# Patient Record
Sex: MALE | Race: WHITE | NOT HISPANIC OR LATINO | Employment: FULL TIME | URBAN - METROPOLITAN AREA
[De-identification: names, ages, dates, MRNs, and addresses within clinical notes are randomized per-mention and may not be internally consistent; named-entity substitution may affect disease eponyms.]

---

## 2017-08-14 ENCOUNTER — TRANSFERRED RECORDS (OUTPATIENT)
Dept: HEALTH INFORMATION MANAGEMENT | Facility: CLINIC | Age: 57
End: 2017-08-14

## 2017-11-16 DIAGNOSIS — I25.10 CORONARY ARTERY DISEASE INVOLVING NATIVE CORONARY ARTERY OF NATIVE HEART WITHOUT ANGINA PECTORIS: Primary | ICD-10-CM

## 2017-11-16 RX ORDER — METOPROLOL SUCCINATE 25 MG/1
TABLET, EXTENDED RELEASE ORAL
Qty: 30 TABLET | Refills: 9 | Status: SHIPPED | OUTPATIENT
Start: 2017-11-16 | End: 2018-10-09 | Stop reason: SDUPTHER

## 2018-01-08 ENCOUNTER — TELEPHONE (OUTPATIENT)
Dept: CARDIOLOGY | Facility: CLINIC | Age: 58
End: 2018-01-08

## 2018-01-08 DIAGNOSIS — E78.5 HYPERLIPIDEMIA, UNSPECIFIED HYPERLIPIDEMIA TYPE: Primary | ICD-10-CM

## 2018-01-08 NOTE — TELEPHONE ENCOUNTER
Spoke with patient and patient told lipid clinic that he forgets to take his medication. Patient told lipid clinic that he now places his medication in bathroom next to his shaving kit. Lipid clinic told patient to obtain lipid panel March 2018 and outpatient orders sent to patient. Patient agreed. Patient requested to be transfered to Dr. Faulkner's care after Dr. Purvis leaves.

## 2018-01-31 DIAGNOSIS — I25.10 CORONARY ARTERY DISEASE INVOLVING NATIVE CORONARY ARTERY OF NATIVE HEART WITHOUT ANGINA PECTORIS: Primary | ICD-10-CM

## 2018-01-31 RX ORDER — LISINOPRIL 5 MG/1
5 TABLET ORAL
Qty: 90 TABLET | Refills: 0 | Status: SHIPPED | OUTPATIENT
Start: 2018-01-31 | End: 2018-05-11 | Stop reason: SDUPTHER

## 2018-04-18 ENCOUNTER — TELEPHONE (OUTPATIENT)
Dept: CARDIOLOGY | Facility: CLINIC | Age: 58
End: 2018-04-18

## 2018-04-18 NOTE — TELEPHONE ENCOUNTER
Spoke with patient and reminded patient to obtain lipid panel and patient told lipid clinic that he forgot to get a lipid panel. Patient will look for outpatient lab orders tonight at home and if he does not find them, he will call lipid panel tomorrow and have lipid clinic send another outpatient lab orders. Lipid clinic agreed. Lipid clinic informed patient that lipid clinic will be out of office Friday April 20th  and returning April 30th.

## 2018-04-27 DIAGNOSIS — E78.5 HYPERLIPIDEMIA, UNSPECIFIED HYPERLIPIDEMIA TYPE: Primary | ICD-10-CM

## 2018-04-27 NOTE — TELEPHONE ENCOUNTER
Spoke with patient and informed of chol results. Lipid clinic told patient to decrease starch foods and pastry's. Patient told lipid clinic that he does not eat pastry;s ,but, eats a lot of potatoes.Patient told lipid clinic that he is trying to lose weight and will start exercising by walking with wife during nice weather months. Patient physically active by going up and down stairs a lot during his work hours and walk on cat walks throughout his day. Lipid clinic told patient to obtain lipid panel in 4 months (time to lose weight). Outpatient lab orders mailed to patient as patient requested.

## 2018-05-11 DIAGNOSIS — I25.10 CORONARY ARTERY DISEASE INVOLVING NATIVE CORONARY ARTERY OF NATIVE HEART WITHOUT ANGINA PECTORIS: ICD-10-CM

## 2018-05-11 RX ORDER — LISINOPRIL 5 MG/1
TABLET ORAL
Qty: 90 TABLET | Refills: 0 | Status: SHIPPED | OUTPATIENT
Start: 2018-05-11 | End: 2018-08-22 | Stop reason: SDUPTHER

## 2018-08-13 ENCOUNTER — CLINICAL SUPPORT (OUTPATIENT)
Dept: URGENT CARE | Facility: URGENT CARE | Age: 58
End: 2018-08-13
Payer: COMMERCIAL

## 2018-08-13 VITALS
DIASTOLIC BLOOD PRESSURE: 80 MMHG | HEART RATE: 79 BPM | OXYGEN SATURATION: 95 % | SYSTOLIC BLOOD PRESSURE: 126 MMHG | RESPIRATION RATE: 16 BRPM | TEMPERATURE: 98.7 F

## 2018-08-13 DIAGNOSIS — Z23 NEED FOR DIPHTHERIA-TETANUS-PERTUSSIS (TDAP) VACCINE: Primary | ICD-10-CM

## 2018-08-13 RX ORDER — ASPIRIN 81 MG/1
81 TABLET ORAL DAILY
COMMUNITY
End: 2018-12-18 | Stop reason: SDUPTHER

## 2018-08-13 ASSESSMENT — PAIN SCALES - GENERAL: PAINLEVEL: 0-NO PAIN

## 2018-08-22 DIAGNOSIS — I25.10 CORONARY ARTERY DISEASE INVOLVING NATIVE CORONARY ARTERY OF NATIVE HEART WITHOUT ANGINA PECTORIS: ICD-10-CM

## 2018-08-22 RX ORDER — LISINOPRIL 5 MG/1
5 TABLET ORAL DAILY
Qty: 60 TABLET | Refills: 0 | Status: SHIPPED | OUTPATIENT
Start: 2018-08-22 | End: 2018-10-09 | Stop reason: SDUPTHER

## 2018-10-09 DIAGNOSIS — I25.10 CORONARY ARTERY DISEASE INVOLVING NATIVE CORONARY ARTERY OF NATIVE HEART WITHOUT ANGINA PECTORIS: ICD-10-CM

## 2018-10-09 DIAGNOSIS — E78.2 MIXED HYPERLIPIDEMIA: Primary | ICD-10-CM

## 2018-10-10 RX ORDER — LISINOPRIL 5 MG/1
5 TABLET ORAL DAILY
Qty: 30 TABLET | Refills: 0 | Status: SHIPPED | OUTPATIENT
Start: 2018-10-10 | End: 2018-11-30 | Stop reason: SDUPTHER

## 2018-10-10 RX ORDER — ATORVASTATIN CALCIUM 80 MG/1
80 TABLET, FILM COATED ORAL NIGHTLY
Qty: 30 TABLET | Refills: 0 | Status: SHIPPED | OUTPATIENT
Start: 2018-10-10 | End: 2018-12-18 | Stop reason: SDUPTHER

## 2018-10-10 RX ORDER — METOPROLOL SUCCINATE 25 MG/1
25 TABLET, EXTENDED RELEASE ORAL DAILY
Qty: 30 TABLET | Refills: 0 | Status: SHIPPED | OUTPATIENT
Start: 2018-10-10 | End: 2018-11-30 | Stop reason: SDUPTHER

## 2018-11-09 ENCOUNTER — IMMUNIZATION (OUTPATIENT)
Dept: URGENT CARE | Facility: URGENT CARE | Age: 58
End: 2018-11-09
Payer: COMMERCIAL

## 2018-11-09 PROCEDURE — 90471 IMMUNIZATION ADMIN: CPT | Performed by: PHYSICIAN ASSISTANT

## 2018-11-09 PROCEDURE — 90686 IIV4 VACC NO PRSV 0.5 ML IM: CPT | Performed by: PHYSICIAN ASSISTANT

## 2018-11-30 ENCOUNTER — TELEPHONE (OUTPATIENT)
Dept: CARDIOLOGY | Facility: CLINIC | Age: 58
End: 2018-11-30

## 2018-11-30 DIAGNOSIS — I25.10 CORONARY ARTERY DISEASE INVOLVING NATIVE CORONARY ARTERY OF NATIVE HEART WITHOUT ANGINA PECTORIS: ICD-10-CM

## 2018-11-30 RX ORDER — METOPROLOL SUCCINATE 25 MG/1
25 TABLET, EXTENDED RELEASE ORAL DAILY
Qty: 30 TABLET | Refills: 0 | Status: SHIPPED | OUTPATIENT
Start: 2018-11-30 | End: 2018-12-18 | Stop reason: SDUPTHER

## 2018-11-30 RX ORDER — LISINOPRIL 5 MG/1
5 TABLET ORAL DAILY
Qty: 30 TABLET | Refills: 0 | Status: SHIPPED | OUTPATIENT
Start: 2018-11-30 | End: 2018-12-18 | Stop reason: SDUPTHER

## 2018-12-18 ENCOUNTER — OFFICE VISIT (OUTPATIENT)
Dept: CARDIOLOGY | Facility: CLINIC | Age: 58
End: 2018-12-18
Payer: COMMERCIAL

## 2018-12-18 VITALS
OXYGEN SATURATION: 91 % | WEIGHT: 198.8 LBS | BODY MASS INDEX: 26.93 KG/M2 | RESPIRATION RATE: 16 BRPM | DIASTOLIC BLOOD PRESSURE: 82 MMHG | HEART RATE: 76 BPM | SYSTOLIC BLOOD PRESSURE: 114 MMHG | HEIGHT: 72 IN

## 2018-12-18 DIAGNOSIS — I25.10 ATHEROSCLEROSIS OF NATIVE CORONARY ARTERY OF NATIVE HEART WITHOUT ANGINA PECTORIS: Primary | ICD-10-CM

## 2018-12-18 DIAGNOSIS — I35.0 NONRHEUMATIC AORTIC (VALVE) STENOSIS: ICD-10-CM

## 2018-12-18 DIAGNOSIS — I25.10 CORONARY ARTERY DISEASE INVOLVING NATIVE CORONARY ARTERY OF NATIVE HEART WITHOUT ANGINA PECTORIS: ICD-10-CM

## 2018-12-18 DIAGNOSIS — F17.210 CIGARETTE NICOTINE DEPENDENCE WITHOUT COMPLICATION: ICD-10-CM

## 2018-12-18 DIAGNOSIS — E78.2 MIXED HYPERLIPIDEMIA: ICD-10-CM

## 2018-12-18 PROBLEM — F17.200 NICOTINE DEPENDENCE, UNSPECIFIED, UNCOMPLICATED: Status: ACTIVE | Noted: 2017-08-03

## 2018-12-18 PROBLEM — Z95.1 HISTORY OF CORONARY ARTERY BYPASS GRAFT X 1: Chronic | Status: ACTIVE | Noted: 2018-12-18

## 2018-12-18 PROBLEM — Z95.2 S/P AVR: Chronic | Status: ACTIVE | Noted: 2018-12-18

## 2018-12-18 PROCEDURE — 99214 OFFICE O/P EST MOD 30 MIN: CPT | Performed by: NURSE PRACTITIONER

## 2018-12-18 RX ORDER — IBUPROFEN 200 MG
1 TABLET ORAL EVERY 24 HOURS
Qty: 45 PATCH | Refills: 0 | Status: SHIPPED | OUTPATIENT
Start: 2018-12-18 | End: 2019-01-17

## 2018-12-18 RX ORDER — LISINOPRIL 5 MG/1
5 TABLET ORAL DAILY
Qty: 90 TABLET | Refills: 3 | Status: SHIPPED | OUTPATIENT
Start: 2018-12-18 | End: 2020-01-30

## 2018-12-18 RX ORDER — NICOTINE 7MG/24HR
1 PATCH, TRANSDERMAL 24 HOURS TRANSDERMAL EVERY 24 HOURS
Qty: 14 PATCH | Refills: 0 | Status: SHIPPED | OUTPATIENT
Start: 2018-12-18 | End: 2019-01-17

## 2018-12-18 RX ORDER — ASPIRIN 81 MG/1
81 TABLET ORAL DAILY
Qty: 90 TABLET | Refills: 3 | Status: SHIPPED | OUTPATIENT
Start: 2018-12-18

## 2018-12-18 RX ORDER — ATORVASTATIN CALCIUM 80 MG/1
80 TABLET, FILM COATED ORAL NIGHTLY
Qty: 90 TABLET | Refills: 3 | Status: SHIPPED | OUTPATIENT
Start: 2018-12-18 | End: 2020-01-30

## 2018-12-18 RX ORDER — METOPROLOL SUCCINATE 25 MG/1
25 TABLET, EXTENDED RELEASE ORAL DAILY
Qty: 90 TABLET | Refills: 3 | Status: SHIPPED | OUTPATIENT
Start: 2018-12-18 | End: 2020-01-30

## 2018-12-18 SDOH — ECONOMIC STABILITY: FOOD INSECURITY
WITHIN THE PAST 12 MONTHS, YOU WORRIED THAT YOUR FOOD WOULD RUN OUT BEFORE YOU GOT THE MONEY TO BUY MORE.: PATIENT DECLINED

## 2018-12-18 SDOH — ECONOMIC STABILITY: TRANSPORTATION INSECURITY
IN THE PAST 12 MONTHS, HAS LACK OF TRANSPORTATION KEPT YOU FROM MEDICAL APPOINTMENTS OR FROM GETTING MEDICATIONS?: PATIENT DECLINED

## 2018-12-18 SDOH — ECONOMIC STABILITY: FOOD INSECURITY: HOW HARD IS IT FOR YOU TO PAY FOR THE VERY BASICS LIKE FOOD, HOUSING, MEDICAL CARE, AND HEATING?: PATIENT DECLINED

## 2018-12-18 SDOH — ECONOMIC STABILITY: FOOD INSECURITY: WITHIN THE PAST 12 MONTHS, THE FOOD YOU BOUGHT JUST DIDN'T LAST AND YOU DIDN'T HAVE MONEY TO GET MORE.: PATIENT DECLINED

## 2018-12-18 ASSESSMENT — ENCOUNTER SYMPTOMS
SHORTNESS OF BREATH: 1
WEIGHT LOSS: 1
MEMORY LOSS: 1
MYALGIAS: 1
DIAPHORESIS: 1
TREMORS: 1
NUMBNESS: 1
BLURRED VISION: 1
SNORING: 1
NIGHT SWEATS: 1
BRUISES/BLEEDS EASILY: 1

## 2018-12-18 ASSESSMENT — ACTIVITIES OF DAILY LIVING (ADL): LACK_OF_TRANSPORTATION: PATIENT DECLINED

## 2018-12-18 ASSESSMENT — PAIN SCALES - GENERAL: PAINLEVEL: 0-NO PAIN

## 2018-12-18 NOTE — PROGRESS NOTES
[facility], later [facility] [place]  [address] [place] [address]                                         Cardiology Outpatient Follow-up Note    Subjective    Patient ID: SHENA Franklin is a 58 y.o. male.    Chief Complaint:   Chief Complaint   Patient presents with   • Coronary Artery Disease   • Aortic valve replacement   • dilated ascending aorta       HPI    This is a pleasant 58-year-old male, patient followed previously by Dr. Purvis.  He has known history as listed below in detail.  He presents today for annual follow-up.  He is been feeling overall well.  He recently switched jobs and is doing more shift work so is a little more tired.  He is active as he is an  climbing up ladders and stairs.  He denies any chest pain/pressure/heaviness. He occasionally has some shortness of breath but relates this to his smoking.  He continues to smoke less than 1 pack/day.  He is interested in quitting with a nicotine patch but is worried that he would replace this with eating more and gaining weight.  He has managed to lose about 10 pounds since May of this year.  His blood pressure today is 114/82.  He denies any lower extremity swelling orthopnea.  At last visit, he did have episodes of dizziness and lightheadedness with blurred vision.  He occasionally will have these but they have improved.  He has not followed up with eye doctor ophthalmologist.  I did recommend this.  He did have carotid ultrasound done at that time showing 16-49% stenosis on the right ICA.  He does not have recent lab work and is scheduled to see his primary care provider in the next month.  We will send a lab slip for CMP and lipid panel.    Cardiac problem list:    Cardiology updated 11/28/2018 by Nela Aggarwal LPN-reviewed BT    Primary Cardiologist: Previously [facility]    1. Coronary Artery Disease  -Inferior MI 11/2013: 1V CABG (SVG-RCA) with concomitant AVR and ascending aortic dacron graft    2. Nonrheumatic  Aortic Valve Stenosis  -s/p Aortic Valve Replacement with a Justin Mitroflow bioprosthetic- 11/2013    -Echo 8/3/2017: mildly globally impaired LV function, EF 45%; mildly dilated and hypocontractile Rv, mild sclerosis of 27mm Justin Mitroflow bioprosthetic aortic valve, mild MVR, mild TVR, Grade I diastolic dysfunction, normal appearing 34mm Dacron Ascending Aorta graft.      3. Dilated Ascending Aorta  -s/p AAA repair with a 34mm Dacron graft- 11/2013    4. Hyperlipidemia  5. Tobacco use  6. Carotid stenosis  -Carotid duplex 8/2017: Right ICA stenosis 16-49%, left ICA stenosis 0-15% stenosis. Suggest follow-up study 1-2 years.    Past Medical History:   Diagnosis Date   • Aortic valve stenosis     s/p AV replacement 11/2013   • Ascending aorta dilatation (CMS/HCC) (HCC)     s/p AAA repair 11/2013   • CAD (coronary artery disease)    • Hyperlipidemia        Past Surgical History:   Procedure Laterality Date   • AAA REPAIR - ENDOGRAFT  11/2013   • AORTIC VALVE REPLACEMENT  11/2013   • CORONARY ANGIOPLASTY  11/2013   • CORONARY ARTERY BYPASS GRAFT  11/2013    1V CABG SVG- RCA       Allergies as of 12/18/2018   • (No Known Allergies)       Current Outpatient Medications   Medication Sig Dispense Refill   • aspirin 81 mg EC tablet Take 81 mg by mouth daily.     • atorvastatin (LIPITOR) 80 mg tablet Take 1 tablet (80 mg total) by mouth nightly. Patient needs appointment for further refills. 30 tablet 0   • lisinopril (PRINIVIL,ZESTRIL) 5 mg tablet Take 1 tablet (5 mg total) by mouth daily Pt has appt on 12/18/18 30 tablet 0   • metoprolol succinate XL (TOPROL-XL) 25 mg 24 hr tablet Take 1 tablet (25 mg total) by mouth daily Pt has appt on 12/18/18 30 tablet 0     No current facility-administered medications for this visit.        History reviewed. No pertinent family history.    Social History     Tobacco Use   • Smoking status: Current Every Day Smoker     Packs/day: 0.75     Years: 30.00     Pack years: 22.50   •  Smokeless tobacco: Never Used   Substance Use Topics   • Alcohol use: Yes     Alcohol/week: 2.4 - 3.0 oz     Types: 4 - 5 Cans of beer per week     Comment: 4-5 beers weekly   • Drug use: No       Review of Systems  Review of Systems   Constitution: Positive for diaphoresis, night sweats and weight loss.   Eyes: Positive for blurred vision.   Cardiovascular: Positive for leg swelling.   Respiratory: Positive for shortness of breath and snoring.    Hematologic/Lymphatic: Bruises/bleeds easily.   Skin: Positive for rash.   Musculoskeletal: Positive for joint pain and myalgias.   Genitourinary: Positive for nocturia.   Neurological: Positive for numbness and tremors.   Psychiatric/Behavioral: Positive for memory loss.       Objective     Vitals:    12/18/18 0754   BP: 114/82   Pulse: 76   Resp: 16   SpO2: 91%     Weight: 90.2 kg (198 lb 12.8 oz)  Physical Exam   Constitutional: He is oriented to person, place, and time. He appears well-developed.   Neck: Normal range of motion. Neck supple. No JVD present.   Cardiovascular: Normal rate, regular rhythm, normal heart sounds and intact distal pulses.   No murmur heard.  Pulmonary/Chest: Effort normal and breath sounds normal.   Abdominal: Soft. Bowel sounds are normal.   Musculoskeletal: Normal range of motion. He exhibits no edema.   Neurological: He is alert and oriented to person, place, and time.   Skin: Skin is warm and dry.   Psychiatric: He has a normal mood and affect.       Data Review:   Lab Results   Component Value Date     12/22/2017    K 4.6 12/22/2017     12/22/2017    CO2 29 12/22/2017    BUN 19 12/22/2017    CREATININE 1.0 12/22/2017    GLUCOSE 98 12/22/2017    CALCIUM 9.6 12/22/2017     No results found for: CKTOTAL, CKMB, CKMBINDEX, TROPONINI, BNP, POCBNP  Lab Results   Component Value Date    WBC 5.30 12/22/2017    HCT 50.80 12/22/2017    MCV 93.90 12/22/2017    .00 12/22/2017     Lab Results   Component Value Date    CHOL 81  04/21/2018    TRIG 177 04/21/2018    HDL 53 04/21/2018       No results found for: TSH    Lab Results   Component Value Date    CHOL 81 04/21/2018      Lab Results   Component Value Date    HDL 53 04/21/2018      Lab Results   Component Value Date    LDLCALC 108 04/21/2018      Lab Results   Component Value Date    TRIG 177 04/21/2018                   Assessment/Plan   Diagnoses and all orders for this visit:    Atherosclerosis of native coronary artery of native heart without angina pectoris    Nonrheumatic aortic (valve) stenosis    Mixed hyperlipidemia    Cigarette nicotine dependence without complication        Plan  Coronary artery disease: Patient is doing well from a cardiac standpoint.  He is active without anginal symptoms.  Most recent lipid panel 4/2018 demonstrated .  Would recommend this less than 70.  He continues to be on atorvastatin 80 mg daily.  No recent lipid panel on file, will send lab slip in addition to a CMP to be done in the next month.  If LDL continues to be elevated we may need to add on Zetia.  Patient is amenable to this plan.  Otherwise, will continue current therapy with beta-blocker, aspirin, and statin therapy.    History of AVR: Patient has history of aortic valve replacement with ascending aortic Dacron graft completed in 2013.  Most recent echocardiogram 8/2017 showed EF 45% with mild sclerosis of the 27 mm Justin Mitroflow bioprosthetic aortic valve, normal-appearing 34 mm Dacron ascending aortic graft.  We will continue to monitor.    Ischemic cardiomyopathy: Patient has known history of ischemic cardia myopathy with EF 45% noted on echocardiogram 8/2017.  He denies any heart failure symptoms.  NYHA class I.  Continue with line directed medical therapy with beta-blocker and ACE inhibitor.    Tobacco use: Patient continues to smoke less than 1 pack/day.  He is interested in quitting.  Will order 14 mg nicotine patch for 6 weeks then 7 mg patch for 2 weeks.  Encouraged  him to participate in routine exercise.  Find other ways of coping with his stress other than adding on more food.    Carotid stenosis: Most recent carotid duplex 8/2017 showed right ICA stenosis 16-49%, left ICA stenosis 0-15% stenosis.  Suggest follow-up study 1-2 years.  Continue aspirin and high intensity statin therapy.  Patient does have occasional blurred vision and dizziness.  Did recommend he follow-up with his ophthalmologist. May need to rule out PFO if continues. Denies any palpitations.     Patient Instructions   Routine exercise. Eat a heart healthy diet.      Return in about 1 year (around 12/18/2019), or sooner if needed.    The patient verbalized correct understanding and agreement to today's instructuctions and plan.  He voiced that questions have been addressed.    Some sections of this report may have been generated using a voice to text program.  Every effort is made to correct errors.  If mistakes are found they should be taken in context.    Electronically signed by: ALEXANDER GALVAN CNP  12/18/2018  7:56 AM

## 2019-01-03 ENCOUNTER — TELEPHONE (OUTPATIENT)
Dept: CARDIOLOGY | Facility: CLINIC | Age: 59
End: 2019-01-03

## 2019-01-03 NOTE — TELEPHONE ENCOUNTER
----- Message from Nadege Coombs CNP sent at 1/2/2019  4:11 PM [facility] -----  Please notify patient his kidney function, potassium, and liver function all look within normal range. I did not receive lipid panel results.  Thanks,  Nadege Coombs CNP

## 2019-01-03 NOTE — TELEPHONE ENCOUNTER
Patient has been notified of normal lab results and verbalized understanding. Patient will contact the clinic with any questions or concerns.

## 2019-01-07 ENCOUNTER — TELEPHONE (OUTPATIENT)
Dept: CARDIOLOGY | Facility: CLINIC | Age: 59
End: 2019-01-07

## 2019-01-07 DIAGNOSIS — E78.5 HYPERLIPIDEMIA, UNSPECIFIED HYPERLIPIDEMIA TYPE: Primary | ICD-10-CM

## 2019-01-07 RX ORDER — EZETIMIBE 10 MG/1
10 TABLET ORAL DAILY
Qty: 30 TABLET | Refills: 11 | Status: SHIPPED | OUTPATIENT
Start: 2019-01-07 | End: 2019-01-07 | Stop reason: SDUPTHER

## 2019-01-07 RX ORDER — EZETIMIBE 10 MG/1
10 TABLET ORAL DAILY
Qty: 90 TABLET | Refills: 3 | Status: SHIPPED | OUTPATIENT
Start: 2019-01-07 | End: 2020-01-07

## 2019-01-07 NOTE — TELEPHONE ENCOUNTER
----- Message from Nadege Coombs CNP sent at 1/7/2019  7:50 AM MST -----  Please notify patient of lipid panel results. LDL is recommended to be less than 70. Please assure he is taking his atorvastatin. Please add Zetia 10 mg daily. Advise heart healthy diet.  Thanks,  Nadege Coombs CNP

## 2019-01-07 NOTE — TELEPHONE ENCOUNTER
This has already been called on. [place]ney on 1/7/2019. Patient was made aware of addition of the Zetia.

## 2019-01-07 NOTE — TELEPHONE ENCOUNTER
Spoke with patient informed chol results. Patient told lipid clinic has lost 10 pounds due to change in hours at work (more nights), eats more portion sizes. Educated patient to healthy eating, less saturated trans fat foods, continue eating in portions size and exercise. Patient informed of ordering Zetia 10 mg to help lower chol. Patient agreed. Zetia 10 mg 90 tablet 3 refills ordered.

## 2019-11-01 ENCOUNTER — IMMUNIZATION (OUTPATIENT)
Dept: URGENT CARE | Facility: URGENT CARE | Age: 59
End: 2019-11-01
Payer: COMMERCIAL

## 2019-11-01 PROCEDURE — 90686 IIV4 VACC NO PRSV 0.5 ML IM: CPT | Mod: FLU

## 2019-11-01 PROCEDURE — 90471 IMMUNIZATION ADMIN: CPT | Mod: FLU

## 2020-01-30 DIAGNOSIS — E78.2 MIXED HYPERLIPIDEMIA: ICD-10-CM

## 2020-01-30 DIAGNOSIS — E78.2 MIXED HYPERLIPIDEMIA: Primary | ICD-10-CM

## 2020-01-30 DIAGNOSIS — I25.10 CORONARY ARTERY DISEASE INVOLVING NATIVE CORONARY ARTERY OF NATIVE HEART WITHOUT ANGINA PECTORIS: ICD-10-CM

## 2020-01-30 RX ORDER — NITROGLYCERIN 0.4 MG/1
0.4 TABLET SUBLINGUAL EVERY 5 MIN PRN
COMMUNITY
Start: 2013-10-30 | End: 2022-08-03 | Stop reason: SDUPTHER

## 2020-01-30 RX ORDER — ATORVASTATIN CALCIUM 80 MG/1
80 TABLET, FILM COATED ORAL DAILY
Qty: 30 TABLET | Refills: 1 | Status: SHIPPED | OUTPATIENT
Start: 2020-01-30 | End: 2020-03-16 | Stop reason: SDUPTHER

## 2020-01-30 RX ORDER — LISINOPRIL 5 MG/1
5 TABLET ORAL DAILY
Qty: 30 TABLET | Refills: 1 | Status: SHIPPED | OUTPATIENT
Start: 2020-01-30 | End: 2020-03-16 | Stop reason: SDUPTHER

## 2020-01-30 RX ORDER — METOPROLOL SUCCINATE 25 MG/1
25 TABLET, EXTENDED RELEASE ORAL DAILY
Qty: 30 TABLET | Refills: 1 | Status: SHIPPED | OUTPATIENT
Start: 2020-01-30 | End: 2020-03-16 | Stop reason: SDUPTHER

## 2020-01-30 NOTE — TELEPHONE ENCOUNTER
Called into [facility] refills on metoprolol succinate XL 25 mg 1 tab by mouth daily #30 with 1 refill, lisinopril 5 mg #30 with 1 refill, and atorvastatin 80 mg 1 tab nightly 30 with 1 refill as patient needs a follow up appointment and labs prior to any further refills. Patient is scheduled to see Nadege Marin CNP on 3-16-20 Vclement,RN        +                        +

## 2020-03-16 ENCOUNTER — OFFICE VISIT (OUTPATIENT)
Dept: CARDIOLOGY | Facility: CLINIC | Age: 60
End: 2020-03-16
Payer: COMMERCIAL

## 2020-03-16 ENCOUNTER — TELEPHONE (OUTPATIENT)
Dept: CARDIOLOGY | Facility: CLINIC | Age: 60
End: 2020-03-16

## 2020-03-16 VITALS
HEIGHT: 72 IN | SYSTOLIC BLOOD PRESSURE: 120 MMHG | OXYGEN SATURATION: 95 % | HEART RATE: 76 BPM | BODY MASS INDEX: 26.59 KG/M2 | WEIGHT: 196.3 LBS | DIASTOLIC BLOOD PRESSURE: 86 MMHG

## 2020-03-16 DIAGNOSIS — I25.10 CORONARY ARTERY DISEASE INVOLVING NATIVE CORONARY ARTERY OF NATIVE HEART WITHOUT ANGINA PECTORIS: ICD-10-CM

## 2020-03-16 DIAGNOSIS — I65.23 OCCLUSION AND STENOSIS OF BILATERAL CAROTID ARTERIES: ICD-10-CM

## 2020-03-16 DIAGNOSIS — E78.5 HYPERLIPIDEMIA, UNSPECIFIED HYPERLIPIDEMIA TYPE: Primary | ICD-10-CM

## 2020-03-16 DIAGNOSIS — E78.2 MIXED HYPERLIPIDEMIA: ICD-10-CM

## 2020-03-16 DIAGNOSIS — Z95.4 HISTORY OF ALLOGRAFT AORTIC VALVE REPLACEMENT: Primary | ICD-10-CM

## 2020-03-16 PROCEDURE — 99214 OFFICE O/P EST MOD 30 MIN: CPT | Performed by: NURSE PRACTITIONER

## 2020-03-16 RX ORDER — METOPROLOL SUCCINATE 25 MG/1
25 TABLET, EXTENDED RELEASE ORAL DAILY
Qty: 90 TABLET | Refills: 4 | Status: SHIPPED | OUTPATIENT
Start: 2020-03-16 | End: 2021-03-26

## 2020-03-16 RX ORDER — LISINOPRIL 5 MG/1
5 TABLET ORAL DAILY
Qty: 90 TABLET | Refills: 4 | Status: SHIPPED | OUTPATIENT
Start: 2020-03-16 | End: 2021-03-26

## 2020-03-16 RX ORDER — ATORVASTATIN CALCIUM 80 MG/1
80 TABLET, FILM COATED ORAL DAILY
Qty: 90 TABLET | Refills: 4 | Status: SHIPPED | OUTPATIENT
Start: 2020-03-16 | End: 2021-04-09 | Stop reason: SDUPTHER

## 2020-03-16 ASSESSMENT — ENCOUNTER SYMPTOMS
SPUTUM PRODUCTION: 1
POLYDIPSIA: 1
TREMORS: 1
SHORTNESS OF BREATH: 1
JOINT SWELLING: 1

## 2020-03-16 ASSESSMENT — PAIN SCALES - GENERAL: PAINLEVEL: 0-NO PAIN

## 2020-03-16 NOTE — PROGRESS NOTES
[facility] [place]  [address] [place] [address]                                         Cardiology Outpatient Follow-up Note    Subjective    Patient ID: SHENA Franklin is a 59 y.o. male.    Chief Complaint:   Chief Complaint   Patient presents with   • Coronary Artery Disease   • Hyperlipidemia   • Cardiomyopathy       HPI    This is a pleasant 59-year-old male, patient followed previously by Dr. marin.  He has known history of coronary disease with CABG x1 with SVG to RCA with concomitant AVR (Justin Mitroflow bioprosthetic valve) and ascending aortic dacron graft in 2013.  His most recent echocardiogram May 2017 showed EF 45%, 27 mm Justin Mitroflow bioprosthetic aortic valve with mild sclerosis, normal-appearing 34 mm Dacron ascending aortic graft.  He also has hyperlipidemia, carotid stenosis with recent duplex 8/2017 showing right ICA stenosis 16 to 49% and left ICA stenosis 0 to 15%.    Patient presents today for annual follow-up.  He is been feeling overall well.  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  He has 2 new grandbaby's a year and a half and 7 weeks old.  He is hoping he can get back to day shift so he can have more time with his grandkids.  He stays very active with his job and denies any chest pain.  He does have some chronic shortness of breath with his smoking but has not worsened.  He denies any lightheaded or dizziness.  No palpitations.  Occasionally some right ankle swelling due to his arthritis but otherwise no significant lower extremity swelling.  No orthopnea.  He does continue to smoke about a pack per day.  Last year I did send in some nicotine patches and he still has them but never started on.  He may consider this.    Cardiac problem list:    Cardiology updated 11/28/2018 by Nela Aggarwal LPN-reviewed BT    Primary Cardiologist: Previously  [place]    1. Coronary Artery Disease  -Inferior MI 11/2013: 1V CABG (SVG-RCA) with concomitant AVR and ascending aortic dacron graft    2. Nonrheumatic Aortic Valve Stenosis  -s/p Aortic Valve Replacement with a Justin Mitroflow bioprosthetic- 11/2013    -Echo 8/3/2017: mildly globally impaired LV function, EF 45%; mildly dilated and hypocontractile Rv, mild sclerosis of 27mm Justin Mitroflow bioprosthetic aortic valve, mild MVR, mild TVR, Grade I diastolic dysfunction, normal appearing 34mm Dacron Ascending Aorta graft.      3. Dilated Ascending Aorta  -s/p repair with a 34mm Dacron graft- 11/2013    4. Hyperlipidemia  5. Tobacco use  6. Carotid stenosis  -Carotid duplex 8/2017: Right ICA stenosis 16-49%, left ICA stenosis 0-15% stenosis. Suggest follow-up study 1-2 years.    Past Medical History:   Diagnosis Date   • Aortic valve stenosis     s/p AV replacement 11/2013   • Ascending aorta dilatation (CMS/HCC) (HCC)     s/p AAA repair 11/2013   • CAD (coronary artery disease)    • Hyperlipidemia        Past Surgical History:   Procedure Laterality Date   • AAA REPAIR - ENDOGRAFT  11/2013   • AORTIC VALVE REPLACEMENT  11/2013   • CORONARY ANGIOPLASTY  11/2013   • CORONARY ARTERY BYPASS GRAFT  11/2013    1V CABG SVG- RCA       Allergies as of 03/16/2020 - Reviewed 03/16/2020   Allergen Reaction Noted   • Ezetimibe Rash 03/16/2020       Current Outpatient Medications   Medication Sig Dispense Refill   • metoprolol succinate XL (TOPROL-XL) 25 mg 24 hr tablet Take 1 tablet (25 mg total) by mouth daily 30 tablet 1   • lisinopril (PRINIVIL,ZESTRIL) 5 mg tablet Take 1 tablet (5 mg total) by mouth daily 30 tablet 1   • atorvastatin (LIPITOR) 80 mg tablet Take 1 tablet (80 mg total) by mouth daily 30 tablet 1   • nitroglycerin (Nitrostat) 0.4 mg SL tablet As needed.     • aspirin 81 mg EC tablet Take 1 tablet (81 mg total) by mouth daily 90 tablet 3     No current facility-administered medications for this visit.         Family History   Problem Relation Age of Onset   • Heart disease Father    • Aneurysm Father        Social History     Tobacco Use   • Smoking status: Current Every Day Smoker     Packs/day: 0.75     Years: 30.00     Pack years: 22.50   • Smokeless tobacco: Never Used   Substance Use Topics   • Alcohol use: Yes     Alcohol/week: 4.0 - 5.0 standard drinks     Types: 4 - 5 Cans of beer per week     Comment: 4-5 beers weekly   • Drug use: No       Review of Systems  Review of Systems   Constitution: Positive for malaise/fatigue.   Cardiovascular: Positive for dyspnea on exertion.   Respiratory: Positive for shortness of breath and sputum production.    Endocrine: Positive for polydipsia and polyuria.   Skin: Positive for rash.   Musculoskeletal: Positive for joint pain, joint swelling and muscle weakness.   Genitourinary: Positive for nocturia.   Neurological: Positive for tremors.   All other systems reviewed and are negative.      Objective     Vitals:    03/16/20 1311   BP: 120/86   Pulse: 76   SpO2: 95%   Height: 1.829 m (6')   Weight: 89 kg (196 lb 4.8 oz)   PainSc: 0-No pain   Patient Position: Sitting     Weight: 89 kg (196 lb 4.8 oz)  Physical Exam   Constitutional: He is oriented to person, place, and time. He appears well-developed.   Neck: Normal range of motion. Neck supple.   Cardiovascular: Normal rate, regular rhythm and intact distal pulses.   Murmur heard.  Carotid bruits left greater than right.   Pulmonary/Chest: Effort normal and breath sounds normal.   Abdominal: Soft. Bowel sounds are normal.   Musculoskeletal: Normal range of motion.         General: No edema.   Neurological: He is alert and oriented to person, place, and time.   Skin: Skin is warm and dry.   Psychiatric: He has a normal mood and affect.       Data Review:   Sodium   Date Value Ref Range Status   03/12/2020 138 mmol/L Final     Potassium   Date Value Ref Range Status   03/12/2020 4.6 mmol/L Final     Chloride   Date Value  Ref Range Status   03/12/2020 103 mmol/L Final     CO2   Date Value Ref Range Status   03/12/2020 30 mmol/L Final     BUN   Date Value Ref Range Status   03/12/2020 18 mg/dL Final     Creatinine   Date Value Ref Range Status   03/12/2020 0.95 mg/dL Final     Glucose   Date Value Ref Range Status   03/12/2020 96 mg/dL Final     Calcium   Date Value Ref Range Status   03/12/2020 9.8 mg/dL Final     External WBC   Date Value Ref Range Status   03/12/2020 6.1 10*3/mL Final     External Hemoglobin   Date Value Ref Range Status   03/12/2020 18.3 g/dL Final     External Hematocrit   Date Value Ref Range Status   03/12/2020 52.5 % Final     External MCV   Date Value Ref Range Status   03/12/2020 93 fL Final     External Platelets   Date Value Ref Range Status   03/12/2020 142 10*3/uL Final     Cholesterol   Date Value Ref Range Status   03/12/2020 239 mg/dL Final     Triglycerides   Date Value Ref Range Status   03/12/2020 90 mg/dL Final     HDL   Date Value Ref Range Status   03/12/2020 61 mg/dL Final     LDL Calculated   Date Value Ref Range Status   03/12/2020 160 mg/dL Final       No results found for: TSH    Lab Results   Component Value Date    CHOL 239 03/12/2020      Lab Results   Component Value Date    HDL 61 03/12/2020      Lab Results   Component Value Date    LDLCALC 160 03/12/2020      Lab Results   Component Value Date    TRIG 90 03/12/2020      No results found for: TROPHS, UMOZAQ2AQ, HSDELTA1, FEONVD7GH, HSDELTA2       Assessment/Plan   Diagnoses and all orders for this visit:    History of allograft aortic valve replacement  -     US Echo complete; Future    Coronary artery disease involving native coronary artery of native heart without angina pectoris  -     metoprolol succinate XL (TOPROL-XL) 25 mg 24 hr tablet; Take 1 tablet (25 mg total) by mouth daily  -     lisinopriL (PRINIVIL,ZESTRIL) 5 mg tablet; Take 1 tablet (5 mg total) by mouth daily  -     atorvastatin (LIPITOR) 80 mg tablet; Take 1 tablet  (80 mg total) by mouth daily    Mixed hyperlipidemia  -     atorvastatin (LIPITOR) 80 mg tablet; Take 1 tablet (80 mg total) by mouth daily    Occlusion and stenosis of bilateral carotid arteries  -     US Carotid duplex bilateral; Future        Plan  Coronary artery disease: Patient has known history of CABG x1.  He stays very active without anginal symptoms. His most recent echocardiogram in 2017 did show an EF of 45%.  He is on Toprol-XL and lisinopril.  His LDL continues to be elevated at 160.  He is on high intensity statin therapy with atorvastatin 80 mg daily.  Last year we tried adding Zetia however he did develop a rash on his leg and abdomen.  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  Will have him follow-up with the lipid clinic to look into PCSK9 inhibitors such as Repatha or Praluent coverage.  Otherwise may benefit from research study to get his LDL down. Goal LDL less than 70.  Otherwise continue beta-blocker, aspirin, and statin therapy.    Aortic valve replacement: Patient history of aortic valve replacement with Justin Mitroflow Bioprosthetic valve in 2013 with 34 mm Dacron graft to the ascending aorta.  Most recent surveillance was 2017.  Will repeat echocardiogram.    Carotid stenosis: Patient was noted to have right ICA stenosis of 16 to 40% in 2017.  He has been on statin therapy.  He continues to smoke and his LDL is not well controlled.  We will repeat carotid ultrasound to be completed in the next few weeks.    Tobacco use: Patient has history of tobacco use with a pack per day.  Last year we talked about nicotine patches and he did get these ordered and has them still but has not started them.  Did encourage him to start as previously ordered.  He will notify clinic if he has any questions or needs further prescription.    Patient Instructions   -Will discuss with lipid clinic for PCSK9 inhibitor injections  for cholesterol management.   -Echocardiogram and carotid ultrasound in the next month.  -Heart healthy diet  -Routine exercise.     Return in about 1 year (around 3/16/2021), or sooner if needed.    The patient verbalized correct understanding and agreement to today's instructuctions and plan.  He voiced that questions have been addressed.    Some sections of this report may have been generated using a voice to text program.  Every effort is made to correct errors.  If mistakes are found they should be taken in context.    Electronically signed by: Nadege Marin, CNP

## 2020-03-16 NOTE — PATIENT INSTRUCTIONS
-Will discuss with lipid clinic for PCSK9 inhibitor injections for cholesterol management.   -Echocardiogram and carotid ultrasound in the next month.  -Heart healthy diet  -Routine exercise.

## 2020-03-16 NOTE — TELEPHONE ENCOUNTER
Nadege Marin CNP ordered PCSK 9. Lipid clinic visited with patient informed PCSK 9 injection process, cost, ordering process, side effects etc. Patient agrees with starting Repatha. E scribed Repatha 140 mg sure click pen to [facility] for ins approval. Patient told [facility] will call with ins approval status.Repatha will be obtained through [facility].

## 2020-03-17 ENCOUNTER — SPECIALTY PHARMACY (OUTPATIENT)
Dept: PHARMACY | Facility: HOSPITAL | Age: 60
End: 2020-03-17

## 2020-03-17 NOTE — PROGRESS NOTES
Prescription/Therapy Management Communication    Medication name:Repatha  Prescribed by: EVERETTE Marin    Patient copay prior to copay assistance:  $0.00  Patient actually paid (out-of-pocket): $0.00    Patient will start medication on:  03/17/2020    Patient enrolled in [facility] Therapy management program:   ____ Yes    __x__ No (pt opted out)        ____ Other:    Patient will receive:            Medication information/education   ___x__ Verbal   ___x__ Written  Disease state information/education        _____ Verbal         ___x__ Written     Injection Training (when applicable)   _____ Yes    _____ No  Sharps container (when applicable)            __x___ Yes          _____ No    Other:     _x___ alcohol wipes        ____ pill boxes     ____ medication card       ____ pen needles  ____ starter package ()              ____ training pen/device      Additional Notes: Gopal was counseled via phone on Repatha. All counseling points were discussed. injection training was done via phone. Injection instructions and medication information was also given to Gopal. He was advised to go to [employer] if he wants to watch injection video. He hads no further questions for the pharmacist at this time.      If you have any questions please call [facility] at [phone]

## 2020-03-24 ENCOUNTER — SPECIALTY PHARMACY (OUTPATIENT)
Dept: PHARMACY | Facility: HOSPITAL | Age: 60
End: 2020-03-24

## 2020-03-24 NOTE — PROGRESS NOTES
Spoke with Gopal for first follow up after initiation of therapy.  He reports that he did start therapy on 3/17 and was able to self-administer the first dose of his Repatha in his right upper thigh without difficulty.  His wife who is a nurse was present to help him.  He denies any adverse effect of medication including injection site reaction.  He is confident that he will not have any problem with future injections and states that he has a reminder marked on his calendar when his next injection is due.  He verifies that he has 5 syringes left and is storing them in the refrigerator as directed.  He has no questions or concerns at this time.  We reviewed the proactive refill process and I explained that we will be contacting him soon after he administers his last dose on hand in order to set up delivery or  of his medication, Gopal verbalizes understanding.  He has opted out of therapy management, therefore profile will be inactivated in [facility].

## 2020-04-10 ENCOUNTER — APPOINTMENT (OUTPATIENT)
Dept: RESEARCH | Facility: OTHER | Age: 60
End: 2020-04-10
Payer: COMMERCIAL

## 2020-04-27 ENCOUNTER — ANCILLARY PROCEDURE (OUTPATIENT)
Dept: CARDIOLOGY | Facility: CLINIC | Age: 60
End: 2020-04-27
Payer: COMMERCIAL

## 2020-04-27 ENCOUNTER — TRANSFERRED RECORDS (OUTPATIENT)
Dept: HEALTH INFORMATION MANAGEMENT | Facility: CLINIC | Age: 60
End: 2020-04-27

## 2020-04-27 VITALS
WEIGHT: 196 LBS | BODY MASS INDEX: 26.55 KG/M2 | HEIGHT: 72 IN | DIASTOLIC BLOOD PRESSURE: 62 MMHG | SYSTOLIC BLOOD PRESSURE: 110 MMHG

## 2020-04-27 VITALS — SYSTOLIC BLOOD PRESSURE: 104 MMHG | DIASTOLIC BLOOD PRESSURE: 61 MMHG

## 2020-04-27 LAB
EJECTION FRACTION: 44 %
EJECTION FRACTION: 45 %

## 2020-04-27 PROCEDURE — 93880 EXTRACRANIAL BILAT STUDY: CPT | Performed by: INTERNAL MEDICINE

## 2020-04-27 PROCEDURE — 93306 TTE W/DOPPLER COMPLETE: CPT | Performed by: INTERNAL MEDICINE

## 2021-02-03 DIAGNOSIS — E78.5 HYPERLIPIDEMIA, UNSPECIFIED HYPERLIPIDEMIA TYPE: ICD-10-CM

## 2021-02-05 RX ORDER — EVOLOCUMAB 140 MG/ML
140 INJECTION, SOLUTION SUBCUTANEOUS
Qty: 6 PEN | Refills: 3 | Status: SHIPPED | OUTPATIENT
Start: 2021-02-05 | End: 2022-01-17 | Stop reason: SDUPTHER

## 2021-03-03 ENCOUNTER — CLINICAL SUPPORT (OUTPATIENT)
Dept: FAMILY MEDICINE | Facility: CLINIC | Age: 61
End: 2021-03-03

## 2021-03-03 DIAGNOSIS — Z23 ENCOUNTER FOR VACCINATION: Primary | ICD-10-CM

## 2021-03-22 ENCOUNTER — TELEPHONE (OUTPATIENT)
Dept: CARDIOLOGY | Facility: CLINIC | Age: 61
End: 2021-03-22

## 2021-03-22 DIAGNOSIS — E78.2 MIXED HYPERLIPIDEMIA: Primary | ICD-10-CM

## 2021-03-22 NOTE — TELEPHONE ENCOUNTER
Spoke with patient informed needing lipid panel, needing PA for Repatha. Patient will obtain lipid panel at [facility] next week. Lipid panel orders faxed to [facility] [phone]

## 2021-03-24 ENCOUNTER — CLINICAL SUPPORT (OUTPATIENT)
Dept: FAMILY MEDICINE | Facility: CLINIC | Age: 61
End: 2021-03-24

## 2021-03-24 DIAGNOSIS — Z23 ENCOUNTER FOR VACCINATION: Primary | ICD-10-CM

## 2021-03-26 DIAGNOSIS — I25.10 CORONARY ARTERY DISEASE INVOLVING NATIVE CORONARY ARTERY OF NATIVE HEART WITHOUT ANGINA PECTORIS: ICD-10-CM

## 2021-03-26 RX ORDER — LISINOPRIL 5 MG/1
TABLET ORAL
Qty: 90 TABLET | Refills: 0 | Status: SHIPPED | OUTPATIENT
Start: 2021-03-26 | End: 2021-07-14 | Stop reason: SDUPTHER

## 2021-03-26 RX ORDER — METOPROLOL SUCCINATE 25 MG/1
TABLET, EXTENDED RELEASE ORAL
Qty: 90 TABLET | Refills: 0 | Status: SHIPPED | OUTPATIENT
Start: 2021-03-26 | End: 2021-07-14 | Stop reason: SDUPTHER

## 2021-04-01 ENCOUNTER — TELEPHONE (OUTPATIENT)
Dept: CARDIOLOGY | Facility: CLINIC | Age: 61
End: 2021-04-01

## 2021-04-01 NOTE — TELEPHONE ENCOUNTER
Spoke with patient's wife Robina, informed  needing lipid panel for patient. Robina will tell patient and will obtain lipid panel either Saturday April 3rd or Monday April 5th 2021.

## 2021-04-06 ENCOUNTER — TELEPHONE (OUTPATIENT)
Dept: CARDIOLOGY | Facility: CLINIC | Age: 61
End: 2021-04-06

## 2021-04-09 ENCOUNTER — TELEPHONE (OUTPATIENT)
Dept: CARDIOLOGY | Facility: CLINIC | Age: 61
End: 2021-04-09

## 2021-04-09 DIAGNOSIS — I25.10 CORONARY ARTERY DISEASE INVOLVING NATIVE CORONARY ARTERY OF NATIVE HEART WITHOUT ANGINA PECTORIS: ICD-10-CM

## 2021-04-09 DIAGNOSIS — E78.2 MIXED HYPERLIPIDEMIA: ICD-10-CM

## 2021-04-09 RX ORDER — ATORVASTATIN CALCIUM 80 MG/1
80 TABLET, FILM COATED ORAL DAILY
Qty: 90 TABLET | Refills: 3 | Status: SHIPPED | OUTPATIENT
Start: 2021-04-09 | End: 2022-08-03 | Stop reason: SDUPTHER

## 2021-04-09 NOTE — TELEPHONE ENCOUNTER
Spoke with patient discussed April 5th lipids with patient, started taking Repatha starting Sept 2020. No issues with Repatha, continues taking Atorvastatin 80 mg with no side effects. E scribed Atorvastatin 80 mg 90 tablets 3 refills. Does a lot of walking at work 3 miles daily, yard work during nice weather. Msg to schedulers to place on recall to schedule appt with Dr Gomez and schedule lab at [facility] lab in Oct 2021.

## 2021-04-15 DIAGNOSIS — E78.2 MIXED HYPERLIPIDEMIA: Primary | ICD-10-CM

## 2021-07-14 ENCOUNTER — OFFICE VISIT (OUTPATIENT)
Dept: CARDIOLOGY | Facility: CLINIC | Age: 61
End: 2021-07-14
Payer: COMMERCIAL

## 2021-07-14 ENCOUNTER — TRANSFERRED RECORDS (OUTPATIENT)
Dept: HEALTH INFORMATION MANAGEMENT | Facility: CLINIC | Age: 61
End: 2021-07-14

## 2021-07-14 VITALS
OXYGEN SATURATION: 96 % | BODY MASS INDEX: 26.57 KG/M2 | SYSTOLIC BLOOD PRESSURE: 110 MMHG | HEIGHT: 73 IN | DIASTOLIC BLOOD PRESSURE: 72 MMHG | WEIGHT: 200.5 LBS | HEART RATE: 74 BPM

## 2021-07-14 DIAGNOSIS — E78.2 MIXED HYPERLIPIDEMIA: ICD-10-CM

## 2021-07-14 DIAGNOSIS — Z95.2 AORTIC VALVE REPLACED: ICD-10-CM

## 2021-07-14 DIAGNOSIS — I25.10 CORONARY ARTERY DISEASE INVOLVING NATIVE CORONARY ARTERY OF NATIVE HEART WITHOUT ANGINA PECTORIS: Primary | ICD-10-CM

## 2021-07-14 PROCEDURE — 93005 ELECTROCARDIOGRAM TRACING: CPT | Performed by: INTERNAL MEDICINE

## 2021-07-14 PROCEDURE — 99214 OFFICE O/P EST MOD 30 MIN: CPT | Mod: 25 | Performed by: INTERNAL MEDICINE

## 2021-07-14 RX ORDER — LISINOPRIL 5 MG/1
5 TABLET ORAL DAILY
Qty: 90 TABLET | Refills: 3 | Status: SHIPPED | OUTPATIENT
Start: 2021-07-14 | End: 2022-08-03 | Stop reason: SDUPTHER

## 2021-07-14 RX ORDER — METOPROLOL SUCCINATE 25 MG/1
25 TABLET, EXTENDED RELEASE ORAL DAILY
Qty: 90 TABLET | Refills: 3 | Status: SHIPPED | OUTPATIENT
Start: 2021-07-14 | End: 2021-08-20 | Stop reason: SDUPTHER

## 2021-07-14 ASSESSMENT — PAIN SCALES - GENERAL: PAINLEVEL: 0-NO PAIN

## 2021-07-14 NOTE — PROGRESS NOTES
Cardiology Outpatient Note                                            MECCA Murry Notes:     Chief Complaint   Patient presents with   • Coronary Artery Disease     S/P CABG 1v 11/6/13    • Hx of AVR       Assessment/ Plan:    Coronary artery disease  Status post CABG x1 vessel (SVG to RCA).  ECG reviewed in clinic today shows normal sinus rhythm with rate IVCD and poor R wave progression through the precordial leads.  -No angina  -GDMT: Aspirin, beta-blocker, lisinopril  -Cardiac diet  -We will have our nutritionist Juliane reach out to patient    Ischemic cardiomyopathy  Last echo 4/2020 showed EF 44% with inferior wall hypokinesis.  NYHA/AHA 1/C  -Volume status: Warm and dry  -Diuretic strategy: None needed at present  -GDMT: Lisinopril, Toprol  -Repeat echo and will decide on more aggressive medical therapy if needed depending on ultrasound finding    Dyslipidemia  Last lipid panel from 4/2021 showed LDL 41, HDL 65, triglycerides 91, non-HDL 58.  LDL less than 70 non-HDL less than 100.  Unable to tolerate Zetia secondary to rash.  -Continue Lipitor 80 mg nightly  -Repatha  -Counseled on plant-based diet, increasing fiber intake, looking into aged garlic extract  -Offered patient meet with our nutritionist Juliane  -Needs hemoglobin A1c checked  -Given young age of onset of CAD would be reasonable to check a LP(a)    Carotid artery stenosis  Less than 50% stenosis in bilateral internal carotid arteries and has remained stable.  -We will likely repeat in the next 2 to 3 years to reassess stability    History of aortic valve replacement  History of ascending aortic aneurysm repair  In 2013 patient had a Justin Mitroflow bioprosthetic aortic valve replacement with a 34 mm 34 mm dacryon ascending aortic graft    Tobacco Abuse  -Encourage cessation and offered nicotine replacement therapy    Sleep disordered breathing  High stop bang score  -May be reasonable to consider sleep study in the future    HPI:  SHENA Saravia  Noemi is a 60 y.o. male being seen 08/09/21   for Coronary Artery Disease (S/P CABG 1v 11/6/13 ) and Hx of AVR     Patient states doing well overall.  Denies any dizziness, lightheadedness, falls, syncope, palpitations, racing heart.  Patient has noted some shortness of breath on exertion but feels it is more related to his smoking as well as having Covid in January 2021.  Patient denies any edema, orthopnea, PND, early satiety or abdominal fullness.  Patient denies any chest pain or claudication.  Denies any bleeding concerns like black or red stools hematuria or epistaxis.    With regards to patient's diet is primarily things like eggs toast cereals for breakfast.  For lunch he is often having deli sandwiches chips or leftovers.  Usually for dinner having hamburgers steaks casseroles with very minimal vegetable and fruit intake.  Does snack in between dinner and bedtime on ice cream.  Denies taking a fiber supplement.  Does not read nutrition labels.  Patient states he eats because of stress in his work schedule. He is an  with variable hours at the [employer]. Still smoking 10-15 cigs/day.      CARDIAC PROBLEM LIST:  Cardiac problem list:    Cardiology updated 11/28/2018 by Nela Aggarwal LPN-reviewed BT    Primary Cardiologist: Charo Purvis    1. Coronary Artery Disease  -Inferior MI 11/2013: 1V CABG (SVG-RCA) with concomitant AVR and ascending aortic dacron graft    2. Nonrheumatic Aortic Valve Stenosis  -s/p Aortic Valve Replacement with a Justin Mitroflow bioprosthetic- 11/2013    -Echo 8/3/2017: mildly globally impaired LV function, EF 45%; mildly dilated and hypocontractile Rv, mild sclerosis of 27mm Justin Mitroflow bioprosthetic aortic valve, mild MVR, mild TVR, Grade I diastolic dysfunction, normal appearing 34mm Dacron Ascending Aorta graft.      3. Dilated Ascending Aorta  -s/p repair with a 34mm Dacron graft- 11/2013    4. Hyperlipidemia  5. Tobacco use  6. Carotid  stenosis  -Carotid duplex 8/2017: Right ICA stenosis 16-49%, left ICA stenosis 0-15% stenosis. Suggest follow-up study 1-2 years.     Review of Systems:  The following system(s) were reviewed and negative.  Pertinent positive and negative findings are noted in the HPI.    [x]                     Const                                   [x]                      Eyes   [x]                     ENT                                     [x]                      Resp                                       [x]                     CV                                       [x]                      GI   [x]                                                            [x]                      Neuro   [x]                     Musc                                    [x]                      Skin   [x]                     Psych                                  [x]                      Endo   [x]                     Allergy                                 [x]                      Heme/Lymph.    Histories:  Past Medical History:   Diagnosis Date   • Aortic valve stenosis     s/p AV replacement 11/2013   • Ascending aorta dilatation (CMS/HCC) (HCC)     s/p AAA repair 11/2013   • CAD (coronary artery disease)    • Hyperlipidemia      Past Surgical History:   Procedure Laterality Date   • AAA REPAIR - ENDOGRAFT  11/2013   • AORTIC VALVE REPLACEMENT  11/2013   • CORONARY ANGIOPLASTY  11/2013   • CORONARY ARTERY BYPASS GRAFT  11/2013    1V CABG SVG- RCA     Family History   Problem Relation Age of Onset   • Heart disease Father    • Aneurysm Father      Social History     Occupational History   • Not on file   Tobacco Use   • Smoking status: Current Every Day Smoker     Packs/day: 0.75     Years: 30.00     Pack years: 22.50   • Smokeless tobacco: Never Used   Substance and Sexual Activity   • Alcohol use: Yes     Alcohol/week: 4.0 - 5.0 standard drinks     Types: 4 - 5 Cans of beer per week     Comment: 4-5 beers weekly   • Drug use: No   •  "Sexual activity: Defer   Social History Narrative   • Not on file     Social History     Tobacco Use   Smoking Status Current Every Day Smoker   • Packs/day: 0.75   • Years: 30.00   • Pack years: 22.50   Smokeless Tobacco Never Used     Social History     Substance and Sexual Activity   Drug Use No       Medications:   Current Outpatient Medications   Medication Sig Dispense Refill   • metoprolol succinate XL (TOPROL-XL) 25 mg 24 hr tablet Take 1 tablet (25 mg total) by mouth daily 90 tablet 3   • lisinopriL (PRINIVIL,ZESTRIL) 5 mg tablet Take 1 tablet (5 mg total) by mouth daily 90 tablet 3   • atorvastatin (LIPITOR) 80 mg tablet Take 1 tablet (80 mg total) by mouth daily 90 tablet 3   • evolocumab (Repatha SureClick) 140 mg/mL pen injector Inject 140 mg under the skin every 14 (fourteen) days 6 pen 3   • nitroglycerin (Nitrostat) 0.4 mg SL tablet As needed.     • aspirin 81 mg EC tablet Take 1 tablet (81 mg total) by mouth daily 90 tablet 3     No current facility-administered medications for this visit.       Allergies:  Allergies   Allergen Reactions   • Ezetimibe Rash     I have reviewed and confirmed SHENA Franklin's   allergies this visit.     Objective:    Vitals:    07/14/21 1442   BP: 110/72   Pulse: 74   SpO2: 96%   Height: 1.854 m (6' 1\")   Weight: 90.9 kg (200 lb 8 oz)   PainSc: 0-No pain        General:  Appears appropriate for stated age.  Awake, alert, and oriented x3. NAD  Head:  Normocephalic, atraumatic.  Neck:  Supple.  No JVD  Eyes:  Anicteric  Cardiovascular: Regular rate and rhythm  Respiratory:  No stridor   Abdominal:  Appears nondistended.    Extremities:  No clubbing, cyanosis.  No edema.    Skin:  No rashes noted.  Neurological:  CN 2 - 12 intact.  No gross sensory or motor deficits noted.  Musculoskeletal:  Able to move all 4 extremities spontaneously.    Data Review:   Sodium   Date Value Ref Range Status   03/12/2020 138 mmol/L Final     Potassium   Date Value Ref Range Status "   03/12/2020 4.6 mmol/L Final     Chloride   Date Value Ref Range Status   03/12/2020 103 mmol/L Final     CO2   Date Value Ref Range Status   03/12/2020 30 mmol/L Final     BUN   Date Value Ref Range Status   03/12/2020 18 mg/dL Final     Creatinine   Date Value Ref Range Status   03/12/2020 0.95 mg/dL Final     Glucose   Date Value Ref Range Status   03/12/2020 96 mg/dL Final     Calcium   Date Value Ref Range Status   03/12/2020 9.8 mg/dL Final     No results found for: CKTOTAL, CKMB, CKMBINDEX, TROPONINI, BNP, POCBNP  Lipids:    Lab Results   Component Value Date    CHOL 123 04/05/2021    CHOL 239 03/12/2020    CHOL 218 12/29/2018     Lab Results   Component Value Date    HDL 65 04/05/2021    HDL 61 03/12/2020    HDL 63 12/29/2018     Lab Results   Component Value Date    LDLCALC 41 04/05/2021    LDLCALC 160 03/12/2020    LDLCALC 134 12/29/2018     Lab Results   Component Value Date    TRIG 91 04/05/2021    TRIG 90 03/12/2020    TRIG 105 12/29/2018        TSH: No results found for: TSH  Magnesium: No results found for: MG  Protime-INR: No results found for: PT, INR  A1c: No results found for: HGBA1C    Patient Instructions   · Look into getting a fiber a supplement like metamucil to take 10 -15 minute before your meals  · Look into getting aged garlic extract which has been shown to have some plaque regression is more of a natural route (kyolic)  · Look into Dr. Power's diet  · We will repeat an ultrasound of your heart and let you know the results if everything is stable we will plan to follow-up with you in 1 year or sooner if there is any change  · Our nutritionist Juliane also try to reach out to you and your wife  · Try to watch her salt intake and maintain it less than 2 to 3 g a day by watching out for hidden salts and not adding salt to your food in particular things like garlic salt which also contain a lot of sodium  · Your children should have ultrasounds at somepoint also for a bicuspid aortic  valve             Next follow up in 1 year or sooner if clinical change or echocardiogram results are significantly abnormal    Sincerely,    Agusto Gomez, DO  08/09/21

## 2021-07-14 NOTE — PATIENT INSTRUCTIONS
· Look into getting a fiber a supplement like metamucil to take 10 -15 minute before your meals  · Look into getting aged garlic extract which has been shown to have some plaque regression is more of a natural route (kyolic)  · Look into Dr. Power's diet  · We will repeat an ultrasound of your heart and let you know the results if everything is stable we will plan to follow-up with you in 1 year or sooner if there is any change  · Our nutritionist Juliane also try to reach out to you and your wife  · Try to watch her salt intake and maintain it less than 2 to 3 g a day by watching out for hidden salts and not adding salt to your food in particular things like garlic salt which also contain a lot of sodium  · Your children should have ultrasounds at somepoint also for a bicuspid aortic valve

## 2021-07-21 PROCEDURE — 93010 ELECTROCARDIOGRAM REPORT: CPT | Performed by: INTERNAL MEDICINE

## 2021-08-18 ENCOUNTER — ANCILLARY PROCEDURE (OUTPATIENT)
Dept: CARDIOLOGY | Facility: CLINIC | Age: 61
End: 2021-08-18
Payer: COMMERCIAL

## 2021-08-18 ENCOUNTER — TRANSFERRED RECORDS (OUTPATIENT)
Dept: HEALTH INFORMATION MANAGEMENT | Facility: CLINIC | Age: 61
End: 2021-08-18

## 2021-08-18 VITALS — DIASTOLIC BLOOD PRESSURE: 80 MMHG | SYSTOLIC BLOOD PRESSURE: 142 MMHG

## 2021-08-18 DIAGNOSIS — Z95.2 AORTIC VALVE REPLACED: ICD-10-CM

## 2021-08-18 LAB
ASCENDING AORTA: 3.51 CM
AV LVOT PEAK GRADIENT: 3.2 MMHG
AV MEAN GRADIENT: 18.84 MMHG
AV PEAK GRADIENT: 30.03 MMHG
DOP CALC AO PEAK VEL: 2.74 M/S
DOP CALC AO VTI: 63.4 CM
DOP CALC LVOT DIAMETER: 2.12 CM
DOP CALC LVOT STROKE VOLUME: 74 CM3
DOP CALC MV VTI: 25.92 CM
DOP CALC RVOT PEAK VEL: 0.5 M/S
DOP CALCLVOT PEAK VEL VTI: 21.1 CM
E/A RATIO: 1.4
E/E' RATIO (AVERAGE): 8.9
E/E' RATIO: 6.9
EJECTION FRACTION: 38 %
ERAP: 5 MMHG
INTERVENTRICULAR SEPTUM: 0.9 CM (ref 0.6–1.1)
LA AREA A4C SYSTOLE: 79.5 CM3
LEFT ATRIUM SIZE: 4.31 CM
LEFT ATRIUM VOLUME INDEX: 44 ML/M2
LEFT ATRIUM VOLUME: 93.8 CM3
LEFT INTERNAL DIMENSION IN SYSTOLE: 3.7 CM (ref 2.1–4)
LEFT VENTRICLE DIASTOLIC VOLUME: 155 CM3
LEFT VENTRICLE SYSTOLIC VOLUME: 96 CM3
LEFT VENTRICULAR INTERNAL DIMENSION IN DIASTOLE: 5.5 CM (ref 3.5–6)
LVAD-AP2: 41.1 CM2
ML OF DILUTED DEFINITY INJECTED: 3.5 ML
MV DEC SLOPE: 6760 MM/S2
MV DT: 182 MS
MV MEAN GRADIENT: 1.7 MMHG
MV PEAK A VEL: 55 CM/S
MV PEAK E VEL: 77.7 CM/S
MV PEAK GRADIENT: 4 MMHG
MV STENOSIS PRESSURE HALF TIME: 43 MS
MV VALVE AREA P 1/2 METHOD: 5.12 CM2
MV VMAX: 102 CM/S
MVA (VTI): 2.87 CM2
POSTERIOR WALL: 1 CM (ref 0.6–1.1)
PULM VEIN S/D RATIO: 1.2
PV PEAK D VEL: 38 CM/S
PV PEAK GRADIENT: 4.08 MMHG
PV PEAK S VEL: 47 CM/S
PV VMAX: 1.01 M/S
RA AREA: 25 CM2
RH CV ECHO AV VALVE AREA VEL: 1.1 CM2
RH CV ECHO AV VALVE AREA VTI: 1.2 CM2
RH LVOT PEAK VELOCITY REST: 0.9 M/S
RIGHT VENTRICULAR INTERNAL DIMENSION IN DIASTOLE: 3.9 CM
RV AP4 BASE: 4.1 CM
S': 7.7 CM/S
TDI: 11.3 CM/S
TDILATERAL: 7.2 CM/S
TR MAX PG: 23.23 MMHG
TRICUSPID ANNULAR PLANE SYSTOLIC EXCURSION: 1.3 CM
TRICUSPID VALVE PEAK REGURGITATION VELOCITY: 2.41 M/S
TV REST PULMONARY ARTERY PRESSURE: 28 MMHG

## 2021-08-18 PROCEDURE — 93306 TTE W/DOPPLER COMPLETE: CPT | Performed by: INTERNAL MEDICINE

## 2021-08-20 ENCOUNTER — TELEPHONE (OUTPATIENT)
Dept: CARDIOLOGY | Facility: CLINIC | Age: 61
End: 2021-08-20

## 2021-08-20 DIAGNOSIS — I25.10 CORONARY ARTERY DISEASE INVOLVING NATIVE CORONARY ARTERY OF NATIVE HEART WITHOUT ANGINA PECTORIS: ICD-10-CM

## 2021-08-20 DIAGNOSIS — I50.9 HEART FAILURE, UNSPECIFIED HF CHRONICITY, UNSPECIFIED HEART FAILURE TYPE (CMS/HCC): Primary | ICD-10-CM

## 2021-08-20 RX ORDER — METOPROLOL SUCCINATE 50 MG/1
50 TABLET, EXTENDED RELEASE ORAL DAILY
Qty: 90 TABLET | Refills: 3 | Status: SHIPPED | OUTPATIENT
Start: 2021-08-20 | End: 2022-09-29 | Stop reason: ALTCHOICE

## 2021-08-20 RX ORDER — DAPAGLIFLOZIN 10 MG/1
10 TABLET, FILM COATED ORAL DAILY
Qty: 90 TABLET | Refills: 1 | Status: ON HOLD | OUTPATIENT
Start: 2021-08-20 | End: 2022-02-25 | Stop reason: ALTCHOICE

## 2021-10-10 ENCOUNTER — OFFICE VISIT (OUTPATIENT)
Dept: URGENT CARE | Facility: URGENT CARE | Age: 61
End: 2021-10-10
Payer: COMMERCIAL

## 2021-10-10 VITALS
SYSTOLIC BLOOD PRESSURE: 148 MMHG | HEIGHT: 73 IN | WEIGHT: 200 LBS | BODY MASS INDEX: 26.51 KG/M2 | DIASTOLIC BLOOD PRESSURE: 73 MMHG | OXYGEN SATURATION: 97 % | HEART RATE: 72 BPM | RESPIRATION RATE: 18 BRPM | TEMPERATURE: 98.6 F

## 2021-10-10 DIAGNOSIS — L50.9 URTICARIA: Primary | ICD-10-CM

## 2021-10-10 PROCEDURE — 99203 OFFICE O/P NEW LOW 30 MIN: CPT | Performed by: FAMILY MEDICINE

## 2021-10-10 RX ORDER — PREDNISONE 50 MG/1
50 TABLET ORAL DAILY
Qty: 5 TABLET | Refills: 0 | Status: SHIPPED | OUTPATIENT
Start: 2021-10-10 | End: 2021-10-15

## 2021-10-10 RX ORDER — CETIRIZINE HYDROCHLORIDE 10 MG/1
10 TABLET ORAL DAILY
Qty: 30 TABLET | Refills: 0 | Status: ON HOLD | OUTPATIENT
Start: 2021-10-10 | End: 2022-02-25 | Stop reason: ALTCHOICE

## 2021-10-10 RX ORDER — FAMOTIDINE 20 MG/1
20 TABLET, FILM COATED ORAL 2 TIMES DAILY
Qty: 60 TABLET | Refills: 0 | Status: ON HOLD | OUTPATIENT
Start: 2021-10-10 | End: 2022-02-25 | Stop reason: ALTCHOICE

## 2021-10-10 ASSESSMENT — PAIN SCALES - GENERAL: PAINLEVEL: 3

## 2021-10-10 NOTE — PATIENT INSTRUCTIONS
Urticaria  1.prednisone for 5 days  2.pepcid 20mg twice daily  3.zyrtec 10mg daily  4. Benadryl 25 to 50 mg every 6 hours as needed

## 2021-10-10 NOTE — PROGRESS NOTES
"Subjective      G Manjit Franklin is a 61 y.o. male who presents for rash.    HPI 61-year-old male with rash affecting face legs back and trunk for the last few days pruritic in nature has had raised welts.  Never had similar rash in the past denies any new medication recent illness  or new topical exposure.      The following have been reviewed and updated as appropriate in this visit:         Review of Systems  See HPI  Objective   /73   Pulse 72   Temp 37 °C (98.6 °F)   Resp 18   Ht 1.854 m (6' 1\")   Wt 90.7 kg (200 lb)   SpO2 97%   BMI 26.39 kg/m²     Physical Exam  General: He is alert no acute distress  HEENT: Normocephalic atraumatic mucous memories are moist pharynx is nonerythematous tongue and lips are nonswollen or affected by the rash  Respiratory: Clear auscultation bilaterally  Skin: Maculopapular rash which is pruritic and with raised wheals multiple areas affecting the face trunk legs and arms  Assessment/Plan   Diagnoses and all orders for this visit:    Urticaria  -     predniSONE 50 mg tablet; Take 1 tablet (50 mg total) by mouth daily for 5 days  -     cetirizine (ZyrTEC) 10 mg tablet; Take 1 tablet (10 mg total) by mouth daily  -     famotidine (Pepcid) 20 mg tablet; Take 1 tablet (20 mg total) by mouth 2 (two) times a day    1.  Urticaria: Pepcid and Zyrtec for pruritus, prednisone for 5 days as well as as needed Benadryl recommended only for use when does not have to operate equipment.  Discussed the potential for cognitive effects and decreased reaction time while using Benadryl.  Typically will last for 1 month.    Follow-up as needed    Santana Jade MD  "

## 2021-10-27 ENCOUNTER — TELEPHONE (OUTPATIENT)
Dept: CARDIOLOGY | Facility: CLINIC | Age: 61
End: 2021-10-27

## 2021-10-27 NOTE — TELEPHONE ENCOUNTER
Developing some hives & he would like to know if [facility] would recommend an allergy doc.     He asked if you could leave a voice mail with that info. I'm sure you can't, but that is what he asked me to tell you. He asked because he doesn't want to play phone tag with the nurse.

## 2021-10-28 NOTE — TELEPHONE ENCOUNTER
Returned call to pt. He states he has an appt with an allergist tomorrow, 10/29/21. He will call if he has any additional concerns.

## 2021-11-18 ENCOUNTER — TELEPHONE (OUTPATIENT)
Dept: CARDIOLOGY | Facility: CLINIC | Age: 61
End: 2021-11-18
Payer: COMMERCIAL

## 2021-11-18 NOTE — TELEPHONE ENCOUNTER
PT would like to have the nurse call & get the correct dosage of his metoprolol 50 mg sent to [employer].    Pt would like a confirmation call when this is done.

## 2022-01-17 DIAGNOSIS — E78.5 HYPERLIPIDEMIA, UNSPECIFIED HYPERLIPIDEMIA TYPE: ICD-10-CM

## 2022-01-18 RX ORDER — EVOLOCUMAB 140 MG/ML
140 INJECTION, SOLUTION SUBCUTANEOUS
Qty: 2 ML | Refills: 11 | Status: SHIPPED | OUTPATIENT
Start: 2022-01-18 | End: 2022-04-19 | Stop reason: SDUPTHER

## 2022-02-04 ENCOUNTER — CLINICAL SUPPORT (OUTPATIENT)
Dept: FAMILY MEDICINE | Facility: CLINIC | Age: 62
End: 2022-02-04

## 2022-02-04 DIAGNOSIS — Z23 ENCOUNTER FOR VACCINATION: Primary | ICD-10-CM

## 2022-02-04 PROCEDURE — 91300 PFIZER-BIONTECH SARS-COV-2 VACCINE: CPT

## 2022-02-04 PROCEDURE — 0004A PFIZER-BIONTECH SARS-COV-2 VACCINE: CPT

## 2022-02-25 ENCOUNTER — HOSPITAL ENCOUNTER (INPATIENT)
Facility: HOSPITAL | Age: 62
LOS: 2 days | Discharge: HOME | End: 2022-02-27
Attending: EMERGENCY MEDICINE | Admitting: HOSPITALIST
Payer: COMMERCIAL

## 2022-02-25 ENCOUNTER — APPOINTMENT (OUTPATIENT)
Dept: CARDIOLOGY | Facility: HOSPITAL | Age: 62
End: 2022-02-25
Payer: COMMERCIAL

## 2022-02-25 ENCOUNTER — APPOINTMENT (OUTPATIENT)
Dept: CT IMAGING | Facility: HOSPITAL | Age: 62
End: 2022-02-25
Payer: COMMERCIAL

## 2022-02-25 ENCOUNTER — APPOINTMENT (OUTPATIENT)
Dept: RADIOLOGY | Facility: HOSPITAL | Age: 62
End: 2022-02-25
Payer: COMMERCIAL

## 2022-02-25 DIAGNOSIS — Z95.1 HISTORY OF CORONARY ARTERY BYPASS GRAFT X 1: Chronic | ICD-10-CM

## 2022-02-25 DIAGNOSIS — I35.0 NONRHEUMATIC AORTIC (VALVE) STENOSIS: ICD-10-CM

## 2022-02-25 DIAGNOSIS — J44.1 COPD EXACERBATION (CMS/HCC): Primary | ICD-10-CM

## 2022-02-25 LAB
ALBUMIN SERPL-MCNC: 4.3 G/DL (ref 3.5–5.3)
ALP SERPL-CCNC: 88 U/L (ref 45–115)
ALT SERPL-CCNC: 14 U/L (ref 7–52)
ANION GAP SERPL CALC-SCNC: 8 MMOL/L (ref 3–11)
AR MAX PG: 57 MMHG
AR SLOPE: 5440 MM/S2
ASCENDING AORTA: 3.26 CM
AST SERPL-CCNC: 18 U/L
AV LVOT PEAK GRADIENT: 4 MMHG
AV MEAN GRADIENT: 20.23 MMHG
AV PEAK GRADIENT: 38.69 MMHG
AV REGURGITATION PRESSURE HALF TIME: 205 MS
B PARAP IS1001 DNA NPH QL NAA+NON-PROBE: NEGATIVE
B PERT.PT PRMT NPH QL NAA+NON-PROBE: NEGATIVE
BASE EXCESS BLDA CALC-SCNC: 2.3 MMOL/L (ref -2–2)
BASOPHILS # BLD AUTO: 0.1 10*3/UL
BASOPHILS NFR BLD AUTO: 1 % (ref 0–2)
BILIRUB SERPL-MCNC: 0.69 MG/DL (ref 0.2–1.4)
BNP SERPL-MCNC: 572 PG/ML (ref 0–100)
BUN SERPL-MCNC: 19 MG/DL (ref 7–25)
C PNEUM DNA NPH QL NAA+NON-PROBE: NEGATIVE
CALCIUM ALBUM COR SERPL-MCNC: 9 MG/DL (ref 8.6–10.3)
CALCIUM SERPL-MCNC: 9.2 MG/DL (ref 8.6–10.3)
CHLORIDE SERPL-SCNC: 105 MMOL/L (ref 98–107)
CO2 BLDA-SCNC: 23.5 MMOL/L (ref 19–24)
CO2 SERPL-SCNC: 26 MMOL/L (ref 21–32)
CREAT SERPL-MCNC: 1.01 MG/DL (ref 0.7–1.3)
DELTA HIGH SENSITIVITY TROPONIN I, 1 HOUR: -0.6
DELTA HIGH SENSITIVITY TROPONIN I, 2 HOUR: 0.9
DEVICE (RT): ABNORMAL
DOP CALC AO PEAK VEL: 3.11 M/S
DOP CALC AO VTI: 60.4 CM
DOP CALC LVOT DIAMETER: 2.2 CM
DOP CALC LVOT STROKE VOLUME: 83 CM3
DOP CALC MV VTI: 25.39 CM
DOP CALC RVOT PEAK VEL: 0.6 M/S
DOP CALCLVOT PEAK VEL VTI: 21.8 CM
ECHO EF ESTIMATED: 50 %
EJECTION FRACTION: 52 %
EJECTION FRACTION: 52 %
EOSINOPHIL # BLD AUTO: 0.2 10*3/UL
EOSINOPHIL NFR BLD AUTO: 3 % (ref 0–3)
ERAP: 5 MMHG
ERYTHROCYTE [DISTWIDTH] IN BLOOD BY AUTOMATED COUNT: 13 % (ref 11.5–15)
FIBRIN D-DIMER (NG/ML) IN PLATELET POOR PLASMA: 1081 NG/ML FEU
FLOW: 4 L/M
FLUAV RNA NPH QL NAA+NON-PROBE: NEGATIVE
FLUBV RNA NPH QL NAA+NON-PROBE: NEGATIVE
GFR SERPL CREATININE-BSD FRML MDRD: 86 ML/MIN/1.73M*2
GLUCOSE SERPL-MCNC: 103 MG/DL (ref 70–105)
HADV DNA NPH QL NAA+NON-PROBE: NEGATIVE
HCO3 BLDA-SCNC: 27 MMOL/L (ref 23–29)
HCOV 229E RNA NPH QL NAA+NON-PROBE: NEGATIVE
HCOV HKU1 RNA NPH QL NAA+NON-PROBE: NEGATIVE
HCOV NL63 RNA NPH QL NAA+NON-PROBE: NEGATIVE
HCOV OC43 RNA NPH QL NAA+NON-PROBE: NEGATIVE
HCT VFR BLD AUTO: 46.8 % (ref 38–50)
HGB BLD-MCNC: 15.9 G/DL (ref 13.2–17.2)
HMPV RNA NPH QL NAA+NON-PROBE: NEGATIVE
HPIV1 RNA NPH QL NAA+NON-PROBE: NEGATIVE
HPIV2 RNA NPH QL NAA+NON-PROBE: NEGATIVE
HPIV3 RNA NPH QL NAA+NON-PROBE: NEGATIVE
HPIV4 RNA NPH QL NAA+NON-PROBE: NEGATIVE
INTERVENTRICULAR SEPTUM: 1.3 CM (ref 0.6–1.1)
IVC PROX: 1.86 CM
LA AREA A4C SYSTOLE: 88.7 CM3
LEFT ATRIUM SIZE: 4.13 CM
LEFT ATRIUM VOLUME INDEX: 28 ML/M2
LEFT INTERNAL DIMENSION IN SYSTOLE: 3 CM (ref 2.1–4)
LEFT VENTRICLE DIASTOLIC VOLUME: 192 CM3
LEFT VENTRICLE SYSTOLIC VOLUME: 92 CM3
LEFT VENTRICULAR INTERNAL DIMENSION IN DIASTOLE: 5.1 CM (ref 3.5–6)
LVAD-AP2: 51.1 CM2
LYMPHOCYTES # BLD AUTO: 1.8 10*3/UL
LYMPHOCYTES NFR BLD AUTO: 20 % (ref 15–47)
M PNEUMO DNA NPH QL NAA+NON-PROBE: NEGATIVE
MCH RBC QN AUTO: 31.9 PG (ref 29–34)
MCHC RBC AUTO-ENTMCNC: 34 G/DL (ref 32–36)
MCV RBC AUTO: 93.9 FL (ref 82–97)
MONOCYTES # BLD AUTO: 0.6 10*3/UL
MONOCYTES NFR BLD AUTO: 7 % (ref 5–13)
MV DEC SLOPE: 3960 MM/S2
MV MEAN GRADIENT: 2 MMHG
MV PEAK GRADIENT: 6 MMHG
MV STENOSIS PRESSURE HALF TIME: 87 MS
MV VALVE AREA P 1/2 METHOD: 2.53 CM2
MV VMAX: 121 CM/S
MVA (VTI): 3.26 CM2
NEUTROPHILS # BLD AUTO: 6.2 10*3/UL
NEUTROPHILS NFR BLD AUTO: 70 % (ref 46–70)
PATIENT POSITION: ABNORMAL
PCO2 BLDA: 41.3 MMHG (ref 35–45)
PH BLDA: 7.42 PH (ref 7.35–7.45)
PISA AR MAX VEL: 376 CM/S
PLATELET # BLD AUTO: 141 10*3/UL (ref 130–350)
PMV BLD AUTO: 10.7 FL (ref 6.9–10.8)
PO2 BLDA: 69.2 MMHG (ref 60–80)
POCT CTO2, ARTERIAL: 20 ML/DL (ref 15–24)
POCT HEMOGLOBIN (MEASURED), ARTERIAL: 15.6 G/DL (ref 13.2–17.2)
POCT OXYHEMOGLOBIN, ARTERIAL: 91.1 %
POSTERIOR WALL: 1.2 CM (ref 0.6–1.1)
POTASSIUM SERPL-SCNC: 4.1 MMOL/L (ref 3.5–5.1)
PROCALCITONIN SERPL-MCNC: <0.02 NG/ML
PROT SERPL-MCNC: 6.8 G/DL (ref 6–8.3)
PULM VEIN S/D RATIO: 0.6
PV PEAK D VEL: 90 CM/S
PV PEAK GRADIENT: 3.69 MMHG
PV PEAK S VEL: 52 CM/S
PV VMAX: 0.96 M/S
RA AREA: 22 CM2
RBC # BLD AUTO: 4.99 10*6/ΜL (ref 4.1–5.8)
RH CV ECHO AV VALVE AREA VEL: 1.2 CM2
RH CV ECHO AV VALVE AREA VTI: 1.4 CM2
RH LVOT PEAK VELOCITY REST: 1 M/S
RIGHT VENTRICULAR INTERNAL DIMENSION IN DIASTOLE: 3.6 CM
RSV RNA NPH QL NAA+NON-PROBE: NEGATIVE
RV AP4 BASE: 3.8 CM
RV+EV RNA NPH QL NAA+NON-PROBE: NEGATIVE
S': 10.3 CM/S
SARS-COV-2 RNA NPH QL NAA+NON-PROBE: NEGATIVE
SODIUM SERPL-SCNC: 139 MMOL/L (ref 135–145)
SPO2 (RT): 93 %
TDI: 10.3 CM/S
TDILATERAL: 10 CM/S
TR MAX PG: 35.05 MMHG
TRICUSPID ANNULAR PLANE SYSTOLIC EXCURSION: 2.1 CM
TRICUSPID VALVE PEAK REGURGITATION VELOCITY: 2.96 M/S
TROPONIN I SERPL-MCNC: 15.3 PG/ML
TROPONIN I SERPL-MCNC: 17 PG/ML
TROPONIN I SERPL-MCNC: 17.6 PG/ML
TROPONIN I SERPL-MCNC: 18.5 PG/ML
TROPONIN I SERPL-MCNC: 20.8 PG/ML
TV REST PULMONARY ARTERY PRESSURE: 40 MMHG
WBC # BLD AUTO: 8.9 10*3/UL (ref 3.7–9.6)

## 2022-02-25 PROCEDURE — 82805 BLOOD GASES W/O2 SATURATION: CPT

## 2022-02-25 PROCEDURE — 99223 1ST HOSP IP/OBS HIGH 75: CPT | Mod: AI | Performed by: HOSPITALIST

## 2022-02-25 PROCEDURE — 80053 COMPREHEN METABOLIC PANEL: CPT | Performed by: EMERGENCY MEDICINE

## 2022-02-25 PROCEDURE — 6360000200 HC RX 636 W HCPCS (ALT 250 FOR IP): Performed by: HOSPITALIST

## 2022-02-25 PROCEDURE — 87633 RESP VIRUS 12-25 TARGETS: CPT | Performed by: EMERGENCY MEDICINE

## 2022-02-25 PROCEDURE — 6360000200 HC RX 636 W HCPCS (ALT 250 FOR IP): Performed by: INTERNAL MEDICINE

## 2022-02-25 PROCEDURE — 94640 AIRWAY INHALATION TREATMENT: CPT

## 2022-02-25 PROCEDURE — 84484 ASSAY OF TROPONIN QUANT: CPT | Performed by: EMERGENCY MEDICINE

## 2022-02-25 PROCEDURE — 84484 ASSAY OF TROPONIN QUANT: CPT | Performed by: INTERNAL MEDICINE

## 2022-02-25 PROCEDURE — 85025 COMPLETE CBC W/AUTO DIFF WBC: CPT | Performed by: EMERGENCY MEDICINE

## 2022-02-25 PROCEDURE — 96374 THER/PROPH/DIAG INJ IV PUSH: CPT

## 2022-02-25 PROCEDURE — 99232 SBSQ HOSP IP/OBS MODERATE 35: CPT | Mod: NC | Performed by: INTERNAL MEDICINE

## 2022-02-25 PROCEDURE — 85379 FIBRIN DEGRADATION QUANT: CPT | Performed by: HOSPITALIST

## 2022-02-25 PROCEDURE — 99285 EMERGENCY DEPT VISIT HI MDM: CPT | Performed by: EMERGENCY MEDICINE

## 2022-02-25 PROCEDURE — 36415 COLL VENOUS BLD VENIPUNCTURE: CPT | Performed by: HOSPITALIST

## 2022-02-25 PROCEDURE — 84484 ASSAY OF TROPONIN QUANT: CPT | Performed by: HOSPITALIST

## 2022-02-25 PROCEDURE — 36600 WITHDRAWAL OF ARTERIAL BLOOD: CPT

## 2022-02-25 PROCEDURE — 6370000100 HC RX 637 (ALT 250 FOR IP): Performed by: EMERGENCY MEDICINE

## 2022-02-25 PROCEDURE — 2550000100 HC RX 255: Performed by: HOSPITALIST

## 2022-02-25 PROCEDURE — 6370000100 HC RX 637 (ALT 250 FOR IP): Performed by: INTERNAL MEDICINE

## 2022-02-25 PROCEDURE — 84145 PROCALCITONIN (PCT): CPT | Performed by: HOSPITALIST

## 2022-02-25 PROCEDURE — 71275 CT ANGIOGRAPHY CHEST: CPT | Mod: ME

## 2022-02-25 PROCEDURE — 71045 X-RAY EXAM CHEST 1 VIEW: CPT

## 2022-02-25 PROCEDURE — 6370000100 HC RX 637 (ALT 250 FOR IP): Performed by: HOSPITALIST

## 2022-02-25 PROCEDURE — 93306 TTE W/DOPPLER COMPLETE: CPT | Mod: 26 | Performed by: INTERNAL MEDICINE

## 2022-02-25 PROCEDURE — 2550000100 HC RX 255: Performed by: INTERNAL MEDICINE

## 2022-02-25 PROCEDURE — 83880 ASSAY OF NATRIURETIC PEPTIDE: CPT | Performed by: EMERGENCY MEDICINE

## 2022-02-25 PROCEDURE — 6360000200 HC RX 636 W HCPCS (ALT 250 FOR IP): Performed by: EMERGENCY MEDICINE

## 2022-02-25 PROCEDURE — 36415 COLL VENOUS BLD VENIPUNCTURE: CPT | Performed by: EMERGENCY MEDICINE

## 2022-02-25 PROCEDURE — 93005 ELECTROCARDIOGRAM TRACING: CPT | Performed by: EMERGENCY MEDICINE

## 2022-02-25 PROCEDURE — 93306 TTE W/DOPPLER COMPLETE: CPT

## 2022-02-25 PROCEDURE — (BLANK) HC ROOM SEMI PRIVATE

## 2022-02-25 RX ORDER — FAMOTIDINE 20 MG/1
20 TABLET, FILM COATED ORAL 2 TIMES DAILY
Status: DISCONTINUED | OUTPATIENT
Start: 2022-02-25 | End: 2022-02-25 | Stop reason: ALTCHOICE

## 2022-02-25 RX ORDER — SODIUM CHLORIDE 0.9 % (FLUSH) 0.9 %
3 SYRINGE (ML) INJECTION AS NEEDED
Status: DISCONTINUED | OUTPATIENT
Start: 2022-02-25 | End: 2022-02-27 | Stop reason: HOSPADM

## 2022-02-25 RX ORDER — ATORVASTATIN CALCIUM 80 MG/1
80 TABLET, FILM COATED ORAL DAILY
Status: DISCONTINUED | OUTPATIENT
Start: 2022-02-25 | End: 2022-02-27 | Stop reason: HOSPADM

## 2022-02-25 RX ORDER — LEVALBUTEROL 1.25 MG/.5ML
1.25 SOLUTION, CONCENTRATE RESPIRATORY (INHALATION) EVERY 4 HOURS PRN
Status: DISCONTINUED | OUTPATIENT
Start: 2022-02-25 | End: 2022-02-27 | Stop reason: HOSPADM

## 2022-02-25 RX ORDER — DAPAGLIFLOZIN 10 MG/1
10 TABLET, FILM COATED ORAL DAILY
Status: CANCELLED | OUTPATIENT
Start: 2022-02-25

## 2022-02-25 RX ORDER — ONDANSETRON HYDROCHLORIDE 2 MG/ML
4 INJECTION, SOLUTION INTRAVENOUS EVERY 6 HOURS PRN
Status: DISCONTINUED | OUTPATIENT
Start: 2022-02-25 | End: 2022-02-27 | Stop reason: HOSPADM

## 2022-02-25 RX ORDER — METOPROLOL SUCCINATE 50 MG/1
50 TABLET, EXTENDED RELEASE ORAL DAILY
Status: DISCONTINUED | OUTPATIENT
Start: 2022-02-25 | End: 2022-02-27 | Stop reason: HOSPADM

## 2022-02-25 RX ORDER — OXYCODONE HYDROCHLORIDE 5 MG/1
5-10 TABLET ORAL EVERY 4 HOURS PRN
Status: DISCONTINUED | OUTPATIENT
Start: 2022-02-25 | End: 2022-02-26

## 2022-02-25 RX ORDER — ENOXAPARIN SODIUM 100 MG/ML
40 INJECTION SUBCUTANEOUS
Status: DISCONTINUED | OUTPATIENT
Start: 2022-02-25 | End: 2022-02-27 | Stop reason: HOSPADM

## 2022-02-25 RX ORDER — IPRATROPIUM BROMIDE 0.5 MG/2.5ML
0.5 SOLUTION RESPIRATORY (INHALATION) EVERY 4 HOURS PRN
Status: DISCONTINUED | OUTPATIENT
Start: 2022-02-25 | End: 2022-02-27 | Stop reason: HOSPADM

## 2022-02-25 RX ORDER — ACETAMINOPHEN 325 MG/1
325-650 TABLET ORAL EVERY 4 HOURS PRN
Status: DISCONTINUED | OUTPATIENT
Start: 2022-02-25 | End: 2022-02-27 | Stop reason: HOSPADM

## 2022-02-25 RX ORDER — IPRATROPIUM BROMIDE 0.5 MG/2.5ML
0.5 SOLUTION RESPIRATORY (INHALATION)
Status: DISCONTINUED | OUTPATIENT
Start: 2022-02-25 | End: 2022-02-27 | Stop reason: HOSPADM

## 2022-02-25 RX ORDER — PREDNISONE 20 MG/1
40 TABLET ORAL DAILY
Status: DISCONTINUED | OUTPATIENT
Start: 2022-02-25 | End: 2022-02-27 | Stop reason: HOSPADM

## 2022-02-25 RX ORDER — IPRATROPIUM BROMIDE 0.5 MG/2.5ML
0.5 SOLUTION RESPIRATORY (INHALATION) ONCE
Status: COMPLETED | OUTPATIENT
Start: 2022-02-25 | End: 2022-02-25

## 2022-02-25 RX ORDER — POTASSIUM CHLORIDE 750 MG/1
20 TABLET, FILM COATED, EXTENDED RELEASE ORAL 2 TIMES DAILY WITH MEALS
Status: DISCONTINUED | OUTPATIENT
Start: 2022-02-25 | End: 2022-02-27 | Stop reason: HOSPADM

## 2022-02-25 RX ORDER — IOPAMIDOL 755 MG/ML
80 INJECTION, SOLUTION INTRAVASCULAR ONCE
Status: COMPLETED | OUTPATIENT
Start: 2022-02-25 | End: 2022-02-25

## 2022-02-25 RX ORDER — ONDANSETRON 4 MG/1
4 TABLET, FILM COATED ORAL EVERY 6 HOURS PRN
Status: DISCONTINUED | OUTPATIENT
Start: 2022-02-25 | End: 2022-02-27 | Stop reason: HOSPADM

## 2022-02-25 RX ORDER — LEVALBUTEROL 1.25 MG/.5ML
1.25 SOLUTION, CONCENTRATE RESPIRATORY (INHALATION)
Status: DISCONTINUED | OUTPATIENT
Start: 2022-02-25 | End: 2022-02-27 | Stop reason: HOSPADM

## 2022-02-25 RX ORDER — FUROSEMIDE 10 MG/ML
40 INJECTION INTRAMUSCULAR; INTRAVENOUS EVERY 12 HOURS
Status: DISCONTINUED | OUTPATIENT
Start: 2022-02-25 | End: 2022-02-27 | Stop reason: HOSPADM

## 2022-02-25 RX ORDER — LEVALBUTEROL 1.25 MG/.5ML
1.25 SOLUTION, CONCENTRATE RESPIRATORY (INHALATION) ONCE
Status: COMPLETED | OUTPATIENT
Start: 2022-02-25 | End: 2022-02-25

## 2022-02-25 RX ORDER — ASPIRIN 81 MG/1
81 TABLET ORAL DAILY
Status: DISCONTINUED | OUTPATIENT
Start: 2022-02-25 | End: 2022-02-27 | Stop reason: HOSPADM

## 2022-02-25 RX ORDER — LISINOPRIL 5 MG/1
5 TABLET ORAL DAILY
Status: DISCONTINUED | OUTPATIENT
Start: 2022-02-25 | End: 2022-02-27 | Stop reason: HOSPADM

## 2022-02-25 RX ORDER — BUDESONIDE AND FORMOTEROL FUMARATE DIHYDRATE 160; 4.5 UG/1; UG/1
2 AEROSOL RESPIRATORY (INHALATION)
Status: DISCONTINUED | OUTPATIENT
Start: 2022-02-25 | End: 2022-02-27 | Stop reason: HOSPADM

## 2022-02-25 RX ADMIN — PREDNISONE 40 MG: 20 TABLET ORAL at 10:44

## 2022-02-25 RX ADMIN — BUDESONIDE AND FORMOTEROL FUMARATE DIHYDRATE 2 PUFF: 160; 4.5 AEROSOL RESPIRATORY (INHALATION) at 05:49

## 2022-02-25 RX ADMIN — IPRATROPIUM BROMIDE 0.5 MG: 0.5 SOLUTION RESPIRATORY (INHALATION) at 03:07

## 2022-02-25 RX ADMIN — LEVALBUTEROL 1.25 MG: 1.25 SOLUTION, CONCENTRATE RESPIRATORY (INHALATION) at 03:07

## 2022-02-25 RX ADMIN — IPRATROPIUM BROMIDE 0.5 MG: 0.5 SOLUTION RESPIRATORY (INHALATION) at 15:04

## 2022-02-25 RX ADMIN — POTASSIUM CHLORIDE 20 MEQ: 750 TABLET, FILM COATED, EXTENDED RELEASE ORAL at 10:45

## 2022-02-25 RX ADMIN — LEVALBUTEROL 1.25 MG: 1.25 SOLUTION, CONCENTRATE RESPIRATORY (INHALATION) at 19:30

## 2022-02-25 RX ADMIN — FUROSEMIDE 40 MG: 10 INJECTION, SOLUTION INTRAMUSCULAR; INTRAVENOUS at 17:56

## 2022-02-25 RX ADMIN — BUDESONIDE AND FORMOTEROL FUMARATE DIHYDRATE 2 PUFF: 160; 4.5 AEROSOL RESPIRATORY (INHALATION) at 19:31

## 2022-02-25 RX ADMIN — LEVALBUTEROL 1.25 MG: 1.25 SOLUTION, CONCENTRATE RESPIRATORY (INHALATION) at 15:04

## 2022-02-25 RX ADMIN — ATORVASTATIN CALCIUM 80 MG: 80 TABLET, FILM COATED ORAL at 19:13

## 2022-02-25 RX ADMIN — LISINOPRIL 5 MG: 5 TABLET ORAL at 14:39

## 2022-02-25 RX ADMIN — ENOXAPARIN SODIUM 40 MG: 100 INJECTION SUBCUTANEOUS at 13:21

## 2022-02-25 RX ADMIN — PERFLUTREN 2 ML: 6.52 INJECTION, SUSPENSION INTRAVENOUS at 09:30

## 2022-02-25 RX ADMIN — POTASSIUM CHLORIDE 20 MEQ: 750 TABLET, FILM COATED, EXTENDED RELEASE ORAL at 17:56

## 2022-02-25 RX ADMIN — METOPROLOL SUCCINATE 50 MG: 50 TABLET, EXTENDED RELEASE ORAL at 14:40

## 2022-02-25 RX ADMIN — METHYLPREDNISOLONE SODIUM SUCCINATE 125 MG: 125 INJECTION, POWDER, FOR SOLUTION INTRAMUSCULAR; INTRAVENOUS at 04:07

## 2022-02-25 RX ADMIN — FUROSEMIDE 40 MG: 10 INJECTION, SOLUTION INTRAMUSCULAR; INTRAVENOUS at 06:06

## 2022-02-25 RX ADMIN — IPRATROPIUM BROMIDE 0.5 MG: 0.5 SOLUTION RESPIRATORY (INHALATION) at 19:30

## 2022-02-25 RX ADMIN — IOPAMIDOL 80 ML: 755 INJECTION, SOLUTION INTRAVENOUS at 08:40

## 2022-02-25 RX ADMIN — ASPIRIN 81 MG: 81 TABLET ORAL at 14:39

## 2022-02-25 ASSESSMENT — ENCOUNTER SYMPTOMS
SHORTNESS OF BREATH: 1
BACK PAIN: 0
EYE PAIN: 0
SEIZURES: 0
PALPITATIONS: 0
COLOR CHANGE: 0
DYSURIA: 0
COUGH: 1
ABDOMINAL PAIN: 0
VOMITING: 0
CHILLS: 0
SORE THROAT: 0
FEVER: 0
HEMATURIA: 0
ARTHRALGIAS: 0

## 2022-02-25 ASSESSMENT — ACTIVITIES OF DAILY LIVING (ADL)
PATIENT'S MEMORY ADEQUATE TO SAFELY COMPLETE DAILY ACTIVITIES?: YES
ADEQUATE_TO_COMPLETE_ADL: YES

## 2022-02-25 NOTE — ED PROCEDURE NOTE
Procedure  ECG 12 lead    Date/Time: 2/25/2022 2:59 AM  Performed by: Mumtaz Figueroa DO  Authorized by: Mumtaz Figueroa DO     Comments:      Sinus rhythm, rate 83, , , QTc 42, incomplete right bundle branch block no acute ST depression or elevation, similar comparison EKG July 14, 2021                 Mumtaz Figueroa DO  02/25/22 0300

## 2022-02-25 NOTE — MEDICAL STUDENT
Hospitalist Daily Progress Note  Patient name: Hayden Franklin  MRN: [id number]   LOS: 0 days     Subjective   I saw and examined the patient in his hospital room today. Gopal initially presented to the ED secondary to acute onset shortness of breath and dyspnea. Initial occurrence at approximately 11:30PM last night, which worsened during the night. He denied chest pain, palpitations, lightheadedness, loss of consciousness, nausea/vomiting, or fever/chills. He admits to recent COVID infection and persistent cough.     Of note, his past medical history is significant for CAD s/p CABG of one vessel, COPD with active heavy smoking/tobacco use, and hypertension. Also of note, he reports that in December, he noted an unusual rash, that had a hive-like appearance. He followed up with dermatology who told him that he had positive mycoplasma in his blood and treated him with several medications with names he does not remember.     Today, he reports that he is doing okay and feels like he has returned to his baseline. He denies SOB, dyspnea, nausea/vomiting, or fever/chills.     Objective   Vitals:Temp:  [36.7 °C (98.1 °F)-36.8 °C (98.2 °F)] 36.8 °C (98.2 °F)  Heart Rate:  [75-88] 88  Resp:  [13-25] 17  BP: ()/(46-79) 109/50  SpO2/FiO2 Ratio Using Approximate FiO2 (%):  [202.3-242.5] 227.5  Estimated P/F Ratio Using Approximate FiO2 (%):  [182.1-214.7] 202.6   No intake/output data recorded.  I/O this shift:  In: 240 [P.O.:240]  Out: 400 [Urine:400]  Weight change:     Physical Exam:  General: Alert and oriented x 4, communicative and cooperative, no acute distress, comfortably laying in bed with examination  HEENT: Pupils equal round and reactive to light and accommodation.  Extraocular movements are intact.   Mucous membranes moist.  Lungs: breath sounds auscultated, but diminished bilaterally, expiratory wheezing heard  Heart: Regular rate and rhythm with normal S1 and S2, no appreciable S3/S4, murmurs, rubs, or  "thrills  Abdomen: Soft nontender. normoactive bowel sounds all 4 quadrants. No hepatosplenomegaly  Extremities: No clubbing, cyanosis, or edema. Pulses palpable  Neurologic: Alert and oriented ×3.  CN II through XII intact.  Nonfocal exam.     Results from last 4 days   Lab Units 02/25/22  0252   WBC AUTO 10*3/uL 8.9   HEMOGLOBIN g/dL 15.9   HEMATOCRIT % 46.8   PLATELETS AUTO 10*3/uL 141     Results from last 4 days   Lab Units 02/25/22  0252   SODIUM mmol/L 139   POTASSIUM MMOL/L 4.1   CHLORIDE mmol/L 105   CO2 mmol/L 26   BUN mg/dL 19   CREATININE mg/dL 1.01   CALCIUM mg/dL 9.2   ALBUMIN g/dL 4.3   TOTAL PROTEIN g/dL 6.8   BILIRUBIN TOTAL mg/dL 0.69   ALK PHOS U/L 88   ALT U/L 14   AST U/L 18   GLUCOSE mg/dL 103     Lab Results   Component Value Date    TROPHS 17.6 02/25/2022    HGDLZZ5EY 17.0 02/25/2022    HSDELTA1 -0.6 02/25/2022    MLSNCE6CS 18.5 02/25/2022    HSDELTA2 0.9 02/25/2022         aspirin, 81 mg, oral, Daily  atorvastatin, 80 mg, oral, Daily  famotidine, 20 mg, oral, 2x daily  lisinopriL, 5 mg, oral, Daily  metoprolol succinate XL, 50 mg, oral, Daily  levalbuterol, 1.25 mg, nebulization, 3x daily  ipratropium, 0.5 mg, nebulization, 3x daily  enoxaparin, 40 mg, subcutaneous, Daily at 1400  budesonide-formoteroL, 2 puff, inhalation, 2x daily  furosemide, 40 mg, intravenous, q12h  predniSONE, 40 mg, oral, Daily  potassium chloride, 20 mEq, oral, 2x daily with meals  [START ON 2/26/2022] influenza vaccine, 0.5 mL, intramuscular, Vaccine - Once        Assessment/Plan    In summary this is a 60 yo male with past medical history of CAD s/p CABG, hypertension, active tobacco use/smoking, and COPD presenting for acute onset shortness of breath and dyspnea.   Currently requiring oxygen supplemental via nasal cannula, at 5L satting 88-92%  Recent rash, dermatologist told him \"hives\" with positive mycoplasma culture-per patient  EKG compared to previous: new onset LAFB  Echo: EF 52%, aortic stenosis is " unchanged, aortic regurgitation has progressed, LV systolic function improved compared to previously  CTA: Negative for pulmonary emboli. Biatrial cardiac dilatation. Small bilateral pleural effusions with adjacent atelectasis/ consolidation. Mild bronchial wall thickening and interlobular septal thickening, favored secondary to mild cardiogenic pulmonary edema. Mild centrilobular pulmonary emphysema.Mild adenopathy at the right lateral hilar mediastinal regions, nonspecific. 6 mm right upper lobe pulmonary nodule    ASSESSMENT  Shortness of breath  COPD exacerbation possible  Heart failure, possible  Essential hypertension  Mycoplasma infection, possible    PLAN  Shortness of breath  - Patient has several risk factors that could impact breathing: heavy smoking, COPD, and possible heart failure in the setting of CAD s/p CABG  - Most recent echo indicates EF at 52%, no chest pain, troponins remain low  -X-rays indicates cardiomegaly with pulmonary vasculature concerning for hypertension/edema  -currently still requiring oxygen supplementation at 5L, he does not use oxygen at home  - Concern that this could be exacerbation of heart failure with elevated BNP  Heart failure, possible  - Elevated BNP with abnormal chest imaging (cardiomegaly and pulmonary hypertension/edema) is concerning for heart failure exacerbation.   COPD exacerbation possible  -Patient has known COPD with associated shortness of breath with significant exertion, he is still an active smoker  - Could play role in current presentation, but more concern for heart failure given elevated BNP and abnormal imaging showing pulmonary vasculature edema/hypertension with enlarged heart and history of CAD.   - patient does report wheezing which is supported by physical exam, steroid started  -Inhaled medications (Symbicort) as well   Essential hypertension  -well-controlled on home medications, continue with medication therapy  -will monitor closely while in  the facility  Mycoplasma infection, possible  - patient admits to rash that he visited dermatologist for. Per patient, dermatologist informed him that he had mycoplasma in his blood and started him on several medications for this. Rash began in January.   -recommend assessing for mycoplasma to r/o possible atypical pneumonia along with other symptoms.     DVT Prophylaxis: Lovenox  Full Code      Electronically signed by: Leesa Hilton  2/25/2022  12:49 PM    Provider Inpatient Hospital Services Certification: Current therapeutic care plan can only be provided in the hospital

## 2022-02-25 NOTE — MEDICATION HISTORY SPECIALIST NOTES
"    [facility] 7-724-01    CSN: [id number]  : [id number]    Information sources:  EPIC  Rx meds in [facility] are \"house meds\" and have been e-prescribed accordingly.     Allergies verified.    Patient interviewed via phone who provided all history. Patient verified medications and provided last doses. Verified with Complete Dispense Report.     New medications added:    Discontinued medications:  Pt no longer taking these medications as therapy  Completed;  Zyrtec   farxiga  famotidine    Dose changes:    Profile was checked for  medications. Reviewed previously removed medication history section for previously removed medications and reasoning. If applicable reasoning will be listed in discrepancies.     **Specialty med** Repatha       "

## 2022-02-25 NOTE — FAX COVER SHEET
Fax Transmission  ----------------------------------------------------------------------------------------------------------------------  Facility Name:  [facility] - Care Management Dept.  Mailing address:  [address] [place] [address], Zip:  [place] [address]  Tax ID:   [id number]  NPI:    [id number]      Attention: WAYLON Sears     Initial clinical. Please let me know if you have any questions. Thanks.     Comments: [facility] has become [facility]: Find out more at [email or web address]        Auth/Cert Number:   [id number]        Please call with any questions.        Thanks,         Nani Stephen RN, Case Management- Utilization Review  [address]   [place] [id number]  p:  [phone]    f: [phone]    e: [email or web address]    This facsimile message is CONFIDENTIAL and may contain -privileged information and/or Protected Health Information (PHI) as defined in the federal Health Insurance Portability and Accountability Act, as amended.  This facsimile is  intended ONLY for the use of the individual or company named.  If the reader is NOT the intended recipient, or the employee or agent responsible to deliver it to the intended recipient, you are hereby notified that any dissemination, distribution, or copying of this communication is prohibited.  If you have received this communication in error, please immediately notify us by telephone so that we may arrange for the return of the original message.

## 2022-02-25 NOTE — H&P
HISTORY AND PHYSICAL NOTE  Maikel Smith MD      CHIEF COMPLAINT:  Shortness of breath    HPI:  61 years old  gentleman with past medical history of heavy smoking, CAD status post one-vessel CABG, dyslipidemia and hypertension who is presenting to emergency room with a sudden episode of shortness of breath starting around 11:30 PM last night.    Patient reports recent Covid infection and reports that since then he has been having a nagging cough.  He reports he is breathing getting suddenly labored and being short of breath acutely around 11:30 PM last night.  He reports audible wheezing.  Denies any chest pain, palpitation, lightheadedness, fever or chills.  Denies any nausea, vomiting, abdominal pain or diarrhea.    ALLERGIES:  Allergies   Allergen Reactions   • Ezetimibe Rash         HOME MEDICATIONS:    Current Outpatient Medications:   •  evolocumab (Repatha SureClick) 140 mg/mL pen injector, Inject 140 mg under the skin every 14 (fourteen) days, Disp: 2 mL, Rfl: 11  •  cetirizine (ZyrTEC) 10 mg tablet, Take 1 tablet (10 mg total) by mouth daily, Disp: 30 tablet, Rfl: 0  •  famotidine (Pepcid) 20 mg tablet, Take 1 tablet (20 mg total) by mouth 2 (two) times a day, Disp: 60 tablet, Rfl: 0  •  metoprolol succinate XL (TOPROL-XL) 50 mg 24 hr tablet, Take 1 tablet (50 mg total) by mouth daily, Disp: 90 tablet, Rfl: 3  •  dapagliflozin (Farxiga) 10 mg tablet, Take 1 tablet (10 mg total) by mouth daily, Disp: 90 tablet, Rfl: 1  •  lisinopriL (PRINIVIL,ZESTRIL) 5 mg tablet, Take 1 tablet (5 mg total) by mouth daily, Disp: 90 tablet, Rfl: 3  •  atorvastatin (LIPITOR) 80 mg tablet, Take 1 tablet (80 mg total) by mouth daily, Disp: 90 tablet, Rfl: 3  •  nitroglycerin (Nitrostat) 0.4 mg SL tablet, As needed., Disp: , Rfl:   •  aspirin 81 mg EC tablet, Take 1 tablet (81 mg total) by mouth daily, Disp: 90 tablet, Rfl: 3      PMH:   Past Medical History:   Diagnosis Date   • Aortic valve stenosis     s/p  AV replacement 11/2013   • Ascending aorta dilatation (CMS/HCC) (HCC)     s/p AAA repair 11/2013   • CAD (coronary artery disease)    • Hyperlipidemia         FAMILY HISTORY:  Family History   Problem Relation Age of Onset   • Heart disease Father    • Aneurysm Father         SOCIAL HISTORY:  Patient  reports that he has been smoking cigarettes. He has a 22.50 pack-year smoking history. He has never used smokeless tobacco. He reports current alcohol use of about 4.0 - 5.0 standard drinks of alcohol per week. He reports that he does not use drugs.       ROS:  All systems reviewed and a 14 point review of system is remarkable for what has been mentioned in the body of present illness.      VITAL SIGNS:  /49   Pulse 75   Temp 36.8 °C (98.2 °F) (Oral)   Resp 20   Wt 90.6 kg (199 lb 11.8 oz)   SpO2 90%   BMI 26.35 kg/m²     PHYSICAL EXAM  General Appearance: Middle-age  gentleman who looks acutely ill  HEENT:  PERRLA. No neck vein distension.  No neck rigidity.  CHEST: Poor air mobilization.  Mild diffuse wheezing after receiving nebulizers in ED.  Basilar crackles.  HEART:  Nr S1 and S2  ABDOMEN:  Soft, positive bowel sounds, no tenderness, no distention. No ascites  MUSCULOSKELETAL: No lower or upper extremities edema or swelling. Joints are grossly unremarkable.  SKIN/HAIR/NAILS:  no remarkable rash or lesions.  NEURO:  Grossly non-focal  PSYCH:  Alert and orient times three    LABS AND DIAGNOSTICS  Results for orders placed or performed during the hospital encounter of 02/25/22   CBC w/auto differential Blood, Venous   Result Value Ref Range    WBC 8.9 3.7 - 9.6 10*3/uL    RBC 4.99 4.10 - 5.80 10*6/µL    Hemoglobin 15.9 13.2 - 17.2 g/dL    Hematocrit 46.8 38.0 - 50.0 %    MCV 93.9 82.0 - 97.0 fL    MCH 31.9 29.0 - 34.0 pg    MCHC 34.0 32.0 - 36.0 g/dL    RDW 13.0 11.5 - 15.0 %    Platelets 141 130 - 350 10*3/uL    MPV 10.7 6.9 - 10.8 fL    Neutrophils% 70 46 - 70 %    Lymphocytes% 20 15 - 47 %     Monocytes% 7 5 - 13 %    Eosinophils% 3 0 - 3 %    Basophils% 1 0 - 2 %    ANC (auto diff) 6.20 10*3/UL    Lymphocytes Absolute 1.80 10*3/uL    Monocytes Absolute 0.60 10*3/uL    Eosinophils Absolute 0.20 10*3/uL    Basophils Absolute 0.10 10*3/uL   Comprehensive metabolic panel Blood, Venous   Result Value Ref Range    Sodium 139 135 - 145 mmol/L    Potassium 4.1 3.5 - 5.1 MMOL/L    Chloride 105 98 - 107 mmol/L    CO2 26 21 - 32 mmol/L    Anion Gap 8 3 - 11 mmol/L    BUN 19 7 - 25 mg/dL    Creatinine 1.01 0.70 - 1.30 mg/dL    Glucose 103 70 - 105 mg/dL    Calcium 9.2 8.6 - 10.3 mg/dL    AST 18 <40 U/L    ALT (SGPT) 14 7 - 52 U/L    Alkaline Phosphatase 88 45 - 115 U/L    Total Protein 6.8 6.0 - 8.3 g/dL    Albumin 4.3 3.5 - 5.3 g/dL    Total Bilirubin 0.69 0.20 - 1.40 mg/dL    Corrected Calcium 9.0 8.6 - 10.3 mg/dL    eGFR 86 >60 mL/min/1.73m*2   HS Troponin I   Result Value Ref Range    hsTnI 0 Hour 15.3 <=19.8 pg/mL   B-type natriuretic peptide (BNP) Blood, Venous   Result Value Ref Range     (H) 0 - 100 pg/mL   Respiratory Pathogen Panel, PCR Swab   Result Value Ref Range    Adenovirus Detection by PCR Negative Negative    SARS-COV-2 PCR Negative Negative    Human Metapneumovirus Detection by PCR Negative Negative    Rhinovirus/Enterovirus Detection by PCR Negative Negative    Influenza A  Detection by PCR Negative Negative    Influenza B Detection by PCR Negative Negative    Respiratory Syncytial Virus Detection by PCR Negative Negative    Bordetella Pertussis Detection by PCR Negative Negative    Chlamydophila Pneumoniae Detection by PCR Negative Negative    Mycoplasma pneumo by PCR Negative Negative    Bordetella Parapertussis Detection by PCR Negative Negative    Coronavirus 229E Detection by PCR Negative Negative    Coronavirus HKU1 Detection by PCR Negative Negative    Coronavirus NL63  Detection by PCR Negative Negative    Coronavirus OC43 Detection by PCR Negative Negative    Parainfluenza 1  Detection by PCR Negative Negative    Parainfluenza 2 Detection by PCR Negative Negative    Parainfluenza 3 Detection by PCR Negative Negative    Parainfluenza 4 Detection by PCR Negative Negative   Blood gas, arterial (Respiratory Therapy) Blood, Arterial   Result Value Ref Range    pH, Arterial 7.42 7.35 - 7.45 PH    pCO2, Arterial 41.3 35.0 - 45.0 MMHG    pO2, Arterial 69.2 60.0 - 80.0 MMHG    HCO3, Arterial 27.0 23.0 - 29.0 mmol/L    Base Excess, Arterial 2.3 (H) -2.0 - 2.0 mmol/L    tO2, Arterial 20.0 15.0 - 24.0 mL/dL    Hemoglobin (measured), Arterial 15.6 13.2 - 17.2 g/dL    Oxyhemoglobin, Arterial 91.1 %    Flow 4.00 L/m    Device Nasal Cannula     SpO2 93 %    tCO2 (calculated), Arterial 23.5 19.0 - 24.0 mmol/L    Patient Position Supine        EKG: Incomplete RBBB, LAFB, poor R wave progressions anteroseptal leads  CXR: Cardiomegaly with possible pulmonary edema.       ASSESSMENT and PLAN     • Acute respiratory insufficiency: Due to acute CHF exacerbation versus acute COPD exacerbation.  Will also check a D-dimer.  Absence of sinus tachycardia might be due to beta-blocker therapy    • Acute COPD exacerbation suspected: Patient was reportedly wheezing very remarkably on arrival to our ED but at the time of my encounter after getting breathing treatments there are only very faint wheezing diffusely.  Will treat for COPD exacerbation with nebulizers along with systemic steroids and inhaled steroids.    • Acute CHF exacerbation suspected: Patient's chest x-ray shows mild cardiomegaly and pulmonary edema.  Lung exam shows basilar crackles.  BNP is mildly elevated.  Will start patient on IV Lasix and do a 2D echo.    • History of CAD status post one-vessel CABG: Patient denies chest pain.  Troponin on arrival is unremarkable.  Given possibility of acute CHF exacerbation, will do another troponin.  We will continue home medications.    • History of essential hypertension: We will continue home  medications.    • DVT PROPHYLAXIS: Lovenox subcutaneous    • Code status: Full code with discussion    Patient is expected to stay admitted in the hospital for 2 or more midnights.  Past medical history, social history and family history were reviewed and obtained from the patient directly.      HERMAN HUDSON MD  5:35 AM, 2/25/2022.

## 2022-02-25 NOTE — ED PROVIDER NOTES
HPI:  Chief Complaint   Patient presents with   • Shortness of Breath     Pt came in for eval of SOB started last night 2330h, accompanied by cough. RA 83%     HPI    61-year-old male presents emergency department with complaints of cough whitish phlegm and shortness of breath without fever.  Patient denies chest pain.  Patient has an extensive cardiac history with one-vessel CABG, bicuspid aortic valve replacement and aortic graft.  Patient states he is not on systemic anticoagulation.  Patient has had prior Covid infection and has been vaccinated since then to include booster.  Patient smokes 1 pack/cigarettes a day.  Patient is not on home oxygen.  Patient was 83% upon arrival.      HISTORY:  Past Medical History:   Diagnosis Date   • Aortic valve stenosis     s/p AV replacement 11/2013   • Ascending aorta dilatation (CMS/HCC) (Formerly [facility])     s/p AAA repair 11/2013   • CAD (coronary artery disease)    • Hyperlipidemia        Past Surgical History:   Procedure Laterality Date   • AAA REPAIR - ENDOGRAFT  11/2013   • AORTIC VALVE REPLACEMENT  11/2013   • CORONARY ANGIOPLASTY  11/2013   • CORONARY ARTERY BYPASS GRAFT  11/2013    1V CABG SVG- RCA       Family History   Problem Relation Age of Onset   • Heart disease Father    • Aneurysm Father        Social History     Tobacco Use   • Smoking status: Current Every Day Smoker     Packs/day: 0.75     Years: 30.00     Pack years: 22.50     Types: Cigarettes   • Smokeless tobacco: Never Used   Substance Use Topics   • Alcohol use: Yes     Alcohol/week: 4.0 - 5.0 standard drinks     Types: 4 - 5 Cans of beer per week     Comment: 4-5 beers weekly   • Drug use: No       ROS:  Review of Systems   Constitutional: Negative for chills and fever.   HENT: Negative for ear pain and sore throat.    Eyes: Negative for pain and visual disturbance.   Respiratory: Positive for cough and shortness of breath.    Cardiovascular: Negative for chest pain and palpitations.   Gastrointestinal:  Negative for abdominal pain and vomiting.   Genitourinary: Negative for dysuria and hematuria.   Musculoskeletal: Negative for arthralgias and back pain.   Skin: Negative for color change and rash.   Neurological: Negative for seizures and syncope.   All other systems reviewed and are negative.       PHYSICAL EXAM:  Physical Exam  Vitals and nursing note reviewed.   Constitutional:       Appearance: He is well-developed.   HENT:      Head: Normocephalic and atraumatic.      Mouth/Throat:      Mouth: Mucous membranes are moist.      Pharynx: Oropharynx is clear.   Eyes:      Extraocular Movements: Extraocular movements intact.      Conjunctiva/sclera: Conjunctivae normal.      Pupils: Pupils are equal, round, and reactive to light.   Cardiovascular:      Rate and Rhythm: Normal rate and regular rhythm.      Heart sounds: No murmur heard.  Pulmonary:      Effort: Tachypnea, accessory muscle usage and respiratory distress present.      Breath sounds: Examination of the right-upper field reveals decreased breath sounds. Examination of the left-upper field reveals decreased breath sounds. Examination of the right-middle field reveals decreased breath sounds. Examination of the left-middle field reveals decreased breath sounds. Examination of the right-lower field reveals decreased breath sounds and wheezing. Examination of the left-lower field reveals decreased breath sounds and wheezing. Decreased breath sounds and wheezing present.   Abdominal:      Palpations: Abdomen is soft.      Tenderness: There is no abdominal tenderness.   Musculoskeletal:      Cervical back: Normal range of motion and neck supple.   Skin:     General: Skin is warm and dry.      Capillary Refill: Capillary refill takes less than 2 seconds.   Neurological:      General: No focal deficit present.      Mental Status: He is alert and oriented to person, place, and time.          Results for orders placed or performed during the hospital encounter of  02/25/22   CBC w/auto differential Blood, Venous   Result Value Ref Range    WBC 8.9 3.7 - 9.6 10*3/uL    RBC 4.99 4.10 - 5.80 10*6/µL    Hemoglobin 15.9 13.2 - 17.2 g/dL    Hematocrit 46.8 38.0 - 50.0 %    MCV 93.9 82.0 - 97.0 fL    MCH 31.9 29.0 - 34.0 pg    MCHC 34.0 32.0 - 36.0 g/dL    RDW 13.0 11.5 - 15.0 %    Platelets 141 130 - 350 10*3/uL    MPV 10.7 6.9 - 10.8 fL    Neutrophils% 70 46 - 70 %    Lymphocytes% 20 15 - 47 %    Monocytes% 7 5 - 13 %    Eosinophils% 3 0 - 3 %    Basophils% 1 0 - 2 %    ANC (auto diff) 6.20 10*3/UL    Lymphocytes Absolute 1.80 10*3/uL    Monocytes Absolute 0.60 10*3/uL    Eosinophils Absolute 0.20 10*3/uL    Basophils Absolute 0.10 10*3/uL   Comprehensive metabolic panel Blood, Venous   Result Value Ref Range    Sodium 139 135 - 145 mmol/L    Potassium 4.1 3.5 - 5.1 MMOL/L    Chloride 105 98 - 107 mmol/L    CO2 26 21 - 32 mmol/L    Anion Gap 8 3 - 11 mmol/L    BUN 19 7 - 25 mg/dL    Creatinine 1.01 0.70 - 1.30 mg/dL    Glucose 103 70 - 105 mg/dL    Calcium 9.2 8.6 - 10.3 mg/dL    AST 18 <40 U/L    ALT (SGPT) 14 7 - 52 U/L    Alkaline Phosphatase 88 45 - 115 U/L    Total Protein 6.8 6.0 - 8.3 g/dL    Albumin 4.3 3.5 - 5.3 g/dL    Total Bilirubin 0.69 0.20 - 1.40 mg/dL    Corrected Calcium 9.0 8.6 - 10.3 mg/dL    eGFR 86 >60 mL/min/1.73m*2   HS Troponin I   Result Value Ref Range    hsTnI 0 Hour 15.3 <=19.8 pg/mL   B-type natriuretic peptide (BNP) Blood, Venous   Result Value Ref Range     (H) 0 - 100 pg/mL   Respiratory Pathogen Panel, PCR Swab   Result Value Ref Range    Adenovirus Detection by PCR Negative Negative    SARS-COV-2 PCR Negative Negative    Human Metapneumovirus Detection by PCR Negative Negative    Rhinovirus/Enterovirus Detection by PCR Negative Negative    Influenza A  Detection by PCR Negative Negative    Influenza B Detection by PCR Negative Negative    Respiratory Syncytial Virus Detection by PCR Negative Negative    Bordetella Pertussis Detection by PCR  Negative Negative    Chlamydophila Pneumoniae Detection by PCR Negative Negative    Mycoplasma pneumo by PCR Negative Negative    Bordetella Parapertussis Detection by PCR Negative Negative    Coronavirus 229E Detection by PCR Negative Negative    Coronavirus HKU1 Detection by PCR Negative Negative    Coronavirus NL63  Detection by PCR Negative Negative    Coronavirus OC43 Detection by PCR Negative Negative    Parainfluenza 1 Detection by PCR Negative Negative    Parainfluenza 2 Detection by PCR Negative Negative    Parainfluenza 3 Detection by PCR Negative Negative    Parainfluenza 4 Detection by PCR Negative Negative   Blood gas, arterial (Respiratory Therapy) Blood, Arterial   Result Value Ref Range    pH, Arterial 7.42 7.35 - 7.45 PH    pCO2, Arterial 41.3 35.0 - 45.0 MMHG    pO2, Arterial 69.2 60.0 - 80.0 MMHG    HCO3, Arterial 27.0 23.0 - 29.0 mmol/L    Base Excess, Arterial 2.3 (H) -2.0 - 2.0 mmol/L    tO2, Arterial 20.0 15.0 - 24.0 mL/dL    Hemoglobin (measured), Arterial 15.6 13.2 - 17.2 g/dL    Oxyhemoglobin, Arterial 91.1 %    Flow 4.00 L/m    Device Nasal Cannula     SpO2 93 %    tCO2 (calculated), Arterial 23.5 19.0 - 24.0 mmol/L    Patient Position Supine        MDM:     61-year-old male presents emergency department with respiratory distress.  Upon arrival patient was hypoxic with difficulty breathing was placed on 4 L nasal cannula due to his O2 sat at 83% on room air, patient has no history of O2 requirement and his O2 sat increased to 92%.  Patient did have wheezing with minimal air movement.  Chest x-ray showed no acute abnormalities.  Patient was given a DuoNeb as well as IV corticosteroids.  Reevaluation after administration of DuoNeb patient's wheezing had resolved and he had increased air movement.  EKG was negative for arrhythmia or ischemia, patient had no chest pain during his emergency department stay.  Arterial blood gas on 4 L nasal cannula demonstrated pH 7.42, PCO2 41.3, PO2 69.2 with  bicarb 27.  Cardiac biomarker x1 was not elevated, respiratory PCR was negative for viral infection.  Patient had no elevated white count is not anemic platelets were normal.  Comprehensive metabolic panel had no acute abnormalities.  Patient did have mildly elevated BNP of 572 without findings of CHF.    Notified patient of all findings recommended hospitalization due to his oxygen dependency which patient consented to.    Discussed case with hospitalist service who agreed to hospitalize patient for further evaluation and management for his COPD exacerbation.    Differential diagnosis includes not limited to: COPD exacerbation, viral pneumonia, bacterial pneumonia, pulmonary hypertension, pulmonary embolism.    I provided critical care services for this patient given the high probability of sudden, clinically significant, and/or life-threatening deterioration requiring full and direct attention, intervention, and personal management while the patient was critical.  I provided  31 min of critical care services in addition to separately billable procedures.     Labs Reviewed   B-TYPE NATRIURETIC PEPTIDE (BNP) - Abnormal       Result Value     (*)    BLOOD GAS, ARTERIAL (RESPIRATORY THERAPY) - Abnormal    pH, Arterial 7.42      pCO2, Arterial 41.3      pO2, Arterial 69.2      HCO3, Arterial 27.0      Base Excess, Arterial 2.3 (*)     tO2, Arterial 20.0      Hemoglobin (measured), Arterial 15.6      Oxyhemoglobin, Arterial 91.1      Flow 4.00      Device Nasal Cannula      SpO2 93      tCO2 (calculated), Arterial 23.5      Patient Position Supine     COMPREHENSIVE METABOLIC PANEL - Normal    Sodium 139      Potassium 4.1      Chloride 105      CO2 26      Anion Gap 8      BUN 19      Creatinine 1.01      Glucose 103      Calcium 9.2      AST 18      ALT (SGPT) 14      Alkaline Phosphatase 88      Total Protein 6.8      Albumin 4.3      Total Bilirubin 0.69      Corrected Calcium 9.0      eGFR 86      Narrative:      Calculation based on the 2021 Chronic Kidney Disease Epidemiology Collaboration (CKD-EPI) equation refit without adjustment for race.   HIGH SENSITIVE TROPONIN I - Normal    hsTnI 0 Hour 15.3     RESPIRATORY PATHOGEN PANEL, PCR - Normal    Adenovirus Detection by PCR Negative      SARS-COV-2 PCR Negative      Human Metapneumovirus Detection by PCR Negative      Rhinovirus/Enterovirus Detection by PCR Negative      Influenza A  Detection by PCR Negative      Influenza B Detection by PCR Negative      Respiratory Syncytial Virus Detection by PCR Negative      Bordetella Pertussis Detection by PCR Negative      Chlamydophila Pneumoniae Detection by PCR Negative      Mycoplasma pneumo by PCR Negative      Bordetella Parapertussis Detection by PCR Negative      Coronavirus 229E Detection by PCR Negative      Coronavirus HKU1 Detection by PCR Negative      Coronavirus NL63  Detection by PCR Negative      Coronavirus OC43 Detection by PCR Negative      Parainfluenza 1 Detection by PCR Negative      Parainfluenza 2 Detection by PCR Negative      Parainfluenza 3 Detection by PCR Negative      Parainfluenza 4 Detection by PCR Negative      Narrative:     This assay is performed using the FilmArray Respiratory Panel ([employer] Results from this test must be correlated with the clinical history, epidemiological data, and other data available to the clinician evaluating the patient. A negative FilmArray RP result does not exclude the possibility of viral or bacterial infection. Negative test results may occur from the presence of sequence variants in the region targeted by the assay, the presence of inhibitors or an infection caused by an organism not detected by thepanel.   CBC WITH AUTO DIFFERENTIAL    WBC 8.9      RBC 4.99      Hemoglobin 15.9      Hematocrit 46.8      MCV 93.9      MCH 31.9      MCHC 34.0      RDW 13.0      Platelets 141      MPV 10.7      Neutrophils% 70      Lymphocytes% 20       Monocytes% 7      Eosinophils% 3      Basophils% 1      ANC (auto diff) 6.20      Lymphocytes Absolute 1.80      Monocytes Absolute 0.60      Eosinophils Absolute 0.20      Basophils Absolute 0.10     BLOOD GAS, ARTERIAL (RESPIRATORY THERAPY)   PROCALCITONIN       XR chest portable 1 view    (Results Pending)       ED Medication Administration from 02/25/2022 0232 to 02/25/2022 0533       Date/Time Order Dose Route Action Action by     02/25/2022 0307 levalbuterol (XOPENEX) 1.25 mg/0.5 mL nebulizer solution 1.25 mg 1.25 mg nebulization Given Koehler, W     02/25/2022 0307 ipratropium (ATROVENT) 0.02 % nebulizer solution 0.5 mg 0.5 mg nebulization Given Koehler, W     02/25/2022 0407 methylPREDNISolone sod suc(PF) (Solu-MEDROL) injection 125 mg 125 mg intravenous Given EVERETTE Riley          PROCEDURES:  Procedures    ED COURSE:  ED Course as of 02/25/22 0533   Fri Feb 25, 2022   0347 Reevaluated patient after administration of DuoNeb, patient states he feels better work of breathing has improved. Patient is still requiring 5 L supplemental oxygen to keep his O2 sat at 92%. [ANDRIY]      ED Course User Index  [ANDRIY] Mumtaz Figueroa DO     Recent Results (from the past 24 hour(s))   CBC w/auto differential Blood, Venous    Collection Time: 02/25/22  2:52 AM   Result Value Ref Range    WBC 8.9 3.7 - 9.6 10*3/uL    RBC 4.99 4.10 - 5.80 10*6/µL    Hemoglobin 15.9 13.2 - 17.2 g/dL    Hematocrit 46.8 38.0 - 50.0 %    MCV 93.9 82.0 - 97.0 fL    MCH 31.9 29.0 - 34.0 pg    MCHC 34.0 32.0 - 36.0 g/dL    RDW 13.0 11.5 - 15.0 %    Platelets 141 130 - 350 10*3/uL    MPV 10.7 6.9 - 10.8 fL    Neutrophils% 70 46 - 70 %    Lymphocytes% 20 15 - 47 %    Monocytes% 7 5 - 13 %    Eosinophils% 3 0 - 3 %    Basophils% 1 0 - 2 %    ANC (auto diff) 6.20 10*3/UL    Lymphocytes Absolute 1.80 10*3/uL    Monocytes Absolute 0.60 10*3/uL    Eosinophils Absolute 0.20 10*3/uL    Basophils Absolute 0.10 10*3/uL   Comprehensive metabolic panel Blood, Venous     Collection Time: 02/25/22  2:52 AM   Result Value Ref Range    Sodium 139 135 - 145 mmol/L    Potassium 4.1 3.5 - 5.1 MMOL/L    Chloride 105 98 - 107 mmol/L    CO2 26 21 - 32 mmol/L    Anion Gap 8 3 - 11 mmol/L    BUN 19 7 - 25 mg/dL    Creatinine 1.01 0.70 - 1.30 mg/dL    Glucose 103 70 - 105 mg/dL    Calcium 9.2 8.6 - 10.3 mg/dL    AST 18 <40 U/L    ALT (SGPT) 14 7 - 52 U/L    Alkaline Phosphatase 88 45 - 115 U/L    Total Protein 6.8 6.0 - 8.3 g/dL    Albumin 4.3 3.5 - 5.3 g/dL    Total Bilirubin 0.69 0.20 - 1.40 mg/dL    Corrected Calcium 9.0 8.6 - 10.3 mg/dL    eGFR 86 >60 mL/min/1.73m*2   HS Troponin I    Collection Time: 02/25/22  2:52 AM   Result Value Ref Range    hsTnI 0 Hour 15.3 <=19.8 pg/mL   B-type natriuretic peptide (BNP) Blood, Venous    Collection Time: 02/25/22  2:52 AM   Result Value Ref Range     (H) 0 - 100 pg/mL   Respiratory Pathogen Panel, PCR Swab    Collection Time: 02/25/22  2:53 AM   Result Value Ref Range    Adenovirus Detection by PCR Negative Negative    SARS-COV-2 PCR Negative Negative    Human Metapneumovirus Detection by PCR Negative Negative    Rhinovirus/Enterovirus Detection by PCR Negative Negative    Influenza A  Detection by PCR Negative Negative    Influenza B Detection by PCR Negative Negative    Respiratory Syncytial Virus Detection by PCR Negative Negative    Bordetella Pertussis Detection by PCR Negative Negative    Chlamydophila Pneumoniae Detection by PCR Negative Negative    Mycoplasma pneumo by PCR Negative Negative    Bordetella Parapertussis Detection by PCR Negative Negative    Coronavirus 229E Detection by PCR Negative Negative    Coronavirus HKU1 Detection by PCR Negative Negative    Coronavirus NL63  Detection by PCR Negative Negative    Coronavirus OC43 Detection by PCR Negative Negative    Parainfluenza 1 Detection by PCR Negative Negative    Parainfluenza 2 Detection by PCR Negative Negative    Parainfluenza 3 Detection by PCR Negative Negative     Parainfluenza 4 Detection by PCR Negative Negative   Blood gas, arterial (Respiratory Therapy) Blood, Arterial    Collection Time: 02/25/22  3:23 AM   Result Value Ref Range    pH, Arterial 7.42 7.35 - 7.45 PH    pCO2, Arterial 41.3 35.0 - 45.0 MMHG    pO2, Arterial 69.2 60.0 - 80.0 MMHG    HCO3, Arterial 27.0 23.0 - 29.0 mmol/L    Base Excess, Arterial 2.3 (H) -2.0 - 2.0 mmol/L    tO2, Arterial 20.0 15.0 - 24.0 mL/dL    Hemoglobin (measured), Arterial 15.6 13.2 - 17.2 g/dL    Oxyhemoglobin, Arterial 91.1 %    Flow 4.00 L/m    Device Nasal Cannula     SpO2 93 %    tCO2 (calculated), Arterial 23.5 19.0 - 24.0 mmol/L    Patient Position Supine               CLINICAL IMPRESSION:  Final diagnoses:   [J44.1] COPD exacerbation (CMS/[facility]) ([facility]ly [facility])       I, Dr. Figueroa, personally performed the services described in this documentation as described by andreia in my presence and it is both accurate and complete.     A voice recognition program was used to aid in documentation of this record.  Sometimes words are not printed exactly as they were spoken.  While efforts were made to carefully edit and correct any inaccuracies, some areas may be present; please take these into context.  Please contact the provider if areas are identified.       Mumtaz Figueroa DO  02/25/22 0553

## 2022-02-26 ENCOUNTER — APPOINTMENT (OUTPATIENT)
Dept: RADIOLOGY | Facility: HOSPITAL | Age: 62
End: 2022-02-26
Payer: COMMERCIAL

## 2022-02-26 ENCOUNTER — APPOINTMENT (OUTPATIENT)
Dept: NUCLEAR MEDICINE | Facility: HOSPITAL | Age: 62
End: 2022-02-26
Payer: COMMERCIAL

## 2022-02-26 LAB
ANION GAP SERPL CALC-SCNC: 10 MMOL/L (ref 3–11)
BASOPHILS # BLD AUTO: 0.1 10*3/UL
BASOPHILS NFR BLD AUTO: 1 % (ref 0–2)
BNP SERPL-MCNC: 634 PG/ML (ref 0–100)
BUN SERPL-MCNC: 26 MG/DL (ref 7–25)
CALCIUM SERPL-MCNC: 9.7 MG/DL (ref 8.6–10.3)
CHLORIDE SERPL-SCNC: 100 MMOL/L (ref 98–107)
CO2 SERPL-SCNC: 28 MMOL/L (ref 21–32)
CREAT SERPL-MCNC: 0.96 MG/DL (ref 0.7–1.3)
EJECTION FRACTION: 48 %
EOSINOPHIL # BLD AUTO: 0 10*3/UL
EOSINOPHIL NFR BLD AUTO: 0 % (ref 0–3)
ERYTHROCYTE [DISTWIDTH] IN BLOOD BY AUTOMATED COUNT: 13.4 % (ref 11.5–15)
GFR SERPL CREATININE-BSD FRML MDRD: 91 ML/MIN/1.73M*2
GLUCOSE SERPL-MCNC: 136 MG/DL (ref 70–105)
HCT VFR BLD AUTO: 47.5 % (ref 38–50)
HGB BLD-MCNC: 15.8 G/DL (ref 13.2–17.2)
LYMPHOCYTES # BLD AUTO: 1.3 10*3/UL
LYMPHOCYTES NFR BLD AUTO: 7 % (ref 15–47)
MCH RBC QN AUTO: 31.8 PG (ref 29–34)
MCHC RBC AUTO-ENTMCNC: 33.4 G/DL (ref 32–36)
MCV RBC AUTO: 95.2 FL (ref 82–97)
MONOCYTES # BLD AUTO: 1.1 10*3/UL
MONOCYTES NFR BLD AUTO: 6 % (ref 5–13)
NEUTROPHILS # BLD AUTO: 14.7 10*3/UL
NEUTROPHILS NFR BLD AUTO: 86 % (ref 46–70)
PLATELET # BLD AUTO: 149 10*3/UL (ref 130–350)
PMV BLD AUTO: 10.5 FL (ref 6.9–10.8)
POTASSIUM SERPL-SCNC: 4.2 MMOL/L (ref 3.5–5.1)
PROCALCITONIN SERPL-MCNC: <0.02 NG/ML
RBC # BLD AUTO: 4.99 10*6/ΜL (ref 4.1–5.8)
SODIUM SERPL-SCNC: 138 MMOL/L (ref 135–145)
TROPONIN I SERPL-MCNC: 14.7 PG/ML
WBC # BLD AUTO: 17.2 10*3/UL (ref 3.7–9.6)

## 2022-02-26 PROCEDURE — 84145 PROCALCITONIN (PCT): CPT | Performed by: INTERNAL MEDICINE

## 2022-02-26 PROCEDURE — 93017 CV STRESS TEST TRACING ONLY: CPT

## 2022-02-26 PROCEDURE — 78452 HT MUSCLE IMAGE SPECT MULT: CPT | Mod: ME

## 2022-02-26 PROCEDURE — 3430000500 HC RX DIAGNOSTIC RADIOPHARMACEUTICALS: Performed by: INTERNAL MEDICINE

## 2022-02-26 PROCEDURE — 6370000100 HC RX 637 (ALT 250 FOR IP): Performed by: HOSPITALIST

## 2022-02-26 PROCEDURE — 90471 IMMUNIZATION ADMIN: CPT | Mod: FLU

## 2022-02-26 PROCEDURE — 6360000200 HC RX 636 W HCPCS (ALT 250 FOR IP): Mod: FLU

## 2022-02-26 PROCEDURE — A9502 TC99M TETROFOSMIN: HCPCS | Performed by: INTERNAL MEDICINE

## 2022-02-26 PROCEDURE — 90686 IIV4 VACC NO PRSV 0.5 ML IM: CPT | Mod: FLU

## 2022-02-26 PROCEDURE — 84484 ASSAY OF TROPONIN QUANT: CPT | Performed by: INTERNAL MEDICINE

## 2022-02-26 PROCEDURE — 6360000200 HC RX 636 W HCPCS (ALT 250 FOR IP): Performed by: INTERNAL MEDICINE

## 2022-02-26 PROCEDURE — 97161 PT EVAL LOW COMPLEX 20 MIN: CPT | Mod: GP

## 2022-02-26 PROCEDURE — 80048 BASIC METABOLIC PNL TOTAL CA: CPT | Performed by: HOSPITALIST

## 2022-02-26 PROCEDURE — (BLANK) HC ROOM SEMI PRIVATE W TELE

## 2022-02-26 PROCEDURE — 94640 AIRWAY INHALATION TREATMENT: CPT

## 2022-02-26 PROCEDURE — 71046 X-RAY EXAM CHEST 2 VIEWS: CPT

## 2022-02-26 PROCEDURE — 83880 ASSAY OF NATRIURETIC PEPTIDE: CPT | Performed by: INTERNAL MEDICINE

## 2022-02-26 PROCEDURE — 36415 COLL VENOUS BLD VENIPUNCTURE: CPT | Performed by: HOSPITALIST

## 2022-02-26 PROCEDURE — 99232 SBSQ HOSP IP/OBS MODERATE 35: CPT | Performed by: INTERNAL MEDICINE

## 2022-02-26 PROCEDURE — 6370000100 HC RX 637 (ALT 250 FOR IP): Performed by: INTERNAL MEDICINE

## 2022-02-26 PROCEDURE — 6360000200 HC RX 636 W HCPCS (ALT 250 FOR IP): Performed by: HOSPITALIST

## 2022-02-26 PROCEDURE — 85025 COMPLETE CBC W/AUTO DIFF WBC: CPT | Performed by: HOSPITALIST

## 2022-02-26 RX ADMIN — TETROFOSMIN 7.44 MILLICURIE: 1.38 INJECTION, POWDER, LYOPHILIZED, FOR SOLUTION INTRAVENOUS at 10:30

## 2022-02-26 RX ADMIN — INFLUENZA VIRUS VACCINE 0.5 ML: 15; 15; 15; 15 SUSPENSION INTRAMUSCULAR at 09:19

## 2022-02-26 RX ADMIN — IPRATROPIUM BROMIDE 0.5 MG: 0.5 SOLUTION RESPIRATORY (INHALATION) at 07:22

## 2022-02-26 RX ADMIN — BUDESONIDE AND FORMOTEROL FUMARATE DIHYDRATE 2 PUFF: 160; 4.5 AEROSOL RESPIRATORY (INHALATION) at 07:24

## 2022-02-26 RX ADMIN — LEVALBUTEROL 1.25 MG: 1.25 SOLUTION, CONCENTRATE RESPIRATORY (INHALATION) at 07:23

## 2022-02-26 RX ADMIN — POTASSIUM CHLORIDE 20 MEQ: 750 TABLET, FILM COATED, EXTENDED RELEASE ORAL at 17:37

## 2022-02-26 RX ADMIN — PREDNISONE 40 MG: 20 TABLET ORAL at 09:15

## 2022-02-26 RX ADMIN — BUDESONIDE AND FORMOTEROL FUMARATE DIHYDRATE 2 PUFF: 160; 4.5 AEROSOL RESPIRATORY (INHALATION) at 19:31

## 2022-02-26 RX ADMIN — FUROSEMIDE 40 MG: 10 INJECTION, SOLUTION INTRAMUSCULAR; INTRAVENOUS at 17:38

## 2022-02-26 RX ADMIN — IPRATROPIUM BROMIDE 0.5 MG: 0.5 SOLUTION RESPIRATORY (INHALATION) at 19:31

## 2022-02-26 RX ADMIN — LEVALBUTEROL 1.25 MG: 1.25 SOLUTION, CONCENTRATE RESPIRATORY (INHALATION) at 19:31

## 2022-02-26 RX ADMIN — LISINOPRIL 5 MG: 5 TABLET ORAL at 09:15

## 2022-02-26 RX ADMIN — LEVALBUTEROL 1.25 MG: 1.25 SOLUTION, CONCENTRATE RESPIRATORY (INHALATION) at 14:13

## 2022-02-26 RX ADMIN — ENOXAPARIN SODIUM 40 MG: 100 INJECTION SUBCUTANEOUS at 13:25

## 2022-02-26 RX ADMIN — ATORVASTATIN CALCIUM 80 MG: 80 TABLET, FILM COATED ORAL at 19:44

## 2022-02-26 RX ADMIN — IPRATROPIUM BROMIDE 0.5 MG: 0.5 SOLUTION RESPIRATORY (INHALATION) at 14:13

## 2022-02-26 RX ADMIN — FUROSEMIDE 40 MG: 10 INJECTION, SOLUTION INTRAMUSCULAR; INTRAVENOUS at 05:18

## 2022-02-26 RX ADMIN — POTASSIUM CHLORIDE 20 MEQ: 750 TABLET, FILM COATED, EXTENDED RELEASE ORAL at 09:15

## 2022-02-26 RX ADMIN — ASPIRIN 81 MG: 81 TABLET ORAL at 09:15

## 2022-02-26 NOTE — INTERDISCIPLINARY/THERAPY
02/26/22 1000   Time Calculation   Start Time 1000   Stop Time 1009   Time Calculation (min) 9 min   General   Therapy Treatment Diagnosis COPD   Is this a Co-Treatment? No   Home Living   Home Living Comments Pt indep at baseline. Reports 5 [employer]. No DME   Prior Function   Level of [place] Independent with ADLs and functional transfers;Independent with homemaking with ambulation   Subjective Comments   RN Stated patient is medically cleared for therapy Yes   Subjective Comments Pt sitting EOB, agreeable for session. Pt ends session EOB w/ needs in reach.   Bed Mobility   Bed Mobility Not assessed   Transfers   Transfer Assessed   Transfer 1   Transfer From 1 Sit   Transfer Type 1 To and from   Transfer to 1 Stand   Level of Assistance 1 Independent   Ambulation 1   Surface 1 Level surface;Smooth   Device 1 Gait belt;No device   Assistance 1 General supervision   Quality of Gait 1 SPV, steady w no AD.   Ambulation Distance (meters) 80 meters   Balance   Balance Intact   RLE Assessment   RLE Assessment WFL   LLE Assessment   LLE Assessment WFL   Activity Tolerance   Activity Tolerance Comments on 3L , VSS after amb   Assessment   Assessment Comment Pt is up ambulating indep in room. He ambulates 80m w no AD and no assist required. Pt w/ no need for skilled PT, PT will sign off.   Recommendation   Recommendations for Therapy No further therapy needed   PT Untimed Charges - Quick List (Time Spent in Minutes)   PT Eval Low Complexity 9   Plan   Plan Comment DC PT

## 2022-02-26 NOTE — PLAN OF CARE
Problem: Pain - Adult  Goal: Verbalizes/displays adequate comfort level or baseline comfort level  Description: INTERVENTIONS:  1. Encourage patient to monitor pain and request interventions  2. Assess pain using the appropriate pain scale  3. Administer analgesics based on type and severity of pain and evaluate response  4. Educate/Implement non-pharmacological measures as appropriate and evaluate response  5. Consider cultural, developmental and social influences on pain and pain management  6. Notify Provider if interventions unsuccessful or patient reports new pain  Outcome: Adequate for Discharge     Problem: Infection - Adult  Goal: Absence of infection during hospitalization  Description: INTERVENTIONS:  1. Assess and monitor for signs and symptoms of infection  2. Monitor lab/diagnostic results  3. Monitor all insertion sites/wounds/incisions  4. Monitor secretions for changes in amount and color  5. Administer medications as ordered  6. Educate and encourage patient and family to use good hand hygiene technique  7. Identify and educate in appropriate isolation precautions for identified infection/condition  Outcome: Progressing     Problem: Safety Adult  Goal: Patient will remain safe during hospitalization  Description: INTERVENTIONS    1. Assess patient for fall risk and implement interventions if needed  2. Use safe transport techniques  3. Assess patient using the appropriate Bennie skin assessment scale  4. Assess patient for risk of aspiration  5. Assess patient for risk of elopement  6. Assess patient for risk of suicide  Outcome: Progressing     Problem: Daily Care  Goal: Daily care needs are met  Description: INTERVENTIONS:   1. Assess and monitor skin integrity  2. Identify patients at risk for skin breakdown on admission and per policy  3. Assess and monitor ability to perform self care and identify potential discharge needs  4. Assess skin integrity/risk for skin breakdown  5. Assist patient  with activities of daily living as needed  6. Encourage independent activity per ability   7. Provide mouth care   8. Include patient/family/caregiver in decisions related to daily care   Outcome: Progressing     Problem: Knowledge Deficit  Goal: Patient/family/caregiver demonstrates understanding of disease process, treatment plan, medications, and discharge instructions  Description: INTERVENTIONS:   1. Complete learning assessment and assess knowledge base  2. Provide teaching at level of understanding   3. Provide teaching via preferred learning methods  Outcome: Progressing     Problem: Discharge Barriers  Goal: Patient's discharge needs are met  Description: INTERVENTIONS:  1. Assess patient for self-management skills  2. Encourage participation in management  3. Identify potential discharge barriers on admission and throughout hospital stay  4. Involve patient/family/caregiver in discharge planning process  5. Collaborate with case management/ for discharge needs  Outcome: Progressing

## 2022-02-26 NOTE — PROGRESS NOTES
Subjective   Overall he says he is doing better he feels better  Still has a little bit of a cough is not sure if he feels quite back to normal but much better than yesterday  He denies any fevers chills nausea or vomiting  Denies any chest pain or pressure  Bowels are doing okay  Has noted frequent urination  Objective  Temp:  [36.4 °C (97.5 °F)-36.8 °C (98.3 °F)] 36.4 °C (97.6 °F)  Heart Rate:  [76-93] 84  Resp:  [13-25] 18  BP: ()/(45-63) 105/48  SpO2/FiO2 Ratio Using Approximate FiO2 (%):  [202.3-230] 230  Estimated P/F Ratio Using Approximate FiO2 (%):  [182.1-204.6] 204.6  I/O last 3 completed shifts:  In: 240 [P.O.:240]  Out: 1400 [Urine:1400]  No intake/output data recorded.   General: Alert pleasant no distress laying in bed    HEENT exam: No scleral icterus pharynx is clear and moist neck is supple trachea              midline no lymphadenopathy    Lungs: Positive expiratory wheeze no rales  Cardiovascular exam: S1-S2 regular rate and rhythm,                                         No murmurs    Abdominal exam: Soft positive bowel sounds nontender    Extremities: Warm no palpable cords no edema    Neurological exam: Alert and oriented ×3                                       Cranial nerves II through XII are grossly intact                                        Grossly nonfocal exam    Skin exam: No significant rashes, warm                                               Assessment & Plan   Shortness of breath/hypoxia: Appear to be secondary to congestive heart failure yesterday though also had evidence of expiratory wheeze  He has been treated aggressively with Lasix as well as steroid  When I walked in this morning he was on 5 L I was able to wean him down to 2 L so he is still requiring oxygen I'm going to repeat a BNP repeat a procalcitonin and get a 2 view chest x-ray    History of cardiomyopathy/ischemic: Is echocardiogram actually showed an improvement in his ejection fraction, however possible  aortic regurgitation which was somewhat worse than it was before  Patient's troponins have been negative I'm going to recheck again this morning I'm going to try and have him ambulate on a treadmill and see if we can assess his cardiac status  I do not have a good explanation as to why he went into heart failure so we need to consider cardiac ischemia    Leukocytosis: Likely related to steroids  No other obvious evidence of infection, though chest x-ray thought possible mild consolidation  Patient also has underlying emphysema and expiratory wheeze    Pulmonary nodule: Repeat CT scan in 6 months discussed with patient    DVT prophylaxis  Active Problems:    COPD exacerbation (CMS/HCC) ([facility])

## 2022-02-27 ENCOUNTER — APPOINTMENT (OUTPATIENT)
Dept: NUCLEAR MEDICINE | Facility: HOSPITAL | Age: 62
End: 2022-02-27
Payer: COMMERCIAL

## 2022-02-27 LAB
ALBUMIN SERPL-MCNC: 3.9 G/DL (ref 3.5–5.3)
ALP SERPL-CCNC: 60 U/L (ref 45–115)
ALT SERPL-CCNC: 12 U/L (ref 7–52)
ANION GAP SERPL CALC-SCNC: 6 MMOL/L (ref 3–11)
AST SERPL-CCNC: 11 U/L
BASOPHILS # BLD AUTO: 0.1 10*3/UL
BASOPHILS NFR BLD AUTO: 1 % (ref 0–2)
BILIRUB SERPL-MCNC: 0.7 MG/DL (ref 0.2–1.4)
BNP SERPL-MCNC: 494 PG/ML (ref 0–100)
BSA FOR ECHO PROCEDURE: 2.2 M2
BUN SERPL-MCNC: 24 MG/DL (ref 7–25)
CALCIUM ALBUM COR SERPL-MCNC: 9 MG/DL (ref 8.6–10.3)
CALCIUM SERPL-MCNC: 8.9 MG/DL (ref 8.6–10.3)
CHLORIDE SERPL-SCNC: 102 MMOL/L (ref 98–107)
CO2 SERPL-SCNC: 31 MMOL/L (ref 21–32)
CREAT SERPL-MCNC: 0.87 MG/DL (ref 0.7–1.3)
EOSINOPHIL # BLD AUTO: 0 10*3/UL
EOSINOPHIL NFR BLD AUTO: 0 % (ref 0–3)
ERYTHROCYTE [DISTWIDTH] IN BLOOD BY AUTOMATED COUNT: 13.3 % (ref 11.5–15)
GFR SERPL CREATININE-BSD FRML MDRD: 99 ML/MIN/1.73M*2
GLUCOSE SERPL-MCNC: 94 MG/DL (ref 70–105)
HCT VFR BLD AUTO: 43.5 % (ref 38–50)
HGB BLD-MCNC: 14.5 G/DL (ref 13.2–17.2)
LYMPHOCYTES # BLD AUTO: 1.8 10*3/UL
LYMPHOCYTES NFR BLD AUTO: 17 % (ref 15–47)
MCH RBC QN AUTO: 31.6 PG (ref 29–34)
MCHC RBC AUTO-ENTMCNC: 33.4 G/DL (ref 32–36)
MCV RBC AUTO: 94.9 FL (ref 82–97)
MONOCYTES # BLD AUTO: 0.8 10*3/UL
MONOCYTES NFR BLD AUTO: 8 % (ref 5–13)
NEUTROPHILS # BLD AUTO: 8.2 10*3/UL
NEUTROPHILS NFR BLD AUTO: 75 % (ref 46–70)
NUC STRESS EJECTION FRACTION: 48 %
NUC STRESS TID: 1.11
PLATELET # BLD AUTO: 121 10*3/UL (ref 130–350)
PMV BLD AUTO: 10.7 FL (ref 6.9–10.8)
POTASSIUM SERPL-SCNC: 4.1 MMOL/L (ref 3.5–5.1)
PROT SERPL-MCNC: 6.1 G/DL (ref 6–8.3)
RBC # BLD AUTO: 4.59 10*6/ΜL (ref 4.1–5.8)
RH CV BASELINE VITALS COMMENTS: ABNORMAL
RH CV NM REST DOSE: 7.4
RH CV NM STRESS DOSE: 22.6
RH CV PEAK STRESS VITALS COMMENTS: ABNORMAL
RH CV STRESS CONVERT TO LEXI: 2 MIN
RH CV STRESS CONVERT TO WALKING LEXI: 3 MIN
RH CV STRESS CONVERTED TO LEXI SECONDS: 10 SEC
RH CV STRESS CONVERTED TO WALKING LEXI SECONDS: 27 SEC
SODIUM SERPL-SCNC: 139 MMOL/L (ref 135–145)
STRESS BASELINE BP: ABNORMAL MMHG
STRESS BASELINE HR: 82 BPM
STRESS O2 SAT REST: 89 %
STRESS PERCENT HR: 72 %
STRESS POST O2 SAT PEAK: 92 %
STRESS POST PEAK BP: ABNORMAL MMHG
STRESS POST PEAK HR: 116 BPM
STRESS TARGET HR: 137 BPM
SUMMED DIFFERENCE: 2
SUMMED REST SCORE: 6
SUMMED STRESS SCORE: 7
WBC # BLD AUTO: 10.9 10*3/UL (ref 3.7–9.6)

## 2022-02-27 PROCEDURE — 6360000200 HC RX 636 W HCPCS (ALT 250 FOR IP): Performed by: INTERNAL MEDICINE

## 2022-02-27 PROCEDURE — 93016 CV STRESS TEST SUPVJ ONLY: CPT | Performed by: INTERNAL MEDICINE

## 2022-02-27 PROCEDURE — 36415 COLL VENOUS BLD VENIPUNCTURE: CPT | Performed by: INTERNAL MEDICINE

## 2022-02-27 PROCEDURE — 94761 N-INVAS EAR/PLS OXIMETRY MLT: CPT

## 2022-02-27 PROCEDURE — A9502 TC99M TETROFOSMIN: HCPCS | Performed by: INTERNAL MEDICINE

## 2022-02-27 PROCEDURE — 85025 COMPLETE CBC W/AUTO DIFF WBC: CPT | Performed by: INTERNAL MEDICINE

## 2022-02-27 PROCEDURE — 93018 CV STRESS TEST I&R ONLY: CPT | Performed by: INTERNAL MEDICINE

## 2022-02-27 PROCEDURE — 3430000500 HC RX DIAGNOSTIC RADIOPHARMACEUTICALS: Performed by: INTERNAL MEDICINE

## 2022-02-27 PROCEDURE — 80053 COMPREHEN METABOLIC PANEL: CPT | Performed by: INTERNAL MEDICINE

## 2022-02-27 PROCEDURE — 94640 AIRWAY INHALATION TREATMENT: CPT

## 2022-02-27 PROCEDURE — 78452 HT MUSCLE IMAGE SPECT MULT: CPT | Mod: 26,ME | Performed by: INTERNAL MEDICINE

## 2022-02-27 PROCEDURE — 6370000100 HC RX 637 (ALT 250 FOR IP): Performed by: HOSPITALIST

## 2022-02-27 PROCEDURE — 6370000100 HC RX 637 (ALT 250 FOR IP): Performed by: INTERNAL MEDICINE

## 2022-02-27 PROCEDURE — 99238 HOSP IP/OBS DSCHRG MGMT 30/<: CPT | Performed by: INTERNAL MEDICINE

## 2022-02-27 PROCEDURE — G1004 CDSM NDSC: HCPCS | Performed by: INTERNAL MEDICINE

## 2022-02-27 PROCEDURE — 83880 ASSAY OF NATRIURETIC PEPTIDE: CPT | Performed by: INTERNAL MEDICINE

## 2022-02-27 RX ORDER — FLUTICASONE PROPIONATE AND SALMETEROL XINAFOATE 230; 21 UG/1; UG/1
2 AEROSOL, METERED RESPIRATORY (INHALATION) 2 TIMES DAILY
Qty: 1 G | Refills: 0 | Status: SHIPPED | OUTPATIENT
Start: 2022-02-27 | End: 2022-04-06 | Stop reason: ALTCHOICE

## 2022-02-27 RX ORDER — POTASSIUM CHLORIDE 20 MEQ/1
20 TABLET, EXTENDED RELEASE ORAL DAILY
Qty: 30 TABLET | Refills: 0 | Status: SHIPPED | OUTPATIENT
Start: 2022-02-27 | End: 2022-03-21 | Stop reason: SDUPTHER

## 2022-02-27 RX ORDER — PREDNISONE 20 MG/1
40 TABLET ORAL DAILY
Qty: 10 TABLET | Refills: 0 | Status: SHIPPED | OUTPATIENT
Start: 2022-02-28 | End: 2022-03-05

## 2022-02-27 RX ORDER — FUROSEMIDE 40 MG/1
40 TABLET ORAL 2 TIMES DAILY
Qty: 60 TABLET | Refills: 0 | Status: SHIPPED | OUTPATIENT
Start: 2022-02-27 | End: 2022-03-21 | Stop reason: SDUPTHER

## 2022-02-27 RX ORDER — REGADENOSON 0.08 MG/ML
0.4 INJECTION, SOLUTION INTRAVENOUS ONCE
Status: COMPLETED | OUTPATIENT
Start: 2022-02-27 | End: 2022-02-27

## 2022-02-27 RX ADMIN — LISINOPRIL 5 MG: 5 TABLET ORAL at 10:40

## 2022-02-27 RX ADMIN — TETROFOSMIN 22.58 MILLICURIE: 1.38 INJECTION, POWDER, LYOPHILIZED, FOR SOLUTION INTRAVENOUS at 08:42

## 2022-02-27 RX ADMIN — BUDESONIDE AND FORMOTEROL FUMARATE DIHYDRATE 2 PUFF: 160; 4.5 AEROSOL RESPIRATORY (INHALATION) at 07:01

## 2022-02-27 RX ADMIN — REGADENOSON 0.4 MG: 0.08 INJECTION, SOLUTION INTRAVENOUS at 08:42

## 2022-02-27 RX ADMIN — LEVALBUTEROL 1.25 MG: 1.25 SOLUTION, CONCENTRATE RESPIRATORY (INHALATION) at 07:05

## 2022-02-27 RX ADMIN — ASPIRIN 81 MG: 81 TABLET ORAL at 10:40

## 2022-02-27 RX ADMIN — IPRATROPIUM BROMIDE 0.5 MG: 0.5 SOLUTION RESPIRATORY (INHALATION) at 07:05

## 2022-02-27 RX ADMIN — POTASSIUM CHLORIDE 20 MEQ: 750 TABLET, FILM COATED, EXTENDED RELEASE ORAL at 10:40

## 2022-02-27 RX ADMIN — IPRATROPIUM BROMIDE 0.5 MG: 0.5 SOLUTION RESPIRATORY (INHALATION) at 13:42

## 2022-02-27 RX ADMIN — PREDNISONE 40 MG: 20 TABLET ORAL at 10:40

## 2022-02-27 RX ADMIN — LEVALBUTEROL 1.25 MG: 1.25 SOLUTION, CONCENTRATE RESPIRATORY (INHALATION) at 13:42

## 2022-02-27 NOTE — INTERDISCIPLINARY/THERAPY
Case Management Admission Note    Phone # [phone]    Living Situation: Spouse/significant other Private residence            ADLs: Independent  Stairs:      HME/CPAP: None      Oxygen: No   Home Health:No  Current Resources: None   Diabetes/supplies: Do you have Diabetes?: No  PCP: Pcp No  Funding: [facility]  Pharmacy:[facility] [id number]    Support Person: Primary Emergency Contact: Robina Franklin, Home Phone: [phone], Mobile Phone: [phone], Relation: Spouse  Needs transportation assistance at DC: No  Discharge Needs/Barriers: Home O2, PCP    Narrative: Patient admitted for resp failure and CHF. Received IV diuresis. PO steroids. No ischemia shown on stress test. Home O2 determination completed and patient requiring 3L O2 with rest and 4L with activity. CM met with patient and daughter and discussed need for home O2, provided [facility] and [facility] CM queued up order and obtained signature from provider. Faxed to [facility] and spoke with on-call tech for delivery. The patient explained that he does not feel he can work with O2 as he is an . CM discussed that if his provider feels that he may only need O2 for a short period, of time he could look into [place] through his work. He also is concerned about traveling with O2 as he has a trip planned to [place] at the end of March. ECTOR explained that airlines have specifications on portable O2 tanks and that the have to be airline compatible. ECTOR discussed he will need to speak with [facility] about this and also look at the airlines website for their requirements.     Dispo: ARTHUR

## 2022-02-27 NOTE — DISCHARGE SUMMARY
Date of admission:  February 25, 2022      Date of discharge:  February 27, 2022      Admission diagnoses:  Acute hypoxic respiratory failure  Acute systolic congestive heart failure  Known bioprosthetic aortic valve  Known history of cardiomyopathy  Probable underlying COPD  Known history of coronary artery disease status post CABG  Essential hypertension    Discharge diagnoses:  Acute hypoxic respiratory failure  Acute systolic heart failure  Known bioprosthetic aortic valve  Ischemic cardiomyopathy  Probable underlying COPD  Essential hypertension    Procedures:  Walking Lexiscan stress test  Echocardiogram  CT pulmonary embolism protocol chest    Hospital course:  The patient is a 61-year-old gentleman with known history of coronary artery disease/cardiomyopathy/bioprosthetic aortic valve who presented to the emergency department with increasing shortness of breath that began in the middle of the night  On presentation he was found to be hypoxic he also had evidence of heart failure on his chest x-ray had an elevated BNP and diffuse expiratory wheezing  CT PE protocol was obtained which was negative there is no obvious evidence of infection with a negative procalcitonin but his BNP was elevated  He underwent aggressive diuresis and improved to some extent however echocardiogram showed possibly slightly worsening aortic regurgitation on his bioprosthetic valve  He did have improvement in his ejection fraction from 6 months earlier  A Lexiscan walking treadmill test was negative for ischemia as were his cardiac enzyme  Patient was anxious for discharge to home he was discharged on diuretics including Lasix and potassium replacement, prednisone 40 mg daily for 5 days, and Advair inhaler with instructions to follow-up with his cardiologist as well as primary care physician  Patient was smoking up until the time of admission but plans on discontinuing      Physical exam on discharge:  General: Alert pleasant no  distress    HEENT exam: No scleral icterus pharynx is clear and moist neck is supple trachea              midline no lymphadenopathy    Lungs: Occasional expiratory wheeze rare rales at the bases    Cardiovascular exam: S1-S2 regular rate and rhythm,                                        1/6 systolic murmur  Abdominal exam: Soft positive bowel sounds nontender    Extremities: Warm no palpable cords no edema    Neurological exam: Alert and oriented ×3                                       Cranial nerves II through XII are grossly intact                                        Grossly nonfocal exam    Skin exam: No significant rashes, warm                                               Discharge medications:     Discharge medication list      START taking these medications      Instructions   Advair -21 mcg/actuation inhaler  Generic drug: fluticasone propion-salmeteroL   Inhale 2 puffs 2 (two) times a day Rinse mouth with water after use. Do not swallow.     furosemide 40 mg tablet  Commonly known as: Lasix   Take 1 tablet (40 mg total) by mouth 2 (two) times a day     potassium chloride 20 mEq CR tablet  Commonly known as: KLOR-CON M20   Take 1 tablet (20 mEq total) by mouth daily     predniSONE 20 mg tablet  Commonly known as: DELTASONE  Start taking on: February 28, 2022   Take 2 tablets (40 mg total) by mouth daily for 5 days        CONTINUE taking these medications      Instructions   aspirin 81 mg EC tablet   Take 1 tablet (81 mg total) by mouth daily     atorvastatin 80 mg tablet  Commonly known as: LIPITOR   Take 1 tablet (80 mg total) by mouth daily     lisinopriL 5 mg tablet  Commonly known as: PRINIVIL,ZESTRIL   Take 1 tablet (5 mg total) by mouth daily     metoprolol succinate XL 50 mg 24 hr tablet  Commonly known as: TOPROL-XL   Take 1 tablet (50 mg total) by mouth daily     nitroglycerin 0.4 mg SL tablet  Commonly known as: NITROSTAT      Repatha SureClick 140 mg/mL pen injector  Generic drug:  evolocumab   Inject 140 mg under the skin every 14 (fourteen) days           Where to Get Your Medications      These medications were sent to [facility] [address] [place], [place] - [address] [facility]  [address] [facility] [address]    Phone: [phone]   · Advair -21 mcg/actuation inhaler  · furosemide 40 mg tablet  · potassium chloride 20 mEq CR tablet  · predniSONE 20 mg tablet          Follow-up:  Cardiology 1 to 2-week  Primary care 1 to 2-week

## 2022-02-27 NOTE — INTERDISCIPLINARY/THERAPY
02/27/22 0814   O2 Discharge Determination   Activity Resting;Exercise   $ Multiple SpO2 Levels Obtained? - RT Charge Only Yes   O2 Discharge Determination Activity: Resting   Resting Lowest SPO2 (%) on Room Air 86 %   Resting Oxygen Delivery Interface Nasal cannula   Resting Oxygen Requirement (lpm) 3 lpm   Resting SPO2 (%) on Final Need of Oxygen 90 %   O2 Discharge Determination Activity: Exercise   Exercise Oxygen Delivery Interface Nasal cannula   Exercise Oxygen Requirement (lpm) 4 lpm   Exercise SPO2 (%) on Final Need of Oxygen 90 %   Exercise Comment pt ambulates well.

## 2022-02-28 ENCOUNTER — PATIENT OUTREACH (OUTPATIENT)
Dept: FAMILY MEDICINE | Facility: CLINIC | Age: 62
End: 2022-02-28
Payer: COMMERCIAL

## 2022-02-28 NOTE — FAX COVER SHEET
Fax Transmission  ----------------------------------------------------------------------------------------------------------------------  Facility Name:  [facility] - Care Management Dept.  Mailing address:  [address] [place] [address], Zip:  [place] [address]  Tax ID:   [id number]  NPI:    [id number]      Attention: UR  Additional clinical       Comments: [facility] has become [facility]: Find out more at [email or web address]        Auth/Cert Number:   Pending REF/ AUTH #[id number]        Please call with any questions.        Thanks,         Nani Stephen RN, Case Management- Utilization Review  [address] [place] [address]  p:  [phone]    f: [phone]    e: [email or web address]    This facsimile message is CONFIDENTIAL and may contain -privileged information and/or Protected Health Information (PHI) as defined in the federal Health Insurance Portability and Accountability Act, as amended.  This facsimile is  intended ONLY for the use of the individual or company named.  If the reader is NOT the intended recipient, or the employee or agent responsible to deliver it to the intended recipient, you are hereby notified that any dissemination, distribution, or copying of this communication is prohibited.  If you have received this communication in error, please immediately notify us by telephone so that we may arrange for the return of the original message.

## 2022-02-28 NOTE — PROGRESS NOTES
Hayden Franklin was contacted following recent hospitalization at [facility]. The patient was discharged from the hospital on 2/27/2022 .The Discharge Summary and After Visit Summary were reviewed. The patient's main diagnosis during the hospitalization was COPD exacerbation.  Followup appointment:is to be made by pt once he establishes care with a PCP. Any additional testing and labs will be discussed at the patient's upcoming post-hospital follow up appointment.    Transitional Care Management Follow Up (most recent)     Transitional Care Management - 02/28/22 1440        OTHER    Date of post hospital outreach 02/28/22     Contact Status Contact done     Speaking with the Patient or Patient's Caregiver? Patient     Is the patient on the Diabetes registry? N     Were patient's home medications changed or did they have any new medications added during the hospitalization? Y     Did someone go over the discharge summary with the patient or the patient's caregiver and discuss the medications listed on it? Y     Does the patient or patient's caregiver have any questions about the medication changes? no     Patient verbalized understanding of when to contact health care provider or when to get help right away? Y     Discharge instructions reviewed with patient or patient's caregiver and all questions answered? Y     Is there a Home Health/DME Plan being enacted? Please note name of HH agency, DME vendor, contacted/received N     Does patient have psychosocial issues that might impact their health status? None     Does patient have financial barriers to care? None                  Corrine Ash RN  February 28, 2022 2:45 PM

## 2022-03-02 VITALS
SYSTOLIC BLOOD PRESSURE: 117 MMHG | TEMPERATURE: 97.7 F | HEIGHT: 73 IN | OXYGEN SATURATION: 92 % | WEIGHT: 203.93 LBS | BODY MASS INDEX: 27.03 KG/M2 | HEART RATE: 83 BPM | RESPIRATION RATE: 18 BRPM | DIASTOLIC BLOOD PRESSURE: 44 MMHG

## 2022-03-06 ENCOUNTER — TRANSFERRED RECORDS (OUTPATIENT)
Dept: HEALTH INFORMATION MANAGEMENT | Facility: CLINIC | Age: 62
End: 2022-03-06

## 2022-03-06 PROBLEM — Z98.890 HISTORY OF ABDOMINAL AORTIC ANEURYSM REPAIR: Status: RESOLVED | Noted: 2022-03-06 | Resolved: 2022-03-06

## 2022-03-06 PROBLEM — Z98.890 HISTORY OF AORTIC VALVE REPAIR: Status: ACTIVE | Noted: 2022-03-06

## 2022-03-06 PROBLEM — Z86.79 HISTORY OF AORTIC VALVE REPAIR: Status: ACTIVE | Noted: 2022-03-06

## 2022-03-06 PROBLEM — Z98.890 HISTORY OF ABDOMINAL AORTIC ANEURYSM REPAIR: Status: ACTIVE | Noted: 2022-03-06

## 2022-03-07 ENCOUNTER — OFFICE VISIT (OUTPATIENT)
Dept: CARDIOLOGY | Facility: CLINIC | Age: 62
End: 2022-03-07
Payer: COMMERCIAL

## 2022-03-07 ENCOUNTER — OFFICE VISIT (OUTPATIENT)
Dept: FAMILY MEDICINE | Facility: CLINIC | Age: 62
End: 2022-03-07
Payer: COMMERCIAL

## 2022-03-07 ENCOUNTER — TRANSFERRED RECORDS (OUTPATIENT)
Dept: HEALTH INFORMATION MANAGEMENT | Facility: CLINIC | Age: 62
End: 2022-03-07

## 2022-03-07 VITALS
HEIGHT: 73 IN | HEART RATE: 78 BPM | DIASTOLIC BLOOD PRESSURE: 42 MMHG | OXYGEN SATURATION: 96 % | WEIGHT: 200 LBS | BODY MASS INDEX: 26.51 KG/M2 | SYSTOLIC BLOOD PRESSURE: 100 MMHG

## 2022-03-07 VITALS
DIASTOLIC BLOOD PRESSURE: 60 MMHG | WEIGHT: 198 LBS | OXYGEN SATURATION: 95 % | TEMPERATURE: 96.8 F | BODY MASS INDEX: 26.24 KG/M2 | SYSTOLIC BLOOD PRESSURE: 116 MMHG | HEART RATE: 85 BPM | HEIGHT: 73 IN

## 2022-03-07 DIAGNOSIS — I65.23 OCCLUSION AND STENOSIS OF BILATERAL CAROTID ARTERIES: ICD-10-CM

## 2022-03-07 DIAGNOSIS — J44.1 COPD EXACERBATION (CMS/HCC): Primary | ICD-10-CM

## 2022-03-07 DIAGNOSIS — I50.811 ACUTE RIGHT-SIDED CONGESTIVE HEART FAILURE (CMS/HCC): ICD-10-CM

## 2022-03-07 DIAGNOSIS — Z23 NEED FOR VACCINATION: ICD-10-CM

## 2022-03-07 DIAGNOSIS — R91.1 LUNG NODULE: ICD-10-CM

## 2022-03-07 DIAGNOSIS — E78.2 MIXED HYPERLIPIDEMIA: ICD-10-CM

## 2022-03-07 DIAGNOSIS — I25.5 ISCHEMIC CARDIOMYOPATHY: ICD-10-CM

## 2022-03-07 DIAGNOSIS — I35.0 NONRHEUMATIC AORTIC (VALVE) STENOSIS: Primary | ICD-10-CM

## 2022-03-07 DIAGNOSIS — I25.10 CORONARY ARTERY DISEASE INVOLVING NATIVE CORONARY ARTERY OF NATIVE HEART WITHOUT ANGINA PECTORIS: ICD-10-CM

## 2022-03-07 PROCEDURE — 90732 PPSV23 VACC 2 YRS+ SUBQ/IM: CPT | Performed by: FAMILY MEDICINE

## 2022-03-07 PROCEDURE — 90471 IMMUNIZATION ADMIN: CPT | Performed by: FAMILY MEDICINE

## 2022-03-07 PROCEDURE — 99213 OFFICE O/P EST LOW 20 MIN: CPT | Mod: 25 | Performed by: FAMILY MEDICINE

## 2022-03-07 PROCEDURE — 99214 OFFICE O/P EST MOD 30 MIN: CPT | Performed by: NURSE PRACTITIONER

## 2022-03-07 ASSESSMENT — ENCOUNTER SYMPTOMS
DYSPNEA ON EXERTION: 0
WEIGHT LOSS: 0
LIGHT-HEADEDNESS: 0
WEIGHT GAIN: 0
SNORING: 1
IRREGULAR HEARTBEAT: 0
DIZZINESS: 0
PALPITATIONS: 0
BACK PAIN: 0
SHORTNESS OF BREATH: 0
CLAUDICATION: 0
FALLS: 0
SLEEP DISTURBANCES DUE TO BREATHING: 0
LOSS OF BALANCE: 0
ORTHOPNEA: 0
BRUISES/BLEEDS EASILY: 0

## 2022-03-07 ASSESSMENT — PAIN SCALES - GENERAL: PAINLEVEL: 0-NO PAIN

## 2022-03-07 NOTE — Clinical Note
Kizzy,   I sent this patient over to you to help manage lasix therapy closely as he needs to return to work.  Please let me know if you have questions.

## 2022-03-07 NOTE — PROGRESS NOTES
Cardiology Outpatient Follow-up Note    Subjective    Patient ID: Hayden Franklin is a 61 y.o. male.    Chief Complaint   Patient presents with   • Hospital Follow Up   • Coronary Artery Disease   • Hyperlipidemia        HPI    This is a 61-year-old male who is a patient of Dr. Gomez.  He has a history of known coronary disease with CABG x1 with SVG to RCA with con commitment AVR (Justin Mitroflow bioprosthetic valve) with ascending aortic Dacron graft in 2013.  His echocardiogram in August 2021 demonstrated that he had trivial regurgitation with mild valvular stenosis.  At that time his EF was 35 to 40%.  She also has a history of carotid stenosis with his most recent carotid duplex in April 2020 demonstrating 16 to 49% bilateral ICA stenosis.    He was recently hospitalized from 2/25/2022 to 2/27/2022 for acute hypoxic respiratory failure secondary to acute diastolic congestive heart failure as well as probable underlying COPD.  He had an elevated BNP on arrival with diffuse expiratory wheezing.  His chest x-ray demonstrated diffuse vascular congestive changes suggestive of pulmonary edema/vascular congestion.  His echocardiogram did demonstrate worsening valvular dysfunction with moderate bioprosthetic aortic stenosis with orifice area of 1.4 cm².  Peak velocity of 3.1 m/s with mild to moderate aortic regurgitation.  The degree of aortic stenosis and regurgitation has progressed since prior echocardiogram however, the LV systolic function had improved significantly from prior echocardiogram with his LV systolic function being 52%.there was mild hypokinesis of the basal to mid inferior wall.  However his walking Lexiscan stress test was negative for any ischemia or prior infarction.  Patient was anxious to discharge and was sent home on 3 L of oxygen therapy.  As well as close follow-up with his cardiologist and primary care.  He was sent home with prednisone for 5 days.    He presents today for post  hospital follow-up.  He says he is lost approximately 3 pounds since his hospitalization.  He is an  by [employer].  Currently he is doing shift work with overnight shifts.  They are 12-hour shifts.  Starting in the beginning of April, he is doing five 8-hour shifts.  He occasionally does take breaks with his oxygen therapy and this will be after small increments of activity such as going to the garage and coming back and his oxygen he states will be above 90%.  He does maintain adequate salt restrictions in his diet.  He does not have a scale at home to check his weights.  We did discuss this at today's visit in the importance of daily weights.  He denies any PND orthopnea.  His lower leg swelling has significantly improved.  He denies any significant shortness of breath.  He states that he is feeling much better prior to his hospitalization.  He is inquiring also when he can come off of his oxygen and returned to work.  He will follow-up with his primary care provider later today for his first post hospital follow-up.    Cardiology Problem List:      Cardiology Problem List     Last Edited By Markell Cano LPN on 3/2/2022 at 2:07 PM     Primary Cardiologist: Dr. Gomez     Coronary   -11/1/2013 Inferior MI: 1V CABG (SVG-RCA) with concomitant AVR and ascending aortic dacron graft     Peripheral Vascular  - 8/2017 Carotid duplex: Right ICA stenosis 16-49%, left ICA stenosis 0-15% stenosis. Suggest follow-up study 1-2 years.    - 11/2013 s/p repair with a 34mm Dacron graft d/t Dilated Ascending Aorta    Valvular  - 11/2013 Aortic Valve Replacement with a Justin Mitroflow bioprosthetic d/t Nonrheumatic Aortic Valve Stenosis    CHF/CM NA  Electrophysiology NA  Pulmonary Vascular NA    Risk Factors   - Hyperlipidemia  - Tobacco use  - Carotid stenosis    Prior Imaging/Testing History   -2/26/2022 Lexiscan: EF 48%, LV perfusion is abnormal. Post stress imaging demonstrates moderately sized area of mild to  moderately decreased intensity involving the proximal to distal inferior wall and inferoapical wall. On rest imaging this defect appears predominantly fixed to partially worse. Findings consistent with prior inferior wall infarct.    - 2/25/2022 Echo: EF 52%, RA dilated, there is a 27 mm Justin Mitroflow bioprosthetic aortic valve present. Valve leaflets are slightly thickened and calcified although all 3 leaflets are noted to have a decent range of motion. Mild to moderate aortic regurgitation visually. Mild pulmonary hypertension is present. Right ventricular systolic pressure is estimated at 40 mmHg.    -8/18/2021 Echo: Biplane EF 38%, moderate LV systolic dysfunction. Segmental wall motion abnormalities noted, RV is hypokinetic, LA is moderately dilated, Prosthetic graft present. 34 mm Dacron Graft, RV systolic pressure is estimated at 28 mmHg.    -4/27/2020 Echo: EF 44%, Mild LV systolic dysfunction. Segmental wall motion abnormalities noted. Grade I (mild) LV diastolic abnormality, RV is moderately dilated. RV is mildly hypokinetic, LA  is moderately dilated. Mild to moderate aortic stenosis is noted.    - 8/3/2017 Echo: mildly globally impaired LV function, EF 45%; mildly dilated and hypocontractile Rv, mild sclerosis of 27mm Justin Mitroflow bioprosthetic aortic valve, mild MVR, mild TVR, Grade I diastolic dysfunction, normal appearing 34mm Dacron Ascending Aorta graft.    Past Medical History:   Diagnosis Date   • Aortic valve stenosis     s/p AV replacement 11/2013   • Ascending aorta dilatation (CMS/HCC) (HCC)     s/p AAA repair 11/2013   • CAD (coronary artery disease)    • Hyperlipidemia        Past Surgical History:   Procedure Laterality Date   • AAA REPAIR - ENDOGRAFT  11/2013   • AORTIC VALVE REPLACEMENT  11/2013   • CORONARY ANGIOPLASTY  11/2013   • CORONARY ARTERY BYPASS GRAFT  11/2013    1V CABG SVG- RCA       Allergies as of 03/07/2022 - Reviewed 03/07/2022   Allergen Reaction Noted   • Ezetimibe  Rash 03/16/2020       Current Outpatient Medications   Medication Sig Dispense Refill   • fluticasone propion-salmeteroL (Advair HFA) 230-21 mcg/actuation inhaler Inhale 2 puffs 2 (two) times a day Rinse mouth with water after use. Do not swallow. 1 g 0   • potassium chloride (KLOR-CON M20) 20 mEq CR tablet Take 1 tablet (20 mEq total) by mouth daily 30 tablet 0   • furosemide (Lasix) 40 mg tablet Take 1 tablet (40 mg total) by mouth 2 (two) times a day 60 tablet 0   • evolocumab (Repatha SureClick) 140 mg/mL pen injector Inject 140 mg under the skin every 14 (fourteen) days 2 mL 11   • metoprolol succinate XL (TOPROL-XL) 50 mg 24 hr tablet Take 1 tablet (50 mg total) by mouth daily 90 tablet 3   • lisinopriL (PRINIVIL,ZESTRIL) 5 mg tablet Take 1 tablet (5 mg total) by mouth daily 90 tablet 3   • atorvastatin (LIPITOR) 80 mg tablet Take 1 tablet (80 mg total) by mouth daily 90 tablet 3   • nitroglycerin (NITROSTAT) 0.4 mg SL tablet As needed.     • aspirin 81 mg EC tablet Take 1 tablet (81 mg total) by mouth daily 90 tablet 3     No current facility-administered medications for this visit.       Family History   Problem Relation Age of Onset   • Heart disease Father    • Aneurysm Father        Social History     Tobacco Use   • Smoking status: Current Every Day Smoker     Packs/day: 0.75     Years: 30.00     Pack years: 22.50     Types: Cigarettes   • Smokeless tobacco: Never Used   • Tobacco comment: Last smoked 3/1/22   Substance Use Topics   • Alcohol use: Yes     Alcohol/week: 4.0 - 5.0 standard drinks     Types: 4 - 5 Cans of beer per week     Comment: 4-5 beers weekly   • Drug use: No       The following have been reviewed and updated as appropriate in this visit  Tobacco  Allergies  Problems  Med Hx  Surg Hx  Fam Hx      .    Review of Systems   Constitutional: Positive for malaise/fatigue. Negative for weight gain and weight loss.   HENT: Negative for nosebleeds.    Cardiovascular: Negative for chest  pain, claudication, dyspnea on exertion, irregular heartbeat, leg swelling, orthopnea and palpitations.   Respiratory: Positive for snoring. Negative for shortness of breath and sleep disturbances due to breathing.    Hematologic/Lymphatic: Does not bruise/bleed easily.   Musculoskeletal: Positive for arthritis and joint pain. Negative for back pain and falls.   Neurological: Negative for dizziness, light-headedness and loss of balance.   All other systems reviewed and are negative.      Objective     Vitals:    03/07/22 0800   BP: 100/42   Pulse: 78   SpO2: 96%     Weight:   Weights (Current Encounter Only) (last 180 days)     Date/Time Weight    03/07/22 0800 90.7 kg (200 lb)           Physical Exam    Constitutional: Well built, nourished, no acute distress.   HEENT: Head is normocephalic and atraumatic. Sclera anicteric.   Neck: Supple. No carotid bruits.  No JVD.  Lungs: Effort normal. Breath sounds are clear without wheezes or rales. No respiratory distress.   Heart: S1-S2, Regular rate and rhythm.  Crisp prosthetic valve tones.  Grade 1/6 systolic murmur.  Extremities: Right lower ankle has trace edema (chronic) no left lower leg edema. Radial pulses 2+.  Musculoskeletal: Moves all 4 extremities spontaneously  Neurologic: No focal deficits.  Psychiartic: cooperative, alert and orientated X 3.  Skin: warm, dry, intact.      Data Review:     Sodium   Date Value Ref Range Status   02/27/2022 139 135 - 145 mmol/L Final     Potassium   Date Value Ref Range Status   02/27/2022 4.1 3.5 - 5.1 MMOL/L Final     Chloride   Date Value Ref Range Status   02/27/2022 102 98 - 107 mmol/L Final     CO2   Date Value Ref Range Status   02/27/2022 31 21 - 32 mmol/L Final     BUN   Date Value Ref Range Status   02/27/2022 24 7 - 25 mg/dL Final     Creatinine   Date Value Ref Range Status   02/27/2022 0.87 0.70 - 1.30 mg/dL Final     Glucose   Date Value Ref Range Status   02/27/2022 94 70 - 105 mg/dL Final     Calcium   Date  Value Ref Range Status   02/27/2022 8.9 8.6 - 10.3 mg/dL Final      hsTnI 0 Hour   Date Value Ref Range Status   02/26/2022 14.7 <=19.8 pg/mL Final     BNP   Date Value Ref Range Status   02/27/2022 494 (H) 0 - 100 pg/mL Final      WBC   Date Value Ref Range Status   02/27/2022 10.9 (H) 3.7 - 9.6 10*3/uL Final     Hemoglobin   Date Value Ref Range Status   02/27/2022 14.5 13.2 - 17.2 g/dL Final     Hematocrit   Date Value Ref Range Status   02/27/2022 43.5 38.0 - 50.0 % Final     MCV   Date Value Ref Range Status   02/27/2022 94.9 82.0 - 97.0 fL Final     Platelets   Date Value Ref Range Status   02/27/2022 121 (L) 130 - 350 10*3/uL Final      Cholesterol   Date Value Ref Range Status   04/05/2021 123 mg/dL Final     Triglycerides   Date Value Ref Range Status   04/05/2021 91 mg/dL Final     HDL   Date Value Ref Range Status   04/05/2021 65 mg/dL Final     LDL Calculated   Date Value Ref Range Status   04/05/2021 41 mg/dL Final          Assessment/Plan     1. Nonrheumatic aortic (valve) stenosis  · Since prior echocardiogram in August 2021, patient did have increase in aortic stenosis as well as aortic regurgitation.  We will obtain a repeat echocardiogram in approximately 3 months after the patient is euvolemic.  If the patient does have evidence again of moderate aortic stenosis as well as mild aortic regurgitation again at 3 months, likely it would be beneficial for him to obtain a transesophageal echocardiogram to assess the bioprosthetic valve gradients.  - Ambulatory referral to Cardiology  - B-type natriuretic peptide (BNP) Blood, Venous; Future  - Basic metabolic panel Blood, Venous; Future  - Ambulatory referral to CHF Clinic; Future  -  Echo ltd; Future    2. Acute right-sided congestive heart failure (CMS/HCC) (HCC)  · He is doing well at this time at furosemide 40 mg twice daily.  He does not have a scale at home and would benefit from obtaining daily weights.  · He has lost approximately 3 pounds  since his hospitalization stay.  · He denies any PND orthopnea.  · NYHA class III  · Still on oxygen therapy.  Likely this is a mixed component with probable underlying COPD as well as CHF.  · Obtain labs today for BNP as well as BMP.  · Consultation to heart failure for Kizzy Bruno CNP to help titrate diuretic therapy.  · Follow-up with echocardiogram in 3 months as well as myself in approximately 4 to 6 weeks.  · Tentative return to work beginning of April.  Letter provided to patient and spouse today.  If patient improves prior to then, will re-evaluate.    3. Coronary artery disease involving native coronary artery of native heart without angina pectoris  · GDMT: Aspirin therapy, metoprolol, statin, Repatha  · No angina.  · Stress test demonstrated no ischemia or infarction.  · EF 52%.    4. Mixed hyperlipidemia  · 4/5/2021: Triglycerides 91, cholesterol 123, HDL 65 with an LDL of 41.  · Due for lipid panel in April 2022.    5. Occlusion and stenosis of bilateral carotid arteries  · Last carotid artery duplex completed in April 2020 demonstrated 16 to 49% stenosis bilaterally.  Recommend repeat in 1 year.  He is due for surveillance ultrasound.  · Orders placed to obtain at his next appointment for echocardiogram.     6.  Tobacco cessation  · Patient has not had a cigarette since prior to his hospitalization.  Congratulated him on his efforts.    7.  Probable COPD  · He is still on oxygen therapy at this time.  He would like to discontinue his oxygen.  · He has completed his prednisone taper therapy.  · He does have a follow-up with his primary care provider.  I did asked that he discuss this with his primary care provider on whether or not he would benefit from referral to pulmonary or whether or not primary would like to manage.  · Likely multifactorial in nature with his oxygen therapy with underlying COPD exacerbation as well as heart failure exacerbation.  · He is likely close to being able to come off  of his oxygen therapy with O2 determination reevaluation necessary prior to discontinuation.    Patient Instructions   Labs today      Return in about 4 weeks (around 4/4/2022) for Recheck - In Office; Yeny Fisher CNP.    The patient verbalized correct understanding and agreement to today's instructions and plan.  The patient verbalized that all questions have been addressed.    Yeny Fisher CNP      A voice recognition program was used to aid in documentation of this record. Sometimes words are not printed exactly as they were spoken. While efforts were made to carefully edit and correct any inaccuracies, some errors may be present; please take these into context. Please contact the provider if errors are identified.

## 2022-03-08 ENCOUNTER — APPOINTMENT (OUTPATIENT)
Dept: LAB | Facility: CLINIC | Age: 62
End: 2022-03-08
Payer: COMMERCIAL

## 2022-03-08 DIAGNOSIS — I35.0 NONRHEUMATIC AORTIC (VALVE) STENOSIS: ICD-10-CM

## 2022-03-08 LAB
ANION GAP SERPL CALC-SCNC: 7 MMOL/L (ref 3–11)
BNP SERPL-MCNC: 375 PG/ML (ref 0–100)
BUN SERPL-MCNC: 23 MG/DL (ref 7–25)
CALCIUM SERPL-MCNC: 9.3 MG/DL (ref 8.6–10.3)
CHLORIDE SERPL-SCNC: 99 MMOL/L (ref 98–107)
CO2 SERPL-SCNC: 31 MMOL/L (ref 21–32)
CREAT SERPL-MCNC: 0.96 MG/DL (ref 0.7–1.3)
CREATININE (EXTERNAL): 0.96 MG/DL (ref 0.7–1.3)
GFR SERPL CREATININE-BSD FRML MDRD: 91 ML/MIN/1.73M*2
GLUCOSE (EXTERNAL): 119 MG/DL (ref 70–105)
GLUCOSE SERPL-MCNC: 119 MG/DL (ref 70–105)
POTASSIUM (EXTERNAL): 4.1 MMOL/L (ref 3.5–5.1)
POTASSIUM SERPL-SCNC: 4.1 MMOL/L (ref 3.5–5.1)
SODIUM SERPL-SCNC: 137 MMOL/L (ref 135–145)

## 2022-03-08 PROCEDURE — 36415 COLL VENOUS BLD VENIPUNCTURE: CPT | Performed by: NURSE PRACTITIONER

## 2022-03-08 PROCEDURE — 83880 ASSAY OF NATRIURETIC PEPTIDE: CPT | Performed by: NURSE PRACTITIONER

## 2022-03-08 PROCEDURE — 80048 BASIC METABOLIC PNL TOTAL CA: CPT | Performed by: NURSE PRACTITIONER

## 2022-03-08 NOTE — PROGRESS NOTES
SUBJECTIVE:    Chief Complaint   Patient presents with   • Breathing Problem         Hayden Franklin is a 61 y.o. old male who presents for follow-up of his recent hospitalization and to establish care.  The patient was in the hospital 2/25 to 2/27/2022 with an acute CHF/COPD exacerbation.  He was discharged on Advair which is new.  He also was prescribed prednisone which is finished.  Lasix and potassium are also new.  He had a stress test in the hospital which was normal and his EF was 52% on echo.  He just saw cardiology in follow-up today and will be scheduled to follow-up in the heart failure clinic as well.    The patient has a cardiac history including aortic valve replacement with bioprosthetic valve, one-vessel CABG and an ascending aortic aneurysm repair, all in 2013.  He also has a history of ischemic cardiomyopathy which has been relatively stable until his recent hospitalization.    Patient was discharged on oxygen, 3 L continuous.  He had not been on oxygen at home previously.  He is averaging sats of 93 to 94% at home.  He works as an , is not currently able to return to work until he is off of oxygen.  We discussed weaning by a half a liter as able at home, daytime only.  He will most likely need [facility] paperwork completed and he can bring that by any time for me to complete.    He is a smoker.  He has not smoked since right before his hospitalization.  He has tried methods in the past to help him quit which have not been successful.  He wants to just quit on his own at this point.  He had a lung nodule on his CT chest which needs follow-up in 6 months.  I have ordered that CT to be done at that time.    The patient's past medical history, medications, family history, allergies and social history were reviewed in his electronic medical record at today's visit.    Review of Systems -   Positive for: Shortness of breath  Negative for: Edema, chest pain    OBJECTIVE:  /60 (BP  "Location: Right arm, Patient Position: Sitting, Cuff Size: Regular Adult)   Pulse 85   Temp 36 °C (96.8 °F) (Temporal)   Ht 1.854 m (6' 1\")   Wt 89.8 kg (198 lb)   SpO2 95%   BMI 26.12 kg/m²   General appearance: alert, well appearing, and in no distress.  Neck exam - supple, no significant adenopathy.  Thyroid- palpates normal, no nodules.  Chest: clear to auscultation, no wheezes, rales or rhonchi, symmetric air entry.   CVS exam: normal rate, regular rhythm, carotids without bruits.  Abdominal exam: soft, non-tender, no palpable or pulsatile masses, no hepatosplenomegaly.  Exam of extremities: no pedal edema noted  Skin exam - no rashes noted; no jaundice.        DIAGNOSIS   Diagnosis Plan   1. COPD exacerbation (CMS/HCC) (HCC)  Ambulatory referral to Pulmonology   2. Lung nodule  CT chest without IV contrast   3. Need for vaccination  Pneumococcal polysaccharide vaccine 23-valent greater than or equal to 1yo subcutaneous/IM   4. Ischemic cardiomyopathy           PLAN:  He will continue with his Advair for the COPD.  I have also recommended a referral to pulmonology and he is agreeable.  He may wean his daytime oxygen by half liter at a time as tolerated, with target of keeping his oxygen saturations above 90% with both rest and ambulation.  Once he is weaned off daytime oxygen, we can consider an overnight oximetry.  The pulmonology may resume care of this problem if desired.    Continue specialty follow-up with the heart failure clinic.      I have ordered a follow-up CT chest to follow-up on the lung nodule in 6 months.    I recommended a physical at some point in the future.  We discussed goal of may be 6 months from now, after he has gotten through these acute issues.    Total encounter time:  26 minutes.    Rosa Haney MD    "

## 2022-03-09 NOTE — NURSING END OF SHIFT
Nursing End of Shift Summary:    Patient: Hayden Franklin  MRN: [id number]  : 1960, Age: 61 y.o.    Location: [address] [place] [address]    Nursing Goals  Clinical Goals for the Shift: safety,comfort, monitor vitals, wean off oxygen    Narrative Summary of Progress Toward Clinical Goals:  Safety and comfort maintained, vitals acceptable BP soft but no dizziness noted. Reports no pain. For Lexiscan in the morning.    Barriers to Goals/Nursing Concerns:  None    New Patient or Family Concerns/Issues:  None    Shift Summary:   Significant Events & Communications to Providers (last 12 hours)     Last 5 Values    No documentation.             Oxygen Usage (last 12 hours)     Last 5 Values    No documentation.             Mobility (last 12 hours)     Last 5 Values     Row Name [id number]                   Mobility    Activity Bathroom privileges;Chair        Level of Assistance Independent after set-up                  Urethral Catheter     Active Urethral Catheter     None            Active Lines     Active Central venous catheter / Peripherally inserted central catheter / Implantable Port / Hemodialysis catheter / Midline Catheter     None              Infusing Medications   Medication Dose Last Rate     PRN Medications   Medication Dose Last Admin   • sodium chloride  3 mL     • acetaminophen  325-650 mg     • ondansetron  4 mg      Or   • ondansetron  4 mg     • levalbuterol  1.25 mg     • ipratropium  0.5 mg       _________________________  Neema Patton RN  22 6:59 PM   No

## 2022-03-14 ENCOUNTER — TELEPHONE (OUTPATIENT)
Dept: FAMILY MEDICINE | Facility: CLINIC | Age: 62
End: 2022-03-14
Payer: COMMERCIAL

## 2022-03-14 NOTE — TELEPHONE ENCOUNTER
Patient stopped in at clinic today and stated he had lost 6lbs since being in for an appointment. He states he is now slowing gaining again and was told to let you know in regards to his Lasix medication. He is currently on 40mg BID.      Also dropped paperwork to be filled out and faxed.     Also going on a Trip and was wondering about his oxygen and I told him he would have to contact [facility] where he got his oxygen and he will give them a call.

## 2022-03-14 NOTE — TELEPHONE ENCOUNTER
Disability paperwork completed. I put an estimated return date of 4/10/2022 which can be changed if his condition changes.  Regarding diuretics/weight--he is managed by heart failure clinic and needs to contact them

## 2022-03-18 DIAGNOSIS — J44.9 CHRONIC OBSTRUCTIVE PULMONARY DISEASE, UNSPECIFIED COPD TYPE (CMS/HCC): Primary | ICD-10-CM

## 2022-03-21 ENCOUNTER — OFFICE VISIT (OUTPATIENT)
Dept: CARDIOLOGY | Facility: CLINIC | Age: 62
End: 2022-03-21
Payer: COMMERCIAL

## 2022-03-21 ENCOUNTER — TRANSFERRED RECORDS (OUTPATIENT)
Dept: HEALTH INFORMATION MANAGEMENT | Facility: CLINIC | Age: 62
End: 2022-03-21

## 2022-03-21 VITALS
WEIGHT: 200.9 LBS | SYSTOLIC BLOOD PRESSURE: 108 MMHG | BODY MASS INDEX: 26.51 KG/M2 | OXYGEN SATURATION: 96 % | DIASTOLIC BLOOD PRESSURE: 58 MMHG | HEART RATE: 75 BPM

## 2022-03-21 DIAGNOSIS — I50.32 CHRONIC DIASTOLIC HEART FAILURE DUE TO VALVULAR DISEASE (CMS/HCC): Primary | ICD-10-CM

## 2022-03-21 DIAGNOSIS — I35.0 NONRHEUMATIC AORTIC (VALVE) STENOSIS: ICD-10-CM

## 2022-03-21 DIAGNOSIS — I38 CHRONIC DIASTOLIC HEART FAILURE DUE TO VALVULAR DISEASE (CMS/HCC): Primary | ICD-10-CM

## 2022-03-21 PROCEDURE — 99213 OFFICE O/P EST LOW 20 MIN: CPT | Performed by: NURSE PRACTITIONER

## 2022-03-21 RX ORDER — FUROSEMIDE 40 MG/1
40 TABLET ORAL 2 TIMES DAILY
Qty: 180 TABLET | Refills: 3 | Status: SHIPPED | OUTPATIENT
Start: 2022-03-21 | End: 2022-04-19 | Stop reason: ALTCHOICE

## 2022-03-21 RX ORDER — POTASSIUM CHLORIDE 20 MEQ/1
20 TABLET, EXTENDED RELEASE ORAL DAILY
Qty: 90 TABLET | Refills: 3 | Status: SHIPPED | OUTPATIENT
Start: 2022-03-21 | End: 2022-05-23 | Stop reason: SDUPTHER

## 2022-03-21 ASSESSMENT — ENCOUNTER SYMPTOMS
DYSPNEA ON EXERTION: 0
VOMITING: 0
HEARTBURN: 0
INSOMNIA: 0
DIZZINESS: 0
PALPITATIONS: 0
CLAUDICATION: 0
VERTIGO: 0
NEAR-SYNCOPE: 0
ORTHOPNEA: 0
COUGH: 0
BLOATING: 1
CONSTIPATION: 0
PND: 0
DIARRHEA: 0
SHORTNESS OF BREATH: 0
MYALGIAS: 0
IRREGULAR HEARTBEAT: 0
CHANGE IN BOWEL HABIT: 0
COLOR CHANGE: 0
WEIGHT LOSS: 0
WEAKNESS: 0
LOSS OF BALANCE: 0
SYNCOPE: 0
ABDOMINAL PAIN: 0
DEPRESSION: 0
FEVER: 0
LIGHT-HEADEDNESS: 1
MEMORY LOSS: 0
SLEEP DISTURBANCES DUE TO BREATHING: 0
SPUTUM PRODUCTION: 0
WEIGHT GAIN: 0
DECREASED APPETITE: 0
BRUISES/BLEEDS EASILY: 0
NAUSEA: 0
FALLS: 0
SNORING: 1

## 2022-03-21 ASSESSMENT — PAIN SCALES - GENERAL: PAINLEVEL: 0-NO PAIN

## 2022-03-21 NOTE — PROGRESS NOTES
Subjective     Patient ID: Hayden Franklin is a 61 y.o. patient of Dr. Gomez, who presents to the heart failure clinic for a follow-up visit. He has a history of coronary artery disease and aortic stenosis status post CABG x1 (SVG-RCA) with concomitant  AVR (Justin Mitroflow bioprosthetic valve) and ascending aortic Dacron graft in 2013, ischemic cardiomyopathy, systolic heart failure, carotid artery disease, hyperlipidemia, tobacco use     Gopal had a hospitalization the end of February 2022 for acute hypoxemic respiratory failure secondary to acute on chronic heart failure.  He was diuresed with IV diuretic therapy.  His echocardiogram revealed worsening valvular dysfunction with moderate bioprosthetic aortic stenosis with aortic valve area of 1.4 cm², peak velocity of 3.1 m/s, and mild to moderate regurgitation..  He was found to have an improvement in his ejection fraction from 35-40% to 52%.  He had a Lexiscan Cardiolite stress test that was negative for ischemia.  He was discharged home with 3 L of oxygen and on prednisone due to suspected COPD exacerbation.  A lung nodule was noted on his CT scan and repeat scan was recommended in 6 months.     Gopal was seen by Yeny Fisher CNP on 3/7/2022 for a post hospital visit.  His volume status was stable at that time and he was continued on furosemide 40 mg twice daily.  She ordered follow-up in the heart failure clinic for medication optimization.  She also ordered an echocardiogram to be completed in 3 months.       Gopal was seen by his primary care provider on 3/7/2022 for a post hospital visit.  He was doing well at that time. She ordered a CT chest in 6 months. Gopal stopped primary care provider office on 3/14/2022 to drop off disability paperwork.  He also reported a 6-pound weight loss since his appointment on 3/7/2022, but stated he was slowly regaining the weight.  He was advised to follow-up in the heart failure clinic.    Gopal states that he has been  feeling okay since he was seen earlier this month.  He denies dyspnea, but states he has not done a lot of activity.  He works out in his shop and moves around the house, but has not been actively going up and down stairs or exerting himself physically.  He states that he does not typically wear his oxygen during the day unless his oxygen saturations are around 90%.  He is wearing his oxygen at night.  Gopal reports some daytime fatigue as well as snoring at night but would like to consider a sleep study at a later date.  Gopal denies PND and orthopnea.  He has some chronic right ankle edema secondary to an injury, but otherwise has not had any edema.  He reports a good appetite and only feels bloated after eating Mexican food.  He reports good urine output with the furosemide.  Gopal denies chest discomfort, palpitations, and presyncopal or syncopal episodes.    Gopal Angulo uses a small amount of salt during the day.  He reports eating foods such as toast, eggs, sandwiches, casseroles, pizza, tacos, hamburger, and grilled meats.  He is drinking approximately half gallon of fluid each day.      HPI    Past Medical History:   Diagnosis Date   • Aortic valve stenosis     s/p AV replacement 11/2013   • Ascending aorta dilatation (CMS/[facility]) ([facility])     s/p AAA repair 11/2013   • CAD (coronary artery disease)     1 vessel bypass   • COPD (chronic obstructive pulmonary disease) (CMS/[facility]) ([facility])    • Hyperlipidemia    • Ischemic cardiomyopathy        Past Surgical History:   Procedure Laterality Date   • AAA REPAIR - ENDOGRAFT  11/2013   • ANKLE SURGERY     • AORTIC VALVE REPLACEMENT  11/2013   • CORONARY ANGIOPLASTY  11/2013   • CORONARY ARTERY BYPASS GRAFT  11/2013    1V CABG SVG- RCA       Allergies   Allergen Reactions   • Ezetimibe Rash         Current Outpatient Medications:   •  fluticasone propion-salmeteroL (Advair HFA) 230-21 mcg/actuation inhaler, Inhale 2 puffs 2 (two) times a day Rinse mouth with water after use. Do not  swallow., Disp: 1 g, Rfl: 0  •  evolocumab (Repatha SureClick) 140 mg/mL pen injector, Inject 140 mg under the skin every 14 (fourteen) days, Disp: 2 mL, Rfl: 11  •  metoprolol succinate XL (TOPROL-XL) 50 mg 24 hr tablet, Take 1 tablet (50 mg total) by mouth daily, Disp: 90 tablet, Rfl: 3  •  lisinopriL (PRINIVIL,ZESTRIL) 5 mg tablet, Take 1 tablet (5 mg total) by mouth daily, Disp: 90 tablet, Rfl: 3  •  atorvastatin (LIPITOR) 80 mg tablet, Take 1 tablet (80 mg total) by mouth daily, Disp: 90 tablet, Rfl: 3  •  aspirin 81 mg EC tablet, Take 1 tablet (81 mg total) by mouth daily, Disp: 90 tablet, Rfl: 3  •  furosemide (Lasix) 40 mg tablet, Take 1 tablet (40 mg total) by mouth 2 (two) times a day, Disp: 180 tablet, Rfl: 3  •  potassium chloride (KLOR-CON M20) 20 mEq CR tablet, Take 1 tablet (20 mEq total) by mouth daily, Disp: 90 tablet, Rfl: 3  •  nitroglycerin (NITROSTAT) 0.4 mg SL tablet, As needed., Disp: , Rfl:     Family History   Problem Relation Age of Onset   • No Known Problems Mother    • Heart disease Father    • Aneurysm Father    • Parkinsonism Sister    • No Known Problems Sister    • No Known Problems Sister    • No Known Problems Daughter    • No Known Problems Daughter        Social History     Socioeconomic History   • Marital status:      Spouse name: Robina   • Number of children: 2   • Years of education: Not on file   • Highest education level: Not on file   Occupational History   • 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   Tobacco Use   • Smoking status: Former Smoker     Packs/day: 0.75     Years: 30.00     Pack years: 22.50     Types: Cigarettes     Quit date: 2022     Years since quittin.0   • Smokeless tobacco: Never Used   • Tobacco comment: Last smoked 3/1/22   Substance and Sexual Activity   • Alcohol use: Yes     Alcohol/week: 4.0 - 5.0 standard drinks     Types: 4 - 5 Cans of beer per week     Comment: 4-5 beers weekly or less   • Drug use: No   • Sexual activity: Defer    Other Topics Concern   • Not on file   Social History Narrative   • Not on file     Social Determinants of Health     Financial Resource Strain: Not on file   Food Insecurity: Not on file   Transportation Needs: Not on file   Physical Activity: Not on file   Stress: Not on file   Social Connections: Not on file   Intimate Partner Violence: Not on file   Housing Stability: Not on file       Review of Systems   Constitutional: Positive for malaise/fatigue (occasional). Negative for decreased appetite, fever, weight gain and weight loss.   HENT: Negative for congestion.    Eyes: Negative for visual disturbance.   Cardiovascular: Positive for leg swelling (chronic right ankle due to injury). Negative for chest pain, claudication, dyspnea on exertion, irregular heartbeat, near-syncope, orthopnea, palpitations, paroxysmal nocturnal dyspnea and syncope.   Respiratory: Positive for snoring. Negative for cough, shortness of breath, sleep disturbances due to breathing and sputum production.    Hematologic/Lymphatic: Does not bruise/bleed easily.   Skin: Negative for color change, dry skin and rash.   Musculoskeletal: Negative for falls, joint pain, muscle weakness and myalgias.   Gastrointestinal: Positive for bloating (if overeats). Negative for abdominal pain, change in bowel habit, constipation, diarrhea, heartburn, nausea and vomiting.   Genitourinary: Positive for nocturia.   Neurological: Positive for light-headedness (occasional). Negative for dizziness, loss of balance, vertigo and weakness.   Psychiatric/Behavioral: Negative for depression and memory loss. The patient does not have insomnia.        Objective     /58 (BP Location: Left arm, Patient Position: Sitting)   Pulse 75   Wt 91.1 kg (200 lb 14.4 oz) Comment: 199.8 lbs home weight  SpO2 96%   BMI 26.51 kg/m²    His weight in the clinic on 3/7/2022 was 200 pounds.    Sodium   Date Value Ref Range Status   03/08/2022 137 135 - 145 mmol/L Final      Potassium   Date Value Ref Range Status   03/08/2022 4.1 3.5 - 5.1 MMOL/L Final     Chloride   Date Value Ref Range Status   03/08/2022 99 98 - 107 mmol/L Final     CO2   Date Value Ref Range Status   03/08/2022 31 21 - 32 mmol/L Final     BUN   Date Value Ref Range Status   03/08/2022 23 7 - 25 mg/dL Final     Creatinine   Date Value Ref Range Status   03/08/2022 0.96 0.70 - 1.30 mg/dL Final     Glucose   Date Value Ref Range Status   03/08/2022 119 (H) 70 - 105 mg/dL Final     Calcium   Date Value Ref Range Status   03/08/2022 9.3 8.6 - 10.3 mg/dL Final       Physical Exam  Constitutional:       Appearance: He is well-developed.   HENT:      Head: Normocephalic.      Nose: Nose normal.   Eyes:      Conjunctiva/sclera: Conjunctivae normal.      Pupils: Pupils are equal, round, and reactive to light.   Neck:      Vascular: No JVD.   Cardiovascular:      Rate and Rhythm: Normal rate and regular rhythm.      Pulses: Intact distal pulses.      Heart sounds: Murmur heard.    Systolic murmur is present with a grade of 2/6.    No gallop.   Pulmonary:      Effort: Pulmonary effort is normal. No respiratory distress.      Breath sounds: No decreased breath sounds, wheezing, rhonchi or rales.   Chest:      Chest wall: No tenderness.   Abdominal:      General: Bowel sounds are normal. There is no distension.      Palpations: Abdomen is soft.      Tenderness: There is no abdominal tenderness.   Musculoskeletal:         General: No tenderness or deformity. Normal range of motion.      Cervical back: Normal range of motion.      Right lower leg: No edema.      Left lower leg: No edema.   Skin:     General: Skin is warm and dry.   Neurological:      Mental Status: He is alert and oriented to person, place, and time.   Psychiatric:         Behavior: Behavior normal.         Assessment/Plan     Diagnoses and all orders for this visit:    Chronic diastolic heart failure due to valvular disease (CMS/HCC) (HCC)  Patient with  chronic diastolic heart failure in the setting of moderate bioprosthetic aortic valve stenosis.  His ejection fraction is improved to 52% on echocardiogram 2/25/2022.  He has moderate bioprosthetic aortic valve stenosis.  He has mild to moderate exertional symptoms, which are multifactorial in etiology.  His volume status is stable on exam.  Discussed that he take his furosemide all at once or split and take the doses 6 hours apart.  Continue to weigh daily and contact the clinic with a 3 pound weight gain overnight or 5 pounds in a week.  Follow a low-sodium, heart healthy diet and 2 L fluid restriction.  Increase daily activity.  Keep follow-up as scheduled with Yeny Fisher CNP on 4/19/2022.  He will be seeing pulmonology with PFTs on 4/6/2022.  Discussed sleep study but he would like to hold on that for now.  I will see him back with his echocardiogram on 6/7/2022.  Discussed that we can see him sooner than that if needed.  Advised he contact the clinic with increasing symptoms or concerns.  -     furosemide (Lasix) 40 mg tablet; Take 1 tablet (40 mg total) by mouth 2 (two) times a day  -     potassium chloride (KLOR-CON M20) 20 mEq CR tablet; Take 1 tablet (20 mEq total) by mouth daily          Return in about 3 months (around 6/7/2022).            A voice recognition program was used to aid in documentation of this record. Sometimes words are not printed exactly as they were spoken.  While efforts were made to carefully edit and correct any inaccuracies, some errors may be present; please take these into context.  Please contact the provider if errors are identified.

## 2022-03-21 NOTE — PATIENT INSTRUCTIONS
You may take both tablets of furosemide (lasix) in the morning or you can split the dose and take 1 tab at 6-8am and 1 tab 6 hours later

## 2022-03-22 ENCOUNTER — TELEPHONE (OUTPATIENT)
Dept: FAMILY MEDICINE | Facility: CLINIC | Age: 62
End: 2022-03-22
Payer: COMMERCIAL

## 2022-03-22 NOTE — TELEPHONE ENCOUNTER
Caller would like to discuss (a) paperwork q's Writer has advised caller of a callback from within 24 hours.    Patient: Hayden Franklin    Caller Name (Last and first, relation/role): self    Name of Facility: [facility]    Callback Number: [phone]    Best Availability: anytime    Fax Number: [phone]    Additional Info: wondering if there is any info for [employer] for leave. Wondering if the paperwork has been filled out and sent back? Please call and give update on this. Said to leave VM if no answer, going to some appts.    Did you confirm the message with the caller: Yes    Is it okay that the nurse communicates your response through [employer]? No

## 2022-03-22 NOTE — TELEPHONE ENCOUNTER
Spoke with patient and I faxed this over on 3/16/22. I have faxed again today just in case and he will be giving them a call later. No other questions or concerns at this time.

## 2022-03-30 NOTE — PROGRESS NOTES
Cardiology Outpatient Follow-up Note    Subjective    Patient ID: Hayden Franklin is a 61 y.o. male.    Chief Complaint   Patient presents with   • Congestive Heart Failure        HPI    Gopal is a 60 yo patient followed by Dr. Gomez.  He has a known history of CAD with CABG x1 (SVG-RCA) with AVR (Justin Mitroflow bioprosthetic valve) with ascending aortic Dacron graft in 2013.  He was recently hospitalized for acute on chronic diastolic HFpEF with COPD exacerbation.  He followed with the heart failure clinic about 1 month ago without adjustment of diuretic therapy.  His degree of aortic stenosis and regurgitation has progressed since his prior echocardiogram in August 2021 now to the moderate range.  His stress test was negative for ischemia or infarction.    He is here today for cardiology follow-up.  He has needed to begin his oxygen within the last couple of days.  Prior to this, he has been able to wean himself off to only using oxygen at night.  He denies any PND.  He does have some lower extremity swelling.  He does follow a sodium restricted diet.  He primarily uses fresh and frozen vegetables as well as a lean meat.  He did state that he did have a little more salt intake than normal over the holiday weekend.  He does feel that he does have more bloating in his stomach than normal.  However, he has not had a bowel movement for several days as well and he is unsure whether or not this is fluid retention versus constipation.  He states his weights have been stable over the last several days.  He denies any chest pain.    Cardiology Problem List:      Cardiology Problem List     Last Edited By Markell Cano LPN on 3/2/2022 at 2:07 PM     Primary Cardiologist: Dr. Gomez     Coronary   -11/1/2013 Inferior MI: 1V CABG (SVG-RCA) with concomitant AVR and ascending aortic dacron graft     Peripheral Vascular  - 8/2017 Carotid duplex: Right ICA stenosis 16-49%, left ICA stenosis 0-15% stenosis. Suggest  follow-up study 1-2 years.    - 11/2013 s/p repair with a 34mm Dacron graft d/t Dilated Ascending Aorta    Valvular  - 11/2013 Aortic Valve Replacement with a Justin Mitroflow bioprosthetic d/t Nonrheumatic Aortic Valve Stenosis    CHF/CM NA  Electrophysiology NA  Pulmonary Vascular NA    Risk Factors   - Hyperlipidemia  - Tobacco use  - Carotid stenosis    Prior Imaging/Testing History   -2/26/2022 Lexiscan: EF 48%, LV perfusion is abnormal. Post stress imaging demonstrates moderately sized area of mild to moderately decreased intensity involving the proximal to distal inferior wall and inferoapical wall. On rest imaging this defect appears predominantly fixed to partially worse. Findings consistent with prior inferior wall infarct.    - 2/25/2022 Echo: EF 52%, RA dilated, there is a 27 mm Justin Mitroflow bioprosthetic aortic valve present. Valve leaflets are slightly thickened and calcified although all 3 leaflets are noted to have a decent range of motion. Mild to moderate aortic regurgitation visually. Mild pulmonary hypertension is present. Right ventricular systolic pressure is estimated at 40 mmHg.    -8/18/2021 Echo: Biplane EF 38%, moderate LV systolic dysfunction. Segmental wall motion abnormalities noted, RV is hypokinetic, LA is moderately dilated, Prosthetic graft present. 34 mm Dacron Graft, RV systolic pressure is estimated at 28 mmHg.    -4/27/2020 Echo: EF 44%, Mild LV systolic dysfunction. Segmental wall motion abnormalities noted. Grade I (mild) LV diastolic abnormality, RV is moderately dilated. RV is mildly hypokinetic, LA  is moderately dilated. Mild to moderate aortic stenosis is noted.    - 8/3/2017 Echo: mildly globally impaired LV function, EF 45%; mildly dilated and hypocontractile Rv, mild sclerosis of 27mm Justin Mitroflow bioprosthetic aortic valve, mild MVR, mild TVR, Grade I diastolic dysfunction, normal appearing 34mm Dacron Ascending Aorta graft.    Past Medical History:   Diagnosis  Date   • Aortic valve stenosis     s/p AV replacement 2013   • Ascending aorta dilatation (CMS/HCC) ([facility])     s/p AAA repair 2013   • CAD (coronary artery disease)     1 vessel bypass   • COPD (chronic obstructive pulmonary disease) (CMS/HCC) ([facility])    • Hyperlipidemia    • Ischemic cardiomyopathy        Past Surgical History:   Procedure Laterality Date   • AAA REPAIR - ENDOGRAFT  2013   • ANKLE SURGERY     • AORTIC VALVE REPLACEMENT  2013   • CORONARY ANGIOPLASTY  2013   • CORONARY ARTERY BYPASS GRAFT  2013    1V CABG SVG- RCA       Allergies as of 2022 - Reviewed 2022   Allergen Reaction Noted   • Ezetimibe Rash 2020       Current Outpatient Medications   Medication Sig Dispense Refill   • albuterol HFA (Ventolin HFA) 90 mcg/actuation inhaler Inhale 2 puffs every 4 (four) hours 1 Inhaler 11   • clotrimazole (MYCELEX) 10 mg esdras SMARTSI Lozenge(s) By Mouth 5 Times Daily     • amoxicillin (AMOXIL) 500 mg tablet SMARTSI Tablet(s) By Mouth     • budesonide-formoteroL (SYMBICORT) 160-4.5 mcg/actuation inhaler Inhale 2 puffs 2 (two) times a day Rinse mouth with water after use. Do not swallow. 1 Inhaler 5   • potassium chloride (KLOR-CON M20) 20 mEq CR tablet Take 1 tablet (20 mEq total) by mouth daily 90 tablet 3   • evolocumab (Repatha SureClick) 140 mg/mL pen injector Inject 140 mg under the skin every 14 (fourteen) days 2 mL 11   • metoprolol succinate XL (TOPROL-XL) 50 mg 24 hr tablet Take 1 tablet (50 mg total) by mouth daily 90 tablet 3   • lisinopriL (PRINIVIL,ZESTRIL) 5 mg tablet Take 1 tablet (5 mg total) by mouth daily 90 tablet 3   • atorvastatin (LIPITOR) 80 mg tablet Take 1 tablet (80 mg total) by mouth daily 90 tablet 3   • nitroglycerin (NITROSTAT) 0.4 mg SL tablet As needed.     • aspirin 81 mg EC tablet Take 1 tablet (81 mg total) by mouth daily 90 tablet 3   • torsemide (DEMADEX) 20 mg tablet Take 3 tablets (60 mg total) by mouth daily 270 tablet 0     No  current facility-administered medications for this visit.       Family History   Problem Relation Age of Onset   • No Known Problems Mother    • Heart disease Father    • Aneurysm Father    • Parkinsonism Sister    • No Known Problems Sister    • No Known Problems Sister    • No Known Problems Daughter    • No Known Problems Daughter        Social History     Tobacco Use   • Smoking status: Former Smoker     Packs/day: 0.75     Years: 30.00     Pack years: 22.50     Types: Cigarettes     Quit date: 2022     Years since quittin.1   • Smokeless tobacco: Never Used   • Tobacco comment: Last smoked 3/1/22   Substance Use Topics   • Alcohol use: Yes     Alcohol/week: 4.0 - 5.0 standard drinks     Types: 4 - 5 Cans of beer per week     Comment: 4-5 beers weekly or less   • Drug use: No       The following have been reviewed and updated as appropriate in this visit  Tobacco  Allergies  Meds  Problems  Med Hx  Surg Hx  Fam Hx      .    Review of Systems   Constitutional: Positive for malaise/fatigue.   HENT: Negative for nosebleeds.    Cardiovascular: Positive for dyspnea on exertion. Negative for chest pain, claudication, cyanosis, irregular heartbeat, leg swelling, near-syncope and palpitations.   Respiratory: Positive for shortness of breath.    Musculoskeletal: Negative for back pain, falls and joint pain.   Gastrointestinal: Negative for hematochezia.   Genitourinary: Negative for hematuria.   Neurological: Negative for dizziness, focal weakness, headaches, light-headedness and loss of balance.       Objective     Vitals:    22 1522   BP: 100/56   Pulse: 87   Resp: 16   SpO2: 91%     Weight:   Weights (Current Encounter Only) (last 180 days)     Date/Time Weight    22 1522 92.8 kg (204 lb 8 oz)           Physical Exam    Constitutional: Well built, nourished, no acute distress.   HEENT: Head is normocephalic and atraumatic. Sclera anicteric.   Neck: Supple. No carotid bruits.  No  JVD.  Lungs: Effort normal. No respiratory distress.  Bibasilar crackles.  Heart: S1-S2, Regular rate and rhythm.  Crisp prosthetic valve tones.  Grade 1/6 systolic murmur.  Extremities: Right lower ankle has trace edema (chronic) no left lower leg edema. Radial pulses 2+.  Musculoskeletal: Moves all 4 extremities spontaneously  Neurologic: No focal deficits.  Psychiartic: cooperative, alert and orientated X 3.  Skin: warm, dry, intact.    Data Review:     Sodium   Date Value Ref Range Status   03/08/2022 137 135 - 145 mmol/L Final     Potassium   Date Value Ref Range Status   03/08/2022 4.1 3.5 - 5.1 MMOL/L Final     Chloride   Date Value Ref Range Status   03/08/2022 99 98 - 107 mmol/L Final     CO2   Date Value Ref Range Status   03/08/2022 31 21 - 32 mmol/L Final     BUN   Date Value Ref Range Status   03/08/2022 23 7 - 25 mg/dL Final     Creatinine   Date Value Ref Range Status   03/08/2022 0.96 0.70 - 1.30 mg/dL Final     Glucose   Date Value Ref Range Status   03/08/2022 119 (H) 70 - 105 mg/dL Final     Calcium   Date Value Ref Range Status   03/08/2022 9.3 8.6 - 10.3 mg/dL Final      hsTnI 0 Hour   Date Value Ref Range Status   02/26/2022 14.7 <=19.8 pg/mL Final     BNP   Date Value Ref Range Status   03/08/2022 375 (H) 0 - 100 pg/mL Final      WBC   Date Value Ref Range Status   02/27/2022 10.9 (H) 3.7 - 9.6 10*3/uL Final     Hemoglobin   Date Value Ref Range Status   02/27/2022 14.5 13.2 - 17.2 g/dL Final     Hematocrit   Date Value Ref Range Status   02/27/2022 43.5 38.0 - 50.0 % Final     MCV   Date Value Ref Range Status   02/27/2022 94.9 82.0 - 97.0 fL Final     Platelets   Date Value Ref Range Status   02/27/2022 121 (L) 130 - 350 10*3/uL Final      Cholesterol   Date Value Ref Range Status   04/19/2022 84 0 - 199 mg/dL Final     Triglycerides   Date Value Ref Range Status   04/19/2022 60 <=149 mg/dL Final     HDL   Date Value Ref Range Status   04/19/2022 56 >=40 mg/dL Final     LDL Calculated   Date  Value Ref Range Status   04/19/2022 <20 (L) 20 - 99 mg/dL Final          Assessment/Plan     1. Chronic diastolic heart failure due to valvular disease (CMS/HCC) (HCC)    2. Acute right-sided congestive heart failure (CMS/HCC) (HCC)  · Acute on chronic diastolic heart failure.  Likely related to his valvular disease.  · He currently is on 80 mg of furosemide daily.  · He denies PND orthopnea.  · NYHA class III-IV.  · Still on oxygen therapy.  · We will switch his diuretic therapy from furosemide over to torsemide.  We will start at 60 mg daily.  · Repeat basic metabolic panel in 1 to 2 weeks.  · Follow-up with myself in approximately 10 days.  He has follow-up with heart failure clinic in June with repeat echocardiogram.  · Continue with other GDMT including beta-blocker therapy, lisinopril  - torsemide (DEMADEX) 20 mg tablet; Take 3 tablets (60 mg total) by mouth daily  Dispense: 270 tablet; Refill: 0  - Basic metabolic panel Blood, Venous; Future    3. Nonrheumatic aortic (valve) stenosis  · Prior echocardiogram in August 2021 did demonstrate increase in aortic stenosis as well as aortic regurgitation and echocardiogram in February 2022 demonstrated an increase in his aortic regurgitation.  We will repeat echocardiogram once the patient is euvolemic, hopefully this is in June 2022 which is approximately 3 months after titration of medication.  · We did talk today that the patient would likely benefit from pursuing a transesophageal echocardiogram to assess the bioprosthetic valve gradients, as well as bilateral heart catheterization for right-sided heart pressures as well as to rule out any obstructive disease.  He would be in agreement with this plan and we will talk more about this at his next visit.    4. Coronary artery disease involving native coronary artery of native heart without angina pectoris  · Stress test demonstrated no evidence of ischemia or infarction.  No change from previous stress  test.  · GDMT: Aspirin therapy, metoprolol, statin and Repatha.  · No angina.  · EF 52%.    5. Mixed hyperlipidemia  · Lipid panel today demonstrated LDL less than 20, triglycerides 60, cholesterol 84, HDL 56.  · Continue with Repatha therapy.  Refilled for 1 year.    6. Occlusion and stenosis of bilateral carotid arteries  · Due for carotid ultrasound.  Scheduled for June 2022.  Last carotid duplex in April 2020 demonstrated 16 to 49% stenosis with recommendation for repeat in 1 year.       There are no Patient Instructions on file for this visit.    Return in about 20 days (around 5/9/2022) for Recheck - In Office.    The patient verbalized correct understanding and agreement to today's instructions and plan.  The patient verbalized that all questions have been addressed.    Yeny Fisher CNP      A voice recognition program was used to aid in documentation of this record. Sometimes words are not printed exactly as they were spoken. While efforts were made to carefully edit and correct any inaccuracies, some errors may be present; please take these into context. Please contact the provider if errors are identified.

## 2022-04-06 ENCOUNTER — HOSPITAL ENCOUNTER (OUTPATIENT)
Dept: RESPIRATORY THERAPY | Facility: HOSPITAL | Age: 62
Discharge: HOME | End: 2022-04-06
Payer: COMMERCIAL

## 2022-04-06 ENCOUNTER — CONSULT (OUTPATIENT)
Dept: PULMONOLOGY | Facility: CLINIC | Age: 62
End: 2022-04-06
Payer: COMMERCIAL

## 2022-04-06 ENCOUNTER — TRANSFERRED RECORDS (OUTPATIENT)
Dept: HEALTH INFORMATION MANAGEMENT | Facility: CLINIC | Age: 62
End: 2022-04-06

## 2022-04-06 VITALS
HEART RATE: 91 BPM | SYSTOLIC BLOOD PRESSURE: 104 MMHG | RESPIRATION RATE: 16 BRPM | OXYGEN SATURATION: 94 % | DIASTOLIC BLOOD PRESSURE: 52 MMHG

## 2022-04-06 DIAGNOSIS — J44.89 ASTHMA-COPD OVERLAP SYNDROME (CMS/HCC): Primary | ICD-10-CM

## 2022-04-06 DIAGNOSIS — J44.9 CHRONIC OBSTRUCTIVE PULMONARY DISEASE, UNSPECIFIED COPD TYPE (CMS/HCC): ICD-10-CM

## 2022-04-06 DIAGNOSIS — I38 CHRONIC DIASTOLIC HEART FAILURE DUE TO VALVULAR DISEASE (CMS/HCC): ICD-10-CM

## 2022-04-06 DIAGNOSIS — I50.32 CHRONIC DIASTOLIC HEART FAILURE DUE TO VALVULAR DISEASE (CMS/HCC): ICD-10-CM

## 2022-04-06 DIAGNOSIS — R09.02 HYPOXIA: ICD-10-CM

## 2022-04-06 PROCEDURE — 94060 EVALUATION OF WHEEZING: CPT | Mod: 26 | Performed by: INTERNAL MEDICINE

## 2022-04-06 PROCEDURE — 94726 PLETHYSMOGRAPHY LUNG VOLUMES: CPT | Mod: 26 | Performed by: INTERNAL MEDICINE

## 2022-04-06 PROCEDURE — 94729 DIFFUSING CAPACITY: CPT | Mod: 26 | Performed by: INTERNAL MEDICINE

## 2022-04-06 PROCEDURE — 94060 EVALUATION OF WHEEZING: CPT

## 2022-04-06 PROCEDURE — 99205 OFFICE O/P NEW HI 60 MIN: CPT | Mod: 25 | Performed by: INTERNAL MEDICINE

## 2022-04-06 RX ORDER — ALBUTEROL SULFATE 90 UG/1
180 INHALANT RESPIRATORY (INHALATION) EVERY 4 HOURS
COMMUNITY
End: 2022-04-08 | Stop reason: SDUPTHER

## 2022-04-06 RX ORDER — MOMETASONE FUROATE AND FORMOTEROL FUMARATE DIHYDRATE 200; 5 UG/1; UG/1
2 AEROSOL RESPIRATORY (INHALATION) 2 TIMES DAILY
Qty: 120 G | Refills: 11 | Status: SHIPPED | OUTPATIENT
Start: 2022-04-06 | End: 2022-04-19 | Stop reason: ALTCHOICE

## 2022-04-06 RX ORDER — AMOXICILLIN 500 MG/1
500 TABLET, FILM COATED ORAL SEE ADMIN INSTRUCTIONS
COMMUNITY
Start: 2022-03-24 | End: 2024-04-19 | Stop reason: ALTCHOICE

## 2022-04-06 RX ORDER — CLOTRIMAZOLE 10 MG/1
LOZENGE ORAL
COMMUNITY
Start: 2022-03-24 | End: 2023-04-14 | Stop reason: ALTCHOICE

## 2022-04-06 ASSESSMENT — ENCOUNTER SYMPTOMS
AGITATION: 1
WHEEZING: 1
CHEST TIGHTNESS: 1
SLEEP DISTURBANCE: 1
SHORTNESS OF BREATH: 1
VOICE CHANGE: 1
FATIGUE: 1
FREQUENCY: 1

## 2022-04-06 ASSESSMENT — PAIN SCALES - GENERAL: PAINLEVEL: 0-NO PAIN

## 2022-04-06 NOTE — LETTER
[address] [facility] [address]  Dept: [phone]  April 6, 2022    Hayden Franklin  [address]   [place] [address]      To whom it may concern:    Mr. Franklin was evaluated by me in clinic today. He has continued supplemental oxygen requirement with exertion and is not able to perform his job duties as an  at this time.  Plan reevaluation in 1 month to see if he has ongoing oxygen needs.  I am optimistic that he will be able to stop portable oxygen at some point in the future.  Once he has been weaned off of oxygen, there would be no limitations in work or activities.    If you have any questions or concerns, please don't hesitate to call.    Sincerely,             Patric Dick MD        CC: Gopal Franklin

## 2022-04-06 NOTE — PROGRESS NOTES
Assessment      1. Asthma-COPD overlap syndrome (CMS/HCC) ([facility])    2. Hypoxia    3. Chronic diastolic heart failure due to valvular disease (CMS/HCC) ([facility])            Plan   1.  We will restart ICS/laba (Dulera covered on insurance).  He was instructed on inhaler technique and given a spacer to use with dosing.  He knows to rinse his mouth out after using.  If he develops thrush despite spacer use, would try DPI to see if better tolerated.  2.  Continue albuterol as needed.  Encouraged him to use it both when symptomatic and prior to exertion.  3.  Agree with using supplemental oxygen as needed for now with exertion when his SPO2 is less than 88%.  He needs to continue nightly as he is doing, but does not need during the day with rest or when walking short distances around his house  4.  Applaud his efforts at smoking cessation.  5.  Incidental pulmonary nodule will need to be followed in 6 months or so.  6.  He is not able to work for the time being because of oxygen requirements, but hopefully with optimization of both lung and cardiac issues.  Will reassess a 6-minute walk test at next visit  7.  Return in 1 month to see LARRY following 6-minute walk test.  He will need follow-up scheduled with me in 3 months for routine recheck.          On this date of service 66 minutes of total time was spent on this encounter, from 0823 - 0830 and 0840 -  0939.  This included preappointment review of chart, counseling, discussion of diagnosis and treatment options, documentation, oxygen assessment and review of testing with patient        HPI  Hayden Franklin is a 61 y.o. year old male who was referred for evaluation of COPD and hypoxia.    He has noticed a gradual decline in his breathing over the past 6+ months, although never required evaluation or treatment until hospitalization February 2022 for acute CHF exacerbation +/- COPD exacerbation.  He improved with diuretics and steroids, discharged on 3 L nasal cannula.  At  that time was started on high-dose Advair and noticed a significant improvement in his breathing using this.  He has had infrequent dosing of albuterol since discharge.  Almost 1 week ago ran out of his Advair while out of town and feels like his breathing is more difficult with some tightness and wheezing.  With recent dental evaluation noted to have thrush and started on treatment    He has been monitoring his pulse ox closely and mostly goes without oxygen, maintaining 90% or greater.  However he has found that he needs to use POC at 3 L pulsed with more strenuous exertion with desaturations in the mid to upper 80s.    Has some thick nonpurulent sputum, no hemoptysis.  No orthopnea or nocturnal awakenings with breathing issues.  Previously with lower extremity edema, improved after diuretics.  No chest pain or palpitations.  He denies any reflux or aspiration symptoms, occasionally food will get stuck in the back of his throat.  No constitutional symptoms or sinus issues    Roughly 30-pack-year history of tobacco, quit prior to hospitalization in February.  He works as an           Objective     /52   Pulse 91   Resp 16   SpO2 94%       Physical Exam  Vitals reviewed.   Constitutional:       General: He is not in acute distress.     Appearance: He is normal weight. He is not ill-appearing.   HENT:      Nose:      Comments: No sinus tenderness     Mouth/Throat:      Mouth: Mucous membranes are moist.      Pharynx: Oropharynx is clear.   Eyes:      General: No scleral icterus.     Conjunctiva/sclera: Conjunctivae normal.   Cardiovascular:      Rate and Rhythm: Normal rate and regular rhythm.      Heart sounds: Murmur heard.   Pulmonary:      Breath sounds: No stridor. No wheezing, rhonchi or rales.      Comments: Breath sounds somewhat distant with prolonged expiration  Abdominal:      Palpations: Abdomen is soft.      Tenderness: There is no abdominal tenderness.   Musculoskeletal:       Cervical back: Neck supple.      Right lower leg: No edema.      Left lower leg: No edema.   Lymphadenopathy:      Cervical: No cervical adenopathy.   Skin:     General: Skin is warm and dry.      Capillary Refill: Capillary refill takes less than 2 seconds.   Neurological:      Mental Status: He is alert and oriented to person, place, and time.   Psychiatric:         Behavior: Behavior normal.                Imaging  CT angiogram 2/25/2022    IMPRESSION:  1.  Negative for pulmonary emboli.     2. Biatrial cardiac dilatation. Small bilateral pleural effusions with adjacent atelectasis/consolidation. Mild bronchial wall thickening and interlobular septal thickening, favored secondary to mild cardiogenic pulmonary edema.     3. Mild centrilobular pulmonary emphysema.     4. Mild adenopathy at the right lateral hilar mediastinal regions, nonspecific.     5. 6 mm right upper lobe pulmonary nodule. Recommend follow up chest CT in 6 months.    Results and images independently reviewed by me and discussed with patient      PFT  4/6/2022  Severe airflow obstruction with postbronchodilator reversibility and air trapping consistent with asthma-COPD overlap.  Gas exchange is normal.    Results reviewed by me and with patient in detail      Lab            No lab exists for component: LABALBU                              The following have been reviewed and updated as appropriate in this visit:      Review of Systems   Constitutional: Positive for fatigue.   HENT: Positive for congestion, tinnitus and voice change.    Respiratory: Positive for chest tightness, shortness of breath and wheezing.    Cardiovascular: Positive for leg swelling.   Genitourinary: Positive for frequency.   Psychiatric/Behavioral: Positive for agitation and sleep disturbance.           Past Medical History:   Diagnosis Date   • Aortic valve stenosis     s/p AV replacement 11/2013   • Ascending aorta dilatation (CMS/HCC) ([facility])     s/p AAA repair  2013   • CAD (coronary artery disease)     1 vessel bypass   • COPD (chronic obstructive pulmonary disease) (CMS/HCC) ([facility])    • Hyperlipidemia    • Ischemic cardiomyopathy          Past Surgical History:   Procedure Laterality Date   • AAA REPAIR - ENDOGRAFT  2013   • ANKLE SURGERY     • AORTIC VALVE REPLACEMENT  2013   • CORONARY ANGIOPLASTY  2013   • CORONARY ARTERY BYPASS GRAFT  2013    1V CABG SVG- RCA          Social History     Tobacco Use   • Smoking status: Former Smoker     Packs/day: 0.75     Years: 30.00     Pack years: 22.50     Types: Cigarettes     Quit date: 2022     Years since quittin.1   • Smokeless tobacco: Never Used   • Tobacco comment: Last smoked 3/1/22   Substance Use Topics   • Alcohol use: Yes     Alcohol/week: 4.0 - 5.0 standard drinks     Types: 4 - 5 Cans of beer per week     Comment: 4-5 beers weekly or less   • Drug use: No          Family History   Problem Relation Age of Onset   • No Known Problems Mother    • Heart disease Father    • Aneurysm Father    • Parkinsonism Sister    • No Known Problems Sister    • No Known Problems Sister    • No Known Problems Daughter    • No Known Problems Daughter             Current Outpatient Medications:   •  albuterol HFA (Ventolin HFA) 90 mcg/actuation inhaler, Inhale 180 mcg every 4 (four) hours, Disp: , Rfl:   •  furosemide (Lasix) 40 mg tablet, Take 1 tablet (40 mg total) by mouth 2 (two) times a day, Disp: 180 tablet, Rfl: 3  •  potassium chloride (KLOR-CON M20) 20 mEq CR tablet, Take 1 tablet (20 mEq total) by mouth daily, Disp: 90 tablet, Rfl: 3  •  evolocumab (Repatha SureClick) 140 mg/mL pen injector, Inject 140 mg under the skin every 14 (fourteen) days, Disp: 2 mL, Rfl: 11  •  metoprolol succinate XL (TOPROL-XL) 50 mg 24 hr tablet, Take 1 tablet (50 mg total) by mouth daily, Disp: 90 tablet, Rfl: 3  •  lisinopriL (PRINIVIL,ZESTRIL) 5 mg tablet, Take 1 tablet (5 mg total) by mouth daily, Disp: 90 tablet, Rfl:  3  •  nitroglycerin (NITROSTAT) 0.4 mg SL tablet, As needed., Disp: , Rfl:   •  aspirin 81 mg EC tablet, Take 1 tablet (81 mg total) by mouth daily, Disp: 90 tablet, Rfl: 3  •  clotrimazole (MYCELEX) 10 mg esdras, SMARTSI Lozenge(s) By Mouth 5 Times Daily, Disp: , Rfl:   •  amoxicillin (AMOXIL) 500 mg tablet, SMARTSI Tablet(s) By Mouth, Disp: , Rfl:   •  mometasone-formoterol (Dulera) 200-5 mcg/actuation inhaler, Inhale 2 puffs 2 (two) times a day Rinse mouth with water after use to reduce aftertaste and incidence of candidiasis. Do not swallow., Disp: 120 g, Rfl: 11  •  atorvastatin (LIPITOR) 80 mg tablet, Take 1 tablet (80 mg total) by mouth daily, Disp: 90 tablet, Rfl: 3              A voice recognition program was used to aid in documentation of this record.  Sometimes words are not printed exactly as they were spoken.  While efforts were made to carefully edit and correct any inaccuracies, some errors may be present; please take these into context.  Please contact the provider if areas are identified.

## 2022-04-19 ENCOUNTER — APPOINTMENT (OUTPATIENT)
Dept: LAB | Facility: CLINIC | Age: 62
End: 2022-04-19
Payer: COMMERCIAL

## 2022-04-19 ENCOUNTER — TRANSFERRED RECORDS (OUTPATIENT)
Dept: HEALTH INFORMATION MANAGEMENT | Facility: CLINIC | Age: 62
End: 2022-04-19

## 2022-04-19 ENCOUNTER — OFFICE VISIT (OUTPATIENT)
Dept: CARDIOLOGY | Facility: CLINIC | Age: 62
End: 2022-04-19
Payer: COMMERCIAL

## 2022-04-19 VITALS
BODY MASS INDEX: 27.1 KG/M2 | WEIGHT: 204.5 LBS | DIASTOLIC BLOOD PRESSURE: 56 MMHG | HEIGHT: 73 IN | SYSTOLIC BLOOD PRESSURE: 100 MMHG | HEART RATE: 87 BPM | RESPIRATION RATE: 16 BRPM | OXYGEN SATURATION: 91 %

## 2022-04-19 DIAGNOSIS — E78.5 HYPERLIPIDEMIA, UNSPECIFIED HYPERLIPIDEMIA TYPE: ICD-10-CM

## 2022-04-19 DIAGNOSIS — E78.2 MIXED HYPERLIPIDEMIA: ICD-10-CM

## 2022-04-19 DIAGNOSIS — I50.32 CHRONIC DIASTOLIC HEART FAILURE DUE TO VALVULAR DISEASE (CMS/HCC): Primary | ICD-10-CM

## 2022-04-19 DIAGNOSIS — I38 CHRONIC DIASTOLIC HEART FAILURE DUE TO VALVULAR DISEASE (CMS/HCC): Primary | ICD-10-CM

## 2022-04-19 DIAGNOSIS — I35.0 NONRHEUMATIC AORTIC (VALVE) STENOSIS: ICD-10-CM

## 2022-04-19 DIAGNOSIS — I65.23 OCCLUSION AND STENOSIS OF BILATERAL CAROTID ARTERIES: ICD-10-CM

## 2022-04-19 DIAGNOSIS — I50.811 ACUTE RIGHT-SIDED CONGESTIVE HEART FAILURE (CMS/HCC): ICD-10-CM

## 2022-04-19 DIAGNOSIS — I25.10 CORONARY ARTERY DISEASE INVOLVING NATIVE CORONARY ARTERY OF NATIVE HEART WITHOUT ANGINA PECTORIS: ICD-10-CM

## 2022-04-19 LAB
CHOLEST SERPL-MCNC: 84 MG/DL (ref 0–199)
CHOLESTEROL (EXTERNAL): 84 MG/DL (ref 0–199)
FASTING STATUS PATIENT QL REPORTED: YES
HDLC SERPL-MCNC: 56 MG/DL
HDLC SERPL-MCNC: 56 MG/DL
LDL CHOLESTEROL CALCULATED (EXTERNAL): <20 MG/DL (ref 20–99)
LDLC SERPL CALC-MCNC: <20 MG/DL (ref 20–99)
TRIGL SERPL-MCNC: 60 MG/DL
TRIGLYCERIDES (EXTERNAL): 60 MG/DL

## 2022-04-19 PROCEDURE — 80061 LIPID PANEL: CPT | Performed by: NURSE PRACTITIONER

## 2022-04-19 PROCEDURE — 99214 OFFICE O/P EST MOD 30 MIN: CPT | Performed by: NURSE PRACTITIONER

## 2022-04-19 PROCEDURE — 36415 COLL VENOUS BLD VENIPUNCTURE: CPT | Performed by: NURSE PRACTITIONER

## 2022-04-19 RX ORDER — EVOLOCUMAB 140 MG/ML
140 INJECTION, SOLUTION SUBCUTANEOUS
Qty: 6 ML | Refills: 3 | Status: SHIPPED | OUTPATIENT
Start: 2022-04-19 | End: 2023-04-14 | Stop reason: SDUPTHER

## 2022-04-19 RX ORDER — TORSEMIDE 20 MG/1
60 TABLET ORAL DAILY
Qty: 270 TABLET | Refills: 0 | Status: SHIPPED | OUTPATIENT
Start: 2022-04-19 | End: 2022-07-20 | Stop reason: SDUPTHER

## 2022-04-19 ASSESSMENT — ENCOUNTER SYMPTOMS
IRREGULAR HEARTBEAT: 0
PALPITATIONS: 0
BACK PAIN: 0
FOCAL WEAKNESS: 0
NEAR-SYNCOPE: 0
DYSPNEA ON EXERTION: 1
LOSS OF BALANCE: 0
HEMATOCHEZIA: 0
CLAUDICATION: 0
FALLS: 0
SHORTNESS OF BREATH: 1
DIZZINESS: 0
HEADACHES: 0
HEMATURIA: 0
LIGHT-HEADEDNESS: 0

## 2022-04-19 ASSESSMENT — PAIN SCALES - GENERAL: PAINLEVEL: 0-NO PAIN

## 2022-04-20 ENCOUNTER — HOSPITAL ENCOUNTER (OUTPATIENT)
Dept: RESPIRATORY THERAPY | Facility: HOSPITAL | Age: 62
Discharge: HOME | End: 2022-04-20
Payer: COMMERCIAL

## 2022-04-20 VITALS — RESPIRATION RATE: 16 BRPM | HEIGHT: 71 IN | WEIGHT: 199.74 LBS | OXYGEN SATURATION: 97 % | BODY MASS INDEX: 27.96 KG/M2

## 2022-04-20 DIAGNOSIS — J44.89 ASTHMA-COPD OVERLAP SYNDROME (CMS/HCC): ICD-10-CM

## 2022-04-20 PROCEDURE — 94618 PULMONARY STRESS TESTING: CPT

## 2022-04-20 PROCEDURE — 94618 PULMONARY STRESS TESTING: CPT | Mod: 26 | Performed by: INTERNAL MEDICINE

## 2022-04-20 ASSESSMENT — 6 MINUTE WALK TEST (6MWT): COMMENTS: PT GAVE GOOD EFFORT.

## 2022-04-25 NOTE — H&P (VIEW-ONLY)
Cardiology Outpatient Follow-up Note      Subjective    Patient ID: Hayden Franklin is a 61 y.o. male.    Chief Complaint   Patient presents with   • Congestive Heart Failure        HPI    Gopal is a 60 yo patient followed by Dr. Gomez.  He has a known history of CAD with CABG x1 (SVG-RCA) with AVR (Justin Mitroflow bioprosthetic valve) with ascending aortic Dacron graft in 2013.  He was recently hospitalized for acute on chronic diastolic HFpEF with COPD exacerbation. His degree of aortic stenosis and regurgitation has progressed since his prior echocardiogram in August 2021 now in the moderate range.  His stress test was negative for ischemia or infarction.  With his last follow-up on 4/19/2022, I had to switch his furosemide to torsemide to hopefully aide in diuresis, as he had to go back on oxygen therapy.    He presents today for follow-up.  He continues to struggle with ongoing shortness of breath.  However, it is worse in the morning when he wakes up.  He stated that he woke up this morning and his oxygen saturations were in the 70s despite 4 L of oxygen.  He states by mid morning he is able to come off of his oxygen.  He states he is also struggling with constipation and has not had a bowel movement for approximately 5 days.  He states that this worsens with his shortness of breath as well as his abdominal discomfort.  He recently went down to 40 of torsemide on 5/3/2022.  He states that this may correlate with his worsening shortness of breath.  He states that he does try to follow a salt restricted diet.  He does not need to prop himself up with pillows at night however, he does snore at night.  Wife states he has never been evaluated for sleep apnea.  He does not have any lower leg swelling.  Positive abdominal distention.    Cardiology Problem List:      Cardiology Problem List     Last Edited By Markell Cano LPN on 3/2/2022 at 2:07 PM     Primary Cardiologist: Dr. Gomez     Coronary    -11/1/2013 Inferior MI: 1V CABG (SVG-RCA) with concomitant AVR and ascending aortic dacron graft     Peripheral Vascular  - 8/2017 Carotid duplex: Right ICA stenosis 16-49%, left ICA stenosis 0-15% stenosis. Suggest follow-up study 1-2 years.    - 11/2013 s/p repair with a 34mm Dacron graft d/t Dilated Ascending Aorta    Valvular  - 11/2013 Aortic Valve Replacement with a Justin Mitroflow bioprosthetic d/t Nonrheumatic Aortic Valve Stenosis    CHF/CM NA  Electrophysiology NA  Pulmonary Vascular NA    Risk Factors   - Hyperlipidemia  - Tobacco use  - Carotid stenosis    Prior Imaging/Testing History   -2/26/2022 Lexiscan: EF 48%, LV perfusion is abnormal. Post stress imaging demonstrates moderately sized area of mild to moderately decreased intensity involving the proximal to distal inferior wall and inferoapical wall. On rest imaging this defect appears predominantly fixed to partially worse. Findings consistent with prior inferior wall infarct.    - 2/25/2022 Echo: EF 52%, RA dilated, there is a 27 mm Justin Mitroflow bioprosthetic aortic valve present. Valve leaflets are slightly thickened and calcified although all 3 leaflets are noted to have a decent range of motion. Mild to moderate aortic regurgitation visually. Mild pulmonary hypertension is present. Right ventricular systolic pressure is estimated at 40 mmHg.    -8/18/2021 Echo: Biplane EF 38%, moderate LV systolic dysfunction. Segmental wall motion abnormalities noted, RV is hypokinetic, LA is moderately dilated, Prosthetic graft present. 34 mm Dacron Graft, RV systolic pressure is estimated at 28 mmHg.    -4/27/2020 Echo: EF 44%, Mild LV systolic dysfunction. Segmental wall motion abnormalities noted. Grade I (mild) LV diastolic abnormality, RV is moderately dilated. RV is mildly hypokinetic, LA  is moderately dilated. Mild to moderate aortic stenosis is noted.    - 8/3/2017 Echo: mildly globally impaired LV function, EF 45%; mildly dilated and  hypocontractile Rv, mild sclerosis of 27mm Justin Mitroflow bioprosthetic aortic valve, mild MVR, mild TVR, Grade I diastolic dysfunction, normal appearing 34mm Dacron Ascending Aorta graft.    Past Medical History:   Diagnosis Date   • Aortic valve stenosis     s/p AV replacement 2013   • Ascending aorta dilatation (CMS/HCC) ([facility])     s/p AAA repair 2013   • CAD (coronary artery disease)     1 vessel bypass   • COPD (chronic obstructive pulmonary disease) (CMS/HCC) ([facility])    • Hyperlipidemia    • Ischemic cardiomyopathy        Past Surgical History:   Procedure Laterality Date   • AAA REPAIR - ENDOGRAFT  2013   • ANKLE SURGERY     • AORTIC VALVE REPLACEMENT  2013   • CORONARY ANGIOPLASTY  2013   • CORONARY ARTERY BYPASS GRAFT  2013    1V CABG SVG- RCA       Allergies as of 2022 - Reviewed 2022   Allergen Reaction Noted   • Ezetimibe Rash 2020       Current Outpatient Medications   Medication Sig Dispense Refill   • torsemide (DEMADEX) 20 mg tablet Take 3 tablets (60 mg total) by mouth daily (Patient taking differently: Take 40 mg by mouth daily) 270 tablet 0   • evolocumab (Repatha SureClick) 140 mg/mL pen injector Inject 140 mg under the skin every 14 (fourteen) days 6 mL 3   • albuterol HFA (Ventolin HFA) 90 mcg/actuation inhaler Inhale 2 puffs every 4 (four) hours 1 Inhaler 11   • amoxicillin (AMOXIL) 500 mg tablet SMARTSI Tablet(s) By Mouth     • budesonide-formoteroL (SYMBICORT) 160-4.5 mcg/actuation inhaler Inhale 2 puffs 2 (two) times a day Rinse mouth with water after use. Do not swallow. 1 Inhaler 5   • potassium chloride (KLOR-CON M20) 20 mEq CR tablet Take 1 tablet (20 mEq total) by mouth daily 90 tablet 3   • metoprolol succinate XL (TOPROL-XL) 50 mg 24 hr tablet Take 1 tablet (50 mg total) by mouth daily 90 tablet 3   • lisinopriL (PRINIVIL,ZESTRIL) 5 mg tablet Take 1 tablet (5 mg total) by mouth daily 90 tablet 3   • atorvastatin (LIPITOR) 80 mg tablet Take 1  tablet (80 mg total) by mouth daily 90 tablet 3   • nitroglycerin (NITROSTAT) 0.4 mg SL tablet As needed.     • aspirin 81 mg EC tablet Take 1 tablet (81 mg total) by mouth daily 90 tablet 3   • metOLazone (ZAROXOLYN) 5 mg tablet Take 1 tablet (5 mg total) by mouth daily 30 tablet 0   • clotrimazole (MYCELEX) 10 mg esdras SMARTSI Lozenge(s) By Mouth 5 Times Daily       No current facility-administered medications for this visit.       Family History   Problem Relation Age of Onset   • No Known Problems Mother    • Heart disease Father    • Aneurysm Father    • Parkinsonism Sister    • No Known Problems Sister    • No Known Problems Sister    • No Known Problems Daughter    • No Known Problems Daughter        Social History     Tobacco Use   • Smoking status: Former Smoker     Packs/day: 0.75     Years: 30.00     Pack years: 22.50     Types: Cigarettes     Quit date: 2022     Years since quittin.2   • Smokeless tobacco: Never Used   • Tobacco comment: Last smoked 3/1/22   Substance Use Topics   • Alcohol use: Yes     Alcohol/week: 4.0 - 5.0 standard drinks     Types: 4 - 5 Cans of beer per week     Comment: 4-5 beers weekly or less   • Drug use: No       The following have been reviewed and updated as appropriate in this visit  Tobacco  Allergies  Meds  Problems  Med Hx  Surg Hx  Fam Hx      .    Review of Systems   Cardiovascular: Positive for leg swelling and palpitations. Negative for chest pain, claudication, cyanosis, dyspnea on exertion, irregular heartbeat, near-syncope, orthopnea, paroxysmal nocturnal dyspnea and syncope.   Respiratory: Positive for shortness of breath.    Hematologic/Lymphatic: Does not bruise/bleed easily.   Neurological: Negative for dizziness, headaches, light-headedness and loss of balance.       Objective     Vitals:    22 0808   BP: 100/42   Pulse: 83   Resp: 16   SpO2: 93%     Weight:   Weights (Current Encounter Only) (last 180 days)     Date/Time Weight     05/09/22 0808 94.8 kg (209 lb)           Physical Exam    Constitutional: Well built, nourished, no acute distress.   HEENT: Head is normocephalic and atraumatic. Sclera anicteric.   Neck: Supple. No carotid bruits.  Positive JVD  Lungs: Effort normal. No respiratory distress.  Bibasilar crackles.  Heart: S1-S2, Regular rate and rhythm.  Crisp prosthetic valve tones.  Grade 2/6 systolic murmur.  Abdomen: Positive abdominal distention.  Positive bowel sounds.  Extremities: Right lower ankle has trace edema (chronic) no left lower leg edema. Radial pulses 2+.  Musculoskeletal: Moves all 4 extremities spontaneously  Neurologic: No focal or gross deficits.  Psychiartic: cooperative, alert and orientated X 3.  Skin: warm, dry, intact.    Data Review:     Sodium   Date Value Ref Range Status   05/09/2022 138 135 - 145 mmol/L Final     Potassium   Date Value Ref Range Status   05/09/2022 4.2 3.5 - 5.1 MMOL/L Final     Chloride   Date Value Ref Range Status   05/09/2022 101 98 - 107 mmol/L Final     CO2   Date Value Ref Range Status   05/09/2022 30 21 - 32 mmol/L Final     BUN   Date Value Ref Range Status   05/09/2022 24 7 - 25 mg/dL Final     Creatinine   Date Value Ref Range Status   05/09/2022 1.13 0.70 - 1.30 mg/dL Final     Glucose   Date Value Ref Range Status   05/09/2022 118 (H) 70 - 105 mg/dL Final     Calcium   Date Value Ref Range Status   05/09/2022 9.5 8.6 - 10.3 mg/dL Final      hsTnI 0 Hour   Date Value Ref Range Status   02/26/2022 14.7 <=19.8 pg/mL Final     BNP   Date Value Ref Range Status   03/08/2022 375 (H) 0 - 100 pg/mL Final      WBC   Date Value Ref Range Status   02/27/2022 10.9 (H) 3.7 - 9.6 10*3/uL Final     Hemoglobin   Date Value Ref Range Status   02/27/2022 14.5 13.2 - 17.2 g/dL Final     Hematocrit   Date Value Ref Range Status   02/27/2022 43.5 38.0 - 50.0 % Final     MCV   Date Value Ref Range Status   02/27/2022 94.9 82.0 - 97.0 fL Final     Platelets   Date Value Ref Range Status    02/27/2022 121 (L) 130 - 350 10*3/uL Final      Cholesterol   Date Value Ref Range Status   04/19/2022 84 0 - 199 mg/dL Final     Triglycerides   Date Value Ref Range Status   04/19/2022 60 <=149 mg/dL Final     HDL   Date Value Ref Range Status   04/19/2022 56 >=40 mg/dL Final     LDL Calculated   Date Value Ref Range Status   04/19/2022 <20 (L) 20 - 99 mg/dL Final          Assessment/Plan     1. Chronic diastolic heart failure due to valvular disease (CMS/HCC) (HCC)  · Acute on chronic diastolic heart failure likely related to valvular disease as well as pulmonary disease.  Recently transitioned to furosemide to torsemide.  · NYHA class III  · Requires oxygen therapy.  · We will go back to torsemide 60 mg daily.  · Also start metolazone 5 mg.  Metolazone may 10 and may 12.  Follow-up labs may 13.  Extra dose of potassium on May 10 and May 12.  · Call the clinic for an update on May 13.  · Obtain euvolemia.  · He has follow-up with Kizzy Bruno CNP on June 13, 2022.  · Obtain BNP as well as chest x-ray.  · Patient needs strict adherence to a salt restricted as well as fluid restricted diet.  · He is up approximately 9 pounds from his last visit with me likely related to fluid retention as well as constipation.  - metOLazone (ZAROXOLYN) 5 mg tablet; Take 1 tablet (5 mg total) by mouth daily  Dispense: 30 tablet; Refill: 0  - B-type natriuretic peptide (BNP) Blood, Venous; Future  - X-ray chest 2 views; Future  - Basic metabolic panel Blood, Venous; Future    2. Nonrheumatic aortic (valve) stenosis  · Prior echocardiogram in August 2021 demonstrated increasing aortic stenosis as well as aortic regurgitation with echocardiogram in February 2022 demonstrating increasing aortic regurgitation.  · Discussed case with primary cardiologist and recommendation was to increase timing of echocardiogram from June 13 for possible degeneration of aortic valve.  · Will obtain further plan for any need for follow-up  transesophageal echocardiogram after limited echocardiogram was performed.    3. Mixed hyperlipidemia  · Continue Repatha therapy.  · Lipid panel on 4/19/2022 demonstrated LDL less than 20, triglycerides 60, cholesterol 84, and HDL 56.    4. Occlusion and stenosis of bilateral carotid arteries  · Repeat surveillance June 2022  · Last carotid duplex April 2020 demonstrated 16 to 49% stenosis bilaterally       There are no Patient Instructions on file for this visit.    Return in about 3 months (around 8/9/2022) for Recheck - In Office.    The patient verbalized correct understanding and agreement to today's instructions and plan.  The patient verbalized that all questions have been addressed.    Yeny Fisher CNP      A voice recognition program was used to aid in documentation of this record. Sometimes words are not printed exactly as they were spoken. While efforts were made to carefully edit and correct any inaccuracies, some errors may be present; please take these into context. Please contact the provider if errors are identified.

## 2022-04-25 NOTE — PROGRESS NOTES
Cardiology Outpatient Follow-up Note      Subjective    Patient ID: Hayden Franklin is a 61 y.o. male.    Chief Complaint   Patient presents with   • Congestive Heart Failure        HPI    Gopal is a 60 yo patient followed by Dr. Gomez.  He has a known history of CAD with CABG x1 (SVG-RCA) with AVR (Justin Mitroflow bioprosthetic valve) with ascending aortic Dacron graft in 2013.  He was recently hospitalized for acute on chronic diastolic HFpEF with COPD exacerbation. His degree of aortic stenosis and regurgitation has progressed since his prior echocardiogram in August 2021 now in the moderate range.  His stress test was negative for ischemia or infarction.  With his last follow-up on 4/19/2022, I had to switch his furosemide to torsemide to hopefully aide in diuresis, as he had to go back on oxygen therapy.    He presents today for follow-up.  He continues to struggle with ongoing shortness of breath.  However, it is worse in the morning when he wakes up.  He stated that he woke up this morning and his oxygen saturations were in the 70s despite 4 L of oxygen.  He states by mid morning he is able to come off of his oxygen.  He states he is also struggling with constipation and has not had a bowel movement for approximately 5 days.  He states that this worsens with his shortness of breath as well as his abdominal discomfort.  He recently went down to 40 of torsemide on 5/3/2022.  He states that this may correlate with his worsening shortness of breath.  He states that he does try to follow a salt restricted diet.  He does not need to prop himself up with pillows at night however, he does snore at night.  Wife states he has never been evaluated for sleep apnea.  He does not have any lower leg swelling.  Positive abdominal distention.    Cardiology Problem List:      Cardiology Problem List     Last Edited By Markell Cano LPN on 3/2/2022 at 2:07 PM     Primary Cardiologist: Dr. Gomez     Coronary    -11/1/2013 Inferior MI: 1V CABG (SVG-RCA) with concomitant AVR and ascending aortic dacron graft     Peripheral Vascular  - 8/2017 Carotid duplex: Right ICA stenosis 16-49%, left ICA stenosis 0-15% stenosis. Suggest follow-up study 1-2 years.    - 11/2013 s/p repair with a 34mm Dacron graft d/t Dilated Ascending Aorta    Valvular  - 11/2013 Aortic Valve Replacement with a Justin Mitroflow bioprosthetic d/t Nonrheumatic Aortic Valve Stenosis    CHF/CM NA  Electrophysiology NA  Pulmonary Vascular NA    Risk Factors   - Hyperlipidemia  - Tobacco use  - Carotid stenosis    Prior Imaging/Testing History   -2/26/2022 Lexiscan: EF 48%, LV perfusion is abnormal. Post stress imaging demonstrates moderately sized area of mild to moderately decreased intensity involving the proximal to distal inferior wall and inferoapical wall. On rest imaging this defect appears predominantly fixed to partially worse. Findings consistent with prior inferior wall infarct.    - 2/25/2022 Echo: EF 52%, RA dilated, there is a 27 mm Justin Mitroflow bioprosthetic aortic valve present. Valve leaflets are slightly thickened and calcified although all 3 leaflets are noted to have a decent range of motion. Mild to moderate aortic regurgitation visually. Mild pulmonary hypertension is present. Right ventricular systolic pressure is estimated at 40 mmHg.    -8/18/2021 Echo: Biplane EF 38%, moderate LV systolic dysfunction. Segmental wall motion abnormalities noted, RV is hypokinetic, LA is moderately dilated, Prosthetic graft present. 34 mm Dacron Graft, RV systolic pressure is estimated at 28 mmHg.    -4/27/2020 Echo: EF 44%, Mild LV systolic dysfunction. Segmental wall motion abnormalities noted. Grade I (mild) LV diastolic abnormality, RV is moderately dilated. RV is mildly hypokinetic, LA  is moderately dilated. Mild to moderate aortic stenosis is noted.    - 8/3/2017 Echo: mildly globally impaired LV function, EF 45%; mildly dilated and  hypocontractile Rv, mild sclerosis of 27mm Justin Mitroflow bioprosthetic aortic valve, mild MVR, mild TVR, Grade I diastolic dysfunction, normal appearing 34mm Dacron Ascending Aorta graft.    Past Medical History:   Diagnosis Date   • Aortic valve stenosis     s/p AV replacement 2013   • Ascending aorta dilatation (CMS/HCC) (Formerly [facility])     s/p AAA repair 2013   • CAD (coronary artery disease)     1 vessel bypass   • COPD (chronic obstructive pulmonary disease) (CMS/HCC) (Formerly [facility])    • Hyperlipidemia    • Ischemic cardiomyopathy        Past Surgical History:   Procedure Laterality Date   • AAA REPAIR - ENDOGRAFT  2013   • ANKLE SURGERY     • AORTIC VALVE REPLACEMENT  2013   • CORONARY ANGIOPLASTY  2013   • CORONARY ARTERY BYPASS GRAFT  2013    1V CABG SVG- RCA       Allergies as of 2022 - Reviewed 2022   Allergen Reaction Noted   • Ezetimibe Rash 2020       Current Outpatient Medications   Medication Sig Dispense Refill   • torsemide (DEMADEX) 20 mg tablet Take 3 tablets (60 mg total) by mouth daily (Patient taking differently: Take 40 mg by mouth daily) 270 tablet 0   • evolocumab (Repatha SureClick) 140 mg/mL pen injector Inject 140 mg under the skin every 14 (fourteen) days 6 mL 3   • albuterol HFA (Ventolin HFA) 90 mcg/actuation inhaler Inhale 2 puffs every 4 (four) hours 1 Inhaler 11   • amoxicillin (AMOXIL) 500 mg tablet SMARTSI Tablet(s) By Mouth     • budesonide-formoteroL (SYMBICORT) 160-4.5 mcg/actuation inhaler Inhale 2 puffs 2 (two) times a day Rinse mouth with water after use. Do not swallow. 1 Inhaler 5   • potassium chloride (KLOR-CON M20) 20 mEq CR tablet Take 1 tablet (20 mEq total) by mouth daily 90 tablet 3   • metoprolol succinate XL (TOPROL-XL) 50 mg 24 hr tablet Take 1 tablet (50 mg total) by mouth daily 90 tablet 3   • lisinopriL (PRINIVIL,ZESTRIL) 5 mg tablet Take 1 tablet (5 mg total) by mouth daily 90 tablet 3   • atorvastatin (LIPITOR) 80 mg tablet Take 1  tablet (80 mg total) by mouth daily 90 tablet 3   • nitroglycerin (NITROSTAT) 0.4 mg SL tablet As needed.     • aspirin 81 mg EC tablet Take 1 tablet (81 mg total) by mouth daily 90 tablet 3   • metOLazone (ZAROXOLYN) 5 mg tablet Take 1 tablet (5 mg total) by mouth daily 30 tablet 0   • clotrimazole (MYCELEX) 10 mg esdras SMARTSI Lozenge(s) By Mouth 5 Times Daily       No current facility-administered medications for this visit.       Family History   Problem Relation Age of Onset   • No Known Problems Mother    • Heart disease Father    • Aneurysm Father    • Parkinsonism Sister    • No Known Problems Sister    • No Known Problems Sister    • No Known Problems Daughter    • No Known Problems Daughter        Social History     Tobacco Use   • Smoking status: Former Smoker     Packs/day: 0.75     Years: 30.00     Pack years: 22.50     Types: Cigarettes     Quit date: 2022     Years since quittin.2   • Smokeless tobacco: Never Used   • Tobacco comment: Last smoked 3/1/22   Substance Use Topics   • Alcohol use: Yes     Alcohol/week: 4.0 - 5.0 standard drinks     Types: 4 - 5 Cans of beer per week     Comment: 4-5 beers weekly or less   • Drug use: No       The following have been reviewed and updated as appropriate in this visit  Tobacco  Allergies  Meds  Problems  Med Hx  Surg Hx  Fam Hx      .    Review of Systems   Cardiovascular: Positive for leg swelling and palpitations. Negative for chest pain, claudication, cyanosis, dyspnea on exertion, irregular heartbeat, near-syncope, orthopnea, paroxysmal nocturnal dyspnea and syncope.   Respiratory: Positive for shortness of breath.    Hematologic/Lymphatic: Does not bruise/bleed easily.   Neurological: Negative for dizziness, headaches, light-headedness and loss of balance.       Objective     Vitals:    22 0808   BP: 100/42   Pulse: 83   Resp: 16   SpO2: 93%     Weight:   Weights (Current Encounter Only) (last 180 days)     Date/Time Weight     05/09/22 0808 94.8 kg (209 lb)           Physical Exam    Constitutional: Well built, nourished, no acute distress.   HEENT: Head is normocephalic and atraumatic. Sclera anicteric.   Neck: Supple. No carotid bruits.  Positive JVD  Lungs: Effort normal. No respiratory distress.  Bibasilar crackles.  Heart: S1-S2, Regular rate and rhythm.  Crisp prosthetic valve tones.  Grade 2/6 systolic murmur.  Abdomen: Positive abdominal distention.  Positive bowel sounds.  Extremities: Right lower ankle has trace edema (chronic) no left lower leg edema. Radial pulses 2+.  Musculoskeletal: Moves all 4 extremities spontaneously  Neurologic: No focal or gross deficits.  Psychiartic: cooperative, alert and orientated X 3.  Skin: warm, dry, intact.    Data Review:     Sodium   Date Value Ref Range Status   05/09/2022 138 135 - 145 mmol/L Final     Potassium   Date Value Ref Range Status   05/09/2022 4.2 3.5 - 5.1 MMOL/L Final     Chloride   Date Value Ref Range Status   05/09/2022 101 98 - 107 mmol/L Final     CO2   Date Value Ref Range Status   05/09/2022 30 21 - 32 mmol/L Final     BUN   Date Value Ref Range Status   05/09/2022 24 7 - 25 mg/dL Final     Creatinine   Date Value Ref Range Status   05/09/2022 1.13 0.70 - 1.30 mg/dL Final     Glucose   Date Value Ref Range Status   05/09/2022 118 (H) 70 - 105 mg/dL Final     Calcium   Date Value Ref Range Status   05/09/2022 9.5 8.6 - 10.3 mg/dL Final      hsTnI 0 Hour   Date Value Ref Range Status   02/26/2022 14.7 <=19.8 pg/mL Final     BNP   Date Value Ref Range Status   03/08/2022 375 (H) 0 - 100 pg/mL Final      WBC   Date Value Ref Range Status   02/27/2022 10.9 (H) 3.7 - 9.6 10*3/uL Final     Hemoglobin   Date Value Ref Range Status   02/27/2022 14.5 13.2 - 17.2 g/dL Final     Hematocrit   Date Value Ref Range Status   02/27/2022 43.5 38.0 - 50.0 % Final     MCV   Date Value Ref Range Status   02/27/2022 94.9 82.0 - 97.0 fL Final     Platelets   Date Value Ref Range Status    02/27/2022 121 (L) 130 - 350 10*3/uL Final      Cholesterol   Date Value Ref Range Status   04/19/2022 84 0 - 199 mg/dL Final     Triglycerides   Date Value Ref Range Status   04/19/2022 60 <=149 mg/dL Final     HDL   Date Value Ref Range Status   04/19/2022 56 >=40 mg/dL Final     LDL Calculated   Date Value Ref Range Status   04/19/2022 <20 (L) 20 - 99 mg/dL Final          Assessment/Plan     1. Chronic diastolic heart failure due to valvular disease (CMS/HCC) (HCC)  · Acute on chronic diastolic heart failure likely related to valvular disease as well as pulmonary disease.  Recently transitioned to furosemide to torsemide.  · NYHA class III  · Requires oxygen therapy.  · We will go back to torsemide 60 mg daily.  · Also start metolazone 5 mg.  Metolazone may 10 and may 12.  Follow-up labs may 13.  Extra dose of potassium on May 10 and May 12.  · Call the clinic for an update on May 13.  · Obtain euvolemia.  · He has follow-up with Kizzy Bruno CNP on June 13, 2022.  · Obtain BNP as well as chest x-ray.  · Patient needs strict adherence to a salt restricted as well as fluid restricted diet.  · He is up approximately 9 pounds from his last visit with me likely related to fluid retention as well as constipation.  - metOLazone (ZAROXOLYN) 5 mg tablet; Take 1 tablet (5 mg total) by mouth daily  Dispense: 30 tablet; Refill: 0  - B-type natriuretic peptide (BNP) Blood, Venous; Future  - X-ray chest 2 views; Future  - Basic metabolic panel Blood, Venous; Future    2. Nonrheumatic aortic (valve) stenosis  · Prior echocardiogram in August 2021 demonstrated increasing aortic stenosis as well as aortic regurgitation with echocardiogram in February 2022 demonstrating increasing aortic regurgitation.  · Discussed case with primary cardiologist and recommendation was to increase timing of echocardiogram from June 13 for possible degeneration of aortic valve.  · Will obtain further plan for any need for follow-up  transesophageal echocardiogram after limited echocardiogram was performed.    3. Mixed hyperlipidemia  · Continue Repatha therapy.  · Lipid panel on 4/19/2022 demonstrated LDL less than 20, triglycerides 60, cholesterol 84, and HDL 56.    4. Occlusion and stenosis of bilateral carotid arteries  · Repeat surveillance June 2022  · Last carotid duplex April 2020 demonstrated 16 to 49% stenosis bilaterally       There are no Patient Instructions on file for this visit.    Return in about 3 months (around 8/9/2022) for Recheck - In Office.    The patient verbalized correct understanding and agreement to today's instructions and plan.  The patient verbalized that all questions have been addressed.    Yeny Fisher CNP      A voice recognition program was used to aid in documentation of this record. Sometimes words are not printed exactly as they were spoken. While efforts were made to carefully edit and correct any inaccuracies, some errors may be present; please take these into context. Please contact the provider if errors are identified.

## 2022-05-03 ENCOUNTER — APPOINTMENT (OUTPATIENT)
Dept: LAB | Facility: CLINIC | Age: 62
End: 2022-05-03
Payer: COMMERCIAL

## 2022-05-03 DIAGNOSIS — I50.811 ACUTE RIGHT-SIDED CONGESTIVE HEART FAILURE (CMS/HCC): ICD-10-CM

## 2022-05-03 LAB
ANION GAP SERPL CALC-SCNC: 6 MMOL/L (ref 3–11)
BUN SERPL-MCNC: 26 MG/DL (ref 7–25)
CALCIUM SERPL-MCNC: 9.6 MG/DL (ref 8.6–10.3)
CHLORIDE SERPL-SCNC: 100 MMOL/L (ref 98–107)
CO2 SERPL-SCNC: 32 MMOL/L (ref 21–32)
CREAT SERPL-MCNC: 1.17 MG/DL (ref 0.7–1.3)
CREATININE (EXTERNAL): 1.17 MG/DL (ref 0.7–1.3)
GFR ESTIMATED (EXTERNAL): 72 ML/MIN/1.73M2
GFR SERPL CREATININE-BSD FRML MDRD: 72 ML/MIN/1.73M*2
GLUCOSE (EXTERNAL): 118 MG/DL (ref 70–105)
GLUCOSE SERPL-MCNC: 118 MG/DL (ref 70–105)
POTASSIUM (EXTERNAL): 3.8 MMOL/L (ref 3.5–5.1)
POTASSIUM SERPL-SCNC: 3.8 MMOL/L (ref 3.5–5.1)
SODIUM SERPL-SCNC: 138 MMOL/L (ref 135–145)

## 2022-05-03 PROCEDURE — 36415 COLL VENOUS BLD VENIPUNCTURE: CPT | Performed by: NURSE PRACTITIONER

## 2022-05-03 PROCEDURE — 80048 BASIC METABOLIC PNL TOTAL CA: CPT | Performed by: NURSE PRACTITIONER

## 2022-05-04 ENCOUNTER — OFFICE VISIT (OUTPATIENT)
Dept: PULMONOLOGY | Facility: CLINIC | Age: 62
End: 2022-05-04
Payer: COMMERCIAL

## 2022-05-04 VITALS
HEART RATE: 80 BPM | HEIGHT: 72 IN | OXYGEN SATURATION: 92 % | BODY MASS INDEX: 27.09 KG/M2 | DIASTOLIC BLOOD PRESSURE: 70 MMHG | WEIGHT: 200 LBS | SYSTOLIC BLOOD PRESSURE: 122 MMHG

## 2022-05-04 DIAGNOSIS — J44.89 ASTHMA-COPD OVERLAP SYNDROME (CMS/HCC): Primary | ICD-10-CM

## 2022-05-04 DIAGNOSIS — I50.32 CHRONIC DIASTOLIC HEART FAILURE DUE TO VALVULAR DISEASE (CMS/HCC): ICD-10-CM

## 2022-05-04 DIAGNOSIS — I38 CHRONIC DIASTOLIC HEART FAILURE DUE TO VALVULAR DISEASE (CMS/HCC): ICD-10-CM

## 2022-05-04 PROCEDURE — 99213 OFFICE O/P EST LOW 20 MIN: CPT | Performed by: NURSE PRACTITIONER

## 2022-05-04 NOTE — PROGRESS NOTES
PULMONARY NOTE      HPI:  Hayden Franklin is a 61 y.o. male patient who presents for pulmonary follow-up on COPD with asthma overlap.  Patient's most PFT demonstrates severe airflow obstruction with FEV1 37% predicted with significant postbronchodilator response.  He has approximately 30-pack-year history of smoking and quit February 2022 when he was hospitalized.  Patient was hospitalized from 2/25-2/27 with acute hypoxic respiratory failure, exacerbation of congestive heart failure and probable exacerbation of COPD.  Patient has History of coronary artery disease status post CABG.  He had a stress test during hospitalization which was negative for ischemia.  He has been on Lasix.  Patient has been requiring 3 L of oxygen with activity and sleep since his hospitalization.  Dyspnea and hypoxia has prevented him from being able to go back to work as he works as an .  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    Patient reports respiratory symptoms gradually been improving since hospitalization with diuresis and inhalers.  He is currently on Symbicort and using this consistently.  He uses albuterol about 1-2 times a day and does feel this is helpful.  He reports overall cough is decreased compared to February.  He does continue to have mildly productive cough.  He denies hemoptysis.  He reports he continues to have significant dyspnea on exertion and chest tightness with activity.  He has been struggling to regain his activity tolerance since his hospitalization in February.  Reports he will see his SPO2 drop into the mid 80s if he is not wearing oxygen.    Patient denies any symptoms of seasonal allergies or postnasal drip.  He denies symptoms of acid reflux or GERD.      Review of Systems  Pertinent positives and negatives listed in the HPI.    The following portions of the patient's history were reviewed and  updated as appropriate: allergies, current medications, past family history, past medical history, past social history, past surgical history and problem list.    History:  Social History     Tobacco Use   • Smoking status: Former Smoker     Packs/day: 0.75     Years: 30.00     Pack years: 22.50     Types: Cigarettes     Quit date: 2022     Years since quittin.1   • Smokeless tobacco: Never Used   • Tobacco comment: Last smoked 3/1/22   Substance Use Topics   • Alcohol use: Yes     Alcohol/week: 4.0 - 5.0 standard drinks     Types: 4 - 5 Cans of beer per week     Comment: 4-5 beers weekly or less   • Drug use: No         Immunizations:  Immunization History   Administered Date(s) Administered   • Flu vaccine (PF) greater than or equal to 6 months IM 2018, 2019, 2022   • Influenza TIV (IM) 2020   • PFIZER-BIONTECH SARS-COV-2 PURPLE CAP VACCINATION (D1, D2, Immuno, Booster) 2021, 2021, 2022   • Pneumococcal Polysaccharide - PPSV23 2022   • Tdap 2018       Medications:    Current Outpatient Medications:   •  torsemide (DEMADEX) 20 mg tablet, Take 3 tablets (60 mg total) by mouth daily, Disp: 270 tablet, Rfl: 0  •  evolocumab (Repatha SureClick) 140 mg/mL pen injector, Inject 140 mg under the skin every 14 (fourteen) days, Disp: 6 mL, Rfl: 3  •  albuterol HFA (Ventolin HFA) 90 mcg/actuation inhaler, Inhale 2 puffs every 4 (four) hours, Disp: 1 Inhaler, Rfl: 11  •  budesonide-formoteroL (SYMBICORT) 160-4.5 mcg/actuation inhaler, Inhale 2 puffs 2 (two) times a day Rinse mouth with water after use. Do not swallow., Disp: 1 Inhaler, Rfl: 5  •  potassium chloride (KLOR-CON M20) 20 mEq CR tablet, Take 1 tablet (20 mEq total) by mouth daily, Disp: 90 tablet, Rfl: 3  •  metoprolol succinate XL (TOPROL-XL) 50 mg 24 hr tablet, Take 1 tablet (50 mg total) by mouth daily, Disp: 90 tablet, Rfl: 3  •  lisinopriL (PRINIVIL,ZESTRIL) 5 mg tablet, Take 1 tablet (5 mg total)  by mouth daily, Disp: 90 tablet, Rfl: 3  •  atorvastatin (LIPITOR) 80 mg tablet, Take 1 tablet (80 mg total) by mouth daily, Disp: 90 tablet, Rfl: 3  •  nitroglycerin (NITROSTAT) 0.4 mg SL tablet, As needed., Disp: , Rfl:   •  aspirin 81 mg EC tablet, Take 1 tablet (81 mg total) by mouth daily, Disp: 90 tablet, Rfl: 3  •  clotrimazole (MYCELEX) 10 mg esdras, SMARTSI Lozenge(s) By Mouth 5 Times Daily, Disp: , Rfl:   •  amoxicillin (AMOXIL) 500 mg tablet, SMARTSI Tablet(s) By Mouth, Disp: , Rfl:     Allergies:  Allergies   Allergen Reactions   • Ezetimibe Rash       Objective:  Vitals:    22   BP: 122/70    Pulse: 80    SpO2:  92%   Weight: 90.7 kg (200 lb)    Height: 1.829 m (6')        Physical Exam  Constitutional:       General: He is not in acute distress.     Appearance: He is not ill-appearing.   HENT:      Mouth/Throat:      Pharynx: No oropharyngeal exudate or posterior oropharyngeal erythema.   Eyes:      Conjunctiva/sclera: Conjunctivae normal.   Cardiovascular:      Rate and Rhythm: Normal rate and regular rhythm.      Heart sounds: Murmur heard.     No friction rub. No gallop.   Pulmonary:      Effort: Pulmonary effort is normal. No respiratory distress.      Breath sounds: No wheezing, rhonchi or rales.      Comments: Diminished breath sounds throughout with prolonged exhalation.  Chest:      Chest wall: No tenderness.   Musculoskeletal:      Right lower leg: No edema.      Left lower leg: No edema.   Skin:     General: Skin is warm and dry.   Neurological:      General: No focal deficit present.      Mental Status: He is oriented to person, place, and time.      Gait: Gait normal.         Lab Review:   Lab Results   Component Value Date    GLUCOSE 118 (H) 2022    CALCIUM 9.6 2022     2022    K 3.8 2022    CO2 32 2022     2022    BUN 26 (H) 2022    CREATININE 1.17 2022    ANIONGAP 6 2022     Lab Results    Component Value Date     (H) 03/08/2022     No results found for: IGE  Lab Results   Component Value Date    WBC 10.9 (H) 02/27/2022    HGB 14.5 02/27/2022    HCT 43.5 02/27/2022    MCV 94.9 02/27/2022     (L) 02/27/2022         Imaging Review:  CT angiogram 2/25/2022:  Imaging is negative for PE.  Small bilateral pleural effusions with adjacent consolidation/atelectasis.  Mild emphysematous changes.  6 mm right upper lobe nodule.      PFT:  4/6/2022:  FEV1 1.39 or 37% predicted, FVC 3.08 or 64% predicted, FEV1/FVC ratio 45%, TLC 6.2 or 87% predicted, RV 2.73 or 121% predicted, DLCO 27.64 or 78% predicted.  Impression: Spirometry is consistent with severe airflow obstruction with significant postbronchodilator response.  Lung volumes are normal and diffusion capacity is normal.    6-minute walk test: 4/20/2022:  Patient ambulated 507.2 m in 6 minutes which is normal 6-minute walk distance.  He required 3 L oxygen with lowest oxygen saturation 90%.    Ambulatory assessment of SPO2:  In clinic today patient was only able to ambulate 300 feet on room air before his SPO2 dropped to 87%.    Discussion:  Reviewed results of 6-minute walk test, pulmonary function test and chest CT in clinic with patient and his wife.  Discussed with patient he has COPD with overlap of asthma.  Reinforced that it is essential that he never smoke again.  He does have a 6 mm right upper lobe nodule and his primary care provider has already ordered a 6-month follow-up scan for this.    Oxygen evaluation today demonstrates patient cannot walk more than 300 feet without needing supplemental oxygen.  During 6-minute walk test he required 3 L oxygen.  Patient was not on oxygen prior to this hospitalization.  Discussed with patient there is possibility he may always need oxygen with his underlying COPD.  Discussed with patient it may take some time to see improvement in activity tolerance after his hospitalization.  We discussed  consideration of doing pulmonary rehab.  Patient is interested in this option and he will start the 6-week pulmonary rehab program.  We discussed seeing him back in clinic after completing pulmonary rehab reassess oxygen need.  Patient and his wife agreed with this plan.    Assessment:  1.  COPD with overlap of asthma  2.  Hypoxic respiratory failure  3.  History of tobacco use    PLAN:  1.  Continue Symbicort 2 puffs twice daily  2.  Albuterol 2 puffs as needed every 4-6 hours for shortness of breath, cough, chest tightness or wheezing.  3.  Continue to wear oxygen 3 L with activity and sleep.     Follow-up in approximately 2 months, unless needed sooner.    Pt voices understanding and agreement to plan as stated above. All questions answered.            A voice recognition program was used to aid in medical record documentation. Sometimes words are printed not exactly as they were spoken. While efforts were made to carefully edit and correct any inaccuracies, some errors may be present. Errors should be taken within the context of the discussion.  Please contact our office if you need assistance interpreting this medical record or notice any mistakes.

## 2022-05-09 ENCOUNTER — APPOINTMENT (OUTPATIENT)
Dept: LAB | Facility: CLINIC | Age: 62
End: 2022-05-09
Payer: COMMERCIAL

## 2022-05-09 ENCOUNTER — OFFICE VISIT (OUTPATIENT)
Dept: CARDIOLOGY | Facility: CLINIC | Age: 62
End: 2022-05-09
Payer: COMMERCIAL

## 2022-05-09 ENCOUNTER — TRANSFERRED RECORDS (OUTPATIENT)
Dept: HEALTH INFORMATION MANAGEMENT | Facility: CLINIC | Age: 62
End: 2022-05-09

## 2022-05-09 VITALS
HEIGHT: 71 IN | OXYGEN SATURATION: 93 % | WEIGHT: 209 LBS | BODY MASS INDEX: 29.26 KG/M2 | DIASTOLIC BLOOD PRESSURE: 42 MMHG | HEART RATE: 83 BPM | SYSTOLIC BLOOD PRESSURE: 100 MMHG | RESPIRATION RATE: 16 BRPM

## 2022-05-09 DIAGNOSIS — E78.2 MIXED HYPERLIPIDEMIA: ICD-10-CM

## 2022-05-09 DIAGNOSIS — I65.23 OCCLUSION AND STENOSIS OF BILATERAL CAROTID ARTERIES: ICD-10-CM

## 2022-05-09 DIAGNOSIS — I50.32 CHRONIC DIASTOLIC HEART FAILURE DUE TO VALVULAR DISEASE (CMS/HCC): ICD-10-CM

## 2022-05-09 DIAGNOSIS — I38 CHRONIC DIASTOLIC HEART FAILURE DUE TO VALVULAR DISEASE (CMS/HCC): Primary | ICD-10-CM

## 2022-05-09 DIAGNOSIS — I50.32 CHRONIC DIASTOLIC HEART FAILURE DUE TO VALVULAR DISEASE (CMS/HCC): Primary | ICD-10-CM

## 2022-05-09 DIAGNOSIS — I35.0 NONRHEUMATIC AORTIC (VALVE) STENOSIS: ICD-10-CM

## 2022-05-09 DIAGNOSIS — I38 CHRONIC DIASTOLIC HEART FAILURE DUE TO VALVULAR DISEASE (CMS/HCC): ICD-10-CM

## 2022-05-09 LAB
ANION GAP SERPL CALC-SCNC: 7 MMOL/L (ref 3–11)
BUN SERPL-MCNC: 24 MG/DL (ref 7–25)
CALCIUM SERPL-MCNC: 9.5 MG/DL (ref 8.6–10.3)
CHLORIDE SERPL-SCNC: 101 MMOL/L (ref 98–107)
CO2 SERPL-SCNC: 30 MMOL/L (ref 21–32)
CREAT SERPL-MCNC: 1.13 MG/DL (ref 0.7–1.3)
CREATININE (EXTERNAL): 1.13 MG/DL (ref 0.7–1.3)
GFR ESTIMATED (EXTERNAL): 75 ML/MIN/1.73M2
GFR SERPL CREATININE-BSD FRML MDRD: 75 ML/MIN/1.73M*2
GLUCOSE (EXTERNAL): 118 MG/DL (ref 70–105)
GLUCOSE SERPL-MCNC: 118 MG/DL (ref 70–105)
POTASSIUM (EXTERNAL): 4.2 MMOL/L (ref 3.5–5.1)
POTASSIUM SERPL-SCNC: 4.2 MMOL/L (ref 3.5–5.1)
SODIUM SERPL-SCNC: 138 MMOL/L (ref 135–145)

## 2022-05-09 PROCEDURE — 36415 COLL VENOUS BLD VENIPUNCTURE: CPT | Performed by: NURSE PRACTITIONER

## 2022-05-09 PROCEDURE — 80048 BASIC METABOLIC PNL TOTAL CA: CPT | Performed by: NURSE PRACTITIONER

## 2022-05-09 PROCEDURE — 99214 OFFICE O/P EST MOD 30 MIN: CPT | Performed by: NURSE PRACTITIONER

## 2022-05-09 RX ORDER — METOLAZONE 5 MG/1
5 TABLET ORAL DAILY
Qty: 30 TABLET | Refills: 0 | Status: SHIPPED | OUTPATIENT
Start: 2022-05-09 | End: 2022-06-14 | Stop reason: ALTCHOICE

## 2022-05-09 ASSESSMENT — ENCOUNTER SYMPTOMS
PND: 0
NEAR-SYNCOPE: 0
BRUISES/BLEEDS EASILY: 0
IRREGULAR HEARTBEAT: 0
DYSPNEA ON EXERTION: 0
HEADACHES: 0
SYNCOPE: 0
SHORTNESS OF BREATH: 1
LOSS OF BALANCE: 0
DIZZINESS: 0
PALPITATIONS: 1
ORTHOPNEA: 0
CLAUDICATION: 0
LIGHT-HEADEDNESS: 0

## 2022-05-09 ASSESSMENT — PAIN SCALES - GENERAL: PAINLEVEL: 3

## 2022-05-12 ENCOUNTER — TRANSFERRED RECORDS (OUTPATIENT)
Dept: HEALTH INFORMATION MANAGEMENT | Facility: CLINIC | Age: 62
End: 2022-05-12

## 2022-05-12 ENCOUNTER — ANCILLARY PROCEDURE (OUTPATIENT)
Dept: CARDIOLOGY | Facility: CLINIC | Age: 62
End: 2022-05-12
Payer: COMMERCIAL

## 2022-05-12 ENCOUNTER — HOSPITAL ENCOUNTER (OUTPATIENT)
Dept: RESPIRATORY THERAPY | Facility: HOSPITAL | Age: 62
Discharge: HOME | End: 2022-05-12
Payer: COMMERCIAL

## 2022-05-12 VITALS — WEIGHT: 209 LBS | OXYGEN SATURATION: 94 % | RESPIRATION RATE: 16 BRPM | BODY MASS INDEX: 29.26 KG/M2 | HEIGHT: 71 IN

## 2022-05-12 DIAGNOSIS — I35.0 NONRHEUMATIC AORTIC (VALVE) STENOSIS: ICD-10-CM

## 2022-05-12 LAB
AR MAX PG: 72 MMHG
AR SLOPE: 6080 MM/S2
AV LVOT PEAK GRADIENT: 5.1 MMHG
AV MEAN GRADIENT: 17.15 MMHG
AV PEAK GRADIENT: 30.25 MMHG
AV REGURGITATION PRESSURE HALF TIME: 205 MS
DOP CALC AO PEAK VEL: 2.75 M/S
DOP CALC AO VTI: 56.8 CM
DOP CALC LVOT DIAMETER: 2.21 CM
DOP CALC LVOT STROKE VOLUME: 91 CM3
DOP CALC MV VTI: 24.39 CM
DOP CALCLVOT PEAK VEL VTI: 23.8 CM
E/A RATIO: 4.3
E/E' RATIO (AVERAGE): 22.6
E/E' RATIO: 26.1
EJECTION FRACTION: 45 %
EJECTION FRACTION: 45 %
ERAP: 10 MMHG
INTERVENTRICULAR SEPTUM: 1.1 CM (ref 0.6–1.1)
IVC PROX: 1.85 CM
LA AREA A4C SYSTOLE: 69 CM3
LEFT ATRIUM SIZE: 4.53 CM
LEFT ATRIUM VOLUME INDEX: 53 ML/M2
LEFT ATRIUM VOLUME: 69 CM3
LEFT INTERNAL DIMENSION IN SYSTOLE: 4.1 CM (ref 2.1–4)
LEFT VENTRICLE DIASTOLIC VOLUME: 217 CM3
LEFT VENTRICLE SYSTOLIC VOLUME: 119 CM3
LEFT VENTRICULAR INTERNAL DIMENSION IN DIASTOLE: 6 CM (ref 3.5–6)
LVAD-AP2: 51.6 CM2
ML OF DILUTED DEFINITY INJECTED: 3 ML
MV DT: 140 MS
MV MEAN GRADIENT: 2.5 MMHG
MV PEAK A VEL: 30 CM/S
MV PEAK E VEL: 128 CM/S
MV PEAK GRADIENT: 6 MMHG
MV VMAX: 121 CM/S
MVA (VTI): 3.75 CM2
PISA AR MAX VEL: 425 CM/S
POSTERIOR WALL: 1.1 CM (ref 0.6–1.1)
PULM VEIN S/D RATIO: 0.5
PV PEAK D VEL: 80 CM/S
PV PEAK S VEL: 39 CM/S
RA AREA: 26.2 CM2
RH CV ECHO AV VALVE AREA VEL: 1.6 CM2
RH CV ECHO AV VALVE AREA VTI: 1.6 CM2
RH LVOT PEAK VELOCITY REST: 1.1 M/S
RIGHT VENTRICULAR INTERNAL DIMENSION IN DIASTOLE: 4.1 CM
RV AP4 BASE: 3.6 CM
S': 8 CM/S
TDI: 4.9 CM/S
TDILATERAL: 6.7 CM/S
TR MAX PG: 36.24 MMHG
TRICUSPID ANNULAR PLANE SYSTOLIC EXCURSION: 1.2 CM
TRICUSPID VALVE PEAK REGURGITATION VELOCITY: 3.01 M/S
TV REST PULMONARY ARTERY PRESSURE: 46 MMHG

## 2022-05-12 PROCEDURE — 93308 TTE F-UP OR LMTD: CPT | Performed by: INTERNAL MEDICINE

## 2022-05-12 PROCEDURE — 93325 DOPPLER ECHO COLOR FLOW MAPG: CPT | Performed by: INTERNAL MEDICINE

## 2022-05-12 PROCEDURE — 94625 PHY/QHP OP PULM RHB W/O MNTR: CPT

## 2022-05-12 PROCEDURE — 93321 DOPPLER ECHO F-UP/LMTD STD: CPT | Performed by: INTERNAL MEDICINE

## 2022-05-12 NOTE — INTERDISCIPLINARY/THERAPY
"Patient Name: Hayden Franklin  Gender: male    Height: Height: 1.8 m (5' 10.87\")    Weight: Weight: 94.8 kg (208 lb 15.9 oz)    BMI: Body mass index is 29.26 kg/m².        6 MIN WALK/EXERCISE OXIMETRY:   6 Min Walk/Exercise Oximetry     Row Name 05/12/22 1230 05/12/22 1300 05/12/22 1330       Supplemental Oxygen? None (Room air) 05/12/22 1410 None (Room air) 05/12/22 1415 None (Room air) 05/12/22 1417     Test Phase Resting 05/12/22 1410 Exercise 05/12/22 1415 Recovery 05/12/22 1417     Height -- 1.8 m (5' 10.87\") 05/12/22 1415 --     Weight -- 94.8 kg (208 lb 15.9 oz) 05/12/22 1415 --     Exercise Duration (Minutes) -- 6 minutes 05/12/22 1415 --     6 MWT Walk Distance (Meters) -- 483 meters 05/12/22 1415 --     SpO2 93 % 05/12/22 1415 91 % 05/12/22 1415 94 % 05/12/22 1417     Resp 16 05/12/22 1415 22 05/12/22 1415 16 05/12/22 1417     Max Heart Rate 75 bpm 05/12/22 1415 95 bpm 05/12/22 1415 71 bpm 05/12/22 1417     Left Blood Pressure 104/38 05/12/22 1415 109/45 05/12/22 1415 101/39 05/12/22 1417     BP Method Automatic 05/12/22 1415 Automatic 05/12/22 1415 Automatic 05/12/22 1417     RPD 0 05/12/22 1415 1 05/12/22 1415 0 05/12/22 1417     RPE  0 05/12/22 1415 1 05/12/22 1415 0 05/12/22 1417     6 Min Walk Equation - Male -- 590.57 05/12/22 1415 --     6 Min Walk Equation - Female -- 488.69 05/12/22 1415 --     6 Min Walk Lower Limit of Normal - Male -- 437 Meters 05/12/22 1415 --                  FRANDY WILKINSON, RRT  "

## 2022-05-12 NOTE — INTERDISCIPLINARY/THERAPY
Pulmonary Rehab Assessment    Patient Name: Hayden Franklin    An initial assessment was conducted during the admit session for pulmonary rehab where expectations, goals and outcomes of the program were explained to the patient in detail. During the admit session, the following outcome assessments were measured: functional capacity, quality of life (QOL) and dyspnea.  All outcomes will be reassessed at the conclusion of the pulmonary rehab programs.     Functional capacity was measured by completing a six minute walk test. At the conclusion of the walk, the total distance covered was 6 MWT Walk Distance (Meters): 483 meters   meters. The patient's goal is to improve their walk by 30 meters, resulting in a walk greater than or equal to 6 MWT Distance Goal: 513   meters upon completion of the program.     Quality of Life (QOL) was measured using the [place] Respiratory Questionnaire (SGRQ). The patient's total score is [place] Respiratory Questionnaire Score: 56  . The goal is to decrease the total score by four, resulting in a total score less than or equal to [place] Respiratory Questionnaire Goal: 51   upon completion of the program.     Dyspnea was measured using the Shortness of Breath Questionnaire (SOBQ). The patient's total score is SOBQ Score: 41  . The goal is to decrease the total score by five, resulting in a total score less than or equal to SOBQ Goal: 36   upon completion of the program.     Patient goal for exercise is to progress during the program to 30 minutes of exercise while monitoring Dyspnea/Exertion levels using the Modified Manoj Scale.     Final psychosocial recommendations will be provided at the end of the program after patient has completed pulmonary rehab.     FRANDY WILKINSON, RRT  5/12/2022 2:19 PM

## 2022-05-12 NOTE — INTERDISCIPLINARY/THERAPY
Patient Name: Hayden Franklin  Location: [facility] RESPIRATORY CARE    PULMONARY REHAB INDIVIDUALIZED TREATMENT PLAN    GOLD Guideline Classification: 3      PCP  Rosa Haney MD    Patient Goals are assessed weekly, and the estimated timetable to achieve those outcomes is by the end of the program.    Admission Assessment:  Social History  Living Situation: Lives with other adult  Education Level Completed: 3-trade school  Other Occupation: Electrition  Do You Have Anything That Causes You Stress at Home: Life stressors  Do You Currently Follow Relaxation Practices: Relax  Do you still travel?: No      Nutrition History  Coffee or Other Caffiene Drinks: Yes  Amount of Caffiene Per Day: 8 cups  Amount of Water: 16 oz  Type of Diet: see food  Skips Meals: Yes  Appetite: Fair      Fall Risk  History of Falling: No  Secondary Diagnosis: No  Ambulatory Aids: None/bedrest/nurse assist  Intravenous Therapy/Heparin/Saline Lock: No  Gait/Transferring: Normal/bedrest/wheelchair  Mental Status: Oriented to own ability  Vera Fall Risk Score: 0      Exercise History  Exercise: Walk without assistance  Assist Device: None      Sleep History  Awaken At Night Due To Dyspnea: No  Sleep Sitting in Chair: No      Enviroment History  Has Pets: Yes  Type of Pet: dogs      Advanced Directives  Advanced Directive Assessment: Unaware of advanced directive details        Pulmonary Rehab Treatment       Psychosocial:  Psychosocial Identified Needs: Impaired quality of life (QOL)  Psychosocial Assessment: PHQ9; Impaired QOL  [place] Respiratory Questionnaire Symptoms: 61  [place] Respiratory Questionnaire Activities: 79  [place] Respiratory Questionnaire Impacts: 41  [place] Respiratory Questionnaire Score: 56  [place] Respiratory Questionnaire Goal: 51  PHQ-9 Total Score: 2      Psychosocial Goals: Improved quality of life      Psychosocial Education Topics: Review screening results; Benefits of exercise; Relaxation techniques;  Stress management; Train in coping strategies  Patient Verbalizes Understanding: Yes      Psychosocial Reassessment:       Exercise and Fitness:  Exercise and Fitness Identified Need: Deconditioning; Knowledge deficit exercise guidelines and safety; No regular exercise  Exercise and Fitness Assessment: Current exercise level; Deconditioning; No regular exercise; Knowledge deficit exercise, guidelines & safety  6 MWT Walk Distance (Meters): 483 meters  6 MWT Distance Goal: 513      Exercise and Fitness Goal: Aerobic exercise 3-5 days per week; Resistance exercise 2 days per week; Home exercise; Improvement of 6 MWT      Exercise and Fitness Education Topics: Review benefits and core components of exercise; Review how to measure & monitor dyspnea level; Review exercise intensity; Review exercise safety guidelines; Review home exercise guidelines; Review NAMITA scale  Patient Verbalizes Understanding: Yes      Exercise and Fitness Home Prescription:  Frequency: 3-5 x per week  Duration: 20-30 minutes  Intensity: 4-6 per modified namita scale  Target HR: 104-137  Progression: Initial start  Resting O2 Flow Rate (L/min): 0 L/min (RA @92%)  Exercise O2 Flow Rate (L/min): 0 L/min (RA@91%)  Mode: Nustep or treadmill      Exercise and Fitness Reassessment:       Medications:   Medication Identified Need: Medication non-adherence  Medication Assessment: Knowledge of meds; Med adherence; Patient reports percentage of time taking meds      Medications Goal: Adherence/knowledge of prescribed meds; Taking meds 100% of time      Medication Education Topics: Medication list reviewed; Review RX purpose, side effect and importance of compliance      Medications Reassessment:       Inhaled Medications:  Inhaled Medications Identified Need: Incorrect inhaled Rx use, technique  Patient Demonstrates Correct Technique: MDI; DPI; Patient verbalized when to replace MDI?      Inhaled Medications Goals: Correct care of DPI,MDI or nebulizer;  Patient demonstrated correct technique/timing of DPI; Patient demonstrated correct technique/timing of MDI      Inhaled Medications Education Topics: Instruct on care of DPI,MDI or nebulizer; Instruct on correct technique/timing of DPI; Instruct on correct technique/timing of MDI      Inhaled Medications Reassessment:       Nutrition/Weight Management:  Nutrtion/Weight Management Identified Need: Underweight      Nutrition/Weight Management Goals: Follow proper diet; Dietician/patient discussion      Nutrition/Weight Management Education Topics: Eating for good health; Food pyramid; Nutrition strategies; Ongoing weight management; Prednisone and weight management; Role of supplements      Nutrition/Weight Management Reassessment:       Education:   Education Identified Need: Ineffective control of dyspnea; Knowledge deficit self management  Education Assessment: Knowledge deficit of disease self-management strategies; Poor control of dyspnea; SOBQ  SOBQ Score: 41  SOBQ Goal: 36      Education Goals: Effectively partner with Pulmonary Rehab team to prevent and manage disease-related impairments      Education Topics: Advanced directives; Disease overview; Exacerbation prevention/management; Home exercise program; Breathing strategies, dyspnea control at rest, with panic, exercise & ADLs; Intimacy; Panic control; Respiratory medications; Secretion clearance      Education Reassessment:       Hypoxemia:  Hypoxemia Identified Needs: Needs O2 recommendation  Hypoxemia Assessment: Initial SpO2; Resting liter flow; Activity needs; Sleep needs      Hypoxemia Goals: Hypoxemia managed      Hypoxemia Education Topics: Appropriate use at rest and activity; Assist contact to DME; Oxygen safety; Oxygen systems; Recommendation to provider; Monitor SpO2 at rest and activity      Hypoxemia Reassessment:       Activities of Daily Living:  ADLs Identified Need: Impaired ADL management  ADLs Assessment: Impaired ADL management; Need  for OT evaluation; Fear and/or severe dyspnea with stairs      ADLs Goals: ADL management with control of dyspnea      ADL Education Topics: ADL performance with pacing, dyspnea control; OT evaluation; Assistive device evaluation, recommendation & resources; Train in dyspnea control/pacing with stairs      Activities of Daily Living Reassessment:       Secretion Clearance:  Secrtion Clearance Identified Need: Ineffective secretion clearance  Secretion Clearance Assessment: Patient reports productive cough with infections      Secretion Clearance Goals: Patient demonstrated effective cough, effective secretion clearance; Patient demonstrated correct CPT vibratory PEP therapy; Patient demonstrated correct sputum management      Secretion Clearance Education Topics: Controlled cough; CPT; Vibratory PEP therapy; VEST; Role of exercise in secretion clearance      Secretion Clearance Reassessment:       Respiratory Infection:  Respiratory Infection Identified Need: Respiratory infection prevention/management  Respiratory Infection Assessment: Patient reports greater than 1/year respiratory infection      Respiratory Infection Goals: Patient describes signs/symptoms of infection, methods to identify & prevent & when to call provider      Respiratory Infection Education Topics: Hydration; Hand Hygiene; Evaluate sputum; When to call provider; S/S to report: purulent sputum/dyspnea/fatigue; Influenza/pneumonia vaccine; Cleaning of respiratory equipment      Respiratory Infection Reassessment:         FRANDY WILKINSON, RRT  5/12/2022 2:20 PM

## 2022-05-13 ENCOUNTER — APPOINTMENT (OUTPATIENT)
Dept: LAB | Facility: CLINIC | Age: 62
End: 2022-05-13
Payer: COMMERCIAL

## 2022-05-13 ENCOUNTER — TRANSFERRED RECORDS (OUTPATIENT)
Dept: HEALTH INFORMATION MANAGEMENT | Facility: CLINIC | Age: 62
End: 2022-05-13

## 2022-05-13 DIAGNOSIS — I38 CHRONIC DIASTOLIC HEART FAILURE DUE TO VALVULAR DISEASE (CMS/HCC): ICD-10-CM

## 2022-05-13 DIAGNOSIS — I35.0 NONRHEUMATIC AORTIC (VALVE) STENOSIS: ICD-10-CM

## 2022-05-13 DIAGNOSIS — I50.32 CHRONIC DIASTOLIC HEART FAILURE DUE TO VALVULAR DISEASE (CMS/HCC): ICD-10-CM

## 2022-05-13 DIAGNOSIS — I25.5 ISCHEMIC CARDIOMYOPATHY: Primary | ICD-10-CM

## 2022-05-13 LAB
ANION GAP SERPL CALC-SCNC: 10 MMOL/L (ref 3–11)
BUN SERPL-MCNC: 34 MG/DL (ref 7–25)
CALCIUM SERPL-MCNC: 10.1 MG/DL (ref 8.6–10.3)
CHLORIDE SERPL-SCNC: 93 MMOL/L (ref 98–107)
CO2 SERPL-SCNC: 35 MMOL/L (ref 21–32)
CREAT SERPL-MCNC: 1.27 MG/DL (ref 0.7–1.3)
CREATININE (EXTERNAL): 1.27 MG/DL (ref 0.7–1.3)
GFR ESTIMATED (EXTERNAL): 65 ML/MIN/1.73M2
GFR SERPL CREATININE-BSD FRML MDRD: 65 ML/MIN/1.73M*2
GLUCOSE (EXTERNAL): 111 MG/DL (ref 70–105)
GLUCOSE SERPL-MCNC: 111 MG/DL (ref 70–105)
POTASSIUM (EXTERNAL): 3.4 MMOL/L (ref 3.5–5.1)
POTASSIUM SERPL-SCNC: 3.4 MMOL/L (ref 3.5–5.1)
SODIUM SERPL-SCNC: 138 MMOL/L (ref 135–145)

## 2022-05-13 PROCEDURE — 80048 BASIC METABOLIC PNL TOTAL CA: CPT | Performed by: NURSE PRACTITIONER

## 2022-05-13 PROCEDURE — 36415 COLL VENOUS BLD VENIPUNCTURE: CPT | Performed by: NURSE PRACTITIONER

## 2022-05-14 ENCOUNTER — DOCUMENTATION (OUTPATIENT)
Dept: PULMONOLOGY | Facility: CLINIC | Age: 62
End: 2022-05-14
Payer: COMMERCIAL

## 2022-05-14 NOTE — PROGRESS NOTES
Pulmonary Rehab Assessment     Patient Name: Hayden Franklin     An initial assessment was conducted during the admit session for pulmonary rehab where expectations, goals and outcomes of the program were explained to the patient in detail. During the admit session, the following outcome assessments were measured: functional capacity, quality of life (QOL) and dyspnea.  All outcomes will be reassessed at the conclusion of the pulmonary rehab programs.     Functional capacity was measured by completing a six minute walk test. At the conclusion of the walk, the total distance covered was 6 MWT Walk Distance (Meters): 483 meters    meters. The patient's goal is to improve their walk by 30 meters, resulting in a walk greater than or equal to 6 MWT Distance Goal: 513    meters upon completion of the program.     Quality of Life (QOL) was measured using the [place] Respiratory Questionnaire (SGRQ). The patient's total score is [place] Respiratory Questionnaire Score: 56   . The goal is to decrease the total score by four, resulting in a total score less than or equal to [place] Respiratory Questionnaire Goal: 51    upon completion of the program.     Dyspnea was measured using the Shortness of Breath Questionnaire (SOBQ). The patient's total score is SOBQ Score: 41   . The goal is to decrease the total score by five, resulting in a total score less than or equal to SOBQ Goal: 36    upon completion of the program.     Patient goal for exercise is to progress during the program to 30 minutes of exercise while monitoring Dyspnea/Exertion levels using the Modified Manoj Scale.

## 2022-05-17 ENCOUNTER — PREP FOR CASE (OUTPATIENT)
Dept: CARDIOLOGY | Facility: CLINIC | Age: 62
End: 2022-05-17
Payer: COMMERCIAL

## 2022-05-17 ENCOUNTER — TELEPHONE (OUTPATIENT)
Dept: CARDIOLOGY | Facility: CLINIC | Age: 62
End: 2022-05-17
Payer: COMMERCIAL

## 2022-05-17 DIAGNOSIS — I25.10 CORONARY ARTERY DISEASE INVOLVING NATIVE CORONARY ARTERY OF NATIVE HEART WITHOUT ANGINA PECTORIS: Primary | ICD-10-CM

## 2022-05-17 DIAGNOSIS — Z95.2 HX OF PROSTHETIC AORTIC VALVE REPLACEMENT: ICD-10-CM

## 2022-05-17 DIAGNOSIS — Z01.812 PRE-PROCEDURAL LABORATORY EXAMINATION: ICD-10-CM

## 2022-05-17 DIAGNOSIS — Z95.1 HISTORY OF CORONARY ARTERY BYPASS GRAFT X 1: ICD-10-CM

## 2022-05-17 DIAGNOSIS — I35.2 NONRHEUMATIC AORTIC (VALVE) STENOSIS WITH INSUFFICIENCY: ICD-10-CM

## 2022-05-17 RX ORDER — SODIUM CHLORIDE 9 MG/ML
100 INJECTION, SOLUTION INTRAVENOUS CONTINUOUS
Status: CANCELLED | OUTPATIENT
Start: 2022-05-17

## 2022-05-17 NOTE — TELEPHONE ENCOUNTER
Procedure:  You are scheduled for a left heart catheter is on May 20, 2022 at 1600 with Dr. Gallegos at [facility].    Preadmit:  Please arrive at 1315 on 5/20/2022 to the  of the hospital for your Preadmission appointment at 1330 per Tracy.  Please bring all of your medications in their original container or have a complete, current and accurate list of all your medications, dosages and instructions.      [facility] currently has a two visitor policy due to COVID-19 mitigation.  You will be screened at the front door for any symptoms you are currently having and if you have had any recent contact with suspected or positive COVID-19 cases.  Please wear a mask while you are in the hospital.     You may not have anything to eat or drink after midnight on the day of your procedure.   You may take your morning medications with a sip of water with the exception of any vitamins or supplements and diuretics. You will need to hold your Zaroxolyn, and Torsemide  the morning of the procedure.      You will need to have a  and someone to stay with you after your procedure.    Gopal verbalizes understanding of instructions and denies any further questions.

## 2022-05-20 ENCOUNTER — TRANSFERRED RECORDS (OUTPATIENT)
Dept: HEALTH INFORMATION MANAGEMENT | Facility: CLINIC | Age: 62
End: 2022-05-20

## 2022-05-20 ENCOUNTER — PRE-ADMISSION TESTING (OUTPATIENT)
Dept: PREADMISSION TESTING | Facility: HOSPITAL | Age: 62
End: 2022-05-20
Payer: COMMERCIAL

## 2022-05-20 ENCOUNTER — HOSPITAL ENCOUNTER (OUTPATIENT)
Facility: HOSPITAL | Age: 62
Setting detail: OUTPATIENT SURGERY
Discharge: HOME | End: 2022-05-20
Attending: INTERNAL MEDICINE | Admitting: INTERNAL MEDICINE
Payer: COMMERCIAL

## 2022-05-20 VITALS
TEMPERATURE: 97.6 F | HEART RATE: 80 BPM | BODY MASS INDEX: 26.33 KG/M2 | WEIGHT: 194.4 LBS | SYSTOLIC BLOOD PRESSURE: 102 MMHG | DIASTOLIC BLOOD PRESSURE: 50 MMHG | RESPIRATION RATE: 16 BRPM | HEIGHT: 72 IN | OXYGEN SATURATION: 96 %

## 2022-05-20 VITALS
SYSTOLIC BLOOD PRESSURE: 104 MMHG | OXYGEN SATURATION: 96 % | RESPIRATION RATE: 16 BRPM | DIASTOLIC BLOOD PRESSURE: 47 MMHG | TEMPERATURE: 98.6 F | HEART RATE: 79 BPM

## 2022-05-20 DIAGNOSIS — Z01.812 PRE-PROCEDURAL LABORATORY EXAMINATION: ICD-10-CM

## 2022-05-20 DIAGNOSIS — I35.2 NONRHEUMATIC AORTIC (VALVE) STENOSIS WITH INSUFFICIENCY: ICD-10-CM

## 2022-05-20 DIAGNOSIS — Z95.2 HX OF PROSTHETIC AORTIC VALVE REPLACEMENT: ICD-10-CM

## 2022-05-20 DIAGNOSIS — I25.10 CORONARY ARTERY DISEASE INVOLVING NATIVE CORONARY ARTERY OF NATIVE HEART WITHOUT ANGINA PECTORIS: ICD-10-CM

## 2022-05-20 DIAGNOSIS — Z95.1 HISTORY OF CORONARY ARTERY BYPASS GRAFT X 1: ICD-10-CM

## 2022-05-20 LAB
ANION GAP SERPL CALC-SCNC: 9 MMOL/L (ref 3–11)
BUN SERPL-MCNC: 33 MG/DL (ref 7–25)
CALCIUM SERPL-MCNC: 9.7 MG/DL (ref 8.6–10.3)
CHLORIDE SERPL-SCNC: 99 MMOL/L (ref 98–107)
CO2 SERPL-SCNC: 28 MMOL/L (ref 21–32)
CREAT SERPL-MCNC: 1.09 MG/DL (ref 0.7–1.3)
CREATININE (EXTERNAL): 1.09 MG/DL (ref 0.7–1.3)
ERYTHROCYTE [DISTWIDTH] IN BLOOD BY AUTOMATED COUNT: 13.5 % (ref 11.5–15)
GFR ESTIMATED (EXTERNAL): 78 ML/MIN/1.73M2
GFR SERPL CREATININE-BSD FRML MDRD: 78 ML/MIN/1.73M*2
GLUCOSE (EXTERNAL): 92 MG/DL (ref 70–105)
GLUCOSE SERPL-MCNC: 92 MG/DL (ref 70–105)
HCT VFR BLD AUTO: 47 % (ref 38–50)
HGB BLD-MCNC: 16.1 G/DL (ref 13.2–17.2)
MCH RBC QN AUTO: 30.8 PG (ref 29–34)
MCHC RBC AUTO-ENTMCNC: 34.2 G/DL (ref 32–36)
MCV RBC AUTO: 89.9 FL (ref 82–97)
PLATELET # BLD AUTO: 142 10*3/UL (ref 130–350)
PMV BLD AUTO: 10.6 FL (ref 6.9–10.8)
POTASSIUM (EXTERNAL): 3.6 MMOL/L (ref 3.5–5.1)
POTASSIUM SERPL-SCNC: 3.6 MMOL/L (ref 3.5–5.1)
RBC # BLD AUTO: 5.23 10*6/ΜL (ref 4.1–5.8)
SODIUM SERPL-SCNC: 136 MMOL/L (ref 135–145)
WBC # BLD AUTO: 7.8 10*3/UL (ref 3.7–9.6)

## 2022-05-20 PROCEDURE — 80048 BASIC METABOLIC PNL TOTAL CA: CPT

## 2022-05-20 PROCEDURE — C1769 GUIDE WIRE: HCPCS | Performed by: INTERNAL MEDICINE

## 2022-05-20 PROCEDURE — 99152 MOD SED SAME PHYS/QHP 5/>YRS: CPT | Performed by: INTERNAL MEDICINE

## 2022-05-20 PROCEDURE — (BLANK) HC RECOVERY PHASE-2 1ST 1/2 HOUR ACUITY LEVEL 2: Performed by: INTERNAL MEDICINE

## 2022-05-20 PROCEDURE — 2580000300 HC RX 258: Performed by: INTERNAL MEDICINE

## 2022-05-20 PROCEDURE — 99153 MOD SED SAME PHYS/QHP EA: CPT | Performed by: INTERNAL MEDICINE

## 2022-05-20 PROCEDURE — 93455 CORONARY ART/GRFT ANGIO S&I: CPT | Mod: 26 | Performed by: INTERNAL MEDICINE

## 2022-05-20 PROCEDURE — 2550000100 HC RX 255: Performed by: INTERNAL MEDICINE

## 2022-05-20 PROCEDURE — 6360000200 HC RX 636 W HCPCS (ALT 250 FOR IP): Performed by: INTERNAL MEDICINE

## 2022-05-20 PROCEDURE — 36415 COLL VENOUS BLD VENIPUNCTURE: CPT

## 2022-05-20 PROCEDURE — 93567 NJX CAR CTH SPRVLV AORTGRPHY: CPT | Performed by: INTERNAL MEDICINE

## 2022-05-20 PROCEDURE — 85027 COMPLETE CBC AUTOMATED: CPT

## 2022-05-20 PROCEDURE — (BLANK) HC CATH LAB LEVEL 1 FIRST 15 MIN: Performed by: INTERNAL MEDICINE

## 2022-05-20 PROCEDURE — (BLANK) HC RECOVERY PHASE-2 EACH ADDITIONAL 1/2 HOUR ACUITY LEVEL 2: Performed by: INTERNAL MEDICINE

## 2022-05-20 PROCEDURE — (BLANK) HC CATH LAB LEVEL 1 EACH ADDITIONAL MIN: Performed by: INTERNAL MEDICINE

## 2022-05-20 PROCEDURE — C1894 INTRO/SHEATH, NON-LASER: HCPCS | Performed by: INTERNAL MEDICINE

## 2022-05-20 PROCEDURE — 2500000200 HC RX 250 WO HCPCS: Performed by: INTERNAL MEDICINE

## 2022-05-20 RX ORDER — MIDAZOLAM HYDROCHLORIDE 1 MG/ML
INJECTION INTRAMUSCULAR; INTRAVENOUS AS NEEDED
Status: DISCONTINUED | OUTPATIENT
Start: 2022-05-20 | End: 2022-05-20 | Stop reason: HOSPADM

## 2022-05-20 RX ORDER — ATORVASTATIN CALCIUM 80 MG/1
80 TABLET, FILM COATED ORAL DAILY
COMMUNITY
End: 2022-08-03 | Stop reason: SDUPTHER

## 2022-05-20 RX ORDER — SODIUM CHLORIDE 9 MG/ML
125 INJECTION, SOLUTION INTRAVENOUS CONTINUOUS
Status: DISCONTINUED | OUTPATIENT
Start: 2022-05-20 | End: 2022-05-20 | Stop reason: HOSPADM

## 2022-05-20 RX ORDER — ONDANSETRON HYDROCHLORIDE 2 MG/ML
4 INJECTION, SOLUTION INTRAVENOUS EVERY 6 HOURS PRN
Status: DISCONTINUED | OUTPATIENT
Start: 2022-05-20 | End: 2022-05-20 | Stop reason: HOSPADM

## 2022-05-20 RX ORDER — FENTANYL CITRATE/PF 50 MCG/ML
PLASTIC BAG, INJECTION (ML) INTRAVENOUS AS NEEDED
Status: DISCONTINUED | OUTPATIENT
Start: 2022-05-20 | End: 2022-05-20 | Stop reason: HOSPADM

## 2022-05-20 RX ORDER — ACETAMINOPHEN 325 MG/1
650 TABLET ORAL EVERY 4 HOURS PRN
Status: DISCONTINUED | OUTPATIENT
Start: 2022-05-20 | End: 2022-05-20 | Stop reason: HOSPADM

## 2022-05-20 RX ORDER — LIDOCAINE HYDROCHLORIDE 10 MG/ML
INJECTION, SOLUTION EPIDURAL; INFILTRATION; INTRACAUDAL; PERINEURAL AS NEEDED
Status: DISCONTINUED | OUTPATIENT
Start: 2022-05-20 | End: 2022-05-20 | Stop reason: HOSPADM

## 2022-05-20 RX ORDER — ATROPINE SULFATE 0.1 MG/ML
.5-1 INJECTION INTRAVENOUS ONCE AS NEEDED
Status: DISCONTINUED | OUTPATIENT
Start: 2022-05-20 | End: 2022-05-20 | Stop reason: HOSPADM

## 2022-05-20 RX ORDER — HEPARIN SODIUM 1000 [USP'U]/ML
INJECTION, SOLUTION INTRAVENOUS; SUBCUTANEOUS AS NEEDED
Status: DISCONTINUED | OUTPATIENT
Start: 2022-05-20 | End: 2022-05-20 | Stop reason: HOSPADM

## 2022-05-20 RX ADMIN — SODIUM CHLORIDE 125 ML/HR: 9 INJECTION, SOLUTION INTRAVENOUS at 18:30

## 2022-05-20 NOTE — Clinical Note
Sheath(s) removed from the right radial artery. Closure device used: Radial Band. O2 saturation is 93%. Total cc's of air in band = 10. Hemostasis achieved.

## 2022-05-20 NOTE — Clinical Note
ASA score: 3. Mallampati: I. Planned anesthesia type is moderate sedation. Sedation plan and risk discussed with patient.

## 2022-05-20 NOTE — Clinical Note
Prepped: groin and right radial. The site was clipped. Prepped with: ChloraPrep. The patient was draped  in the usual sterile fashion.

## 2022-05-20 NOTE — INTERVAL H&P NOTE
H&P reviewed. The patient was examined and there are no changes to the H&P other than a diastolic murmur was noted, 2/6 best heard at the apex.    The patient reports today that he has not had any change in his symptoms since the 5/9/2022 evaluation.  Since that 5/9/2022 evaluation with Yeny Fisher CNP, the patient underwent an echocardiogram that indicated a decreased EF of 45%, 3 months prior was 52%.  The Jsutin Mitroflow bioprosthetic aortic valve was now showing severe aortic regurgitation.  I do not have documentation indicating the exact details of the discussion, but it can be concluded that the echocardiogram findings has led to the patient to present today for coronary angiogram, which she is here for today, to be done with Dr. Gallegos.    The patient denies any previous adverse reaction to IV contrast.    Sedation Plan    ASA III    Mallampati class:  1    Anesthesia Type: Conscious Sedation    Risks, benefits, and alternatives discussed with patient.    The patient denies any previous adverse reaction to anesthesia or sedation.  The patient denies any limited neck range of motion.    LAST ECHO EF : 5/12/2022 45%  Last NPO at: See nursing documentation, reviewed.  Medications taken this morning: See nursing documentation, reviewed.      Results from last 4 days   Lab Units 05/20/22  1348   WBC AUTO 10*3/uL 7.8   RBC AUTO 10*6/µL 5.23   HEMOGLOBIN g/dL 16.1   HEMATOCRIT % 47.0   PLATELETS AUTO 10*3/uL 142   BUN mg/dL 33*   CREATININE mg/dL 1.09   POTASSIUM MMOL/L 3.6   CHLORIDE mmol/L 99   SODIUM mmol/L 136   CO2 mmol/L 28       Temp:  [36.4 °C (97.6 °F)] 36.4 °C (97.6 °F)  Heart Rate:  [80] 80  Resp:  [16] 16  SpO2:  [96 %] 96 %  BP: (102)/(50) 102/50    Diagnosis:   Active Problems:    Coronary atherosclerosis of native coronary artery    S/P AVR    History of coronary artery bypass graft x 1    Nonrheumatic aortic (valve) stenosis with insufficiency       Plan: Coronary angiogram by   El.      This patient seen in conjunction with Dr. Gallegos.    Electronically Signed by Milly Castañeda CNP  5/20/2022  3:08 PM

## 2022-05-23 DIAGNOSIS — I38 CHRONIC DIASTOLIC HEART FAILURE DUE TO VALVULAR DISEASE (CMS/HCC): ICD-10-CM

## 2022-05-23 DIAGNOSIS — I50.32 CHRONIC DIASTOLIC HEART FAILURE DUE TO VALVULAR DISEASE (CMS/HCC): ICD-10-CM

## 2022-05-23 RX ORDER — POTASSIUM CHLORIDE 20 MEQ/1
TABLET, EXTENDED RELEASE ORAL
Qty: 204 TABLET | Refills: 0 | Status: SHIPPED | OUTPATIENT
Start: 2022-05-23 | End: 2022-06-14 | Stop reason: DRUGHIGH

## 2022-05-23 NOTE — TELEPHONE ENCOUNTER
Patient called in and requested refill for potassium.  Patient also is inquiring if he needs to have repeat lab work done today or if he can wait to have repeat labs done later since he just recently had lab work done on Friday (5/20/2022).  Advised patient that this nurse would refill medication and would speak to Yeny Fisher CNP regarding lab work and would call him with recommendations.  Patient also inquiring when he would hear from valve clinic.  Advised patient that they would reach out to him, however, unsure how long it may take.  Did advise that this nurse would check with TAVR clinic regarding talking with patient and also advised patient that after his heart catheterization he was recommended to follow-up with Dr. Gomez in 1 month so this nurse would arrange that follow-up appointment.  Patient again voiced understanding and was in agreement with this plan.  Patient has no further questions or concerns at this time and voices appreciation for all assistance.

## 2022-06-01 ENCOUNTER — TELEPHONE (OUTPATIENT)
Dept: CARDIOTHORACIC SURGERY | Facility: CLINIC | Age: 62
End: 2022-06-01
Payer: COMMERCIAL

## 2022-06-01 NOTE — TELEPHONE ENCOUNTER
I called Gopal to get him set up to see Dr. Dillard and I gave him the option of coming in today or on 6/13/22 at 1600 and he chose 6/13/22 at 1600 to come in to see Dr. Dillard.  I let him know that I will talk with Dr. Dillard and see if he wants to get a CARA done prior to being seen or if he just wants to do the CARA during surgery and I will let Gopal know.  Gopal voiced his understanding and was in agreement with the plan.

## 2022-06-07 NOTE — PROGRESS NOTES
Cardiothoracic & Vascular Surgery Consultation      HPI    Patient: Hayden Franklin is a 61 y.o. male here for surgical evaluation and recommendations by Dr. Dillard for severe aortic valve regurgitation. Patient follows with cardiologist Dr. Boone. Of significance is patient's history of an aortic valve replacement with a #27 Justin Mitroflow bovine pericardial valve, replacement of the ascending aorta and Eduardo arch with a 34 mm Dacron graft, CABG x1 with a saphenous vein graft to the right PDA via hypothermic circulatory arrest and antegrade cerebral perfusion by Dr. Rod on 11/5/2013.  It is unclear if the patient had a root replacement or not as the operative note could not be found in medical records, the above information was obtained from a discharge summary from that admission for surgery.  Patient has recently been following with cardiology for his worsening shortness of breath. He is on home oxygen for his COPD.  He denies orthopnea or PND, but does snore at night and has never been evaluated for sleep apnea.  Patient's most recent ultrasound echocardiogram shows    · Mild left ventricular dilation, LVIDD 6.0 cm.  · Mild eccentric left ventricular hypertrophy.  · Mild global left ventricular systolic dysfunction, biplane EF 45%.  · Grade 3 diastolic dysfunction, elevated left ventricular filling pressure.  · Severe left atrial enlargement, indexed left atrial volume 53 mL/m².  · Dilated right atrium.  · 27 mm Justin Mitroflow bioprosthetic aortic valve with evidence of significant degeneration.  · Leaflet excursion appears to be adequate with effective orifice area of 1.6 cm², peak velocity 2.7 m/s, mean gradient 17 mmHg.  · Evidence of severe aortic regurgitation.  · Multiple valvular jets are noted with AI pressure half-time of 205 ms.  · Evidence of holodiastolic flow reversal in proximal descending thoracic aorta.  · Trace mitral regurgitation.  · Mild tricuspid regurgitation.  · Mild  pulmonary hypertension, estimated RVSP 46 mmHg.     Interval worsening of left ventricular systolic function.    Interval worsening of aortic regurgitation.    Left ventricle now appears to be dilated.    Back in February, the patient had an abnormal walking pharmacologic nuclear stress test with a moderately sized proximal to distal inferior wall infarct and a mildly hypokinetic basal inferior wall motion abnormality was also present, these findings were similar to previous nuclear stress test back in 2013 and echo is also consistent with prior inferior wall infarct.    Cardiac catheterization and aortic angiogram completed last month reveals severe single-vessel CAD, proximal RCA is 100% , SVG to RPDA is patent and feeds the RPL system as well, left main LAD and left circumflex are relatively patent.    Patient's other past medical history is significant for chronic diastolic heart failure, COPD, hyperlipidemia, carotid stenosis, and a 22-pack-year history of smoking, quit in February 2022.  Patient drinks 4 to 5 cans of beer per week.    Pulmonary function testing completed in April shows spirometry and flow volume loops reveal a severe obstructive airway impairment, Gold class III, there is significant change with bronchodilators on this test, lung volumes are normal, and diffusing capacity is essentially normal.  See raw data below.    The most recent chest imagaing is a CTA chest PE with IV contrast was completed in February. Carotid duplex imaging completed today shows 16-49% stenosis to bilateral internal carotid arteries.    Patient's current symptoms include fatigue and SOB with exertion that improves with rest. He denies chest pain, lightheadedness or dizziness, syncope, pedal edema, orthopnea or PND.     Past Medical History:   Diagnosis Date   • Aortic valve stenosis     s/p AV replacement 11/2013   • Arthritis     Bilat hands, ankle   • Ascending aorta dilatation (CMS/HCC) ([facility])     s/p AAA repair  2013   • Asthma    • CAD (coronary artery disease)     1 vessel bypass   • Cancer (CMS/[facility]) ([facility])     Skin   • CHF (congestive heart failure) (CMS/[facility]) ([facility])    • COPD (chronic obstructive pulmonary disease) (CMS/[facility]) ([facility])    • Hyperlipidemia    • Ischemic cardiomyopathy    • Shortness of breath      Past Surgical History:   Procedure Laterality Date   • AAA REPAIR - ENDOGRAFT  2013   • ANKLE SURGERY     • AORTIC ROOT ANGIOGRAM N/A 2022    Procedure: Aortic Arch  Angiogram;  Surgeon: Derick Gallegos MD;  Location: [facility] Cath Lab;  Service: Cardiovascular;  Laterality: N/A;   • AORTIC VALVE REPLACEMENT  2013   • CORONARY ANGIOPLASTY  2013   • CORONARY ARTERY BYPASS GRAFT  2013    1V CABG SVG- RCA   • LEFT HEART CATH N/A 2022    Procedure: Left heart cath;  Surgeon: Derick Gallegos MD;  Location: [facility] Cath Lab;  Service: Cardiovascular;  Laterality: N/A;       No current facility-administered medications for this visit.    Allergies as of 2022 - Reviewed 2022   Allergen Reaction Noted   • Ezetimibe Rash 2020     Implants     No active implants to display in this view.        Social History     Tobacco Use   • Smoking status: Former Smoker     Packs/day: 0.75     Years: 30.00     Pack years: 22.50     Types: Cigarettes     Quit date: 2022     Years since quittin.3   • Smokeless tobacco: Never Used   • Tobacco comment: Last smoked 3/1/22   Vaping Use   • Vaping Use: Never used   Substance Use Topics   • Alcohol use: Yes     Alcohol/week: 4.0 - 5.0 standard drinks     Types: 4 - 5 Cans of beer per week     Comment: 3-4 beers weekly or less   • Drug use: No     Family History   Problem Relation Age of Onset   • No Known Problems Mother    • Heart disease Father    • Aneurysm Father    • Parkinsonism Sister    • No Known Problems Sister    • No Known Problems Sister    • No Known Problems Daughter    • No Known Problems Daughter      ROS    10 point review of systems  has been completed and is grossly unremarkable except those which may have been mentioned in the history of present illness above    Objective  Vital signs in last 24 hours:  Heart Rate:  [87] 87  Resp:  [20] 20  SpO2:  [96 %] 96 %  BP: (98)/(52) 98/52  SpO2: 96 %  Oxygen Therapy: None (Room air)  Wt Readings from Last 9 Encounters:   06/13/22 90 kg (198 lb 8 oz)   06/13/22 88.9 kg (195 lb 14.4 oz)   05/20/22 88.2 kg (194 lb 6.4 oz)   05/12/22 94.8 kg (208 lb 15.9 oz)   05/09/22 94.8 kg (209 lb)   05/04/22 90.7 kg (200 lb)   04/20/22 90.6 kg (199 lb 11.8 oz)   04/19/22 92.8 kg (204 lb 8 oz)   03/21/22 91.1 kg (200 lb 14.4 oz)     PHYSICAL EXAM:  Constitutional: Oriented to person, place, and time. Appears well-developed and well-nourished. No distress. Alert, pleasant, calm    Head: Normocephalic and atraumatic  Cardiovascular: Normal rate, regular rhythm and a systolic murmur is present  Pulmonary/Chest: Effort normal and breath sounds normal  Musculoskeletal: Exhibits no edema  Neurological: Alert and oriented to person, place, and time  Skin: Skin is warm and dry. Not diaphoretic. Well healed median sternotomy scar.   Psychiatric: Normal mood and affect    Labs:    Results for [place]    Ref. Range 5/20/2022 13:48   Sodium Latest Ref Range: 135 - 145 mmol/L 136   Potassium Latest Ref Range: 3.5 - 5.1 MMOL/L 3.6   Chloride Latest Ref Range: 98 - 107 mmol/L 99   CO2 Latest Ref Range: 21 - 32 mmol/L 28   Anion Gap Latest Ref Range: 3 - 11 mmol/L 9   BUN Latest Ref Range: 7 - 25 mg/dL 33 (H)   Creatinine, Ser Latest Ref Range: 0.70 - 1.30 mg/dL 1.09   eGFR Latest Ref Range: >60 mL/min/1.73m*2 78   Glucose Latest Ref Range: 70 - 105 mg/dL 92   Calcium Latest Ref Range: 8.6 - 10.3 mg/dL 9.7   WBC Latest Ref Range: 3.7 - 9.6 10*3/uL 7.8   RBC Latest Ref Range: 4.10 - 5.80 10*6/µL 5.23   Hemoglobin Latest Ref Range: 13.2 - 17.2 g/dL 16.1   Hematocrit Latest Ref Range: 38.0 - 50.0 % 47.0   MCV Latest Ref Range:  82.0 - 97.0 fL 89.9   MCH Latest Ref Range: 29.0 - 34.0 pg 30.8   MCHC Latest Ref Range: 32.0 - 36.0 g/dL 34.2   RDW Latest Ref Range: 11.5 - 15.0 % 13.5   Platelets Latest Ref Range: 130 - 350 10*3/uL 142   MPV Latest Ref Range: 6.9 - 10.8 fL 10.6     Radiology Results:        Cardiac catheterization, Aortic Angiogram    Result Date: 5/20/2022  Narrative: · Ost RCA to Dist RCA lesion is 100% stenosed .  DATE OF PROCEDURE: 5/20/2022  PREOPERATIVE DIAGNOSIS:  Pre-op Diagnosis    * Coronary artery disease involving native coronary artery of native heart without angina pectoris [I25.10]    * History of coronary artery bypass graft x 1 [Z95.1]    * Nonrheumatic aortic (valve) stenosis with insufficiency [I35.2]    * Hx of prosthetic aortic valve replacement [Z95.2] POSTOPERATIVE DIAGNOSIS:  Post-op Diagnosis    * Coronary artery disease involving native coronary artery of native heart without angina pectoris [I25.10]    * History of coronary artery bypass graft x 1 [Z95.1]    * Nonrheumatic aortic (valve) stenosis with insufficiency [I35.2]    * Hx of prosthetic aortic valve replacement [Z95.2] PROCEDURES PERFORMED:  Left heart cath Aortic Angiogram INDICATION: The patient is a 61 y.o.-year-old male who is presenting with worsening aortic valve disease and preoperative work-up for possible aortic valve replacement. ANESTHESIA: 29 minutes 4 seconds of moderate sedation was administered using Fentanyl and Versed under my supervision.  The patient's ASA class was 3.  I monitored the patient for further administration of agents as needed including continuous monitoring of oxygen saturation, heart rate and blood pressure.  The RN administered the medicine under my direct supervision order, and an independent trained observer was present. SEDATION MEDICATION/CONTRAST USED:   05/20/2022 1741 fentaNYL citrate (PF) 50 mcg/mL injection 50 mcg   05/20/2022 1725 fentaNYL citrate (PF) 50 mcg/mL injection 25 mcg   05/20/2022 1719  fentaNYL citrate (PF) 50 mcg/mL injection 25 mcg   05/20/2022 1746 iopamidoL (ISOVUE-370) 76 % injection 105 mL Other Given   05/20/2022 1725 midazolam (VERSED) injection 1 mg intravenous Given   05/20/2022 1719 midazolam (VERSED) injection 1 mg intravenous Given RADIATION: Radiation (mGy): = 823.000; Fluoro Time (min) = 5.6 HB ACCESS: Operative procedure/Access: The right armwas prepped and draped in usual sterile manner. Using 1% Xylocaine for local anesthesia, a 5/6 slender sheath was inserted in the right radial artery.     Radial artery cocktail was given. Diagnostic coronary angiogram: A standard 035 exchange wire was advanced into the ascending aorta without issue. Selective engagement of the left main coronary artery was performed with a 5 Croatian JL 3.5 catheter.  Selective injections were performed.  Selective engagement of the right coronary artery was performed with a JR4 catheter.  Selective engagement of the SVG to RCA was performed with a 5 Croatian AL 1 diagnostic catheter the patient has a right dominant coronary system.     Left Main: Large vessel, patent.  ANTONIO-3 flow in the LAD and left circumflex.     Left Anterior Descending: Relatively large vessel, approximately 4 to 4.5 mm in diam in the proximal portion.  LAD has diffuse luminal irregularities up to 20%.  Diagonal branches and septal perforators are patent.    Left Circumflex: Nondominant vessel, relatively large in size, proximally 3.5 to 4 mm in diameter in the proximal portion.  Left circumflex system has minimal luminal irregularities up to 20%.  OM branches are patent.    Right Coronary Artery:  Proximal RCA is 100% .   appears to extend all the way to the distal RCA.  SVG to RPDA is patent and fills the RPL as well.  Diagnostic aortography was performed with an angled pigtail.  There was at least moderate aortic regurgitation.  Aortic size was normal in the ascending, arch, and ascending aorta. At the conclusion of the procedure,  all catheters and wires were removed in usual fashion. The patient was transferred out of the cath lab in stable condition. CONCLUSION: Severe single-vessel CAD.  Proximal RCA is 100% . SVG to RPDA is patent and feeds the RPL system as well. Left main, LAD, and left circumflex are relatively patent. At least moderate aortic regurgitation noted. PLAN: Return to [facility].  Patient may be discharged home tonight if postprocedural course is unremarkable. Recommend patient follow-up with Dr. Gomez in 1 month or so.    [employer]    Result Date: 5/12/2022  Narrative: · Mild left ventricular dilation, LVIDD 6.0 cm. · Mild eccentric left ventricular hypertrophy. · Mild global left ventricular systolic dysfunction, biplane EF 45%. · Grade 3 diastolic dysfunction, elevated left ventricular filling pressure. · Severe left atrial enlargement, indexed left atrial volume 53 mL/m². · Dilated right atrium. · 27 mm Justin Mitroflow bioprosthetic aortic valve with evidence of significant degeneration. · Leaflet excursion appears to be adequate with effective orifice area of 1.6 cm², peak velocity 2.7 m/s, mean gradient 17 mmHg. · Evidence of severe aortic regurgitation. · Multiple valvular jets are noted with AI pressure half-time of 205 ms. · Evidence of holodiastolic flow reversal in proximal descending thoracic aorta. · Trace mitral regurgitation. · Mild tricuspid regurgitation. · Mild pulmonary hypertension, estimated RVSP 46 mmHg. Interval worsening of left ventricular systolic function.  Interval worsening of aortic regurgitation.  Left ventricle now appears to be dilated. Management per Dr Gomez     Assessment    Active Ambulatory Problems     Diagnosis Date Noted   • Coronary atherosclerosis of native coronary artery 07/16/2015   • Mixed hyperlipidemia 07/16/2015   • Nicotine dependence, unspecified, uncomplicated 08/03/2017   • S/P AVR 12/18/2018   • History of coronary artery bypass graft x 1 12/18/2018   • COPD  exacerbation (CMS/[facility]) ([facility]) 02/25/2022   • Lung nodule 03/07/2022   • Ischemic cardiomyopathy    • Chronic combined systolic and diastolic heart failure (CMS/[facility]) ([facility]) 03/21/2022   • Asthma-COPD overlap syndrome (CMS/[facility]) ([facility]) 04/06/2022   • Nonrheumatic aortic valve insufficiency 06/14/2022     Resolved Ambulatory Problems     Diagnosis Date Noted   • Nonrheumatic aortic (valve) stenosis 12/18/2018   • History of abdominal aortic aneurysm repair 03/06/2022     Past Medical History:   Diagnosis Date   • Aortic valve stenosis    • Arthritis    • Ascending aorta dilatation (CMS/[facility]) ([facility])    • Asthma    • CAD (coronary artery disease)    • Cancer (CMS/[facility]) ([facility])    • CHF (congestive heart failure) (CMS/[facility]) ([facility])    • COPD (chronic obstructive pulmonary disease) (CMS/[facility]) ([facility])    • Hyperlipidemia    • Shortness of breath         Plan    Patient seen, examined and plan of care formulated in conjunction with Dr. Dillard. Please see his attestation to this note for further recommendations.     The patient and Dr. Dillard had a long discussion with the patient regarding his options for his severe aortic valve regurgitation s/p Justin Mitroflow AVR in 2013.  A redo surgical aortic valve replacement with a mechanical valve was discussed in great detail.  Patient was also given the option to be evaluated by the structural heart team for a transcatheter aortic valve-in-valve replacement.  Patient was educated on the fact that bioprosthetic aortic valves will wear out within his lifetime, approximately 10 to 12 years on average, and there are many uncertainties regarding the future of noninvasive valve replacements after undergoing a valve in valve TAVR procedure.  Patient would like time to think about his decision regarding a redo surgical aortic valve replacement versus pursuing a valve in valve transcatheter aortic valve replacement.  Patient will contact our team within the next week to report his decision.  Patient  was quoted a 7 to 8% risk of mortality or severe morbidity with a redo surgical aortic valve replacement.  It was discussed in great detail that patient's severe obstructive airway impairment increases his risk for a prolonged time on the ventilator and for chronic oxygen use.     Patient is agreeable to a mechanical aortic valve and lifelong anticoagulation with Coumadin if he decides to pursue a redo surgical aortic valve replacement.    The surgical risks (including but not limited to, stroke, heart attack, death, infection, bleeding and injury to surrounding structures), benefits, alternative treatment options, lifetime of a replacement mechanical aortic valve , hospital course and recovery time were all discussed in length by Dr. Dillard.     Hold Lisinopril 2 days prior to aortic valve replacement surgery.     Dr. Dillard recommends that the patient obtain a CTA chest abdomen and pelvis as the most recent chest imaging is from February and Dr. Dillard would like to evaluate the rest of the patient's aorta.    Patient agrees to the above plan of care and has no further questions or concerns at this time.

## 2022-06-13 ENCOUNTER — ANCILLARY PROCEDURE (OUTPATIENT)
Dept: CARDIOLOGY | Facility: CLINIC | Age: 62
End: 2022-06-13
Payer: COMMERCIAL

## 2022-06-13 ENCOUNTER — APPOINTMENT (OUTPATIENT)
Dept: LAB | Facility: CLINIC | Age: 62
End: 2022-06-13
Payer: COMMERCIAL

## 2022-06-13 ENCOUNTER — OFFICE VISIT (OUTPATIENT)
Dept: CARDIOTHORACIC SURGERY | Facility: CLINIC | Age: 62
End: 2022-06-13
Payer: COMMERCIAL

## 2022-06-13 ENCOUNTER — OFFICE VISIT (OUTPATIENT)
Dept: CARDIOLOGY | Facility: CLINIC | Age: 62
End: 2022-06-13
Payer: COMMERCIAL

## 2022-06-13 ENCOUNTER — TRANSFERRED RECORDS (OUTPATIENT)
Dept: HEALTH INFORMATION MANAGEMENT | Facility: CLINIC | Age: 62
End: 2022-06-13

## 2022-06-13 VITALS
SYSTOLIC BLOOD PRESSURE: 98 MMHG | BODY MASS INDEX: 26.89 KG/M2 | DIASTOLIC BLOOD PRESSURE: 52 MMHG | HEIGHT: 72 IN | HEART RATE: 87 BPM | WEIGHT: 198.5 LBS | RESPIRATION RATE: 20 BRPM | OXYGEN SATURATION: 96 %

## 2022-06-13 VITALS
HEART RATE: 80 BPM | DIASTOLIC BLOOD PRESSURE: 52 MMHG | OXYGEN SATURATION: 96 % | SYSTOLIC BLOOD PRESSURE: 94 MMHG | BODY MASS INDEX: 26.57 KG/M2 | WEIGHT: 195.9 LBS

## 2022-06-13 DIAGNOSIS — Z95.1 HISTORY OF CORONARY ARTERY BYPASS GRAFT X 1: Chronic | ICD-10-CM

## 2022-06-13 DIAGNOSIS — I50.42 CHRONIC COMBINED SYSTOLIC AND DIASTOLIC HEART FAILURE (CMS/HCC): Primary | ICD-10-CM

## 2022-06-13 DIAGNOSIS — I65.23 OCCLUSION AND STENOSIS OF BILATERAL CAROTID ARTERIES: ICD-10-CM

## 2022-06-13 DIAGNOSIS — I35.1 NONRHEUMATIC AORTIC VALVE INSUFFICIENCY: ICD-10-CM

## 2022-06-13 DIAGNOSIS — Z95.2 S/P AVR: Primary | Chronic | ICD-10-CM

## 2022-06-13 LAB
ANION GAP SERPL CALC-SCNC: 8 MMOL/L (ref 3–11)
BUN SERPL-MCNC: 34 MG/DL (ref 7–25)
CALCIUM SERPL-MCNC: 9.9 MG/DL (ref 8.6–10.3)
CHLORIDE SERPL-SCNC: 95 MMOL/L (ref 98–107)
CO2 SERPL-SCNC: 30 MMOL/L (ref 21–32)
CREAT SERPL-MCNC: 1.24 MG/DL (ref 0.7–1.3)
CREATININE (EXTERNAL): 1.24 MG/DL (ref 0.7–1.3)
GFR ESTIMATED (EXTERNAL): 67 ML/MIN/1.73M2
GFR SERPL CREATININE-BSD FRML MDRD: 67 ML/MIN/1.73M*2
GLUCOSE (EXTERNAL): 96 MG/DL (ref 70–105)
GLUCOSE SERPL-MCNC: 96 MG/DL (ref 70–105)
LEFT CCA DIST DIAS: 0 CM/S
LEFT CCA DIST SYS: 114 CM/S
LEFT CCA MID DIAS: 0 CM/S
LEFT CCA MID SYS: 116 CM/S
LEFT CCA PROX DIAS: 0 CM/S
LEFT CCA PROX SYS: 119 CM/S
LEFT ECA SYS: 108 CM/S
LEFT ICA DIST DIAS: 13 CM/S
LEFT ICA DIST SYS: 79 CM/S
LEFT ICA MID DIAS: 11 CM/S
LEFT ICA MID SYS: 85 CM/S
LEFT ICA PROX DIAS: 10 CM/S
LEFT ICA PROX SYS: 95 CM/S
LEFT ICA/CCA SYS: 0.83
LEFT VERTEBRAL DIAS: 4 CM/S
LEFT VERTEBRAL SYS: 37 CM/S
POTASSIUM (EXTERNAL): 3.5 MMOL/L (ref 3.5–5.1)
POTASSIUM SERPL-SCNC: 3.5 MMOL/L (ref 3.5–5.1)
RH LEFT CAROTID BULB EDV: 8 CM/S
RH LEFT CAROTID BULB PSV: 75 CM/S
RH RIGHT CAROTID BULB EDV: 10 CM/S
RH RIGHT CAROTID BULB PSV: 95 CM/S
RIGHT CCA DIST DIAS: 9 CM/S
RIGHT CCA DIST SYS: 125 CM/S
RIGHT CCA MID DIAS: 0 CM/S
RIGHT CCA MID SYS: 95 CM/S
RIGHT CCA PROX DIAS: 0 CM/S
RIGHT CCA PROX SYS: 108 CM/S
RIGHT ECA SYS: 145 CM/S
RIGHT ICA DIST DIAS: 11 CM/S
RIGHT ICA DIST SYS: 73 CM/S
RIGHT ICA MID DIAS: 10 CM/S
RIGHT ICA MID SYS: 77 CM/S
RIGHT ICA PROX DIAS: 16 CM/S
RIGHT ICA PROX SYS: 165 CM/S
RIGHT ICA/CCA SYS: 1.3
RIGHT VERTEBRAL DIAS: 4 CM/S
RIGHT VERTEBRAL SYS: 54 CM/S
SODIUM SERPL-SCNC: 133 MMOL/L (ref 135–145)

## 2022-06-13 PROCEDURE — 80048 BASIC METABOLIC PNL TOTAL CA: CPT | Performed by: NURSE PRACTITIONER

## 2022-06-13 PROCEDURE — 99204 OFFICE O/P NEW MOD 45 MIN: CPT | Performed by: THORACIC SURGERY (CARDIOTHORACIC VASCULAR SURGERY)

## 2022-06-13 PROCEDURE — 93880 EXTRACRANIAL BILAT STUDY: CPT | Performed by: INTERNAL MEDICINE

## 2022-06-13 PROCEDURE — 36415 COLL VENOUS BLD VENIPUNCTURE: CPT | Performed by: NURSE PRACTITIONER

## 2022-06-13 PROCEDURE — 99214 OFFICE O/P EST MOD 30 MIN: CPT | Performed by: NURSE PRACTITIONER

## 2022-06-13 ASSESSMENT — ENCOUNTER SYMPTOMS
WEIGHT GAIN: 0
MYALGIAS: 0
PND: 0
SLEEP DISTURBANCES DUE TO BREATHING: 0
NAUSEA: 0
SNORING: 1
INSOMNIA: 0
PALPITATIONS: 0
DIARRHEA: 0
CONSTIPATION: 0
VOMITING: 1
CHANGE IN BOWEL HABIT: 0
MEMORY LOSS: 0
FEVER: 0
BRUISES/BLEEDS EASILY: 0
WEIGHT LOSS: 0
SYNCOPE: 0
DECREASED APPETITE: 0
COUGH: 0
ABDOMINAL PAIN: 0
SPUTUM PRODUCTION: 0
DIZZINESS: 0
LIGHT-HEADEDNESS: 0
NEAR-SYNCOPE: 0
CLAUDICATION: 0
FALLS: 0
DYSPNEA ON EXERTION: 0
SHORTNESS OF BREATH: 0
COLOR CHANGE: 0
BLOATING: 1
VERTIGO: 0
IRREGULAR HEARTBEAT: 0
HEARTBURN: 0
LOSS OF BALANCE: 0
ORTHOPNEA: 0
DEPRESSION: 0
WEAKNESS: 0

## 2022-06-13 ASSESSMENT — PAIN SCALES - GENERAL: PAINLEVEL: 0-NO PAIN

## 2022-06-13 NOTE — PROGRESS NOTES
Subjective     Patient ID: Hayden Franklin is a 61 y.o. patient of Dr. Gomez, who presents to the heart failure clinic for a follow-up visit. He has a history of coronary artery disease and aortic stenosis status post CABG x1 (SVG-RCA) with concomitant  AVR (Justin Mitroflow bioprosthetic valve) and ascending aortic Dacron graft in 2013, ischemic cardiomyopathy, systolic heart failure, carotid artery disease, hyperlipidemia, tobacco use    I last saw Gopal in the clinic for a follow-up visit on 3/21/2022.  At that time he was stable from a heart failure standpoint.  We discussed a sleep study, but he wanted to hold on that for now.    Gopal was seen by pulmonology with PFTs on 4/6/2022.  His PFTs revealed severe airflow obstruction with postbronchodilator reversibility and air trapping consistent with asthma-COPD.  Gas exchange was normal.  He he was started on Dulera.     Gopal was then seen by Yeny Fisher CNP for a follow-up visit on 4/19/2022.  He was noted to be volume overloaded and using his oxygen during the day at the 2 days prior.  She discontinued furosemide and started torsemide 60 mg daily.  She ordered a BMP in 1-2 weeks and return to the clinic in 10 days.      Gopal was seen by pulmonology for follow-up on 5/4/2022 he reported significant dyspnea with exertion and chest discomfort with activity.  They continued him on his inhalers and advised he continue to wear oxygen 3 L with activity and sleep.    Gopal was seen by Yeny Fisher CNP on 5/9/2022.  He reported ongoing shortness of breath, especially in the morning.  He also reported decreased oxygen saturations.  She ordered metolazone 5 mg to be taken on 5/10/2022 and 5/12/2022.  She advised to take an extra dose of potassium on those 2 days.  He was advised to call the clinic on 5/13/2022 with an update.  Yeny ordered a BNP, BMP and chest x-ray.  She discussed case with Dr. Gomez who recommended echocardiogram.  Gopal had a limited  echocardiogram on 5/12/2022 revealing mild global left ventricular systolic dysfunction with ejection fraction 45%, grade 3 diastolic dysfunction, significant degeneration of his bioprosthetic aortic valve with severe regurgitation.  Dr. Gomez ordered a cardiac catheterization, which was completed on 5/20/2022.  This revealed 100% chronic total occlusion of the proximal RCA, patent SVG to RPDA which frees the RPL system, relatively patent left main, LAD, and left circumflex.  At least moderate aortic regurgitation was noted.  Medical management was advised.  Gopal was referred to CT surgery for evaluation of his aortic valve.    Gopal states that he has been feeling a lot better.  He reports taking his metolazone 5 mg daily as that is what the bottle says.  He reports taking his potassium 40 mEq twice daily 4 days a week and 60 mEq 3 days a week.  Gopal denies dyspnea, PND, and orthopnea.  He has not had to wear his oxygen during the day.  He does wear it most nights.  He has not noted any lower extremity edema.  Gopal reports an appetite that waxes and wanes.  He rarely gets bloated.  He does report vomiting 1-2 times every couple of weeks.  It typically happens in the morning, but can happen anytime.  He thinks it may be associated to coffee although he drinks coffee every morning and the vomiting does not occur.  Gopal denies chest discomfort, palpitations, and presyncopal or syncopal episodes.  He has been able to increase his daily activity.    Gopal denies adding additional salt to his food at the table.  He rarely cooks with salt.  He eats 1-2 meals per day.  Gopal reports drinking 2+ cups of coffee, 3-4 bottles of water, and an 8 ounce bottle of green tea each day.  HPI    Past Medical History:   Diagnosis Date   • Aortic valve stenosis     s/p AV replacement 11/2013   • Arthritis     Bilat hands, ankle   • Ascending aorta dilatation (CMS/HCC) (formerly [facility])     s/p AAA repair 11/2013   • Asthma    • CAD (coronary artery  disease)     1 vessel bypass   • Cancer (CMS/HCC) ([facility])     Skin   • CHF (congestive heart failure) (CMS/HCC) ([facility])    • COPD (chronic obstructive pulmonary disease) (CMS/[facility]) ([facility])    • Hyperlipidemia    • Ischemic cardiomyopathy    • Shortness of breath        Past Surgical History:   Procedure Laterality Date   • AAA REPAIR - ENDOGRAFT  2013   • ANKLE SURGERY     • AORTIC ROOT ANGIOGRAM N/A 2022    Procedure: Aortic Arch  Angiogram;  Surgeon: Derick Gallegos MD;  Location: [facility] Cath [facility];  Service: Cardiovascular;  Laterality: N/A;   • AORTIC VALVE REPLACEMENT  2013   • CORONARY ANGIOPLASTY  2013   • CORONARY ARTERY BYPASS GRAFT  2013    1V CABG SVG- RCA   • LEFT HEART CATH N/A 2022    Procedure: Left heart cath;  Surgeon: Derick Gallegos MD;  Location: [facility] Cath Lab;  Service: Cardiovascular;  Laterality: N/A;       Allergies   Allergen Reactions   • Ezetimibe Rash         Current Outpatient Medications:   •  potassium chloride (KLOR-CON M20) 20 mEq CR tablet, Take 60 mEq by mouth on Tuesday, Thursday, and Saturday. Take 40 mEq by mouth on Monday, Wednesday, Friday, and ., Disp: 204 tablet, Rfl: 0  •  atorvastatin (LIPITOR) 80 mg tablet, Take 80 mg by mouth daily, Disp: , Rfl:   •  metOLazone (ZAROXOLYN) 5 mg tablet, Take 1 tablet (5 mg total) by mouth daily, Disp: 30 tablet, Rfl: 0  •  torsemide (DEMADEX) 20 mg tablet, Take 3 tablets (60 mg total) by mouth daily, Disp: 270 tablet, Rfl: 0  •  evolocumab (Repatha SureClick) 140 mg/mL pen injector, Inject 140 mg under the skin every 14 (fourteen) days, Disp: 6 mL, Rfl: 3  •  albuterol HFA (Ventolin HFA) 90 mcg/actuation inhaler, Inhale 2 puffs every 4 (four) hours (Patient taking differently: Inhale 2 puffs every 4 (four) hours PRN Daily), Disp: 1 Inhaler, Rfl: 11  •  amoxicillin (AMOXIL) 500 mg tablet, SMARTSI Tablet(s) By Mouth, Disp: , Rfl:   •  budesonide-formoteroL (SYMBICORT) 160-4.5 mcg/actuation inhaler, Inhale 2 puffs 2  (two) times a day Rinse mouth with water after use. Do not swallow., Disp: 1 Inhaler, Rfl: 5  •  metoprolol succinate XL (TOPROL-XL) 50 mg 24 hr tablet, Take 1 tablet (50 mg total) by mouth daily, Disp: 90 tablet, Rfl: 3  •  lisinopriL (PRINIVIL,ZESTRIL) 5 mg tablet, Take 1 tablet (5 mg total) by mouth daily, Disp: 90 tablet, Rfl: 3  •  nitroglycerin (NITROSTAT) 0.4 mg SL tablet, Place 0.4 mg under the tongue every 5 (five) minutes as needed, Disp: , Rfl:   •  aspirin 81 mg EC tablet, Take 1 tablet (81 mg total) by mouth daily, Disp: 90 tablet, Rfl: 3  •  clotrimazole (MYCELEX) 10 mg esdras, Not currently using, Disp: , Rfl:   •  atorvastatin (LIPITOR) 80 mg tablet, Take 1 tablet (80 mg total) by mouth daily, Disp: 90 tablet, Rfl: 3    Family History   Problem Relation Age of Onset   • No Known Problems Mother    • Heart disease Father    • Aneurysm Father    • Parkinsonism Sister    • No Known Problems Sister    • No Known Problems Sister    • No Known Problems Daughter    • No Known Problems Daughter        Social History     Socioeconomic History   • Marital status:      Spouse name: Robina   • Number of children: 2   • Years of education: Not on file   • Highest education level: Not on file   Occupational History   • Occupation:  at [employer]   Tobacco Use   • Smoking status: Former Smoker     Packs/day: 0.75     Years: 30.00     Pack years: 22.50     Types: Cigarettes     Quit date: 2022     Years since quittin.2   • Smokeless tobacco: Never Used   • Tobacco comment: Last smoked 3/1/22   Vaping Use   • Vaping Use: Never used   Substance and Sexual Activity   • Alcohol use: Yes     Alcohol/week: 4.0 - 5.0 standard drinks     Types: 4 - 5 Cans of beer per week     Comment: 3-4 beers weekly or less   • Drug use: No   • Sexual activity: Defer   Other Topics Concern   • Not on file   Social History Narrative   • Not on file     Social Determinants of Health     Financial Resource Strain:  Not on file   Food Insecurity: Not on file   Transportation Needs: Not on file   Physical Activity: Not on file   Stress: Not on file   Social Connections: Not on file   Intimate Partner Violence: Not on file   Housing Stability: Not on file       Review of Systems   Constitutional: Positive for malaise/fatigue (improving). Negative for decreased appetite, fever, weight gain and weight loss.   HENT: Negative for congestion.    Eyes: Negative for visual disturbance.   Cardiovascular: Negative for chest pain, claudication, dyspnea on exertion, irregular heartbeat, leg swelling, near-syncope, orthopnea, palpitations, paroxysmal nocturnal dyspnea and syncope.   Respiratory: Positive for snoring. Negative for cough, shortness of breath, sleep disturbances due to breathing and sputum production.    Hematologic/Lymphatic: Does not bruise/bleed easily.   Skin: Negative for color change, dry skin and rash.   Musculoskeletal: Negative for falls, joint pain, muscle weakness and myalgias.   Gastrointestinal: Positive for bloating (rarely) and vomiting (1-2 times every couple weeks). Negative for abdominal pain, change in bowel habit, constipation, diarrhea, heartburn and nausea.   Genitourinary: Positive for nocturia.   Neurological: Negative for dizziness, light-headedness, loss of balance, vertigo and weakness.   Psychiatric/Behavioral: Negative for depression and memory loss. The patient does not have insomnia.        Objective     BP 94/52   Pulse 80   Wt 88.9 kg (195 lb 14.4 oz) Comment: Home wt 1 week ago about 200 lbs  SpO2 96% Comment: RA  BMI 26.57 kg/m²    His weight in the clinic on 5/9/2022 was 209 pounds.  His weight in the clinic on 4/19/2022 was 204 pounds.  His weight in the clinic on 3/21/2022 was 200.9 pounds.    Sodium   Date Value Ref Range Status   06/13/2022 133 (L) 135 - 145 mmol/L Final     Potassium   Date Value Ref Range Status   06/13/2022 3.5 3.5 - 5.1 MMOL/L Final     Chloride   Date Value Ref  Range Status   06/13/2022 95 (L) 98 - 107 mmol/L Final     CO2   Date Value Ref Range Status   06/13/2022 30 21 - 32 mmol/L Final     BUN   Date Value Ref Range Status   06/13/2022 34 (H) 7 - 25 mg/dL Final     Creatinine   Date Value Ref Range Status   06/13/2022 1.24 0.70 - 1.30 mg/dL Final     Glucose   Date Value Ref Range Status   06/13/2022 96 70 - 105 mg/dL Final     Calcium   Date Value Ref Range Status   06/13/2022 9.9 8.6 - 10.3 mg/dL Final       Physical Exam  Constitutional:       Appearance: He is well-developed.   HENT:      Head: Normocephalic.      Nose: Nose normal.   Eyes:      Conjunctiva/sclera: Conjunctivae normal.      Pupils: Pupils are equal, round, and reactive to light.   Neck:      Vascular: No JVD.   Cardiovascular:      Rate and Rhythm: Normal rate and regular rhythm.      Pulses: Intact distal pulses.      Heart sounds: Murmur heard.    Systolic murmur is present with a grade of 2/6.    No gallop.   Pulmonary:      Effort: Pulmonary effort is normal. No respiratory distress.      Breath sounds: No decreased breath sounds, wheezing, rhonchi or rales.   Chest:      Chest wall: No tenderness.   Abdominal:      General: Bowel sounds are normal. There is no distension.      Palpations: Abdomen is soft.      Tenderness: There is no abdominal tenderness.   Musculoskeletal:         General: No tenderness or deformity. Normal range of motion.      Cervical back: Normal range of motion.      Right lower leg: No edema.      Left lower leg: No edema.   Skin:     General: Skin is warm and dry.   Neurological:      Mental Status: He is alert and oriented to person, place, and time.   Psychiatric:         Behavior: Behavior normal.         Assessment/Plan     Hayden was seen today for congestive heart failure.    Diagnoses and all orders for this visit:    Chronic combined systolic and diastolic heart failure (CMS/HCC) (HCC)  Patient with chronic systolic and diastolic heart failure in the setting of  moderate bioprosthetic aortic valve degeneration and severe aortic regurgitation.  His ejection fraction on 5/12/2022 was 45%, which is down from 52% in February 2022.  He is on guideline directed medical therapy including Toprol and lisinopril.  He has mild exertional symptoms (NYHA Class II).  His volume status is stable on exam.  He has been taking metolazone 5 mg daily for the last month.  I will get a BMP following his appointment today.  I advised he not take his metolazone or torsemide today and we will see what his labs show.  Would opt to optimize his torsemide further and use metolazone on an as-needed basis.  He will be seeing CT surgery later this afternoon for evaluation for surgical repair of his aortic valve.  Advise daily weights and to contact the clinic with a 3 pound weight gain overnight or 5 pounds in a week.  Follow a low-sodium, heart healthy diet and 2 L fluid restriction.  Routine cardiac exercise.  Continue to participate in pulmonary rehabilitation.  I will arrange follow-up once a plan has been devised by CT surgery later today.  Advised to contact the clinic with increasing symptoms or concerns.  -     Basic metabolic panel Blood, Venous              A voice recognition program was used to aid in documentation of this record. Sometimes words are not printed exactly as they were spoken.  While efforts were made to carefully edit and correct any inaccuracies, some errors may be present; please take these into context.  Please contact the provider if errors are identified.

## 2022-06-14 ENCOUNTER — TELEPHONE (OUTPATIENT)
Dept: CARDIOLOGY | Facility: CLINIC | Age: 62
End: 2022-06-14
Payer: COMMERCIAL

## 2022-06-14 ENCOUNTER — TELEPHONE (OUTPATIENT)
Dept: CARDIOTHORACIC SURGERY | Facility: CLINIC | Age: 62
End: 2022-06-14
Payer: COMMERCIAL

## 2022-06-14 DIAGNOSIS — I35.1 NONRHEUMATIC AORTIC VALVE INSUFFICIENCY: Primary | ICD-10-CM

## 2022-06-14 DIAGNOSIS — I50.42 CHRONIC COMBINED SYSTOLIC AND DIASTOLIC HEART FAILURE (CMS/HCC): Primary | ICD-10-CM

## 2022-06-14 PROBLEM — I35.0 NONRHEUMATIC AORTIC (VALVE) STENOSIS: Status: RESOLVED | Noted: 2018-12-18 | Resolved: 2022-06-14

## 2022-06-14 RX ORDER — POTASSIUM CHLORIDE 20 MEQ/1
60 TABLET, EXTENDED RELEASE ORAL DAILY
COMMUNITY
End: 2022-06-14 | Stop reason: SDUPTHER

## 2022-06-14 RX ORDER — POTASSIUM CHLORIDE 20 MEQ/1
60 TABLET, EXTENDED RELEASE ORAL DAILY
Qty: 90 TABLET | Refills: 5 | Status: SHIPPED | OUTPATIENT
Start: 2022-06-14 | End: 2022-09-27 | Stop reason: SDUPTHER

## 2022-06-14 NOTE — TELEPHONE ENCOUNTER
Received voicemail from patient, requesting return phone call.  He states that he would like to talk about moving forward with the surgery that he discussed with Dr. Dillard.      Called and spoke with patient.  He states that after talking with his family, he has decided that he wants to have the surgery for his valve.    Patient is informed that Dr. Dillard wants patient to have another CT scan, and then the ultrasounds of his veins to view their sizes.  He agrees to the testing, and is informed that once we get the authorization from his insurance company, someone will call him to schedule them.    We talk about patient wanting to start the pulmonary rehab, and how he had been on schedule, and then not.  He is informed that we were asked to weigh in on the subject, this afternoon, and that might be why the change had been noticed.    Patient voices understanding and requests to continue to keep him as up to date as possible.  He is informed that we will.

## 2022-06-14 NOTE — TELEPHONE ENCOUNTER
Called pt with lab results and instructions to stop metolazone and take KCL 60 mEq daily.   Was going to schedule BMP 6/22 when here to start Pulmonary Rehab, but pt is not sure he is supposed to start that.   I sent a msg to CV surgery to check.     Kailey in CV surgery said it was ok for pt to do pulmonary rehab until surgery.  I called pt to schedule his 6/22 when here for pulmonary rehab, but the appt was gone.  I sent a msg to pulmonary rehab and she said pt had cancelled his pulmonary rehab in May but the 6/22 appt had accidentally gotten left on the schedule.  She said the next opening for pulmonary rehab is not until 7/18.  I sent a msg to cardiac rehab to see if pt would qualify to come before surgery.

## 2022-06-14 NOTE — TELEPHONE ENCOUNTER
----- Message from Kizzy Bruno CNP sent at 6/13/2022  3:38 PM MDT -----  Please let Gopal know his labs today are relatively stable. His sodium is low normal likely due to daily metolazone use. His renal function is elevated but stable.  Please have him hold the metolazone. Take potassium 60mEq daily. Please have him get a BMP in 1 week.  Also, please have him contact the clinic on Thursday or Friday with an update after holding the metolazone. Thank you.

## 2022-06-15 ENCOUNTER — TELEPHONE (OUTPATIENT)
Dept: CARDIOLOGY | Facility: CLINIC | Age: 62
End: 2022-06-15
Payer: COMMERCIAL

## 2022-06-15 NOTE — TELEPHONE ENCOUNTER
Pt is having CT on 6/23 so will come early to have his BMP due to us stopping his metolazone and changing his KCl dose.     Have not heard back from Cardiac Rehab so advised to do some low intensity walking on flat ground as tolerated.

## 2022-06-15 NOTE — LETTER
06/15/22      [facility]  [address] [address]  [phone]  Dept: [phone]    Mr. Franklin:    Please arrive at the main entrance of [facility] using the [address] entrance on the South side of the building on 6/23 at 7:00 for registration, the cat scan will begin at 7:20.    Nothing to eat or drink for 4 hrs prior to the test    You may take your morning medications with sips of water    The kidneys will excrete this IV contrast within  24 hours after the cat scan.     It is always important to maintain good hydration, and is especially important following IV contrast administration.     Please drink an extra 2-3 glasses of water a day starting 2 days before the test, the day of the test when it is completed, and for 2 days after the test to help your kidneys clear the IV contrast from your body.    You may bring two people with you into the facility for this appointment.  Please wear a mask when you enter the building.  If you do not have one we can provide one for you to wear.     If at any time prior to your test that you experience new symptoms of fever, cough, SOB please contact us for reevaluation.    6/24---2 ultrasounds:    Please arrive at the main entrance of [facility], [address] entrance on the South side of the building, at 8:15 for registration, the ultrasounds will begin at 8:30    Please wear a mask when you enter the facility. If you do not have a mask one will be provided for you.    If you have any questions or concerns, please don't hesitate to call Tracy at [phone].    KYLEIGH Alcantar  CardioThoracic/Vascular, Cardiology, EP       [facility] [place]   [address] [place] [address]   [id number]  p:  [phone]  fax: [phone]  [email or web address]

## 2022-06-15 NOTE — TELEPHONE ENCOUNTER
Spoke with pt to schedule CTA---please arrive at the main entrance of [facility] using the [address] entrance on the South side of the building on 6/23 at 7;00 for registration, the cat scan will begin at 7:20.    Nothing to eat or drink for 4 hrs prior to the test    You may take your morning medications with sips of water    The kidneys will excrete this IV contrast within  24 hours after the cat scan.     It is always important to maintain good hydration, and is especially important following IV contrast administration.     Please drink an extra 2-3 glasses of water a day starting 2 days before the test, the day of the test when it is completed, and for 2 days after the test to help your kidneys clear the IV contrast from your body.    You may bring two people with you into the facility for this appointment.  Please wear a mask when you enter the building.  If you do not have one we can provide one for you to wear.     If at any time prior to your test that you experience new symptoms of fever, cough, SOB please contact us for reevaluation.    6/24---2 ultrasounds:    Please arrive at the main entrance of [facility], [address] entrance on the South side of the building, on 6/24 at 8:15 for registration, the ultrasounds will begin at 8:30    Please wear a mask when you enter the facility. If you do not have a mask one will be provided for you.    I will also mail you a copy of the above instructions and appointment reminders    Pt stated understanding

## 2022-06-23 ENCOUNTER — TRANSFERRED RECORDS (OUTPATIENT)
Dept: HEALTH INFORMATION MANAGEMENT | Facility: CLINIC | Age: 62
End: 2022-06-23

## 2022-06-23 ENCOUNTER — TELEPHONE (OUTPATIENT)
Dept: CARDIOLOGY | Facility: CLINIC | Age: 62
End: 2022-06-23
Payer: COMMERCIAL

## 2022-06-23 ENCOUNTER — APPOINTMENT (OUTPATIENT)
Dept: LAB | Facility: HOSPITAL | Age: 62
End: 2022-06-23
Payer: COMMERCIAL

## 2022-06-23 ENCOUNTER — HOSPITAL ENCOUNTER (OUTPATIENT)
Dept: CT IMAGING | Facility: HOSPITAL | Age: 62
Discharge: HOME | End: 2022-06-23
Payer: COMMERCIAL

## 2022-06-23 DIAGNOSIS — I50.42 CHRONIC COMBINED SYSTOLIC AND DIASTOLIC HEART FAILURE (CMS/HCC): ICD-10-CM

## 2022-06-23 DIAGNOSIS — I35.1 NONRHEUMATIC AORTIC VALVE INSUFFICIENCY: ICD-10-CM

## 2022-06-23 LAB
ANION GAP SERPL CALC-SCNC: 7 MMOL/L (ref 3–11)
BUN SERPL-MCNC: 23 MG/DL (ref 7–25)
CALCIUM SERPL-MCNC: 9.4 MG/DL (ref 8.6–10.3)
CHLORIDE SERPL-SCNC: 101 MMOL/L (ref 98–107)
CO2 SERPL-SCNC: 31 MMOL/L (ref 21–32)
CREAT SERPL-MCNC: 1.16 MG/DL (ref 0.7–1.3)
CREATININE (EXTERNAL): 1.16 MG/DL (ref 0.7–1.3)
GFR ESTIMATED (EXTERNAL): 73 ML/MIN/1.73M2
GFR SERPL CREATININE-BSD FRML MDRD: 73 ML/MIN/1.73M*2
GLUCOSE (EXTERNAL): 102 MG/DL (ref 70–105)
GLUCOSE SERPL-MCNC: 102 MG/DL (ref 70–105)
POTASSIUM (EXTERNAL): 3.7 MMOL/L (ref 3.5–5.1)
POTASSIUM SERPL-SCNC: 3.7 MMOL/L (ref 3.5–5.1)
SODIUM SERPL-SCNC: 139 MMOL/L (ref 135–145)

## 2022-06-23 PROCEDURE — 80048 BASIC METABOLIC PNL TOTAL CA: CPT

## 2022-06-23 PROCEDURE — G1004 CDSM NDSC: HCPCS

## 2022-06-23 PROCEDURE — 36415 COLL VENOUS BLD VENIPUNCTURE: CPT

## 2022-06-23 PROCEDURE — 2550000100 HC RX 255: Performed by: THORACIC SURGERY (CARDIOTHORACIC VASCULAR SURGERY)

## 2022-06-23 RX ORDER — IOPAMIDOL 755 MG/ML
100 INJECTION, SOLUTION INTRAVASCULAR ONCE
Status: COMPLETED | OUTPATIENT
Start: 2022-06-23 | End: 2022-06-23

## 2022-06-23 RX ADMIN — IOPAMIDOL 100 ML: 755 INJECTION, SOLUTION INTRAVENOUS at 07:30

## 2022-06-23 NOTE — TELEPHONE ENCOUNTER
Called pt with lab results.  Asked how he felt off the metolazone.  He states he feels he might have some extra fluid but feels ok.  He is not weighing himself so asked him to weigh today and then again every morning and call if his weight is trending up, especially because he has a CT and was asked to drink some extra fluid.  He states understanding and agreement.

## 2022-06-23 NOTE — TELEPHONE ENCOUNTER
----- Message from Kizzy Bruno CNP sent at 6/23/2022  8:56 AM MDT -----  Please let Gopal know that his labs from this morning look good.  No changes at this time.  How is he feeling since stopping the metolazone?  Can optimize torsemide if he is feeling volume overloaded. Thanks.

## 2022-06-24 ENCOUNTER — HOSPITAL ENCOUNTER (OUTPATIENT)
Dept: ULTRASOUND IMAGING | Facility: HOSPITAL | Age: 62
Discharge: HOME | End: 2022-06-24
Payer: COMMERCIAL

## 2022-06-24 ENCOUNTER — TRANSFERRED RECORDS (OUTPATIENT)
Dept: HEALTH INFORMATION MANAGEMENT | Facility: CLINIC | Age: 62
End: 2022-06-24

## 2022-06-24 DIAGNOSIS — I35.1 NONRHEUMATIC AORTIC VALVE INSUFFICIENCY: ICD-10-CM

## 2022-06-24 PROCEDURE — 93922 UPR/L XTREMITY ART 2 LEVELS: CPT | Mod: LT

## 2022-06-24 PROCEDURE — 93970 EXTREMITY STUDY: CPT

## 2022-06-29 ENCOUNTER — TELEPHONE (OUTPATIENT)
Dept: CARDIOTHORACIC SURGERY | Facility: CLINIC | Age: 62
End: 2022-06-29
Payer: COMMERCIAL

## 2022-07-05 ENCOUNTER — OFFICE VISIT (OUTPATIENT)
Dept: PULMONOLOGY | Facility: CLINIC | Age: 62
End: 2022-07-05
Payer: COMMERCIAL

## 2022-07-05 VITALS
HEIGHT: 72 IN | WEIGHT: 200 LBS | DIASTOLIC BLOOD PRESSURE: 50 MMHG | BODY MASS INDEX: 27.09 KG/M2 | HEART RATE: 86 BPM | SYSTOLIC BLOOD PRESSURE: 100 MMHG | RESPIRATION RATE: 16 BRPM | OXYGEN SATURATION: 94 %

## 2022-07-05 DIAGNOSIS — J44.89 ASTHMA-COPD OVERLAP SYNDROME (CMS/HCC): Primary | ICD-10-CM

## 2022-07-05 PROCEDURE — 99214 OFFICE O/P EST MOD 30 MIN: CPT | Performed by: NURSE PRACTITIONER

## 2022-07-05 NOTE — PATIENT INSTRUCTIONS
PLAN:  1.  Continue Symbicort 2 puffs twice daily  2.  Albuterol 2 puffs as needed every 4-6 hours for shortness of breath, cough, chest tightness or wheezing.  3.  Continue to wear oxygen 3 L as needed with activity and sleep.     Follow-up in 3 months, unless needed sooner.

## 2022-07-05 NOTE — PROGRESS NOTES
PULMONARY NOTE      HPI:  Hayden Franklin is a 61 y.o. male patient who presents for pulmonary follow-up on COPD with asthma overlap.  Patient's most PFT demonstrates severe airflow obstruction with FEV1 37% predicted with significant postbronchodilator response.  He has approximately 30-pack-year history of smoking and quit February 2022 when he was hospitalized.  Patient was hospitalized from 2/25-2/27 with acute hypoxic respiratory failure, exacerbation of congestive heart failure and probable exacerbation of COPD.  Patient has History of coronary artery disease status post CABG.  He had a stress test during hospitalization which was negative for ischemia.  He has been on Lasix.  Patient has been requiring 3 L of oxygen with activity and sleep since his hospitalization.  Dyspnea and hypoxia has prevented him from being able to go back to work as he works as an .      Patient reports dyspnea has been gradually improving since his hospitalization.  He is making an effort to stay active and goes for short walks throughout the day estimating he walks a total of an hour a day.  He has been needing his oxygen less over time.  He reports on occasion he does still see a dip into the 80s with prolonged activity.  He wears his oxygen 3 L at night and only as needed during the day for SPO2 less than 88%.  Reports his oxygen well often fall off at night when he is woke up and checked his oxygen at night and seen at fall into the 80s when it has fallen off.  Patient reports he has productive cough in the mornings.  He denies hemoptysis.  He denies any chest tightness or wheezing.  He uses his albuterol 3-4 times a week he continues on Symbicort.    Patient has history of aortic valve replacement which is leaking.  He has been followed by cardiology and they are planning to repeat surgery.  Patient had CT angiogram completed with cardiology.  He has small bilateral pleural effusions somewhat improved compared  to previous imaging.  Lower lobe consolidations have also improved compared to CT in February.  He does have mild groundglass opacities in bilateral lung bases the 6 mm right upper lobe pulmonary nodule has resolved.  He does have a new left upper lobe nodule less than 6 mm part solid.      Review of Systems  Pertinent positives and negatives listed in the HPI.    The following portions of the patient's history were reviewed and updated as appropriate: allergies, current medications, past family history, past medical history, past social history, past surgical history and problem list.    History:  Social History     Tobacco Use   • Smoking status: Former Smoker     Packs/day: 0.75     Years: 30.00     Pack years: 22.50     Types: Cigarettes     Quit date: 2022     Years since quittin.3   • Smokeless tobacco: Never Used   • Tobacco comment: Last smoked 3/1/22   Vaping Use   • Vaping Use: Never used   Substance Use Topics   • Alcohol use: Yes     Alcohol/week: 4.0 - 5.0 standard drinks     Types: 4 - 5 Cans of beer per week     Comment: 3-4 beers weekly or less   • Drug use: No         Immunizations:  Immunization History   Administered Date(s) Administered   • Flu vaccine (PF) greater than or equal to 6 months IM 2018, 2019, 2022   • Influenza TIV (IM) 2020   • PFIZER-BIONTECH SARS-COV-2 PURPLE CAP VACCINATION (D1, D2, Immuno, Booster) 2021, 2021, 2022   • Pneumococcal Polysaccharide - PPSV23 2022   • Tdap 2018       Medications:    Current Outpatient Medications:   •  potassium chloride (KLOR-CON M20) 20 mEq CR tablet, Take 3 tablets (60 mEq total) by mouth daily In divided doses, Disp: 90 tablet, Rfl: 5  •  atorvastatin (LIPITOR) 80 mg tablet, Take 80 mg by mouth daily, Disp: , Rfl:   •  torsemide (DEMADEX) 20 mg tablet, Take 3 tablets (60 mg total) by mouth daily, Disp: 270 tablet, Rfl: 0  •  evolocumab (Repatha SureClick) 140 mg/mL pen injector,  Inject 140 mg under the skin every 14 (fourteen) days, Disp: 6 mL, Rfl: 3  •  albuterol HFA (Ventolin HFA) 90 mcg/actuation inhaler, Inhale 2 puffs every 4 (four) hours (Patient taking differently: Inhale 2 puffs every 4 (four) hours PRN Daily), Disp: 1 Inhaler, Rfl: 11  •  budesonide-formoteroL (SYMBICORT) 160-4.5 mcg/actuation inhaler, Inhale 2 puffs 2 (two) times a day Rinse mouth with water after use. Do not swallow., Disp: 1 Inhaler, Rfl: 5  •  metoprolol succinate XL (TOPROL-XL) 50 mg 24 hr tablet, Take 1 tablet (50 mg total) by mouth daily, Disp: 90 tablet, Rfl: 3  •  lisinopriL (PRINIVIL,ZESTRIL) 5 mg tablet, Take 1 tablet (5 mg total) by mouth daily, Disp: 90 tablet, Rfl: 3  •  nitroglycerin (NITROSTAT) 0.4 mg SL tablet, Place 0.4 mg under the tongue every 5 (five) minutes as needed, Disp: , Rfl:   •  aspirin 81 mg EC tablet, Take 1 tablet (81 mg total) by mouth daily, Disp: 90 tablet, Rfl: 3  •  clotrimazole (MYCELEX) 10 mg esdras, Not currently using, Disp: , Rfl:   •  amoxicillin (AMOXIL) 500 mg tablet, SMARTSI Tablet(s) By Mouth, Disp: , Rfl:   •  atorvastatin (LIPITOR) 80 mg tablet, Take 1 tablet (80 mg total) by mouth daily, Disp: 90 tablet, Rfl: 3    Allergies:  Allergies   Allergen Reactions   • Ezetimibe Rash       Objective:  Vitals:    22 0820   BP: 100/50   Pulse: 86   Resp: 16   SpO2: 94%   Weight: 90.7 kg (200 lb)   Height: 1.829 m (6')       Physical Exam  Constitutional:       General: He is not in acute distress.     Appearance: He is not ill-appearing.   Eyes:      Conjunctiva/sclera: Conjunctivae normal.   Cardiovascular:      Rate and Rhythm: Normal rate and regular rhythm.      Heart sounds: No murmur heard.    No friction rub. No gallop.   Pulmonary:      Effort: Pulmonary effort is normal. No respiratory distress.      Breath sounds: Normal breath sounds. No wheezing, rhonchi or rales.   Chest:      Chest wall: No tenderness.   Musculoskeletal:      Right lower leg: Edema  (Trace) present.      Left lower leg: Edema (Trace) present.   Skin:     General: Skin is warm and dry.   Neurological:      General: No focal deficit present.      Mental Status: He is oriented to person, place, and time.      Gait: Gait normal.         Lab Review:   Lab Results   Component Value Date    GLUCOSE 102 06/23/2022    CALCIUM 9.4 06/23/2022     06/23/2022    K 3.7 06/23/2022    CO2 31 06/23/2022     06/23/2022    BUN 23 06/23/2022    CREATININE 1.16 06/23/2022    ANIONGAP 7 06/23/2022     Lab Results   Component Value Date     (H) 03/08/2022     No results found for: IGE  Lab Results   Component Value Date    WBC 7.8 05/20/2022    HGB 16.1 05/20/2022    HCT 47.0 05/20/2022    MCV 89.9 05/20/2022     05/20/2022         Imaging Review:  Discussed in HPI      PFT:  4/6/2022:  FEV1 1.39 or 37% predicted, FVC 3.08 or 64% predicted, FEV1/FVC ratio 45%, TLC 6.2 or 87% predicted, RV 2.73 or 121% predicted, DLCO 27.64 or 78% predicted.  Impression: Spirometry is consistent with severe airflow obstruction with significant postbronchodilator response.  Lung volumes are normal and diffusion capacity is normal.    Ambulatory assessment of SPO2:  Patient's SPO2 was 96% on room air at rest.  Patient ambulated 360 feet at a quick pace and his SPO2 did not drop below 94%.    Discussion:  Reviewed chest imaging and most recent chest CT with patient clinic today.  We will plan on doing 6-month follow-up on left upper lobe part solid nodule of less than 6 mm.  This will be due end of December.  Patient courage to continue with routine physical activity.  We will have him continue to wear his oxygen with sleep and as needed with activity.  Continue on current inhaler regimen.    Assessment:  1.  COPD with overlap of asthma  2.  Hypoxic respiratory failure  3.  History of tobacco use    PLAN:  1.  Continue Symbicort 2 puffs twice daily  2.  Albuterol 2 puffs as needed every 4-6 hours for shortness  of breath, cough, chest tightness or wheezing.  3.  Continue to wear oxygen 3 L as needed with activity and sleep.     Follow-up in 3 months, unless needed sooner.        Pt voices understanding and agreement to plan as stated above. All questions answered.          A voice recognition program was used to aid in medical record documentation. Sometimes words are printed not exactly as they were spoken. While efforts were made to carefully edit and correct any inaccuracies, some errors may be present. Errors should be taken within the context of the discussion.  Please contact our office if you need assistance interpreting this medical record or notice any mistakes.

## 2022-07-06 ENCOUNTER — TELEPHONE (OUTPATIENT)
Dept: CARDIOTHORACIC SURGERY | Facility: CLINIC | Age: 62
End: 2022-07-06
Payer: COMMERCIAL

## 2022-07-06 NOTE — TELEPHONE ENCOUNTER
Received voicemail from patient, requesting return phone call.  He states that he has something that might pertain to his surgery with Dr. Dillard to talk about.    Called and spoke with patient.  He states that he and his wife have located the operation note from his previous heart surgery, and his wife was going to fax it to us.  He is given our direct fax number.      He states that he is excited for his upcoming appointment with Dr. Dillrad, as he is waiting to schedule his surgery, as he wants to move forward.  He is informed that would be something that there is a good possibility for getting a date, when he sees Dr. Dillard next.

## 2022-07-11 ENCOUNTER — SPECIALTY PHARMACY (OUTPATIENT)
Dept: PHARMACY | Facility: HOSPITAL | Age: 62
End: 2022-07-11
Payer: COMMERCIAL

## 2022-07-11 NOTE — PROGRESS NOTES
Patient is filling Repatha at [facility]. I am placing the [facility] on hold.

## 2022-07-11 NOTE — PROGRESS NOTES
Cardiothoracic & Vascular Surgery Follow-Up      HPI    Patient: Hayden Franklin is a 61 y.o. male here for surgical evaluation and recommendations by Dr. Dillard for severe aortic valve regurgitation. Patient follows with cardiologist Dr. Boone. Of significance is patient's history of an aortic valve replacement with a #27 Justin Mitroflow bovine pericardial valve, replacement of the ascending aorta and Eduardo arch with a 34 mm Dacron graft, CABG x1 with a saphenous vein graft to the right PDA via hypothermic circulatory arrest and antegrade cerebral perfusion by Dr. Rod on 11/5/2013.  It is unclear if the patient had a root replacement or not as the operative note could not be found in medical records, the above information was obtained from a discharge summary from that admission for surgery.  Patient has recently been following with cardiology for his worsening shortness of breath. He is on home oxygen for his COPD.  He denies orthopnea or PND, but does snore at night and has never been evaluated for sleep apnea.  Patient's most recent ultrasound echocardiogram shows    · Mild left ventricular dilation, LVIDD 6.0 cm.  · Mild eccentric left ventricular hypertrophy.  · Mild global left ventricular systolic dysfunction, biplane EF 45%.  · Grade 3 diastolic dysfunction, elevated left ventricular filling pressure.  · Severe left atrial enlargement, indexed left atrial volume 53 mL/m².  · Dilated right atrium.  · 27 mm Justin Mitroflow bioprosthetic aortic valve with evidence of significant degeneration.  · Leaflet excursion appears to be adequate with effective orifice area of 1.6 cm², peak velocity 2.7 m/s, mean gradient 17 mmHg.  · Evidence of severe aortic regurgitation.  · Multiple valvular jets are noted with AI pressure half-time of 205 ms.  · Evidence of holodiastolic flow reversal in proximal descending thoracic aorta.  · Trace mitral regurgitation.  · Mild tricuspid regurgitation.  · Mild  pulmonary hypertension, estimated RVSP 46 mmHg.     Interval worsening of left ventricular systolic function.    Interval worsening of aortic regurgitation.    Left ventricle now appears to be dilated.    Back in February, the patient had an abnormal walking pharmacologic nuclear stress test with a moderately sized proximal to distal inferior wall infarct and a mildly hypokinetic basal inferior wall motion abnormality was also present, these findings were similar to previous nuclear stress test back in 2013 and echo is also consistent with prior inferior wall infarct.    Cardiac catheterization and aortic angiogram completed last month reveals severe single-vessel CAD, proximal RCA is 100% , SVG to RPDA is patent and feeds the RPL system as well, left main LAD and left circumflex are relatively patent.    Patient's other past medical history is significant for chronic diastolic heart failure, COPD, hyperlipidemia, carotid stenosis, and a 22-pack-year history of smoking, quit in February 2022.  Patient drinks 4 to 5 cans of beer per week.    Pulmonary function testing completed in April shows spirometry and flow volume loops reveal a severe obstructive airway impairment, Gold class III, there is significant change with bronchodilators on this test, lung volumes are normal, and diffusing capacity is essentially normal.  See raw data below.    The most recent chest imagaing is a CTA chest PE with IV contrast was completed in February. Carotid duplex imaging completed today shows 16-49% stenosis to bilateral internal carotid arteries.    Patient's current symptoms include fatigue and SOB with exertion that improves with rest. He denies chest pain, lightheadedness or dizziness, syncope, pedal edema, orthopnea or PND.     PLAN:    The patient and Dr. Dillard had a long discussion with the patient regarding his options for his severe aortic valve regurgitation s/p Justin Mitroflow AVR in 2013.  A redo surgical aortic  valve replacement with a mechanical valve was discussed in great detail.  Patient was also given the option to be evaluated by the structural heart team for a transcatheter aortic valve-in-valve replacement.  Patient was educated on the fact that bioprosthetic aortic valves will wear out within his lifetime, approximately 10 to 12 years on average, and there are many uncertainties regarding the future of noninvasive valve replacements after undergoing a valve in valve TAVR procedure. Patient would like time to think about his decision regarding a redo surgical aortic valve replacement versus pursuing a valve in valve transcatheter aortic valve replacement.  Patient will contact our team within the next week to report his decision. Patient was quoted a 7 to 8% risk of mortality or severe morbidity with a redo surgical aortic valve replacement.  It was discussed in great detail that patient's severe obstructive airway impairment increases his risk for a prolonged time on the ventilator and for chronic oxygen use.     Patient is agreeable to a mechanical aortic valve and lifelong anticoagulation with Coumadin if he decides to pursue a redo surgical aortic valve replacement.    The surgical risks (including but not limited to, stroke, heart attack, death, infection, bleeding and injury to surrounding structures), benefits, alternative treatment options, lifetime of a replacement mechanical aortic valve , hospital course and recovery time were all discussed in length by Dr. Dillard.     Hold Lisinopril 2 days prior to aortic valve replacement surgery.     Dr. Dillard recommends that the patient obtain a CTA chest abdomen and pelvis as the most recent chest imaging is from February and Dr. Dillard would like to evaluate the rest of the patient's aorta.    7/18/22 Clinic Follow-Up with Dr. Dillard: CTA chest, abd pelvis has been completed. Patient is here to discuss a possible redo aortic valve replacement. The operative  note from  is now available for review.    Past Medical History:   Diagnosis Date   • Aortic valve stenosis     s/p AV replacement 2013   • Arthritis     Bilat hands, ankle   • Ascending aorta dilatation (CMS/[facility]) ([facility])     s/p AAA repair 2013   • Asthma    • CAD (coronary artery disease)     1 vessel bypass   • Cancer (CMS/[facility]) ([facility])     Skin   • CHF (congestive heart failure) (CMS/[facility]) ([facility])    • COPD (chronic obstructive pulmonary disease) (CMS/[facility]) ([facility])    • Hyperlipidemia    • Ischemic cardiomyopathy    • Shortness of breath      Past Surgical History:   Procedure Laterality Date   • AAA REPAIR - ENDOGRAFT  2013   • ANKLE SURGERY     • AORTIC ROOT ANGIOGRAM N/A 2022    Procedure: Aortic Arch  Angiogram;  Surgeon: Derick Gallegos MD;  Location: [facility];  Service: Cardiovascular;  Laterality: N/A;   • AORTIC VALVE REPLACEMENT  2013   • CORONARY ANGIOPLASTY  2013   • CORONARY ARTERY BYPASS GRAFT  2013    1V CABG SVG- RCA   • LEFT HEART CATH N/A 2022    Procedure: Left heart cath;  Surgeon: Derick Gallegos MD;  Location: [facility];  Service: Cardiovascular;  Laterality: N/A;       No current facility-administered medications for this visit.    Allergies as of 2022 - Reviewed 2022   Allergen Reaction Noted   • Ezetimibe Rash 2020     Implants     No active implants to display in this view.        Social History     Tobacco Use   • Smoking status: Former Smoker     Packs/day: 0.75     Years: 30.00     Pack years: 22.50     Types: Cigarettes     Quit date: 2022     Years since quittin.3   • Smokeless tobacco: Never Used   • Tobacco comment: Last smoked 3/1/22   Vaping Use   • Vaping Use: Never used   Substance Use Topics   • Alcohol use: Yes     Alcohol/week: 4.0 - 5.0 standard drinks     Types: 4 - 5 Cans of beer per week     Comment: 3-4 beers weekly or less   • Drug use: No     Family History   Problem Relation Age of Onset   • No Known  Problems Mother    • Heart disease Father    • Aneurysm Father    • Parkinsonism Sister    • No Known Problems Sister    • No Known Problems Sister    • No Known Problems Daughter    • No Known Problems Daughter      ROS    10 point review of systems has been completed and is grossly unremarkable except those which may have been mentioned in the history of present illness above    Objective  Vital signs in last 24 hours:  BP: ()/()   Arterial Line BP: ()/()      Wt Readings from Last 9 Encounters:   07/05/22 90.7 kg (200 lb)   06/13/22 90 kg (198 lb 8 oz)   06/13/22 88.9 kg (195 lb 14.4 oz)   05/20/22 88.2 kg (194 lb 6.4 oz)   05/12/22 94.8 kg (208 lb 15.9 oz)   05/09/22 94.8 kg (209 lb)   05/04/22 90.7 kg (200 lb)   04/20/22 90.6 kg (199 lb 11.8 oz)   04/19/22 92.8 kg (204 lb 8 oz)     PHYSICAL EXAM:  Constitutional: Oriented to person, place, and time. Appears well-developed and well-nourished. No distress. Alert, pleasant, calm    Head: Normocephalic and atraumatic  Cardiovascular: Normal rate, regular rhythm and a systolic murmur is present  Pulmonary/Chest: Effort normal and breath sounds normal  Musculoskeletal: Exhibits no edema  Neurological: Alert and oriented to person, place, and time  Skin: Skin is warm and dry. Not diaphoretic. Well healed median sternotomy scar.   Psychiatric: Normal mood and affect    Labs:    Results for [place]    Ref. Range 5/20/2022 13:48   Sodium Latest Ref Range: 135 - 145 mmol/L 136   Potassium Latest Ref Range: 3.5 - 5.1 MMOL/L 3.6   Chloride Latest Ref Range: 98 - 107 mmol/L 99   CO2 Latest Ref Range: 21 - 32 mmol/L 28   Anion Gap Latest Ref Range: 3 - 11 mmol/L 9   BUN Latest Ref Range: 7 - 25 mg/dL 33 (H)   Creatinine, Ser Latest Ref Range: 0.70 - 1.30 mg/dL 1.09   eGFR Latest Ref Range: >60 mL/min/1.73m*2 78   Glucose Latest Ref Range: 70 - 105 mg/dL 92   Calcium Latest Ref Range: 8.6 - 10.3 mg/dL 9.7   WBC Latest Ref Range: 3.7 - 9.6 10*3/uL 7.8   RBC Latest Ref  Range: 4.10 - 5.80 10*6/µL 5.23   Hemoglobin Latest Ref Range: 13.2 - 17.2 g/dL 16.1   Hematocrit Latest Ref Range: 38.0 - 50.0 % 47.0   MCV Latest Ref Range: 82.0 - 97.0 fL 89.9   MCH Latest Ref Range: 29.0 - 34.0 pg 30.8   MCHC Latest Ref Range: 32.0 - 36.0 g/dL 34.2   RDW Latest Ref Range: 11.5 - 15.0 % 13.5   Platelets Latest Ref Range: 130 - 350 10*3/uL 142   MPV Latest Ref Range: 6.9 - 10.8 fL 10.6     Radiology Results:        Cardiac catheterization, Aortic Angiogram    Result Date: 5/20/2022  Narrative: · Ost RCA to Dist RCA lesion is 100% stenosed .  DATE OF PROCEDURE: 5/20/2022  PREOPERATIVE DIAGNOSIS:  Pre-op Diagnosis    * Coronary artery disease involving native coronary artery of native heart without angina pectoris [I25.10]    * History of coronary artery bypass graft x 1 [Z95.1]    * Nonrheumatic aortic (valve) stenosis with insufficiency [I35.2]    * Hx of prosthetic aortic valve replacement [Z95.2] POSTOPERATIVE DIAGNOSIS:  Post-op Diagnosis    * Coronary artery disease involving native coronary artery of native heart without angina pectoris [I25.10]    * History of coronary artery bypass graft x 1 [Z95.1]    * Nonrheumatic aortic (valve) stenosis with insufficiency [I35.2]    * Hx of prosthetic aortic valve replacement [Z95.2] PROCEDURES PERFORMED:  Left heart cath Aortic Angiogram INDICATION: The patient is a 61 y.o.-year-old male who is presenting with worsening aortic valve disease and preoperative work-up for possible aortic valve replacement. ANESTHESIA: 29 minutes 4 seconds of moderate sedation was administered using Fentanyl and Versed under my supervision.  The patient's ASA class was 3.  I monitored the patient for further administration of agents as needed including continuous monitoring of oxygen saturation, heart rate and blood pressure.  The RN administered the medicine under my direct supervision order, and an independent trained observer was present. SEDATION MEDICATION/CONTRAST  USED:   05/20/2022 1741 fentaNYL citrate (PF) 50 mcg/mL injection 50 mcg   05/20/2022 1725 fentaNYL citrate (PF) 50 mcg/mL injection 25 mcg   05/20/2022 1719 fentaNYL citrate (PF) 50 mcg/mL injection 25 mcg   05/20/2022 1746 iopamidoL (ISOVUE-370) 76 % injection 105 mL Other Given   05/20/2022 1725 midazolam (VERSED) injection 1 mg intravenous Given   05/20/2022 1719 midazolam (VERSED) injection 1 mg intravenous Given RADIATION: Radiation (mGy): = 823.000; Fluoro Time (min) = 5.6 HB ACCESS: Operative procedure/Access: The right armwas prepped and draped in usual sterile manner. Using 1% Xylocaine for local anesthesia, a 5/6 slender sheath was inserted in the right radial artery.     Radial artery cocktail was given. Diagnostic coronary angiogram: A standard 035 exchange wire was advanced into the ascending aorta without issue. Selective engagement of the left main coronary artery was performed with a 5 Hong Konger JL 3.5 catheter.  Selective injections were performed.  Selective engagement of the right coronary artery was performed with a JR4 catheter.  Selective engagement of the SVG to RCA was performed with a 5 Hong Konger AL 1 diagnostic catheter the patient has a right dominant coronary system.     Left Main: Large vessel, patent.  ANTONIO-3 flow in the LAD and left circumflex.     Left Anterior Descending: Relatively large vessel, approximately 4 to 4.5 mm in diam in the proximal portion.  LAD has diffuse luminal irregularities up to 20%.  Diagonal branches and septal perforators are patent.    Left Circumflex: Nondominant vessel, relatively large in size, proximally 3.5 to 4 mm in diameter in the proximal portion.  Left circumflex system has minimal luminal irregularities up to 20%.  OM branches are patent.    Right Coronary Artery:  Proximal RCA is 100% .   appears to extend all the way to the distal RCA.  SVG to RPDA is patent and fills the RPL as well.  Diagnostic aortography was performed with an angled pigtail.   There was at least moderate aortic regurgitation.  Aortic size was normal in the ascending, arch, and ascending aorta. At the conclusion of the procedure, all catheters and wires were removed in usual fashion. The patient was transferred out of the cath lab in stable condition. CONCLUSION: Severe single-vessel CAD.  Proximal RCA is 100% . SVG to RPDA is patent and feeds the RPL system as well. Left main, LAD, and left circumflex are relatively patent. At least moderate aortic regurgitation noted. PLAN: Return to [facility].  Patient may be discharged home tonight if postprocedural course is unremarkable. Recommend patient follow-up with Dr. Gomez in 1 month or so.    [facility]age    Result Date: 5/12/2022  Narrative: · Mild left ventricular dilation, LVIDD 6.0 cm. · Mild eccentric left ventricular hypertrophy. · Mild global left ventricular systolic dysfunction, biplane EF 45%. · Grade 3 diastolic dysfunction, elevated left ventricular filling pressure. · Severe left atrial enlargement, indexed left atrial volume 53 mL/m². · Dilated right atrium. · 27 mm Justin Mitroflow bioprosthetic aortic valve with evidence of significant degeneration. · Leaflet excursion appears to be adequate with effective orifice area of 1.6 cm², peak velocity 2.7 m/s, mean gradient 17 mmHg. · Evidence of severe aortic regurgitation. · Multiple valvular jets are noted with AI pressure half-time of 205 ms. · Evidence of holodiastolic flow reversal in proximal descending thoracic aorta. · Trace mitral regurgitation. · Mild tricuspid regurgitation. · Mild pulmonary hypertension, estimated RVSP 46 mmHg. Interval worsening of left ventricular systolic function.  Interval worsening of aortic regurgitation.  Left ventricle now appears to be dilated. Management per Dr Gomez     Assessment    Active Ambulatory Problems     Diagnosis Date Noted   • Coronary atherosclerosis of native coronary artery 07/16/2015   • Mixed hyperlipidemia 07/16/2015   •  Nicotine dependence, unspecified, uncomplicated 08/03/2017   • S/P AVR 12/18/2018   • History of coronary artery bypass graft x 1 12/18/2018   • COPD exacerbation (CMS/[facility]) ([facility]) 02/25/2022   • Lung nodule 03/07/2022   • Ischemic cardiomyopathy    • Chronic combined systolic and diastolic heart failure (CMS/[facility]) ([facility]) 03/21/2022   • Asthma-COPD overlap syndrome (CMS/[facility]) ([facility]) 04/06/2022   • Nonrheumatic aortic valve insufficiency 06/14/2022     Resolved Ambulatory Problems     Diagnosis Date Noted   • Nonrheumatic aortic (valve) stenosis 12/18/2018   • History of abdominal aortic aneurysm repair 03/06/2022     Past Medical History:   Diagnosis Date   • Aortic valve stenosis    • Arthritis    • Ascending aorta dilatation (CMS/[facility]) ([facility])    • Asthma    • CAD (coronary artery disease)    • Cancer (CMS/[facility]) ([facility])    • CHF (congestive heart failure) (CMS/[facility]) ([facility])    • COPD (chronic obstructive pulmonary disease) (CMS/[facility]) ([facility])    • Hyperlipidemia    • Shortness of breath       Plan    Patient seen, examined and plan of care formulated in conjunction with Dr. Dillard. Please see his attestation to this note for further recommendations.     The patient and Dr. Dillard had a long discussion with the patient regarding his options for his severe aortic valve regurgitation s/p Justin Mitroflow AVR in 2013.  A redo surgical aortic valve replacement with a mechanical valve was discussed in great detail.  Patient was also given the option to be evaluated by the structural heart team for a transcatheter aortic valve-in-valve replacement.  Patient was educated on the fact that bioprosthetic aortic valves will wear out within his lifetime, approximately 10 to 12 years on average, and there are many uncertainties regarding the future of noninvasive valve replacements after undergoing a valve in valve TAVR procedure.  Patient would like time to think about his decision regarding a redo surgical aortic valve replacement versus  pursuing a valve in valve transcatheter aortic valve replacement.  Patient will contact our team within the next week to report his decision.  Patient was quoted a 7 to 8% risk of mortality or severe morbidity with a redo surgical aortic valve replacement.  It was discussed in great detail that patient's severe obstructive airway impairment increases his risk for a prolonged time on the ventilator and for chronic oxygen use.     Patient is agreeable to a mechanical aortic valve and lifelong anticoagulation with Coumadin if he decides to pursue a redo surgical aortic valve replacement.    The surgical risks (including but not limited to, stroke, heart attack, death, infection, bleeding and injury to surrounding structures), benefits, alternative treatment options, lifetime of a replacement mechanical aortic valve , hospital course and recovery time were all discussed in length by Dr. Dillard.     Hold Lisinopril 2 days prior to aortic valve replacement surgery.     Patient agrees to the above plan of care and has no further questions or concerns at this time.

## 2022-07-18 ENCOUNTER — TRANSFERRED RECORDS (OUTPATIENT)
Dept: HEALTH INFORMATION MANAGEMENT | Facility: CLINIC | Age: 62
End: 2022-07-18

## 2022-07-18 ENCOUNTER — OFFICE VISIT (OUTPATIENT)
Dept: CARDIOTHORACIC SURGERY | Facility: CLINIC | Age: 62
End: 2022-07-18
Payer: COMMERCIAL

## 2022-07-18 VITALS
OXYGEN SATURATION: 93 % | WEIGHT: 200 LBS | SYSTOLIC BLOOD PRESSURE: 118 MMHG | HEIGHT: 72 IN | HEART RATE: 84 BPM | DIASTOLIC BLOOD PRESSURE: 52 MMHG | RESPIRATION RATE: 20 BRPM | BODY MASS INDEX: 27.09 KG/M2

## 2022-07-18 DIAGNOSIS — I35.0 NONRHEUMATIC AORTIC VALVE STENOSIS: Primary | ICD-10-CM

## 2022-07-18 PROCEDURE — 99214 OFFICE O/P EST MOD 30 MIN: CPT | Performed by: NURSE PRACTITIONER

## 2022-07-18 ASSESSMENT — PAIN SCALES - GENERAL: PAINLEVEL: 0-NO PAIN

## 2022-07-19 ENCOUNTER — TELEPHONE (OUTPATIENT)
Dept: CARDIOTHORACIC SURGERY | Facility: CLINIC | Age: 62
End: 2022-07-19
Payer: COMMERCIAL

## 2022-07-19 ENCOUNTER — MEDICAL CORRESPONDENCE (OUTPATIENT)
Dept: HEALTH INFORMATION MANAGEMENT | Facility: CLINIC | Age: 62
End: 2022-07-19

## 2022-07-19 ENCOUNTER — TELEPHONE (OUTPATIENT)
Dept: CARDIOLOGY | Facility: CLINIC | Age: 62
End: 2022-07-19
Payer: COMMERCIAL

## 2022-07-19 DIAGNOSIS — I35.1 NONRHEUMATIC AORTIC VALVE INSUFFICIENCY: Primary | ICD-10-CM

## 2022-07-19 DIAGNOSIS — Z95.2 S/P AVR: ICD-10-CM

## 2022-07-19 DIAGNOSIS — I50.42 CHRONIC COMBINED SYSTOLIC AND DIASTOLIC HEART FAILURE (CMS/HCC): Primary | ICD-10-CM

## 2022-07-19 NOTE — TELEPHONE ENCOUNTER
Patient called into clinic today and left message regarding refill of torsemide.  Patient states his bottle says no refills and he is inquiring if he needs to refill this medication.  Will inquire if Yeny Fisher CNP would like patient to continue torsemide or if patient needs to have labs done.

## 2022-07-19 NOTE — TELEPHONE ENCOUNTER
Received call from patient today.  He states that he has decided that he would like to move forward with having a referral to Dr. Liz at the [facility] for his valve surgery.  Patient and I talk about his decision along with his conversation with Dr. Dillard last night, and patient is informed that I will work on the referral and get it sent to them.  He voices understanding.      Dr. Dillard notified. Referral is made.

## 2022-07-19 NOTE — TELEPHONE ENCOUNTER
Yeny Fisher CNP  You 41 minutes ago (11:48 AM)     BMP ask weights. May need to change dose.     Yeny Fisher CNP       Patient states weight is fluctuating between 193-200 consistently but is not having rapid change in weights. Lab orders placed and patient will have labs drawn tomorrow morning.

## 2022-07-20 ENCOUNTER — APPOINTMENT (OUTPATIENT)
Dept: LAB | Facility: CLINIC | Age: 62
End: 2022-07-20
Payer: COMMERCIAL

## 2022-07-20 ENCOUNTER — TRANSFERRED RECORDS (OUTPATIENT)
Dept: HEALTH INFORMATION MANAGEMENT | Facility: CLINIC | Age: 62
End: 2022-07-20

## 2022-07-20 DIAGNOSIS — I50.42 CHRONIC COMBINED SYSTOLIC AND DIASTOLIC HEART FAILURE (CMS/HCC): ICD-10-CM

## 2022-07-20 DIAGNOSIS — I50.811 ACUTE RIGHT-SIDED CONGESTIVE HEART FAILURE (CMS/HCC): ICD-10-CM

## 2022-07-20 LAB
ANION GAP SERPL CALC-SCNC: 9 MMOL/L (ref 3–11)
BUN SERPL-MCNC: 20 MG/DL (ref 7–25)
CALCIUM SERPL-MCNC: 9.7 MG/DL (ref 8.6–10.3)
CHLORIDE SERPL-SCNC: 102 MMOL/L (ref 98–107)
CO2 SERPL-SCNC: 27 MMOL/L (ref 21–32)
CREAT SERPL-MCNC: 1.18 MG/DL (ref 0.7–1.3)
CREATININE (EXTERNAL): 1.18 MG/DL (ref 0.7–1.3)
GFR ESTIMATED (EXTERNAL): 71 ML/MIN/1.73M*2
GFR SERPL CREATININE-BSD FRML MDRD: 71 ML/MIN/1.73M*2
GLUCOSE (EXTERNAL): 108 MG/DL (ref 70–105)
GLUCOSE SERPL-MCNC: 108 MG/DL (ref 70–105)
POTASSIUM (EXTERNAL): 3.8 MMOL/L (ref 3.5–5.1)
POTASSIUM SERPL-SCNC: 3.8 MMOL/L (ref 3.5–5.1)
SODIUM SERPL-SCNC: 138 MMOL/L (ref 135–145)

## 2022-07-20 PROCEDURE — 80048 BASIC METABOLIC PNL TOTAL CA: CPT | Performed by: NURSE PRACTITIONER

## 2022-07-20 PROCEDURE — 36415 COLL VENOUS BLD VENIPUNCTURE: CPT | Performed by: NURSE PRACTITIONER

## 2022-07-20 RX ORDER — TORSEMIDE 20 MG/1
60 TABLET ORAL DAILY
Qty: 270 TABLET | Refills: 0 | Status: SHIPPED | OUTPATIENT
Start: 2022-07-20 | End: 2022-11-30 | Stop reason: SDUPTHER

## 2022-07-22 ENCOUNTER — TRANSCRIBE ORDERS (OUTPATIENT)
Dept: OTHER | Age: 62
End: 2022-07-22

## 2022-07-22 DIAGNOSIS — Z95.2 S/P AVR: ICD-10-CM

## 2022-07-22 DIAGNOSIS — I35.1 NONRHEUMATIC AORTIC VALVE INSUFFICIENCY: Primary | ICD-10-CM

## 2022-07-28 ENCOUNTER — TELEPHONE (OUTPATIENT)
Dept: CARDIOTHORACIC SURGERY | Facility: CLINIC | Age: 62
End: 2022-07-28
Payer: COMMERCIAL

## 2022-07-28 NOTE — TELEPHONE ENCOUNTER
Received voicemail from patient, requesting a return phone call to find out if the disability paperwork has been completed.      Patient is called and informed that the paperwork has been completed, except 1 page needs Dr. Dillard's signature.  Patient is informed that it will be signed tomorrow, and be available for  later tomorrow afternoon.  He voices understanding.    He talks to me, stating that he heard from Dr. Liz's office today.  They are looking at having a consultation with him and surgery around 8/16/22.  He will find out more in the next few days.  Patient and I talk about what to expect while there, and how the situation will be for his spouse.  No further questions at this time.

## 2022-07-29 ENCOUNTER — PREP FOR PROCEDURE (OUTPATIENT)
Dept: CARDIOLOGY | Facility: CLINIC | Age: 62
End: 2022-07-29

## 2022-07-29 ENCOUNTER — TELEPHONE (OUTPATIENT)
Dept: CARDIOTHORACIC SURGERY | Facility: CLINIC | Age: 62
End: 2022-07-29
Payer: COMMERCIAL

## 2022-07-29 ENCOUNTER — TELEPHONE (OUTPATIENT)
Dept: CARDIOLOGY | Facility: CLINIC | Age: 62
End: 2022-07-29

## 2022-07-29 DIAGNOSIS — I35.0 AORTIC STENOSIS: Primary | ICD-10-CM

## 2022-07-29 DIAGNOSIS — I35.9 AORTIC VALVE DISORDER: Primary | ICD-10-CM

## 2022-07-29 NOTE — TELEPHONE ENCOUNTER
Received referral from Dr. JUVENCIO Lopez for aortic valve stenosis s/p aortic valve replacement with a #27 Natan Mitroflow bovine pericardial valve, replacement of the ascending aorta and Erwin arch with a 34 mm Dacron graft, CABG x1 with a saphenous vein graft to the right PDA on 11/5/2013.    Spoke to patient who would prefer to have surgery and meet Dr. Hogue in clinic in the same week, so he only has to make one trip from [place]. He lives about 9 hours away from [facility]. He will have his wife helping him coordinate preop testing and traveling. He requests a list of local lodging options be sent to his email.     Coordinating with surgery scheduling to see when his surgery and preop testing could be done. Will call patient with dates. CV RN contact info provided.

## 2022-07-29 NOTE — TELEPHONE ENCOUNTER
I called Gopal to let him know that the paperwork if filled out and signed so he can pick it up at the 3rd floor reception desk.  Gopal voiced his understanding.

## 2022-08-01 ENCOUNTER — TELEPHONE (OUTPATIENT)
Dept: CARDIOLOGY | Facility: CLINIC | Age: 62
End: 2022-08-01

## 2022-08-01 ENCOUNTER — PREP FOR PROCEDURE (OUTPATIENT)
Dept: CARDIOLOGY | Facility: CLINIC | Age: 62
End: 2022-08-01

## 2022-08-01 DIAGNOSIS — I35.9 AORTIC VALVE DISORDER: Primary | ICD-10-CM

## 2022-08-01 DIAGNOSIS — T82.897A AORTIC PROSTHETIC VALVE REGURGITATION: ICD-10-CM

## 2022-08-01 NOTE — LETTER
[facility]    CARDIOTHORACIC SURGERY PRE-OP INSTRUCTIONS  Your Heart Surgery is scheduled with Zeke Garcia MD  On 08/26/22 at 0730 am.  Please arrive at 0500 am.    Report to the information desk in the front lobby of the hospital at [address], [place] [address]. When you walk in the main entrance of the hospital it is directly in front of you. Ask for an escort or the  can help direct you. You will then be escorted or directed to  on the third floor for your surgery preparation.     Effective 2/28/22 [facility] is implementing the following visitor policy:     1 person may accompany the patient through the Pre-Op process.      That same person may wait in the Surgery Waiting room, provided there is enough room to social distance       Inpatients are allowed 2 visitors per day for the duration of their stay.      Visitors must wear a mask.      Visitors must not be ill.      Visiting hours are 8 am to 8 pm.  [employer] parking is available for anyone with mobility limitations or disabilities.  ([employer]  24 hours/ 7 days a week; [place] [employer]  7 am- 3:30 pm, Mon- Fri)    COVID 19 TESTING    If you re staying overnight in the hospital: 4 days before your procedure, please get a PCR test from a lab, if the lab is requesting an order they can call . Tests that are 4 days or more prior to surgical date will not be accepted.  Your test is scheduled on 8/22/22.   If your test is positive, please contact [facility] at  to reschedule surgery.    After your test and before your procedure, please follow these safety guidelines:    Limit trips outside your home.    Limit the number of people you see.    Always wear a face covering outside your home.    Use social distancing. Stay 6 feet away from others    whenever you can.    Wash your hands often.After the COVID test please protect yourself by following the CDC recommendations for disease  prevention.  Wash hands regularly with soap and water and use hand    if soap and water is not available, wear a face mask, separate yourself from others by 6 feet and keep your hands away from your face.      Call your surgery coordinator Julio George RN or the afterhours number ([phone]) if you develop any of the following symptoms; fever, cough, shortness of breath, sore throat, runny or stuffy nose, muscle or body aches, headaches, fatigue, vomiting or diarrhea.      INSTRUCTIONS PRIOR TO SURGERY    Please review this letter and the enclosed booklet and other information in this surgery folder carefully and call Julio George RN at [phone] with any questions or concerns.      MEDICATIONS    ASPIRIN is okay to take up to the day before your surgery but NOT the day of your surgery. Take your other medications as you normally would until the day of surgery unless instructed otherwise. If you do not take aspirin do not start aspirin unless instructed otherwise.      Take your last dose of Aspirin on  08/25/22    STOP ALL ANTIINFLAMMATORY MEDICATIONS: (Ibuprofen, Aleve, Advil, Celebrex, Votaren, Ketoprofen, and Naproxen) 10 days prior to your surgery  STOP ALL SUPPLEMENTS 10 days prior to your surgery. This includes stopping Co-Q 10, vitamin E and all fish oil supplements.   Please check the labels on any OTC eye vitamins you may be taking.  They often contain vitamin E and should be stopped 10 days prior to surgery.     MEDICATIONS THE MORNING OF SURGERY    Take your Metoprolol the morning of surgery with a drink of clear liquid. Please tell your anesthesiologist what medication you took and at what time.     DENTAL CLEARANCE:  If you are undergoing HEART VALVE SURGERY, and have not seen a dentist on a regular basis, (every 6 months) we will need a brief note from your dentist stating that your teeth have been examined and show no sign of infection or abscess.  Enclosed with this letter is a form  your dentist can complete and fax back to the office.  Please fax to [phone].     HISTORY AND PHYSICAL:  LABS and IMAGING  All tests and procedures must be within 30 days of your surgery date.     You do NOT need to fast for the blood work.      You have a pre-op clinic visit beginning on 08/22/22 at 0800 AM in the [facility], [address] [place] [address]. A  or Coordinator will assist you. The Pre Assessment/Anesthesia Clinic is on the fifth floor.  The laboratory and radiology is on the first floor.      Your schedule is as follows:  0800 AM Preanesthesia consult visit  0930 AM Lab blood draw  0945 AM Chest X-ray  1010 AM EKG  1045 AM Covid Test  0230 PM Clinic visit with Dr. Garcia  DO NOT EAT ANY SOLID FOODS AFTER MIDNIGHT or the morning of your surgery. NO MILK, MILK PRODUCTS, SMOOTHIES 8 HOURS PRIOR TO SURGERY.      DO NOT EAT ANYTHING, however you may have any clear liquids; water, black coffee (sugar okay), clear tea, sprite, ginger ale, apple juice, Gatorade or any clear liquid up to two hours before your surgery time. (No stephanie tea) No milk, or milk products, no smoothies or juice with pulp.     No alcohol for 24 hours prior to surgery.     BELONGINGS  Do not bring personal belongings, jewelry, money, valuables, toiletries or medications to the hospital the morning of your surgery. You may pack a bag and give it to a family member or friend to bring the following day or when needed.   Please remove all jewelry, including body piercings. Cautery instruments are used during surgery that may pose a risk of burns if a body piercing is left in during surgery.     Do bring a photo ID and insurance card.  A copy of your health care directives, if you have one.  Glasses and hearing aids (bring cases).  You cannot wear contact lenses during the surgery.  Inhaler(s) and eye drops if you use them, tell the staff about them when you arrive. Bring your CPAP machine or  breathing device, if you use them.  If you have a pacemaker or ICD (cardiac defibrillator) bring the ID card.  If you have an implanted stimulator, bring the remote control.  If you are a legal guardian bring a copy of the certified (court-stamped) guardianship papers.      Call Awa our  with questions regarding your test and clinic visit dates/times. She can be reached by phone at [phone] between 8 AM and 4:30PM Monday through Friday. Our answering service will  calls outside of those business hours.     If you have questions about your medications, test results or have a change in your health status call Julio George RN at [phone] during regular business hours.      On weekends or after 4:30 please call [phone] and ask the  to page the Cardiothoracic Fellow, Nurse Practitioner, Physician Assistant or Staff on call that weekend.     PLEASE NOTE, there are times when elective surgery (like yours) needs to be rescheduled due to an unplanned emergency or heart transplant. If this should happen, your surgery will be rescheduled as quickly as possible. Our surgery team appreciates your understanding during these instances.       Please feel free to call me or our office with any questions.     Thank you,    Julio George RN  Cardiothoracic Surgery Coordinator  [phone]  Direct Phone

## 2022-08-01 NOTE — LETTER
CARDIOTHORACIC SURGERY DEPARTMENT    You are scheduled to undergo open heart surgery.  Please have your dentist sign form below have it faxed or e-mailed to the Cardiothoracic Surgery office prior to your surgery date.    PATIENT:  Maximino Birch    :   1960    DATE OF DENTAL VISIT: ________________________                                           ______________________________   was seen by me today for dental clearance prior to surgery.     ______  No additional treatment is needed prior to surgery.  ______  Further treatment is needed and will be completed prior to surgery date.  ______  Surgery date may need to be rescheduled due to this condition     _________________________________________________________________________    Dr. _________________________________________   Phone _______________________    Clinic name ________________________________________________________________    Address ___________________________________________________________________    Thank you,    YINKA Lyons  Cardiothoracic Surgery  [phone]    PRIMARY FAX NUMBER: [phone]   Cardiothoracic Surgery Division, [facility]: ATTN: Julio    E-mail: Cardiothoracic Surgery Division, [facility]: [email or web address]

## 2022-08-01 NOTE — TELEPHONE ENCOUNTER
Per task, pt needs to schedule surgery with Dr. Hogue. Talked with pt and due to Dr. Hogue's schedule, pt would like to see a different surgeon. Talked with nurse/Lucinda and she states that it's up to the pt. Schedule pt with Dr. Garcia. preop is scheduled for 8/22 and surgery is scheduled for 8/26. Will call if anything changes

## 2022-08-02 ENCOUNTER — TELEPHONE (OUTPATIENT)
Dept: CARDIOLOGY | Facility: CLINIC | Age: 62
End: 2022-08-02

## 2022-08-02 VITALS — HEIGHT: 72 IN | WEIGHT: 200 LBS | BODY MASS INDEX: 27.09 KG/M2

## 2022-08-02 PROBLEM — I25.5 ISCHEMIC CARDIOMYOPATHY: Status: ACTIVE | Noted: 2022-08-02

## 2022-08-02 PROBLEM — Z95.2 S/P AVR: Status: ACTIVE | Noted: 2018-12-18

## 2022-08-02 PROBLEM — I35.1 NONRHEUMATIC AORTIC VALVE INSUFFICIENCY: Status: ACTIVE | Noted: 2022-06-14

## 2022-08-02 PROBLEM — I50.42 CHRONIC COMBINED SYSTOLIC AND DIASTOLIC HEART FAILURE (H): Status: ACTIVE | Noted: 2022-03-21

## 2022-08-02 PROBLEM — R91.1 LUNG NODULE: Status: ACTIVE | Noted: 2022-03-07

## 2022-08-02 PROBLEM — J44.1 COPD EXACERBATION (H): Status: ACTIVE | Noted: 2022-02-25

## 2022-08-02 RX ORDER — ASPIRIN 81 MG/1
162 TABLET, CHEWABLE ORAL
Status: CANCELLED | OUTPATIENT
Start: 2022-08-02

## 2022-08-02 RX ORDER — BUDESONIDE AND FORMOTEROL FUMARATE DIHYDRATE 160; 4.5 UG/1; UG/1
2 AEROSOL RESPIRATORY (INHALATION) 2 TIMES DAILY
COMMUNITY
Start: 2022-04-06 | End: 2022-10-04

## 2022-08-02 RX ORDER — NOREPINEPHRINE BITARTRATE 0.06 MG/ML
.01-.1 INJECTION, SOLUTION INTRAVENOUS CONTINUOUS
Status: CANCELLED | OUTPATIENT
Start: 2022-09-01

## 2022-08-02 RX ORDER — CHLORHEXIDINE GLUCONATE ORAL RINSE 1.2 MG/ML
10 SOLUTION DENTAL ONCE
Status: CANCELLED | OUTPATIENT
Start: 2022-08-02 | End: 2022-08-02

## 2022-08-02 RX ORDER — GABAPENTIN 300 MG/1
300 CAPSULE ORAL
Status: CANCELLED | OUTPATIENT
Start: 2022-08-02

## 2022-08-02 RX ORDER — CEFAZOLIN SODIUM 2 G/50ML
2 SOLUTION INTRAVENOUS
Status: CANCELLED | OUTPATIENT
Start: 2022-08-02

## 2022-08-02 RX ORDER — LISINOPRIL 5 MG/1
5 TABLET ORAL EVERY MORNING
COMMUNITY
Start: 2021-07-14

## 2022-08-02 RX ORDER — ATORVASTATIN CALCIUM 80 MG/1
80 TABLET, FILM COATED ORAL EVERY EVENING
COMMUNITY
Start: 2021-04-09

## 2022-08-02 RX ORDER — DEXMEDETOMIDINE HYDROCHLORIDE 4 UG/ML
0.2-1.2 INJECTION, SOLUTION INTRAVENOUS CONTINUOUS
Status: CANCELLED | OUTPATIENT
Start: 2022-09-01

## 2022-08-02 RX ORDER — FAMOTIDINE 20 MG/1
20 TABLET, FILM COATED ORAL
Status: CANCELLED | OUTPATIENT
Start: 2022-08-02

## 2022-08-02 RX ORDER — TORSEMIDE 20 MG/1
60 TABLET ORAL EVERY MORNING
Status: ON HOLD | COMMUNITY
Start: 2022-07-20 | End: 2022-09-16

## 2022-08-02 RX ORDER — CEFAZOLIN SODIUM 2 G/50ML
2 SOLUTION INTRAVENOUS SEE ADMIN INSTRUCTIONS
Status: CANCELLED | OUTPATIENT
Start: 2022-08-02

## 2022-08-02 RX ORDER — ASPIRIN 81 MG/1
81 TABLET, CHEWABLE ORAL
Status: CANCELLED | OUTPATIENT
Start: 2022-08-02

## 2022-08-02 RX ORDER — ACETAMINOPHEN 325 MG/1
975 TABLET ORAL ONCE
Status: CANCELLED | OUTPATIENT
Start: 2022-08-02 | End: 2022-08-02

## 2022-08-02 RX ORDER — METOPROLOL SUCCINATE 50 MG/1
50 TABLET, EXTENDED RELEASE ORAL EVERY MORNING
Status: ON HOLD | COMMUNITY
Start: 2021-08-20 | End: 2022-09-16

## 2022-08-02 RX ORDER — ALBUTEROL SULFATE 90 UG/1
2 AEROSOL, METERED RESPIRATORY (INHALATION) EVERY 4 HOURS PRN
COMMUNITY
Start: 2022-04-08

## 2022-08-02 RX ORDER — EVOLOCUMAB 140 MG/ML
140 INJECTION, SOLUTION SUBCUTANEOUS
COMMUNITY
Start: 2022-04-19 | End: 2023-04-20

## 2022-08-02 RX ORDER — PHENYLEPHRINE HCL IN 0.9% NACL 50MG/250ML
.1-6 PLASTIC BAG, INJECTION (ML) INTRAVENOUS CONTINUOUS
Status: CANCELLED | OUTPATIENT
Start: 2022-09-01

## 2022-08-02 RX ORDER — LIDOCAINE 40 MG/G
CREAM TOPICAL
Status: CANCELLED | OUTPATIENT
Start: 2022-08-02

## 2022-08-02 RX ORDER — POTASSIUM CHLORIDE 1500 MG/1
60 TABLET, EXTENDED RELEASE ORAL EVERY MORNING
COMMUNITY
Start: 2022-06-14

## 2022-08-02 NOTE — TELEPHONE ENCOUNTER
FUTURE VISIT INFORMATION      SURGERY INFORMATION:    Date: 22    Location: uu or    Surgeon:  Zeke Garcia MD    Anesthesia Type:  general    Procedure: REPLACEMENT, AORTIC VALVE, WITH REPEAT STERNOTOMY AND ANY ASSOCIATED PROCEDURES    RECORDS REQUESTED FROM:       Most recent EKG+ Tracin22    [facility]    Most recent Cardiac Stress Test: [facility]- [facility]    Most recent PFT's: [facility]- [facility]

## 2022-08-02 NOTE — TELEPHONE ENCOUNTER
Requested 4/6/22 PFT complete study from [facility]. [phone]

## 2022-08-03 ENCOUNTER — TELEPHONE (OUTPATIENT)
Dept: CARDIOLOGY | Facility: CLINIC | Age: 62
End: 2022-08-03

## 2022-08-03 ENCOUNTER — TELEPHONE (OUTPATIENT)
Dept: CARDIOLOGY | Facility: CLINIC | Age: 62
End: 2022-08-03
Payer: COMMERCIAL

## 2022-08-03 DIAGNOSIS — I25.10 CORONARY ARTERY DISEASE INVOLVING NATIVE CORONARY ARTERY OF NATIVE HEART WITHOUT ANGINA PECTORIS: ICD-10-CM

## 2022-08-03 DIAGNOSIS — E78.2 MIXED HYPERLIPIDEMIA: Primary | ICD-10-CM

## 2022-08-03 RX ORDER — ATORVASTATIN CALCIUM 80 MG/1
80 TABLET, FILM COATED ORAL DAILY
Qty: 90 TABLET | Refills: 3 | Status: SHIPPED | OUTPATIENT
Start: 2022-08-03 | End: 2023-10-03 | Stop reason: SDUPTHER

## 2022-08-03 RX ORDER — LISINOPRIL 5 MG/1
5 TABLET ORAL DAILY
Qty: 90 TABLET | Refills: 0 | Status: SHIPPED | OUTPATIENT
Start: 2022-08-03 | End: 2022-11-14

## 2022-08-03 RX ORDER — NITROGLYCERIN 0.4 MG/1
0.4 TABLET SUBLINGUAL EVERY 5 MIN PRN
Qty: 25 TABLET | Refills: 1 | Status: SHIPPED | OUTPATIENT
Start: 2022-08-03

## 2022-08-03 NOTE — TELEPHONE ENCOUNTER
This writer contacted [facility] and sent a dental clearance letter which was returned with information from Madina Reyes DDS that patient has upper left second molar which as been recommended for extraction since 2018; tooth is broken down, decayed and could potentially become infected or break down further in the furture; dentist unable to guarantee tooth will remain stable throughout patient's cardiac treatment. Dentist recommended weighting the risk of this tooth becoming infected or breaking down against possible risk that may be introduced with extraction of this tooth.   Referral was placed to [facility] Oral Surgery and appointment was made for 8/16/22 at 1045 AM for evaluation and possible extraction.   Patient verbalized understanding and agreed to the plan.

## 2022-08-03 NOTE — TELEPHONE ENCOUNTER
LVM with [facility] Medical Records asking them to return my call.  Looking for 2013 OP report

## 2022-08-03 NOTE — TELEPHONE ENCOUNTER
Received call from Gopal that he needs a refill on the lisinopril and the atorvastatin, and his nitro prn.    Is up-to-date with seeing providers.  Last lipid panel 4/19/2022 and to continue current meds.  Refill per refill process.

## 2022-08-04 ENCOUNTER — TELEPHONE (OUTPATIENT)
Dept: CARDIOLOGY | Facility: CLINIC | Age: 62
End: 2022-08-04

## 2022-08-04 NOTE — TELEPHONE ENCOUNTER
FUTURE VISIT INFORMATION        SURGERY INFORMATION:    Date: 22    Location: uu or    Surgeon:  Zeke Garcia MD    Anesthesia Type:  general    Procedure: REPLACEMENT, AORTIC VALVE, WITH REPEAT STERNOTOMY AND ANY ASSOCIATED PROCEDURES     RECORDS REQUESTED FROM:         Most recent EKG+ Tracin22     Most recent ECHO: 22- [facility]     Most recent Cardiac Stress Test: 22- [facility]     Most recent PFT's: 22- [facility]

## 2022-08-04 NOTE — TELEPHONE ENCOUNTER
FUTURE VISIT INFORMATION        SURGERY INFORMATION:    Date: 22    Location: uu or    Surgeon:  Zeke Garcia MD    Anesthesia Type:  general    Procedure: REPLACEMENT, AORTIC VALVE, WITH REPEAT STERNOTOMY AND ANY ASSOCIATED PROCEDURES     RECORDS REQUESTED FROM:         Most recent EKG+ Tracin22     Most recent ECHO: 22- [facility]     Most recent Cardiac Stress Test: 22- [facility]     Most recent PFT's: 22- [facility]

## 2022-08-10 ENCOUNTER — TELEPHONE (OUTPATIENT)
Dept: CARDIOTHORACIC SURGERY | Facility: CLINIC | Age: 62
End: 2022-08-10
Payer: COMMERCIAL

## 2022-08-10 ENCOUNTER — TELEPHONE (OUTPATIENT)
Dept: FAMILY MEDICINE | Facility: CLINIC | Age: 62
End: 2022-08-10
Payer: COMMERCIAL

## 2022-08-10 NOTE — TELEPHONE ENCOUNTER
Received a call from patient, requesting a copy of his last clinic notes and his referral to [facility].  He would like them waiting for him at the .  Patient is informed that I will have them waiting for him first thing in the morning.    He informs me that Dr. Liz will no be doing his surgery for the valve, but it will be Dr. Prescott.  They are expecting surgery to be on 9/1/22.

## 2022-08-10 NOTE — TELEPHONE ENCOUNTER
Pt wife stated pt had CT done recently and lung nodule was no longer present and she was wondering if pt needed to do FU CT scan. Per Dr. Haney no follow up needed as lung nodule has resolved. Order has been canceled

## 2022-08-11 ENCOUNTER — PRE VISIT (OUTPATIENT)
Dept: SURGERY | Facility: CLINIC | Age: 62
End: 2022-08-11

## 2022-08-16 DIAGNOSIS — I35.9 AORTIC VALVE DISORDER: Primary | ICD-10-CM

## 2022-08-18 ENCOUNTER — LAB (OUTPATIENT)
Dept: LAB | Facility: CLINIC | Age: 62
End: 2022-08-18
Payer: COMMERCIAL

## 2022-08-18 ENCOUNTER — OFFICE VISIT (OUTPATIENT)
Dept: SURGERY | Facility: CLINIC | Age: 62
End: 2022-08-18
Payer: COMMERCIAL

## 2022-08-18 ENCOUNTER — PRE VISIT (OUTPATIENT)
Dept: SURGERY | Facility: CLINIC | Age: 62
End: 2022-08-18

## 2022-08-18 ENCOUNTER — OFFICE VISIT (OUTPATIENT)
Dept: CARDIOLOGY | Facility: CLINIC | Age: 62
End: 2022-08-18
Attending: SURGERY
Payer: COMMERCIAL

## 2022-08-18 VITALS
HEART RATE: 85 BPM | BODY MASS INDEX: 28.65 KG/M2 | SYSTOLIC BLOOD PRESSURE: 113 MMHG | WEIGHT: 211.5 LBS | RESPIRATION RATE: 16 BRPM | DIASTOLIC BLOOD PRESSURE: 63 MMHG | HEIGHT: 72 IN | OXYGEN SATURATION: 97 % | TEMPERATURE: 97.7 F

## 2022-08-18 VITALS
WEIGHT: 207.5 LBS | OXYGEN SATURATION: 98 % | DIASTOLIC BLOOD PRESSURE: 74 MMHG | HEART RATE: 87 BPM | SYSTOLIC BLOOD PRESSURE: 117 MMHG | BODY MASS INDEX: 28.14 KG/M2

## 2022-08-18 DIAGNOSIS — I35.9 AORTIC VALVE DISORDER: Primary | ICD-10-CM

## 2022-08-18 DIAGNOSIS — Z01.818 PREOP EXAMINATION: Primary | ICD-10-CM

## 2022-08-18 DIAGNOSIS — I35.9 AORTIC VALVE DISORDER: ICD-10-CM

## 2022-08-18 LAB
ABO/RH(D): NORMAL
ALBUMIN SERPL-MCNC: 3.8 G/DL (ref 3.4–5)
ALBUMIN UR-MCNC: 30 MG/DL
ALP SERPL-CCNC: 108 U/L (ref 40–150)
ALT SERPL W P-5'-P-CCNC: 35 U/L (ref 0–70)
ANION GAP SERPL CALCULATED.3IONS-SCNC: 9 MMOL/L (ref 3–14)
ANTIBODY SCREEN: NEGATIVE
APPEARANCE UR: CLEAR
APTT PPP: 30 SECONDS (ref 22–38)
AST SERPL W P-5'-P-CCNC: 28 U/L (ref 0–45)
BILIRUB SERPL-MCNC: 0.9 MG/DL (ref 0.2–1.3)
BILIRUB UR QL STRIP: NEGATIVE
BUN SERPL-MCNC: 32 MG/DL (ref 7–30)
CALCIUM SERPL-MCNC: 9.2 MG/DL (ref 8.5–10.1)
CHLORIDE BLD-SCNC: 105 MMOL/L (ref 94–109)
CO2 SERPL-SCNC: 26 MMOL/L (ref 20–32)
COLOR UR AUTO: YELLOW
CREAT SERPL-MCNC: 1.38 MG/DL (ref 0.66–1.25)
ERYTHROCYTE [DISTWIDTH] IN BLOOD BY AUTOMATED COUNT: 14.8 % (ref 10–15)
GFR SERPL CREATININE-BSD FRML MDRD: 59 ML/MIN/1.73M2
GLUCOSE BLD-MCNC: 126 MG/DL (ref 70–99)
GLUCOSE UR STRIP-MCNC: NEGATIVE MG/DL
HBA1C MFR BLD: 6.1 % (ref 0–5.6)
HCT VFR BLD AUTO: 44.9 % (ref 40–53)
HGB BLD-MCNC: 14.8 G/DL (ref 13.3–17.7)
HGB UR QL STRIP: NEGATIVE
HYALINE CASTS: 1 /LPF
INR PPP: 1.3 (ref 0.85–1.15)
KETONES UR STRIP-MCNC: NEGATIVE MG/DL
LEUKOCYTE ESTERASE UR QL STRIP: NEGATIVE
MAGNESIUM SERPL-MCNC: 2.3 MG/DL (ref 1.6–2.3)
MCH RBC QN AUTO: 31.1 PG (ref 26.5–33)
MCHC RBC AUTO-ENTMCNC: 33 G/DL (ref 31.5–36.5)
MCV RBC AUTO: 94 FL (ref 78–100)
NITRATE UR QL: NEGATIVE
PH UR STRIP: 5 [PH] (ref 5–7)
PLATELET # BLD AUTO: 105 10E3/UL (ref 150–450)
POTASSIUM BLD-SCNC: 4 MMOL/L (ref 3.4–5.3)
PREALB SERPL IA-MCNC: 18 MG/DL (ref 15–45)
PROT SERPL-MCNC: 6.9 G/DL (ref 6.8–8.8)
RBC # BLD AUTO: 4.76 10E6/UL (ref 4.4–5.9)
RBC URINE: 1 /HPF
SARS-COV-2 RNA RESP QL NAA+PROBE: NEGATIVE
SODIUM SERPL-SCNC: 140 MMOL/L (ref 133–144)
SP GR UR STRIP: 1.02 (ref 1–1.03)
SPECIMEN EXPIRATION DATE: NORMAL
SQUAMOUS EPITHELIAL: <1 /HPF
UROBILINOGEN UR STRIP-MCNC: NORMAL MG/DL
WBC # BLD AUTO: 7.6 10E3/UL (ref 4–11)
WBC URINE: 1 /HPF

## 2022-08-18 PROCEDURE — 99205 OFFICE O/P NEW HI 60 MIN: CPT | Performed by: NURSE PRACTITIONER

## 2022-08-18 PROCEDURE — 99204 OFFICE O/P NEW MOD 45 MIN: CPT | Performed by: SURGERY

## 2022-08-18 PROCEDURE — 99000 SPECIMEN HANDLING OFFICE-LAB: CPT | Performed by: PATHOLOGY

## 2022-08-18 PROCEDURE — 86900 BLOOD TYPING SEROLOGIC ABO: CPT | Performed by: PATHOLOGY

## 2022-08-18 PROCEDURE — 81001 URINALYSIS AUTO W/SCOPE: CPT | Performed by: PATHOLOGY

## 2022-08-18 PROCEDURE — 36415 COLL VENOUS BLD VENIPUNCTURE: CPT | Performed by: PATHOLOGY

## 2022-08-18 PROCEDURE — 86901 BLOOD TYPING SEROLOGIC RH(D): CPT | Performed by: PATHOLOGY

## 2022-08-18 PROCEDURE — 83735 ASSAY OF MAGNESIUM: CPT | Performed by: PATHOLOGY

## 2022-08-18 PROCEDURE — G0463 HOSPITAL OUTPT CLINIC VISIT: HCPCS

## 2022-08-18 PROCEDURE — 85027 COMPLETE CBC AUTOMATED: CPT | Performed by: PATHOLOGY

## 2022-08-18 PROCEDURE — 86850 RBC ANTIBODY SCREEN: CPT | Performed by: PATHOLOGY

## 2022-08-18 PROCEDURE — 85610 PROTHROMBIN TIME: CPT | Performed by: PATHOLOGY

## 2022-08-18 PROCEDURE — 80053 COMPREHEN METABOLIC PANEL: CPT | Performed by: PATHOLOGY

## 2022-08-18 PROCEDURE — U0003 INFECTIOUS AGENT DETECTION BY NUCLEIC ACID (DNA OR RNA); SEVERE ACUTE RESPIRATORY SYNDROME CORONAVIRUS 2 (SARS-COV-2) (CORONAVIRUS DISEASE [COVID-19]), AMPLIFIED PROBE TECHNIQUE, MAKING USE OF HIGH THROUGHPUT TECHNOLOGIES AS DESCRIBED BY CMS-2020-01-R: HCPCS | Performed by: PATHOLOGY

## 2022-08-18 PROCEDURE — U0005 INFEC AGEN DETEC AMPLI PROBE: HCPCS | Performed by: PATHOLOGY

## 2022-08-18 PROCEDURE — 85730 THROMBOPLASTIN TIME PARTIAL: CPT | Performed by: PATHOLOGY

## 2022-08-18 PROCEDURE — 84134 ASSAY OF PREALBUMIN: CPT | Performed by: PATHOLOGY

## 2022-08-18 PROCEDURE — 83036 HEMOGLOBIN GLYCOSYLATED A1C: CPT | Performed by: PATHOLOGY

## 2022-08-18 RX ORDER — CHLORHEXIDINE GLUCONATE ORAL RINSE 1.2 MG/ML
SOLUTION DENTAL 2 TIMES DAILY
Status: ON HOLD | COMMUNITY
Start: 2022-08-16 | End: 2022-09-01

## 2022-08-18 RX ORDER — AMOXICILLIN 500 MG/1
500 CAPSULE ORAL 3 TIMES DAILY
Status: ON HOLD | COMMUNITY
Start: 2022-08-16 | End: 2022-09-07

## 2022-08-18 RX ORDER — IBUPROFEN 200 MG
200 TABLET ORAL EVERY 4 HOURS PRN
COMMUNITY

## 2022-08-18 RX ORDER — ASPIRIN 81 MG/1
81 TABLET ORAL DAILY
COMMUNITY

## 2022-08-18 RX ORDER — NITROGLYCERIN 0.4 MG/1
TABLET SUBLINGUAL EVERY 5 MIN PRN
Status: ON HOLD | COMMUNITY
Start: 2022-08-03 | End: 2022-09-16

## 2022-08-18 ASSESSMENT — PAIN SCALES - GENERAL
PAINLEVEL: NO PAIN (0)
PAINLEVEL: NO PAIN (0)

## 2022-08-18 ASSESSMENT — LIFESTYLE VARIABLES: TOBACCO_USE: 1

## 2022-08-18 ASSESSMENT — COPD QUESTIONNAIRES: COPD: 1

## 2022-08-18 NOTE — LETTER
8/18/2022      RE: Maximino Birch  [address]   [place] [address]         Service Date: 08/18/2022    REFERRING CARDIOLOGIST:  Dr. Ayan Lopez, [facility] [place], [place] and Azeb Jay CNP     REASON FOR CONSULTATION:  Evaluation for reoperative cardiac surgery for severe prosthetic aortic valve degeneration.    HISTORY OF PRESENT ILLNESS:  Mr. Birch is a very pleasant 61-year-old gentleman who was referred to us by Dr. Lopez of [facility] for consideration for reoperative cardiac surgery.  He is otherwise healthy and active gentleman with a history of aortic stenosis, bicuspid aortic valve and ascending aortic arch aneurysm and single-vessel coronary artery disease who underwent a sternotomy AVR with a 27 mm Natan Mitroflow bovine pericardial valve, ascending aorta/hemiarch replacement under deep hypothermic circulatory arrest using a 34 mm Dacron interposition graft and coronary artery bypass grafting x1 with reverse saphenous vein graft to the PDA with a circular arrest and antegrade cerebral perfusion in 2013.  I do have the operative records and the aortic root was not replaced at that time.  He has done well over the years, but over the last 6 months to a year or so, he has developed progressively worsening dyspnea on exertion.  A recent echocardiogram in May demonstrated severe prosthetic aortic valve insufficiency from valve degeneration.  His LVEF was around 45%, which appears to be his baseline.  The aorta had a coronary angiogram demonstrating patent vein graft to the PDA with no significant new coronary artery disease.  Recent CT angiogram of the chest was also performed in July that demonstrated stable findings with no new aneurysmal disease.  He does have a history of COPD, and per the records, it appears that he has been on home O2 in the past, but his recent PFTs demonstrated FEV1 predicted greater than 60% and he presents to clinic today completely  ambulatory and not on any supplemental oxygen.  He did quit smoking in February of this year after a 22-pack-year smoking history.    PAST MEDICAL HISTORY:  Aortic stenosis, bicuspid aortic valve, ascending aortic aneurysm, as described above, coronary artery disease, COPD, congestive heart failure, baseline EF around 40%.    PAST SURGICAL HISTORY:  Sternotomy CABG x, ascending aorta replacement with a hemiarch and AVR with a 27 mm bioprosthetic valve as described above.    ALLERGIES:  No known drug allergies.    CURRENT OUTPATIENT MEDICATIONS:  Reviewed in [facility] chart.    FAMILY HISTORY:  Noncontributory.    SOCIAL HISTORY:  22-pack-year smoking history, quit in February.  He drinks about 5 alcoholic beverages a week.  He lives independently with his wife.  He has not been able to work this year.     REVIEW OF SYSTEMS:  As per HPI.  All other 10-point review of systems are completed and were otherwise negative unless stated above.    PHYSICAL EXAMINATION:    VITAL SIGNS:  All vital signs are stable.  GENERAL:  He appears well, in no acute distress.  HEENT:  Within normal limits.  NECK:  Supple, no lymphadenopathy.  CARDIOVASCULAR:  Regular rate and rhythm, normal S1, S2.  Grade 3/6 diastolic murmur at the right upper sternal border.  LUNGS:  Clear bilaterally.  ABDOMEN:  Soft, nontender, nondistended.  EXTREMITIES:  Negative for edema, cyanosis or clubbing.  NEUROLOGIC:  A and O x3 with no focal deficits.    PERTINENT LABORATORY STUDIES:  His FEV1 on recent PFTs was 37% predicted and he does have a significant obstructive coronary pulmonary disease, but his DLCO is 78% predicted.  Transthoracic echo from 05/12/2022 demonstrated severe AI, mean gradient across the prosthetic aortic valve 17 mmHg, LVEF of 45%.  CT angiogram of the chest 06/23/2022 demonstrated no aneurysmal disease with a stable ascending hemiarch graft.  A coronary angiogram from last month done at [facility] demonstrated patent saphenous vein  graft with no new obstructive coronary artery disease.    ASSESSMENT AND PLAN:  Mr. Birch is a 61-year-old gentleman with prosthetic aortic valve degeneration with severe aortic valve insufficiency.  He is quite symptomatic.  He does have some significant baseline COPD, but I think his recent progressive dyspnea on exertion is definitely related to his aortic valve degeneration.  My recommendation is a redo sternotomy, redo aortic valve replacement with a On-X valve.  He understands that he would have to be on lifelong Coumadin and he is okay with this.  I went over the diagnosis in detail and reason for recommending the operation.  I went over the risks and benefits of the operation and the potential complications associated with the surgery.  These complications include but are not strictly limited to infection, bleeding, stroke, postop MI, postop arrhythmias, pulmonary or renal complications.  I think overall he is a good redo surgical candidate despite his COPD and I quoted an operative mortality risk of around 3%.  Having seeing his PFTs after the fact, however, his FEV1 was under 40% predicted.  That would definitely put him at a higher risk for pulmonary complications, but I definitely do not think his operative risk is in the prohibitively high range.  I went over the potential complications of surgery including but not strictly limited to infection, bleeding, stroke, postop MI, postoperative arrhythmias, pulmonary or renal complications.  He wishes to proceeding.  The patient will be scheduled for redo sternotomy, redo aortic valve replacement at the [facility] on 2022.  It was a pleasure meeting Mr. Birch.  Thank you very much for this referral.    April Hogue MD        D: 2022   T: 2022   MT: dario    Name:     CAROLE BIRCH  MRN:      [id number]        Account:      [id number]   :      1960           Service Date: 2022        Document: [id number]

## 2022-08-18 NOTE — PATIENT INSTRUCTIONS
Preparing for Your Surgery      Name:  Maximino Birch   MRN:  [id number]   :  1960   Today's Date:  2022       Arriving for surgery:  Surgery date:  22   Arrival time:  5:00 am    Restrictions due to COVID 19       Effective 08/15/22 [facility] is implementing the following visitor policy:     1 person may accompany the patient through the Pre-Op process.      That same person may wait in the Surgery Waiting room, provided there is enough room to social distance           Visitors must wear a mask.      Visitors must not be ill.        Inpatients are allowed 2 visitors per day for the duration of their stay.      Children age 5 and older may visit       Visiting hours are 8 am to 8 pm.        For everyone's health and safety visitors are requested to remain in patients room as much as possible        Please come to:     [facility] [address]  [address] [place] [address]    - ? parking is available in front of the hospital      -    Please proceed to [address] on the 3rd floor. [phone]?     - ?If you are in need of directions, wheelchair or escort please stop at the Information Desk in the lobby.  Inform the information person that you are here for surgery; a wheelchair and escort to [address] will be provided.?     What can I eat or drink?  -  You may eat and drink normally for up to 8 hours before your surgery. (Until 11:30 pm)  -  You may have clear liquids until 2 hours before surgery. (Until 5:00 am)    Examples of clear liquids:  Water  Clear broth  Juices (apple, white grape, white cranberry  and cider) without pulp  Noncarbonated, powder based beverages  (lemonade and Juan Ramon-Aid)  Sodas (Sprite, 7-Up, ginger ale and seltzer)  Coffee or tea (without milk or cream)  Gatorade    -  No Alcohol for at least 24 hours before surgery     Which medicines can I take?    Hold Aspirin for 7 days before surgery.   Hold  Multivitamins for 7 days before surgery.  Hold Supplements for 7 days before surgery.  Hold Ibuprofen (Advil, Motrin) for 1 day before surgery--unless otherwise directed by surgeon.  Hold Naproxen (Aleve) for 4 days before surgery.    Hold all NSAIDS for 7 days before spine surgery. (Ibuprofen, Naproxen, Celebrex, Indocin, Diclofenac)    -  DO NOT take these medications the day of surgery:  Not applicable unless instructed by cardiology      -  PLEASE TAKE these medications the day of surgery:  Albuterol inhaler if needed and bring this to the hospital with you  Atorvastatin (Lipitor) may be taken at your usual time  Budesonide-formoterol (Symbicort)  Evolocumab (Repatha)  Lisinopril   Metoprolol (Toprol)  Potassium  Torsemide (Demadex)       How do I prepare myself?  - Please take 2 showers before surgery using Scrubcare or Hibiclens soap.    Use this soap only from the neck to your toes.     Leave the soap on your skin for one minute--then rinse thoroughly.      You may use your own shampoo and conditioner; no other hair products.   - Please remove all jewelry and body piercings.  - No lotions, deodorants or fragrance.  - No makeup or fingernail polish.   - Bring your ID and insurance card.        Questions or Concerns:    - For any questions regarding the day of surgery or your hospital stay, please contact the Pre Admission Nursing Office at [phone].       - If you have health changes between today and your surgery please call your surgeon.       For questions after surgery please call your surgeons office.          -   Parking is available in the Patient Visitor Ramp on [address] [facility].     -   When entering the hospital you will be asked COVID screening questions, you will then be directed to Registration.  Registration will direct you to the 3rd floor Surgery waiting room.     -   Please ask if you need an escort or a wheelchair to the Surgery Waiting Room.  Preop number- [phone]

## 2022-08-18 NOTE — LETTER
8/18/2022      RE: Maximino Figueredomeadriana  [address] [place] [address]       Dear Colleague,    Thank you for the opportunity to participate in the care of your patient, Maximino Birch, at the [facility] [place] [facility]. Please see a copy of my visit note below.    CV Surgery    Patient seen, clinic note dictated #[id number].    April Hogue MD      Please do not hesitate to contact me if you have any questions/concerns.     Sincerely,     April Hogue MD

## 2022-08-18 NOTE — NURSING NOTE
Chief Complaint   Patient presents with     New Patient     Aortic valve regugitation     Vitals were taken and medications reconciled.    Bentley Oden, EMT  2:26 PM

## 2022-08-18 NOTE — H&P
"  Pre-Operative H & P     CC:  Preoperative exam to assess for increased cardiopulmonary risk while undergoing surgery and anesthesia.    Date of Encounter: 8/18/2022  Primary Care Physician:  Gisel Pettit     Reason for visit:   Encounter Diagnosis   Name Primary?     Preop examination Yes       HPI  Maximino Birch is a 61 year old male who presents for pre-operative H & P in preparation for  Procedure Information     Case: [id number] Date/Time: 09/01/22 0730    Procedure: REPLACEMENT, AORTIC VALVE, WITH REPEAT STERNOTOMY AND ANY ASSOCIATED PROCEDURES (N/A Heart)    Anesthesia type: General    Diagnosis: Aortic stenosis [I35.0]    Pre-op diagnosis: Aortic stenosis [I35.0]    Location:  OR  /  OR    Providers: April Hogue MD Gabriel \"Chet\" Conrado has a past medical history significant for coronary artery disease and aortic stenosis status post CABG x1 (SVG-RCA) with concomitant  AVR (Natan Mitroflow bioprosthetic valve) and ascending aortic Dacron graft in 2013, ischemic cardiomyopathy (EF of 44%), dyslipidemia,  tobacco abuse, high risk for KAERN, and COPD with asthma overlap using supplemental oxygen (3L) at night.     Mr. Camp is from [place], [place], and presented to his cardiology team earlier this year with complaints of worsening shortness of breath, decreased oxygen saturations and feelings of early satiety.   Echocardiogram showed bioprosthetic aortic valve with evidence of significant degeneration.  Angiogram showed,\" severe single-vessel CAD, proximal RCA is 100% , SVG to RPDA patent and feeds the RPL system as well, Left main, LAD, and left circumflex relatively patent\".  Given Mr. Camp's complexity, his cardiology team in [place] referred him to Dr. Hogue's office for further evaluation and treatment.  Mr. Camp and his wife, Twyla, present to the PAC in preparation for the above surgical intervention.     History is obtained from the patient and " chart review    Hx of abnormal bleeding or anti-platelet use: ASA      Past Medical History  Past Medical History:   Diagnosis Date     Congestive heart failure (H)      COPD (chronic obstructive pulmonary disease) (H)      Coronary artery disease      Dyspnea on exertion      Heart attack (H)      Heart murmur      History of blood transfusion      Oxygen dependent      Uncomplicated asthma      Walking troubles        Past Surgical History  Past Surgical History:   Procedure Laterality Date     CARDIAC SURGERY      coronary artery disease and aortic stenosis status post CABG x1 (SVG-RCA) with concomitant  AVR (Natan Mitroflow bioprosthetic valve) and ascending aortic Dacron graft in 2013     ORTHOPEDIC SURGERY         Prior to Admission Medications  Current Outpatient Medications   Medication Sig Dispense Refill     albuterol (PROAIR HFA/PROVENTIL HFA/VENTOLIN HFA) 108 (90 Base) MCG/ACT inhaler Inhale 2 puffs into the lungs as needed       amoxicillin (AMOXIL) 500 MG capsule Take 500 mg by mouth 3 times daily       atorvastatin (LIPITOR) 80 MG tablet Take 80 mg by mouth every evening       budesonide-formoterol (SYMBICORT) 160-4.5 MCG/ACT Inhaler Inhale 2 puffs into the lungs 2 times daily       chlorhexidine (PERIDEX) 0.12 % solution 2 times daily       evolocumab (REPATHA SURECLICK) 140 MG/ML prefilled autoinjector Inject 140 mg Subcutaneous every 14 days       lisinopril (ZESTRIL) 5 MG tablet Take 5 mg by mouth every morning       metoprolol succinate ER (TOPROL XL) 50 MG 24 hr tablet Take 50 mg by mouth every morning       nitroGLYcerin (NITROSTAT) 0.4 MG sublingual tablet as needed       potassium chloride ER (KLOR-CON M) 20 MEQ CR tablet Take 60 mEq by mouth every morning       torsemide (DEMADEX) 20 MG tablet Take 60 mg by mouth every morning       aspirin 81 MG EC tablet Take 81 mg by mouth daily       ibuprofen (ADVIL/MOTRIN) 200 MG tablet Take 200 mg by mouth every 4 hours as needed for mild pain          Allergies  Allergies   Allergen Reactions     Ezetimibe Rash       Social History  Social History     Socioeconomic History     Marital status: Single     Spouse name: Not on file     Number of children: Not on file     Years of education: Not on file     Highest education level: Not on file   Occupational History     Not on file   Tobacco Use     Smoking status: Former Smoker     Packs/day: 0.75     Years: 30.00     Pack years: 22.50     Quit date: 2022     Years since quittin.5     Smokeless tobacco: Never Used   Substance and Sexual Activity     Alcohol use: Yes     Comment: 4-5 drinks per week     Drug use: Not Currently     Sexual activity: Not on file   Other Topics Concern     Not on file   Social History Narrative     Not on file     Social Determinants of Health     Financial Resource Strain: Not on file   Food Insecurity: Not on file   Transportation Needs: Not on file   Physical Activity: Not on file   Stress: Not on file   Social Connections: Not on file   Intimate Partner Violence: Not on file   Housing Stability: Not on file       Family History  Family History   Problem Relation Age of Onset     Heart Disease Father      Aneurysm Father      Parkinsonism Sister        Review of Systems  The complete review of systems is negative other than noted in the HPI or here.   Anesthesia Evaluation   Pt has had prior anesthetic. Type: MAC and General.    No history of anesthetic complications       ROS/MED HX  ENT/Pulmonary:     (+) KAREN risk factors, snores loudly, hypertension, tobacco use (Quit 2022. Smoked 1 PPD for 40 years. ), Past use, COPD (Uses 3L/NC at night. ), recent URI, resolved, Pneumonia 2022 (tested (-) for COVID - hospitalized required supplemental oxygen since this time. :  (-) asthma   Neurologic:  - neg neurologic ROS     Cardiovascular:     (+) Dyslipidemia hypertension--CAD -past MI (Inferior MI ) CABG-date: SVG to RCA . -Taking blood thinners CHF etiology: ischemic  Last EF: 44 CASILLAS. Previous cardiac testing  (-) angina and angina   METS/Exercise Tolerance:  Comment: Limited mobility during high temperature and humidity causing shortness of breath. On cooler days he believes he could walk 2-3 blocks slowly.    Hematologic:     (+) history of blood transfusion, no previous transfusion reaction, Known PRBC Anitbodies:No     Musculoskeletal: Comment: S/p right ankle surgery with hardware.  Hardware has since been removed.   (+) arthritis (right ankle),     GI/Hepatic: Comment: Early satiety      Renal/Genitourinary:  - neg Renal ROS     Endo:  - neg endo ROS     Psychiatric/Substance Use:    (-) psychiatric history, alcohol abuse history and chronic opioid use history   Infectious Disease: Comment: COVID (+) 11/2020 with loss of taste and smell     - neg infectious disease ROS     Malignancy:   (+) Malignancy (BCC on left shoulder), History of Skin.Skin CA Remission status post Surgery.        Other: Comment: Works for a [employer].  On disability since February 2022.            /63 (BP Location: Left arm, Patient Position: Sitting, Cuff Size: Adult Regular)   Pulse 85   Temp 97.7  F (36.5  C) (Oral)   Resp 16   Ht 1.829 m (6')   Wt 95.9 kg (211 lb 8 oz)   SpO2 97%   BMI 28.68 kg/m      Physical Exam   Constitutional: Awake, alert, cooperative, no apparent distress, and appears stated age.  Eyes: Pupils equal, round and reactive to light, extra ocular muscles intact, sclera clear, conjunctiva normal.  HENT: Normocephalic, oral pharynx with moist mucus membranes, missing molars with remaining dentition intact. No goiter appreciated.   Respiratory: Clear to auscultation bilaterally, no crackles or wheezing.  Cardiovascular: Regular rate and rhythm, normal S1 and S2, and systolic murmur present.  Carotids +2, no bruits. 3+ pitting LE edema. Palpable pulses to radial.  LE warm to touch, appropriate color, and (+) sensation.   Well healed sternal scar.   GI: Normal bowel  sounds, soft, distended, non-tender, no masses palpated, no hepatosplenomegaly.  Surgical scars: well healed.  Lymph/Hematologic: No cervical lymphadenopathy and no supraclavicular lymphadenopathy.  Genitourinary:  deferred  Skin: Warm and dry.  No rashes at anticipated surgical site.   Musculoskeletal: Full ROM of neck. There is no redness, warmth, or swelling of the joints. Gross motor strength is normal.    Neurologic: Awake, alert, oriented to name, place and time. Cranial nerves II-XII are grossly intact. Gait is normal.   Neuropsychiatric: Calm, cooperative. Normal affect.     Prior Labs/Diagnostic Studies   All labs and imaging personally reviewed   Lab Results   Component Value Date    WBC 7.6 08/18/2022     Lab Results   Component Value Date    RBC 4.76 08/18/2022     Lab Results   Component Value Date    HGB 14.8 08/18/2022     Lab Results   Component Value Date    HCT 44.9 08/18/2022     Lab Results   Component Value Date    MCV 94 08/18/2022     Lab Results   Component Value Date    MCH 31.1 08/18/2022     Lab Results   Component Value Date    MCHC 33.0 08/18/2022     Lab Results   Component Value Date    RDW 14.8 08/18/2022     Lab Results   Component Value Date     08/18/2022       Last Comprehensive Metabolic Panel:  Sodium   Date Value Ref Range Status   08/18/2022 140 133 - 144 mmol/L Final     Potassium   Date Value Ref Range Status   08/18/2022 4.0 3.4 - 5.3 mmol/L Final     Chloride   Date Value Ref Range Status   08/18/2022 105 94 - 109 mmol/L Final     Carbon Dioxide (CO2)   Date Value Ref Range Status   08/18/2022 26 20 - 32 mmol/L Final     Anion Gap   Date Value Ref Range Status   08/18/2022 9 3 - 14 mmol/L Final     Glucose   Date Value Ref Range Status   08/18/2022 126 (H) 70 - 99 mg/dL Final     Urea Nitrogen   Date Value Ref Range Status   08/18/2022 32 (H) 7 - 30 mg/dL Final     Creatinine   Date Value Ref Range Status   08/18/2022 1.38 (H) 0.66 - 1.25 mg/dL Final     GFR  Estimate   Date Value Ref Range Status   08/18/2022 59 (L) >60 mL/min/1.73m2 Final     Comment:     Effective December 21, 2021 eGFRcr in adults is calculated using the 2021 CKD-EPI creatinine equation which includes age and gender (Sun et al., NEJM, DOI: 10.1056/LORFvi2376025)     Calcium   Date Value Ref Range Status   08/18/2022 9.2 8.5 - 10.1 mg/dL Final       PROCEDURES     US venous duplex lower extremity b/l 6/24/22 Care Everywhere     Impression:      Incomplete palmar arch with no flow in the first and fifth digits after radial artery compression.          US radial artery mapping with palmar arch left 6/24/22 Care Everywhere     Impression:      Incomplete palmar arch with no flow in the first and fifth digits after radial artery compression.          CT angiogram chest abdomen pelvis 6/23/22 Care Everywhere     IMPRESSION:      1.  Postoperative changes status post prior median sternotomy, CABG, aortic valve replacement, and ascending thoracic aortic repair. The ascending thoracic aorta is normal in caliber, measuring 3.7 x 3.5 cm.      2.  Mild atherosclerotic plaque within the right brachiocephalic and left subclavian arteries without flow-limiting stenosis.      3.  Mild to moderate calcified atherosclerotic plaque within the nonaneurysmal infrarenal abdominal aorta. No occlusive changes in the aortic bifurcation.      4.  Widely patent celiac axis, superior mesenteric artery, single bilateral renal arteries, and inferior mesenteric artery.      5.  Mild atherosclerotic plaque within the otherwise widely patent bilateral pelvic arterial outflow.      6.  Mild bilateral bronchial wall thickening, interlobular septal thickening in the lung bases, patchy groundglass airspace opacities in the lung bases, and small bilateral pleural effusions, compatible with mild pulmonary edema.      7.  Mild cardiomegaly.      8.  Mild centrilobular pulmonary emphysema.      9.  Mild mediastinal lymphadenopathy is  stable and most likely reactive.          US Carotis b/l 6/13/2022     Narrative      1.  16-49% right bulbar and internal carotid artery stenosis      2.  16-49% left bulbar and internal carotid artery stenosis      3.  Less than 50% stenosis is present in the external carotid arteries      bilaterally      4.  Antegrade vertebral flow bilaterally      5.  Compared to the patient's previous study done in 2020, there have been      no changes.  Recommend ongoing aggressive medical therapy and follow-up      surveillance study in 2 years.          Cardiac catheterization 5/20/22 Care Everywhere     CONCLUSION:      Severe single-vessel CAD.  Proximal RCA is 100% .      SVG to RPDA is patent and feeds the RPL system as well.      Left main, LAD, and left circumflex are relatively patent.      At least moderate aortic regurgitation noted.            Aortic Angiogram 5/20/22 Care Everywhere     CONCLUSION:      Severe single-vessel CAD.  Proximal RCA is 100% .      SVG to RPDA is patent and feeds the RPL system as well.      Left main, LAD, and left circumflex are relatively patent.      At least moderate aortic regurgitation noted.          Echocardiogram Care Everywhere 5/12/2022 Narrative             Mild left ventricular dilation, LVIDD 6.0 cm.        Mild eccentric left ventricular hypertrophy.        Mild global left ventricular systolic dysfunction, biplane EF 45%.        Grade 3 diastolic dysfunction, elevated left ventricular filling      pressure.        Severe left atrial enlargement, indexed left atrial volume 53 mL/m .        Dilated right atrium.        27 mm Natan Mitroflow bioprosthetic aortic valve with evidence of      significant degeneration.        Leaflet excursion appears to be adequate with effective orifice area of      1.6 cm , peak velocity 2.7 m/s, mean gradient 17 mmHg.        Evidence of severe aortic regurgitation.        Multiple valvular jets are noted with AI pressure half-time of  205 ms.        Evidence of holodiastolic flow reversal in proximal descending thoracic      aorta.        Trace mitral regurgitation.        Mild tricuspid regurgitation.        Mild pulmonary hypertension, estimated RVSP 46 mmHg.            Interval worsening of left ventricular systolic function.        Interval worsening of aortic regurgitation.        Left ventricle now appears to be dilated.      Management per Dr Perea     PFTs 4/6/2022 Care Everywhere     1.  Spirometry and flow volume loops reveal a severe obstructive airways      impairment, Gold class III.      2.  There is a significant change with bronchodilators on this test.      3.  Lung volumes are normal      4.  Diffusion capacity is essentially normal          Six Minute Walk Test 4/20/22     6 MIN WALK/EXERCISE OXIMETRY:      Patient was ambulated on 3 L/min.  He walked 507.2 m.  Lowest sat was 90%      Max heart rate was 109.  This is a normal 6-minute walk distance.       Felix Gu MD         The patient's records and results personally reviewed by this provider.     Outside records reviewed from: Care Everywhere    LAB/DIAGNOSTIC STUDIES TODAY: as above    Assessment  Maximino Birch is a 61 year old male seen as a PAC referral for risk assessment and optimization for anesthesia.    Plan/Recommendations  Pt will be optimized for the proposed procedure.  See below for details on the assessment, risk, and preoperative recommendations    NEUROLOGY  - No history of TIA, CVA or seizure  - Denies chronic pain  -Post Op delirium risk factors:  No risk identified    ENT  - No current airway concerns.  Will need to be reassessed day of surgery.  Mallampati: I  TM: > 3    CARDIAC  -  coronary artery disease and aortic stenosis status post CABG x1 (SVG-RCA) with concomitant  AVR (Natan Mitroflow bioprosthetic valve) and ascending aortic Dacron graft in 2013.   ~ Managed with ASA and statin    ~ Symptoms of dyspnea on exertion, decreased  oxygen saturation and early satietythis spring.  Found to have bioprosthetic aortic valve with evidence of  significant degeneration.    ~ above procedure planned.    ~ ASA pre-op instructions per cardiology  - Ischemic cardiomyopathy (EF of 44%)   ~ Torsemide, metoprolol and lisinopril    ~ Denies orthopnea, PND but endorses LE edema.  On exam, 3+ pitting edema, b/l. In last 24 hours, patient reports road in car for >550 miles form [place], [place], to [place].  Reports not as compliant with low salt diet in last 24-48 hours as eating in restaurants. Believes weight is up ~5 pounds over past 4 days.  Follows with Sammie Mason, JUVENAL, in [place] for ICM.  Instructed patient to continue to take medication as prescribed, keep feet elevated above the level of the heart when lying down and at night and to do his best to adhere to low sodium diet while they are traveling. Instructed to contact MsRobin Mason as soon as he gets back on Friday for further evaluation.  Notified Dr. Hogue's office and spoke with his nurse regarding the above information. LS were clear t/o. O2 sats 97% on RA.     - Dyslipidemia   ~ Statin and Repatha  - METS (Metabolic Equivalents) <4    PULMONARY  KAREN Medium Risk            Total Score: 3    KAREN: Hypertension    KAREN: Over 50 ys old    KAREN: Male      - COPD   ~ Hospitalized for pneumonia 2/2022    ~ Supplemental O2 3L/NC at night time only since discharge 2/2022   ~ Using inhalers as prescribed   ~ Lung sounds clear t/o today   ~ RN provided aerobika and teaching to help optimize lungs for surgery    - Tobacco History   History   Smoking Status     Former Smoker     Packs/day: 0.75     Years: 30.00     Quit date: 02/2022   Smokeless Tobacco     Never Used       GI  - denies GERD  PONV Medium Risk  Total Score: 2           1 AN PONV: Patient is not a current smoker    1 AN PONV: Intended Post Op Opioids        /RENAL  Creatinine   Date Value Ref Range Status   08/18/2022 1.38 (H) 0.66 -  1.25 mg/dL Final      ~ CKD.  Consideration for close monitoring of fluid status and avoid nephrotoxins.       ENDOCRINE    - BMI: Estimated body mass index is 28.68 kg/m  as calculated from the following:    Height as of this encounter: 1.829 m (6').    Weight as of this encounter: 95.9 kg (211 lb 8 oz).  Overweight (BMI 25.0-29.9)  - Elevated A1C.  Will need further evaluation after above surgery  Hemoglobin A1C   Date Value Ref Range Status   08/18/2022 6.1 (H) 0.0 - 5.6 % Final     Comment:     Normal <5.7%   Prediabetes 5.7-6.4%    Diabetes 6.5% or higher     Note: Adopted from ADA consensus guidelines.         HEME  VTE Low Risk 0.5%            Total Score: 2    VTE: Male      - Platelet disfunction second to Aspirin (Darshana, many others)  - Denies h/o anemia   - Type and scree done today       Patient was discussed with Dr Long    The patient is optimized for their procedure. AVS with information on surgery time/arrival time, meds and NPO status given by nursing staff. No further diagnostic testing indicated.      On the day of service:     Prep time: 20 minutes  Visit time: 30 minutes  Documentation time: 30 minutes  ------------------------------------------  Total time: 80 minutes      MARY Gallo CNP  Preoperative Assessment Center  [facility]  Phone: [phone]  Fax: [phone]

## 2022-08-18 NOTE — PATIENT INSTRUCTIONS
You were seen today in the [facility]    Plan for surgery on September 1 with Dr. Hogue.    Please call STACIE Jane surgery coordinator with any questions.  Thank you.    Phone [phone]  Fax [phone]

## 2022-08-19 LAB
ATRIAL RATE - MUSE: 80 BPM
DIASTOLIC BLOOD PRESSURE - MUSE: NORMAL MMHG
INTERPRETATION ECG - MUSE: NORMAL
P AXIS - MUSE: 29 DEGREES
PR INTERVAL - MUSE: 142 MS
QRS DURATION - MUSE: 112 MS
QT - MUSE: 416 MS
QTC - MUSE: 479 MS
R AXIS - MUSE: 261 DEGREES
SYSTOLIC BLOOD PRESSURE - MUSE: NORMAL MMHG
T AXIS - MUSE: 70 DEGREES
VENTRICULAR RATE- MUSE: 80 BPM

## 2022-08-20 NOTE — PROGRESS NOTES
Service Date: 08/18/2022    REFERRING CARDIOLOGIST:  Dr. Ayan Lopez, [facility], [place], [place] and Azeb Jay CNP     REASON FOR CONSULTATION:  Evaluation for reoperative cardiac surgery for severe prosthetic aortic valve degeneration.    HISTORY OF PRESENT ILLNESS:  Mr. Birch is a very pleasant 61-year-old gentleman who was referred to us by Dr. Lopez of [facility] for consideration for reoperative cardiac surgery.  He is otherwise healthy and active gentleman with a history of aortic stenosis, bicuspid aortic valve and ascending aortic arch aneurysm and single-vessel coronary artery disease who underwent a sternotomy AVR with a 27 mm Natan Mitroflow bovine pericardial valve, ascending aorta/hemiarch replacement under deep hypothermic circulatory arrest using a 34 mm Dacron interposition graft and coronary artery bypass grafting x1 with reverse saphenous vein graft to the PDA with a circular arrest and antegrade cerebral perfusion in 2013.  I do have the operative records and the aortic root was not replaced at that time.  He has done well over the years, but over the last 6 months to a year or so, he has developed progressively worsening dyspnea on exertion.  A recent echocardiogram in May demonstrated severe prosthetic aortic valve insufficiency from valve degeneration.  His LVEF was around 45%, which appears to be his baseline.  The aorta had a coronary angiogram demonstrating patent vein graft to the PDA with no significant new coronary artery disease.  Recent CT angiogram of the chest was also performed in July that demonstrated stable findings with no new aneurysmal disease.  He does have a history of COPD, and per the records, it appears that he has been on home O2 in the past, but his recent PFTs demonstrated FEV1 predicted greater than 60% and he presents to clinic today completely ambulatory and not on any supplemental oxygen.  He did quit smoking in February of this  year after a 22-pack-year smoking history.    PAST MEDICAL HISTORY:  Aortic stenosis, bicuspid aortic valve, ascending aortic aneurysm, as described above, coronary artery disease, COPD, congestive heart failure, baseline EF around 40%.    PAST SURGICAL HISTORY:  Sternotomy CABG x, ascending aorta replacement with a hemiarch and AVR with a 27 mm bioprosthetic valve as described above.    ALLERGIES:  No known drug allergies.    CURRENT OUTPATIENT MEDICATIONS:  Reviewed in [facility] chart.    FAMILY HISTORY:  Noncontributory.    SOCIAL HISTORY:  22-pack-year smoking history, quit in February.  He drinks about 5 alcoholic beverages a week.  He lives independently with his wife.  He has not been able to work this year.     REVIEW OF SYSTEMS:  As per HPI.  All other 10-point review of systems are completed and were otherwise negative unless stated above.    PHYSICAL EXAMINATION:    VITAL SIGNS:  All vital signs are stable.  GENERAL:  He appears well, in no acute distress.  HEENT:  Within normal limits.  NECK:  Supple, no lymphadenopathy.  CARDIOVASCULAR:  Regular rate and rhythm, normal S1, S2.  Grade 3/6 diastolic murmur at the right upper sternal border.  LUNGS:  Clear bilaterally.  ABDOMEN:  Soft, nontender, nondistended.  EXTREMITIES:  Negative for edema, cyanosis or clubbing.  NEUROLOGIC:  A and O x3 with no focal deficits.    PERTINENT LABORATORY STUDIES:  His FEV1 on recent PFTs was 37% predicted and he does have a significant obstructive coronary pulmonary disease, but his DLCO is 78% predicted.  Transthoracic echo from 05/12/2022 demonstrated severe AI, mean gradient across the prosthetic aortic valve 17 mmHg, LVEF of 45%.  CT angiogram of the chest 06/23/2022 demonstrated no aneurysmal disease with a stable ascending hemiarch graft.  A coronary angiogram from last month done at [facility] demonstrated patent saphenous vein graft with no new obstructive coronary artery disease.    ASSESSMENT AND PLAN:    Queta is a 61-year-old gentleman with prosthetic aortic valve degeneration with severe aortic valve insufficiency.  He is quite symptomatic.  He does have some significant baseline COPD, but I think his recent progressive dyspnea on exertion is definitely related to his aortic valve degeneration.  My recommendation is a redo sternotomy, redo aortic valve replacement with a On-X valve.  He understands that he would have to be on lifelong Coumadin and he is okay with this.  I went over the diagnosis in detail and reason for recommending the operation.  I went over the risks and benefits of the operation and the potential complications associated with the surgery.  These complications include but are not strictly limited to infection, bleeding, stroke, postop MI, postop arrhythmias, pulmonary or renal complications.  I think overall he is a good redo surgical candidate despite his COPD and I quoted an operative mortality risk of around 3%.  Having seeing his PFTs after the fact, however, his FEV1 was under 40% predicted.  That would definitely put him at a higher risk for pulmonary complications, but I definitely do not think his operative risk is in the prohibitively high range.  I went over the potential complications of surgery including but not strictly limited to infection, bleeding, stroke, postop MI, postoperative arrhythmias, pulmonary or renal complications.  He wishes to proceeding.  The patient will be scheduled for redo sternotomy, redo aortic valve replacement at the [facility] on 2022.  It was a pleasure meeting Mr. Birch.  Thank you very much for this referral.    April Hogue MD        D: 2022   T: 2022   MT: dario    Name:     CAROLE BIRCH  MRN:      [id number]        Account:      [id number]   :      1960           Service Date: 2022       Document: [id number]

## 2022-08-22 ENCOUNTER — PRE VISIT (OUTPATIENT)
Dept: SURGERY | Facility: CLINIC | Age: 62
End: 2022-08-22

## 2022-08-25 ENCOUNTER — TELEPHONE (OUTPATIENT)
Dept: CARDIOLOGY | Facility: CLINIC | Age: 62
End: 2022-08-25
Payer: COMMERCIAL

## 2022-08-25 NOTE — TELEPHONE ENCOUNTER
Patient called into clinic while this nurse was out of the office and left message regarding lower extremity swelling concerns, however, patient was in [place].  Attempted to return call to patient regarding his concerns however, no answer so left voicemail for patient to call back to clinic.

## 2022-08-25 NOTE — TELEPHONE ENCOUNTER
Patient states that the swelling that the doctor in [place] was concerned with has improved. He states that he will continue to monitor this and will call if the swelling worsens. Patient feels that the swelling was worse because of his travel up to [place] and not wearing compression. Patient was instructed to wear compression on next travel to [place], as well as to elevate legs and to stop and walk every 2 hours on drive to [place]. Patient verbalized understanding and was in agreement with this plan. No further questions or concerns at this time.

## 2022-08-29 ENCOUNTER — TELEPHONE (OUTPATIENT)
Dept: CARDIOLOGY | Facility: CLINIC | Age: 62
End: 2022-08-29

## 2022-08-29 ENCOUNTER — APPOINTMENT (OUTPATIENT)
Dept: LAB | Facility: URGENT CARE | Age: 62
End: 2022-08-29
Payer: COMMERCIAL

## 2022-08-29 DIAGNOSIS — Z11.52 ENCOUNTER FOR SCREENING FOR COVID-19: ICD-10-CM

## 2022-08-29 LAB — SARS-COV-2 RNA RESP QL NAA+PROBE: NEGATIVE

## 2022-08-29 PROCEDURE — 99211 OFF/OP EST MAY X REQ PHY/QHP: CPT | Mod: CS,LAB | Performed by: FAMILY MEDICINE

## 2022-08-29 PROCEDURE — 87635 SARS-COV-2 COVID-19 AMP PRB: CPT | Performed by: FAMILY MEDICINE

## 2022-08-29 NOTE — TELEPHONE ENCOUNTER
Called patient to follow up on recent fluid overload. Patient contacted his heart failure office and spoke to RN. Pt is currently 203 lbs, down from 207 lbs. Feels like he has less swelling in his legs and abdomen.    Patient's medications have been changed. He is currently taking potassium 60 meq and furosemide 60 mg once a day changed from torsemide.    TruHearingID test done today. Patient will take a screenshot of results. Patient will start traveling to [facility] today.

## 2022-08-31 ENCOUNTER — TELEPHONE (OUTPATIENT)
Dept: URGENT CARE | Facility: URGENT CARE | Age: 62
End: 2022-08-31
Payer: COMMERCIAL

## 2022-08-31 NOTE — TELEPHONE ENCOUNTER
Robina, patients wife calls to obtain Covid pre-op results for his surgery that is set for tomorrow morning.  He came through our [employer] drive-thru.  She reports they can access in [employer] but that they would like a paper copy because of the seriousness of his upcoming surgery. Results routed to the appropriate destination for their surgeon.

## 2022-09-01 ENCOUNTER — HOSPITAL ENCOUNTER (OUTPATIENT)
Facility: CLINIC | Age: 62
Discharge: HOME | End: 2022-09-01
Attending: SURGERY | Admitting: SURGERY
Payer: COMMERCIAL

## 2022-09-01 ENCOUNTER — PREP FOR PROCEDURE (OUTPATIENT)
Dept: CARDIOLOGY | Facility: CLINIC | Age: 62
End: 2022-09-01

## 2022-09-01 VITALS — BODY MASS INDEX: 28.36 KG/M2 | WEIGHT: 209.35 LBS | HEIGHT: 72 IN

## 2022-09-01 DIAGNOSIS — T82.897A AORTIC PROSTHETIC VALVE REGURGITATION: ICD-10-CM

## 2022-09-01 DIAGNOSIS — I35.0 AORTIC STENOSIS: Primary | ICD-10-CM

## 2022-09-01 LAB
ABO/RH(D): NORMAL
ANTIBODY SCREEN: NEGATIVE
GLUCOSE BLDC GLUCOMTR-MCNC: 106 MG/DL (ref 70–99)
MRSA DNA SPEC QL NAA+PROBE: NEGATIVE
SA TARGET DNA: NEGATIVE
SPECIMEN EXPIRATION DATE: NORMAL

## 2022-09-01 PROCEDURE — 86850 RBC ANTIBODY SCREEN: CPT | Performed by: STUDENT IN AN ORGANIZED HEALTH CARE EDUCATION/TRAINING PROGRAM

## 2022-09-01 PROCEDURE — 86901 BLOOD TYPING SEROLOGIC RH(D): CPT | Performed by: STUDENT IN AN ORGANIZED HEALTH CARE EDUCATION/TRAINING PROGRAM

## 2022-09-01 PROCEDURE — 36415 COLL VENOUS BLD VENIPUNCTURE: CPT | Performed by: STUDENT IN AN ORGANIZED HEALTH CARE EDUCATION/TRAINING PROGRAM

## 2022-09-01 PROCEDURE — 250N000009 HC RX 250: Performed by: SURGERY

## 2022-09-01 PROCEDURE — 258N000003 HC RX IP 258 OP 636: Performed by: SURGERY

## 2022-09-01 PROCEDURE — 87641 MR-STAPH DNA AMP PROBE: CPT | Performed by: SURGERY

## 2022-09-01 PROCEDURE — 999N000001 HC CANCELLED SURGERY UP TO 15 MINS: Performed by: SURGERY

## 2022-09-01 PROCEDURE — 250N000011 HC RX IP 250 OP 636: Performed by: SURGERY

## 2022-09-01 PROCEDURE — 999N000141 HC STATISTIC PRE-PROCEDURE NURSING ASSESSMENT: Performed by: SURGERY

## 2022-09-01 RX ORDER — NALOXONE HYDROCHLORIDE 0.4 MG/ML
0.2 INJECTION, SOLUTION INTRAMUSCULAR; INTRAVENOUS; SUBCUTANEOUS
Status: DISCONTINUED | OUTPATIENT
Start: 2022-09-01 | End: 2022-09-01 | Stop reason: HOSPADM

## 2022-09-01 RX ORDER — PHENYLEPHRINE HCL IN 0.9% NACL 50MG/250ML
.1-6 PLASTIC BAG, INJECTION (ML) INTRAVENOUS CONTINUOUS
Status: DISCONTINUED | OUTPATIENT
Start: 2022-09-01 | End: 2022-09-01 | Stop reason: HOSPADM

## 2022-09-01 RX ORDER — ASPIRIN 81 MG/1
162 TABLET, CHEWABLE ORAL
Status: DISCONTINUED | OUTPATIENT
Start: 2022-09-01 | End: 2022-09-01 | Stop reason: HOSPADM

## 2022-09-01 RX ORDER — SODIUM CHLORIDE, SODIUM LACTATE, POTASSIUM CHLORIDE, CALCIUM CHLORIDE 600; 310; 30; 20 MG/100ML; MG/100ML; MG/100ML; MG/100ML
INJECTION, SOLUTION INTRAVENOUS CONTINUOUS
Status: DISCONTINUED | OUTPATIENT
Start: 2022-09-01 | End: 2022-09-01 | Stop reason: HOSPADM

## 2022-09-01 RX ORDER — ASPIRIN 81 MG/1
81 TABLET, CHEWABLE ORAL
Status: DISCONTINUED | OUTPATIENT
Start: 2022-09-01 | End: 2022-09-01 | Stop reason: HOSPADM

## 2022-09-01 RX ORDER — FLUMAZENIL 0.1 MG/ML
0.2 INJECTION, SOLUTION INTRAVENOUS
Status: DISCONTINUED | OUTPATIENT
Start: 2022-09-01 | End: 2022-09-01 | Stop reason: HOSPADM

## 2022-09-01 RX ORDER — NALOXONE HYDROCHLORIDE 0.4 MG/ML
0.4 INJECTION, SOLUTION INTRAMUSCULAR; INTRAVENOUS; SUBCUTANEOUS
Status: DISCONTINUED | OUTPATIENT
Start: 2022-09-01 | End: 2022-09-01 | Stop reason: HOSPADM

## 2022-09-01 RX ORDER — NOREPINEPHRINE BITARTRATE 0.06 MG/ML
.01-.1 INJECTION, SOLUTION INTRAVENOUS CONTINUOUS
Status: DISCONTINUED | OUTPATIENT
Start: 2022-09-01 | End: 2022-09-01 | Stop reason: HOSPADM

## 2022-09-01 RX ORDER — ACETAMINOPHEN 325 MG/1
975 TABLET ORAL ONCE
Status: DISCONTINUED | OUTPATIENT
Start: 2022-09-01 | End: 2022-09-01 | Stop reason: HOSPADM

## 2022-09-01 RX ORDER — DEXMEDETOMIDINE HYDROCHLORIDE 4 UG/ML
0.2-1.2 INJECTION, SOLUTION INTRAVENOUS CONTINUOUS
Status: DISCONTINUED | OUTPATIENT
Start: 2022-09-01 | End: 2022-09-01 | Stop reason: HOSPADM

## 2022-09-01 RX ORDER — LIDOCAINE 40 MG/G
CREAM TOPICAL
Status: DISCONTINUED | OUTPATIENT
Start: 2022-09-01 | End: 2022-09-01 | Stop reason: HOSPADM

## 2022-09-01 RX ORDER — CEFAZOLIN SODIUM/WATER 2 G/20 ML
2 SYRINGE (ML) INTRAVENOUS SEE ADMIN INSTRUCTIONS
Status: DISCONTINUED | OUTPATIENT
Start: 2022-09-01 | End: 2022-09-01 | Stop reason: HOSPADM

## 2022-09-01 RX ORDER — CHLORHEXIDINE GLUCONATE ORAL RINSE 1.2 MG/ML
10 SOLUTION DENTAL ONCE
Status: DISCONTINUED | OUTPATIENT
Start: 2022-09-01 | End: 2022-09-01 | Stop reason: HOSPADM

## 2022-09-01 RX ORDER — FAMOTIDINE 20 MG/1
20 TABLET, FILM COATED ORAL
Status: DISCONTINUED | OUTPATIENT
Start: 2022-09-01 | End: 2022-09-01 | Stop reason: HOSPADM

## 2022-09-01 RX ORDER — GABAPENTIN 300 MG/1
300 CAPSULE ORAL
Status: DISCONTINUED | OUTPATIENT
Start: 2022-09-01 | End: 2022-09-01 | Stop reason: HOSPADM

## 2022-09-01 RX ORDER — CEFAZOLIN SODIUM/WATER 2 G/20 ML
2 SYRINGE (ML) INTRAVENOUS
Status: DISCONTINUED | OUTPATIENT
Start: 2022-09-01 | End: 2022-09-01 | Stop reason: HOSPADM

## 2022-09-01 RX ORDER — FENTANYL CITRATE 50 UG/ML
25-50 INJECTION, SOLUTION INTRAMUSCULAR; INTRAVENOUS
Status: DISCONTINUED | OUTPATIENT
Start: 2022-09-01 | End: 2022-09-01 | Stop reason: HOSPADM

## 2022-09-01 ASSESSMENT — ACTIVITIES OF DAILY LIVING (ADL)
ADLS_ACUITY_SCORE: 18
ADLS_ACUITY_SCORE: 16
ADLS_ACUITY_SCORE: 18

## 2022-09-01 NOTE — OR NURSING
Procedure was canceled this morning due to surgeon request to reschedule. Provide spoke with patient regarding situation. No invasive procedures were done. IV was not started and no pre op meds were given.

## 2022-09-02 ENCOUNTER — ANESTHESIA EVENT (OUTPATIENT)
Dept: SURGERY | Facility: CLINIC | Age: 62
End: 2022-09-02
Payer: COMMERCIAL

## 2022-09-02 ENCOUNTER — LAB (OUTPATIENT)
Dept: LAB | Facility: CLINIC | Age: 62
End: 2022-09-02
Payer: COMMERCIAL

## 2022-09-02 ENCOUNTER — TELEPHONE (OUTPATIENT)
Dept: CARDIOLOGY | Facility: CLINIC | Age: 62
End: 2022-09-02

## 2022-09-02 DIAGNOSIS — T82.897A AORTIC PROSTHETIC VALVE REGURGITATION: ICD-10-CM

## 2022-09-02 DIAGNOSIS — T82.897A AORTIC PROSTHETIC VALVE REGURGITATION: Primary | ICD-10-CM

## 2022-09-02 PROCEDURE — U0005 INFEC AGEN DETEC AMPLI PROBE: HCPCS | Performed by: PATHOLOGY

## 2022-09-02 PROCEDURE — U0003 INFECTIOUS AGENT DETECTION BY NUCLEIC ACID (DNA OR RNA); SEVERE ACUTE RESPIRATORY SYNDROME CORONAVIRUS 2 (SARS-COV-2) (CORONAVIRUS DISEASE [COVID-19]), AMPLIFIED PROBE TECHNIQUE, MAKING USE OF HIGH THROUGHPUT TECHNOLOGIES AS DESCRIBED BY CMS-2020-01-R: HCPCS | Performed by: PATHOLOGY

## 2022-09-02 PROCEDURE — 99000 SPECIMEN HANDLING OFFICE-LAB: CPT | Performed by: PATHOLOGY

## 2022-09-02 NOTE — TELEPHONE ENCOUNTER
Patient called to inform that his surgery was moved to 9/6 and this will require a new Covid test. Pt informed that our  [facility] will contact and schedule a test within 4 days of surgery. Patient informed to take Aspirin up until the morning of surgery, to hold it on 9/6. Patient verbalized understanding and agreed to the plan.

## 2022-09-02 NOTE — TELEPHONE ENCOUNTER
Scheduled pt for pre procedure covid test today at 330 at [facility]. Pt is aware of time and location

## 2022-09-03 LAB — SARS-COV-2 RNA RESP QL NAA+PROBE: NEGATIVE

## 2022-09-04 ASSESSMENT — COPD QUESTIONNAIRES: COPD: 1

## 2022-09-04 ASSESSMENT — LIFESTYLE VARIABLES: TOBACCO_USE: 1

## 2022-09-04 NOTE — ANESTHESIA PREPROCEDURE EVALUATION
Anesthesia Pre-Procedure Evaluation    Patient: Maximino Birch   MRN: [id number] : 1960        Procedure : Procedure(s):  STERNOTOMY, REPEAT, WITH AORTIC VALVE REPAIR AND ANY ASSOCIATED PROCEDURES, transesophageal echocardiogram (NATY)  possible REPLACEMENT, AORTIC VALVE, WITH REPEAT STERNOTOMY          Past Medical History:   Diagnosis Date     Congestive heart failure (H)      COPD (chronic obstructive pulmonary disease) (H)      Coronary artery disease      Dyspnea on exertion      Heart attack (H)      Heart murmur      History of blood transfusion      Oxygen dependent      Uncomplicated asthma      Walking troubles       Past Surgical History:   Procedure Laterality Date     CARDIAC SURGERY      coronary artery disease and aortic stenosis status post CABG x1 (SVG-RCA) with concomitant  AVR (Natan Mitroflow bioprosthetic valve) and ascending aortic Dacron graft in 2013     ORTHOPEDIC SURGERY        Allergies   Allergen Reactions     Ezetimibe Rash      Social History     Tobacco Use     Smoking status: Former Smoker     Packs/day: 0.75     Years: 30.00     Pack years: 22.50     Quit date: 2022     Years since quittin.5     Smokeless tobacco: Never Used   Substance Use Topics     Alcohol use: Yes     Comment: 4-5 drinks per week      Wt Readings from Last 1 Encounters:   22 95 kg (209 lb 5.6 oz)        Anesthesia Evaluation   Pt has had prior anesthetic. Type: MAC and General.    No history of anesthetic complications       ROS/MED HX  ENT/Pulmonary:     (+) KAREN risk factors, snores loudly, hypertension, tobacco use (Quit 2022. Smoked 1 PPD for 40 years. ), Past use, COPD (Uses 3L/NC at night. ), recent URI, resolved, Pneumonia 2022 (tested (-) for COVID - hospitalized required supplemental oxygen since this time. :  (-) asthma   Neurologic:  - neg neurologic ROS     Cardiovascular: Comment: Carotid US 2022: 1.  16-49% right bulbar and internal carotid artery stenosis   2.  16-49%  left bulbar and internal carotid artery stenosis   3.  Less than 50% stenosis is present in the external carotid arteries   bilaterally   4.  Antegrade vertebral flow bilaterally     Hx incomplete left palmar arch w/ incomplete flow to 1st and 5th digits w/ radial a. Compression on US.    (+) Dyslipidemia hypertension--CAD -past MI (Inferior MI 2013) CABG-date: SVG to RCA . -Taking blood thinners CHF etiology: ischemic Last EF: 44 CASILLAS. Previous cardiac testing   Echo: Date: 5/2022 Results:    Mild left ventricular dilation, LVIDD 6.0 cm.     Mild eccentric left ventricular hypertrophy.     Mild global left ventricular systolic dysfunction, biplane EF 45%.     Grade 3 diastolic dysfunction, elevated left ventricular filling   pressure.     Severe left atrial enlargement, indexed left atrial volume 53 mL/m .     Dilated right atrium.     27 mm Natan Mitroflow bioprosthetic aortic valve with evidence of   significant degeneration.     Leaflet excursion appears to be adequate with effective orifice area of   1.6 cm , peak velocity 2.7 m/s, mean gradient 17 mmHg.     Evidence of severe aortic regurgitation.     Multiple valvular jets are noted with AI pressure half-time of 205 ms.     Evidence of holodiastolic flow reversal in proximal descending thoracic   aorta.     Trace mitral regurgitation.     Mild tricuspid regurgitation.     Mild pulmonary hypertension, estimated RVSP 46 mmHg.     Stress Test: Date: Results:    ECG Reviewed: Date: Results:    Cath: Date: 5/2022 Results:  CONCLUSION:     Severe single-vessel CAD.  Proximal RCA is 100% . SVG to RPDA is patent and feeds the RPL system as well.    Left main, LAD, and left circumflex are relatively patent.    At least moderate aortic regurgitation noted.       (-) angina and angina   METS/Exercise Tolerance:  Comment: Limited mobility during high temperature and humidity causing shortness of breath. On cooler days he believes he could walk 2-3 blocks slowly.     Hematologic:     (+) history of blood transfusion, no previous transfusion reaction, Known PRBC Anitbodies:No     Musculoskeletal: Comment: S/p right ankle surgery with hardware.  Hardware has since been removed.   (+) arthritis (right ankle),     GI/Hepatic: Comment: Early satiety      Renal/Genitourinary:  - neg Renal ROS     Endo:  - neg endo ROS     Psychiatric/Substance Use:    (-) psychiatric history, alcohol abuse history and chronic opioid use history   Infectious Disease: Comment: COVID (+) 11/2020 with loss of taste and smell     - neg infectious disease ROS     Malignancy:   (+) Malignancy (BCC on left shoulder), History of Skin.Skin CA Remission status post Surgery.        Other: Comment: Works for a [employer].  On disability since February 2022.               OUTSIDE LABS:  CBC:   Lab Results   Component Value Date    WBC 7.6 08/18/2022    HGB 14.8 08/18/2022    HCT 44.9 08/18/2022     (L) 08/18/2022     BMP:   Lab Results   Component Value Date     08/18/2022    POTASSIUM 4.0 08/18/2022    CHLORIDE 105 08/18/2022    CO2 26 08/18/2022    BUN 32 (H) 08/18/2022    CR 1.38 (H) 08/18/2022     (H) 09/01/2022     (H) 08/18/2022     COAGS:   Lab Results   Component Value Date    PTT 30 08/18/2022    INR 1.30 (H) 08/18/2022     POC: No results found for: BGM, HCG, HCGS  HEPATIC:   Lab Results   Component Value Date    ALBUMIN 3.8 08/18/2022    PROTTOTAL 6.9 08/18/2022    ALT 35 08/18/2022    AST 28 08/18/2022    ALKPHOS 108 08/18/2022    BILITOTAL 0.9 08/18/2022     OTHER:   Lab Results   Component Value Date    A1C 6.1 (H) 08/18/2022    ISREAL 9.2 08/18/2022    MAG 2.3 08/18/2022       Anesthesia Plan    ASA Status:  4      Anesthesia Type: General.     - Airway: ETT   Induction: Intravenous.   Maintenance: Balanced.   Techniques and Equipment:     - Lines/Monitors: Arterial Line, 2nd IV, Central Line, PAC, CVP, BIS, NIRS, NATY (Avoid L radial A line w/ hx of incomplete palmar  arch)            NATY Absolute Contra-indication: NONE            NATY Relative Contra-indication: NONE     - Blood: Blood in Room, T&C     - Drips/Meds: Norepi, Epinephrine, Vasopressin     Consents            Postoperative Care    Pain management: IV analgesics, Oral pain medications, Peripheral nerve block (Continuous), Multi-modal analgesia.   PONV prophylaxis: Ondansetron (or other 5HT-3), Dexamethasone or Solumedrol     Comments:                Carl Ng MD

## 2022-09-06 ENCOUNTER — ANESTHESIA (OUTPATIENT)
Dept: SURGERY | Facility: CLINIC | Age: 62
End: 2022-09-06
Payer: COMMERCIAL

## 2022-09-06 ENCOUNTER — APPOINTMENT (OUTPATIENT)
Dept: GENERAL RADIOLOGY | Facility: CLINIC | Age: 62
End: 2022-09-06
Attending: PHYSICIAN ASSISTANT
Payer: COMMERCIAL

## 2022-09-06 ENCOUNTER — APPOINTMENT (OUTPATIENT)
Dept: GENERAL RADIOLOGY | Facility: CLINIC | Age: 62
End: 2022-09-06
Attending: SURGERY
Payer: COMMERCIAL

## 2022-09-06 ENCOUNTER — APPOINTMENT (OUTPATIENT)
Dept: GENERAL RADIOLOGY | Facility: CLINIC | Age: 62
End: 2022-09-06
Attending: STUDENT IN AN ORGANIZED HEALTH CARE EDUCATION/TRAINING PROGRAM
Payer: COMMERCIAL

## 2022-09-06 ENCOUNTER — HOSPITAL ENCOUNTER (INPATIENT)
Facility: CLINIC | Age: 62
LOS: 10 days | Discharge: HOME | End: 2022-09-16
Attending: SURGERY | Admitting: SURGERY
Payer: COMMERCIAL

## 2022-09-06 DIAGNOSIS — I89.0 LYMPHEDEMA OF LOWER EXTREMITY: ICD-10-CM

## 2022-09-06 DIAGNOSIS — T82.120A DISPLACEMENT OF ATRIAL PACEMAKER LEADS, INITIAL ENCOUNTER: ICD-10-CM

## 2022-09-06 DIAGNOSIS — I44.2 COMPLETE ATRIOVENTRICULAR BLOCK (H): ICD-10-CM

## 2022-09-06 DIAGNOSIS — I35.1 NONRHEUMATIC AORTIC VALVE INSUFFICIENCY: Primary | ICD-10-CM

## 2022-09-06 DIAGNOSIS — R91.1 LUNG NODULE: ICD-10-CM

## 2022-09-06 DIAGNOSIS — Z95.2 S/P AVR: ICD-10-CM

## 2022-09-06 DIAGNOSIS — E78.2 MIXED HYPERLIPIDEMIA: ICD-10-CM

## 2022-09-06 DIAGNOSIS — I50.42 CHRONIC COMBINED SYSTOLIC AND DIASTOLIC HEART FAILURE (H): ICD-10-CM

## 2022-09-06 DIAGNOSIS — I25.118 ATHEROSCLEROSIS OF NATIVE CORONARY ARTERY OF NATIVE HEART WITH STABLE ANGINA PECTORIS (H): ICD-10-CM

## 2022-09-06 DIAGNOSIS — J44.1 COPD EXACERBATION (H): ICD-10-CM

## 2022-09-06 DIAGNOSIS — I25.5 ISCHEMIC CARDIOMYOPATHY: ICD-10-CM

## 2022-09-06 LAB
ALBUMIN SERPL BCG-MCNC: 2.7 G/DL (ref 3.5–5.2)
ALP SERPL-CCNC: 61 U/L (ref 40–129)
ALT SERPL W P-5'-P-CCNC: 18 U/L (ref 10–50)
ANION GAP SERPL CALCULATED.3IONS-SCNC: 13 MMOL/L (ref 7–15)
APTT PPP: 67 SECONDS (ref 22–38)
APTT PPP: 80 SECONDS (ref 22–38)
AST SERPL W P-5'-P-CCNC: 44 U/L (ref 10–50)
ATRIAL RATE - MUSE: 84 BPM
BASE EXCESS BLDA CALC-SCNC: -0.7 MMOL/L (ref -9–1.8)
BASE EXCESS BLDA CALC-SCNC: 0.5 MMOL/L (ref -9.6–2)
BASE EXCESS BLDA CALC-SCNC: 0.8 MMOL/L (ref -9.6–2)
BASE EXCESS BLDA CALC-SCNC: 0.9 MMOL/L (ref -9.6–2)
BASE EXCESS BLDA CALC-SCNC: 1.6 MMOL/L (ref -9.6–2)
BASE EXCESS BLDA CALC-SCNC: 2.2 MMOL/L (ref -9.6–2)
BASE EXCESS BLDA CALC-SCNC: 2.8 MMOL/L (ref -9.6–2)
BASE EXCESS BLDA CALC-SCNC: 3.3 MMOL/L (ref -9.6–2)
BASE EXCESS BLDA CALC-SCNC: 3.4 MMOL/L (ref -9.6–2)
BASE EXCESS BLDA CALC-SCNC: 4.3 MMOL/L (ref -9.6–2)
BASE EXCESS BLDA CALC-SCNC: 4.3 MMOL/L (ref -9.6–2)
BASE EXCESS BLDA CALC-SCNC: 4.7 MMOL/L (ref -9.6–2)
BASE EXCESS BLDV CALC-SCNC: 2.1 MMOL/L (ref -7.7–1.9)
BASE EXCESS BLDV CALC-SCNC: 2.4 MMOL/L (ref -7.7–1.9)
BASE EXCESS BLDV CALC-SCNC: 5 MMOL/L (ref -8.1–1.9)
BILIRUB SERPL-MCNC: 1.7 MG/DL
BLD PROD TYP BPU: NORMAL
BLOOD COMPONENT TYPE: NORMAL
BUN SERPL-MCNC: 21.4 MG/DL (ref 8–23)
CA-I BLD-MCNC: 4.1 MG/DL (ref 4.4–5.2)
CA-I BLD-MCNC: 4.2 MG/DL (ref 4.4–5.2)
CA-I BLD-MCNC: 4.3 MG/DL (ref 4.4–5.2)
CA-I BLD-MCNC: 4.3 MG/DL (ref 4.4–5.2)
CA-I BLD-MCNC: 4.4 MG/DL (ref 4.4–5.2)
CA-I BLD-MCNC: 4.4 MG/DL (ref 4.4–5.2)
CA-I BLD-MCNC: 4.5 MG/DL (ref 4.4–5.2)
CA-I BLD-MCNC: 4.6 MG/DL (ref 4.4–5.2)
CA-I BLD-MCNC: 4.7 MG/DL (ref 4.4–5.2)
CA-I BLD-MCNC: 4.7 MG/DL (ref 4.4–5.2)
CA-I BLD-MCNC: 4.9 MG/DL (ref 4.4–5.2)
CALCIUM SERPL-MCNC: 8.7 MG/DL (ref 8.6–10)
CF REDUC 30M P MA P HEP LENFR BLD TEG: 0 % (ref 0–8)
CF REDUC 60M P MA LENFR BLD TEG: 0 % (ref 0–15)
CF REDUC 60M P MA LENFR BLD TEG: 1.8 % (ref 0–15)
CF REDUC 60M P MA LENFR BLD TEG: 4.3 % (ref 0–15)
CF REDUC 60M P MA LENFR BLD TEG: 4.6 % (ref 0–15)
CF REDUC 60M P MA LENFR BLD TEG: ABNORMAL %
CF REDUC 60M P MA P HEPASE LENFR BLD TEG: 0 % (ref 0–15)
CF REDUC 60M P MA P HEPASE LENFR BLD TEG: 0 % (ref 0–15)
CF REDUC 60M P MA P HEPASE LENFR BLD TEG: ABNORMAL %
CFT BLD TEG: 1.8 MINUTE (ref 1–3)
CFT BLD TEG: 2.4 MINUTE (ref 1–3)
CFT BLD TEG: 2.7 MINUTE (ref 1–3)
CFT P HPASE BLD TEG: 1.3 MINUTE (ref 1–3)
CFT P HPASE BLD TEG: 1.3 MINUTE (ref 1–3)
CFT P HPASE BLD TEG: 2.4 MINUTE (ref 1–3)
CHLORIDE SERPL-SCNC: 103 MMOL/L (ref 98–107)
CI (COAGULATION INDEX)(Z) NON NATIVE: -0.4 (ref -3–3)
CI (COAGULATION INDEX)(Z) NON NATIVE: -2.2 (ref -3–3)
CI (COAGULATION INDEX)(Z) NON NATIVE: 0.6 (ref -3–3)
CI (COAGULATION INDEX)(Z) NON NATIVE: 0.9 (ref -3–3)
CI (COAGULATION INDEX)(Z) NON NATIVE: 1.2 (ref -3–3)
CI (COAGULATION INDEX)(Z): 0 (ref -3–3)
CI (COAGULATION INDEX)(Z): 2 (ref -3–3)
CI (COAGULATION INDEX)(Z): 2.2 (ref -3–3)
CLOT ANGLE BLD TEG: 56.8 DEGREES (ref 53–72)
CLOT ANGLE BLD TEG: 61.3 DEGREES (ref 53–72)
CLOT ANGLE BLD TEG: 66.4 DEGREES (ref 53–72)
CLOT ANGLE BLD TEG: 67.4 DEGREES (ref 53–72)
CLOT ANGLE BLD TEG: 68 DEGREES (ref 53–72)
CLOT ANGLE P HPASE BLD TEG: 62.8 DEGREES (ref 53–72)
CLOT ANGLE P HPASE BLD TEG: 71.1 DEGREES (ref 53–72)
CLOT ANGLE P HPASE BLD TEG: 71.3 DEGREES (ref 53–72)
CLOT INIT BLD TEG: 4.2 MINUTE (ref 5–10)
CLOT INIT BLD TEG: 4.8 MINUTE (ref 5–10)
CLOT INIT BLD TEG: 4.9 MINUTE (ref 5–10)
CLOT INIT BLD TEG: 5.2 MINUTE (ref 5–10)
CLOT INIT BLD TEG: 7.2 MINUTE (ref 5–10)
CLOT INIT P HPASE BLD TEG: 4.1 MINUTE (ref 5–10)
CLOT INIT P HPASE BLD TEG: 4.3 MINUTE (ref 5–10)
CLOT INIT P HPASE BLD TEG: 4.6 MINUTE (ref 5–10)
CLOT LYSIS 30M P MA LENFR BLD TEG: 0 % (ref 0–8)
CLOT LYSIS 30M P MA LENFR BLD TEG: 0 % (ref 0–8)
CLOT LYSIS 30M P MA LENFR BLD TEG: 0.3 % (ref 0–8)
CLOT LYSIS 30M P MA LENFR BLD TEG: 0.7 % (ref 0–8)
CLOT LYSIS 30M P MA LENFR BLD TEG: 1.1 % (ref 0–8)
CLOT STRENGTH BLD TEG: 5.6 KD/SC (ref 4.5–11)
CLOT STRENGTH BLD TEG: 6.5 KD/SC (ref 4.5–11)
CLOT STRENGTH BLD TEG: 7.5 KD/SC (ref 4.5–11)
CLOT STRENGTH BLD TEG: 7.8 KD/SC (ref 4.5–11)
CLOT STRENGTH BLD TEG: 8.6 KD/SC (ref 4.5–11)
CLOT STRENGTH P HPASE BLD TEG: 5.8 KD/SC (ref 4.5–11)
CLOT STRENGTH P HPASE BLD TEG: 9 KD/SC (ref 4.5–11)
CLOT STRENGTH P HPASE BLD TEG: 9.1 KD/SC (ref 4.5–11)
CODING SYSTEM: NORMAL
CREAT SERPL-MCNC: 1.17 MG/DL (ref 0.67–1.17)
CREAT SERPL-MCNC: 1.18 MG/DL (ref 0.67–1.17)
CROSSMATCH: NORMAL
DEPRECATED HCO3 PLAS-SCNC: 25 MMOL/L (ref 22–29)
DIASTOLIC BLOOD PRESSURE - MUSE: NORMAL MMHG
ERYTHROCYTE [DISTWIDTH] IN BLOOD BY AUTOMATED COUNT: 15 % (ref 10–15)
ERYTHROCYTE [DISTWIDTH] IN BLOOD BY AUTOMATED COUNT: 15.1 % (ref 10–15)
FIBRINOGEN PPP-MCNC: 260 MG/DL (ref 170–490)
GFR SERPL CREATININE-BSD FRML MDRD: 71 ML/MIN/1.73M2
GFR SERPL CREATININE-BSD FRML MDRD: 72 ML/MIN/1.73M2
GLUCOSE BLD-MCNC: 100 MG/DL (ref 70–99)
GLUCOSE BLD-MCNC: 102 MG/DL (ref 70–99)
GLUCOSE BLD-MCNC: 103 MG/DL (ref 70–99)
GLUCOSE BLD-MCNC: 103 MG/DL (ref 70–99)
GLUCOSE BLD-MCNC: 105 MG/DL (ref 70–99)
GLUCOSE BLD-MCNC: 107 MG/DL (ref 70–99)
GLUCOSE BLD-MCNC: 113 MG/DL (ref 70–99)
GLUCOSE BLD-MCNC: 85 MG/DL (ref 70–99)
GLUCOSE BLD-MCNC: 85 MG/DL (ref 70–99)
GLUCOSE BLD-MCNC: 90 MG/DL (ref 70–99)
GLUCOSE BLD-MCNC: 98 MG/DL (ref 70–99)
GLUCOSE BLD-MCNC: 99 MG/DL (ref 70–99)
GLUCOSE BLDC GLUCOMTR-MCNC: 105 MG/DL (ref 70–99)
GLUCOSE BLDC GLUCOMTR-MCNC: 105 MG/DL (ref 70–99)
GLUCOSE BLDC GLUCOMTR-MCNC: 147 MG/DL (ref 70–99)
GLUCOSE BLDC GLUCOMTR-MCNC: 177 MG/DL (ref 70–99)
GLUCOSE SERPL-MCNC: 101 MG/DL (ref 70–99)
HCO3 BLD-SCNC: 25 MMOL/L (ref 21–28)
HCO3 BLDA-SCNC: 27 MMOL/L (ref 21–28)
HCO3 BLDA-SCNC: 28 MMOL/L (ref 21–28)
HCO3 BLDA-SCNC: 29 MMOL/L (ref 21–28)
HCO3 BLDA-SCNC: 30 MMOL/L (ref 21–28)
HCO3 BLDV-SCNC: 28 MMOL/L (ref 21–28)
HCO3 BLDV-SCNC: 29 MMOL/L (ref 21–28)
HCO3 BLDV-SCNC: 32 MMOL/L (ref 21–28)
HCT VFR BLD AUTO: 34.3 % (ref 40–53)
HCT VFR BLD AUTO: 37.2 % (ref 40–53)
HGB BLD-MCNC: 10.8 G/DL (ref 13.3–17.7)
HGB BLD-MCNC: 10.9 G/DL (ref 13.3–17.7)
HGB BLD-MCNC: 11.1 G/DL (ref 13.3–17.7)
HGB BLD-MCNC: 11.4 G/DL (ref 13.3–17.7)
HGB BLD-MCNC: 11.8 G/DL (ref 13.3–17.7)
HGB BLD-MCNC: 12 G/DL (ref 13.3–17.7)
HGB BLD-MCNC: 12.2 G/DL (ref 13.3–17.7)
HGB BLD-MCNC: 12.3 G/DL (ref 13.3–17.7)
HGB BLD-MCNC: 12.4 G/DL (ref 13.3–17.7)
HGB BLD-MCNC: 12.5 G/DL (ref 13.3–17.7)
HGB BLD-MCNC: 12.6 G/DL (ref 13.3–17.7)
HGB BLD-MCNC: 12.9 G/DL (ref 13.3–17.7)
HGB BLD-MCNC: 13 G/DL (ref 13.3–17.7)
HGB BLD-MCNC: 13.7 G/DL (ref 13.3–17.7)
HGB BLD-MCNC: 14.2 G/DL (ref 13.3–17.7)
INR PPP: 1.5 (ref 0.85–1.15)
INR PPP: 1.72 (ref 0.85–1.15)
INTERPRETATION ECG - MUSE: NORMAL
ISSUE DATE AND TIME: NORMAL
LACTATE BLD-SCNC: 0.9 MMOL/L
LACTATE BLD-SCNC: 1 MMOL/L
LACTATE BLD-SCNC: 1.1 MMOL/L
LACTATE BLD-SCNC: 1.6 MMOL/L
LACTATE BLD-SCNC: 1.7 MMOL/L
LACTATE BLD-SCNC: 2 MMOL/L
LACTATE BLD-SCNC: 2 MMOL/L
LACTATE SERPL-SCNC: 1.8 MMOL/L (ref 0.7–2)
MAGNESIUM SERPL-MCNC: 2.5 MG/DL (ref 1.7–2.3)
MCF BLD TEG: 52.9 MM (ref 50–70)
MCF BLD TEG: 56.4 MM (ref 50–70)
MCF BLD TEG: 59.9 MM (ref 50–70)
MCF BLD TEG: 61 MM (ref 50–70)
MCF BLD TEG: 63.1 MM (ref 50–70)
MCF P HPASE BLD TEG: 53.6 MM (ref 50–70)
MCF P HPASE BLD TEG: 64.3 MM (ref 50–70)
MCF P HPASE BLD TEG: 64.6 MM (ref 50–70)
MCH RBC QN AUTO: 31.4 PG (ref 26.5–33)
MCH RBC QN AUTO: 31.9 PG (ref 26.5–33)
MCHC RBC AUTO-ENTMCNC: 32.4 G/DL (ref 31.5–36.5)
MCHC RBC AUTO-ENTMCNC: 32.8 G/DL (ref 31.5–36.5)
MCV RBC AUTO: 97 FL (ref 78–100)
MCV RBC AUTO: 97 FL (ref 78–100)
O2/TOTAL GAS SETTING VFR VENT: 100 %
O2/TOTAL GAS SETTING VFR VENT: 60 %
O2/TOTAL GAS SETTING VFR VENT: 60 %
O2/TOTAL GAS SETTING VFR VENT: 69 %
O2/TOTAL GAS SETTING VFR VENT: 75 %
O2/TOTAL GAS SETTING VFR VENT: 80 %
O2/TOTAL GAS SETTING VFR VENT: 80 %
O2/TOTAL GAS SETTING VFR VENT: 85 %
OXYHGB MFR BLD: 92 % (ref 92–100)
OXYHGB MFR BLDA: 90 % (ref 92–100)
OXYHGB MFR BLDA: 92 % (ref 92–100)
OXYHGB MFR BLDA: 97 % (ref 92–100)
OXYHGB MFR BLDA: 98 % (ref 92–100)
OXYHGB MFR BLDV: 71 % (ref 70–75)
OXYHGB MFR BLDV: 72 % (ref 70–75)
OXYHGB MFR BLDV: 74 % (ref 92–100)
P AXIS - MUSE: 46 DEGREES
PCO2 BLD: 43 MM HG (ref 35–45)
PCO2 BLDA: 42 MM HG (ref 35–45)
PCO2 BLDA: 43 MM HG (ref 35–45)
PCO2 BLDA: 44 MM HG (ref 35–45)
PCO2 BLDA: 45 MM HG (ref 35–45)
PCO2 BLDA: 45 MM HG (ref 35–45)
PCO2 BLDA: 47 MM HG (ref 35–45)
PCO2 BLDA: 48 MM HG (ref 35–45)
PCO2 BLDA: 51 MM HG (ref 35–45)
PCO2 BLDA: 54 MM HG (ref 35–45)
PCO2 BLDV: 48 MM HG (ref 40–50)
PCO2 BLDV: 52 MM HG (ref 40–50)
PCO2 BLDV: 55 MM HG (ref 40–50)
PH BLD: 7.37 [PH] (ref 7.35–7.45)
PH BLDA: 7.34 [PH] (ref 7.35–7.45)
PH BLDA: 7.34 [PH] (ref 7.35–7.45)
PH BLDA: 7.35 [PH] (ref 7.35–7.45)
PH BLDA: 7.36 [PH] (ref 7.35–7.45)
PH BLDA: 7.39 [PH] (ref 7.35–7.45)
PH BLDA: 7.4 [PH] (ref 7.35–7.45)
PH BLDA: 7.4 [PH] (ref 7.35–7.45)
PH BLDA: 7.41 [PH] (ref 7.35–7.45)
PH BLDA: 7.41 [PH] (ref 7.35–7.45)
PH BLDA: 7.44 [PH] (ref 7.35–7.45)
PH BLDA: 7.45 [PH] (ref 7.35–7.45)
PH BLDV: 7.36 [PH] (ref 7.32–7.43)
PH BLDV: 7.37 [PH] (ref 7.32–7.43)
PH BLDV: 7.37 [PH] (ref 7.32–7.43)
PHOSPHATE SERPL-MCNC: 4 MG/DL (ref 2.5–4.5)
PLATELET # BLD AUTO: 175 10E3/UL (ref 150–450)
PLATELET # BLD AUTO: 52 10E3/UL (ref 150–450)
PLATELET # BLD AUTO: 91 10E3/UL (ref 150–450)
PO2 BLD: 72 MM HG (ref 80–105)
PO2 BLDA: 134 MM HG (ref 80–105)
PO2 BLDA: 322 MM HG (ref 80–105)
PO2 BLDA: 330 MM HG (ref 80–105)
PO2 BLDA: 336 MM HG (ref 80–105)
PO2 BLDA: 362 MM HG (ref 80–105)
PO2 BLDA: 385 MM HG (ref 80–105)
PO2 BLDA: 70 MM HG (ref 80–105)
PO2 BLDA: 75 MM HG (ref 80–105)
PO2 BLDA: 78 MM HG (ref 80–105)
PO2 BLDA: 83 MM HG (ref 80–105)
PO2 BLDA: 98 MM HG (ref 80–105)
PO2 BLDV: 43 MM HG (ref 25–47)
PO2 BLDV: 44 MM HG (ref 25–47)
PO2 BLDV: 45 MM HG (ref 25–47)
POTASSIUM BLD-SCNC: 3.8 MMOL/L (ref 3.5–5)
POTASSIUM BLD-SCNC: 3.9 MMOL/L (ref 3.5–5)
POTASSIUM BLD-SCNC: 3.9 MMOL/L (ref 3.5–5)
POTASSIUM BLD-SCNC: 4.1 MMOL/L (ref 3.5–5)
POTASSIUM BLD-SCNC: 4.1 MMOL/L (ref 3.5–5)
POTASSIUM BLD-SCNC: 4.3 MMOL/L (ref 3.5–5)
POTASSIUM BLD-SCNC: 4.4 MMOL/L (ref 3.5–5)
POTASSIUM BLD-SCNC: 4.9 MMOL/L (ref 3.5–5)
POTASSIUM SERPL-SCNC: 3.8 MMOL/L (ref 3.4–5.3)
POTASSIUM SERPL-SCNC: 4.6 MMOL/L (ref 3.4–5.3)
PR INTERVAL - MUSE: 178 MS
PROT SERPL-MCNC: 4.4 G/DL (ref 6.4–8.3)
QRS DURATION - MUSE: 118 MS
QT - MUSE: 426 MS
QTC - MUSE: 503 MS
R AXIS - MUSE: -83 DEGREES
RADIOLOGIST FLAGS: ABNORMAL
RADIOLOGIST FLAGS: ABNORMAL
RBC # BLD AUTO: 3.54 10E6/UL (ref 4.4–5.9)
RBC # BLD AUTO: 3.83 10E6/UL (ref 4.4–5.9)
SODIUM BLD-SCNC: 139 MMOL/L (ref 133–144)
SODIUM BLD-SCNC: 140 MMOL/L (ref 133–144)
SODIUM BLD-SCNC: 141 MMOL/L (ref 133–144)
SODIUM SERPL-SCNC: 141 MMOL/L (ref 136–145)
SYSTOLIC BLOOD PRESSURE - MUSE: NORMAL MMHG
T AXIS - MUSE: 71 DEGREES
UNIT ABO/RH: NORMAL
UNIT NUMBER: NORMAL
UNIT STATUS: NORMAL
UNIT TYPE ISBT: 7300
VENTRICULAR RATE- MUSE: 84 BPM
WBC # BLD AUTO: 12.3 10E3/UL (ref 4–11)
WBC # BLD AUTO: 13.5 10E3/UL (ref 4–11)

## 2022-09-06 PROCEDURE — 5A1221Z PERFORMANCE OF CARDIAC OUTPUT, CONTINUOUS: ICD-10-PCS | Performed by: SURGERY

## 2022-09-06 PROCEDURE — 85018 HEMOGLOBIN: CPT | Performed by: ANESTHESIOLOGY

## 2022-09-06 PROCEDURE — 82330 ASSAY OF CALCIUM: CPT

## 2022-09-06 PROCEDURE — 84132 ASSAY OF SERUM POTASSIUM: CPT | Performed by: PHYSICIAN ASSISTANT

## 2022-09-06 PROCEDURE — 82805 BLOOD GASES W/O2 SATURATION: CPT | Performed by: SURGERY

## 2022-09-06 PROCEDURE — 88305 TISSUE EXAM BY PATHOLOGIST: CPT | Mod: TC | Performed by: SURGERY

## 2022-09-06 PROCEDURE — 71045 X-RAY EXAM CHEST 1 VIEW: CPT | Mod: 26 | Performed by: RADIOLOGY

## 2022-09-06 PROCEDURE — 82330 ASSAY OF CALCIUM: CPT | Performed by: PHYSICIAN ASSISTANT

## 2022-09-06 PROCEDURE — 3E043XZ INTRODUCTION OF VASOPRESSOR INTO CENTRAL VEIN, PERCUTANEOUS APPROACH: ICD-10-PCS | Performed by: SURGERY

## 2022-09-06 PROCEDURE — 84132 ASSAY OF SERUM POTASSIUM: CPT | Performed by: ANESTHESIOLOGY

## 2022-09-06 PROCEDURE — 84295 ASSAY OF SERUM SODIUM: CPT

## 2022-09-06 PROCEDURE — 93005 ELECTROCARDIOGRAM TRACING: CPT

## 2022-09-06 PROCEDURE — 85396 CLOTTING ASSAY WHOLE BLOOD: CPT | Performed by: SURGERY

## 2022-09-06 PROCEDURE — 74018 RADEX ABDOMEN 1 VIEW: CPT | Mod: 26 | Performed by: RADIOLOGY

## 2022-09-06 PROCEDURE — 4A0239Z MEASUREMENT OF CARDIAC OUTPUT, PERCUTANEOUS APPROACH: ICD-10-PCS | Performed by: SURGERY

## 2022-09-06 PROCEDURE — 370N000017 HC ANESTHESIA TECHNICAL FEE, PER MIN: Performed by: SURGERY

## 2022-09-06 PROCEDURE — 258N000003 HC RX IP 258 OP 636: Performed by: STUDENT IN AN ORGANIZED HEALTH CARE EDUCATION/TRAINING PROGRAM

## 2022-09-06 PROCEDURE — 83605 ASSAY OF LACTIC ACID: CPT

## 2022-09-06 PROCEDURE — 85014 HEMATOCRIT: CPT | Performed by: SURGERY

## 2022-09-06 PROCEDURE — 999N000065 XR CHEST PORT 1 VIEW

## 2022-09-06 PROCEDURE — 02RF0JZ REPLACEMENT OF AORTIC VALVE WITH SYNTHETIC SUBSTITUTE, OPEN APPROACH: ICD-10-PCS | Performed by: SURGERY

## 2022-09-06 PROCEDURE — 93010 ELECTROCARDIOGRAM REPORT: CPT | Mod: 59 | Performed by: INTERNAL MEDICINE

## 2022-09-06 PROCEDURE — 82810 BLOOD GASES O2 SAT ONLY: CPT

## 2022-09-06 PROCEDURE — 84100 ASSAY OF PHOSPHORUS: CPT | Performed by: PHYSICIAN ASSISTANT

## 2022-09-06 PROCEDURE — 4A033B3 MEASUREMENT OF ARTERIAL PRESSURE, PULMONARY, PERCUTANEOUS APPROACH: ICD-10-PCS | Performed by: SURGERY

## 2022-09-06 PROCEDURE — 84132 ASSAY OF SERUM POTASSIUM: CPT

## 2022-09-06 PROCEDURE — 410N000004: Performed by: SURGERY

## 2022-09-06 PROCEDURE — 250N000011 HC RX IP 250 OP 636: Performed by: PHYSICIAN ASSISTANT

## 2022-09-06 PROCEDURE — 33530 CORONARY ARTERY BYPASS/REOP: CPT | Performed by: SURGERY

## 2022-09-06 PROCEDURE — 999N000065 XR ABDOMEN PORT 1 VIEW

## 2022-09-06 PROCEDURE — 36415 COLL VENOUS BLD VENIPUNCTURE: CPT | Performed by: ANESTHESIOLOGY

## 2022-09-06 PROCEDURE — 250N000009 HC RX 250: Performed by: STUDENT IN AN ORGANIZED HEALTH CARE EDUCATION/TRAINING PROGRAM

## 2022-09-06 PROCEDURE — 250N000011 HC RX IP 250 OP 636: Performed by: STUDENT IN AN ORGANIZED HEALTH CARE EDUCATION/TRAINING PROGRAM

## 2022-09-06 PROCEDURE — 250N000011 HC RX IP 250 OP 636

## 2022-09-06 PROCEDURE — 250N000011 HC RX IP 250 OP 636: Performed by: SURGERY

## 2022-09-06 PROCEDURE — 258N000003 HC RX IP 258 OP 636: Performed by: SURGERY

## 2022-09-06 PROCEDURE — 83605 ASSAY OF LACTIC ACID: CPT | Performed by: PHYSICIAN ASSISTANT

## 2022-09-06 PROCEDURE — C1713 ANCHOR/SCREW BN/BN,TIS/BN: HCPCS | Performed by: SURGERY

## 2022-09-06 PROCEDURE — 278N000051 HC OR IMPLANT GENERAL: Performed by: SURGERY

## 2022-09-06 PROCEDURE — 82805 BLOOD GASES W/O2 SATURATION: CPT

## 2022-09-06 PROCEDURE — 999N000141 HC STATISTIC PRE-PROCEDURE NURSING ASSESSMENT: Performed by: SURGERY

## 2022-09-06 PROCEDURE — 250N000015 HC RX FACTOR IP MED 636 OP 636: Performed by: SURGERY

## 2022-09-06 PROCEDURE — 85396 CLOTTING ASSAY WHOLE BLOOD: CPT | Performed by: STUDENT IN AN ORGANIZED HEALTH CARE EDUCATION/TRAINING PROGRAM

## 2022-09-06 PROCEDURE — 83735 ASSAY OF MAGNESIUM: CPT | Performed by: PHYSICIAN ASSISTANT

## 2022-09-06 PROCEDURE — 94002 VENT MGMT INPAT INIT DAY: CPT

## 2022-09-06 PROCEDURE — 85018 HEMOGLOBIN: CPT | Performed by: PHYSICIAN ASSISTANT

## 2022-09-06 PROCEDURE — 250N000009 HC RX 250: Performed by: SURGERY

## 2022-09-06 PROCEDURE — 272N000001 HC OR GENERAL SUPPLY STERILE: Performed by: SURGERY

## 2022-09-06 PROCEDURE — 88311 DECALCIFY TISSUE: CPT | Mod: 26 | Performed by: PATHOLOGY

## 2022-09-06 PROCEDURE — 02PA08Z REMOVAL OF ZOOPLASTIC TISSUE FROM HEART, OPEN APPROACH: ICD-10-PCS | Performed by: SURGERY

## 2022-09-06 PROCEDURE — 85610 PROTHROMBIN TIME: CPT | Performed by: SURGERY

## 2022-09-06 PROCEDURE — 74018 RADEX ABDOMEN 1 VIEW: CPT

## 2022-09-06 PROCEDURE — 200N000002 HC R&B ICU UMMC

## 2022-09-06 PROCEDURE — 86923 COMPATIBILITY TEST ELECTRIC: CPT

## 2022-09-06 PROCEDURE — 88305 TISSUE EXAM BY PATHOLOGIST: CPT | Mod: 26 | Performed by: PATHOLOGY

## 2022-09-06 PROCEDURE — 82803 BLOOD GASES ANY COMBINATION: CPT

## 2022-09-06 PROCEDURE — 271N000002 HC RX 271

## 2022-09-06 PROCEDURE — 85018 HEMOGLOBIN: CPT

## 2022-09-06 PROCEDURE — 85610 PROTHROMBIN TIME: CPT | Performed by: PHYSICIAN ASSISTANT

## 2022-09-06 PROCEDURE — 85384 FIBRINOGEN ACTIVITY: CPT | Performed by: SURGERY

## 2022-09-06 PROCEDURE — 272N000088 HC PUMP APP ADULT PERFUSION: Performed by: SURGERY

## 2022-09-06 PROCEDURE — 02HQ32Z INSERTION OF MONITORING DEVICE INTO RIGHT PULMONARY ARTERY, PERCUTANEOUS APPROACH: ICD-10-PCS | Performed by: SURGERY

## 2022-09-06 PROCEDURE — 85730 THROMBOPLASTIN TIME PARTIAL: CPT | Performed by: PHYSICIAN ASSISTANT

## 2022-09-06 PROCEDURE — 410N000003 HC PER-PERFUSION 1ST 30 MIN: Performed by: SURGERY

## 2022-09-06 PROCEDURE — 360N000079 HC SURGERY LEVEL 6, PER MIN: Performed by: SURGERY

## 2022-09-06 PROCEDURE — 258N000003 HC RX IP 258 OP 636: Performed by: PHYSICIAN ASSISTANT

## 2022-09-06 PROCEDURE — 250N000024 HC ISOFLURANE, PER MIN: Performed by: SURGERY

## 2022-09-06 PROCEDURE — 85049 AUTOMATED PLATELET COUNT: CPT | Performed by: ANESTHESIOLOGY

## 2022-09-06 PROCEDURE — 250N000013 HC RX MED GY IP 250 OP 250 PS 637: Performed by: SURGERY

## 2022-09-06 PROCEDURE — 250N000013 HC RX MED GY IP 250 OP 250 PS 637: Performed by: STUDENT IN AN ORGANIZED HEALTH CARE EDUCATION/TRAINING PROGRAM

## 2022-09-06 PROCEDURE — C1898 LEAD, PMKR, OTHER THAN TRANS: HCPCS | Performed by: SURGERY

## 2022-09-06 PROCEDURE — 80053 COMPREHEN METABOLIC PANEL: CPT | Performed by: ANESTHESIOLOGY

## 2022-09-06 PROCEDURE — 82805 BLOOD GASES W/O2 SATURATION: CPT | Performed by: PHYSICIAN ASSISTANT

## 2022-09-06 PROCEDURE — P9016 RBC LEUKOCYTES REDUCED: HCPCS

## 2022-09-06 PROCEDURE — 33405 REPLACEMENT AORTIC VALVE OPN: CPT | Performed by: SURGERY

## 2022-09-06 PROCEDURE — 85730 THROMBOPLASTIN TIME PARTIAL: CPT | Performed by: SURGERY

## 2022-09-06 PROCEDURE — 5A1223Z PERFORMANCE OF CARDIAC PACING, CONTINUOUS: ICD-10-PCS | Performed by: SURGERY

## 2022-09-06 PROCEDURE — 272N000085 HC PACK CELL SAVER CSP: Performed by: SURGERY

## 2022-09-06 PROCEDURE — 999N000157 HC STATISTIC RCP TIME EA 10 MIN

## 2022-09-06 PROCEDURE — P9037 PLATE PHERES LEUKOREDU IRRAD: HCPCS

## 2022-09-06 PROCEDURE — 250N000013 HC RX MED GY IP 250 OP 250 PS 637: Performed by: PHYSICIAN ASSISTANT

## 2022-09-06 DEVICE — COR-KNOT® QUICK LOAD® 6-POUCH
Type: IMPLANTABLE DEVICE | Site: HEART | Status: FUNCTIONAL
Brand: COR-KNOT® QUICK LOAD®

## 2022-09-06 DEVICE — VALVE AORTIC ON-X ANATOMIC RING 23MM ONXANE-23: Type: IMPLANTABLE DEVICE | Site: HEART | Status: FUNCTIONAL

## 2022-09-06 DEVICE — COR-KNOT MINI® COMBO KIT
Type: IMPLANTABLE DEVICE | Site: HEART | Status: FUNCTIONAL
Brand: COR-KNOT MINI®

## 2022-09-06 RX ORDER — CALCIUM CHLORIDE 100 MG/ML
INJECTION INTRAVENOUS; INTRAVENTRICULAR PRN
Status: DISCONTINUED | OUTPATIENT
Start: 2022-09-06 | End: 2022-09-06

## 2022-09-06 RX ORDER — SODIUM CHLORIDE, SODIUM LACTATE, POTASSIUM CHLORIDE, CALCIUM CHLORIDE 600; 310; 30; 20 MG/100ML; MG/100ML; MG/100ML; MG/100ML
INJECTION, SOLUTION INTRAVENOUS CONTINUOUS
Status: DISCONTINUED | OUTPATIENT
Start: 2022-09-06 | End: 2022-09-06 | Stop reason: HOSPADM

## 2022-09-06 RX ORDER — SODIUM CHLORIDE, SODIUM GLUCONATE, SODIUM ACETATE, POTASSIUM CHLORIDE AND MAGNESIUM CHLORIDE 526; 502; 368; 37; 30 MG/100ML; MG/100ML; MG/100ML; MG/100ML; MG/100ML
INJECTION, SOLUTION INTRAVENOUS CONTINUOUS PRN
Status: DISCONTINUED | OUTPATIENT
Start: 2022-09-06 | End: 2022-09-06

## 2022-09-06 RX ORDER — FIBRINOGEN (HUMAN) 700-1300MG
1100 KIT INTRAVENOUS ONCE
Status: COMPLETED | OUTPATIENT
Start: 2022-09-06 | End: 2022-09-06

## 2022-09-06 RX ORDER — NALOXONE HYDROCHLORIDE 0.4 MG/ML
0.2 INJECTION, SOLUTION INTRAMUSCULAR; INTRAVENOUS; SUBCUTANEOUS
Status: DISCONTINUED | OUTPATIENT
Start: 2022-09-06 | End: 2022-09-11

## 2022-09-06 RX ORDER — AMOXICILLIN 250 MG
1 CAPSULE ORAL 2 TIMES DAILY
Status: DISCONTINUED | OUTPATIENT
Start: 2022-09-06 | End: 2022-09-16 | Stop reason: HOSPADM

## 2022-09-06 RX ORDER — FLUMAZENIL 0.1 MG/ML
0.2 INJECTION, SOLUTION INTRAVENOUS
Status: DISCONTINUED | OUTPATIENT
Start: 2022-09-06 | End: 2022-09-06 | Stop reason: HOSPADM

## 2022-09-06 RX ORDER — CEFAZOLIN SODIUM/WATER 2 G/20 ML
2 SYRINGE (ML) INTRAVENOUS SEE ADMIN INSTRUCTIONS
Status: DISCONTINUED | OUTPATIENT
Start: 2022-09-06 | End: 2022-09-06 | Stop reason: HOSPADM

## 2022-09-06 RX ORDER — LIDOCAINE 40 MG/G
CREAM TOPICAL
Status: DISCONTINUED | OUTPATIENT
Start: 2022-09-06 | End: 2022-09-16 | Stop reason: HOSPADM

## 2022-09-06 RX ORDER — GABAPENTIN 100 MG/1
100 CAPSULE ORAL 3 TIMES DAILY
Status: DISCONTINUED | OUTPATIENT
Start: 2022-09-06 | End: 2022-09-16 | Stop reason: HOSPADM

## 2022-09-06 RX ORDER — FIBRINOGEN (HUMAN) 700-1300MG
1100 KIT INTRAVENOUS ONCE
Status: DISCONTINUED | OUTPATIENT
Start: 2022-09-06 | End: 2022-09-06

## 2022-09-06 RX ORDER — FENTANYL CITRATE 50 UG/ML
INJECTION, SOLUTION INTRAMUSCULAR; INTRAVENOUS PRN
Status: DISCONTINUED | OUTPATIENT
Start: 2022-09-06 | End: 2022-09-06

## 2022-09-06 RX ORDER — NOREPINEPHRINE BITARTRATE 0.06 MG/ML
.01-.4 INJECTION, SOLUTION INTRAVENOUS CONTINUOUS PRN
Status: DISCONTINUED | OUTPATIENT
Start: 2022-09-06 | End: 2022-09-07

## 2022-09-06 RX ORDER — FUROSEMIDE 10 MG/ML
20 INJECTION INTRAMUSCULAR; INTRAVENOUS
Status: DISCONTINUED | OUTPATIENT
Start: 2022-09-06 | End: 2022-09-07

## 2022-09-06 RX ORDER — BISACODYL 10 MG
10 SUPPOSITORY, RECTAL RECTAL DAILY PRN
Status: DISCONTINUED | OUTPATIENT
Start: 2022-09-06 | End: 2022-09-16 | Stop reason: HOSPADM

## 2022-09-06 RX ORDER — BUPIVACAINE HYDROCHLORIDE 2.5 MG/ML
INJECTION, SOLUTION EPIDURAL; INFILTRATION; INTRACAUDAL
Status: COMPLETED | OUTPATIENT
Start: 2022-09-06 | End: 2022-09-06

## 2022-09-06 RX ORDER — ALBUTEROL SULFATE 90 UG/1
AEROSOL, METERED RESPIRATORY (INHALATION) PRN
Status: DISCONTINUED | OUTPATIENT
Start: 2022-09-06 | End: 2022-09-06

## 2022-09-06 RX ORDER — PROPOFOL 10 MG/ML
5-75 INJECTION, EMULSION INTRAVENOUS CONTINUOUS
Status: DISCONTINUED | OUTPATIENT
Start: 2022-09-06 | End: 2022-09-07

## 2022-09-06 RX ORDER — NALOXONE HYDROCHLORIDE 0.4 MG/ML
0.4 INJECTION, SOLUTION INTRAMUSCULAR; INTRAVENOUS; SUBCUTANEOUS
Status: DISCONTINUED | OUTPATIENT
Start: 2022-09-06 | End: 2022-09-11

## 2022-09-06 RX ORDER — MUPIROCIN 20 MG/G
0.5 OINTMENT TOPICAL 2 TIMES DAILY
Status: DISCONTINUED | OUTPATIENT
Start: 2022-09-06 | End: 2022-09-07

## 2022-09-06 RX ORDER — ACETAMINOPHEN 325 MG/1
650 TABLET ORAL EVERY 4 HOURS PRN
Status: DISCONTINUED | OUTPATIENT
Start: 2022-09-09 | End: 2022-09-16 | Stop reason: HOSPADM

## 2022-09-06 RX ORDER — HEPARIN SODIUM 5000 [USP'U]/.5ML
5000 INJECTION, SOLUTION INTRAVENOUS; SUBCUTANEOUS EVERY 8 HOURS
Status: DISCONTINUED | OUTPATIENT
Start: 2022-09-07 | End: 2022-09-09

## 2022-09-06 RX ORDER — OXYCODONE HYDROCHLORIDE 10 MG/1
10 TABLET ORAL EVERY 4 HOURS PRN
Status: DISCONTINUED | OUTPATIENT
Start: 2022-09-06 | End: 2022-09-16 | Stop reason: HOSPADM

## 2022-09-06 RX ORDER — METHOCARBAMOL 750 MG/1
750 TABLET, FILM COATED ORAL EVERY 6 HOURS PRN
Status: DISCONTINUED | OUTPATIENT
Start: 2022-09-06 | End: 2022-09-16 | Stop reason: HOSPADM

## 2022-09-06 RX ORDER — HEPARIN SODIUM 1000 [USP'U]/ML
INJECTION, SOLUTION INTRAVENOUS; SUBCUTANEOUS PRN
Status: DISCONTINUED | OUTPATIENT
Start: 2022-09-06 | End: 2022-09-06

## 2022-09-06 RX ORDER — HYDROMORPHONE HYDROCHLORIDE 1 MG/ML
0.4 INJECTION, SOLUTION INTRAMUSCULAR; INTRAVENOUS; SUBCUTANEOUS
Status: DISCONTINUED | OUTPATIENT
Start: 2022-09-06 | End: 2022-09-13

## 2022-09-06 RX ORDER — ASPIRIN 81 MG/1
81 TABLET, CHEWABLE ORAL DAILY
Status: DISCONTINUED | OUTPATIENT
Start: 2022-09-07 | End: 2022-09-16 | Stop reason: HOSPADM

## 2022-09-06 RX ORDER — DEXMEDETOMIDINE HYDROCHLORIDE 4 UG/ML
0.2-1.2 INJECTION, SOLUTION INTRAVENOUS CONTINUOUS
Status: DISCONTINUED | OUTPATIENT
Start: 2022-09-06 | End: 2022-09-06 | Stop reason: HOSPADM

## 2022-09-06 RX ORDER — FAMOTIDINE 20 MG/1
20 TABLET, FILM COATED ORAL
Status: DISCONTINUED | OUTPATIENT
Start: 2022-09-06 | End: 2022-09-06 | Stop reason: HOSPADM

## 2022-09-06 RX ORDER — PANTOPRAZOLE SODIUM 40 MG/1
40 TABLET, DELAYED RELEASE ORAL DAILY
Status: DISCONTINUED | OUTPATIENT
Start: 2022-09-06 | End: 2022-09-10

## 2022-09-06 RX ORDER — CALCIUM GLUCONATE 20 MG/ML
1 INJECTION, SOLUTION INTRAVENOUS
Status: ACTIVE | OUTPATIENT
Start: 2022-09-06 | End: 2022-09-12

## 2022-09-06 RX ORDER — PHENYLEPHRINE HCL IN 0.9% NACL 50MG/250ML
.1-6 PLASTIC BAG, INJECTION (ML) INTRAVENOUS CONTINUOUS
Status: DISCONTINUED | OUTPATIENT
Start: 2022-09-06 | End: 2022-09-06 | Stop reason: HOSPADM

## 2022-09-06 RX ORDER — HYDRALAZINE HYDROCHLORIDE 20 MG/ML
10 INJECTION INTRAMUSCULAR; INTRAVENOUS EVERY 30 MIN PRN
Status: DISCONTINUED | OUTPATIENT
Start: 2022-09-06 | End: 2022-09-15

## 2022-09-06 RX ORDER — DEXMEDETOMIDINE HYDROCHLORIDE 4 UG/ML
.2-.7 INJECTION, SOLUTION INTRAVENOUS CONTINUOUS
Status: DISCONTINUED | OUTPATIENT
Start: 2022-09-06 | End: 2022-09-07

## 2022-09-06 RX ORDER — NOREPINEPHRINE BITARTRATE 0.06 MG/ML
.01-.1 INJECTION, SOLUTION INTRAVENOUS CONTINUOUS
Status: DISCONTINUED | OUTPATIENT
Start: 2022-09-06 | End: 2022-09-06 | Stop reason: HOSPADM

## 2022-09-06 RX ORDER — LIDOCAINE HYDROCHLORIDE 20 MG/ML
INJECTION, SOLUTION INFILTRATION; PERINEURAL PRN
Status: DISCONTINUED | OUTPATIENT
Start: 2022-09-06 | End: 2022-09-06

## 2022-09-06 RX ORDER — IPRATROPIUM BROMIDE AND ALBUTEROL SULFATE 2.5; .5 MG/3ML; MG/3ML
SOLUTION RESPIRATORY (INHALATION) PRN
Status: DISCONTINUED | OUTPATIENT
Start: 2022-09-06 | End: 2022-09-06

## 2022-09-06 RX ORDER — PROPOFOL 10 MG/ML
INJECTION, EMULSION INTRAVENOUS CONTINUOUS PRN
Status: DISCONTINUED | OUTPATIENT
Start: 2022-09-06 | End: 2022-09-06

## 2022-09-06 RX ORDER — CHLORHEXIDINE GLUCONATE ORAL RINSE 1.2 MG/ML
10 SOLUTION DENTAL ONCE
Status: DISCONTINUED | OUTPATIENT
Start: 2022-09-06 | End: 2022-09-06 | Stop reason: HOSPADM

## 2022-09-06 RX ORDER — ACETAMINOPHEN 325 MG/1
975 TABLET ORAL ONCE
Status: COMPLETED | OUTPATIENT
Start: 2022-09-06 | End: 2022-09-06

## 2022-09-06 RX ORDER — NITROGLYCERIN 20 MG/100ML
10-200 INJECTION INTRAVENOUS CONTINUOUS PRN
Status: DISCONTINUED | OUTPATIENT
Start: 2022-09-06 | End: 2022-09-08

## 2022-09-06 RX ORDER — CALCIUM GLUCONATE 20 MG/ML
2 INJECTION, SOLUTION INTRAVENOUS
Status: ACTIVE | OUTPATIENT
Start: 2022-09-06 | End: 2022-09-12

## 2022-09-06 RX ORDER — CEFAZOLIN SODIUM/WATER 2 G/20 ML
2 SYRINGE (ML) INTRAVENOUS EVERY 8 HOURS
Status: DISCONTINUED | OUTPATIENT
Start: 2022-09-07 | End: 2022-09-06

## 2022-09-06 RX ORDER — CEFAZOLIN SODIUM 2 G/100ML
2 INJECTION, SOLUTION INTRAVENOUS EVERY 8 HOURS
Status: COMPLETED | OUTPATIENT
Start: 2022-09-07 | End: 2022-09-07

## 2022-09-06 RX ORDER — GABAPENTIN 300 MG/1
300 CAPSULE ORAL
Status: COMPLETED | OUTPATIENT
Start: 2022-09-06 | End: 2022-09-06

## 2022-09-06 RX ORDER — ACETAMINOPHEN 325 MG/1
975 TABLET ORAL EVERY 8 HOURS
Status: COMPLETED | OUTPATIENT
Start: 2022-09-06 | End: 2022-09-09

## 2022-09-06 RX ORDER — FENTANYL CITRATE 50 UG/ML
25-50 INJECTION, SOLUTION INTRAMUSCULAR; INTRAVENOUS
Status: DISCONTINUED | OUTPATIENT
Start: 2022-09-06 | End: 2022-09-06 | Stop reason: HOSPADM

## 2022-09-06 RX ORDER — PROPOFOL 10 MG/ML
INJECTION, EMULSION INTRAVENOUS PRN
Status: DISCONTINUED | OUTPATIENT
Start: 2022-09-06 | End: 2022-09-06

## 2022-09-06 RX ORDER — PROCHLORPERAZINE MALEATE 5 MG
10 TABLET ORAL EVERY 6 HOURS PRN
Status: DISCONTINUED | OUTPATIENT
Start: 2022-09-06 | End: 2022-09-16 | Stop reason: HOSPADM

## 2022-09-06 RX ORDER — CEFAZOLIN SODIUM/WATER 2 G/20 ML
2 SYRINGE (ML) INTRAVENOUS EVERY 8 HOURS
Status: DISCONTINUED | OUTPATIENT
Start: 2022-09-06 | End: 2022-09-06

## 2022-09-06 RX ORDER — HYDROMORPHONE HYDROCHLORIDE 1 MG/ML
0.2 INJECTION, SOLUTION INTRAMUSCULAR; INTRAVENOUS; SUBCUTANEOUS
Status: DISCONTINUED | OUTPATIENT
Start: 2022-09-06 | End: 2022-09-13

## 2022-09-06 RX ORDER — LIDOCAINE 40 MG/G
CREAM TOPICAL
Status: DISCONTINUED | OUTPATIENT
Start: 2022-09-06 | End: 2022-09-06 | Stop reason: HOSPADM

## 2022-09-06 RX ORDER — OXYCODONE HYDROCHLORIDE 5 MG/1
5 TABLET ORAL EVERY 4 HOURS PRN
Status: DISCONTINUED | OUTPATIENT
Start: 2022-09-06 | End: 2022-09-16 | Stop reason: HOSPADM

## 2022-09-06 RX ORDER — CEFAZOLIN SODIUM/WATER 2 G/20 ML
2 SYRINGE (ML) INTRAVENOUS
Status: COMPLETED | OUTPATIENT
Start: 2022-09-06 | End: 2022-09-06

## 2022-09-06 RX ORDER — ONDANSETRON 4 MG/1
4 TABLET, ORALLY DISINTEGRATING ORAL EVERY 6 HOURS PRN
Status: DISCONTINUED | OUTPATIENT
Start: 2022-09-06 | End: 2022-09-16 | Stop reason: HOSPADM

## 2022-09-06 RX ORDER — POLYETHYLENE GLYCOL 3350 17 G/17G
17 POWDER, FOR SOLUTION ORAL DAILY
Status: DISCONTINUED | OUTPATIENT
Start: 2022-09-07 | End: 2022-09-16 | Stop reason: HOSPADM

## 2022-09-06 RX ORDER — PROTAMINE SULFATE 10 MG/ML
INJECTION, SOLUTION INTRAVENOUS PRN
Status: DISCONTINUED | OUTPATIENT
Start: 2022-09-06 | End: 2022-09-06

## 2022-09-06 RX ORDER — ONDANSETRON 2 MG/ML
4 INJECTION INTRAMUSCULAR; INTRAVENOUS EVERY 6 HOURS PRN
Status: DISCONTINUED | OUTPATIENT
Start: 2022-09-06 | End: 2022-09-16 | Stop reason: HOSPADM

## 2022-09-06 RX ADMIN — ACETAMINOPHEN 975 MG: 325 TABLET, FILM COATED ORAL at 07:11

## 2022-09-06 RX ADMIN — Medication 7 ML/HR: at 10:58

## 2022-09-06 RX ADMIN — PROPOFOL 50 MG: 10 INJECTION, EMULSION INTRAVENOUS at 08:35

## 2022-09-06 RX ADMIN — SODIUM CHLORIDE 7.5 G: 900 INJECTION, SOLUTION INTRAVENOUS at 08:48

## 2022-09-06 RX ADMIN — FENTANYL CITRATE 150 MCG: 50 INJECTION, SOLUTION INTRAMUSCULAR; INTRAVENOUS at 09:29

## 2022-09-06 RX ADMIN — MIDAZOLAM HYDROCHLORIDE 1 MG: 1 INJECTION, SOLUTION INTRAMUSCULAR; INTRAVENOUS at 07:25

## 2022-09-06 RX ADMIN — BUPIVACAINE HYDROCHLORIDE 20 ML: 2.5 INJECTION, SOLUTION EPIDURAL; INFILTRATION; INTRACAUDAL; PERINEURAL at 07:30

## 2022-09-06 RX ADMIN — VANCOMYCIN HYDROCHLORIDE 1500 MG: 10 INJECTION, POWDER, LYOPHILIZED, FOR SOLUTION INTRAVENOUS at 07:11

## 2022-09-06 RX ADMIN — SODIUM CHLORIDE, SODIUM GLUCONATE, SODIUM ACETATE, POTASSIUM CHLORIDE AND MAGNESIUM CHLORIDE: 526; 502; 368; 37; 30 INJECTION, SOLUTION INTRAVENOUS at 08:21

## 2022-09-06 RX ADMIN — FENTANYL CITRATE 150 MCG: 50 INJECTION, SOLUTION INTRAMUSCULAR; INTRAVENOUS at 15:55

## 2022-09-06 RX ADMIN — LIDOCAINE HYDROCHLORIDE 100 MG: 20 INJECTION, SOLUTION INFILTRATION; PERINEURAL at 08:35

## 2022-09-06 RX ADMIN — PROTHROMBIN, COAGULATION FACTOR VII HUMAN, COAGULATION FACTOR IX HUMAN, COAGULATION FACTOR X HUMAN, PROTEIN C, PROTEIN S HUMAN, AND WATER 551 UNITS: KIT at 14:26

## 2022-09-06 RX ADMIN — FENTANYL CITRATE 250 MCG: 50 INJECTION, SOLUTION INTRAMUSCULAR; INTRAVENOUS at 08:35

## 2022-09-06 RX ADMIN — Medication 50 MCG/HR: at 20:25

## 2022-09-06 RX ADMIN — Medication 50 MG: at 14:06

## 2022-09-06 RX ADMIN — SODIUM CHLORIDE, POTASSIUM CHLORIDE, SODIUM LACTATE AND CALCIUM CHLORIDE 500 ML: 600; 310; 30; 20 INJECTION, SOLUTION INTRAVENOUS at 23:01

## 2022-09-06 RX ADMIN — Medication 100 MG: at 08:36

## 2022-09-06 RX ADMIN — Medication 30 MG: at 10:30

## 2022-09-06 RX ADMIN — Medication 50 MG: at 12:30

## 2022-09-06 RX ADMIN — GABAPENTIN 300 MG: 300 CAPSULE ORAL at 07:11

## 2022-09-06 RX ADMIN — SUGAMMADEX 200 MG: 100 INJECTION, SOLUTION INTRAVENOUS at 16:03

## 2022-09-06 RX ADMIN — ACETAMINOPHEN 975 MG: 325 TABLET, FILM COATED ORAL at 21:57

## 2022-09-06 RX ADMIN — Medication 2 G: at 09:00

## 2022-09-06 RX ADMIN — Medication 20 MG: at 11:00

## 2022-09-06 RX ADMIN — PROTHROMBIN, COAGULATION FACTOR VII HUMAN, COAGULATION FACTOR IX HUMAN, COAGULATION FACTOR X HUMAN, PROTEIN C, PROTEIN S HUMAN, AND WATER 551 UNITS: KIT at 15:03

## 2022-09-06 RX ADMIN — ALBUTEROL SULFATE 6 PUFF: 108 INHALANT RESPIRATORY (INHALATION) at 15:33

## 2022-09-06 RX ADMIN — PROPOFOL 50 MCG/KG/MIN: 10 INJECTION, EMULSION INTRAVENOUS at 15:29

## 2022-09-06 RX ADMIN — NOREPINEPHRINE BITARTRATE 0.03 MCG/KG/MIN: 1 INJECTION, SOLUTION, CONCENTRATE INTRAVENOUS at 11:03

## 2022-09-06 RX ADMIN — SENNOSIDES AND DOCUSATE SODIUM 1 TABLET: 8.6; 5 TABLET ORAL at 21:56

## 2022-09-06 RX ADMIN — HEPARIN SODIUM 38000 UNITS: 1000 INJECTION INTRAVENOUS; SUBCUTANEOUS at 10:44

## 2022-09-06 RX ADMIN — MIDAZOLAM 2 MG: 1 INJECTION INTRAMUSCULAR; INTRAVENOUS at 08:21

## 2022-09-06 RX ADMIN — Medication 50 MG: at 09:29

## 2022-09-06 RX ADMIN — FENTANYL CITRATE 50 MCG: 50 INJECTION, SOLUTION INTRAMUSCULAR; INTRAVENOUS at 07:25

## 2022-09-06 RX ADMIN — SODIUM CHLORIDE 1.25 G/HR: 900 INJECTION, SOLUTION INTRAVENOUS at 10:00

## 2022-09-06 RX ADMIN — VANCOMYCIN HYDROCHLORIDE 1500 MG: 10 INJECTION, POWDER, LYOPHILIZED, FOR SOLUTION INTRAVENOUS at 21:14

## 2022-09-06 RX ADMIN — FIBRINOGEN (HUMAN) 1100 MG: KIT INTRAVENOUS at 15:30

## 2022-09-06 RX ADMIN — DEXMEDETOMIDINE HYDROCHLORIDE 0.3 MCG/KG/HR: 4 INJECTION, SOLUTION INTRAVENOUS at 09:33

## 2022-09-06 RX ADMIN — CALCIUM CHLORIDE 0.5 MG: 100 INJECTION INTRAVENOUS; INTRAVENTRICULAR at 17:43

## 2022-09-06 RX ADMIN — CALCIUM CHLORIDE 0.5 MG: 100 INJECTION INTRAVENOUS; INTRAVENTRICULAR at 17:19

## 2022-09-06 RX ADMIN — Medication 40 MG: at 21:57

## 2022-09-06 RX ADMIN — PROTAMINE SULFATE 170 MG: 10 INJECTION, SOLUTION INTRAVENOUS at 14:00

## 2022-09-06 RX ADMIN — Medication 2 G: at 13:00

## 2022-09-06 RX ADMIN — FENTANYL CITRATE 150 MCG: 50 INJECTION, SOLUTION INTRAMUSCULAR; INTRAVENOUS at 14:04

## 2022-09-06 RX ADMIN — IPRATROPIUM BROMIDE AND ALBUTEROL SULFATE 1 VIAL: .5; 3 SOLUTION RESPIRATORY (INHALATION) at 16:36

## 2022-09-06 RX ADMIN — FENTANYL CITRATE 50 MCG: 50 INJECTION, SOLUTION INTRAMUSCULAR; INTRAVENOUS at 07:28

## 2022-09-06 RX ADMIN — PROPOFOL 50 MG: 10 INJECTION, EMULSION INTRAVENOUS at 08:36

## 2022-09-06 RX ADMIN — Medication 50 MG: at 11:13

## 2022-09-06 RX ADMIN — EPINEPHRINE 0.03 MCG/KG/MIN: 1 INJECTION INTRAMUSCULAR; INTRAVENOUS; SUBCUTANEOUS at 13:28

## 2022-09-06 RX ADMIN — Medication 2 G: at 17:00

## 2022-09-06 RX ADMIN — GABAPENTIN 100 MG: 100 CAPSULE ORAL at 21:56

## 2022-09-06 RX ADMIN — SODIUM CHLORIDE, SODIUM GLUCONATE, SODIUM ACETATE, POTASSIUM CHLORIDE AND MAGNESIUM CHLORIDE: 526; 502; 368; 37; 30 INJECTION, SOLUTION INTRAVENOUS at 08:49

## 2022-09-06 ASSESSMENT — ACTIVITIES OF DAILY LIVING (ADL)
ADLS_ACUITY_SCORE: 30
ADLS_ACUITY_SCORE: 18

## 2022-09-06 NOTE — BRIEF OP NOTE
[facility]    Brief Operative Note    Pre-operative diagnosis: Aortic stenosis [I35.0]  Post-operative diagnosis Same as pre-operative diagnosis    Procedure: Procedure(s):  STERNOTOMY, REPEAT, WITH AORTIC VALVE REPLACEMENT USING 23MM ON-X AORTIC VALVE. ON CARDIOPULMONARY BYPASS., transesophageal echocardiogram (NATY)  Surgeon: Surgeon(s) and Role:     * April Hogue MD - Primary     * Dixie Das PA-C - Assisting  Anesthesia: General with Block   Estimated Blood Loss: 1000ml    Drains: 3 x 32 mediastinal   Specimens:   ID Type Source Tests Collected by Time Destination   1 : explanted bioprosthetic aortic valve Tissue Other SURGICAL PATHOLOGY EXAM April Hogue MD 9/6/2022 12:14 PM      Findings:   dictated .  Complications: None.  Implants:   Implant Name Type Inv. Item Serial No.  Lot No. LRB No. Used Action   MITROFLOW BIOPROSTHETIC AORTIC VALVE    [employer]  N/A 1 Explanted   VALVE AORTIC ON-X ANATOMIC RING 23MM ONXANE-23 - [id number] Valve VALVE AORTIC ON-X ANATOMIC RING 23MM ONXANE-23 [id number] [employer]  N/A 1 Implanted   IMP KIT SUTURE COR-KNOT MINI 4X14MM [id number] - [id number] Metallic Hardware/[place] IMP KIT SUTURE COR-KNOT MINI 4X14MM [id number]  [employer] [id number] N/A 1 Implanted   DEVICE CUTLER ENDO COR KNOT QUICK LOAD RELOAD [id number] - [id number] Wire DEVICE CUTLER ENDO COR KNOT QUICK LOAD RELOAD [id number]  [employer] [id number] N/A 1 Implanted

## 2022-09-06 NOTE — ANESTHESIA PROCEDURE NOTES
Erector spinae Procedure Note    Pre-Procedure   Staff -        Anesthesiologist:  Marcin Luque DO       Resident/Fellow: Nita Mabry MD       Performed By: resident       Location: pre-op       Procedure Start/Stop Times: 9/6/2022 7:25 AM and 9/6/2022 7:45 AM       Pre-Anesthestic Checklist: patient identified, IV checked, site marked, risks and benefits discussed, informed consent, monitors and equipment checked, pre-op evaluation, at physician/surgeon's request and post-op pain management  Timeout:       Correct Patient: Yes        Correct Procedure: Yes        Correct Site: Yes        Correct Position: Yes        Correct Laterality: Yes        Site Marked: Yes  Procedure Documentation  Procedure: Erector spinae       Diagnosis: ANALGESIA       Laterality: bilateral       Patient Position: prone       Patient Prep/Sterile Barriers: sterile gloves, mask, patient draped       Skin prep: Chloraprep       Local skin infiltrated with 5 mL of 1% lidocaine.        Insertion Site: T6-7.       Needle Type: Touhy needle       Needle Gauge: 17.        Needle Length (Inches): 3.13        Catheter: 19 G.          Catheter threaded easily.             Ultrasound guided       1. Ultrasound was used to identify targeted nerve, plexus, vascular marker, or fascial plane and place a needle adjacent to it in real-time.       2. Ultrasound was used to visualize the spread of anesthetic in close proximity to the above referenced structure.       3. A permanent image is entered into the patient's record.    Assessment/Narrative         The placement was negative for: blood aspirated, painful injection and site bleeding       Paresthesias: No.       Bolus given via catheter. no blood aspirated via catheter.        Secured via Tegaderm and Dermabond.        Insertion/Infusion Method: Continuous Infusion       Complications: none    Medication(s) Administered   Bupivacaine 0.25% PF (Infiltration) - Infiltration   20 mL - 9/6/2022  7:30:00 AM  Medication Administration Time: 9/6/2022 7:25 AM     Comments:  Mild oozing around right catheter site. Resolved with pressure.   Bolused 10 ml 0.25% Bupivacaine per catheter (total 20 ml)

## 2022-09-06 NOTE — ANESTHESIA PROCEDURE NOTES
Arterial Line Procedure Note    Pre-Procedure   Staff -        Anesthesiologist:  Hailee Martin MD       Resident/Fellow: Carl Ng MD       Performed By: resident       Location: OR       Pre-Anesthestic Checklist: patient identified, IV checked, risks and benefits discussed, informed consent, monitors and equipment checked, pre-op evaluation and at physician/surgeon's request  Timeout:       Correct Patient: Yes        Correct Procedure: Yes        Correct Site: Yes        Correct Position: Yes   Line Placement:   This line was placed Pre Induction starting at 9/6/2022 8:24 AM and ending at 9/6/2022 8:30 AM  Procedure   Procedure: arterial line       Laterality: left       Insertion site: Axillary.  Sterile Prep        Standard elements of sterile barrier followed       Skin prep: Chloraprep  Insertion/Injection        Technique: ultrasound guided and Seldinger Technique        1. Ultrasound was used to evaluate the access site.       2. Artery evaluated via ultrasound for patency/adequacy.       3. Using real-time ultrasound the needle/catheter was observed entering the artery/vein.       Catheter Type/Size: 20 G, 12 cm  Narrative         Secured by: suture       Tegaderm dressing used.       Complications: None apparent,        Arterial waveform: Yes        IBP within 10% of NIBP: Yes

## 2022-09-06 NOTE — ANESTHESIA CARE TRANSFER NOTE
Patient: Maximino Birch    Procedure: Procedure(s):  STERNOTOMY, REPEAT, WITH REDO AORTIC VALVE REPLACEMENT USING 23MM ON-X AORTIC VALVE. CARDIOPULMONARY BYPASS.  INTRAOPERATIVE TRANSESOPHAGEAL ECHOCARDIOGRAM PER ANESTHESIA       Diagnosis: Aortic stenosis [I35.0]  Diagnosis Additional Information: No value filed.    Anesthesia Type:   General     Note:    Oropharynx: ventilatory support and endotracheal tube in place  Level of Consciousness: iatrogenic sedation    Level of Supplemental Oxygen (L/min / FiO2): 100% FiO2    Dentition: dentition unchanged  Vital Signs Stable: post-procedure vital signs reviewed and stable  Report to RN Given: handoff report given  Patient transferred to: ICU    ICU Handoff: Call for PAUSE to initiate/utilize ICU HANDOFF, Identified Patient, Identified Responsible Provider, Reviewed the Pertinent Medical History, Discussed Surgical Course, Reviewed Intra-OP Anesthesia Management and Issues during Anesthesia, Set Expectations for Post Procedure Period and Allowed Opportunity for Questions and Acknowledgement of Understanding      Vitals:  Vitals Value Taken Time   BP 98/64    Temp 36.7  C (98.1  F) 09/06/22 1800   Pulse 80    Resp     SpO2 94        Electronically Signed By: MARY Payton CRNA  September 6, 2022  6:17 PM

## 2022-09-06 NOTE — H&P
CV ICU H&P    ASSESSMENT: Maximino Birch is a 61 year old male with PMH of COPD not on O2, HFrEF (EF 40%), aortic stenosis, bicuspid aortic valve, ascending aortic arch aneurysm and single-vessel CAD s/p AVR with bovine valve, arch repair and CABGx1 in 2013. He is now s/p redo sternotomy and AVR with On-X valve on 9/6.     In OR:  Bipass time: 153 min  Cross clamp time: 103 min  3 units platelets  Fibryga 1 g  K centra administered  5L crystalloid  1 L UOP  2 versed  700 fentanyl    PLAN:  Neuro/ pain/ sedation:  # Acute post-operative pain  - Fentanyl gtt for pain.  - Precedex and propofol gtt for sedation.    Pulmonary:   # Acute post-operative respiratory failure  # COPD, non-O2 dependent, FEV1 37%  - Plan to keep intubated overnight due to COPD and intraoperative desaturation; wean O2 requirements as tolerated; hopeful extubation in AM  - Titrate FiO2 for SpO2 88-92%  - PTA Symbicort  - PTA albuterol    Cardiovascular:    # Cardiogenic shock  # HFrEF/HFpEF (EF 40-45%, grade III diastolic dysfunction)   # CAD s/p CABG x1  # Aortic insufficiency of prosthetic AV, s/p redo AVR on 9/6  # HLD  - Epi and norepi ggt, titrate to MAP>65, wean as tolerated  - PTA Torsemide 60mg daily (held)  - PTA ASA 81 (held)  - PTA Atorvastatin 80 (held)  - PTA Evolocumab (held)  - PTA Lisinopril 5 (held)  - PTA Metoprolol succinate ER 50mg (held)  - PTA NTG  - Anticoagulation with coumadin for mechanical valve starting POD1  - V pacer wire present, DDD paced at 80 bpm      GI/Nutrition:   - NPO except ice chips and medications.  - OGT  - PPI prophylaxis    Fluids/ Electrolytes/Renal:   - Strict I's & O's, Santiago catheter in place    Endocrine:    # Stress hyperglycemia  - Insulin gtt to keep postoperative BG<180    ID/ Antibiotics:  - Complete perioperative antibiotics - ancef and vancomycin 24 hrs    Heme:     # Acute blood loss anemia  # Thombocytopenia  - Hgb goal >7  - Received 3 units platelets, will recheck  postoperatively, last plt 52    Prophylaxis:    - SCD's  - SQH to start POD1  - PPI prophylaxis    Lines/ tubes/ drains:  - RIJ Standish  - Left brachial arterial line  - Mediastinal drains x3  - Santiago  - OG  - ETT    Disposition:  - CV ICU.     Patient seen, findings and plan discussed with CV ICU staff.    Jud Au MD (PGY-4)  General Surgery   Pg #: [phone]    ====================================    HPI:   Maximino Birch is a 61 year old male with PMH of COPD not on O2, HFrEF (EF 40%), aortic stenosis, bicuspid aortic valve, ascending aortic arch aneurysm and single-vessel CAD s/p AVR with bovine valve, arch repair and CABGx1 in 2013. He is now s/p redo sternotomy and AVR with On-X valve on .     PAST MEDICAL HISTORY:   Past Medical History:   Diagnosis Date     Congestive heart failure (H)      COPD (chronic obstructive pulmonary disease) (H)      Coronary artery disease      Dyspnea on exertion      Heart attack (H)      Heart murmur      History of blood transfusion      Oxygen dependent      Uncomplicated asthma      Walking troubles        PAST SURGICAL HISTORY:   Past Surgical History:   Procedure Laterality Date     CARDIAC SURGERY      coronary artery disease and aortic stenosis status post CABG x1 (SVG-RCA) with concomitant  AVR (Natan Mitroflow bioprosthetic valve) and ascending aortic Dacron graft in 2013     ORTHOPEDIC SURGERY         FAMILY HISTORY:   Family History   Problem Relation Age of Onset     Heart Disease Father      Aneurysm Father      Parkinsonism Sister        SOCIAL HISTORY:   Social History     Tobacco Use     Smoking status: Former Smoker     Packs/day: 0.75     Years: 30.00     Pack years: 22.50     Quit date: 2022     Years since quittin.5     Smokeless tobacco: Never Used   Substance Use Topics     Alcohol use: Yes     Comment: 4-5 drinks per week         OBJECTIVE:  1. VITAL SIGNS:   Temp:  [98.8  F (37.1  C)] 98.8  F (37.1  C)  Pulse:  [80-86] 85  Resp:   [14-18] 14  BP: (107-129)/(60-75) 119/68  SpO2:  [94 %-97 %] 97 %    Resp: 14        2. INTAKE/ OUTPUT:   No intake/output data recorded.        3. PHYSICAL EXAMINATION:   General: Intubated and sedated  Neuro: Sedated, neuro exam not obtainable currently  Resp: Intubated on 450/16/6/100  CV: Paced at 80 bpm, mediastinal drains with s/s drainage  Abdomen: Soft, nondistended  Incisions: dressings in place c/d/i  Extremities: warm and well perfused    4. INVESTIGATIONS:   Arterial Blood Gases   Recent Labs   Lab 09/06/22  1121 09/06/22  1120 09/06/22  0909 09/06/22  0632   HGB 10.9* 10.8*   < > 14.2   PLT  --   --   --  91*    141   < >  --    POTASSIUM 3.9 3.9   < > 3.8   CR  --   --   --  1.18*    < > = values in this interval not displayed.           5. RADIOLOGY:     =========================================

## 2022-09-06 NOTE — Clinical Note
dry, intact, no bleeding and no hematoma. Incision sutured closed by EP Fellow. Steri-strips and dressing applied.

## 2022-09-06 NOTE — ANESTHESIA PROCEDURE NOTES
Central Line/PA Catheter Placement    Pre-Procedure   Staff -        Anesthesiologist:  Hailee Martin MD       Resident/Fellow: Carl Ng MD       Performed By: resident       Location: OR       Pre-Anesthestic Checklist: patient identified, IV checked, site marked, risks and benefits discussed, informed consent, monitors and equipment checked, pre-op evaluation and at physician/surgeon's request  Timeout:       Correct Patient: Yes        Correct Procedure: Yes        Correct Site: Yes        Correct Position: Yes        Correct Laterality: Yes   Line Placement:   This line was placed Post Induction    Procedure   Procedure: central line       Laterality: right       Insertion Site: internal jugular.       Patient Position: Trendelenburg  Sterile Prep        All elements of maximal sterile barrier technique followed       Patient Prep/Sterile Barriers: draped, hand hygiene, gloves , hat , mask , draped, gown, sterile gel and probe cover       Skin prep: Chloraprep  Insertion/Injection        Technique: ultrasound guided and Seldinger Technique        1. Ultrasound was used to evaluate the access site.       2. Vein evaluated via ultrasound for patency/adequacy.       3. Using real-time ultrasound the needle/catheter was observed entering the artery/vein.       Introducer Type: 9 Fr, 2-lumen MAC        Type: PA/CVC with Introducer       Catheter Size: 9 Fr       Catheter Length: 12       Number of Lumens: double lumen       PA Catheter Type: CCO         Appropriate RV, RA and PA waveforms noted:  Yes  Narrative         Secured by: suture       Tegaderm and Biopatch dressing used.       Complications: None apparent,        blood aspirated from all lumens,        All lumens flushed: Yes       Verification method: NATY and Placement to be verified post-op

## 2022-09-06 NOTE — ANESTHESIA PROCEDURE NOTES
Perioperative NATY Procedure Note    Staff -        Anesthesiologist:  Hailee Martin MD       Resident/Fellow: Evert Priest MD       Performed By: fellow and anesthesiologist  Preanesthesia Checklist:  Patient identified, IV assessed, risks and benefits discussed, monitors and equipment assessed, procedure being performed at surgeon's request and anesthesia consent obtained.    NATY Probe Insertion    Probe Status PRE Insertion: NO obvious damage  Probe type:  Adult 3D  Bite block used:   Soft  Insertion Technique: Jaw Lift  Insertion complications: None obvious  Billing Report:NATY report by Anesthesiologist (See Separate Report note)  Probe Status POST Removal: NO obvious damage    NATY Report  General Procedure Information  Images for this study have been archived.  Modalities: 2D, 3D, CW Doppler, PW Doppler and Color flow mapping  Diagnostic indications for NATY:   Aortic stenosis + Aortic insufficiency  Echocardiographic and Doppler Measurements  Right Ventricle:  Cavity size normal.    Hypertrophy not present.   Thrombus not present.    Global function normal.     Left Ventricle:  Cavity size dilated.    Hypertrophy not present.   Thrombus not present.   Global Function normal.   Ejection Fraction 50%.      Ventricular Regional Function:  1- Basal Anteroseptal:  normal  2- Basal Anterior:  normal  3- Basal Anterolateral:  normal  4- Basal Inferolateral:  normal  5- Basal Inferior:  normal  6- Basal Inferoseptal:  hypokinetic  7- Mid Anteroseptal:  normal  8- Mid Anterior:  normal  9- Mid Anterolateral:  normal  10- Mid Inferolateral:  normal  11- Mid Inferior:  normal  12- Mid Inferoseptal:  normal  13- Apical Anterior:  normal  14- Apical Lateral:  normal  15- Apical Inferior:  normal  16- Apical Septal:  normal  17- Vickery:  normal    Valves  Aortic Valve: Annulus bioprosthetic.  Stenosis mild.  Regurgitation +4.  Leaflets normal.    Mitral Valve: Annulus dilated.  Stenosis not present.  Regurgitation +2.   Leaflets normal.  Leaflet motions normal.    Tricuspid Valve: Annulus normal.  Stenosis mild.  Regurgitation +1.  Leaflets normal.  Leaflet motions normal.    Pulmonic Valve: Stenosis not present.  Regurgitation +1.      Aorta: Ascending Aorta: Size normal.  Dissection not present.  Plaque thickness less than 3 mm.  Mobile plaque not present.    Aortic Arch: Size normal.    Dissection not present.   Plaque thickness less than 3 mm.   Mobile plaque not present.    Descending Aorta: Size normal.    Dissection not present.   Plaque thickness less than 3 mm.   Mobile plaque not present.      Right Atrium:  Size normal.   Spontaneous echo contrast not present.   Thrombus not present.   Tumor not present.   Device not present.     Left Atrium: Size normal.  Spontaneous echo contrast not present.  Thrombus not present.  Tumor not present.  Device not present.    Left atrial appendage normal.     Atrial Septum: Intra-atrial septal morphology normal.       Ventricular Septum: Intra-ventricular septum morphology normal.        Other Findings:   Pericardium:  normal. Pleural Effusion:  none.   Cornoary sinus catheter present.    Post Intervention Findings  Procedure(s) performed:  Aortic Valve Repair/Replace. Global function:  Unchanged. Regional wall motion: Unchanged. Surgeon(s) notified of all postintervention findings: Yes.   Aortic Valve: Valve replaced with mechanical valve.               Echocardiogram Comments  Echocardiogram comments: Pre CPB:  LV mildly dilated, global function mildly impaired, EF 45-50%. RV normal size and function. Basal inferoseptal hypokinesis. Severe AI, mild AS. Mild to moderate MR with central jet. Mild TR. Trace PI. No PFO by color flow doppler. No aortic dissection. No pericardial effusion. All findings communicated with surgeon.    Post CPB:  S/p on-x aortic valve replacement. LV mildly dilated, global function mildly impaired EF 45%. RV normal size and function. Basal inferoseptal  hypokinesis. Aortic valve seated well, no paravalvular leaks, washing jets present, mobile leaflets, mean gradient 9 mm Hg, peak gradient 19 mm Hg. Mild MR. Mild TR. Trace PI. No PFO. No aortic dissection. No pericardial effusion. No pleural effusion. All findings communicated with surgeon.

## 2022-09-06 NOTE — Clinical Note
Hemodynamic equipment used: 5 lead ECG, LIFEPAK With 3 Leads, Calometric ETCO2 device, Machine BP Cuff and pulse oximeter probe. PRINCIPAL DISCHARGE DIAGNOSIS  Diagnosis: Urinary outflow obstruction  Assessment and Plan of Treatment: - Had left nephrostomy placement with IR      SECONDARY DISCHARGE DIAGNOSES  Diagnosis: Acute renal failure due to urinary obstruction  Assessment and Plan of Treatment: - had L NT placement w/ IR  -  Required HD, brian converted to permacath   - Had first HD on 1/28 tolerated well  - Access working well  - Outaptient nephrology follow up    Diagnosis: Urinary tract infection  Assessment and Plan of Treatment: - Completed antibiotics   - Seen by ID    Diagnosis: Bacteremia due to Klebsiella pneumoniae  Assessment and Plan of Treatment: - Completed antibiotics  - Repeat blood cultures were negative   - Seen by ID    Diagnosis: Anemia  Assessment and Plan of Treatment: - multifactorial, anemia of chronic illness and kidney disease  - seen by heme onc    Diagnosis: Diabetes mellitus  Assessment and Plan of Treatment: HgA1C this admission 7.0  Make sure you get your HgA1c checked every three months.  If you take oral diabetes medications, check your blood glucose two times a day.  If you take insulin, check your blood glucose before meals and at bedtime.  It's important not to skip any meals.  Keep a log of your blood glucose results and always take it with you to your doctor appointments.  Keep a list of your current medications including injectables and over the counter medications and bring this medication list with you to all your doctor appointments.  If you have not seen your ophthalmologist this year call for appointment.  Check your feet daily for redness, sores, or openings. Do not self treat. If no improvement in two days call your primary care physician for an appointment.  Low blood sugar (hypoglycemia) is a blood sugar below 70mg/dl. Check your blood sugar if you feel signs/symptoms of hypoglycemia. If your blood sugar is below 70 take 15 grams of carbohydrates (ex 4 oz of apple juice, 3-4 glucose tablets, or 4-6 oz of regular soda) wait 15 minutes and repeat blood sugar to make sure it comes up above 70.  If your blood sugar is above 70 and you are due for a meal, have a meal.  If you are not due for a meal have a snack.  This snack helps keeps your blood sugar at a safe range.      Diagnosis: Essential hypertension  Assessment and Plan of Treatment: Low salt diet  Activity as tolerated.  Take all medication as prescribed.  Follow up with your medical doctor for routine blood pressure monitoring at your next visit.  Notify your doctor if you have any of the following symptoms:   Dizziness, Lightheadedness, Blurry vision, Headache, Chest pain, Shortness of breath      Diagnosis: Renal cell cancer  Assessment and Plan of Treatment: - history of metastatic renal cell CA. Follows at [facility]. Appreciate urology recommendations  - Had Treatment by Dr. Daniel Cazares of [facility]  --Therapy (Sunitinib) completed in 2018  - Oncology follow up    Diagnosis: Abdominal distension  Assessment and Plan of Treatment: - Seen by GI   - no signs of obstruction    Diagnosis: Lymphoma  Assessment and Plan of Treatment: --Had Treatment by Dr. Daniel Cazares of [facility]  --Therapy (Sunitinib) completed in 2018  - Oncology follow up

## 2022-09-06 NOTE — OR NURSING
Bilat erector spinae plane block performed, pt tolerated well, VSS.  See MAR for medications. No S/S of toxicity.

## 2022-09-06 NOTE — Clinical Note
The ECG shows an A-V block. The A-V block is 3rd degree. ECG rate  = 73 bpm. Accelerated junctional with CHB

## 2022-09-06 NOTE — Clinical Note
Called to 4E [facility]. All questions answered. All belongings sent with patient. Patient transported to  via bed with CCL RN.

## 2022-09-06 NOTE — Clinical Note
Prepped: chest. Prepped with: ChloraPrep. The patient was draped. .Pre-procedure site marking:Insertion site not predetermined

## 2022-09-07 ENCOUNTER — APPOINTMENT (OUTPATIENT)
Dept: GENERAL RADIOLOGY | Facility: CLINIC | Age: 62
End: 2022-09-07
Attending: PHYSICIAN ASSISTANT
Payer: COMMERCIAL

## 2022-09-07 LAB
ABO/RH(D): NORMAL
ALBUMIN SERPL BCG-MCNC: 2.8 G/DL (ref 3.5–5.2)
ALP SERPL-CCNC: 62 U/L (ref 40–129)
ALT SERPL W P-5'-P-CCNC: 21 U/L (ref 10–50)
ANION GAP SERPL CALCULATED.3IONS-SCNC: 11 MMOL/L (ref 7–15)
ANION GAP SERPL CALCULATED.3IONS-SCNC: 12 MMOL/L (ref 7–15)
ANTIBODY SCREEN: NEGATIVE
AST SERPL W P-5'-P-CCNC: 54 U/L (ref 10–50)
ATRIAL RATE - MUSE: 77 BPM
ATRIAL RATE - MUSE: 93 BPM
BASE EXCESS BLDA CALC-SCNC: 2.9 MMOL/L (ref -9–1.8)
BASE EXCESS BLDA CALC-SCNC: 3.8 MMOL/L (ref -9–1.8)
BASE EXCESS BLDA CALC-SCNC: 4.2 MMOL/L (ref -9–1.8)
BASE EXCESS BLDA CALC-SCNC: 4.9 MMOL/L (ref -9–1.8)
BASE EXCESS BLDV CALC-SCNC: 3.8 MMOL/L (ref -7.7–1.9)
BASE EXCESS BLDV CALC-SCNC: 4 MMOL/L (ref -7.7–1.9)
BASE EXCESS BLDV CALC-SCNC: 4.6 MMOL/L (ref -7.7–1.9)
BASE EXCESS BLDV CALC-SCNC: 4.8 MMOL/L (ref -7.7–1.9)
BASE EXCESS BLDV CALC-SCNC: 5.9 MMOL/L (ref -7.7–1.9)
BILIRUB SERPL-MCNC: 0.9 MG/DL
BUN SERPL-MCNC: 25 MG/DL (ref 8–23)
BUN SERPL-MCNC: 27.4 MG/DL (ref 8–23)
CA-I BLD-MCNC: 4.7 MG/DL (ref 4.4–5.2)
CALCIUM SERPL-MCNC: 8.5 MG/DL (ref 8.6–10)
CALCIUM SERPL-MCNC: 8.6 MG/DL (ref 8.6–10)
CHLORIDE SERPL-SCNC: 104 MMOL/L (ref 98–107)
CHLORIDE SERPL-SCNC: 105 MMOL/L (ref 98–107)
CREAT SERPL-MCNC: 1.19 MG/DL (ref 0.67–1.17)
CREAT SERPL-MCNC: 1.47 MG/DL (ref 0.67–1.17)
DEPRECATED HCO3 PLAS-SCNC: 26 MMOL/L (ref 22–29)
DEPRECATED HCO3 PLAS-SCNC: 26 MMOL/L (ref 22–29)
DIASTOLIC BLOOD PRESSURE - MUSE: NORMAL MMHG
DIASTOLIC BLOOD PRESSURE - MUSE: NORMAL MMHG
ERYTHROCYTE [DISTWIDTH] IN BLOOD BY AUTOMATED COUNT: 14.9 % (ref 10–15)
GFR SERPL CREATININE-BSD FRML MDRD: 55 ML/MIN/1.73M2
GFR SERPL CREATININE-BSD FRML MDRD: 70 ML/MIN/1.73M2
GLUCOSE BLDC GLUCOMTR-MCNC: 103 MG/DL (ref 70–99)
GLUCOSE BLDC GLUCOMTR-MCNC: 142 MG/DL (ref 70–99)
GLUCOSE BLDC GLUCOMTR-MCNC: 148 MG/DL (ref 70–99)
GLUCOSE BLDC GLUCOMTR-MCNC: 150 MG/DL (ref 70–99)
GLUCOSE BLDC GLUCOMTR-MCNC: 166 MG/DL (ref 70–99)
GLUCOSE BLDC GLUCOMTR-MCNC: 176 MG/DL (ref 70–99)
GLUCOSE BLDC GLUCOMTR-MCNC: 177 MG/DL (ref 70–99)
GLUCOSE BLDC GLUCOMTR-MCNC: 183 MG/DL (ref 70–99)
GLUCOSE BLDC GLUCOMTR-MCNC: 194 MG/DL (ref 70–99)
GLUCOSE BLDC GLUCOMTR-MCNC: 197 MG/DL (ref 70–99)
GLUCOSE BLDC GLUCOMTR-MCNC: 199 MG/DL (ref 70–99)
GLUCOSE BLDC GLUCOMTR-MCNC: 85 MG/DL (ref 70–99)
GLUCOSE BLDC GLUCOMTR-MCNC: 99 MG/DL (ref 70–99)
GLUCOSE SERPL-MCNC: 185 MG/DL (ref 70–99)
GLUCOSE SERPL-MCNC: 98 MG/DL (ref 70–99)
HCO3 BLD-SCNC: 29 MMOL/L (ref 21–28)
HCO3 BLD-SCNC: 29 MMOL/L (ref 21–28)
HCO3 BLD-SCNC: 30 MMOL/L (ref 21–28)
HCO3 BLD-SCNC: 30 MMOL/L (ref 21–28)
HCO3 BLDV-SCNC: 30 MMOL/L (ref 21–28)
HCO3 BLDV-SCNC: 31 MMOL/L (ref 21–28)
HCO3 BLDV-SCNC: 32 MMOL/L (ref 21–28)
HCT VFR BLD AUTO: 34.7 % (ref 40–53)
HGB BLD-MCNC: 11.3 G/DL (ref 13.3–17.7)
INR PPP: 1.3 (ref 0.85–1.15)
INTERPRETATION ECG - MUSE: NORMAL
INTERPRETATION ECG - MUSE: NORMAL
MAGNESIUM SERPL-MCNC: 2.3 MG/DL (ref 1.7–2.3)
MCH RBC QN AUTO: 31.4 PG (ref 26.5–33)
MCHC RBC AUTO-ENTMCNC: 32.6 G/DL (ref 31.5–36.5)
MCV RBC AUTO: 96 FL (ref 78–100)
O2/TOTAL GAS SETTING VFR VENT: 30 %
O2/TOTAL GAS SETTING VFR VENT: 4 %
O2/TOTAL GAS SETTING VFR VENT: 40 %
O2/TOTAL GAS SETTING VFR VENT: 45 %
O2/TOTAL GAS SETTING VFR VENT: 45 %
O2/TOTAL GAS SETTING VFR VENT: 50 %
OXYHGB MFR BLD: 88 % (ref 92–100)
OXYHGB MFR BLD: 89 % (ref 92–100)
OXYHGB MFR BLD: 91 % (ref 92–100)
OXYHGB MFR BLD: 95 % (ref 92–100)
OXYHGB MFR BLDV: 43 % (ref 70–75)
OXYHGB MFR BLDV: 45 % (ref 70–75)
OXYHGB MFR BLDV: 51 % (ref 70–75)
OXYHGB MFR BLDV: 61 % (ref 70–75)
OXYHGB MFR BLDV: 67 % (ref 70–75)
P AXIS - MUSE: NORMAL DEGREES
P AXIS - MUSE: NORMAL DEGREES
PCO2 BLD: 44 MM HG (ref 35–45)
PCO2 BLD: 48 MM HG (ref 35–45)
PCO2 BLDV: 49 MM HG (ref 40–50)
PCO2 BLDV: 49 MM HG (ref 40–50)
PCO2 BLDV: 53 MM HG (ref 40–50)
PCO2 BLDV: 53 MM HG (ref 40–50)
PCO2 BLDV: 54 MM HG (ref 40–50)
PH BLD: 7.39 [PH] (ref 7.35–7.45)
PH BLD: 7.4 [PH] (ref 7.35–7.45)
PH BLD: 7.41 [PH] (ref 7.35–7.45)
PH BLD: 7.42 [PH] (ref 7.35–7.45)
PH BLDV: 7.37 [PH] (ref 7.32–7.43)
PH BLDV: 7.37 [PH] (ref 7.32–7.43)
PH BLDV: 7.39 [PH] (ref 7.32–7.43)
PH BLDV: 7.39 [PH] (ref 7.32–7.43)
PH BLDV: 7.4 [PH] (ref 7.32–7.43)
PHOSPHATE SERPL-MCNC: 4.4 MG/DL (ref 2.5–4.5)
PLATELET # BLD AUTO: 168 10E3/UL (ref 150–450)
PO2 BLD: 56 MM HG (ref 80–105)
PO2 BLD: 59 MM HG (ref 80–105)
PO2 BLD: 67 MM HG (ref 80–105)
PO2 BLD: 85 MM HG (ref 80–105)
PO2 BLDV: 27 MM HG (ref 25–47)
PO2 BLDV: 28 MM HG (ref 25–47)
PO2 BLDV: 29 MM HG (ref 25–47)
PO2 BLDV: 37 MM HG (ref 25–47)
PO2 BLDV: 39 MM HG (ref 25–47)
POTASSIUM SERPL-SCNC: 3.9 MMOL/L (ref 3.4–5.3)
POTASSIUM SERPL-SCNC: 4.2 MMOL/L (ref 3.4–5.3)
PR INTERVAL - MUSE: NORMAL MS
PR INTERVAL - MUSE: NORMAL MS
PROT SERPL-MCNC: 4.6 G/DL (ref 6.4–8.3)
QRS DURATION - MUSE: 188 MS
QRS DURATION - MUSE: 190 MS
QT - MUSE: 498 MS
QT - MUSE: 510 MS
QTC - MUSE: 574 MS
QTC - MUSE: 588 MS
R AXIS - MUSE: 222 DEGREES
R AXIS - MUSE: 270 DEGREES
RBC # BLD AUTO: 3.6 10E6/UL (ref 4.4–5.9)
SODIUM SERPL-SCNC: 141 MMOL/L (ref 136–145)
SODIUM SERPL-SCNC: 143 MMOL/L (ref 136–145)
SPECIMEN EXPIRATION DATE: NORMAL
SYSTOLIC BLOOD PRESSURE - MUSE: NORMAL MMHG
SYSTOLIC BLOOD PRESSURE - MUSE: NORMAL MMHG
T AXIS - MUSE: 192 DEGREES
T AXIS - MUSE: 245 DEGREES
VENTRICULAR RATE- MUSE: 80 BPM
VENTRICULAR RATE- MUSE: 80 BPM
WBC # BLD AUTO: 12.8 10E3/UL (ref 4–11)

## 2022-09-07 PROCEDURE — 258N000003 HC RX IP 258 OP 636: Performed by: STUDENT IN AN ORGANIZED HEALTH CARE EDUCATION/TRAINING PROGRAM

## 2022-09-07 PROCEDURE — 999N000015 HC STATISTIC ARTERIAL MONITORING DAILY

## 2022-09-07 PROCEDURE — 250N000011 HC RX IP 250 OP 636: Performed by: SURGERY

## 2022-09-07 PROCEDURE — 02H63JZ INSERTION OF PACEMAKER LEAD INTO RIGHT ATRIUM, PERCUTANEOUS APPROACH: ICD-10-PCS | Performed by: INTERNAL MEDICINE

## 2022-09-07 PROCEDURE — 999N000215 HC STATISTIC HFNC ADULT NON-CPAP

## 2022-09-07 PROCEDURE — 272N000010 HC KIT CATH ARTERIAL EXT SUPPLY

## 2022-09-07 PROCEDURE — 82310 ASSAY OF CALCIUM: CPT | Performed by: SURGERY

## 2022-09-07 PROCEDURE — 99253 IP/OBS CNSLTJ NEW/EST LOW 45: CPT | Mod: 24 | Performed by: INTERNAL MEDICINE

## 2022-09-07 PROCEDURE — 71045 X-RAY EXAM CHEST 1 VIEW: CPT

## 2022-09-07 PROCEDURE — 82330 ASSAY OF CALCIUM: CPT | Performed by: PHYSICIAN ASSISTANT

## 2022-09-07 PROCEDURE — 82805 BLOOD GASES W/O2 SATURATION: CPT | Performed by: PHYSICIAN ASSISTANT

## 2022-09-07 PROCEDURE — 999N000045 HC STATISTIC DAILY SWAN MONITORING

## 2022-09-07 PROCEDURE — 85610 PROTHROMBIN TIME: CPT | Performed by: SURGERY

## 2022-09-07 PROCEDURE — 250N000009 HC RX 250: Performed by: STUDENT IN AN ORGANIZED HEALTH CARE EDUCATION/TRAINING PROGRAM

## 2022-09-07 PROCEDURE — 80053 COMPREHEN METABOLIC PANEL: CPT | Performed by: STUDENT IN AN ORGANIZED HEALTH CARE EDUCATION/TRAINING PROGRAM

## 2022-09-07 PROCEDURE — 02HK3JZ INSERTION OF PACEMAKER LEAD INTO RIGHT VENTRICLE, PERCUTANEOUS APPROACH: ICD-10-PCS | Performed by: INTERNAL MEDICINE

## 2022-09-07 PROCEDURE — 82805 BLOOD GASES W/O2 SATURATION: CPT | Performed by: SURGERY

## 2022-09-07 PROCEDURE — 999N000253 HC STATISTIC WEANING TRIALS

## 2022-09-07 PROCEDURE — 200N000002 HC R&B ICU UMMC

## 2022-09-07 PROCEDURE — 250N000013 HC RX MED GY IP 250 OP 250 PS 637: Performed by: SURGERY

## 2022-09-07 PROCEDURE — 271N000002 HC RX 271: Performed by: NURSE PRACTITIONER

## 2022-09-07 PROCEDURE — 71045 X-RAY EXAM CHEST 1 VIEW: CPT | Mod: 26 | Performed by: RADIOLOGY

## 2022-09-07 PROCEDURE — 250N000013 HC RX MED GY IP 250 OP 250 PS 637: Performed by: STUDENT IN AN ORGANIZED HEALTH CARE EDUCATION/TRAINING PROGRAM

## 2022-09-07 PROCEDURE — 0JH606Z INSERTION OF PACEMAKER, DUAL CHAMBER INTO CHEST SUBCUTANEOUS TISSUE AND FASCIA, OPEN APPROACH: ICD-10-PCS | Performed by: INTERNAL MEDICINE

## 2022-09-07 PROCEDURE — 250N000011 HC RX IP 250 OP 636: Performed by: STUDENT IN AN ORGANIZED HEALTH CARE EDUCATION/TRAINING PROGRAM

## 2022-09-07 PROCEDURE — 99291 CRITICAL CARE FIRST HOUR: CPT | Mod: 24 | Performed by: ANESTHESIOLOGY

## 2022-09-07 PROCEDURE — 999N000043 HC STATISTIC CTO2 CONT OXYGEN TECH TIME EA 90 MIN

## 2022-09-07 PROCEDURE — 250N000011 HC RX IP 250 OP 636: Performed by: NURSE PRACTITIONER

## 2022-09-07 PROCEDURE — 83735 ASSAY OF MAGNESIUM: CPT | Performed by: STUDENT IN AN ORGANIZED HEALTH CARE EDUCATION/TRAINING PROGRAM

## 2022-09-07 PROCEDURE — 999N000155 HC STATISTIC RAPCV CVP MONITORING

## 2022-09-07 PROCEDURE — 99231 SBSQ HOSP IP/OBS SF/LOW 25: CPT | Performed by: NURSE PRACTITIONER

## 2022-09-07 PROCEDURE — 93010 ELECTROCARDIOGRAM REPORT: CPT | Mod: 76 | Performed by: INTERNAL MEDICINE

## 2022-09-07 PROCEDURE — 93005 ELECTROCARDIOGRAM TRACING: CPT

## 2022-09-07 PROCEDURE — 36620 INSERTION CATHETER ARTERY: CPT

## 2022-09-07 PROCEDURE — 85014 HEMATOCRIT: CPT | Performed by: STUDENT IN AN ORGANIZED HEALTH CARE EDUCATION/TRAINING PROGRAM

## 2022-09-07 PROCEDURE — 258N000003 HC RX IP 258 OP 636: Performed by: PHYSICIAN ASSISTANT

## 2022-09-07 PROCEDURE — 94003 VENT MGMT INPAT SUBQ DAY: CPT

## 2022-09-07 PROCEDURE — 250N000013 HC RX MED GY IP 250 OP 250 PS 637: Performed by: PHYSICIAN ASSISTANT

## 2022-09-07 PROCEDURE — 258N000003 HC RX IP 258 OP 636: Performed by: SURGERY

## 2022-09-07 PROCEDURE — 272N000202 HC AEROBIKA WITH MANOMETER

## 2022-09-07 PROCEDURE — 250N000011 HC RX IP 250 OP 636: Performed by: PHYSICIAN ASSISTANT

## 2022-09-07 PROCEDURE — 999N000157 HC STATISTIC RCP TIME EA 10 MIN

## 2022-09-07 PROCEDURE — 84100 ASSAY OF PHOSPHORUS: CPT | Performed by: STUDENT IN AN ORGANIZED HEALTH CARE EDUCATION/TRAINING PROGRAM

## 2022-09-07 RX ORDER — ALBUTEROL SULFATE 90 UG/1
2 AEROSOL, METERED RESPIRATORY (INHALATION) EVERY 6 HOURS PRN
Status: DISCONTINUED | OUTPATIENT
Start: 2022-09-07 | End: 2022-09-16 | Stop reason: HOSPADM

## 2022-09-07 RX ORDER — WARFARIN SODIUM 5 MG/1
5 TABLET ORAL
Status: COMPLETED | OUTPATIENT
Start: 2022-09-07 | End: 2022-09-07

## 2022-09-07 RX ORDER — LIDOCAINE 40 MG/G
CREAM TOPICAL
Status: DISCONTINUED | OUTPATIENT
Start: 2022-09-07 | End: 2022-09-09 | Stop reason: HOSPADM

## 2022-09-07 RX ORDER — NICOTINE POLACRILEX 4 MG
15-30 LOZENGE BUCCAL
Status: DISCONTINUED | OUTPATIENT
Start: 2022-09-07 | End: 2022-09-15

## 2022-09-07 RX ORDER — FUROSEMIDE 10 MG/ML
20 INJECTION INTRAMUSCULAR; INTRAVENOUS ONCE
Status: COMPLETED | OUTPATIENT
Start: 2022-09-07 | End: 2022-09-07

## 2022-09-07 RX ORDER — NOREPINEPHRINE BITARTRATE 0.06 MG/ML
.01-.4 INJECTION, SOLUTION INTRAVENOUS CONTINUOUS
Status: DISCONTINUED | OUTPATIENT
Start: 2022-09-07 | End: 2022-09-09

## 2022-09-07 RX ORDER — DEXTROSE MONOHYDRATE 25 G/50ML
25-50 INJECTION, SOLUTION INTRAVENOUS
Status: DISCONTINUED | OUTPATIENT
Start: 2022-09-07 | End: 2022-09-15

## 2022-09-07 RX ORDER — FUROSEMIDE 10 MG/ML
60 INJECTION INTRAMUSCULAR; INTRAVENOUS ONCE
Status: COMPLETED | OUTPATIENT
Start: 2022-09-07 | End: 2022-09-08

## 2022-09-07 RX ORDER — DEXTROSE MONOHYDRATE 100 MG/ML
INJECTION, SOLUTION INTRAVENOUS CONTINUOUS PRN
Status: DISCONTINUED | OUTPATIENT
Start: 2022-09-07 | End: 2022-09-16 | Stop reason: HOSPADM

## 2022-09-07 RX ORDER — CEFAZOLIN SODIUM 2 G/100ML
2 INJECTION, SOLUTION INTRAVENOUS
Status: DISCONTINUED | OUTPATIENT
Start: 2022-09-07 | End: 2022-09-08

## 2022-09-07 RX ORDER — ATORVASTATIN CALCIUM 80 MG/1
80 TABLET, FILM COATED ORAL EVERY EVENING
Status: DISCONTINUED | OUTPATIENT
Start: 2022-09-07 | End: 2022-09-16 | Stop reason: HOSPADM

## 2022-09-07 RX ADMIN — ASPIRIN 81 MG CHEWABLE TABLET 81 MG: 81 TABLET CHEWABLE at 07:40

## 2022-09-07 RX ADMIN — WARFARIN SODIUM 5 MG: 5 TABLET ORAL at 17:57

## 2022-09-07 RX ADMIN — POLYETHYLENE GLYCOL 3350 17 G: 17 POWDER, FOR SOLUTION ORAL at 07:40

## 2022-09-07 RX ADMIN — GABAPENTIN 100 MG: 100 CAPSULE ORAL at 07:40

## 2022-09-07 RX ADMIN — HUMAN INSULIN 1.5 UNITS/HR: 100 INJECTION, SOLUTION SUBCUTANEOUS at 00:44

## 2022-09-07 RX ADMIN — GABAPENTIN 100 MG: 100 CAPSULE ORAL at 19:52

## 2022-09-07 RX ADMIN — Medication 40 MG: at 07:39

## 2022-09-07 RX ADMIN — CEFAZOLIN SODIUM 2 G: 2 INJECTION, SOLUTION INTRAVENOUS at 00:29

## 2022-09-07 RX ADMIN — SENNOSIDES AND DOCUSATE SODIUM 1 TABLET: 8.6; 5 TABLET ORAL at 07:40

## 2022-09-07 RX ADMIN — HEPARIN SODIUM 5000 UNITS: 5000 INJECTION, SOLUTION INTRAVENOUS; SUBCUTANEOUS at 13:43

## 2022-09-07 RX ADMIN — METHOCARBAMOL 750 MG: 750 TABLET, FILM COATED ORAL at 19:53

## 2022-09-07 RX ADMIN — HEPARIN SODIUM 5000 UNITS: 5000 INJECTION, SOLUTION INTRAVENOUS; SUBCUTANEOUS at 19:52

## 2022-09-07 RX ADMIN — PROPOFOL 30 MCG/KG/MIN: 10 INJECTION, EMULSION INTRAVENOUS at 05:21

## 2022-09-07 RX ADMIN — EPINEPHRINE 0.06 MCG/KG/MIN: 1 INJECTION INTRAMUSCULAR; INTRAVENOUS; SUBCUTANEOUS at 03:35

## 2022-09-07 RX ADMIN — VANCOMYCIN HYDROCHLORIDE 1500 MG: 10 INJECTION, POWDER, LYOPHILIZED, FOR SOLUTION INTRAVENOUS at 10:17

## 2022-09-07 RX ADMIN — ACETAMINOPHEN 975 MG: 325 TABLET, FILM COATED ORAL at 23:04

## 2022-09-07 RX ADMIN — FUROSEMIDE 20 MG: 10 INJECTION, SOLUTION INTRAVENOUS at 19:51

## 2022-09-07 RX ADMIN — GABAPENTIN 100 MG: 100 CAPSULE ORAL at 13:42

## 2022-09-07 RX ADMIN — SODIUM CHLORIDE, POTASSIUM CHLORIDE, SODIUM LACTATE AND CALCIUM CHLORIDE 500 ML: 600; 310; 30; 20 INJECTION, SOLUTION INTRAVENOUS at 06:05

## 2022-09-07 RX ADMIN — ATORVASTATIN CALCIUM 80 MG: 80 TABLET, FILM COATED ORAL at 19:52

## 2022-09-07 RX ADMIN — SODIUM CHLORIDE, POTASSIUM CHLORIDE, SODIUM LACTATE AND CALCIUM CHLORIDE 500 ML: 600; 310; 30; 20 INJECTION, SOLUTION INTRAVENOUS at 06:54

## 2022-09-07 RX ADMIN — ACETAMINOPHEN 975 MG: 325 TABLET, FILM COATED ORAL at 13:42

## 2022-09-07 RX ADMIN — Medication: at 21:35

## 2022-09-07 RX ADMIN — ACETAMINOPHEN 975 MG: 325 TABLET, FILM COATED ORAL at 05:35

## 2022-09-07 RX ADMIN — FLUTICASONE FUROATE AND VILANTEROL TRIFENATATE 1 PUFF: 200; 25 POWDER RESPIRATORY (INHALATION) at 13:48

## 2022-09-07 RX ADMIN — OXYCODONE HYDROCHLORIDE 10 MG: 10 TABLET ORAL at 19:53

## 2022-09-07 RX ADMIN — SENNOSIDES AND DOCUSATE SODIUM 1 TABLET: 8.6; 5 TABLET ORAL at 19:52

## 2022-09-07 RX ADMIN — Medication 500 MG: at 21:34

## 2022-09-07 RX ADMIN — CEFAZOLIN SODIUM 2 G: 2 INJECTION, SOLUTION INTRAVENOUS at 12:53

## 2022-09-07 RX ADMIN — PROPOFOL 25 MCG/KG/MIN: 10 INJECTION, EMULSION INTRAVENOUS at 00:21

## 2022-09-07 ASSESSMENT — ACTIVITIES OF DAILY LIVING (ADL)
ADLS_ACUITY_SCORE: 28
ADLS_ACUITY_SCORE: 32
ADLS_ACUITY_SCORE: 28

## 2022-09-07 NOTE — PHARMACY-ADMISSION MEDICATION HISTORY
Admission Medication History Completed by Pharmacy    See [facility] Admission Navigator for allergy information, preferred outpatient pharmacy, prior to admission medications and immunization status.     Medication History Sources:     Patient/spouse, prescription adjudication database, last doses as documented by RN, and chart review    Changes made to PTA medication list (reason):    Added: None    Deleted: amoxicillin - treatment course completed    Changed:  o Albuterol - to reflect current prescription signature  o Nitroglycerin - to reflect current prescription signature    Additional Information:    Verified allergies and confirmed no additional allergies that patient and spouse are aware of    Verified patient's primary pharmacy is [facility] in [place], [place]    Amoxicillin - prescribed 7-day course for dental extraction on 8/16/22; course completed and removed from PTA medication list    Repatha (evolocumab) - patient's last dose was Tuesday 8/30, next dose due next Tuesday 9/13, spouse states they are able to bring in medication if patient is still admitted at that time    Prior to Admission medications    Medication Sig Last Dose Taking? Auth Provider Long Term End Date   albuterol (PROAIR HFA/PROVENTIL HFA/VENTOLIN HFA) 108 (90 Base) MCG/ACT inhaler Inhale 2 puffs into the lungs every 4 hours as needed Past Week at Unknown time Yes Reported, Patient Yes    aspirin 81 MG EC tablet Take 81 mg by mouth daily 9/5/2022 at Unknown time Yes Reported, Patient     atorvastatin (LIPITOR) 80 MG tablet Take 80 mg by mouth every evening 9/5/2022 at 2100 Yes Reported, Patient Yes    budesonide-formoterol (SYMBICORT) 160-4.5 MCG/ACT Inhaler Inhale 2 puffs into the lungs 2 times daily 9/6/2022 at Unknown time Yes Reported, Patient Yes 10/4/22   evolocumab (REPATHA SURECLICK) 140 MG/ML prefilled autoinjector Inject 140 mg Subcutaneous every 14 days 8/30/2022 at Unknown time Yes Reported, Patient  4/20/23    ibuprofen (ADVIL/MOTRIN) 200 MG tablet Take 200 mg by mouth every 4 hours as needed for mild pain Past Month at Unknown time Yes Reported, Patient     lisinopril (ZESTRIL) 5 MG tablet Take 5 mg by mouth every morning 9/6/2022 at Unknown time Yes Reported, Patient Yes    metoprolol succinate ER (TOPROL XL) 50 MG 24 hr tablet Take 50 mg by mouth every morning 9/6/2022 at Unknown time Yes Reported, Patient Yes    nitroGLYcerin (NITROSTAT) 0.4 MG sublingual tablet Place under the tongue every 5 minutes as needed More than a month at Unknown time Yes Reported, Patient Yes    potassium chloride ER (KLOR-CON M) 20 MEQ CR tablet Take 60 mEq by mouth every morning 9/6/2022 at Unknown time Yes Reported, Patient     torsemide (DEMADEX) 20 MG tablet Take 60 mg by mouth every morning 9/6/2022 at Unknown time Yes Reported, Patient Yes 10/19/22       Date completed: 09/07/22    Medication history completed by: Carl Reich, PharmD

## 2022-09-07 NOTE — PROGRESS NOTES
CLINICAL NUTRITION SERVICES - BRIEF NOTE    Received provider consult for nutrition education with comments post op cardiovascular surgery (automatic consult on post-op order set). S/p redo sternotomy and AVR with On-X valve on 9/6. Nutrition education not indicated.    RD will follow per LOS protocol or if re-consulted.     Camelia Mason MS, RD, LD  4E (CVICU) RD pager: [phone]  Ascom: [id number]  Weekend/Holiday RD pager: [phone]

## 2022-09-07 NOTE — PROGRESS NOTES
"Pain Service Progress Note  [facility]  Date: 09/07/2022       Patient Name: Maximino Birch  MRN: [id number]  Age: 61 year old  Sex: male      Assessment:  Maximino Birch is a 61 year old male with PMH significant for COPD on home O2 (3LPM at night), HFrEF (EF 40%), aortic stenosis, bicuspid aortic valve and ascending aortic aneurysm s/p aortic valve replacement with bovine valve, arch repair and CABG x 1 SVG to RCA in 2013    Procedure: redo sternotomy and AVR with On-X valve    Date of Surgery: 9/6/22      Date of Catheter Placement: 9/6/22     Plan/Recommendations:  1. Regional Anesthesia/Analgesia  -Continuous Catheter Type/Site: bilateral erector spinae (ES) T6-7  Infusate: Ropivacaine 0.2%  Programmed Intermittent Bolus (PIB) at 7 mL Q60 min via each catheter, total infusion rate of 14 mL/hr  No Bolus given.  Patient denies pain.     Starting warfarin tonight so plan to maintain catheters tomorrow (9/8/22)    2. Anticoagulation  Okay to continue heparin 5,000 units S C Q 8 hrs as ordered  Okay to begin warfarin tonight.  Most recent INR 1.30   -Please contact Inpatient Pain Service before ordering or making any anticoagulation changes       3. Multimodal Analgesia  -  Per primary service    Pain Service will continue to follow.    Discussed with attending anesthesiologist    Please Page the Pain Team [facility]: \"Adult acute inpatient pain management/[facility]\"    Madyson Velazquez, MARY CNP  09/07/2022     Overnight Events: Intubated, on CMV     Tubes/Drains: Yes  Chest tubes x 3    Subjective: remains intubated with plan to extubate soon.  Maximino is alert, he gives okay sign, denies pain, denies symptoms of LAST.      Symptoms of LAST: No    Diet: Advance Diet as Tolerated: Clear Liquid Diet    Relevant Labs:  Recent Labs   Lab Test 09/07/22  0904 09/07/22  0341 09/06/22  1819   INR 1.30*  --  1.50*   PLT  --  168 175   PTT  --   --  80*   BUN  --  25.0* 21.4 " "      Physical Exam:  Vitals: /63   Pulse 60   Temp 97.8  F (36.6  C) (Axillary)   Resp 16   Ht 1.829 m (6')   Wt 94.4 kg (208 lb 1.8 oz)   SpO2 90%   BMI 28.23 kg/m      Physical Exam:   Orientation:  Alert, oriented, and in no acute distress: Yes  Sedation: No    Motor Examination:  5/5 Strength in lower extremities: Yes     Catheter Site:   Catheter entry site is clean/dry/intact: Yes    Tender: No      Relevant Medications:  Current Pain Medications:  Medications related to Pain Management (From now, onward)    Start     Dose/Rate Route Frequency Ordered Stop    09/09/22 0000  acetaminophen (TYLENOL) tablet 650 mg         650 mg Oral EVERY 4 HOURS PRN 09/06/22 1818 09/07/22 0930  ropivacaine 0.2% in NS perineural infusion simple          Perineural Continuous Nerve Block 09/07/22 0914 09/07/22 0930  ropivacaine 0.2% in NS perineural infusion simple          Perineural Continuous Nerve Block 09/07/22 0914 09/07/22 0800  polyethylene glycol (MIRALAX) Packet 17 g         17 g Oral DAILY 09/06/22 1818 09/07/22 0800  aspirin (ASA) chewable tablet 81 mg         81 mg Oral or NG Tube DAILY 09/06/22 1818 09/06/22 2000  senna-docusate (SENOKOT-S/PERICOLACE) 8.6-50 MG per tablet 1 tablet         1 tablet Oral 2 TIMES DAILY 09/06/22 1818 09/06/22 2000  gabapentin (NEURONTIN) capsule 100 mg         100 mg Oral 3 TIMES DAILY 09/06/22 1818 09/06/22 1830  acetaminophen (TYLENOL) tablet 975 mg         975 mg Oral EVERY 8 HOURS 09/06/22 1818 09/09/22 2159 09/06/22 1817  HYDROmorphone (PF) (DILAUDID) injection 0.2 mg        \"Or\" Linked Group Details    0.2 mg Intravenous EVERY 2 HOURS PRN 09/06/22 1818 09/06/22 1817  HYDROmorphone (PF) (DILAUDID) injection 0.4 mg        \"Or\" Linked Group Details    0.4 mg Intravenous EVERY 2 HOURS PRN 09/06/22 1818 09/06/22 1817  oxyCODONE (ROXICODONE) tablet 5 mg        \"Or\" Linked Group Details    5 mg Oral EVERY 4 HOURS PRN 09/06/22 " "1818 09/06/22 1817  oxyCODONE IR (ROXICODONE) tablet 10 mg        \"Or\" Linked Group Details    10 mg Oral EVERY 4 HOURS PRN 09/06/22 1818      09/06/22 1817  methocarbamol (ROBAXIN) tablet 750 mg         750 mg Oral EVERY 6 HOURS PRN 09/06/22 1818      09/06/22 1817  magnesium hydroxide (MILK OF MAGNESIA) suspension 30 mL         30 mL Oral DAILY PRN 09/06/22 1818      09/06/22 1817  bisacodyl (DULCOLAX) suppository 10 mg         10 mg Rectal DAILY PRN 09/06/22 1818      09/06/22 1817  lidocaine 1 % 0.1-1 mL         0.1-1 mL Other EVERY 1 HOUR PRN 09/06/22 1818      09/06/22 1817  lidocaine (LMX4) kit          Topical EVERY 1 HOUR PRN 09/06/22 1818            Primary Service Contacted with Recommendations? No      Time/Communication:  I personally spent 15 minutes with greater than 50% in consultation, education, counseling and coordination of care.  See note above for details on conversation.          "

## 2022-09-07 NOTE — PROGRESS NOTES
[facility], Procedure Note          Extubation:       Maximino Birch  MRN# [id number]   September 7, 2022, 09:40 AM         Patient extubated at: September 7, 2022, 09:40 AM   Supplemental Oxygen: Via nasal cannula at 4 liters per minute then HFNC 40% 30 L   Cough: The cough is good and productive   Secretion Mode: PRN suction with assistance   Secretion Amount: Moderate amount, moderately thick and white / yellow in color   Respiratory Exam:: Breath sounds: good aeration     Location: bilaterally   Skin Exam:: Patient color: natural   Patient Status: Currently appears comfortable   Arterial Blood Gasses: pH Arterial (no units)   Date Value   09/07/2022 7.40     pO2 Arterial (mm Hg)   Date Value   09/07/2022 67 (L)     pCO2 Arterial (mm Hg)   Date Value   09/07/2022 48 (H)     Bicarbonate Arterial (mmol/L)   Date Value   09/07/2022 30 (H)            Recorded by Jenifer Lopez, RT

## 2022-09-07 NOTE — CONSULTS
Cardiac Electrophysiology Consult                                                               2022  Maximino Birch MRN: [id number]  Age: 61 year old, : 1960        Reason for consult:      3rd degree AVB       Assessment and Recommendation:     Mr. Birch is a 61-year-old man with a PMHx of a BAV and ascending aortic aneurysm and single-vessel CAD who underwent AVR along with ascending aortic hemiarch replacement and CABG x 1 (SVG-PDA) in  and later developed severe prosthetic aortic valve insufficiency now s/p mechanical AVR 22 with course c/b complete AVB with junctional escape. EP consulted for management of complete AVB.     ECG today and telemetry shows complete AVB. Pre-operative ECG showed SR with incomplete RBBB. Given stable junctional escape and epicardial pacing leads, no indication for urgent intervention, however ppm is indicated given complete AVB. Disucced pacemaker placement with patient including the risks and benefits and he is in agreement with proceeding.     Will plan to do dual chamber PPM 22. Pre-implant orders have been placed.      Patient discussed with staff attending, Dr. Daigle and the note reflects our joint plan. Thank you for consulting the cardiac electrophysiology services at the [facility]. Please do not hesitate to call with questions or concerns.     Colton Gómez MD  PGY-7, EP Fellow  Pager: [phone]     Addendum:  I saw and evaluated the patient and agree with the fellow s finding and plans as written.  The nature, risks, benefits, alternatives and anticipated results of the procedure were explained to the patient. These risks include but are not limited to local vascular damage, bleeding, tamponade and infection. After careful consideration the patient wished to proceed with the procedure. The patient was verbally consented. We will schedule the patient to have this done on Friday morning.    Edwin Daigle MD     History of Present Illness:     Mr. Birch is a 61-year-old man with a PMHx of a BAV and ascending aortic aneurysm and single-vessel CAD who underwent AVR along with ascending aortic hemiarch replacement and CABG x 1 (SVG-PDA) in 2013 and later developed severe prosthetic aortic valve insufficiency now s/p mechanical AVR 22 with course c/b complete AVB with junctional escape. EP consulted for management of complete AVB.    Patient states he is recovering well from surgery at present. No chest pain or SOB. No lightheadedness. No syncope. He was hoping to avoid pacemaker placement however he understands the indication.      A 13-point ROS is negative except as mentioned above      Past Medical History:     Patient Active Problem List   Diagnosis     Chronic combined systolic and diastolic heart failure (H)     COPD exacerbation (H)     Coronary atherosclerosis of native coronary artery     Ischemic cardiomyopathy     Lung nodule     Mixed hyperlipidemia     Nonrheumatic aortic valve insufficiency     S/P AVR         Past Surgical History:      Past Surgical History:   Procedure Laterality Date     CARDIAC SURGERY      coronary artery disease and aortic stenosis status post CABG x1 (SVG-RCA) with concomitant  AVR (Natan Mitroflow bioprosthetic valve) and ascending aortic Dacron graft in 2013     ORTHOPEDIC SURGERY           Social History:     Social History     Socioeconomic History     Marital status: Single     Spouse name: Not on file     Number of children: Not on file     Years of education: Not on file     Highest education level: Not on file   Occupational History     Not on file   Tobacco Use     Smoking status: Former Smoker     Packs/day: 0.75     Years: 30.00     Pack years: 22.50     Quit date: 2022     Years since quittin.5     Smokeless tobacco: Never Used   Substance and Sexual Activity     Alcohol use: Yes     Comment: 4-5 drinks per week     Drug use: Not  Currently     Sexual activity: Not on file   Other Topics Concern     Not on file   Social History Narrative     Not on file     Social Determinants of Health     Financial Resource Strain: Not on file   Food Insecurity: Not on file   Transportation Needs: Not on file   Physical Activity: Not on file   Stress: Not on file   Social Connections: Not on file   Intimate Partner Violence: Not on file   Housing Stability: Not on file         Family History:     Family History   Problem Relation Age of Onset     Heart Disease Father      Aneurysm Father      Parkinsonism Sister          Allergies:     Allergies   Allergen Reactions     Ezetimibe Rash         Medications:     No current facility-administered medications on file prior to encounter.  albuterol (PROAIR HFA/PROVENTIL HFA/VENTOLIN HFA) 108 (90 Base) MCG/ACT inhaler, Inhale 2 puffs into the lungs every 4 hours as needed  aspirin 81 MG EC tablet, Take 81 mg by mouth daily  atorvastatin (LIPITOR) 80 MG tablet, Take 80 mg by mouth every evening  budesonide-formoterol (SYMBICORT) 160-4.5 MCG/ACT Inhaler, Inhale 2 puffs into the lungs 2 times daily  evolocumab (REPATHA SURECLICK) 140 MG/ML prefilled autoinjector, Inject 140 mg Subcutaneous every 14 days  ibuprofen (ADVIL/MOTRIN) 200 MG tablet, Take 200 mg by mouth every 4 hours as needed for mild pain  lisinopril (ZESTRIL) 5 MG tablet, Take 5 mg by mouth every morning  metoprolol succinate ER (TOPROL XL) 50 MG 24 hr tablet, Take 50 mg by mouth every morning  nitroGLYcerin (NITROSTAT) 0.4 MG sublingual tablet, Place under the tongue every 5 minutes as needed  potassium chloride ER (KLOR-CON M) 20 MEQ CR tablet, Take 60 mEq by mouth every morning  torsemide (DEMADEX) 20 MG tablet, Take 60 mg by mouth every morning            Physical Exam:     B/P: 115/63, T: 97.8, P: 61, R: 16    Wt Readings from Last 4 Encounters:   09/07/22 94.4 kg (208 lb 1.8 oz)   09/01/22 95 kg (209 lb 5.6 oz)   08/18/22 94.1 kg (207 lb 8 oz)    08/18/22 95.9 kg (211 lb 8 oz)         Intake/Output Summary (Last 24 hours) at 9/7/2022 1502  Last data filed at 9/7/2022 1400  Gross per 24 hour   Intake 7157.39 ml   Output 2845 ml   Net 4312.39 ml     Gen: AA&Ox3, no acute distress  HEENT:AT/ NC, EOM grossly intact. On HFNC.   PULM/THORAX: Decreased throughout, no rales/rhonchi/wheezes  CV:RRR, S1 and S2 appreciated, no extra heart sounds, murmurs or rub auscultated. No JVD  ABD: Soft, nontender, nondistended. Normoactive bowel sounds  EXT: Warm. No edema, clubbing or cyanosis.   NEURO: No focal deficits on limited exam       Data:     Labs Reviewed on Admission    Troponin No results found for: TROPI, TROPONIN, TROPR, TROPN  BMP  Recent Labs   Lab 09/07/22  1353 09/07/22  1221 09/07/22  0907 09/07/22  0754 09/07/22  0347 09/07/22  0341 09/06/22  1821 09/06/22  1819 09/06/22  1712 09/06/22  1611 09/06/22  0909 09/06/22  0632   0000   NA  --   --   --   --   --  143  --  141 139 140   < >  --   --    POTASSIUM  --   --   --   --   --  3.9  --  4.6 4.3 4.3   < > 3.8  --    CHLORIDE  --   --   --   --   --  105  --  103  --   --   --   --   --    ISREAL  --   --   --   --   --  8.5*  --  8.7  --   --   --   --   --    CO2  --   --   --   --   --  26  --  25  --   --   --   --   --    BUN  --   --   --   --   --  25.0*  --  21.4  --   --   --   --   --    CR  --   --   --   --   --  1.19*  --  1.17  --   --   --  1.18*  --    GLC 85 99 142* 148*   < > 185*   < > 101* 99 102*   < >  --    < >    < > = values in this interval not displayed.     CBC  Recent Labs   Lab 09/07/22  0341 09/06/22  1819 09/06/22  1712 09/06/22  1611 09/06/22  1452 09/06/22  1410 09/06/22  0909 09/06/22  0632   WBC 12.8* 12.3*  --   --   --  13.5*  --   --    RBC 3.60* 3.54*  --   --   --  3.83*  --   --    HGB 11.3* 11.1* 11.4* 12.0*   < > 12.2*  12.5*   < > 14.2   HCT 34.7* 34.3*  --   --   --  37.2*  --   --    MCV 96 97  --   --   --  97  --   --    MCH 31.4 31.4  --   --   --  31.9  --    --    MCHC 32.6 32.4  --   --   --  32.8  --   --    RDW 14.9 15.0  --   --   --  15.1*  --   --     175  --   --   --  52*  --  91*    < > = values in this interval not displayed.     INR  Recent Labs   Lab 09/07/22  0904 09/06/22  1819 09/06/22  1410   INR 1.30* 1.50* 1.72*      Hepatic Panel   Lab Results   Component Value Date    AST 54 09/07/2022     Lab Results   Component Value Date    ALT 21 09/07/2022     No results found for: BILICONJ   Lab Results   Component Value Date    BILITOTAL 0.9 09/07/2022     Lab Results   Component Value Date    ALBUMIN 2.8 09/07/2022    ALBUMIN 3.8 08/18/2022     Lab Results   Component Value Date    PROTTOTAL 4.6 09/07/2022      Lab Results   Component Value Date    ALKPHOS 62 09/07/2022           Most Recent Imaging:     ECG:   ECG from 8 am 9/7/2022: SR, complete AVB, junctional escape with RBBB  Pre-op: SR with incomplete RBBB     TTE Sept '22:   LVEF 45%, severe AI

## 2022-09-07 NOTE — PROCEDURES
[facility]    Arterial line placement    Date/Time: 9/7/2022 10:57 AM  Performed by: Leopoldo Shah MD  Authorized by: Leopoldo Shah MD       UNIVERSAL PROTOCOL   Site Marked: Yes  Prior Images Obtained and Reviewed:  Yes  Required items: Required blood products, implants, devices and special equipment available    Patient identity confirmed:  Verbally with patient and arm band  Patient was reevaluated immediately before administering moderate or deep sedation or anesthesia  Confirmation Checklist:  Relevant allergies, procedure was appropriate and matched the consent or emergent situation, correct equipment/implants were available and patient's identity using two indicators  Time out: Immediately prior to the procedure a time out was called    Universal Protocol: the Joint Commission Universal Protocol was followed    Preparation: Patient was prepped and draped in usual sterile fashion    Indication: hemodynamic monitoring  Location: right radial      SEDATION    Patient Sedated: No      PROCEDURE DETAILS    Needle Gauge:  20  Seldinger technique: Seldinger technique used    Number of Attempts:  1  Post-procedure:  Line sutured  CMS: normal and unchanged    PROCEDURE    Patient Tolerance:  Patient tolerated the procedure well with no immediate complications  Length of time physician/provider present for 1:1 monitoring during sedation: 0

## 2022-09-07 NOTE — PROGRESS NOTES
CV ICU PROGRESS NOTE  September 7, 2022      CO-MORBIDITIES:   Nonrheumatic aortic valve insufficiency  (primary encounter diagnosis)  S/P AVR    ASSESSMENT: Maximino Birch is a 61 year old male with PMH of COPD on home O2 (3L/min at night), HFrEF (EF 40%), aortic stenosis, bicuspid aortic valve, ascending aortic arch aneurysm and single-vessel CAD s/p AVR with bovine valve, arch repair and CABGx1 SVG to RCA in 2013. He is now s/p redo sternotomy and AVR with On-X valve on 9/6 with Dr Hogue.    Last 24 hours:  Remains on epinephrine and norepinephrine and intubated, however able to wean down oxygen to FiO2 40.  Following commands. Received 1L LR for MAP 65. No other significant events overnight.    Today's Progress:  Wean off mechanical ventilation and extubation.  Wean off norepinephrine and epinephrine.    PLAN:  Neuro/ pain/ sedation:  - Monitor neurological status. Notify the MD for any acute changes in exam.  - Fentanyl drip for pain. Wean off as tolerated. Scheduled Gabapentin 100mg TID  - On propofol for sedation, plan to wean off.    Pulmonary care:   - SBT this morning, patient tolerated well and ABG after about 45min with PaO2 67, extubated to [facility].  - Supplemental oxygen to keep saturation above 92%. Wean supplemental oxygen as tolerated.  - Incentive spirometer every 15- 30 minutes when awake.  - PTA Symbicort, Albuterol    Cardiovascular:    - Monitor hemodynamic status. Replace right arterial line as left brachial arterial line stopped working.  - Wean off epinephrine and norepinephrine  - Aspirin 81 mg  - Start Coumadin and PTA Lipitor 80mg today  - Postop ECG off pacemaker - new RBBB. Consult EP.      GI care:   - NPO except ice chips and medications. Swallow study at bedside and advance diet as tolerated after extubated.  - PPI  - Bowel regimen with Miralax and Senokot    Fluids/ Electrolytes/ Nutrition:   - Received 1.5L LR overnight. Keep net even today, does not seem to need further IVF  or diuresis today.  - Monitor electrolytes and replete as needed  - No indication for parenteral nutrition.    Renal/ Fluid Balance:    - Urine output is adequate so far.  - Will continue to monitor intake and output.    Endocrine:    - Insulin drip  - Glucose goal 110-180    ID/ Antibiotics:  - On Vancomycin (first dose on 9/6) last dose today (9/7) at 1900  - No indication for antibiotics when above is completed.    Heme:     - Hemoglobin stable. Continue to monitor CBC  - Monitor INR    Prophylaxis:    - Mechanical prophylaxis for DVT  - Heparin 5,000 units TID  - PPI    Lines/ tubes/ drains:  - Arterial line (9/6/22)   - CVC and [place] (9/6/22)   - Santiago catheter (9/6/22)  - OG tube (9/6/22)  - Chest tube (9/6/22)    Disposition:  - CVICU    ICU Checklist  F - Feeding: NPO - advance diet as tolerated when extubated  A - Analgesia: fentanyl drip - wean off - and gabapentin, optimize  S - Sedation: propofol - wean off  T - Thromboembolic prophylaxis: heparin. He will be started on Warfarin today     H - Head of bed elevated  U - Ulcer prophylaxis: PPI  G - Glycemic control, goal 110-180  S - SBT today and plan for extubation if well tolerated     B - Bowel regimen with miralax and senokot  I - Indwelling catheters needed  D - De-escalation of antibiotics - Vancomycin last dose tonight 1900    Patient seen, findings and plan discussed with CVICU staff    Leopoldo Ball MD  Anesthesiology CA-1    ==========================================    SUBJECTIVE:   Patient lying comfortably in bed, in no acute distress. Received 1.5L LR overnight for mild low MAP. No other acute issues.    OBJECTIVE:   1. VITAL SIGNS:   Temp:  [97.2  F (36.2  C)-98.1  F (36.7  C)] 97.2  F (36.2  C)  Pulse:  [63-80] 65  Resp:  [8-20] 16  MAP:  [64 mmHg-84 mmHg] 75 mmHg  Arterial Line BP: ()/(48-64) 106/58  FiO2 (%):  [40 %-100 %] 40 %  SpO2:  [89 %-98 %] 92 %  Vent Mode: (S) CPAP/PS  (Continuous positive airway pressure with Pressure  Support)  FiO2 (%): 40 %  Resp Rate (Set): 16 breaths/min  Tidal Volume (Set, mL): 450 mL  PEEP (cm H2O): 5 cmH2O  Pressure Support (cm H2O): 7 cmH2O  Resp: 16      2. INTAKE/ OUTPUT:   I/O last 3 completed shifts:  In: 9130.89 [I.V.:6077.89; Other:1115; NG/GT:150; IV Piggyback:1000]  Out: 3225 [Urine:2725; Chest Tube:500]    3. PHYSICAL EXAMINATION:   General: NAD, intubated, sedated, mechanically ventilated  Neuro: RASS -1  Resp: Breathing non-labored  CV: RRR  Abdomen: Soft, Non-distended, Non-tender  Incisions: c/d/i  Extremities: warm and well perfused, bilateral lower extremity edema    4. INVESTIGATIONS:   Arterial Blood Gases   Recent Labs   Lab 09/07/22  0903 09/07/22  0337 09/06/22 2219 09/06/22  1712   PH 7.40 7.39 7.37 7.34*   PCO2 48* 48* 43 51*   PO2 67* 85 72* 83   HCO3 30* 29* 25 27     Complete Blood Count   Recent Labs   Lab 09/07/22  0341 09/06/22  1819 09/06/22  1712 09/06/22  1611 09/06/22  1452 09/06/22  1410 09/06/22  0909 09/06/22  0632   WBC 12.8* 12.3*  --   --   --  13.5*  --   --    HGB 11.3* 11.1* 11.4* 12.0*   < > 12.2*  12.5*   < > 14.2    175  --   --   --  52*  --  91*    < > = values in this interval not displayed.     Basic Metabolic Panel  Recent Labs   Lab 09/07/22  0907 09/07/22  0754 09/07/22  0658 09/07/22  0603 09/07/22  0347 09/07/22  0341 09/06/22  1821 09/06/22  1819 09/06/22  1712 09/06/22  1611 09/06/22  0909 09/06/22  0632   0000   NA  --   --   --   --   --  143  --  141 139 140   < >  --   --    POTASSIUM  --   --   --   --   --  3.9  --  4.6 4.3 4.3   < > 3.8  --    CHLORIDE  --   --   --   --   --  105  --  103  --   --   --   --   --    CO2  --   --   --   --   --  26  --  25  --   --   --   --   --    BUN  --   --   --   --   --  25.0*  --  21.4  --   --   --   --   --    CR  --   --   --   --   --  1.19*  --  1.17  --   --   --  1.18*  --    * 148* 150* 166*   < > 185*   < > 101* 99 102*   < >  --    < >    < > = values in this interval not  displayed.     Liver Function Tests  Recent Labs   Lab 09/07/22  0904 09/07/22  0341 09/06/22  1819 09/06/22  1410   AST  --  54* 44  --    ALT  --  21 18  --    ALKPHOS  --  62 61  --    BILITOTAL  --  0.9 1.7*  --    ALBUMIN  --  2.8* 2.7*  --    INR 1.30*  --  1.50* 1.72*     Pancreatic Enzymes  No lab results found in last 7 days.  Coagulation Profile  Recent Labs   Lab 09/07/22  0904 09/06/22  1819 09/06/22  1410   INR 1.30* 1.50* 1.72*   PTT  --  80* 67*         5. RADIOLOGY:   Recent Results (from the past 24 hour(s))   XR Chest Port 1 View   Result Value    Radiologist flags No suspicious radiopaque foreign body (Urgent)    Narrative    EXAM: XR CHEST PORT 1 VIEW  9/6/2022 3:22 PM      HISTORY: Missing needle    COMPARISON: Chest x-ray 8/18/2022    FINDINGS: Portable supine AP intraoperative radiograph of the chest.  Right IJ [place]Markell catheter tip projects over the right pulmonary  artery. Mediastinal drains. Spinal analgesic catheter. NATY probe tip  projects over the region of the GE junction. The tip of the  endotracheal tube projects over the midthoracic trachea. No suspicious  radiodense foreign body.    The trachea is midline. The cardiac silhouette is not enlarged. The  right costophrenic angle is collimated out of the field of view,  however there is no large pleural effusion. No pneumothorax. Streaky  perihilar and retrocardiac opacities. The visualized upper abdomen is  unremarkable.       Impression    IMPRESSION:   1. No suspicious radiopaque foreign body.  2. Streaky perihilar and retrocardiac opacities, likely atelectasis.      [Urgent Result: No suspicious radiopaque foreign body]    Finding was identified on 9/6/2022 3:22 PM.     Angélica, OR nurse was contacted by Dr. Carmen Kate at 9/6/2022 3:23 PM  and verbalized understanding of the urgent finding.     I have personally reviewed the examination and initial interpretation  and I agree with the findings.    MARIAJOSE SWENSON MD          SYSTEM ID:  [id number]   XR Abdomen Port 1 View    Narrative    Exam: XR ABDOMEN PORT 1 VIEW, 9/6/2022 7:31 PM    Indication: OG placement    Comparison: None    Findings:   Enteric tube with tip projecting in the stomach, side port near the GE  junction. No dilated loops of bowel are visualized within the upper  abdomen. Incompletely imaged chest tube and mediastinal drains.      Impression    Impression: Enteric tube with side port near the GE junction, consider  advancement by approximately 3 cm.    I have personally reviewed the examination and initial interpretation  and I agree with the findings.    LEE JOY MD         SYSTEM ID:  [id number]   XR Chest Port 1 View   Result Value    Radiologist flags Tiny biapical pneumothoraces (Urgent)    Narrative    EXAM: XR CHEST PORT 1 VIEW  9/6/2022 7:32 PM     HISTORY:  Post Op CVTS Surgery       COMPARISON:  Earlier same day chest x-ray obtained at 1516 hours    FINDINGS: AP radiograph of the chest. Postoperative chest consistent  with CABG, aortic valve replacement, and ascending aortic graft. Since  endotracheal tube projecting over the midthoracic trachea with the tip  approximately 4.7 cm cephalad to the farrah. Right IJ [place]-Markell  catheter with tip overlying the proximal right pulmonary artery.  Partially visualized enteric tube with tip extending inferior to the  field of view. Mediastinal drain.    Stable borderline enlargement of the cardiomediastinal silhouette.  Pulmonary vasculature is indistinct. Increased hazy perihilar  opacities. Streaky left midlung opacities. Tiny biapical  pneumothoraces. No significant pleural effusion. Upper abdomen is  unremarkable.      Impression    IMPRESSION:  1. Tiny biapical pneumothoraces.  2. Indistinct pulmonary vasculature with increased hazy perihilar  opacities likely representing pulmonary edema and/or atelectasis.  3. Endotracheal tube projecting over the midthoracic trachea.  Additional support devices as  detailed above.    [Urgent Result: Tiny biapical pneumothoraces]    Finding was identified on 9/6/2022 7:47 PM.     Dr. Santoyo was contacted by Dr. Kunz at 9/6/2022 7:58 PM and  verbalized understanding of the urgent finding.     I have personally reviewed the examination and initial interpretation  and I agree with the findings.    LEE JOY MD         SYSTEM ID:  [id number]   XR Abdomen Port 1 View    Narrative    EXAM: XR ABDOMEN PORT 1 VIEW  9/6/2022 9:26 PM     HISTORY:  repositioned OG tube       COMPARISON:  Earlier same day abdominal radiograph obtained at 1909  hours.    FINDINGS: Supine radiograph of the abdomen. Advanced enteric tube with  tip and side-port overlying the stomach. Partially visualized  mediastinal drain and [place]-Markell catheter. Aortic valve replacement.  Nonspecific, nonobstructive bowel gas pattern. No pneumatosis or  portal venous gas. The visualized lung bases are clear. No acute  osseous abnormality.      Impression    IMPRESSION: Enteric tube with tip and side port now overlying the  stomach. Non obstructive bowel gas pattern.     I have personally reviewed the examination and initial interpretation  and I agree with the findings.    LEE JOY MD         SYSTEM ID:  [id number]   XR Chest Port 1 View    Narrative    Chest radiograph  9/7/2022 1:22 AM      HISTORY: Postop thoracic surgery    COMPARISON: 9/6/2022    FINDINGS:   Single AP view of the chest. Postoperative changes of thoracic surgery  and aortic valve replacement. Stable sternotomy. Endotracheal tip over  the mid thoracic trachea. Enteric tube courses beyond the field of  view. Right IJ [place] tip in the right pulmonary artery. Stable  mediastinal drains. Stable cardiomediastinal silhouette. Question tiny  residual right apical pneumothorax. Redistributed suspected left  basilar pneumothorax. Small layering pleural effusions. Increased  diffuse interstitial and airspace opacities.      Impression    IMPRESSION:    1. Stable support devices.  2. Question tiny residual right apical pneumothorax with redistributed  suspected left basilar pneumothorax.  3. Increased diffuse pulmonary opacities most consistent with  pulmonary edema and atelectasis, with small layering pleural  effusions.    I have personally reviewed the examination and initial interpretation  and I agree with the findings.    TOM HERNANDEZ DO         SYSTEM ID:  [id number]       =========================================

## 2022-09-07 NOTE — OP NOTE
sProcedure Date: 09/06/2022    REFERRING PHYSICIAN:  Dr. Ayan Lopez, cardiovascular surgeon at [facility] in [place], [place].    PREOPERATIVE DIAGNOSIS:  Severe prosthetic aortic valve insufficiency.    POSTOPERATIVE DIAGNOSIS:  Severe prosthetic aortic valve insufficiency.    SURGEON:  April Hogue MD    ASSISTANT:  GABBY Luna    NAME OF OPERATION:  Redo sternotomy, redo aortic valve replacement with a 23 mm On-X mechanical valve, right common femoral arterial and femoral cannulation for cardiopulmonary bypass, intraoperative NATY.    ANESTHESIA:  General endotracheal.    INDICATIONS FOR PROCEDURE:  Mr. Birch is a very pleasant 61-year-old gentleman with a history of  bicuspid aortic valve and ascending aortic aneurysm and single-vessel coronary artery disease who underwent a sternotomy, AVR with a 27 mm Natan Mitroflow bovine pericardial valve and ascending aortic hemiarch replacement with a 34 mm Dacron interposition graft under deep hypothermic circulatory arrest and CABG x 1 with a reverse saphenous vein graft to PDA in 2013.  He developed severe prosthetic aortic valve degeneration with severe insufficiency.  He was quite symptomatic with significant heart failure symptoms.  He was taken to the operating room today for a re-do sternotomy, re-do aortic valve replacement with a mechanical valve.     OPERATIVE FINDINGS: The patient had an LVEF of around 40 to 45%. The previous prosthetic aortic valve had severe degeneration and one of the leaflets was torn off the post.  There were no signs of active or previous infection, however.    DESCRIPTION OF PROCEDURE:  After informed informed consent was obtained, the patient was brought down to the operating room and was placed on the OR table in the supine position.  Intravenous and intra-arterial lines were begun.  While monitoring his blood pressure and EKG tracing, he was anesthetized and intubated using a single lumen endotracheal  tube.  His entire chest, abdomen, both groins and legs were prepped down to the toes using multiple layers of DuraPrep.  He was draped in a sterile field.  The re-do median sternotomy was performed using a handheld oscillating saw after removing all previous sternal wires.  Extensive mediastinal adhesiolysis was then performed.  We dissected around the previous hemiarch graft and there was limited space on the arch to cannulate safely, so we therefore decided to cannulate the groin to go on cardiopulmonary bypass.  A small right groin incision was made near the inguinal crease and the right common femoral artery and vein were dissected out.  The patient was fully heparinized.  A 5-0 Prolene pursestring suture was used to cannulate the right common femoral artery and vein using a 17-Honduran femoral arterial cannula and a 25-Honduran multi-stage venous cannula, respectively.  After appropriate ACT level was achieved, cardiopulmonary bypass was established and the patient was kept normothermic during the entire operation. A retrograde cardioplegia catheter was placed into the coronary sinus without difficulty.  An LV vent was placed via the right superior pulmonary vein.  Carbon dioxide was flooded into the chest to prevent air embolism.  The aorta was eventually cross clamped and a liter of retrograde cardioplegia was given to fully arrest the heart.  The patient went into good diastolic arrest without any LV distention.  Following this, intermittent retrograde cardioplegia doses were given on average of every 15 minutes for myocardial protection while the aorta was cross-clamped.    The ascending hemiarch graft was opened transversely to expose the prosthetic aortic valve.  The findings were noted above.  The previous prosthetic valve was then removed en bloc including all previous sutures and pledgets.  The annulus was meticulously debrided and was irrigated out.  The annulus was sized to a 23 mm On-X valve.  Annular  sutures were placed using horizontal mattress sutures using 2 Ethibond without pledgets.  The valve was then brought to the field and all sutures were brought through the sewing ring and the valve seated down without difficulty.  All sutures were tied down securely using the Cor-Knot device.  The prosthetic valve leaflets were checked and they opened and closed freely.  The hemiarch graft was then closed using running 4-0 Prolene.  A retrograde hot shot was given and the aortic crossclamp was removed.  Aortic cross-clamp time was 107 minutes.  The left ventricle and the ascending aorta were continuously vented to deair the heart.  The patient was then weaned off cardiopulmonary bypass with epinephrine and Levophed drips.  LV function was back to its baseline.  The heart was adequately deaired.  The prosthetic valve was seated down well with no paravalvular leak.  Total cardiopulmonary bypass time was 153 minutes.  Once the patient remained stable off bypass, the femoral venous cannula was removed and protamine was given.  The femoral arterial cannula was subsequently removed as well.  Temporary ventricular pacer wire was placed in the RV muscle.  A 32-Lebanese angled tube and two 32-Lebanese straight chest tubes were placed in the mediastinum as well.  These were all brought out percutaneously below the sternotomy incision and secured to the skin using 2-0 Ethibond.  Both pleural spaces were widely opened.  The mediastinum was irrigated with antibiotic saline and hemostasis was eventually achieved.  The sternum was reapproximated using multiple interrupted single and double wires.  The incision was closed in layers of running Vicryl suture.  The skin was closed using 3-0 Vicryl and was sealed using Dermabond. The groin incision was also closed in layers of Vicryl suture.    There were no intraoperative complications and the patient tolerated the operation well.  Three packs of platelets were given intraoperatively.  All  sponge counts, needle counts and instrument counts were correct x 2 at the end of the operation.    ESTIMATED BLOOD LOSS:  Unknown.    SPECIMEN REMOVED:  Previous prosthetic aortic valve.    The patient was brought to the ICU in hemodynamically stable condition and remained intubated.    April Hogue MD        D: 2022   T: 2022   MT: [id number]    Name:     CAROLE MORRIS  MRN:      [id number]        Account:        [id number]   :      1960           Procedure Date: 2022     Document: [id number]

## 2022-09-07 NOTE — PLAN OF CARE
Goal Outcome Evaluation:    Plan of Care Reviewed With: patient, daughter     Overall Patient Progress: improving       A&Ox4, follows commands and moves all extremities. Extubated this morning without issue, holding sats 88-92% per team on 5L NC. Hemodynamically stable with declining vasopressor needs. Noted underlying rhythm of accelerated junctional rhythm with complete heart block.EP saw today, plan for permanent pacemaker placement Friday AM. Bilateral ES blocks providing good relief. Diet advanced to regular. Up to chair x1 this afternoon, interactive and in good spirits.

## 2022-09-07 NOTE — PLAN OF CARE
Major Shift Events: RASS -2 to -4. Opens eyes to gentle tough/voice; squeezes hands and wiggles toes. Denied pain overnight. Paced rhythm with underlying bradycardic intrinsic rhythm. 1L LR bolus given for MAPs of 65; norepinephrine and epinephrine gtt titrated to maintain MAP goal > 60. Tolerating ventilator overnight; weaned FiO2 down to 40%. Serosanguinous output in chest tubes. Good UOP in caal.  Plan: Continue hemodynamic monitoring; HAILEE Q4. Assess for pressure supporting and work toward extubation.  For vital signs and complete assessments, please see documentation flowsheets.

## 2022-09-07 NOTE — PHARMACY-ANTICOAGULATION SERVICE
Clinical Pharmacy - Warfarin Dosing Consult     Pharmacy has been consulted to manage this patient s warfarin therapy.  Indication: On-X Aortic Valve (Suggested goal INR 2.0-3.0 for first 3 months then INR 1.5-2.0)  Therapy Goal: INR 2-3  Provider/Team: CVTS  OP Anticoag Clinic: N/A  Warfarin Prior to Admission: No  Warfarin PTA Regimen: N/A  Significant drug interactions: Aspirin and heparin (increased bleeding risk), cefazolin (possible risk for increased INR)  Recent documented change in oral intake/nutrition: Yes (NPO with orders to advance diet as tolerated post extubation)  Dose Comments: INR within acceptable range.    INR   Date Value Ref Range Status   09/07/2022 1.30 (H) 0.85 - 1.15 Final   09/06/2022 1.50 (H) 0.85 - 1.15 Final     Recommend warfarin 5 mg today.  Pharmacy will monitor Maximino Birch daily and order warfarin doses to achieve specified goal.      Please contact pharmacy as soon as possible if the warfarin needs to be held for a procedure or if the warfarin goals change.      Carl Reich, PharmD

## 2022-09-08 ENCOUNTER — APPOINTMENT (OUTPATIENT)
Dept: GENERAL RADIOLOGY | Facility: CLINIC | Age: 62
End: 2022-09-08
Attending: STUDENT IN AN ORGANIZED HEALTH CARE EDUCATION/TRAINING PROGRAM
Payer: COMMERCIAL

## 2022-09-08 LAB
ALBUMIN SERPL BCG-MCNC: 3 G/DL (ref 3.5–5.2)
ALBUMIN UR-MCNC: 10 MG/DL
ALP SERPL-CCNC: 70 U/L (ref 40–129)
ALT SERPL W P-5'-P-CCNC: 23 U/L (ref 10–50)
ANION GAP SERPL CALCULATED.3IONS-SCNC: 5 MMOL/L (ref 7–15)
ANION GAP SERPL CALCULATED.3IONS-SCNC: 5 MMOL/L (ref 7–15)
ANION GAP SERPL CALCULATED.3IONS-SCNC: 9 MMOL/L (ref 7–15)
APPEARANCE UR: ABNORMAL
AST SERPL W P-5'-P-CCNC: 62 U/L (ref 10–50)
BASE EXCESS BLDA CALC-SCNC: 1.1 MMOL/L (ref -9–1.8)
BASE EXCESS BLDA CALC-SCNC: 3 MMOL/L (ref -9–1.8)
BASE EXCESS BLDA CALC-SCNC: 5.1 MMOL/L (ref -9–1.8)
BASE EXCESS BLDV CALC-SCNC: 2.4 MMOL/L (ref -7.7–1.9)
BASE EXCESS BLDV CALC-SCNC: 2.6 MMOL/L (ref -7.7–1.9)
BASE EXCESS BLDV CALC-SCNC: 3.7 MMOL/L (ref -7.7–1.9)
BASE EXCESS BLDV CALC-SCNC: 4 MMOL/L (ref -7.7–1.9)
BASE EXCESS BLDV CALC-SCNC: 4 MMOL/L (ref -7.7–1.9)
BASE EXCESS BLDV CALC-SCNC: 4.1 MMOL/L (ref -7.7–1.9)
BASE EXCESS BLDV CALC-SCNC: 4.2 MMOL/L (ref -7.7–1.9)
BASE EXCESS BLDV CALC-SCNC: 5.2 MMOL/L (ref -7.7–1.9)
BILIRUB SERPL-MCNC: 0.6 MG/DL
BILIRUB UR QL STRIP: NEGATIVE
BUN SERPL-MCNC: 37.1 MG/DL (ref 8–23)
BUN SERPL-MCNC: 38.7 MG/DL (ref 8–23)
BUN SERPL-MCNC: 41.6 MG/DL (ref 8–23)
CA-I BLD-MCNC: 4.7 MG/DL (ref 4.4–5.2)
CALCIUM SERPL-MCNC: 8.1 MG/DL (ref 8.6–10)
CALCIUM SERPL-MCNC: 8.3 MG/DL (ref 8.6–10)
CALCIUM SERPL-MCNC: 8.5 MG/DL (ref 8.6–10)
CHLORIDE SERPL-SCNC: 100 MMOL/L (ref 98–107)
CHLORIDE SERPL-SCNC: 97 MMOL/L (ref 98–107)
CHLORIDE SERPL-SCNC: 98 MMOL/L (ref 98–107)
COLOR UR AUTO: YELLOW
CREAT SERPL-MCNC: 1.45 MG/DL (ref 0.67–1.17)
CREAT SERPL-MCNC: 1.87 MG/DL (ref 0.67–1.17)
CREAT SERPL-MCNC: 1.95 MG/DL (ref 0.67–1.17)
CREAT UR-MCNC: 141 MG/DL
DEPRECATED HCO3 PLAS-SCNC: 26 MMOL/L (ref 22–29)
DEPRECATED HCO3 PLAS-SCNC: 31 MMOL/L (ref 22–29)
DEPRECATED HCO3 PLAS-SCNC: 31 MMOL/L (ref 22–29)
ERYTHROCYTE [DISTWIDTH] IN BLOOD BY AUTOMATED COUNT: 15.4 % (ref 10–15)
ERYTHROCYTE [DISTWIDTH] IN BLOOD BY AUTOMATED COUNT: 15.4 % (ref 10–15)
GFR SERPL CREATININE-BSD FRML MDRD: 39 ML/MIN/1.73M2
GFR SERPL CREATININE-BSD FRML MDRD: 41 ML/MIN/1.73M2
GFR SERPL CREATININE-BSD FRML MDRD: 56 ML/MIN/1.73M2
GLUCOSE BLDC GLUCOMTR-MCNC: 106 MG/DL (ref 70–99)
GLUCOSE BLDC GLUCOMTR-MCNC: 106 MG/DL (ref 70–99)
GLUCOSE BLDC GLUCOMTR-MCNC: 112 MG/DL (ref 70–99)
GLUCOSE BLDC GLUCOMTR-MCNC: 115 MG/DL (ref 70–99)
GLUCOSE BLDC GLUCOMTR-MCNC: 118 MG/DL (ref 70–99)
GLUCOSE BLDC GLUCOMTR-MCNC: 129 MG/DL (ref 70–99)
GLUCOSE BLDC GLUCOMTR-MCNC: 134 MG/DL (ref 70–99)
GLUCOSE BLDC GLUCOMTR-MCNC: 135 MG/DL (ref 70–99)
GLUCOSE BLDC GLUCOMTR-MCNC: 158 MG/DL (ref 70–99)
GLUCOSE BLDC GLUCOMTR-MCNC: 193 MG/DL (ref 70–99)
GLUCOSE BLDC GLUCOMTR-MCNC: 227 MG/DL (ref 70–99)
GLUCOSE SERPL-MCNC: 105 MG/DL (ref 70–99)
GLUCOSE SERPL-MCNC: 159 MG/DL (ref 70–99)
GLUCOSE SERPL-MCNC: 200 MG/DL (ref 70–99)
GLUCOSE UR STRIP-MCNC: NEGATIVE MG/DL
HCO3 BLD-SCNC: 26 MMOL/L (ref 21–28)
HCO3 BLD-SCNC: 29 MMOL/L (ref 21–28)
HCO3 BLD-SCNC: 31 MMOL/L (ref 21–28)
HCO3 BLDV-SCNC: 28 MMOL/L (ref 21–28)
HCO3 BLDV-SCNC: 29 MMOL/L (ref 21–28)
HCO3 BLDV-SCNC: 30 MMOL/L (ref 21–28)
HCO3 BLDV-SCNC: 31 MMOL/L (ref 21–28)
HCT VFR BLD AUTO: 29.2 % (ref 40–53)
HCT VFR BLD AUTO: 29.2 % (ref 40–53)
HGB BLD-MCNC: 9.3 G/DL (ref 13.3–17.7)
HGB BLD-MCNC: 9.5 G/DL (ref 13.3–17.7)
HGB UR QL STRIP: NEGATIVE
HYALINE CASTS: 42 /LPF
INR PPP: 1.58 (ref 0.85–1.15)
KETONES UR STRIP-MCNC: NEGATIVE MG/DL
LEUKOCYTE ESTERASE UR QL STRIP: ABNORMAL
MAGNESIUM SERPL-MCNC: 2.6 MG/DL (ref 1.7–2.3)
MCH RBC QN AUTO: 31.3 PG (ref 26.5–33)
MCH RBC QN AUTO: 31.9 PG (ref 26.5–33)
MCHC RBC AUTO-ENTMCNC: 31.8 G/DL (ref 31.5–36.5)
MCHC RBC AUTO-ENTMCNC: 32.5 G/DL (ref 31.5–36.5)
MCV RBC AUTO: 98 FL (ref 78–100)
MCV RBC AUTO: 98 FL (ref 78–100)
MUCOUS THREADS #/AREA URNS LPF: PRESENT /LPF
NITRATE UR QL: NEGATIVE
O2/TOTAL GAS SETTING VFR VENT: 2 %
O2/TOTAL GAS SETTING VFR VENT: 23 %
O2/TOTAL GAS SETTING VFR VENT: 4 %
OXYHGB MFR BLD: 91 % (ref 92–100)
OXYHGB MFR BLD: 93 % (ref 92–100)
OXYHGB MFR BLDV: 30 % (ref 70–75)
OXYHGB MFR BLDV: 44 % (ref 70–75)
OXYHGB MFR BLDV: 47 % (ref 70–75)
OXYHGB MFR BLDV: 48 % (ref 70–75)
OXYHGB MFR BLDV: 50 % (ref 70–75)
OXYHGB MFR BLDV: 50 % (ref 70–75)
OXYHGB MFR BLDV: 55 % (ref 70–75)
OXYHGB MFR BLDV: 94 % (ref 70–75)
PCO2 BLD: 44 MM HG (ref 35–45)
PCO2 BLD: 47 MM HG (ref 35–45)
PCO2 BLD: 49 MM HG (ref 35–45)
PCO2 BLDV: 46 MM HG (ref 40–50)
PCO2 BLDV: 52 MM HG (ref 40–50)
PCO2 BLDV: 52 MM HG (ref 40–50)
PCO2 BLDV: 53 MM HG (ref 40–50)
PCO2 BLDV: 54 MM HG (ref 40–50)
PCO2 BLDV: 55 MM HG (ref 40–50)
PCO2 BLDV: 57 MM HG (ref 40–50)
PCO2 BLDV: 57 MM HG (ref 40–50)
PH BLD: 7.39 [PH] (ref 7.35–7.45)
PH BLD: 7.39 [PH] (ref 7.35–7.45)
PH BLD: 7.41 [PH] (ref 7.35–7.45)
PH BLDV: 7.34 [PH] (ref 7.32–7.43)
PH BLDV: 7.34 [PH] (ref 7.32–7.43)
PH BLDV: 7.35 [PH] (ref 7.32–7.43)
PH BLDV: 7.35 [PH] (ref 7.32–7.43)
PH BLDV: 7.36 [PH] (ref 7.32–7.43)
PH BLDV: 7.37 [PH] (ref 7.32–7.43)
PH BLDV: 7.38 [PH] (ref 7.32–7.43)
PH BLDV: 7.39 [PH] (ref 7.32–7.43)
PH UR STRIP: 5 [PH] (ref 5–7)
PHOSPHATE SERPL-MCNC: 5.1 MG/DL (ref 2.5–4.5)
PLATELET # BLD AUTO: 103 10E3/UL (ref 150–450)
PLATELET # BLD AUTO: 115 10E3/UL (ref 150–450)
PO2 BLD: 63 MM HG (ref 80–105)
PO2 BLD: 68 MM HG (ref 80–105)
PO2 BLD: 74 MM HG (ref 80–105)
PO2 BLDV: 23 MM HG (ref 25–47)
PO2 BLDV: 27 MM HG (ref 25–47)
PO2 BLDV: 29 MM HG (ref 25–47)
PO2 BLDV: 30 MM HG (ref 25–47)
PO2 BLDV: 30 MM HG (ref 25–47)
PO2 BLDV: 31 MM HG (ref 25–47)
PO2 BLDV: 34 MM HG (ref 25–47)
PO2 BLDV: 75 MM HG (ref 25–47)
POTASSIUM SERPL-SCNC: 3.7 MMOL/L (ref 3.4–5.3)
POTASSIUM SERPL-SCNC: 3.9 MMOL/L (ref 3.4–5.3)
POTASSIUM SERPL-SCNC: 4.4 MMOL/L (ref 3.4–5.3)
PROT SERPL-MCNC: 5 G/DL (ref 6.4–8.3)
RBC # BLD AUTO: 2.97 10E6/UL (ref 4.4–5.9)
RBC # BLD AUTO: 2.98 10E6/UL (ref 4.4–5.9)
RBC URINE: 3 /HPF
SODIUM SERPL-SCNC: 132 MMOL/L (ref 136–145)
SODIUM SERPL-SCNC: 134 MMOL/L (ref 136–145)
SODIUM SERPL-SCNC: 136 MMOL/L (ref 136–145)
SP GR UR STRIP: 1.02 (ref 1–1.03)
SQUAMOUS EPITHELIAL: 2 /HPF
TRANSITIONAL EPI: 2 /HPF
UROBILINOGEN UR STRIP-MCNC: NORMAL MG/DL
UUN UR-MCNC: 387 MG/DL (ref 801–1666)
WBC # BLD AUTO: 14.5 10E3/UL (ref 4–11)
WBC # BLD AUTO: 16.4 10E3/UL (ref 4–11)
WBC URINE: 14 /HPF

## 2022-09-08 PROCEDURE — 250N000011 HC RX IP 250 OP 636: Performed by: STUDENT IN AN ORGANIZED HEALTH CARE EDUCATION/TRAINING PROGRAM

## 2022-09-08 PROCEDURE — 99291 CRITICAL CARE FIRST HOUR: CPT | Mod: 24 | Performed by: ANESTHESIOLOGY

## 2022-09-08 PROCEDURE — 86901 BLOOD TYPING SEROLOGIC RH(D): CPT | Performed by: SURGERY

## 2022-09-08 PROCEDURE — 85027 COMPLETE CBC AUTOMATED: CPT | Performed by: STUDENT IN AN ORGANIZED HEALTH CARE EDUCATION/TRAINING PROGRAM

## 2022-09-08 PROCEDURE — 82805 BLOOD GASES W/O2 SATURATION: CPT | Performed by: PHYSICIAN ASSISTANT

## 2022-09-08 PROCEDURE — 250N000013 HC RX MED GY IP 250 OP 250 PS 637: Performed by: PHYSICIAN ASSISTANT

## 2022-09-08 PROCEDURE — 84100 ASSAY OF PHOSPHORUS: CPT | Performed by: SURGERY

## 2022-09-08 PROCEDURE — 999N000015 HC STATISTIC ARTERIAL MONITORING DAILY

## 2022-09-08 PROCEDURE — 99231 SBSQ HOSP IP/OBS SF/LOW 25: CPT | Performed by: NURSE PRACTITIONER

## 2022-09-08 PROCEDURE — 71045 X-RAY EXAM CHEST 1 VIEW: CPT | Mod: 26 | Performed by: RADIOLOGY

## 2022-09-08 PROCEDURE — 85610 PROTHROMBIN TIME: CPT | Performed by: STUDENT IN AN ORGANIZED HEALTH CARE EDUCATION/TRAINING PROGRAM

## 2022-09-08 PROCEDURE — 80053 COMPREHEN METABOLIC PANEL: CPT | Performed by: STUDENT IN AN ORGANIZED HEALTH CARE EDUCATION/TRAINING PROGRAM

## 2022-09-08 PROCEDURE — 999N000045 HC STATISTIC DAILY SWAN MONITORING

## 2022-09-08 PROCEDURE — 82805 BLOOD GASES W/O2 SATURATION: CPT | Performed by: SURGERY

## 2022-09-08 PROCEDURE — 250N000011 HC RX IP 250 OP 636: Performed by: SURGERY

## 2022-09-08 PROCEDURE — 250N000013 HC RX MED GY IP 250 OP 250 PS 637: Performed by: SURGERY

## 2022-09-08 PROCEDURE — 84540 ASSAY OF URINE/UREA-N: CPT | Performed by: STUDENT IN AN ORGANIZED HEALTH CARE EDUCATION/TRAINING PROGRAM

## 2022-09-08 PROCEDURE — 87040 BLOOD CULTURE FOR BACTERIA: CPT | Performed by: SURGERY

## 2022-09-08 PROCEDURE — 81001 URINALYSIS AUTO W/SCOPE: CPT | Performed by: STUDENT IN AN ORGANIZED HEALTH CARE EDUCATION/TRAINING PROGRAM

## 2022-09-08 PROCEDURE — 83735 ASSAY OF MAGNESIUM: CPT | Performed by: SURGERY

## 2022-09-08 PROCEDURE — 87086 URINE CULTURE/COLONY COUNT: CPT | Performed by: STUDENT IN AN ORGANIZED HEALTH CARE EDUCATION/TRAINING PROGRAM

## 2022-09-08 PROCEDURE — 36415 COLL VENOUS BLD VENIPUNCTURE: CPT | Performed by: SURGERY

## 2022-09-08 PROCEDURE — 250N000013 HC RX MED GY IP 250 OP 250 PS 637: Performed by: STUDENT IN AN ORGANIZED HEALTH CARE EDUCATION/TRAINING PROGRAM

## 2022-09-08 PROCEDURE — 82803 BLOOD GASES ANY COMBINATION: CPT | Performed by: SURGERY

## 2022-09-08 PROCEDURE — 999N000065 XR CHEST PORT 1 VIEW

## 2022-09-08 PROCEDURE — 82570 ASSAY OF URINE CREATININE: CPT | Performed by: STUDENT IN AN ORGANIZED HEALTH CARE EDUCATION/TRAINING PROGRAM

## 2022-09-08 PROCEDURE — 80048 BASIC METABOLIC PNL TOTAL CA: CPT | Performed by: STUDENT IN AN ORGANIZED HEALTH CARE EDUCATION/TRAINING PROGRAM

## 2022-09-08 PROCEDURE — 86850 RBC ANTIBODY SCREEN: CPT | Performed by: SURGERY

## 2022-09-08 PROCEDURE — 999N000155 HC STATISTIC RAPCV CVP MONITORING

## 2022-09-08 PROCEDURE — 250N000009 HC RX 250: Performed by: STUDENT IN AN ORGANIZED HEALTH CARE EDUCATION/TRAINING PROGRAM

## 2022-09-08 PROCEDURE — 82330 ASSAY OF CALCIUM: CPT | Performed by: PHYSICIAN ASSISTANT

## 2022-09-08 PROCEDURE — 250N000011 HC RX IP 250 OP 636: Performed by: PHYSICIAN ASSISTANT

## 2022-09-08 PROCEDURE — 200N000002 HC R&B ICU UMMC

## 2022-09-08 PROCEDURE — 258N000003 HC RX IP 258 OP 636: Performed by: SURGERY

## 2022-09-08 RX ORDER — WARFARIN SODIUM 2.5 MG/1
2.5 TABLET ORAL
Status: COMPLETED | OUTPATIENT
Start: 2022-09-08 | End: 2022-09-08

## 2022-09-08 RX ORDER — POTASSIUM CHLORIDE 750 MG/1
20 TABLET, EXTENDED RELEASE ORAL ONCE
Status: COMPLETED | OUTPATIENT
Start: 2022-09-09 | End: 2022-09-09

## 2022-09-08 RX ORDER — BUMETANIDE 0.25 MG/ML
3 INJECTION INTRAMUSCULAR; INTRAVENOUS ONCE
Status: COMPLETED | OUTPATIENT
Start: 2022-09-08 | End: 2022-09-08

## 2022-09-08 RX ORDER — FUROSEMIDE 10 MG/ML
120 INJECTION INTRAMUSCULAR; INTRAVENOUS ONCE
Status: DISCONTINUED | OUTPATIENT
Start: 2022-09-08 | End: 2022-09-08

## 2022-09-08 RX ORDER — FUROSEMIDE 10 MG/ML
120 INJECTION INTRAMUSCULAR; INTRAVENOUS ONCE
Status: COMPLETED | OUTPATIENT
Start: 2022-09-08 | End: 2022-09-08

## 2022-09-08 RX ADMIN — ONDANSETRON 4 MG: 2 INJECTION INTRAMUSCULAR; INTRAVENOUS at 14:43

## 2022-09-08 RX ADMIN — ACETAMINOPHEN 975 MG: 325 TABLET, FILM COATED ORAL at 05:18

## 2022-09-08 RX ADMIN — PANTOPRAZOLE SODIUM 40 MG: 40 TABLET, DELAYED RELEASE ORAL at 07:48

## 2022-09-08 RX ADMIN — HEPARIN SODIUM 5000 UNITS: 5000 INJECTION, SOLUTION INTRAVENOUS; SUBCUTANEOUS at 20:08

## 2022-09-08 RX ADMIN — OXYCODONE HYDROCHLORIDE 10 MG: 10 TABLET ORAL at 00:17

## 2022-09-08 RX ADMIN — WARFARIN SODIUM 2.5 MG: 2.5 TABLET ORAL at 17:41

## 2022-09-08 RX ADMIN — METHOCARBAMOL 750 MG: 750 TABLET, FILM COATED ORAL at 20:08

## 2022-09-08 RX ADMIN — HEPARIN SODIUM 5000 UNITS: 5000 INJECTION, SOLUTION INTRAVENOUS; SUBCUTANEOUS at 04:23

## 2022-09-08 RX ADMIN — FLUTICASONE FUROATE AND VILANTEROL TRIFENATATE 1 PUFF: 200; 25 POWDER RESPIRATORY (INHALATION) at 07:52

## 2022-09-08 RX ADMIN — METHOCARBAMOL 750 MG: 750 TABLET, FILM COATED ORAL at 04:18

## 2022-09-08 RX ADMIN — SENNOSIDES AND DOCUSATE SODIUM 1 TABLET: 8.6; 5 TABLET ORAL at 07:48

## 2022-09-08 RX ADMIN — GABAPENTIN 100 MG: 100 CAPSULE ORAL at 20:08

## 2022-09-08 RX ADMIN — HUMAN INSULIN 2 UNITS/HR: 100 INJECTION, SOLUTION SUBCUTANEOUS at 02:25

## 2022-09-08 RX ADMIN — ACETAMINOPHEN 975 MG: 325 TABLET, FILM COATED ORAL at 22:29

## 2022-09-08 RX ADMIN — ACETAMINOPHEN 975 MG: 325 TABLET, FILM COATED ORAL at 14:01

## 2022-09-08 RX ADMIN — POLYETHYLENE GLYCOL 3350 17 G: 17 POWDER, FOR SOLUTION ORAL at 07:48

## 2022-09-08 RX ADMIN — OXYCODONE HYDROCHLORIDE 5 MG: 5 TABLET ORAL at 20:08

## 2022-09-08 RX ADMIN — GABAPENTIN 100 MG: 100 CAPSULE ORAL at 14:01

## 2022-09-08 RX ADMIN — GABAPENTIN 100 MG: 100 CAPSULE ORAL at 07:48

## 2022-09-08 RX ADMIN — OXYCODONE HYDROCHLORIDE 10 MG: 10 TABLET ORAL at 04:18

## 2022-09-08 RX ADMIN — ATORVASTATIN CALCIUM 80 MG: 80 TABLET, FILM COATED ORAL at 20:08

## 2022-09-08 RX ADMIN — HEPARIN SODIUM 5000 UNITS: 5000 INJECTION, SOLUTION INTRAVENOUS; SUBCUTANEOUS at 14:01

## 2022-09-08 RX ADMIN — FUROSEMIDE 60 MG: 10 INJECTION, SOLUTION INTRAMUSCULAR; INTRAVENOUS at 00:17

## 2022-09-08 RX ADMIN — ASPIRIN 81 MG CHEWABLE TABLET 81 MG: 81 TABLET CHEWABLE at 07:48

## 2022-09-08 RX ADMIN — FUROSEMIDE 120 MG: 10 INJECTION, SOLUTION INTRAVENOUS at 10:24

## 2022-09-08 RX ADMIN — SENNOSIDES AND DOCUSATE SODIUM 1 TABLET: 8.6; 5 TABLET ORAL at 20:08

## 2022-09-08 RX ADMIN — BUMETANIDE 3 MG: 0.25 INJECTION INTRAMUSCULAR; INTRAVENOUS at 17:40

## 2022-09-08 RX ADMIN — EPINEPHRINE 0.03 MCG/KG/MIN: 1 INJECTION INTRAMUSCULAR; INTRAVENOUS; SUBCUTANEOUS at 06:17

## 2022-09-08 ASSESSMENT — ACTIVITIES OF DAILY LIVING (ADL)
ADLS_ACUITY_SCORE: 28

## 2022-09-08 NOTE — PROGRESS NOTES
ICU End of Shift Summary. See flowsheets for vital signs and detailed assessment.    Changes this shift: Pt alert, oriented x4. Pain managed with prn oxycodone, tylenol, robaxin and continuous ropivacaine 14mL/hr. Pt remains in accelerated junctional rhythm 60's with temporary pacer still in place with backup rate VVI at 50. MAPs > 65 while on 0.03 of Epi. CVPs between 10-14, CO 8.5 and CI 3.8 on last check. Chest tubes with 20-50mL output/hr. Now on 2L NC, no BM, caal with decreased urine output, no response to scheduled 20mg lasix so 60mg lasix was ordered with minimal improvement to UO.     Plan: Permanent pacer placement planned for Friday. Trial bumex during day.

## 2022-09-08 NOTE — PROGRESS NOTES
"Pain Service Progress Note  [facility]  Date: 09/08/2022       Patient Name: Maximino Birch  MRN: [id number]  Age: 61 year old  Sex: male      Assessment:  Maximino Birch is a 61 year old male with PMH significant for COPD on home O2 (3LPM at night), HFrEF (EF 40%), aortic stenosis, bicuspid aortic valve and ascending aortic aneurysm s/p aortic valve replacement with bovine valve, arch repair and CABG x 1 SVG to RCA in 2013    Procedure: redo sternotomy and AVR with On-X valve    Date of Surgery: 9/6/22      Date of Catheter Placement: 9/6/22     Plan/Recommendations:  1. Regional Anesthesia/Analgesia  -Continuous Catheter Type/Site: bilateral erector spinae (ES) T6-7  Infusate: Ropivacaine 0.2%  Last dose of heparin S C at 0423 AM, and started warfarin 5 mg yesterday    0923 bilateral nerve catheters removed without complication, dark tips intact. Insertion sites c/d/i. Small bandages applied.      Okay to continue heparin 5,000 units SC Q 8 hrs as ordered    Pain Service will sign off    Discussed with attending anesthesiologist    Please Page the Pain Team Via [employer]: \"Adult acute inpatient pain management/[facility]\"    Madyson Velazquez, MARY CNP  09/08/2022     Overnight Events: None    Tubes/Drains: Yes  Chest tubes    Subjective:  I don't have pain  Maximino understands plan to remove nerve catheters today.  Denies symptoms of LAST      Diet: Regular Diet Adult    Relevant Labs:  Recent Labs   Lab Test 09/08/22  1237 09/08/22  0406 09/07/22  0341 09/06/22  1819   INR  --  1.58*   < > 1.50*   * 115*   < > 175   PTT  --   --   --  80*   BUN 41.6* 38.7*   < > 21.4    < > = values in this interval not displayed.       Physical Exam:  Vitals: /63   Pulse 61   Temp 98  F (36.7  C) (Oral)   Resp 18   Ht 1.829 m (6')   Wt 98 kg (216 lb)   SpO2 93%   BMI 29.29 kg/m      Physical Exam:   Orientation:  Alert, oriented, and in no acute distress: Yes  Sedation: " "No    Motor Examination:  5/5 Strength in lower extremities: Yes      Catheter Site:   Catheter entry site is clean/dry/intact: Yes    Tender: No      Relevant Medications:  Current Pain Medications:  Medications related to Pain Management (From now, onward)    Start     Dose/Rate Route Frequency Ordered Stop    09/09/22 0000  acetaminophen (TYLENOL) tablet 650 mg         650 mg Oral EVERY 4 HOURS PRN 09/06/22 1818      09/07/22 1443  lidocaine 1 % 0.1-1 mL         0.1-1 mL Other EVERY 1 HOUR PRN 09/07/22 1444      09/07/22 1443  lidocaine (LMX4) cream          Topical EVERY 1 HOUR PRN 09/07/22 1444      09/07/22 0800  polyethylene glycol (MIRALAX) Packet 17 g         17 g Oral DAILY 09/06/22 1818 09/07/22 0800  aspirin (ASA) chewable tablet 81 mg         81 mg Oral or NG Tube DAILY 09/06/22 1818 09/06/22 2000  senna-docusate (SENOKOT-S/PERICOLACE) 8.6-50 MG per tablet 1 tablet         1 tablet Oral 2 TIMES DAILY 09/06/22 1818 09/06/22 2000  gabapentin (NEURONTIN) capsule 100 mg         100 mg Oral 3 TIMES DAILY 09/06/22 1818 09/06/22 1830  acetaminophen (TYLENOL) tablet 975 mg         975 mg Oral EVERY 8 HOURS 09/06/22 1818 09/09/22 2159 09/06/22 1817  HYDROmorphone (PF) (DILAUDID) injection 0.2 mg        \"Or\" Linked Group Details    0.2 mg Intravenous EVERY 2 HOURS PRN 09/06/22 1818 09/06/22 1817  HYDROmorphone (PF) (DILAUDID) injection 0.4 mg        \"Or\" Linked Group Details    0.4 mg Intravenous EVERY 2 HOURS PRN 09/06/22 1818      09/06/22 1817  oxyCODONE (ROXICODONE) tablet 5 mg        \"Or\" Linked Group Details    5 mg Oral EVERY 4 HOURS PRN 09/06/22 1818      09/06/22 1817  oxyCODONE IR (ROXICODONE) tablet 10 mg        \"Or\" Linked Group Details    10 mg Oral EVERY 4 HOURS PRN 09/06/22 1818 09/06/22 1817  methocarbamol (ROBAXIN) tablet 750 mg         750 mg Oral EVERY 6 HOURS PRN 09/06/22 1818 09/06/22 1817  magnesium hydroxide (MILK OF MAGNESIA) suspension 30 mL         " 30 mL Oral DAILY PRN 09/06/22 1818      09/06/22 1817  bisacodyl (DULCOLAX) suppository 10 mg         10 mg Rectal DAILY PRN 09/06/22 1818      09/06/22 1817  lidocaine 1 % 0.1-1 mL         0.1-1 mL Other EVERY 1 HOUR PRN 09/06/22 1818      09/06/22 1817  lidocaine (LMX4) kit          Topical EVERY 1 HOUR PRN 09/06/22 1818            Primary Service Contacted with Recommendations? Yes      Time/Communication:  I personally spent 15 minutes with greater than 50% in consultation, education, counseling and coordination of care.  See note above for details on conversation.

## 2022-09-08 NOTE — PROGRESS NOTES
CV ICU PROGRESS NOTE  September 8, 2022       CO-MORBIDITIES:   Nonrheumatic aortic valve insufficiency  (primary encounter diagnosis)  S/P AVR    ASSESSMENT: Maximino Birch is a 61 year old male with PMH of COPD on home O2 (3L/min at night), HFrEF (EF 40%), aortic stenosis, bicuspid aortic valve, ascending aortic arch aneurysm and single-vessel CAD s/p AVR with bovine valve, arch repair and CABGx1 SVG to RCA in 2013. He is now s/p redo sternotomy and AVR with On-X valve on 9/6 with Dr Hogue.    Last 24 hours:  Received lasix for decreased urine output with poor response.    Today's Progress:  Wean off epinephrine  Lasix 120mg IV    PLAN:  Neuro/ pain/ sedation:  - Monitor neurological status. Notify for any acute changes in exam.  - Scheduled Gabapentin 100mg TID, Tylenol  - PRN Tylenol, Dilaudid  - Off sedation    Pulmonary care:   - Extubated on 9/7  - Supplemental oxygen to keep saturation above 92%. Wean supplemental oxygen as tolerated.  - Incentive spirometer every 15- 30 minutes when awake.  - PTA Symbicort, Albuterol    Cardiovascular:    - Monitor hemodynamic  - Wean off epinephrine, monitor SvO2  - On Aspirin 81 mg, Coumadin and PTA Lipitor  - Postop ECG off pacemaker with new RBBB, EP was consulted, appreciated recs. Spoke to EP and they will reassess patient tomorrow before placing PPM, to evaluate if it still needed.    GI care:   - Advance PO diet as tolerated.  - PPI  - Bowel regimen with Miralax and Senokot    Fluids/ Electrolytes/ Nutrition:  - Monitor electrolytes and replete as needed  - No indication for parenteral nutrition.  - Per RN report he has been drinking high amounts of water, noticed sodium is trending down. Water restriction     Renal/ Fluid Balance:    # Acute Kidney Injury  - Urine output is decreased. Creatinine trending up  - Hold PTA Torsemide 60mg  - Lasix 120mg IV  - FEurea  - Output goal: negative 1-2L  - BMP in the afternoon    Endocrine:    - Stop insulin drip and  start sliding scale.  - Glucose goal 110-180    ID/ Antibiotics:  - Completed perioperative Vancomycin on 9/7  - No indication for antibiotics at this time. Continue to monitor leukocytes.   - Urinalysis, urine culture and blood culture  - CBC in the afternoon    Heme:     - Hemoglobin stable. Continue to monitor CBC  - Repeat CBC in the afternoon.  - Continue to monitor INR    Prophylaxis:    - Mechanical prophylaxis for DVT  - Heparin 5,000 units TID  - PPI    Lines/ tubes/ drains:  - Arterial line (9/8/22)   - CVC and [place] (9/6/22)   - Santiago catheter (9/6/22)  - Chest tubes staying (9/6/22)    Disposition:  - CVICU    ICU Checklist performed during rounds    Patient seen, findings and plan discussed with CVICU staff    Leopoldo Ball MD  Anesthesiology CA-1/PGY-3    ==========================================    SUBJECTIVE:   Patient lying comfortably in bed, in no acute distress. Received 60mg IV Lasix overnight with poor urine output. No other issues or complaints.    OBJECTIVE:   1. VITAL SIGNS:   Temp:  [97.2  F (36.2  C)-98.5  F (36.9  C)] 98.5  F (36.9  C)  Pulse:  [59-80] 63  Resp:  [12-21] 16  BP: (114-120)/(60-68) 115/63  MAP:  [47 mmHg-90 mmHg] 68 mmHg  Arterial Line BP: ()/(27-74) 103/53  FiO2 (%):  [40 %-50 %] 45 %  SpO2:  [89 %-98 %] 93 %  Vent Mode: (S) CPAP/PS  (Continuous positive airway pressure with Pressure Support)  FiO2 (%): 45 %  Resp Rate (Set): 16 breaths/min  Tidal Volume (Set, mL): 450 mL  PEEP (cm H2O): 5 cmH2O  Pressure Support (cm H2O): 7 cmH2O  Resp: 16      2. INTAKE/ OUTPUT:   I/O last 3 completed shifts:  In: 3281.52 [P.O.:360; I.V.:1271.52; NG/GT:150; IV Piggyback:1500]  Out: 1915 [Urine:1025; Chest Tube:890]    3. PHYSICAL EXAMINATION:   General: NAD  Neuro: AOx3  Resp: Breathing non-labored on 2L/min nasal cannula  CV: RRR  Abdomen: Soft, Non-distended, Non-tender  Incisions: c/d/i  Extremities: warm and well perfused, bilateral lower extremity edema    4. INVESTIGATIONS:    Arterial Blood Gases   Recent Labs   Lab 09/07/22  1608 09/07/22  1215 09/07/22  0903 09/07/22  0337   PH 7.42 7.41 7.40 7.39   PCO2 44 48* 48* 48*   PO2 56* 59* 67* 85   HCO3 29* 30* 30* 29*     Complete Blood Count   Recent Labs   Lab 09/08/22  0406 09/07/22  0341 09/06/22  1819 09/06/22  1712 09/06/22  1452 09/06/22  1410   WBC 16.4* 12.8* 12.3*  --   --  13.5*   HGB 9.5* 11.3* 11.1* 11.4*   < > 12.2*  12.5*   * 168 175  --   --  52*    < > = values in this interval not displayed.     Basic Metabolic Panel  Recent Labs   Lab 09/08/22  0558 09/08/22  0413 09/08/22  0406 09/08/22  0200 09/07/22 2010 09/07/22  1501 09/07/22  0347 09/07/22 0341 09/06/22  1821 09/06/22  1819   NA  --   --  136  --   --  141  --  143  --  141   POTASSIUM  --   --  4.4  --   --  4.2  --  3.9  --  4.6   CHLORIDE  --   --  100  --   --  104  --  105  --  103   CO2  --   --  31*  --   --  26  --  26  --  25   BUN  --   --  38.7*  --   --  27.4*  --  25.0*  --  21.4   CR  --   --  1.87*  --   --  1.47*  --  1.19*  --  1.17   * 112* 105* 135*   < > 98   < > 185*   < > 101*    < > = values in this interval not displayed.     Liver Function Tests  Recent Labs   Lab 09/08/22  0406 09/07/22  0904 09/07/22  0341 09/06/22  1819 09/06/22  1410   AST 62*  --  54* 44  --    ALT 23  --  21 18  --    ALKPHOS 70  --  62 61  --    BILITOTAL 0.6  --  0.9 1.7*  --    ALBUMIN 3.0*  --  2.8* 2.7*  --    INR 1.58* 1.30*  --  1.50* 1.72*     Pancreatic Enzymes  No lab results found in last 7 days.  Coagulation Profile  Recent Labs   Lab 09/08/22  0406 09/07/22  0904 09/06/22  1819 09/06/22  1410   INR 1.58* 1.30* 1.50* 1.72*   PTT  --   --  80* 67*         5. RADIOLOGY:   No results found for this or any previous visit (from the past 24 hour(s)).    =========================================

## 2022-09-08 NOTE — CONSULTS
Nephrology Initial Consult  September 8, 2022      Maximino Birch MRN:[id number] YOB: 1960  Date of Admission:9/6/2022  Primary care provider: Gisel Pettit  Requesting physician: April Hogue    ASSESSMENT AND RECOMMENDATIONS:  SCARLET on CKD, likely hemodynamic  Baseline Cr ~1.2 recently, with rise noted to ~2 today, however rate of rise appears to be decreasing as Cr was 1.87 in the morning. SCARLET likely in the setting of AVR with vy-operative pressor requirements that have improved. UA with pyuria but patient denies any sx of UTI. Also showed hyaline casts that further support hemodynamic injury. Low suspicion for obstruction given adequate UOP. Per CT from 6/23/22, no renal/ abnormalities were noted.   - Given SCARLET, would not pursue aggressive diuresis so as to avoid provoking hypotension which could further compound renal injury, particularly as patient's respiratory status has significantly improved. Would aim for 500 ml-1L negative per day and not more  - Suggest trial of bumex 3-4 mg IV to augment urine output to start with. If suboptimal response would add thiazide diuretic for sequential nephron blockade to meet above goal  - Await urine Cx  - Daily renal panel  - Avoid nephrotoxins and hypotension  - Renally dose medications  - Daily weights  - Strict I/Os    Recommendations were communicated to primary team via note    Seen and discussed with Dr. Liane Flores MD  Division of Renal Disease and Hypertension  [facility]  [email or web address]  Vocera Web Console    REASON FOR CONSULT: SCARLET    HISTORY OF PRESENT ILLNESS:  Admitting provider and nursing notes reviewed  Maximino Birch is a 61 year old with PMH COPD on home O2 (3L/min at night), CHF (EF 40-45%), bicuspid AV, ascending aortic aneurysm and CAD who underwent AVR + ascending aortic hemiarch replacement + CABG x 1 (SVG-PDA) in 2013 and later developed severe prosthetic AV insufficiency now s/p mech AVR 9/6. Course  c/b complete AVB with junctional escape and SCARLET with lack of optimal response to IV lasix so far. Plan for PPM 9/9.  Patient notes significant improvement in respiratory status since yesterday and also thinks his lower extremity swelling has improved. He notes his mouth feels dry and he feels thirsty. Denies significant pain and did not have dysuria or change in frequency prior to admission.  Notes chronic leg swelling prior to admission for which he was taking torsemide 60 mg daily. Denies any prior kidney issues.    PAST MEDICAL HISTORY:  Past Medical History:   Diagnosis Date     Congestive heart failure (H)      COPD (chronic obstructive pulmonary disease) (H)      Coronary artery disease      Dyspnea on exertion      Heart attack (H)      Heart murmur      History of blood transfusion      Oxygen dependent      Uncomplicated asthma      Walking troubles      Past Surgical History:   Procedure Laterality Date     CARDIAC SURGERY      coronary artery disease and aortic stenosis status post CABG x1 (SVG-RCA) with concomitant  AVR (Natan Mitroflow bioprosthetic valve) and ascending aortic Dacron graft in 2013     ORTHOPEDIC SURGERY       REDO STERNOTOMY REPAIR VALVE AORTIC N/A 9/6/2022    Procedure: STERNOTOMY, REPEAT, WITH REDO AORTIC VALVE REPLACEMENT USING 23MM ON-X AORTIC VALVE. CARDIOPULMONARY BYPASS.  INTRAOPERATIVE TRANSESOPHAGEAL ECHOCARDIOGRAM PER ANESTHESIA;  Surgeon: April Hogue MD;  Location: [facility] OR     MEDICATIONS:  PTA Meds  Prior to Admission medications    Medication Sig Last Dose Taking? Auth Provider Long Term End Date   albuterol (PROAIR HFA/PROVENTIL HFA/VENTOLIN HFA) 108 (90 Base) MCG/ACT inhaler Inhale 2 puffs into the lungs every 4 hours as needed Past Week at Unknown time Yes Reported, Patient Yes    aspirin 81 MG EC tablet Take 81 mg by mouth daily 9/5/2022 at Unknown time Yes Reported, Patient     atorvastatin (LIPITOR) 80 MG tablet Take 80 mg by mouth every evening 9/5/2022 at  2100 Yes Reported, Patient Yes    budesonide-formoterol (SYMBICORT) 160-4.5 MCG/ACT Inhaler Inhale 2 puffs into the lungs 2 times daily 9/6/2022 at Unknown time Yes Reported, Patient Yes 10/4/22   evolocumab (REPATHA SURECLICK) 140 MG/ML prefilled autoinjector Inject 140 mg Subcutaneous every 14 days 8/30/2022 at Unknown time Yes Reported, Patient  4/20/23   ibuprofen (ADVIL/MOTRIN) 200 MG tablet Take 200 mg by mouth every 4 hours as needed for mild pain Past Month at Unknown time Yes Reported, Patient     lisinopril (ZESTRIL) 5 MG tablet Take 5 mg by mouth every morning 9/6/2022 at Unknown time Yes Reported, Patient Yes    metoprolol succinate ER (TOPROL XL) 50 MG 24 hr tablet Take 50 mg by mouth every morning 9/6/2022 at Unknown time Yes Reported, Patient Yes    nitroGLYcerin (NITROSTAT) 0.4 MG sublingual tablet Place under the tongue every 5 minutes as needed More than a month at Unknown time Yes Reported, Patient Yes    potassium chloride ER (KLOR-CON M) 20 MEQ CR tablet Take 60 mEq by mouth every morning 9/6/2022 at Unknown time Yes Reported, Patient     torsemide (DEMADEX) 20 MG tablet Take 60 mg by mouth every morning 9/6/2022 at Unknown time Yes Reported, Patient Yes 10/19/22      Current Meds    acetaminophen  975 mg Oral Q8H     aspirin  81 mg Oral or NG Tube Daily     atorvastatin  80 mg Oral QPM     fluticasone-vilanterol  1 puff Inhalation Daily     gabapentin  100 mg Oral TID     heparin ANTICOAGULANT  5,000 Units Subcutaneous Q8H     insulin aspart  1-7 Units Subcutaneous TID AC     insulin aspart  1-5 Units Subcutaneous At Bedtime     lactated ringers  500 mL Intravenous Once     pantoprazole  40 mg Oral or NG Tube Daily    Or     pantoprazole  40 mg Oral Daily     polyethylene glycol  17 g Oral Daily     senna-docusate  1 tablet Oral BID     sodium chloride (PF)  3 mL Intracatheter Q8H     sodium chloride (PF)  3 mL Intracatheter Q8H     warfarin ANTICOAGULANT  2.5 mg Oral ONCE at 18:00     Warfarin  Therapy Reminder  1 each Oral See Admin Instructions     Infusion Meds    dextrose       EPINEPHrine Stopped (22 1400)     norepinephrine Stopped (22 1700)     BETA BLOCKER NOT PRESCRIBED       ALLERGIES:  Allergies   Allergen Reactions     Ezetimibe Rash     REVIEW OF SYSTEMS:  A comprehensive of systems was negative except as noted above.    SOCIAL HISTORY:  Social History     Socioeconomic History     Marital status: Single     Spouse name: Not on file     Number of children: Not on file     Years of education: Not on file     Highest education level: Not on file   Occupational History     Not on file   Tobacco Use     Smoking status: Former Smoker     Packs/day: 0.75     Years: 30.00     Pack years: 22.50     Quit date: 2022     Years since quittin.6     Smokeless tobacco: Never Used   Substance and Sexual Activity     Alcohol use: Yes     Comment: 4-5 drinks per week     Drug use: Not Currently     Sexual activity: Not on file   Other Topics Concern     Not on file   Social History Narrative     Not on file     Social Determinants of Health     Financial Resource Strain: Not on file   Food Insecurity: Not on file   Transportation Needs: Not on file   Physical Activity: Not on file   Stress: Not on file   Social Connections: Not on file   Intimate Partner Violence: Not on file   Housing Stability: Not on file     FAMILY MEDICAL HISTORY:  Family History   Problem Relation Age of Onset     Heart Disease Father      Aneurysm Father      Parkinsonism Sister      PHYSICAL EXAM:  Temp  Av  F (36.7  C)  Min: 97.2  F (36.2  C)  Max: 98.8  F (37.1  C)  Arterial Line BP  Min: 51/44  Max: 145/75  Arterial Line MAP (mmHg)  Av.3 mmHg  Min: 47 mmHg  Max: 96 mmHg      Pulse  Av.5  Min: 59  Max: 86 Resp  Av.2  Min: 8  Max: 21  FiO2 (%)  Av.9 %  Min: 40 %  Max: 100 %  SpO2  Av.7 %  Min: 88 %  Max: 98 %  CVP (mmHg): 15 mmHg  /63   Pulse 59   Temp 98  F (36.7  C) (Oral)    Resp 18   Ht 1.829 m (6')   Wt 98 kg (216 lb)   SpO2 94%   BMI 29.29 kg/m     Date 09/08/22 0700 - 09/09/22 0659   Shift 1934-6425 9315-1986 6691-2685 24 Hour Total   INTAKE   P.O. 720   720   I.V. 174.4 14  188.4   Shift Total(mL/kg) 894.4(9.13) 14(0.14)  908.4(9.27)   OUTPUT   Urine 435 30  465   Chest Tube(mL/kg) 250(2.55)   250(2.55)   Shift Total(mL/kg) 685(6.99) 30(0.31)  715(7.3)   Weight (kg) 97.98 97.98 97.98 97.98      Admit Weight: 95.9 kg (211 lb 6.7 oz)     GENERAL APPEARANCE: no distress, awake  EYES: no scleral icterus, pupils equal  Pulmonary: lungs clear to auscultation with equal breath sounds bilaterally  CV: regular rhythm, normal rate, no rub   - Edema 2-3+  GI: soft, nontender  MS: no evidence of inflammation in joints  : ten present  SKIN: no rash on exposed surfaces  NEURO: face symmetric, AOx3, normal speech  PSYCH: Normal affect    LABS:   I have reviewed the following labs:  CMP  Recent Labs   Lab 09/08/22  1237 09/08/22  1118 09/08/22  0802 09/08/22  0647 09/08/22  0413 09/08/22  0406 09/07/22 2010 09/07/22  1501 09/07/22  0347 09/07/22  0341 09/06/22  1821 09/06/22  1819   *  --   --   --   --  136  --  141  --  143  --  141   POTASSIUM 3.9  --   --   --   --  4.4  --  4.2  --  3.9  --  4.6   CHLORIDE 97*  --   --   --   --  100  --  104  --  105  --  103   CO2 26  --   --   --   --  31*  --  26  --  26  --  25   ANIONGAP 9  --   --   --   --  5*  --  11  --  12  --  13   * 106* 106* 115*   < > 105*   < > 98   < > 185*   < > 101*   BUN 41.6*  --   --   --   --  38.7*  --  27.4*  --  25.0*  --  21.4   CR 1.95*  --   --   --   --  1.87*  --  1.47*  --  1.19*  --  1.17   GFRESTIMATED 39*  --   --   --   --  41*  --  55*  --  70  --  72   ISREAL 8.3*  --   --   --   --  8.5*  --  8.6  --  8.5*  --  8.7   MAG  --   --   --   --   --  2.6*  --   --   --  2.3  --  2.5*   PHOS  --   --   --   --   --  5.1*  --   --   --  4.4  --  4.0   PROTTOTAL  --   --   --   --   --  5.0*   --   --   --  4.6*  --  4.4*   ALBUMIN  --   --   --   --   --  3.0*  --   --   --  2.8*  --  2.7*   BILITOTAL  --   --   --   --   --  0.6  --   --   --  0.9  --  1.7*   ALKPHOS  --   --   --   --   --  70  --   --   --  62  --  61   AST  --   --   --   --   --  62*  --   --   --  54*  --  44   ALT  --   --   --   --   --  23  --   --   --  21  --  18    < > = values in this interval not displayed.     CBC  Recent Labs   Lab 09/08/22  1237 09/08/22  0406 09/07/22  0341 09/06/22  1819   HGB 9.3* 9.5* 11.3* 11.1*   WBC 14.5* 16.4* 12.8* 12.3*   RBC 2.97* 2.98* 3.60* 3.54*   HCT 29.2* 29.2* 34.7* 34.3*   MCV 98 98 96 97   MCH 31.3 31.9 31.4 31.4   MCHC 31.8 32.5 32.6 32.4   RDW 15.4* 15.4* 14.9 15.0   * 115* 168 175     INR  Recent Labs   Lab 09/08/22  0406 09/07/22  0904 09/06/22  1819 09/06/22  1410   INR 1.58* 1.30* 1.50* 1.72*   PTT  --   --  80* 67*     ABG  Recent Labs   Lab 09/08/22  1237 09/08/22  1236 09/08/22  0847 09/08/22  0754 09/08/22  0643 09/07/22  1609 09/07/22  1608 09/07/22  1226 09/07/22  1215   PH  --  7.39  --   --  7.39  --  7.42  --  7.41   PCO2  --  44  --   --  47*  --  44  --  48*   PO2  --  63*  --   --  68*  --  56*  --  59*   HCO3  --  26  --   --  29*  --  29*  --  30*   O2PER 2 2 2 2 2   < > 45   < > 50    < > = values in this interval not displayed.      URINE STUDIES  Recent Labs   Lab Test 09/08/22  0938 08/18/22  1122   COLOR Yellow Yellow   APPEARANCE Slightly Cloudy* Clear   URINEGLC Negative Negative   URINEBILI Negative Negative   URINEKETONE Negative Negative   SG 1.022 1.020   UBLD Negative Negative   URINEPH 5.0 5.0   PROTEIN 10 * 30 *   NITRITE Negative Negative   LEUKEST Small* Negative   RBCU 3* 1   WBCU 14* 1     No lab results found.  PTH  No lab results found.  IRON STUDIES  No lab results found.    IMAGING:  All imaging studies reviewed by me.     Yoni Flores MD

## 2022-09-08 NOTE — PLAN OF CARE
Goal Outcome Evaluation:    Plan of Care Reviewed With: patient, spouse     Overall Patient Progress: improving         A&Ox4, calm, cooperative. Slightly more lethargic this morning which resolved later in the shift. Afebrile. Remains in accelerated junctional rhythm with BBB, per team may hold off on pacer placement pending changes. MAPs at goal, remaining on low dose epi per primary team instructions. Higher lasix dose given early in shift with sub-optimal response, later given bumex. Per nephrology goal is net -500 to -1000mL daily. Endorses feeling bloated, no bowel movement yet. Passing flatus. Switched to sliding scale insulin coverage without issue - fair to poor appetite throughout day.

## 2022-09-08 NOTE — ANESTHESIA POSTPROCEDURE EVALUATION
Patient: Maximino Birch    Procedure: Procedure(s):  STERNOTOMY, REPEAT, WITH REDO AORTIC VALVE REPLACEMENT USING 23MM ON-X AORTIC VALVE. CARDIOPULMONARY BYPASS.  INTRAOPERATIVE TRANSESOPHAGEAL ECHOCARDIOGRAM PER ANESTHESIA       Anesthesia Type:  General    Note:  Disposition: ICU            ICU Sign Out: Anesthesiologist/ICU physician sign out WAS performed   Postop Pain Control:            Sign Out: Well controlled pain   PONV:    Neuro/Psych:             Sign Out: PLANNED postop sedation   Airway/Respiratory:             Sign Out: AIRWAY IN SITU/Resp. Support               Airway in situ/Resp. Support: ETT   CV/Hemodynamics: Uneventful            Sign Out: Acceptable CV status; No obvious hypovolemia; No obvious fluid overload   Other NRE:    DID A NON-ROUTINE EVENT OCCUR?            Last vitals:  Vitals:    09/08/22 0545 09/08/22 0600 09/08/22 0615   BP:      Pulse: 63 63 63   Resp:      Temp:      SpO2: 94% 93% 93%       Electronically Signed By: Hailee Martin MD  September 8, 2022  6:51 AM

## 2022-09-09 ENCOUNTER — APPOINTMENT (OUTPATIENT)
Dept: GENERAL RADIOLOGY | Facility: CLINIC | Age: 62
End: 2022-09-09
Attending: STUDENT IN AN ORGANIZED HEALTH CARE EDUCATION/TRAINING PROGRAM
Payer: COMMERCIAL

## 2022-09-09 LAB
ALBUMIN SERPL BCG-MCNC: 3 G/DL (ref 3.5–5.2)
ALP SERPL-CCNC: 72 U/L (ref 40–129)
ALT SERPL W P-5'-P-CCNC: 15 U/L (ref 10–50)
ANION GAP SERPL CALCULATED.3IONS-SCNC: 5 MMOL/L (ref 7–15)
AST SERPL W P-5'-P-CCNC: 41 U/L (ref 10–50)
ATRIAL RATE - MUSE: 87 BPM
ATRIAL RATE - MUSE: 88 BPM
ATRIAL RATE - MUSE: 89 BPM
ATRIAL RATE - MUSE: 90 BPM
BACTERIA SPT CULT: NORMAL
BACTERIA UR CULT: NO GROWTH
BASE EXCESS BLDA CALC-SCNC: 6 MMOL/L (ref -9–1.8)
BASE EXCESS BLDV CALC-SCNC: 6.3 MMOL/L (ref -7.7–1.9)
BASE EXCESS BLDV CALC-SCNC: 7.1 MMOL/L (ref -7.7–1.9)
BASE EXCESS BLDV CALC-SCNC: 7.1 MMOL/L (ref -7.7–1.9)
BASE EXCESS BLDV CALC-SCNC: 7.4 MMOL/L (ref -7.7–1.9)
BASE EXCESS BLDV CALC-SCNC: 8.7 MMOL/L (ref -7.7–1.9)
BASE EXCESS BLDV CALC-SCNC: 9.1 MMOL/L (ref -7.7–1.9)
BILIRUB SERPL-MCNC: 0.6 MG/DL
BUN SERPL-MCNC: 35.1 MG/DL (ref 8–23)
CA-I BLD-MCNC: 4.6 MG/DL (ref 4.4–5.2)
CA-I BLD-MCNC: 4.6 MG/DL (ref 4.4–5.2)
CALCIUM SERPL-MCNC: 8.1 MG/DL (ref 8.6–10)
CHLORIDE SERPL-SCNC: 100 MMOL/L (ref 98–107)
CREAT SERPL-MCNC: 1.25 MG/DL (ref 0.67–1.17)
DEPRECATED HCO3 PLAS-SCNC: 31 MMOL/L (ref 22–29)
DIASTOLIC BLOOD PRESSURE - MUSE: NORMAL MMHG
ERYTHROCYTE [DISTWIDTH] IN BLOOD BY AUTOMATED COUNT: 15.1 % (ref 10–15)
GFR SERPL CREATININE-BSD FRML MDRD: 66 ML/MIN/1.73M2
GLUCOSE BLDC GLUCOMTR-MCNC: 125 MG/DL (ref 70–99)
GLUCOSE BLDC GLUCOMTR-MCNC: 125 MG/DL (ref 70–99)
GLUCOSE BLDC GLUCOMTR-MCNC: 134 MG/DL (ref 70–99)
GLUCOSE BLDC GLUCOMTR-MCNC: 145 MG/DL (ref 70–99)
GLUCOSE BLDC GLUCOMTR-MCNC: 161 MG/DL (ref 70–99)
GLUCOSE SERPL-MCNC: 142 MG/DL (ref 70–99)
GRAM STAIN RESULT: NORMAL
HCO3 BLD-SCNC: 31 MMOL/L (ref 21–28)
HCO3 BLDV-SCNC: 32 MMOL/L (ref 21–28)
HCO3 BLDV-SCNC: 33 MMOL/L (ref 21–28)
HCO3 BLDV-SCNC: 35 MMOL/L (ref 21–28)
HCO3 BLDV-SCNC: 35 MMOL/L (ref 21–28)
HCT VFR BLD AUTO: 27.4 % (ref 40–53)
HGB BLD-MCNC: 9 G/DL (ref 13.3–17.7)
INR PPP: 3.79 (ref 0.85–1.15)
INTERPRETATION ECG - MUSE: NORMAL
MAGNESIUM SERPL-MCNC: 2.5 MG/DL (ref 1.7–2.3)
MCH RBC QN AUTO: 31.6 PG (ref 26.5–33)
MCHC RBC AUTO-ENTMCNC: 32.8 G/DL (ref 31.5–36.5)
MCV RBC AUTO: 96 FL (ref 78–100)
O2/TOTAL GAS SETTING VFR VENT: 2 %
O2/TOTAL GAS SETTING VFR VENT: 3 %
OXYHGB MFR BLD: 93 % (ref 92–100)
OXYHGB MFR BLDV: 48 % (ref 70–75)
OXYHGB MFR BLDV: 48 % (ref 70–75)
OXYHGB MFR BLDV: 52 % (ref 70–75)
OXYHGB MFR BLDV: 62 % (ref 70–75)
OXYHGB MFR BLDV: 64 % (ref 70–75)
OXYHGB MFR BLDV: 69 % (ref 70–75)
P AXIS - MUSE: 34 DEGREES
P AXIS - MUSE: 47 DEGREES
P AXIS - MUSE: NORMAL DEGREES
P AXIS - MUSE: NORMAL DEGREES
PCO2 BLD: 47 MM HG (ref 35–45)
PCO2 BLDV: 49 MM HG (ref 40–50)
PCO2 BLDV: 52 MM HG (ref 40–50)
PCO2 BLDV: 52 MM HG (ref 40–50)
PCO2 BLDV: 53 MM HG (ref 40–50)
PCO2 BLDV: 54 MM HG (ref 40–50)
PCO2 BLDV: 55 MM HG (ref 40–50)
PH BLD: 7.43 [PH] (ref 7.35–7.45)
PH BLDV: 7.4 [PH] (ref 7.32–7.43)
PH BLDV: 7.4 [PH] (ref 7.32–7.43)
PH BLDV: 7.41 [PH] (ref 7.32–7.43)
PH BLDV: 7.43 [PH] (ref 7.32–7.43)
PHOSPHATE SERPL-MCNC: 3.3 MG/DL (ref 2.5–4.5)
PLATELET # BLD AUTO: 96 10E3/UL (ref 150–450)
PO2 BLD: 72 MM HG (ref 80–105)
PO2 BLDV: 29 MM HG (ref 25–47)
PO2 BLDV: 29 MM HG (ref 25–47)
PO2 BLDV: 30 MM HG (ref 25–47)
PO2 BLDV: 35 MM HG (ref 25–47)
PO2 BLDV: 36 MM HG (ref 25–47)
PO2 BLDV: 39 MM HG (ref 25–47)
POTASSIUM SERPL-SCNC: 3.7 MMOL/L (ref 3.4–5.3)
POTASSIUM SERPL-SCNC: 3.8 MMOL/L (ref 3.4–5.3)
PR INTERVAL - MUSE: 64 MS
PR INTERVAL - MUSE: NORMAL MS
PROT SERPL-MCNC: 5 G/DL (ref 6.4–8.3)
QRS DURATION - MUSE: 142 MS
QRS DURATION - MUSE: 144 MS
QRS DURATION - MUSE: 148 MS
QRS DURATION - MUSE: 156 MS
QT - MUSE: 426 MS
QT - MUSE: 448 MS
QT - MUSE: 494 MS
QT - MUSE: 508 MS
QTC - MUSE: 506 MS
QTC - MUSE: 512 MS
QTC - MUSE: 517 MS
QTC - MUSE: 528 MS
R AXIS - MUSE: -44 DEGREES
R AXIS - MUSE: -46 DEGREES
R AXIS - MUSE: -55 DEGREES
R AXIS - MUSE: 85 DEGREES
RBC # BLD AUTO: 2.85 10E6/UL (ref 4.4–5.9)
SODIUM SERPL-SCNC: 136 MMOL/L (ref 136–145)
SYSTOLIC BLOOD PRESSURE - MUSE: NORMAL MMHG
T AXIS - MUSE: -15 DEGREES
T AXIS - MUSE: 43 DEGREES
T AXIS - MUSE: 46 DEGREES
T AXIS - MUSE: 57 DEGREES
VENTRICULAR RATE- MUSE: 65 BPM
VENTRICULAR RATE- MUSE: 66 BPM
VENTRICULAR RATE- MUSE: 77 BPM
VENTRICULAR RATE- MUSE: 87 BPM
WBC # BLD AUTO: 11.2 10E3/UL (ref 4–11)

## 2022-09-09 PROCEDURE — 250N000013 HC RX MED GY IP 250 OP 250 PS 637

## 2022-09-09 PROCEDURE — 272N000001 HC OR GENERAL SUPPLY STERILE: Performed by: INTERNAL MEDICINE

## 2022-09-09 PROCEDURE — 999N000155 HC STATISTIC RAPCV CVP MONITORING

## 2022-09-09 PROCEDURE — 99153 MOD SED SAME PHYS/QHP EA: CPT | Performed by: INTERNAL MEDICINE

## 2022-09-09 PROCEDURE — 250N000013 HC RX MED GY IP 250 OP 250 PS 637: Performed by: SURGERY

## 2022-09-09 PROCEDURE — 83735 ASSAY OF MAGNESIUM: CPT | Performed by: STUDENT IN AN ORGANIZED HEALTH CARE EDUCATION/TRAINING PROGRAM

## 2022-09-09 PROCEDURE — 85610 PROTHROMBIN TIME: CPT | Performed by: STUDENT IN AN ORGANIZED HEALTH CARE EDUCATION/TRAINING PROGRAM

## 2022-09-09 PROCEDURE — 250N000011 HC RX IP 250 OP 636: Performed by: PHYSICIAN ASSISTANT

## 2022-09-09 PROCEDURE — 71045 X-RAY EXAM CHEST 1 VIEW: CPT

## 2022-09-09 PROCEDURE — 999N000015 HC STATISTIC ARTERIAL MONITORING DAILY

## 2022-09-09 PROCEDURE — 250N000009 HC RX 250: Performed by: INTERNAL MEDICINE

## 2022-09-09 PROCEDURE — 84132 ASSAY OF SERUM POTASSIUM: CPT | Performed by: SURGERY

## 2022-09-09 PROCEDURE — 99291 CRITICAL CARE FIRST HOUR: CPT | Mod: 24 | Performed by: ANESTHESIOLOGY

## 2022-09-09 PROCEDURE — 250N000013 HC RX MED GY IP 250 OP 250 PS 637: Performed by: STUDENT IN AN ORGANIZED HEALTH CARE EDUCATION/TRAINING PROGRAM

## 2022-09-09 PROCEDURE — 80053 COMPREHEN METABOLIC PANEL: CPT | Performed by: STUDENT IN AN ORGANIZED HEALTH CARE EDUCATION/TRAINING PROGRAM

## 2022-09-09 PROCEDURE — 85027 COMPLETE CBC AUTOMATED: CPT | Performed by: STUDENT IN AN ORGANIZED HEALTH CARE EDUCATION/TRAINING PROGRAM

## 2022-09-09 PROCEDURE — 87070 CULTURE OTHR SPECIMN AEROBIC: CPT | Performed by: STUDENT IN AN ORGANIZED HEALTH CARE EDUCATION/TRAINING PROGRAM

## 2022-09-09 PROCEDURE — 200N000002 HC R&B ICU UMMC

## 2022-09-09 PROCEDURE — C1898 LEAD, PMKR, OTHER THAN TRANS: HCPCS | Performed by: INTERNAL MEDICINE

## 2022-09-09 PROCEDURE — 82330 ASSAY OF CALCIUM: CPT | Performed by: PHYSICIAN ASSISTANT

## 2022-09-09 PROCEDURE — 250N000011 HC RX IP 250 OP 636: Performed by: INTERNAL MEDICINE

## 2022-09-09 PROCEDURE — 250N000013 HC RX MED GY IP 250 OP 250 PS 637: Performed by: PHYSICIAN ASSISTANT

## 2022-09-09 PROCEDURE — 71045 X-RAY EXAM CHEST 1 VIEW: CPT | Mod: 26 | Performed by: RADIOLOGY

## 2022-09-09 PROCEDURE — 99152 MOD SED SAME PHYS/QHP 5/>YRS: CPT | Performed by: INTERNAL MEDICINE

## 2022-09-09 PROCEDURE — 93010 ELECTROCARDIOGRAM REPORT: CPT | Performed by: INTERNAL MEDICINE

## 2022-09-09 PROCEDURE — C1785 PMKR, DUAL, RATE-RESP: HCPCS | Performed by: INTERNAL MEDICINE

## 2022-09-09 PROCEDURE — 82805 BLOOD GASES W/O2 SATURATION: CPT | Performed by: SURGERY

## 2022-09-09 PROCEDURE — 84100 ASSAY OF PHOSPHORUS: CPT | Performed by: STUDENT IN AN ORGANIZED HEALTH CARE EDUCATION/TRAINING PROGRAM

## 2022-09-09 PROCEDURE — 250N000011 HC RX IP 250 OP 636: Performed by: SURGERY

## 2022-09-09 PROCEDURE — 999N000045 HC STATISTIC DAILY SWAN MONITORING

## 2022-09-09 PROCEDURE — 93010 ELECTROCARDIOGRAM REPORT: CPT | Mod: 76 | Performed by: INTERNAL MEDICINE

## 2022-09-09 PROCEDURE — 250N000011 HC RX IP 250 OP 636: Performed by: STUDENT IN AN ORGANIZED HEALTH CARE EDUCATION/TRAINING PROGRAM

## 2022-09-09 PROCEDURE — 99223 1ST HOSP IP/OBS HIGH 75: CPT | Mod: 24 | Performed by: INTERNAL MEDICINE

## 2022-09-09 PROCEDURE — 33208 INSRT HEART PM ATRIAL & VENT: CPT | Performed by: INTERNAL MEDICINE

## 2022-09-09 PROCEDURE — 93005 ELECTROCARDIOGRAM TRACING: CPT

## 2022-09-09 PROCEDURE — 82805 BLOOD GASES W/O2 SATURATION: CPT | Performed by: PHYSICIAN ASSISTANT

## 2022-09-09 PROCEDURE — C1894 INTRO/SHEATH, NON-LASER: HCPCS | Performed by: INTERNAL MEDICINE

## 2022-09-09 DEVICE — PCMKR CARD ACCOLADE MRI EL DR: Type: IMPLANTABLE DEVICE | Status: FUNCTIONAL

## 2022-09-09 DEVICE — LEAD INGEVITY+ AF IS1 7841 52CM: Type: IMPLANTABLE DEVICE | Status: FUNCTIONAL

## 2022-09-09 DEVICE — LEAD INGEVITY+ AF IS1 7842 59CM: Type: IMPLANTABLE DEVICE | Status: FUNCTIONAL

## 2022-09-09 RX ORDER — NALOXONE HYDROCHLORIDE 0.4 MG/ML
0.2 INJECTION, SOLUTION INTRAMUSCULAR; INTRAVENOUS; SUBCUTANEOUS
Status: DISCONTINUED | OUTPATIENT
Start: 2022-09-09 | End: 2022-09-16 | Stop reason: HOSPADM

## 2022-09-09 RX ORDER — CEPHALEXIN 500 MG/1
500 CAPSULE ORAL EVERY 12 HOURS SCHEDULED
Status: DISCONTINUED | OUTPATIENT
Start: 2022-09-09 | End: 2022-09-11

## 2022-09-09 RX ORDER — CEFAZOLIN SODIUM 2 G/100ML
2 INJECTION, SOLUTION INTRAVENOUS
Status: DISCONTINUED | OUTPATIENT
Start: 2022-09-09 | End: 2022-09-09 | Stop reason: HOSPADM

## 2022-09-09 RX ORDER — NALOXONE HYDROCHLORIDE 0.4 MG/ML
0.4 INJECTION, SOLUTION INTRAMUSCULAR; INTRAVENOUS; SUBCUTANEOUS
Status: DISCONTINUED | OUTPATIENT
Start: 2022-09-09 | End: 2022-09-16 | Stop reason: HOSPADM

## 2022-09-09 RX ORDER — DOBUTAMINE HYDROCHLORIDE 200 MG/100ML
2.5 INJECTION INTRAVENOUS CONTINUOUS
Status: DISCONTINUED | OUTPATIENT
Start: 2022-09-09 | End: 2022-09-13

## 2022-09-09 RX ORDER — FLUMAZENIL 0.1 MG/ML
0.2 INJECTION, SOLUTION INTRAVENOUS
Status: CANCELLED | OUTPATIENT
Start: 2022-09-09 | End: 2022-09-09

## 2022-09-09 RX ORDER — SODIUM CHLORIDE 9 MG/ML
INJECTION, SOLUTION INTRAVENOUS CONTINUOUS
Status: DISCONTINUED | OUTPATIENT
Start: 2022-09-09 | End: 2022-09-09 | Stop reason: HOSPADM

## 2022-09-09 RX ORDER — DOBUTAMINE HYDROCHLORIDE 200 MG/100ML
2.5 INJECTION INTRAVENOUS CONTINUOUS
Status: DISCONTINUED | OUTPATIENT
Start: 2022-09-09 | End: 2022-09-09

## 2022-09-09 RX ORDER — CEFAZOLIN SODIUM 2 G/100ML
INJECTION, SOLUTION INTRAVENOUS CONTINUOUS PRN
Status: COMPLETED | OUTPATIENT
Start: 2022-09-09 | End: 2022-09-09

## 2022-09-09 RX ORDER — POTASSIUM CHLORIDE 750 MG/1
20 TABLET, EXTENDED RELEASE ORAL ONCE
Status: COMPLETED | OUTPATIENT
Start: 2022-09-09 | End: 2022-09-09

## 2022-09-09 RX ORDER — FENTANYL CITRATE 50 UG/ML
INJECTION, SOLUTION INTRAMUSCULAR; INTRAVENOUS
Status: DISCONTINUED | OUTPATIENT
Start: 2022-09-09 | End: 2022-09-09 | Stop reason: HOSPADM

## 2022-09-09 RX ORDER — FENTANYL CITRATE 50 UG/ML
25-50 INJECTION, SOLUTION INTRAMUSCULAR; INTRAVENOUS
Status: CANCELLED | OUTPATIENT
Start: 2022-09-09 | End: 2022-09-09

## 2022-09-09 RX ORDER — ATROPINE SULFATE 0.1 MG/ML
0.5 INJECTION INTRAVENOUS EVERY 5 MIN PRN
Status: CANCELLED | OUTPATIENT
Start: 2022-09-09 | End: 2022-09-09

## 2022-09-09 RX ORDER — POTASSIUM CHLORIDE 29.8 MG/ML
20 INJECTION INTRAVENOUS ONCE
Status: COMPLETED | OUTPATIENT
Start: 2022-09-09 | End: 2022-09-09

## 2022-09-09 RX ORDER — OXYCODONE AND ACETAMINOPHEN 5; 325 MG/1; MG/1
1 TABLET ORAL EVERY 4 HOURS PRN
Status: CANCELLED | OUTPATIENT
Start: 2022-09-09

## 2022-09-09 RX ORDER — BUMETANIDE 0.25 MG/ML
3 INJECTION INTRAMUSCULAR; INTRAVENOUS ONCE
Status: COMPLETED | OUTPATIENT
Start: 2022-09-09 | End: 2022-09-09

## 2022-09-09 RX ORDER — LIDOCAINE 40 MG/G
CREAM TOPICAL
Status: DISCONTINUED | OUTPATIENT
Start: 2022-09-09 | End: 2022-09-09 | Stop reason: HOSPADM

## 2022-09-09 RX ADMIN — CEPHALEXIN 500 MG: 500 CAPSULE ORAL at 12:35

## 2022-09-09 RX ADMIN — HEPARIN SODIUM 5000 UNITS: 5000 INJECTION, SOLUTION INTRAVENOUS; SUBCUTANEOUS at 03:41

## 2022-09-09 RX ADMIN — OXYCODONE HYDROCHLORIDE 5 MG: 5 TABLET ORAL at 12:35

## 2022-09-09 RX ADMIN — POLYETHYLENE GLYCOL 3350 17 G: 17 POWDER, FOR SOLUTION ORAL at 07:46

## 2022-09-09 RX ADMIN — METHOCARBAMOL 750 MG: 750 TABLET, FILM COATED ORAL at 21:11

## 2022-09-09 RX ADMIN — SENNOSIDES AND DOCUSATE SODIUM 1 TABLET: 8.6; 5 TABLET ORAL at 21:11

## 2022-09-09 RX ADMIN — OXYCODONE HYDROCHLORIDE 5 MG: 5 TABLET ORAL at 21:11

## 2022-09-09 RX ADMIN — PANTOPRAZOLE SODIUM 40 MG: 40 TABLET, DELAYED RELEASE ORAL at 07:46

## 2022-09-09 RX ADMIN — FLUTICASONE FUROATE AND VILANTEROL TRIFENATATE 1 PUFF: 200; 25 POWDER RESPIRATORY (INHALATION) at 07:48

## 2022-09-09 RX ADMIN — SENNOSIDES AND DOCUSATE SODIUM 1 TABLET: 8.6; 5 TABLET ORAL at 07:46

## 2022-09-09 RX ADMIN — POTASSIUM CHLORIDE 20 MEQ: 750 TABLET, EXTENDED RELEASE ORAL at 05:52

## 2022-09-09 RX ADMIN — ACETAMINOPHEN 975 MG: 325 TABLET, FILM COATED ORAL at 05:52

## 2022-09-09 RX ADMIN — OXYCODONE HYDROCHLORIDE 5 MG: 5 TABLET ORAL at 04:00

## 2022-09-09 RX ADMIN — ATORVASTATIN CALCIUM 80 MG: 80 TABLET, FILM COATED ORAL at 21:11

## 2022-09-09 RX ADMIN — ACETAMINOPHEN 650 MG: 325 TABLET, FILM COATED ORAL at 21:11

## 2022-09-09 RX ADMIN — DOBUTAMINE HYDROCHLORIDE 2.5 MCG/KG/MIN: 200 INJECTION INTRAVENOUS at 17:28

## 2022-09-09 RX ADMIN — GABAPENTIN 100 MG: 100 CAPSULE ORAL at 21:11

## 2022-09-09 RX ADMIN — POTASSIUM CHLORIDE 20 MEQ: 29.8 INJECTION, SOLUTION INTRAVENOUS at 16:40

## 2022-09-09 RX ADMIN — ASPIRIN 81 MG CHEWABLE TABLET 81 MG: 81 TABLET CHEWABLE at 07:46

## 2022-09-09 RX ADMIN — POTASSIUM CHLORIDE 20 MEQ: 750 TABLET, EXTENDED RELEASE ORAL at 00:29

## 2022-09-09 RX ADMIN — BUMETANIDE 3 MG: 0.25 INJECTION INTRAMUSCULAR; INTRAVENOUS at 12:42

## 2022-09-09 RX ADMIN — ACETAMINOPHEN 975 MG: 325 TABLET, FILM COATED ORAL at 14:30

## 2022-09-09 RX ADMIN — GABAPENTIN 100 MG: 100 CAPSULE ORAL at 07:46

## 2022-09-09 RX ADMIN — CEPHALEXIN 500 MG: 500 CAPSULE ORAL at 21:11

## 2022-09-09 RX ADMIN — GABAPENTIN 100 MG: 100 CAPSULE ORAL at 14:30

## 2022-09-09 ASSESSMENT — ACTIVITIES OF DAILY LIVING (ADL)
ADLS_ACUITY_SCORE: 27
ADLS_ACUITY_SCORE: 28
ADLS_ACUITY_SCORE: 28
ADLS_ACUITY_SCORE: 27
ADLS_ACUITY_SCORE: 28
ADLS_ACUITY_SCORE: 27
ADLS_ACUITY_SCORE: 28
ADLS_ACUITY_SCORE: 27
ADLS_ACUITY_SCORE: 28

## 2022-09-09 NOTE — PROGRESS NOTES
CV ICU PROGRESS NOTE  September 9, 2022       CO-MORBIDITIES:   Nonrheumatic aortic valve insufficiency  (primary encounter diagnosis)  S/P AVR    ASSESSMENT: Maximino Birch is a 61 year old male with PMH of COPD on home O2 (3L/min at night), HFrEF (EF 40%), aortic stenosis, bicuspid aortic valve, ascending aortic arch aneurysm and single-vessel CAD s/p AVR with bovine valve, arch repair and CABGx1 SVG to RCA in 2013. He is now s/p redo sternotomy and AVR with On-X valve on 9/6 with Dr Hogue.    Last 24 hours:  Received 3mg Bumex yesterday with good response.  Appreciated Nephrology recs.  PA catheter occluded - Last accurate SvO2 44 at 8pm on 9/8, it was then pulled from CVP.  Creatinine improving.    Today's Progress:  - Wean off epinephrine.  - Limit diuretic use, goal is negative 500mL-1L  - PPM today as recommended by EP for complete heart block.    PLAN:  Neuro/ pain/ sedation:  - Monitor neurological status. Notify for any acute changes in exam.  - Scheduled Gabapentin 100mg TID, Tylenol  - PRN Tylenol, Dilaudid  - Off sedation    Pulmonary care:   - Extubated on 9/7  - Supplemental oxygen to keep saturation above 92%. Wean supplemental oxygen as tolerated.  - Incentive spirometer every 15- 30 minutes when awake.  - PTA Symbicort, Albuterol    Cardiovascular:    - Monitor hemodynamic  - Wean off epinephrine, currently on 0.02. Monitor Flotrack and central venous saturation.   - On Aspirin 81 mg, Coumadin and PTA Lipitor  - Re-evaluated by EP today and recommended PPM. Appreciated recommendations.  - Re-evaluate PA catheter in the afternoon to see if we can pull or no    GI care:   - Advance PO diet as tolerated.  - PPI  - Bowel regimen with Miralax and Senokot    Fluids/ Electrolytes/ Nutrition:  - Monitor electrolytes and replete as needed  - No indication for parenteral nutrition.    Renal/ Fluid Balance:    # SCARLET on CKD  Likely hemodynamic. Baseline creatinine ~1.2. Appreciated Nephrology recs.  Given Bumex yesterday in the afternoon with improved urine output.  - Goal negative 500ml-1L  - Possible diuresis. Will contact nephrology for further recommendations, as diastolic PAP is high and could benefit from diuresis. Will give Bumex 3mg today and continue to monitor I/Os.  - Avoid hypotension  - Daily weights    Endocrine:    - Insulin sliding scale.  - Glucose goal 110-180    ID/ Antibiotics:  - Completed perioperative Vancomycin on 9/7  - No indication for antibiotics at this time. Continue to monitor leukocytes.   - Blood cultures no growth to date  - Urine culture in process  - Keflex for 5 days post pacemaker placement    Heme:     - Hemoglobin stable. Continue to monitor CBC  - Repeat CBC in the afternoon.  - Continue to monitor INR    Prophylaxis:    - Mechanical prophylaxis for DVT  - Heparin 5,000 units TID - discontinue due to supra therapeutic INR  - PPI    Lines/ tubes/ drains:  - Arterial line (9/8/22)   - CVC and Alice (9/6/22)   - Santiago catheter (9/6/22) - remove  - Chest tubes staying (9/6/22)    Disposition:  - CVICU    ICU Checklist performed during rounds    Patient seen, findings and plan discussed with CVICU staff    Leopoldo Ball MD  Anesthesiology CA-1/PGY-3    ==========================================    SUBJECTIVE:   Patient lying comfortably in bed, in no acute distress. Received 60mg IV Lasix overnight with poor urine output. No other issues or complaints.    OBJECTIVE:   1. VITAL SIGNS:   Temp:  [97.4  F (36.3  C)-98.2  F (36.8  C)] 98  F (36.7  C)  Pulse:  [59-86] 68  Resp:  [12-18] 15  MAP:  [60 mmHg-96 mmHg] 77 mmHg  Arterial Line BP: ()/(42-75) 130/56  SpO2:  [88 %-98 %] 98 %  Resp: 15      2. INTAKE/ OUTPUT:   I/O last 3 completed shifts:  In: 1441.01 [P.O.:920; I.V.:521.01]  Out: 2180 [Urine:1580; Chest Tube:600]    3. PHYSICAL EXAMINATION:   General: NAD  Neuro: AOx3  Resp: Breathing non-labored on 2L/min nasal cannula  CV: RRR  Abdomen: Soft, Non-distended,  Non-tender  Incisions: c/d/i  Extremities: warm and well perfused, bilateral lower extremity edema    4. INVESTIGATIONS:   Arterial Blood Gases   Recent Labs   Lab 09/09/22 0342 09/08/22 2224 09/08/22  1236 09/08/22  0643   PH 7.43 7.41 7.39 7.39   PCO2 47* 49* 44 47*   PO2 72* 74* 63* 68*   HCO3 31* 31* 26 29*     Complete Blood Count   Recent Labs   Lab 09/09/22 0343 09/08/22 1237 09/08/22 0406 09/07/22  0341   WBC 11.2* 14.5* 16.4* 12.8*   HGB 9.0* 9.3* 9.5* 11.3*   PLT 96* 103* 115* 168     Basic Metabolic Panel  Recent Labs   Lab 09/09/22 0343 09/09/22 0019 09/08/22 2229 09/08/22 2223 09/08/22 1752 09/08/22 1237 09/08/22  0413 09/08/22  0406     --   --  134*  --  132*  --  136   POTASSIUM 3.8  --   --  3.7  --  3.9  --  4.4   CHLORIDE 100  --   --  98  --  97*  --  100   CO2 31*  --   --  31*  --  26  --  31*   BUN 35.1*  --   --  37.1*  --  41.6*  --  38.7*   CR 1.25*  --   --  1.45*  --  1.95*  --  1.87*   * 161* 158* 159*   < > 200*   < > 105*    < > = values in this interval not displayed.     Liver Function Tests  Recent Labs   Lab 09/09/22 0343 09/08/22 0406 09/07/22  0904 09/07/22  0341 09/06/22  1819   AST 41 62*  --  54* 44   ALT 15 23  --  21 18   ALKPHOS 72 70  --  62 61   BILITOTAL 0.6 0.6  --  0.9 1.7*   ALBUMIN 3.0* 3.0*  --  2.8* 2.7*   INR 3.79* 1.58* 1.30*  --  1.50*     Pancreatic Enzymes  No lab results found in last 7 days.  Coagulation Profile  Recent Labs   Lab 09/09/22  0343 09/08/22  0406 09/07/22  0904 09/06/22  1819 09/06/22  1410   INR 3.79* 1.58* 1.30* 1.50* 1.72*   PTT  --   --   --  80* 67*         5. RADIOLOGY:   Recent Results (from the past 24 hour(s))   XR Chest Port 1 View    Narrative    EXAM: XR CHEST PORT 1 VIEW  9/8/2022 9:07 PM     HISTORY:  position of swan       COMPARISON:  Chest x-ray 9/7/2022    FINDINGS: AP radiograph of the chest. Right IJ Salem-Markell catheter with  tip overlying the proximal main pulmonary artery. Stable positioning  of  mediastinal drains. Endotracheal and enteric tubes have been  removed. Postoperative chest with intact median sternotomy wires with  surgical clips overlying the superior mediastinum.    Stable enlargement of the cardiac silhouette. Atherosclerotic  calcifications of the aortic arch. Pulmonary vasculature is indistinct  with slightly increased right greater than left perihilar and right  mid lung interstitial opacities. Small right pleural effusion. No  pneumothorax. Upper abdomen is within normal limits. Suture anchor to  the left shoulder.      Impression    IMPRESSION:  1. Right IJ Hampton-Markell catheter with tip overlying the proximal main  pulmonary artery.  2. Indistinct pulmonary vasculature with increased perihilar and right  midlung interstitial opacities concerning for increasing pulmonary  edema.  3. Small right pleural effusion.    I have personally reviewed the examination and initial interpretation  and I agree with the findings.    ADALBERTO BARTHOLOMEW MD         SYSTEM ID:  [id number]   XR Chest Port 1 View    Impression    RESIDENT PRELIMINARY INTERPRETATION  IMPRESSION:  1. Stable position of the right IJ Hampton-Markell catheter with the tip  overlying the main pulmonary artery. Additional support devices are  also unchanged.  2. Unchanged interstitial opacities, likely represent pulmonary edema.  3. Stable small right pleural effusion.       =========================================

## 2022-09-09 NOTE — PROGRESS NOTES
ICU End of Shift Summary. See flowsheets for vital signs and detailed assessment.    Changes this shift: Pt lethargic, oriented x4, no neuro deficits noted, strength improving. Pain managed with prn oxycodone, tylenol, robaxin and ES catheters removed. Pt was in accelerated junctional rhythm with BBB 60-70's until 02:00 and flipped into sinus rhythm Pt rhythm flipped several times from sinus rhythm back to accelerated junctional rhythm. Pacer wires capped. MAPs > 65 while on 0.02 of Epi. CVPs 10-13, PA 45/25, CO 4.2, CI 1.9. Chest tubes with 20-40mL/2hrs. Of note, PA catheter clotted off and no longer draws back blood. PA catheter placement was confirmed at bedside with overnight cards fellow and CVTS resident. XR x2 taken and placement confirmed, waveform appropriate, all wires checked and tightened. Subsequent VBGs were taken from CVP line as this was next closest line to PA.     Otherwise remained on 2L, no BM, milk of mag added to prn orders. Pt unresponsive to lasix during the day but was responsive to Bumex at night. Pt was negative 750mL 9/8 and is negative 700 mL today. Notable labs include K 3.8 replaced, Cr 1.25 slowly downtrending.     Plan: Continue diuresis nephrology goal of -500mL - 1L daily. Pacer placement scheduled for 8am.

## 2022-09-09 NOTE — PLAN OF CARE
Problem: Gas Exchange Impaired  Goal: Optimal Gas Exchange  Intervention: Optimize Oxygenation and Ventilation  Recent Flowsheet Documentation  Taken 9/9/2022 1600 by Bailee Coreas RN  Head of Bed (HOB) Positioning: HOB at 30-45 degrees  Taken 9/9/2022 1400 by Bailee Coreas RN  Head of Bed (HOB) Positioning: HOB at 30-45 degrees  Taken 9/9/2022 1200 by Bailee Coreas RN  Head of Bed (HOB) Positioning: HOB at 30-45 degrees  Taken 9/9/2022 0800 by Bailee Coreas RN  Head of Bed (HOB) Positioning: HOB at 30-45 degrees     Problem: Pain (Cardiovascular Surgery)  Goal: Acceptable Pain Control  Intervention: Prevent or Manage Pain  Recent Flowsheet Documentation  Taken 9/9/2022 1600 by Bailee Coreas RN  Pain Management Interventions:   medication (see MAR)   care clustered   pillow support provided  Taken 9/9/2022 1200 by Bailee Coreas RN  Pain Management Interventions:   medication (see MAR)   care clustered   pillow support provided  Taken 9/9/2022 0800 by Bailee Coreas RN  Pain Management Interventions:   medication (see MAR)   care clustered   pillow support provided   Goal Outcome Evaluation:    Major Shift Events:  To cath lab for permanent pacer with EP.  Attempt to wean Epi from 0.02 to off. SvO2 remain stable at 48-52, and indexes 2.1 with Epi off. Team decided to start Dobutamine at 2.5 overnight to see how numbers improve. Bumex given to diurese.  Per radiology and EP, atrial pacing wire of the permanent pacer may be incorrectly positioned.  Cardiology consulted to determine if urgent intervention is needed; they state patient can wait until Monday for repositioning of lead if necessary.  Lateral chest x-ray ordered for tomorrow to evaluate for positioning.  Plan: Lateral chest x-rays to evaluate positioning of atrial leads. Try to wean dobutamine off and remove swan.  For vital signs and complete assessments, please see documentation flowsheets.

## 2022-09-09 NOTE — PROGRESS NOTES
Patient seen on PCU 4E for discharge teaching. Patient is s/p [employer] pacemaker implant on 9/9/2022. Spoke with patient and his wife and daughter, discharge teaching provided in written and verbal form. Patient verbalized understanding of instructions. Plan to check in with patient on Monday 9/12/22 as he will still be inpatient.  At that time we will evaluate his LOS, for an incision and device check.  Patient lives in [facility] and plans to resume cardiology care there.  We may be able to do the incision and device check prior to his discharge.    Vivien Mike RN on 9/9/2022 at 4:21 PM

## 2022-09-10 ENCOUNTER — APPOINTMENT (OUTPATIENT)
Dept: CARDIOLOGY | Facility: CLINIC | Age: 62
End: 2022-09-10
Attending: SURGERY
Payer: COMMERCIAL

## 2022-09-10 ENCOUNTER — APPOINTMENT (OUTPATIENT)
Dept: OCCUPATIONAL THERAPY | Facility: CLINIC | Age: 62
End: 2022-09-10
Attending: PHYSICIAN ASSISTANT
Payer: COMMERCIAL

## 2022-09-10 ENCOUNTER — APPOINTMENT (OUTPATIENT)
Dept: GENERAL RADIOLOGY | Facility: CLINIC | Age: 62
End: 2022-09-10
Attending: SURGERY
Payer: COMMERCIAL

## 2022-09-10 LAB
ALBUMIN SERPL BCG-MCNC: 2.5 G/DL (ref 3.5–5.2)
ALP SERPL-CCNC: 69 U/L (ref 40–129)
ALT SERPL W P-5'-P-CCNC: 13 U/L (ref 10–50)
ANION GAP SERPL CALCULATED.3IONS-SCNC: 2 MMOL/L (ref 7–15)
AST SERPL W P-5'-P-CCNC: 29 U/L (ref 10–50)
BASE EXCESS BLDV CALC-SCNC: 10.3 MMOL/L (ref -7.7–1.9)
BASE EXCESS BLDV CALC-SCNC: 8.2 MMOL/L (ref -7.7–1.9)
BASE EXCESS BLDV CALC-SCNC: 8.6 MMOL/L (ref -7.7–1.9)
BASE EXCESS BLDV CALC-SCNC: 9 MMOL/L (ref -7.7–1.9)
BASE EXCESS BLDV CALC-SCNC: 9.6 MMOL/L (ref -7.7–1.9)
BILIRUB SERPL-MCNC: 0.5 MG/DL
BUN SERPL-MCNC: 23.4 MG/DL (ref 8–23)
CALCIUM SERPL-MCNC: 8 MG/DL (ref 8.6–10)
CHLORIDE SERPL-SCNC: 101 MMOL/L (ref 98–107)
CREAT SERPL-MCNC: 0.78 MG/DL (ref 0.67–1.17)
DEPRECATED HCO3 PLAS-SCNC: 35 MMOL/L (ref 22–29)
ERYTHROCYTE [DISTWIDTH] IN BLOOD BY AUTOMATED COUNT: 14.9 % (ref 10–15)
GFR SERPL CREATININE-BSD FRML MDRD: >90 ML/MIN/1.73M2
GLUCOSE BLDC GLUCOMTR-MCNC: 141 MG/DL (ref 70–99)
GLUCOSE BLDC GLUCOMTR-MCNC: 96 MG/DL (ref 70–99)
GLUCOSE BLDC GLUCOMTR-MCNC: 98 MG/DL (ref 70–99)
GLUCOSE SERPL-MCNC: 100 MG/DL (ref 70–99)
HCO3 BLDV-SCNC: 34 MMOL/L (ref 21–28)
HCO3 BLDV-SCNC: 35 MMOL/L (ref 21–28)
HCO3 BLDV-SCNC: 35 MMOL/L (ref 21–28)
HCT VFR BLD AUTO: 24.4 % (ref 40–53)
HGB BLD-MCNC: 7.9 G/DL (ref 13.3–17.7)
HGB BLD-MCNC: 7.9 G/DL (ref 13.3–17.7)
HGB BLD-MCNC: 8.1 G/DL (ref 13.3–17.7)
INR PPP: 2.44 (ref 0.85–1.15)
LVEF ECHO: NORMAL
MAGNESIUM SERPL-MCNC: 2.3 MG/DL (ref 1.7–2.3)
MCH RBC QN AUTO: 31.2 PG (ref 26.5–33)
MCHC RBC AUTO-ENTMCNC: 32.4 G/DL (ref 31.5–36.5)
MCV RBC AUTO: 96 FL (ref 78–100)
O2/TOTAL GAS SETTING VFR VENT: 0 %
O2/TOTAL GAS SETTING VFR VENT: 1 %
O2/TOTAL GAS SETTING VFR VENT: 3 %
OXYHGB MFR BLDV: 48 % (ref 70–75)
OXYHGB MFR BLDV: 54 % (ref 70–75)
OXYHGB MFR BLDV: 60 % (ref 70–75)
OXYHGB MFR BLDV: 63 % (ref 70–75)
OXYHGB MFR BLDV: 65 % (ref 70–75)
PCO2 BLDV: 48 MM HG (ref 40–50)
PCO2 BLDV: 49 MM HG (ref 40–50)
PCO2 BLDV: 51 MM HG (ref 40–50)
PCO2 BLDV: 52 MM HG (ref 40–50)
PCO2 BLDV: 53 MM HG (ref 40–50)
PH BLDV: 7.42 [PH] (ref 7.32–7.43)
PH BLDV: 7.42 [PH] (ref 7.32–7.43)
PH BLDV: 7.43 [PH] (ref 7.32–7.43)
PH BLDV: 7.47 [PH] (ref 7.32–7.43)
PH BLDV: 7.47 [PH] (ref 7.32–7.43)
PHOSPHATE SERPL-MCNC: 2.4 MG/DL (ref 2.5–4.5)
PLATELET # BLD AUTO: 87 10E3/UL (ref 150–450)
PO2 BLDV: 27 MM HG (ref 25–47)
PO2 BLDV: 30 MM HG (ref 25–47)
PO2 BLDV: 33 MM HG (ref 25–47)
PO2 BLDV: 35 MM HG (ref 25–47)
PO2 BLDV: 36 MM HG (ref 25–47)
POTASSIUM SERPL-SCNC: 3.6 MMOL/L (ref 3.4–5.3)
PROT SERPL-MCNC: 4.6 G/DL (ref 6.4–8.3)
RBC # BLD AUTO: 2.53 10E6/UL (ref 4.4–5.9)
SODIUM SERPL-SCNC: 138 MMOL/L (ref 136–145)
WBC # BLD AUTO: 4.5 10E3/UL (ref 4–11)

## 2022-09-10 PROCEDURE — 272N000451 HC KIT SHRLOCK 5FR POWER PICC DOUBLE LUMEN

## 2022-09-10 PROCEDURE — C8924 2D TTE W OR W/O FOL W/CON,FU: HCPCS

## 2022-09-10 PROCEDURE — 71045 X-RAY EXAM CHEST 1 VIEW: CPT

## 2022-09-10 PROCEDURE — 200N000002 HC R&B ICU UMMC

## 2022-09-10 PROCEDURE — 93308 TTE F-UP OR LMTD: CPT | Mod: 26 | Performed by: STUDENT IN AN ORGANIZED HEALTH CARE EDUCATION/TRAINING PROGRAM

## 2022-09-10 PROCEDURE — 97535 SELF CARE MNGMENT TRAINING: CPT | Mod: GO | Performed by: OCCUPATIONAL THERAPIST

## 2022-09-10 PROCEDURE — 999N000015 HC STATISTIC ARTERIAL MONITORING DAILY

## 2022-09-10 PROCEDURE — 999N000111 HC STATISTIC OT IP EVAL DEFER: Performed by: OCCUPATIONAL THERAPIST

## 2022-09-10 PROCEDURE — 99291 CRITICAL CARE FIRST HOUR: CPT | Mod: 24 | Performed by: ANESTHESIOLOGY

## 2022-09-10 PROCEDURE — 250N000013 HC RX MED GY IP 250 OP 250 PS 637

## 2022-09-10 PROCEDURE — 97530 THERAPEUTIC ACTIVITIES: CPT | Mod: GO | Performed by: OCCUPATIONAL THERAPIST

## 2022-09-10 PROCEDURE — 36569 INSJ PICC 5 YR+ W/O IMAGING: CPT

## 2022-09-10 PROCEDURE — 85027 COMPLETE CBC AUTOMATED: CPT | Performed by: STUDENT IN AN ORGANIZED HEALTH CARE EDUCATION/TRAINING PROGRAM

## 2022-09-10 PROCEDURE — 71045 X-RAY EXAM CHEST 1 VIEW: CPT | Mod: 26 | Performed by: RADIOLOGY

## 2022-09-10 PROCEDURE — 250N000013 HC RX MED GY IP 250 OP 250 PS 637: Performed by: STUDENT IN AN ORGANIZED HEALTH CARE EDUCATION/TRAINING PROGRAM

## 2022-09-10 PROCEDURE — 250N000009 HC RX 250: Performed by: STUDENT IN AN ORGANIZED HEALTH CARE EDUCATION/TRAINING PROGRAM

## 2022-09-10 PROCEDURE — 250N000013 HC RX MED GY IP 250 OP 250 PS 637: Performed by: PHYSICIAN ASSISTANT

## 2022-09-10 PROCEDURE — 999N000155 HC STATISTIC RAPCV CVP MONITORING

## 2022-09-10 PROCEDURE — 84100 ASSAY OF PHOSPHORUS: CPT | Performed by: SURGERY

## 2022-09-10 PROCEDURE — 82805 BLOOD GASES W/O2 SATURATION: CPT | Performed by: SURGERY

## 2022-09-10 PROCEDURE — 85018 HEMOGLOBIN: CPT | Performed by: STUDENT IN AN ORGANIZED HEALTH CARE EDUCATION/TRAINING PROGRAM

## 2022-09-10 PROCEDURE — 97165 OT EVAL LOW COMPLEX 30 MIN: CPT | Mod: GO | Performed by: OCCUPATIONAL THERAPIST

## 2022-09-10 PROCEDURE — 999N000045 HC STATISTIC DAILY SWAN MONITORING

## 2022-09-10 PROCEDURE — 71045 X-RAY EXAM CHEST 1 VIEW: CPT | Mod: 26 | Performed by: STUDENT IN AN ORGANIZED HEALTH CARE EDUCATION/TRAINING PROGRAM

## 2022-09-10 PROCEDURE — 83735 ASSAY OF MAGNESIUM: CPT | Performed by: SURGERY

## 2022-09-10 PROCEDURE — 93321 DOPPLER ECHO F-UP/LMTD STD: CPT | Mod: 26 | Performed by: STUDENT IN AN ORGANIZED HEALTH CARE EDUCATION/TRAINING PROGRAM

## 2022-09-10 PROCEDURE — 93325 DOPPLER ECHO COLOR FLOW MAPG: CPT

## 2022-09-10 PROCEDURE — 97110 THERAPEUTIC EXERCISES: CPT | Mod: GO | Performed by: OCCUPATIONAL THERAPIST

## 2022-09-10 PROCEDURE — 255N000002 HC RX 255 OP 636: Performed by: SURGERY

## 2022-09-10 PROCEDURE — 999N000065 XR CHEST PORT 1 VIEW

## 2022-09-10 PROCEDURE — 999N000157 HC STATISTIC RCP TIME EA 10 MIN

## 2022-09-10 PROCEDURE — 93325 DOPPLER ECHO COLOR FLOW MAPG: CPT | Mod: 26 | Performed by: STUDENT IN AN ORGANIZED HEALTH CARE EDUCATION/TRAINING PROGRAM

## 2022-09-10 PROCEDURE — 250N000013 HC RX MED GY IP 250 OP 250 PS 637: Performed by: SURGERY

## 2022-09-10 PROCEDURE — 80053 COMPREHEN METABOLIC PANEL: CPT | Performed by: STUDENT IN AN ORGANIZED HEALTH CARE EDUCATION/TRAINING PROGRAM

## 2022-09-10 PROCEDURE — 85610 PROTHROMBIN TIME: CPT | Performed by: STUDENT IN AN ORGANIZED HEALTH CARE EDUCATION/TRAINING PROGRAM

## 2022-09-10 PROCEDURE — 250N000011 HC RX IP 250 OP 636: Performed by: STUDENT IN AN ORGANIZED HEALTH CARE EDUCATION/TRAINING PROGRAM

## 2022-09-10 RX ORDER — PANTOPRAZOLE SODIUM 40 MG/1
40 TABLET, DELAYED RELEASE ORAL
Status: DISCONTINUED | OUTPATIENT
Start: 2022-09-11 | End: 2022-09-16 | Stop reason: HOSPADM

## 2022-09-10 RX ORDER — WARFARIN SODIUM 1 MG/1
1 TABLET ORAL
Status: COMPLETED | OUTPATIENT
Start: 2022-09-10 | End: 2022-09-10

## 2022-09-10 RX ORDER — TORSEMIDE 20 MG/1
60 TABLET ORAL EVERY MORNING
Status: DISCONTINUED | OUTPATIENT
Start: 2022-09-10 | End: 2022-09-12

## 2022-09-10 RX ORDER — LIDOCAINE 40 MG/G
CREAM TOPICAL
Status: ACTIVE | OUTPATIENT
Start: 2022-09-10 | End: 2022-09-13

## 2022-09-10 RX ORDER — HEPARIN SODIUM,PORCINE 10 UNIT/ML
5-20 VIAL (ML) INTRAVENOUS
Status: DISCONTINUED | OUTPATIENT
Start: 2022-09-10 | End: 2022-09-16 | Stop reason: HOSPADM

## 2022-09-10 RX ORDER — HEPARIN SODIUM,PORCINE 10 UNIT/ML
5-20 VIAL (ML) INTRAVENOUS EVERY 24 HOURS
Status: DISCONTINUED | OUTPATIENT
Start: 2022-09-10 | End: 2022-09-16 | Stop reason: HOSPADM

## 2022-09-10 RX ORDER — POTASSIUM CHLORIDE 750 MG/1
20 TABLET, EXTENDED RELEASE ORAL ONCE
Status: COMPLETED | OUTPATIENT
Start: 2022-09-10 | End: 2022-09-10

## 2022-09-10 RX ADMIN — LIDOCAINE HYDROCHLORIDE ANHYDROUS 3 ML: 10 INJECTION, SOLUTION INFILTRATION at 17:51

## 2022-09-10 RX ADMIN — GABAPENTIN 100 MG: 100 CAPSULE ORAL at 07:28

## 2022-09-10 RX ADMIN — GABAPENTIN 100 MG: 100 CAPSULE ORAL at 20:26

## 2022-09-10 RX ADMIN — ACETAMINOPHEN 650 MG: 325 TABLET, FILM COATED ORAL at 20:26

## 2022-09-10 RX ADMIN — METHOCARBAMOL 750 MG: 750 TABLET, FILM COATED ORAL at 04:08

## 2022-09-10 RX ADMIN — ASPIRIN 81 MG CHEWABLE TABLET 81 MG: 81 TABLET CHEWABLE at 07:28

## 2022-09-10 RX ADMIN — POTASSIUM CHLORIDE 20 MEQ: 750 TABLET, EXTENDED RELEASE ORAL at 05:47

## 2022-09-10 RX ADMIN — ATORVASTATIN CALCIUM 80 MG: 80 TABLET, FILM COATED ORAL at 20:26

## 2022-09-10 RX ADMIN — GABAPENTIN 100 MG: 100 CAPSULE ORAL at 13:51

## 2022-09-10 RX ADMIN — HUMAN ALBUMIN MICROSPHERES AND PERFLUTREN 7 ML: 10; .22 INJECTION, SOLUTION INTRAVENOUS at 12:16

## 2022-09-10 RX ADMIN — SENNOSIDES AND DOCUSATE SODIUM 1 TABLET: 8.6; 5 TABLET ORAL at 20:26

## 2022-09-10 RX ADMIN — POTASSIUM & SODIUM PHOSPHATES POWDER PACK 280-160-250 MG 1 PACKET: 280-160-250 PACK at 13:51

## 2022-09-10 RX ADMIN — FLUTICASONE FUROATE AND VILANTEROL TRIFENATATE 1 PUFF: 200; 25 POWDER RESPIRATORY (INHALATION) at 07:31

## 2022-09-10 RX ADMIN — TORSEMIDE 60 MG: 20 TABLET ORAL at 10:54

## 2022-09-10 RX ADMIN — CEPHALEXIN 500 MG: 500 CAPSULE ORAL at 07:28

## 2022-09-10 RX ADMIN — METHOCARBAMOL 750 MG: 750 TABLET, FILM COATED ORAL at 18:39

## 2022-09-10 RX ADMIN — PANTOPRAZOLE SODIUM 40 MG: 40 TABLET, DELAYED RELEASE ORAL at 07:28

## 2022-09-10 RX ADMIN — ACETAMINOPHEN 650 MG: 325 TABLET, FILM COATED ORAL at 12:31

## 2022-09-10 RX ADMIN — POTASSIUM & SODIUM PHOSPHATES POWDER PACK 280-160-250 MG 1 PACKET: 280-160-250 PACK at 09:59

## 2022-09-10 RX ADMIN — SODIUM CHLORIDE, PRESERVATIVE FREE 10 ML: 5 INJECTION INTRAVENOUS at 20:26

## 2022-09-10 RX ADMIN — ACETAMINOPHEN 650 MG: 325 TABLET, FILM COATED ORAL at 04:07

## 2022-09-10 RX ADMIN — WARFARIN SODIUM 1 MG: 1 TABLET ORAL at 18:36

## 2022-09-10 RX ADMIN — POTASSIUM & SODIUM PHOSPHATES POWDER PACK 280-160-250 MG 1 PACKET: 280-160-250 PACK at 05:47

## 2022-09-10 RX ADMIN — CEPHALEXIN 500 MG: 500 CAPSULE ORAL at 20:26

## 2022-09-10 RX ADMIN — OXYCODONE HYDROCHLORIDE 5 MG: 5 TABLET ORAL at 04:08

## 2022-09-10 RX ADMIN — ACETAMINOPHEN 650 MG: 325 TABLET, FILM COATED ORAL at 16:16

## 2022-09-10 ASSESSMENT — ACTIVITIES OF DAILY LIVING (ADL)
ADLS_ACUITY_SCORE: 26

## 2022-09-10 NOTE — PROGRESS NOTES
CV ICU PROGRESS NOTE  September 10, 2022       CO-MORBIDITIES:   Nonrheumatic aortic valve insufficiency  (primary encounter diagnosis)  S/P AVR    ASSESSMENT: Maximino Birch is a 61 year old male with PMH of COPD on home O2 (3L/min at night), HFrEF (EF 40%), aortic stenosis, bicuspid aortic valve, ascending aortic arch aneurysm and single-vessel CAD s/p AVR with bovine valve, arch repair and CABGx1 SVG to RCA in 2013. He is now s/p redo sternotomy and AVR with On-X valve on 9/6 with Dr Hogue.    Last 24 hours:  No acute or significant events overnight.  Hemoglobin dropped to 7.9 this morning (from 9 yesterday).    Today's Progress:  - TTE for further evaluation  - PPM atrial wire seems to be misplaced, will contact EP  - Restart PTA Torsemide  - Monitor hemoglobin    PLAN:  Neuro/ pain/ sedation:  - Monitor neurological status. Notify for any acute changes in exam.  - Scheduled Gabapentin 100mg TID, Tylenol  - PRN Tylenol, Dilaudid  - Off sedation    Pulmonary care:   - Extubated on 9/7  - Supplemental oxygen to keep saturation above 92%. Wean supplemental oxygen as tolerated. On NC 3L/min  - Incentive spirometer every 15- 30 minutes when awake.  - PTA Symbicort, Albuterol    Cardiovascular:    - Monitor hemodynamic  - On Dobutamine 2.5mcg/kg/min  - On Aspirin 81 mg, Coumadin and PTA Lipitor  - Will contact PE regarding pacemaker wires  - TTE to further assess heart contractility    GI care:   - Advance PO diet as tolerated.  - PPI  - Bowel regimen with Miralax and Senokot    Fluids/ Electrolytes/ Nutrition:  - Monitor electrolytes and replete as needed  - No indication for parenteral nutrition.    Renal/ Fluid Balance:    # SCARLET on CKD, resolved  - Goal net even  - Restart PTA Torsemide  - Avoid hypotension  - Daily weights    Endocrine:    - Insulin sliding scale.  - Glucose goal 110-180    ID/ Antibiotics:  - Completed perioperative Vancomycin on 9/7  - No indication for antibiotics at this time.  Continue to monitor leukocytes.   - Blood cultures no growth to date  - Urine culture no growth to date  - Keflex for 5 days post pacemaker placement    Heme:     - Hemoglobin monitor q8h  - Continue to monitor INR    Prophylaxis:    - Mechanical prophylaxis for DVT  - PPI    Lines/ tubes/ drains:  - Arterial line (9/8/22)   - CVC and [place] (9/6/22)   - Santiago catheter (9/6/22) - remove  - Chest tubes staying (9/6/22)  - Obtain PICC line today and will discontinue other lines after     Disposition:  - Transfer to floor    ICU Checklist performed during rounds    Patient seen, findings and plan discussed with CVICU staff    Leopoldo Ball MD  Anesthesiology CA-1/PGY-3    ==========================================    SUBJECTIVE:   Patient lying comfortably in bed, in no acute distress. Received 60mg IV Lasix overnight with poor urine output. No other issues or complaints.    OBJECTIVE:   1. VITAL SIGNS:   Temp:  [97  F (36.1  C)-98.7  F (37.1  C)] 97.7  F (36.5  C)  Pulse:  [60-77] 75  Resp:  [8-29] 18  MAP:  [61 mmHg-96 mmHg] 85 mmHg  Arterial Line BP: (102-157)/(41-72) 142/64  SpO2:  [88 %-99 %] 98 %  Resp: 18      2. INTAKE/ OUTPUT:   I/O last 3 completed shifts:  In: 1393.81 [P.O.:980; I.V.:413.81]  Out: 3390 [Urine:2450; Chest Tube:940]    3. PHYSICAL EXAMINATION:   General: NAD  Neuro: AOx3  Resp: Breathing non-labored on 3L/min nasal cannula  CV: RRR  Abdomen: Soft, Non-distended, Non-tender  Incisions: c/d/i  Extremities: warm and well perfused, bilateral lower extremity edema    4. INVESTIGATIONS:   Arterial Blood Gases   Recent Labs   Lab 09/09/22  0342 09/08/22  2224 09/08/22  1236 09/08/22  0643   PH 7.43 7.41 7.39 7.39   PCO2 47* 49* 44 47*   PO2 72* 74* 63* 68*   HCO3 31* 31* 26 29*     Complete Blood Count   Recent Labs   Lab 09/10/22  0400 09/09/22  0343 09/08/22  1237 09/08/22  0406   WBC 4.5 11.2* 14.5* 16.4*   HGB 7.9* 9.0* 9.3* 9.5*   PLT 87* 96* 103* 115*     Basic Metabolic Panel  Recent Labs    Lab 09/10/22  0740 09/10/22  0400 09/09/22  2104 09/09/22  1635 09/09/22  1445 09/09/22  0726 09/09/22  0343 09/08/22  2229 09/08/22  2223 09/08/22  1752 09/08/22  1237   NA  --  138  --   --   --   --  136  --  134*  --  132*   POTASSIUM  --  3.6  --   --  3.7  --  3.8  --  3.7  --  3.9   CHLORIDE  --  101  --   --   --   --  100  --  98  --  97*   CO2  --  35*  --   --   --   --  31*  --  31*  --  26   BUN  --  23.4*  --   --   --   --  35.1*  --  37.1*  --  41.6*   CR  --  0.78  --   --   --   --  1.25*  --  1.45*  --  1.95*   GLC 98 100* 145* 125*  --    < > 142*   < > 159*   < > 200*    < > = values in this interval not displayed.     Liver Function Tests  Recent Labs   Lab 09/10/22  0400 09/09/22 0343 09/08/22 0406 09/07/22  0904 09/07/22  0341   AST 29 41 62*  --  54*   ALT 13 15 23  --  21   ALKPHOS 69 72 70  --  62   BILITOTAL 0.5 0.6 0.6  --  0.9   ALBUMIN 2.5* 3.0* 3.0*  --  2.8*   INR 2.44* 3.79* 1.58* 1.30*  --      Pancreatic Enzymes  No lab results found in last 7 days.  Coagulation Profile  Recent Labs   Lab 09/10/22  0400 09/09/22  0343 09/08/22  0406 09/07/22  0904 09/06/22  1819 09/06/22  1410   INR 2.44* 3.79* 1.58* 1.30* 1.50* 1.72*   PTT  --   --   --   --  80* 67*         5. RADIOLOGY:   Recent Results (from the past 24 hour(s))   EP Device    Narrative    EP PROCEDURE NOTE    Procedures:  1. Dual chamber PPM device implantation.  2. Cephalic Cut down approach.     Attending: Dr Oxana URRUTIA Fellow: Dr Ruiz  Procedure Date:9/9/22    Pre-operative Diagnosis: Complete AV block with a stable junctional rhythm     Device Indication: CHB  Post-operative diagnosis:   Successful implantation of Dual chamber PPM.  Complications: None.     Fluoroscopy time/dose: please see procedure log       Clinical Profile:  Mr. Birch is a 61-year-old man with a PMHx of a BAV and ascending   aortic aneurysm and single-vessel CAD who underwent AVR along with   ascending aortic hemiarch replacement and CABG x  1 (SVG-PDA) in 2013 and   later developed severe prosthetic aortic valve insufficiency now s/p   mechanical AVR 9/6/22 with course c/b complete AVB with junctional escape.   EP consulted for management of complete AVB.    Here for a pacemaker device today    PROCEDURE  The risks and benefits of the procedure were explained to the patient in   full.  The risks of the procedure include, but are not limited to: pain,   bleeding, blood transfusion reactions, infection, pneumothorax,   perforation of vessels or heart, pericardial effusion, and death. Informed   Consent was obtained. The patient was brought to the EP lab in a fasting   and hemodynamically stable condition. The patient was prepped and draped   in a sterile fashion.     Sedation: This procedure was performed under moderate sedation under the   supervision of the the Staff Physician. The patient was assessed   immediately prior to administering sedation. The patient received Versed   and Fentanyl. Heart rate, blood pressure, oxygen saturation, and patient   responses were monitored throughout the procedure with the assistance of   the RN.     The left chest wall was vigorously washed, prepped, and sterilely draped.   The chest wall was anesthetized with 2% lidocaine.     A 5 cm incision was made at the deltopectoral groove. The subcutaneous   tissue was carefully dissected down to the pectoral fascia. The dissection   was carried inferiorly to form a subcutaneous pocket with adequate   hemostasis provided by electrocautery. The Cephalic vein was identified   and a Cephalic vein Cut down approach was performed. and two guidewires   were inserted down into the level of the IVC, and 2 sheaths were inserted   over the guidewires. The guidewire and dilator was removed. A right   ventricular lead was inserted through the sheath down to the RV septum,   apical area and was screwed into the myocardium. There was very good   sensing and pacing threshold at this  position. Ten volt Pacing was   performed without diaphragmatic stimulation. A right atrial lead was   inserted through the sheath and positioned at the RA appendage. The lead   was screwed into the myocardium. There was very good sensing and pacing   threshold at this position. Ten volt pacing did not produce diaphragmatic   stimulation. both sheaths were peeled away. Both leads were tied over the   cephalic vein and the sleeve adapters were advanced over the leads. The   leads were then secured to the muscle using 0-Ethibond suture.     The pulse generator and the lead were inserted into the subcutaneous   pocket and secured with a 0-Ethibond suture.Then pocket was then   vigorously washed with antibiotic solution. Flowseal was used. The pocket   was then closed in 3 layers using 2-0 Vicryl for the deep layers and 4-0   Vicryl for the subcuticular layer. Steri-Strips and a dressing were then   applied over the incision site, and the patient was transported to a   monitored bed.    Dr. Daigle was present throughout the entire procedure.    I was present during the key portions of the procedure and I was   immediately available for the entire procedure. - Dr. Edwin Daigle      PLAN  1. Wound care.  2. Antibiotics: oral antibiotics for 5 days.  3. CXR and Device interrogation in a.m.   XR Chest Port 1 View    Narrative    Exam: XR CHEST PORT 1 VIEW, 9/9/2022 1:44 PM    Comparison: 9/8/2022    History: s/p AVR    Findings:  AP portable semiupright view of the chest. Left chest wall cardiac  pacemaker. Stable position right IJ [place] catheter with tip  overlying the proximal main pulmonary artery. Intact median sternotomy  wires. Mediastinal drains.    Trachea is midline. Stable cardiomediastinal silhouette. Unchanged  pneumopericardium. Diffuse interstitial opacities. No pneumothorax.  Small bilateral pleural effusions.       Impression    Impression:   1. Improved diffuse interstitial opacities likely  representing  improving pulmonary edema.  2. Unchanged pneumopericardium.  3. Small bilateral pleural effusions.    I have personally reviewed the examination and initial interpretation  and I agree with the findings.    TMO HERNANDEZ DO         SYSTEM ID:  [id number]       =========================================

## 2022-09-10 NOTE — PLAN OF CARE
See flowsheets for vital signs, hemodynamics, and detailed assessment.    Major Shift Updates/Changes: Remains SR with BBB entire shift. 3L NC overnight. One loose stool. Able to urinate after caal was pulled. Reports pain on incision sites, tylenol, robaxin, and oxy 5 mg given with relief. Potassium and phos replaced.     Katie Xavier Rn  Shift cared for: [id number]

## 2022-09-10 NOTE — PROCEDURES
[facility]    Double Lumen PICC Placement    Date/Time: 9/10/2022 5:45 PM  Performed by: Cecilia Coon RN  Authorized by: Wilfrido Lancaster MD   Indications: Dobutamine.      UNIVERSAL PROTOCOL   Site Marked: Yes  Prior Images Obtained and Reviewed:  Yes  Required items: Required blood products, implants, devices and special equipment available    Patient identity confirmed:  Verbally with patient and arm band  NA - No sedation, light sedation, or local anesthesia  Confirmation Checklist:  Patient's identity using two indicators, relevant allergies, procedure was appropriate and matched the consent or emergent situation and correct equipment/implants were available  Time out: Immediately prior to the procedure a time out was called    Universal Protocol: the Joint Commission Universal Protocol was followed    Preparation: Patient was prepped and draped in usual sterile fashion       ANESTHESIA    Anesthesia: See MAR for details  Local Anesthetic:  Lidocaine 1% without epinephrine  Anesthetic Total (mL):  3      SEDATION    Patient Sedated: No        Preparation: skin prepped with ChloraPrep  Skin prep agent: skin prep agent completely dried prior to procedure  Sterile barriers: maximum sterile barriers were used: cap, mask, sterile gown, sterile gloves, and large sterile sheet  Hand hygiene: hand hygiene performed prior to central venous catheter insertion  Type of line used: Power PICC  Catheter type: double lumen  Lumen type: non-valved  Catheter size: 5 Fr  Brand: Bard  Lot number: [id number]  Placement method: venipuncture, MST, ultrasound and tip navigation system  Number of attempts: 1  Difficulty threading catheter: no  Successful placement: yes  Orientation: right    Location: brachial vein (lateral) (vein diameter-0.57cm)  Site rationale: left pacemaker  Arm circumference: adults 10 cm  Extremity circumference: 27  Visible catheter length: 1  Total catheter  length: 42  Dressing and securement: alcohol impregnated caps, blood cleaned with CHG, chlorhexidine disc applied, glue, site cleansed, statlock, sterile dressing applied and transparent dressing  Post procedure assessment: blood return through all ports, free fluid flow and placement verified by x-ray  PROCEDURE   Patient Tolerance:  Patient tolerated the procedure well with no immediate complicationsDescribe Procedure: PICC placement will be verified by CXR, pending.

## 2022-09-10 NOTE — PROGRESS NOTES
09/10/22 0900   Quick Adds   Type of Visit Initial Occupational Therapy Evaluation   Living Environment   People in Home spouse   Current Living Arrangements house   Home Accessibility stairs to enter home   Number of Stairs, Main Entrance 6   Stair Railings, Main Entrance railings on both sides of stairs   Transportation Anticipated car, drives self;family or friend will provide   Self-Care   Usual Activity Tolerance good   Current Activity Tolerance moderate   Equipment Currently Used at Home none   Fall history within last six months no   Instrumental Activities of Daily Living (IADL)   IADL Comments Pt was ind, family can assist prn   General Information   Onset of Illness/Injury or Date of Surgery 09/09/22   Patient/Family Therapy Goal Statement (OT) to discharge home   Additional Occupational Profile Info/Pertinent History of Current Problem Maximino Birch is a 61 year old male with PMH of COPD on home O2 (3L/min at night), HFrEF (EF 40%), aortic stenosis, bicuspid aortic valve, ascending aortic arch aneurysm and single-vessel CAD s/p AVR with bovine valve, arch repair and CABGx1 SVG to RCA in 2013. He is now s/p redo sternotomy and AVR with On-X valve on 9/6 with Dr Hogue.   Existing Precautions/Restrictions sternal;pacemaker  (pacemaker placed 9/9)   Left Upper Extremity (Weight-bearing Status)   (10#, pacer precautions)   Right Upper Extremity (Weight-bearing Status)   (10#)   Cognitive Status Examination   Cognitive Status Comments no concerns at this time   Visual Perception   Impact of Vision Impairment on Function (Vision) no acute changes noted   Range of Motion Comprehensive   Comment, General Range of Motion WFL w/in sternal/pacer precautions   Strength Comprehensive (MMT)   Comment, General Manual Muscle Testing (MMT) Assessment NT 2/2 precuations, pt overall deconditioned   Coordination   Coordination Comments WFL   Bed Mobility   Comment (Bed Mobility) Pt attempting supine> EOB w/ max A  required OT to assist pt to return to supine and educate for precautions   Transfers   Transfer Comments min-mod A x2 STS from EOB   Balance   Balance Comments CGA   Upper Body Dressing Assessment/Training   Comment, (Upper Body Dressing) per clinical judgement mod A   Lower Body Dressing Assessment/Training   Comment, (Lower Body Dressing) mod A donning pants   Toileting   Comment, (Toileting) OT: pt unable to stand from BSC prior to education on proper precuations/techniques, prior to education max A x1   Clinical Impression   Criteria for Skilled Therapeutic Interventions Met (OT) Yes, treatment indicated   OT Diagnosis decreased ind in I/ADLS   OT Problem List-Impairments impacting ADL problems related to;activity tolerance impaired;balance;mobility;strength;pain;post-surgical precautions   ADL comments/analysis decreased ind in I/ADLS   Assessment of Occupational Performance 1-3 Performance Deficits   Planned Therapy Interventions (OT) ADL retraining;IADL retraining;balance training;bed mobility training;strengthening;transfer training;home program guidelines;progressive activity/exercise;risk factor education   Clinical Decision Making Complexity (OT) low complexity   Risk & Benefits of therapy have been explained evaluation/treatment results reviewed;care plan/treatment goals reviewed;patient;spouse/significant other;daughter   OT Discharge Planning   OT Discharge Recommendation (DC Rec) Transitional Care Facility;home with assist;home with outpatient cardiac rehab   OT Rationale for DC Rec If pt were to discharge today would require TCU 2/2 A w/ transfers and ADLS, Per clinical judgement, antiipate pt will be appropriate to discharge home w/ OP CR   Total Evaluation Time (Minutes)   Total Evaluation Time (Minutes) 11   OT Goals   Therapy Frequency (OT) Daily   OT Predicted Duration/Target Date for Goal Attainment 09/25/22   OT Goals Hygiene/Grooming;Upper Body Dressing;Lower Body Dressing;Bed  Mobility;Transfers;Toilet Transfer/Toileting;Cardiac Phase 1   OT: Hygiene/Grooming modified independent   OT: Upper Body Dressing Modified independent   OT: Lower Body Dressing Modified independent   OT: Bed Mobility Modified independent   OT: Transfer Modified independent   OT: Toilet Transfer/Toileting Modified independent   OT: Understanding of cardiac education to maximize quality of life, condition management, and health outcomes Patient   OT: Perform aerobic activity with stable cardiovascular response continuous;25 minutes   OT: Functional/aerobic ambulation tolerance with stable cardiovascular response in order to return to home and community environment Modified independent;Greater than 300 feet

## 2022-09-10 NOTE — PLAN OF CARE
Major Shift Events: Pt A/Ox4. Paced vs intrinsic rhythm - per [employer] rep, pacemaker changed to VVI @ 60. O2 weaned to 1L NC. Pain managed with scheduled regimen + PRN Tylenol. See flowsheets for HAILEE #s. Dobutamine continues @ 2.5. Echo completed. Started on PTA Torsemide, adequate UOP. PICC line placed at bedside. Worked with OT, up to chair. Pt visited by wife and dghtrs.  Plan: Continue to monitor pt, hemodynamics, etc.Tentatively plan for pacemaker fix on Monday?  For vital signs and complete assessments, please see documentation flowsheets.

## 2022-09-10 NOTE — PLAN OF CARE
See flowsheets for vital signs, hemodynamics, and detailed assessment.    Major Shift Updates/Changes: Remains SR with BBB with intermittent V paced at times. 3L NC overnight. One loose stool. Able to urinate after caal was pulled. Reports pain on incision sites, tylenol, robaxin, and oxy 5 mg given with relief.     Katie Xavier Rn  Shift cared for: [phone]

## 2022-09-11 ENCOUNTER — APPOINTMENT (OUTPATIENT)
Dept: OCCUPATIONAL THERAPY | Facility: CLINIC | Age: 62
End: 2022-09-11
Attending: SURGERY
Payer: COMMERCIAL

## 2022-09-11 LAB
ALBUMIN SERPL BCG-MCNC: 2.7 G/DL (ref 3.5–5.2)
ALP SERPL-CCNC: 80 U/L (ref 40–129)
ALT SERPL W P-5'-P-CCNC: 15 U/L (ref 10–50)
ANION GAP SERPL CALCULATED.3IONS-SCNC: 5 MMOL/L (ref 7–15)
AST SERPL W P-5'-P-CCNC: 29 U/L (ref 10–50)
BASE EXCESS BLDV CALC-SCNC: 10.2 MMOL/L (ref -7.7–1.9)
BASE EXCESS BLDV CALC-SCNC: 9.5 MMOL/L (ref -7.7–1.9)
BASE EXCESS BLDV CALC-SCNC: 9.6 MMOL/L (ref -7.7–1.9)
BILIRUB SERPL-MCNC: 0.7 MG/DL
BUN SERPL-MCNC: 17 MG/DL (ref 8–23)
CA-I BLD-MCNC: 4.5 MG/DL (ref 4.4–5.2)
CALCIUM SERPL-MCNC: 8 MG/DL (ref 8.6–10)
CHLORIDE SERPL-SCNC: 98 MMOL/L (ref 98–107)
CREAT SERPL-MCNC: 0.69 MG/DL (ref 0.67–1.17)
DEPRECATED HCO3 PLAS-SCNC: 33 MMOL/L (ref 22–29)
ERYTHROCYTE [DISTWIDTH] IN BLOOD BY AUTOMATED COUNT: 15 % (ref 10–15)
GFR SERPL CREATININE-BSD FRML MDRD: >90 ML/MIN/1.73M2
GLUCOSE BLDC GLUCOMTR-MCNC: 106 MG/DL (ref 70–99)
GLUCOSE BLDC GLUCOMTR-MCNC: 107 MG/DL (ref 70–99)
GLUCOSE BLDC GLUCOMTR-MCNC: 118 MG/DL (ref 70–99)
GLUCOSE BLDC GLUCOMTR-MCNC: 123 MG/DL (ref 70–99)
GLUCOSE BLDC GLUCOMTR-MCNC: 94 MG/DL (ref 70–99)
GLUCOSE SERPL-MCNC: 112 MG/DL (ref 70–99)
HCO3 BLDV-SCNC: 34 MMOL/L (ref 21–28)
HCT VFR BLD AUTO: 23.1 % (ref 40–53)
HGB BLD-MCNC: 7.4 G/DL (ref 13.3–17.7)
HGB BLD-MCNC: 8.1 G/DL (ref 13.3–17.7)
HGB BLD-MCNC: 8.1 G/DL (ref 13.3–17.7)
INR PPP: 1.87 (ref 0.85–1.15)
MAGNESIUM SERPL-MCNC: 2.1 MG/DL (ref 1.7–2.3)
MCH RBC QN AUTO: 31.1 PG (ref 26.5–33)
MCHC RBC AUTO-ENTMCNC: 32 G/DL (ref 31.5–36.5)
MCV RBC AUTO: 97 FL (ref 78–100)
O2/TOTAL GAS SETTING VFR VENT: 1 %
OXYHGB MFR BLDV: 57 % (ref 70–75)
OXYHGB MFR BLDV: 58 % (ref 70–75)
OXYHGB MFR BLDV: 81 % (ref 70–75)
PCO2 BLDV: 40 MM HG (ref 40–50)
PCO2 BLDV: 46 MM HG (ref 40–50)
PCO2 BLDV: 48 MM HG (ref 40–50)
PH BLDV: 7.46 [PH] (ref 7.32–7.43)
PH BLDV: 7.48 [PH] (ref 7.32–7.43)
PH BLDV: 7.54 [PH] (ref 7.32–7.43)
PHOSPHATE SERPL-MCNC: 2.8 MG/DL (ref 2.5–4.5)
PLATELET # BLD AUTO: 103 10E3/UL (ref 150–450)
PO2 BLDV: 32 MM HG (ref 25–47)
PO2 BLDV: 32 MM HG (ref 25–47)
PO2 BLDV: 67 MM HG (ref 25–47)
POTASSIUM SERPL-SCNC: 3.6 MMOL/L (ref 3.4–5.3)
PROT SERPL-MCNC: 4.8 G/DL (ref 6.4–8.3)
RBC # BLD AUTO: 2.38 10E6/UL (ref 4.4–5.9)
SODIUM SERPL-SCNC: 136 MMOL/L (ref 136–145)
WBC # BLD AUTO: 5.2 10E3/UL (ref 4–11)

## 2022-09-11 PROCEDURE — 82805 BLOOD GASES W/O2 SATURATION: CPT | Performed by: SURGERY

## 2022-09-11 PROCEDURE — 82330 ASSAY OF CALCIUM: CPT | Performed by: PHYSICIAN ASSISTANT

## 2022-09-11 PROCEDURE — 250N000013 HC RX MED GY IP 250 OP 250 PS 637: Performed by: PHYSICIAN ASSISTANT

## 2022-09-11 PROCEDURE — 85027 COMPLETE CBC AUTOMATED: CPT | Performed by: STUDENT IN AN ORGANIZED HEALTH CARE EDUCATION/TRAINING PROGRAM

## 2022-09-11 PROCEDURE — 99232 SBSQ HOSP IP/OBS MODERATE 35: CPT | Mod: 24 | Performed by: ANESTHESIOLOGY

## 2022-09-11 PROCEDURE — 250N000013 HC RX MED GY IP 250 OP 250 PS 637: Performed by: STUDENT IN AN ORGANIZED HEALTH CARE EDUCATION/TRAINING PROGRAM

## 2022-09-11 PROCEDURE — 999N000157 HC STATISTIC RCP TIME EA 10 MIN

## 2022-09-11 PROCEDURE — 999N000155 HC STATISTIC RAPCV CVP MONITORING

## 2022-09-11 PROCEDURE — 82040 ASSAY OF SERUM ALBUMIN: CPT | Performed by: STUDENT IN AN ORGANIZED HEALTH CARE EDUCATION/TRAINING PROGRAM

## 2022-09-11 PROCEDURE — 250N000013 HC RX MED GY IP 250 OP 250 PS 637: Performed by: SURGERY

## 2022-09-11 PROCEDURE — 80053 COMPREHEN METABOLIC PANEL: CPT | Performed by: STUDENT IN AN ORGANIZED HEALTH CARE EDUCATION/TRAINING PROGRAM

## 2022-09-11 PROCEDURE — 200N000002 HC R&B ICU UMMC

## 2022-09-11 PROCEDURE — 250N000011 HC RX IP 250 OP 636: Performed by: STUDENT IN AN ORGANIZED HEALTH CARE EDUCATION/TRAINING PROGRAM

## 2022-09-11 PROCEDURE — 97530 THERAPEUTIC ACTIVITIES: CPT | Mod: GO | Performed by: OCCUPATIONAL THERAPIST

## 2022-09-11 PROCEDURE — 83735 ASSAY OF MAGNESIUM: CPT | Performed by: STUDENT IN AN ORGANIZED HEALTH CARE EDUCATION/TRAINING PROGRAM

## 2022-09-11 PROCEDURE — 999N000015 HC STATISTIC ARTERIAL MONITORING DAILY

## 2022-09-11 PROCEDURE — 999N000045 HC STATISTIC DAILY SWAN MONITORING

## 2022-09-11 PROCEDURE — 85018 HEMOGLOBIN: CPT | Performed by: STUDENT IN AN ORGANIZED HEALTH CARE EDUCATION/TRAINING PROGRAM

## 2022-09-11 PROCEDURE — 84100 ASSAY OF PHOSPHORUS: CPT | Performed by: SURGERY

## 2022-09-11 PROCEDURE — 85610 PROTHROMBIN TIME: CPT | Performed by: STUDENT IN AN ORGANIZED HEALTH CARE EDUCATION/TRAINING PROGRAM

## 2022-09-11 PROCEDURE — 250N000013 HC RX MED GY IP 250 OP 250 PS 637

## 2022-09-11 RX ORDER — CEPHALEXIN 500 MG/1
500 CAPSULE ORAL EVERY 6 HOURS SCHEDULED
Status: COMPLETED | OUTPATIENT
Start: 2022-09-11 | End: 2022-09-13

## 2022-09-11 RX ORDER — WARFARIN SODIUM 2.5 MG/1
2.5 TABLET ORAL
Status: COMPLETED | OUTPATIENT
Start: 2022-09-11 | End: 2022-09-11

## 2022-09-11 RX ORDER — POTASSIUM CHLORIDE 750 MG/1
20 TABLET, EXTENDED RELEASE ORAL ONCE
Status: COMPLETED | OUTPATIENT
Start: 2022-09-11 | End: 2022-09-11

## 2022-09-11 RX ADMIN — METHOCARBAMOL 750 MG: 750 TABLET, FILM COATED ORAL at 00:13

## 2022-09-11 RX ADMIN — ACETAMINOPHEN 650 MG: 325 TABLET, FILM COATED ORAL at 16:57

## 2022-09-11 RX ADMIN — ATORVASTATIN CALCIUM 80 MG: 80 TABLET, FILM COATED ORAL at 21:02

## 2022-09-11 RX ADMIN — CEPHALEXIN 500 MG: 500 CAPSULE ORAL at 23:13

## 2022-09-11 RX ADMIN — DOBUTAMINE HYDROCHLORIDE 2.5 MCG/KG/MIN: 200 INJECTION INTRAVENOUS at 00:17

## 2022-09-11 RX ADMIN — ACETAMINOPHEN 650 MG: 325 TABLET, FILM COATED ORAL at 21:02

## 2022-09-11 RX ADMIN — CEPHALEXIN 500 MG: 500 CAPSULE ORAL at 18:15

## 2022-09-11 RX ADMIN — FLUTICASONE FUROATE AND VILANTEROL TRIFENATATE 1 PUFF: 200; 25 POWDER RESPIRATORY (INHALATION) at 10:03

## 2022-09-11 RX ADMIN — ACETAMINOPHEN 650 MG: 325 TABLET, FILM COATED ORAL at 00:13

## 2022-09-11 RX ADMIN — SENNOSIDES AND DOCUSATE SODIUM 1 TABLET: 8.6; 5 TABLET ORAL at 21:02

## 2022-09-11 RX ADMIN — SODIUM CHLORIDE, PRESERVATIVE FREE 5 ML: 5 INJECTION INTRAVENOUS at 18:16

## 2022-09-11 RX ADMIN — GABAPENTIN 100 MG: 100 CAPSULE ORAL at 21:02

## 2022-09-11 RX ADMIN — GABAPENTIN 100 MG: 100 CAPSULE ORAL at 08:24

## 2022-09-11 RX ADMIN — CEPHALEXIN 500 MG: 500 CAPSULE ORAL at 08:24

## 2022-09-11 RX ADMIN — TORSEMIDE 60 MG: 20 TABLET ORAL at 08:25

## 2022-09-11 RX ADMIN — OXYCODONE HYDROCHLORIDE 5 MG: 5 TABLET ORAL at 00:13

## 2022-09-11 RX ADMIN — METHOCARBAMOL 750 MG: 750 TABLET, FILM COATED ORAL at 21:02

## 2022-09-11 RX ADMIN — GABAPENTIN 100 MG: 100 CAPSULE ORAL at 15:12

## 2022-09-11 RX ADMIN — DOBUTAMINE HYDROCHLORIDE 2.5 MCG/KG/MIN: 200 INJECTION INTRAVENOUS at 08:40

## 2022-09-11 RX ADMIN — SENNOSIDES AND DOCUSATE SODIUM 1 TABLET: 8.6; 5 TABLET ORAL at 08:24

## 2022-09-11 RX ADMIN — ASPIRIN 81 MG CHEWABLE TABLET 81 MG: 81 TABLET CHEWABLE at 08:24

## 2022-09-11 RX ADMIN — PANTOPRAZOLE SODIUM 40 MG: 40 TABLET, DELAYED RELEASE ORAL at 08:24

## 2022-09-11 RX ADMIN — CEPHALEXIN 500 MG: 500 CAPSULE ORAL at 13:02

## 2022-09-11 RX ADMIN — POLYETHYLENE GLYCOL 3350 17 G: 17 POWDER, FOR SOLUTION ORAL at 08:24

## 2022-09-11 RX ADMIN — ACETAMINOPHEN 650 MG: 325 TABLET, FILM COATED ORAL at 10:03

## 2022-09-11 RX ADMIN — POTASSIUM CHLORIDE 20 MEQ: 750 TABLET, EXTENDED RELEASE ORAL at 06:40

## 2022-09-11 RX ADMIN — WARFARIN SODIUM 2.5 MG: 2.5 TABLET ORAL at 18:15

## 2022-09-11 ASSESSMENT — ACTIVITIES OF DAILY LIVING (ADL)
ADLS_ACUITY_SCORE: 26

## 2022-09-11 NOTE — PROGRESS NOTES
CV ICU PROGRESS NOTE      CO-MORBIDITIES:   Nonrheumatic aortic valve insufficiency  (primary encounter diagnosis)  S/P AVR    ASSESSMENT: Maximino Birch is a 61 year old male with PMH of COPD on home O2 (3L/min at night), HFrEF (EF 40%), aortic stenosis, bicuspid aortic valve, ascending aortic arch aneurysm and single-vessel CAD s/p AVR with bovine valve, arch repair and CABGx1 SVG to RCA in 2013. He is now s/p redo sternotomy and AVR with On-X valve on 9/6 with Dr Hogue.    Last 24 hours:  No acute events overnight.    Today's Progress:  Remove unnecessary lines. Keep arterial line until he goes to floors. Transfer order in.    PLAN:  Neuro/ pain/ sedation:  - Monitor neurological status. Notify for any acute changes in exam.  - Scheduled Gabapentin 100mg TID, Tylenol  - PRN Tylenol, Dilaudid  - Off sedation    Pulmonary care:   - Extubated on 9/7  - Supplemental oxygen to keep saturation above 92%. Wean supplemental oxygen as tolerated. On NC 1L/min  - Incentive spirometer every 15- 30 minutes when awake.  - PTA Symbicort, Albuterol    Cardiovascular:    - Monitor hemodynamic  - On Dobutamine 2.5mcg/kg/min  - On Aspirin 81 mg, Coumadin and PTA Lipitor  - TTE yesterday showing: EF 30-35%, AVR with On-X valve mean gradient 13.     GI care:   - Advance PO diet as tolerated to regular diet  - PPI  - Bowel regimen with Miralax and Senokot    Fluids/ Electrolytes/ Nutrition:  - Monitor electrolytes and replete as needed  - No indication for parenteral nutrition.    Renal/ Fluid Balance:    # SCARLET on CKD, resolved, likely hemodynamic.  - Goal net even  - On PTA Torsemide  - Avoid hypotension  - Daily weights    Endocrine:    - Insulin sliding scale.  - Glucose goal 110-180    ID/ Antibiotics:  - Completed perioperative Vancomycin on 9/7  - No indication for antibiotics at this time. Continue to monitor leukocytes.   - Blood cultures no growth to date  - Urine culture no growth to date  - Keflex for 5 days  (9/9-9/13) post pacemaker placement    Heme:     - Hemoglobin monitor q8h  - Continue to monitor INR    Prophylaxis:    - Mechanical prophylaxis for DVT  - PPI    Lines/ tubes/ drains:  - Arterial line (9/8/22) - remove  - CVC and [place] (9/6/22) - remove  - Santiago catheter (9/6/22) - remove  - Chest tubes staying (9/6/22)  - PICC line    Disposition:  - Transfer too floor    ICU Checklist performed during rounds    Patient seen, findings and plan discussed with CVICU staff    Leopoldo Ball MD  Anesthesiology CA-1/PGY-3    ==========================================    SUBJECTIVE:   Patient lying comfortably in bed, no acute events overnight. No complaints, reports he is feeling well. Not in distress.    OBJECTIVE:   1. VITAL SIGNS:   Temp:  [97.7  F (36.5  C)-98.6  F (37  C)] 98.6  F (37  C)  Pulse:  [59-80] 60  Resp:  [12-20] 18  MAP:  [58 mmHg-89 mmHg] 84 mmHg  Arterial Line BP: (108-143)/(43-68) 141/62  SpO2:  [91 %-100 %] 96 %  Resp: 18      2. INTAKE/ OUTPUT:   I/O last 3 completed shifts:  In: 1908.8 [P.O.:1490; I.V.:418.8]  Out: 3160 [Urine:2500; Chest Tube:660]    3. PHYSICAL EXAMINATION:   General: NAD  Neuro: AOx3  Resp: Breathing non-labored on 11L/min nasal cannula  CV: RRR  Abdomen: Soft, Non-distended, Non-tender  Incisions: c/d/i  Extremities: warm and well perfused, bilateral lower extremity edema    4. INVESTIGATIONS:   Arterial Blood Gases   Recent Labs   Lab 09/09/22  0342 09/08/22  2224 09/08/22  1236 09/08/22  0643   PH 7.43 7.41 7.39 7.39   PCO2 47* 49* 44 47*   PO2 72* 74* 63* 68*   HCO3 31* 31* 26 29*     Complete Blood Count   Recent Labs   Lab 09/11/22  0354 09/10/22  2034 09/10/22  0953 09/10/22  0400 09/09/22  0343 09/08/22  1237   WBC 5.2  --   --  4.5 11.2* 14.5*   HGB 7.4* 7.9* 8.1* 7.9* 9.0* 9.3*   *  --   --  87* 96* 103*     Basic Metabolic Panel  Recent Labs   Lab 09/11/22  0724 09/11/22  0354 09/11/22  0014 09/10/22  1624 09/10/22  0740 09/10/22  0400 09/09/22  1635  09/09/22  1445 09/09/22  0726 09/09/22  0343 09/08/22 2229 09/08/22 2223   NA  --  136  --   --   --  138  --   --   --  136  --  134*   POTASSIUM  --  3.6  --   --   --  3.6  --  3.7  --  3.8  --  3.7   CHLORIDE  --  98  --   --   --  101  --   --   --  100  --  98   CO2  --  33*  --   --   --  35*  --   --   --  31*  --  31*   BUN  --  17.0  --   --   --  23.4*  --   --   --  35.1*  --  37.1*   CR  --  0.69  --   --   --  0.78  --   --   --  1.25*  --  1.45*   * 112* 123* 96   < > 100*   < >  --    < > 142*   < > 159*    < > = values in this interval not displayed.     Liver Function Tests  Recent Labs   Lab 09/11/22  0354 09/10/22  0400 09/09/22 0343 09/08/22  0406   AST 29 29 41 62*   ALT 15 13 15 23   ALKPHOS 80 69 72 70   BILITOTAL 0.7 0.5 0.6 0.6   ALBUMIN 2.7* 2.5* 3.0* 3.0*   INR 1.87* 2.44* 3.79* 1.58*     Pancreatic Enzymes  No lab results found in last 7 days.  Coagulation Profile  Recent Labs   Lab 09/11/22  0354 09/10/22  0400 09/09/22  0343 09/08/22  0406 09/07/22  0904 09/06/22  1819 09/06/22  1410   INR 1.87* 2.44* 3.79* 1.58*   < > 1.50* 1.72*   PTT  --   --   --   --   --  80* 67*    < > = values in this interval not displayed.         5. RADIOLOGY:   Recent Results (from the past 24 hour(s))   XR Chest Port 1 View    Narrative    Exam: XR CHEST PORT 1 VIEW, 9/10/2022 9:07 AM    Comparison: 9/8/2022, 9/9/2022    History: Status post pacer/ICD    Findings:    AP portable semiupright view of the chest. Left chest wall cardiac  pacemaker. Stable position right IJ [place]-Markell catheter with tip  overlying the proximal main pulmonary artery. Intact median sternotomy  wires. Mediastinal drains.    Trachea is midline. Stable cardiomediastinal silhouette. Unchanged  pneumopericardium. Diffuse interstitial opacities are unchanged, no  dense consolidation. No pneumothorax. Stable small bilateral pleural  effusions.       Impression    Impression:     1. Stable diffuse interstitial opacities likely  representing pulmonary  edema. No acute airspace opacities.  2. Unchanged pneumopericardium.  3. Stable small bilateral pleural effusions.    I have personally reviewed the examination and initial interpretation  and I agree with the findings.    MARY BARRON MD         SYSTEM ID:  [id number]   Echo Limited   Result Value    LVEF  30-35% (moderately reduced)    [facility]    [id number]  [id number]  [id number]  [id number]^SOL     [facility],[place]  Echocardiography Laboratory  [address] [place] [id number]     Name: CAROLE MORRIS  MRN: [id number]  : 1960  Study Date: 09/10/2022 11:48 AM  Age: 61 yrs  Gender: Male  Patient Location: [facility]  Reason For Study: CABG  Ordering Physician: SOL CHONG  Performed By: Latoya Milan RDCS     BSA: 2.2 m2  Height: 72 in  Weight: 217 lb  BP: 140/68 mmHg  ______________________________________________________________________________  Procedure  Limited Echocardiogram with portions of two-dimensional, color and spectral  Doppler performed. Contrast Optison. Technically difficult study.Extremely  difficult acoustic windows despite the use of contrast for endcardial border  definition. Optison (NDC #9900-3135-18) given intravenously. Patient was given  7 ml mixture of 3 ml Optison and 6 ml saline. 3 ml wasted.  ______________________________________________________________________________  Interpretation Summary  Technically difficult study.Extremely difficult acoustic windows despite the  use of contrast for endcardial border definition.  Left ventricular function is decreased. The ejection fraction is 30-35%  (moderately reduced).  The right ventricle is normal size.  Global right ventricular function is normal.  AVR with On-X valve, the valve is well seated with normal Dopper  interrogation. Mean gradient 13 mmHg.  No pericardial effusion is  present.  ______________________________________________________________________________  Left Ventricle  Left ventricular function is decreased. The ejection fraction is 30-35%  (moderately reduced). Left ventricular diastolic function is not assessable.  Abnormal non-specific septal motion is present. Anterolateral wall akinesis is  present. Inferolateral (posterior) wall akinesis is present. Inferior wall  hypokinesis is present. Apical wall hypokinesis is present.     Right Ventricle  The right ventricle is normal size. Global right ventricular function is  normal. A pacemaker lead is noted in the right ventricle.     Mitral Valve  Mild mitral insufficiency is present.     Aortic Valve  AVR with On-X valve, the valve is well seated with normal Dopper  interrogation. Mean gradient 13 mmHg.     Tricuspid Valve  Moderate tricuspid insufficiency is present. The right ventricular systolic  pressure is approximated at 34.1 mmHg plus the right atrial pressure.     Pulmonic Valve  The valve leaflets are not well visualized. On Doppler interrogation, there is  no significant stenosis or regurgitation.     Vessels  The aorta root is normal. The inferior vena cava cannot be assessed.     Pericardium  No pericardial effusion is present.     ______________________________________________________________________________  MMode/2D Measurements & Calculations  LVOT diam: 2.2 cm  LVOT area: 3.8 cm2     Doppler Measurements & Calculations  Ao V2 max: 234.7 cm/sec  Ao max P.0 mmHg  Ao V2 mean: 179.0 cm/sec  Ao mean P.0 mmHg  Ao V2 VTI: 41.0 cm  ZINA(I,D): 1.6 cm2  ZINA(V,D): 1.5 cm2  LV V1 max PG: 3.3 mmHg  LV V1 max: 91.3 cm/sec  LV V1 VTI: 17.3 cm     SV(LVOT): 65.6 ml  SI(LVOT): 29.7 ml/m2  TR max cristofer: 292.0 cm/sec  TR max P.1 mmHg  AV Cristofer Ratio (DI): 0.39  ZINA Index (cm2/m2): 0.72     ______________________________________________________________________________  Report approved by: MD Juan Antonio Trotter  Kristine 09/10/2022 01:29 PM         XR Chest Port 1 View    Narrative    Exam: XR CHEST PORT 1 VIEW, 9/10/2022 6:16 PM    Indication: s/p PICC    Comparison: Same-day    Findings:   Right upper extremity PICC tip at the low SVC. Right internal jugular  [place]-Markell catheter tip projects over the main pulmonary artery. Left  chest wall pacemaker leads are intact and in stable position. Intact  median sternotomy wires. Mediastinal surgical clips. Mediastinal  drains. Stable enlarged cardiac silhouette. The pulmonary vasculature  is indistinct. Continued perihilar and basilar predominant mixed  interstitial and patchy airspace opacities. Small bilateral pleural  effusions, right greater than left. No pneumothorax.      Impression    Impression: Right upper extremity PICC tip at the low SVC.    TOM HERNANDEZ DO         SYSTEM ID:  [id number]       =========================================

## 2022-09-11 NOTE — PLAN OF CARE
See flowsheets for vital signs, hemodynamics, and detailed assessment.    Major Shift Updates/Changes: Remains Sr with BBB and 75% V paced with PVC's. Remains on 1L NC. Reports incsional pain, oxy, robaxin and tylenol given for pain.     Katie Xavier Rn  Shift cared for: 2140 - 0730

## 2022-09-11 NOTE — PLAN OF CARE
Major Shift Events: Pt A/Ox4. VSS on RA, MAPs dropped to 50s when ambulating with slight dizziness reported by pt, recovered quickly. Paced vs intrinsic rhythm. Pain managed with scheduled regimen + PRN Tylenol. Dobutamine continues @ 2.5. MAC/swan removed per orders. Adequate UOP. See flowsheets for CT output - leaking around site, dressing reinforced several times. Up to chair. Pt visited by spouse.   Plan: Continue to monitor pt. Anticipate transfer to  when bed becomes available, art line to be removed prior.  For vital signs and complete assessments, please see documentation flowsheets.

## 2022-09-12 ENCOUNTER — ANCILLARY PROCEDURE (OUTPATIENT)
Dept: CARDIOLOGY | Facility: CLINIC | Age: 62
End: 2022-09-12
Attending: NURSE PRACTITIONER
Payer: COMMERCIAL

## 2022-09-12 ENCOUNTER — APPOINTMENT (OUTPATIENT)
Dept: GENERAL RADIOLOGY | Facility: CLINIC | Age: 62
End: 2022-09-12
Attending: NURSE PRACTITIONER
Payer: COMMERCIAL

## 2022-09-12 ENCOUNTER — APPOINTMENT (OUTPATIENT)
Dept: OCCUPATIONAL THERAPY | Facility: CLINIC | Age: 62
End: 2022-09-12
Attending: SURGERY
Payer: COMMERCIAL

## 2022-09-12 LAB
ALBUMIN SERPL BCG-MCNC: 2.7 G/DL (ref 3.5–5.2)
ALP SERPL-CCNC: 92 U/L (ref 40–129)
ALT SERPL W P-5'-P-CCNC: 23 U/L (ref 10–50)
ANION GAP SERPL CALCULATED.3IONS-SCNC: 3 MMOL/L (ref 7–15)
AST SERPL W P-5'-P-CCNC: 38 U/L (ref 10–50)
ATRIAL RATE - MUSE: 104 BPM
BILIRUB SERPL-MCNC: 0.8 MG/DL
BUN SERPL-MCNC: 15.4 MG/DL (ref 8–23)
CA-I BLD-MCNC: 4.4 MG/DL (ref 4.4–5.2)
CALCIUM SERPL-MCNC: 8.2 MG/DL (ref 8.6–10)
CHLORIDE SERPL-SCNC: 96 MMOL/L (ref 98–107)
CREAT SERPL-MCNC: 0.68 MG/DL (ref 0.67–1.17)
DEPRECATED HCO3 PLAS-SCNC: 34 MMOL/L (ref 22–29)
DIASTOLIC BLOOD PRESSURE - MUSE: NORMAL MMHG
ERYTHROCYTE [DISTWIDTH] IN BLOOD BY AUTOMATED COUNT: 15 % (ref 10–15)
GFR SERPL CREATININE-BSD FRML MDRD: >90 ML/MIN/1.73M2
GLUCOSE BLDC GLUCOMTR-MCNC: 107 MG/DL (ref 70–99)
GLUCOSE BLDC GLUCOMTR-MCNC: 108 MG/DL (ref 70–99)
GLUCOSE BLDC GLUCOMTR-MCNC: 114 MG/DL (ref 70–99)
GLUCOSE BLDC GLUCOMTR-MCNC: 153 MG/DL (ref 70–99)
GLUCOSE SERPL-MCNC: 102 MG/DL (ref 70–99)
HCT VFR BLD AUTO: 23.7 % (ref 40–53)
HGB BLD-MCNC: 7.9 G/DL (ref 13.3–17.7)
HGB BLD-MCNC: 8.1 G/DL (ref 13.3–17.7)
HGB BLD-MCNC: 8.3 G/DL (ref 13.3–17.7)
INR PPP: 1.71 (ref 0.85–1.15)
INTERPRETATION ECG - MUSE: NORMAL
MAGNESIUM SERPL-MCNC: 2.1 MG/DL (ref 1.7–2.3)
MCH RBC QN AUTO: 31.5 PG (ref 26.5–33)
MCHC RBC AUTO-ENTMCNC: 33.3 G/DL (ref 31.5–36.5)
MCV RBC AUTO: 94 FL (ref 78–100)
MDC_IDC_EPISODE_DTM: NORMAL
MDC_IDC_EPISODE_ID: NORMAL
MDC_IDC_EPISODE_TYPE: NORMAL
MDC_IDC_LEAD_IMPLANT_DT: NORMAL
MDC_IDC_LEAD_IMPLANT_DT: NORMAL
MDC_IDC_LEAD_LOCATION: NORMAL
MDC_IDC_LEAD_LOCATION: NORMAL
MDC_IDC_LEAD_LOCATION_DETAIL_1: NORMAL
MDC_IDC_LEAD_LOCATION_DETAIL_1: NORMAL
MDC_IDC_LEAD_MFG: NORMAL
MDC_IDC_LEAD_MFG: NORMAL
MDC_IDC_LEAD_MODEL: NORMAL
MDC_IDC_LEAD_MODEL: NORMAL
MDC_IDC_LEAD_POLARITY_TYPE: NORMAL
MDC_IDC_LEAD_POLARITY_TYPE: NORMAL
MDC_IDC_LEAD_SERIAL: NORMAL
MDC_IDC_LEAD_SERIAL: NORMAL
MDC_IDC_MSMT_BATTERY_DTM: NORMAL
MDC_IDC_MSMT_BATTERY_REMAINING_LONGEVITY: 132 MO
MDC_IDC_MSMT_BATTERY_REMAINING_PERCENTAGE: 100 %
MDC_IDC_MSMT_BATTERY_STATUS: NORMAL
MDC_IDC_MSMT_LEADCHNL_RA_IMPEDANCE_VALUE: 521 OHM
MDC_IDC_MSMT_LEADCHNL_RA_PACING_THRESHOLD_AMPLITUDE: 0.4 V
MDC_IDC_MSMT_LEADCHNL_RA_PACING_THRESHOLD_PULSEWIDTH: 0.4 MS
MDC_IDC_MSMT_LEADCHNL_RV_IMPEDANCE_VALUE: 788 OHM
MDC_IDC_MSMT_LEADCHNL_RV_PACING_THRESHOLD_AMPLITUDE: 0.4 V
MDC_IDC_MSMT_LEADCHNL_RV_PACING_THRESHOLD_PULSEWIDTH: 0.4 MS
MDC_IDC_PG_IMPLANT_DTM: NORMAL
MDC_IDC_PG_MFG: NORMAL
MDC_IDC_PG_MODEL: NORMAL
MDC_IDC_PG_SERIAL: NORMAL
MDC_IDC_PG_TYPE: NORMAL
MDC_IDC_SESS_CLINIC_NAME: NORMAL
MDC_IDC_SESS_DTM: NORMAL
MDC_IDC_SESS_TYPE: NORMAL
MDC_IDC_SET_BRADY_LOWRATE: 60 {BEATS}/MIN
MDC_IDC_SET_BRADY_MODE: NORMAL
MDC_IDC_SET_LEADCHNL_RA_SENSING_ADAPTATION_MODE: NORMAL
MDC_IDC_SET_LEADCHNL_RA_SENSING_POLARITY: NORMAL
MDC_IDC_SET_LEADCHNL_RA_SENSING_SENSITIVITY: 0.5 MV
MDC_IDC_SET_LEADCHNL_RV_PACING_AMPLITUDE: 3.5 V
MDC_IDC_SET_LEADCHNL_RV_PACING_CAPTURE_MODE: NORMAL
MDC_IDC_SET_LEADCHNL_RV_PACING_POLARITY: NORMAL
MDC_IDC_SET_LEADCHNL_RV_PACING_PULSEWIDTH: 0.4 MS
MDC_IDC_SET_LEADCHNL_RV_SENSING_ADAPTATION_MODE: NORMAL
MDC_IDC_SET_LEADCHNL_RV_SENSING_POLARITY: NORMAL
MDC_IDC_SET_LEADCHNL_RV_SENSING_SENSITIVITY: 2.5 MV
MDC_IDC_SET_ZONE_DETECTION_INTERVAL: 375 MS
MDC_IDC_SET_ZONE_TYPE: NORMAL
MDC_IDC_SET_ZONE_VENDOR_TYPE: NORMAL
MDC_IDC_STAT_BRADY_DTM_END: NORMAL
MDC_IDC_STAT_BRADY_DTM_START: NORMAL
MDC_IDC_STAT_BRADY_RA_PERCENT_PACED: 10 %
MDC_IDC_STAT_BRADY_RV_PERCENT_PACED: 51 %
MDC_IDC_STAT_EPISODE_RECENT_COUNT: 0
MDC_IDC_STAT_EPISODE_RECENT_COUNT_DTM_END: NORMAL
MDC_IDC_STAT_EPISODE_RECENT_COUNT_DTM_START: NORMAL
MDC_IDC_STAT_EPISODE_TYPE: NORMAL
MDC_IDC_STAT_EPISODE_VENDOR_TYPE: NORMAL
P AXIS - MUSE: 73 DEGREES
PHOSPHATE SERPL-MCNC: 2.7 MG/DL (ref 2.5–4.5)
PLATELET # BLD AUTO: 149 10E3/UL (ref 150–450)
POTASSIUM SERPL-SCNC: 3.5 MMOL/L (ref 3.4–5.3)
PR INTERVAL - MUSE: NORMAL MS
PROT SERPL-MCNC: 5 G/DL (ref 6.4–8.3)
QRS DURATION - MUSE: 146 MS
QT - MUSE: 426 MS
QTC - MUSE: 482 MS
R AXIS - MUSE: 75 DEGREES
RADIOLOGIST FLAGS: ABNORMAL
RBC # BLD AUTO: 2.51 10E6/UL (ref 4.4–5.9)
SODIUM SERPL-SCNC: 133 MMOL/L (ref 136–145)
SYSTOLIC BLOOD PRESSURE - MUSE: NORMAL MMHG
T AXIS - MUSE: -21 DEGREES
VENTRICULAR RATE- MUSE: 77 BPM
WBC # BLD AUTO: 7.7 10E3/UL (ref 4–11)

## 2022-09-12 PROCEDURE — 85610 PROTHROMBIN TIME: CPT | Performed by: STUDENT IN AN ORGANIZED HEALTH CARE EDUCATION/TRAINING PROGRAM

## 2022-09-12 PROCEDURE — 250N000013 HC RX MED GY IP 250 OP 250 PS 637: Performed by: PHYSICIAN ASSISTANT

## 2022-09-12 PROCEDURE — 71045 X-RAY EXAM CHEST 1 VIEW: CPT

## 2022-09-12 PROCEDURE — 71045 X-RAY EXAM CHEST 1 VIEW: CPT | Mod: 26 | Performed by: RADIOLOGY

## 2022-09-12 PROCEDURE — 97530 THERAPEUTIC ACTIVITIES: CPT | Mod: GO

## 2022-09-12 PROCEDURE — 83735 ASSAY OF MAGNESIUM: CPT | Performed by: STUDENT IN AN ORGANIZED HEALTH CARE EDUCATION/TRAINING PROGRAM

## 2022-09-12 PROCEDURE — 250N000013 HC RX MED GY IP 250 OP 250 PS 637: Performed by: STUDENT IN AN ORGANIZED HEALTH CARE EDUCATION/TRAINING PROGRAM

## 2022-09-12 PROCEDURE — 250N000013 HC RX MED GY IP 250 OP 250 PS 637: Performed by: NURSE PRACTITIONER

## 2022-09-12 PROCEDURE — 93280 PM DEVICE PROGR EVAL DUAL: CPT

## 2022-09-12 PROCEDURE — 250N000012 HC RX MED GY IP 250 OP 636 PS 637: Performed by: STUDENT IN AN ORGANIZED HEALTH CARE EDUCATION/TRAINING PROGRAM

## 2022-09-12 PROCEDURE — 82330 ASSAY OF CALCIUM: CPT | Performed by: PHYSICIAN ASSISTANT

## 2022-09-12 PROCEDURE — 250N000013 HC RX MED GY IP 250 OP 250 PS 637: Performed by: SURGERY

## 2022-09-12 PROCEDURE — 93280 PM DEVICE PROGR EVAL DUAL: CPT | Mod: 26 | Performed by: INTERNAL MEDICINE

## 2022-09-12 PROCEDURE — 80053 COMPREHEN METABOLIC PANEL: CPT | Performed by: STUDENT IN AN ORGANIZED HEALTH CARE EDUCATION/TRAINING PROGRAM

## 2022-09-12 PROCEDURE — 200N000002 HC R&B ICU UMMC

## 2022-09-12 PROCEDURE — 999N000015 HC STATISTIC ARTERIAL MONITORING DAILY

## 2022-09-12 PROCEDURE — 250N000011 HC RX IP 250 OP 636: Performed by: PHYSICIAN ASSISTANT

## 2022-09-12 PROCEDURE — 85018 HEMOGLOBIN: CPT | Performed by: STUDENT IN AN ORGANIZED HEALTH CARE EDUCATION/TRAINING PROGRAM

## 2022-09-12 PROCEDURE — 84100 ASSAY OF PHOSPHORUS: CPT | Performed by: STUDENT IN AN ORGANIZED HEALTH CARE EDUCATION/TRAINING PROGRAM

## 2022-09-12 PROCEDURE — 97535 SELF CARE MNGMENT TRAINING: CPT | Mod: GO

## 2022-09-12 PROCEDURE — 85027 COMPLETE CBC AUTOMATED: CPT | Performed by: STUDENT IN AN ORGANIZED HEALTH CARE EDUCATION/TRAINING PROGRAM

## 2022-09-12 RX ORDER — CEFAZOLIN SODIUM 2 G/100ML
2 INJECTION, SOLUTION INTRAVENOUS
Status: CANCELLED | OUTPATIENT
Start: 2022-09-12

## 2022-09-12 RX ORDER — POTASSIUM CHLORIDE 750 MG/1
20 TABLET, EXTENDED RELEASE ORAL ONCE
Status: COMPLETED | OUTPATIENT
Start: 2022-09-12 | End: 2022-09-12

## 2022-09-12 RX ORDER — WARFARIN SODIUM 3 MG/1
3 TABLET ORAL
Status: COMPLETED | OUTPATIENT
Start: 2022-09-12 | End: 2022-09-12

## 2022-09-12 RX ORDER — TORSEMIDE 20 MG/1
60 TABLET ORAL 2 TIMES DAILY
Status: DISCONTINUED | OUTPATIENT
Start: 2022-09-12 | End: 2022-09-16 | Stop reason: HOSPADM

## 2022-09-12 RX ORDER — SODIUM CHLORIDE 9 MG/ML
INJECTION, SOLUTION INTRAVENOUS CONTINUOUS
Status: CANCELLED | OUTPATIENT
Start: 2022-09-12

## 2022-09-12 RX ORDER — LIDOCAINE 40 MG/G
CREAM TOPICAL
Status: CANCELLED | OUTPATIENT
Start: 2022-09-12

## 2022-09-12 RX ADMIN — SENNOSIDES AND DOCUSATE SODIUM 1 TABLET: 8.6; 5 TABLET ORAL at 20:22

## 2022-09-12 RX ADMIN — WARFARIN SODIUM 3 MG: 3 TABLET ORAL at 17:36

## 2022-09-12 RX ADMIN — ACETAMINOPHEN 650 MG: 325 TABLET, FILM COATED ORAL at 17:36

## 2022-09-12 RX ADMIN — GABAPENTIN 100 MG: 100 CAPSULE ORAL at 20:22

## 2022-09-12 RX ADMIN — GABAPENTIN 100 MG: 100 CAPSULE ORAL at 07:41

## 2022-09-12 RX ADMIN — ACETAMINOPHEN 650 MG: 325 TABLET, FILM COATED ORAL at 07:43

## 2022-09-12 RX ADMIN — TORSEMIDE 60 MG: 20 TABLET ORAL at 16:33

## 2022-09-12 RX ADMIN — INSULIN ASPART 1 UNITS: 100 INJECTION, SOLUTION INTRAVENOUS; SUBCUTANEOUS at 17:31

## 2022-09-12 RX ADMIN — PANTOPRAZOLE SODIUM 40 MG: 40 TABLET, DELAYED RELEASE ORAL at 07:41

## 2022-09-12 RX ADMIN — ASPIRIN 81 MG CHEWABLE TABLET 81 MG: 81 TABLET CHEWABLE at 07:41

## 2022-09-12 RX ADMIN — CEPHALEXIN 500 MG: 500 CAPSULE ORAL at 06:26

## 2022-09-12 RX ADMIN — METHOCARBAMOL 750 MG: 750 TABLET, FILM COATED ORAL at 14:21

## 2022-09-12 RX ADMIN — METHOCARBAMOL 750 MG: 750 TABLET, FILM COATED ORAL at 20:23

## 2022-09-12 RX ADMIN — ATORVASTATIN CALCIUM 80 MG: 80 TABLET, FILM COATED ORAL at 20:23

## 2022-09-12 RX ADMIN — CEPHALEXIN 500 MG: 500 CAPSULE ORAL at 17:36

## 2022-09-12 RX ADMIN — GABAPENTIN 100 MG: 100 CAPSULE ORAL at 14:17

## 2022-09-12 RX ADMIN — TORSEMIDE 60 MG: 20 TABLET ORAL at 20:22

## 2022-09-12 RX ADMIN — CEPHALEXIN 500 MG: 500 CAPSULE ORAL at 12:40

## 2022-09-12 RX ADMIN — FLUTICASONE FUROATE AND VILANTEROL TRIFENATATE 1 PUFF: 200; 25 POWDER RESPIRATORY (INHALATION) at 07:43

## 2022-09-12 RX ADMIN — ACETAMINOPHEN 650 MG: 325 TABLET, FILM COATED ORAL at 21:50

## 2022-09-12 RX ADMIN — POTASSIUM & SODIUM PHOSPHATES POWDER PACK 280-160-250 MG 1 PACKET: 280-160-250 PACK at 06:26

## 2022-09-12 RX ADMIN — TORSEMIDE 60 MG: 20 TABLET ORAL at 07:41

## 2022-09-12 RX ADMIN — SENNOSIDES AND DOCUSATE SODIUM 1 TABLET: 8.6; 5 TABLET ORAL at 07:42

## 2022-09-12 RX ADMIN — POTASSIUM & SODIUM PHOSPHATES POWDER PACK 280-160-250 MG 1 PACKET: 280-160-250 PACK at 12:40

## 2022-09-12 RX ADMIN — POTASSIUM CHLORIDE 20 MEQ: 750 TABLET, EXTENDED RELEASE ORAL at 06:26

## 2022-09-12 RX ADMIN — METHOCARBAMOL 750 MG: 750 TABLET, FILM COATED ORAL at 07:46

## 2022-09-12 RX ADMIN — ACETAMINOPHEN 650 MG: 325 TABLET, FILM COATED ORAL at 12:40

## 2022-09-12 ASSESSMENT — ACTIVITIES OF DAILY LIVING (ADL)
ADLS_ACUITY_SCORE: 23
ADLS_ACUITY_SCORE: 26
ADLS_ACUITY_SCORE: 23
ADLS_ACUITY_SCORE: 23
ADLS_ACUITY_SCORE: 26

## 2022-09-12 NOTE — PLAN OF CARE
Neuro: A&Ox4.   Cardiac: 100% V paced at 50; atrial lead dislodged, device check completed.  Plan for cath lab tomorrow to address atrial lead dislodgement. Dobutamine stopped; normotensive. SBP goal <140.    Respiratory: Sating >92% on RA. CLS. X2 meds with serosanguinous drainage.  GI/: Adequate urine output. BM 9/10  Diet/appetite: Tolerating regular diet. Eating well.  Activity:  Assist of 1, up to chair and in halls.  Pain: At acceptable level on current regimen.  APAP/robaxin   Skin: No new deficits noted.  R vannesa site with serous drainage, site c/d/I.  LDA's:  R radial A-line, double lumen PICC to RUE    Plan: Continue with POC. Notify primary team with changes.

## 2022-09-12 NOTE — PROGRESS NOTES
Cardiovascular Surgery Progress Note  09/12/2022         Assessment and Plan:     Maximino Birch is a 61 year old male with PMH of COPD on home O2 (3L/min at night), HFrEF (EF 40%), aortic stenosis, bicuspid aortic valve, ascending aortic arch aneurysm and single-vessel CAD s/p AVR with bovine valve, arch repair and CABGx1 SVG to RCA in 2013. He is now s/p redo sternotomy and AVR with On-X valve on 9/6 with Dr Hogue.    Cardiovascular:   Hx of bicuspid aortic valve and ascending aortic aneurysm - s/p redo sternotomy, redo AVR with 23 mm OnX mechanical valve, right femoral CPB cannulation  History of previous AVR and ascending aortic hemiarch replacement and CABG x1 to PDA in 2013  Acute on Chronic HFrEF/HFpEF   HLD  Postoperative CHB s/p PPM placement 9/10  No arrhythmias overnight, HD stable, in V-paced rhythm. Currently on dobuta drip at 2.5 mcg, hypertensive  Most recent echo showed LVEF 30-35%, grade III diastolic dysfunction  Postoperative echo with LVEF 35% and normal RV function  - ASA 81 mg daily  - Atorvastatin 80 mg daily  - BB held in the setting on inotropic support  - Hold dobutamine drip this AM, if stable, discontinue  - Diuresis as below  - Lymphedema wraps  - AC as below  - S/P placement of PPM on 9/10, concern for no capture of atrial leads, interrogation on 9/12 demonstrated dislodgement of atrial lead, on schedule to have replaced on 9/15, earlier if opening available    Chest tubes: in to suction, large amount of drainage around insertion sites  TPW: Removed, now PPM in place    Pulmonary:  COPD, FEV1 37%, no home O2 use  Extubated POD 1. Now saturating well on RA.   - Supplemental O2 PRN to keep sats > 92%. Wean off as tolerated.  - Pulm hygiene, IS, activity and deep breathing    Neurology / MSK:  Acute post-operative pain   Pain well controlled with current regimen:  - Acetaminophen, PO oxycodone PRN, IV dilaudid PRN, methocarbamol, gabapentin scheduled     / Renal / Fluid /  Electrolytes:  SCARLET on CKD, resolved  Hypervolemia  BL creat ~ 1.3, most recent creatinine 0.68; adequate UOP.   FLUID STATUS: Pre-op weight 211 lbs, weight today 217 lbs; I/O past 24 hours: -570 mL  Has profound BLE edema, +JVD and is weeping from CT sites  - Diuresis: Increase torsemide from 60 mg daily (PTA dose) to BID with goal negative ~1L in 24h  - Replete lytes per protocol  - Strict I/O, daily weights  - Avoid/limit nephrotoxins as able    GI / Nutrition:   - Tolerating regular diet  - Continue bowel regimen, last BM 9/9    Endocrine:  Stress induced hyperglycemia   Hgb A1c 6.1%  - Initially managed on insulin drip postop, transitioned to sliding scale; goal BG <180 for optimal healing    Infectious Disease:  Stress induced leukocytosis  WBC 7.7, remains afebrile, no signs or symptoms of infection  - Completed perioperative antibiotics  - Continue to monitor fever curve, CBC  - To complete keflex per PPM placement protocol    Hematology:   Acute blood loss anemia and thrombocytopenia  Hgb 7.9; Plt 149, no signs or symptoms of active bleeding  - CBC in AM    Anticoagulation:   - ASA 81 mg daily  - Coumadin for mechanical OnX valve with goal INR 2-3 for first three months then 1.5-2 lifelong thereafter    MSK/Skin:  Sternotomy  Surgical incision  - Sternal precautions  - Incisional cares per protocol    Prophylaxis:   - Stress ulcer prophylaxis: Pantoprazole 40 mg daily  - DVT prophylaxis: IV heparin, warfarin, SCD    Disposition:   - Transfer to  orders in place on 9/11  - Therapies recommending discharge to home with OP CR pending therapeutic INR, diuresis, CT removal and PPM adjustment    Updated Dr. Hogue  Rounded with Jose Bradley and Ophelia Lopez, APRDEBORAH, ACNPC-AG, CCRN  Nurse Practitioner  Cardiothoracic Surgery  Pager: [phone]        Interval History:     No overnight events. CT sites and right groin cannulation site with weeping, large amounts.  States pain is well managed on  current regimen. Slept fairly well overnight.  Tolerating diet, is passing flatus, + BM. No nausea or vomiting.  Breathing well without complaints.   Working with therapies and ambulating in halls with assistance.   Denies chest pain, palpitations, dizziness, syncopal symptoms, fevers, chills, myalgias, or sternal popping/clicking.         Physical Exam:     Gen: A&Ox4, NAD  Neuro: Intact, no focal deficits   CV: RRR, normal S1 S2, no murmurs, rubs or gallops. + JVD  Pulm: CTA, no wheezing or rhonchi, normal breathing on RA  Abd: nondistended, normal BS, soft, nontender  Ext: + profund peripheral edema, 2-3+ pitting  Incision: clean, dry, intact, no erythema, sternum stable  Tubes/drain sites: dressing clean and dry, serosanguinous output, no air leak, no crepitus         Data:    Imaging:  reviewed recent imaging, no acute concerns    No results found for this or any previous visit (from the past 24 hour(s)).     Labs:  Recent Labs   Lab 09/12/22  0806 09/12/22  0439 09/11/22  2116 09/11/22  2114 09/11/22  1152 09/11/22  1149 09/11/22  0724 09/11/22  0354 09/10/22  0740 09/10/22  0400   WBC  --  7.7  --   --   --   --   --  5.2  --  4.5   HGB  --  7.9*  --  8.1*  --  8.1*  --  7.4*   < > 7.9*   MCV  --  94  --   --   --   --   --  97  --  96   PLT  --  149*  --   --   --   --   --  103*  --  87*   INR  --  1.71*  --   --   --   --   --  1.87*  --  2.44*   NA  --  133*  --   --   --   --   --  136  --  138   POTASSIUM  --  3.5  --   --   --   --   --  3.6  --  3.6   CHLORIDE  --  96*  --   --   --   --   --  98  --  101   CO2  --  34*  --   --   --   --   --  33*  --  35*   BUN  --  15.4  --   --   --   --   --  17.0  --  23.4*   CR  --  0.68  --   --   --   --   --  0.69  --  0.78   ANIONGAP  --  3*  --   --   --   --   --  5*  --  2*   ISREAL  --  8.2*  --   --   --   --   --  8.0*  --  8.0*   * 102* 106*  --    < >  --    < > 112*   < > 100*   ALBUMIN  --  2.7*  --   --   --   --   --  2.7*  --  2.5*    PROTTOTAL  --  5.0*  --   --   --   --   --  4.8*  --  4.6*   BILITOTAL  --  0.8  --   --   --   --   --  0.7  --  0.5   ALKPHOS  --  92  --   --   --   --   --  80  --  69   ALT  --  23  --   --   --   --   --  15  --  13   AST  --  38  --   --   --   --   --  29  --  29    < > = values in this interval not displayed.      GLUCOSE:   Recent Labs   Lab 09/12/22  0806 09/12/22  0439 09/11/22  2116 09/11/22  1720 09/11/22  1152 09/11/22  0724   * 102* 106* 94 107* 118*

## 2022-09-12 NOTE — PLAN OF CARE
See flowsheets for vital signs, hemodynamics, and detailed assessment.    Major Shift Updates/Changes: Remains Vpaced entire shift. Copious drainage from chest tube site, dressed with quick clot and pressure type. Reports mild pain on incisional site. Robaxin and tylenol given for pain. Replaced potassium and phos.     Katie Xavier Rn  Shift cared for: 5610 - 8830

## 2022-09-13 ENCOUNTER — APPOINTMENT (OUTPATIENT)
Dept: OCCUPATIONAL THERAPY | Facility: CLINIC | Age: 62
End: 2022-09-13
Attending: NURSE PRACTITIONER
Payer: COMMERCIAL

## 2022-09-13 ENCOUNTER — APPOINTMENT (OUTPATIENT)
Dept: GENERAL RADIOLOGY | Facility: CLINIC | Age: 62
End: 2022-09-13
Attending: NURSE PRACTITIONER
Payer: COMMERCIAL

## 2022-09-13 ENCOUNTER — APPOINTMENT (OUTPATIENT)
Dept: OCCUPATIONAL THERAPY | Facility: CLINIC | Age: 62
End: 2022-09-13
Attending: SURGERY
Payer: COMMERCIAL

## 2022-09-13 LAB
ANION GAP SERPL CALCULATED.3IONS-SCNC: 6 MMOL/L (ref 7–15)
BACTERIA BLD CULT: NO GROWTH
BACTERIA BLD CULT: NO GROWTH
BUN SERPL-MCNC: 14.9 MG/DL (ref 8–23)
CALCIUM SERPL-MCNC: 8.1 MG/DL (ref 8.6–10)
CHLORIDE SERPL-SCNC: 94 MMOL/L (ref 98–107)
CREAT SERPL-MCNC: 0.85 MG/DL (ref 0.67–1.17)
DEPRECATED HCO3 PLAS-SCNC: 34 MMOL/L (ref 22–29)
ERYTHROCYTE [DISTWIDTH] IN BLOOD BY AUTOMATED COUNT: 15.1 % (ref 10–15)
GFR SERPL CREATININE-BSD FRML MDRD: >90 ML/MIN/1.73M2
GLUCOSE BLDC GLUCOMTR-MCNC: 104 MG/DL (ref 70–99)
GLUCOSE BLDC GLUCOMTR-MCNC: 108 MG/DL (ref 70–99)
GLUCOSE BLDC GLUCOMTR-MCNC: 124 MG/DL (ref 70–99)
GLUCOSE BLDC GLUCOMTR-MCNC: 140 MG/DL (ref 70–99)
GLUCOSE SERPL-MCNC: 107 MG/DL (ref 70–99)
HCT VFR BLD AUTO: 26 % (ref 40–53)
HGB BLD-MCNC: 7.8 G/DL (ref 13.3–17.7)
HGB BLD-MCNC: 7.9 G/DL (ref 13.3–17.7)
HGB BLD-MCNC: 8.3 G/DL (ref 13.3–17.7)
INR PPP: 1.6 (ref 0.85–1.15)
MAGNESIUM SERPL-MCNC: 1.9 MG/DL (ref 1.7–2.3)
MCH RBC QN AUTO: 30.5 PG (ref 26.5–33)
MCHC RBC AUTO-ENTMCNC: 31.9 G/DL (ref 31.5–36.5)
MCV RBC AUTO: 96 FL (ref 78–100)
PATH REPORT.COMMENTS IMP SPEC: NORMAL
PATH REPORT.COMMENTS IMP SPEC: NORMAL
PATH REPORT.FINAL DX SPEC: NORMAL
PATH REPORT.GROSS SPEC: NORMAL
PATH REPORT.MICROSCOPIC SPEC OTHER STN: NORMAL
PATH REPORT.RELEVANT HX SPEC: NORMAL
PHOSPHATE SERPL-MCNC: 3.4 MG/DL (ref 2.5–4.5)
PHOTO IMAGE: NORMAL
PLATELET # BLD AUTO: 224 10E3/UL (ref 150–450)
POTASSIUM SERPL-SCNC: 3.4 MMOL/L (ref 3.4–5.3)
POTASSIUM SERPL-SCNC: 3.4 MMOL/L (ref 3.4–5.3)
POTASSIUM SERPL-SCNC: 3.9 MMOL/L (ref 3.4–5.3)
PROCALCITONIN SERPL IA-MCNC: 0.18 NG/ML
RBC # BLD AUTO: 2.72 10E6/UL (ref 4.4–5.9)
SODIUM SERPL-SCNC: 134 MMOL/L (ref 136–145)
WBC # BLD AUTO: 11.6 10E3/UL (ref 4–11)

## 2022-09-13 PROCEDURE — 36592 COLLECT BLOOD FROM PICC: CPT | Performed by: SURGERY

## 2022-09-13 PROCEDURE — 85610 PROTHROMBIN TIME: CPT | Performed by: STUDENT IN AN ORGANIZED HEALTH CARE EDUCATION/TRAINING PROGRAM

## 2022-09-13 PROCEDURE — 71045 X-RAY EXAM CHEST 1 VIEW: CPT | Mod: 26 | Performed by: RADIOLOGY

## 2022-09-13 PROCEDURE — 80048 BASIC METABOLIC PNL TOTAL CA: CPT | Performed by: NURSE PRACTITIONER

## 2022-09-13 PROCEDURE — 250N000013 HC RX MED GY IP 250 OP 250 PS 637: Performed by: PHYSICIAN ASSISTANT

## 2022-09-13 PROCEDURE — 83735 ASSAY OF MAGNESIUM: CPT | Performed by: SURGERY

## 2022-09-13 PROCEDURE — 97140 MANUAL THERAPY 1/> REGIONS: CPT | Mod: GO

## 2022-09-13 PROCEDURE — 120N000003 HC R&B IMCU UMMC

## 2022-09-13 PROCEDURE — 97530 THERAPEUTIC ACTIVITIES: CPT | Mod: GO

## 2022-09-13 PROCEDURE — 250N000011 HC RX IP 250 OP 636: Performed by: STUDENT IN AN ORGANIZED HEALTH CARE EDUCATION/TRAINING PROGRAM

## 2022-09-13 PROCEDURE — 250N000013 HC RX MED GY IP 250 OP 250 PS 637: Performed by: NURSE PRACTITIONER

## 2022-09-13 PROCEDURE — 84145 PROCALCITONIN (PCT): CPT | Performed by: NURSE PRACTITIONER

## 2022-09-13 PROCEDURE — 84100 ASSAY OF PHOSPHORUS: CPT | Performed by: STUDENT IN AN ORGANIZED HEALTH CARE EDUCATION/TRAINING PROGRAM

## 2022-09-13 PROCEDURE — 83735 ASSAY OF MAGNESIUM: CPT | Performed by: STUDENT IN AN ORGANIZED HEALTH CARE EDUCATION/TRAINING PROGRAM

## 2022-09-13 PROCEDURE — 85027 COMPLETE CBC AUTOMATED: CPT | Performed by: STUDENT IN AN ORGANIZED HEALTH CARE EDUCATION/TRAINING PROGRAM

## 2022-09-13 PROCEDURE — 85018 HEMOGLOBIN: CPT | Performed by: STUDENT IN AN ORGANIZED HEALTH CARE EDUCATION/TRAINING PROGRAM

## 2022-09-13 PROCEDURE — 71045 X-RAY EXAM CHEST 1 VIEW: CPT

## 2022-09-13 PROCEDURE — 36592 COLLECT BLOOD FROM PICC: CPT | Performed by: STUDENT IN AN ORGANIZED HEALTH CARE EDUCATION/TRAINING PROGRAM

## 2022-09-13 PROCEDURE — 250N000013 HC RX MED GY IP 250 OP 250 PS 637: Performed by: SURGERY

## 2022-09-13 PROCEDURE — 250N000013 HC RX MED GY IP 250 OP 250 PS 637: Performed by: STUDENT IN AN ORGANIZED HEALTH CARE EDUCATION/TRAINING PROGRAM

## 2022-09-13 PROCEDURE — 84132 ASSAY OF SERUM POTASSIUM: CPT | Performed by: SURGERY

## 2022-09-13 PROCEDURE — 250N000011 HC RX IP 250 OP 636: Performed by: SURGERY

## 2022-09-13 RX ORDER — WARFARIN SODIUM 5 MG/1
5 TABLET ORAL
Status: COMPLETED | OUTPATIENT
Start: 2022-09-13 | End: 2022-09-13

## 2022-09-13 RX ORDER — POTASSIUM CHLORIDE 750 MG/1
40 TABLET, EXTENDED RELEASE ORAL ONCE
Status: COMPLETED | OUTPATIENT
Start: 2022-09-13 | End: 2022-09-13

## 2022-09-13 RX ORDER — MAGNESIUM SULFATE HEPTAHYDRATE 40 MG/ML
2 INJECTION, SOLUTION INTRAVENOUS ONCE
Status: COMPLETED | OUTPATIENT
Start: 2022-09-13 | End: 2022-09-13

## 2022-09-13 RX ORDER — LISINOPRIL 5 MG/1
5 TABLET ORAL DAILY
Status: DISCONTINUED | OUTPATIENT
Start: 2022-09-13 | End: 2022-09-16 | Stop reason: HOSPADM

## 2022-09-13 RX ADMIN — POLYETHYLENE GLYCOL 3350 17 G: 17 POWDER, FOR SOLUTION ORAL at 08:53

## 2022-09-13 RX ADMIN — PANTOPRAZOLE SODIUM 40 MG: 40 TABLET, DELAYED RELEASE ORAL at 08:53

## 2022-09-13 RX ADMIN — CEPHALEXIN 500 MG: 500 CAPSULE ORAL at 05:05

## 2022-09-13 RX ADMIN — ACETAMINOPHEN 650 MG: 325 TABLET, FILM COATED ORAL at 22:16

## 2022-09-13 RX ADMIN — LISINOPRIL 5 MG: 2.5 TABLET ORAL at 11:14

## 2022-09-13 RX ADMIN — Medication 10 ML: at 15:56

## 2022-09-13 RX ADMIN — METHOCARBAMOL 750 MG: 750 TABLET, FILM COATED ORAL at 03:37

## 2022-09-13 RX ADMIN — METHOCARBAMOL 750 MG: 750 TABLET, FILM COATED ORAL at 22:16

## 2022-09-13 RX ADMIN — ASPIRIN 81 MG CHEWABLE TABLET 81 MG: 81 TABLET CHEWABLE at 08:53

## 2022-09-13 RX ADMIN — ACETAMINOPHEN 650 MG: 325 TABLET, FILM COATED ORAL at 17:50

## 2022-09-13 RX ADMIN — WARFARIN SODIUM 5 MG: 5 TABLET ORAL at 17:53

## 2022-09-13 RX ADMIN — GABAPENTIN 100 MG: 100 CAPSULE ORAL at 20:12

## 2022-09-13 RX ADMIN — TORSEMIDE 60 MG: 20 TABLET ORAL at 20:11

## 2022-09-13 RX ADMIN — SENNOSIDES AND DOCUSATE SODIUM 1 TABLET: 8.6; 5 TABLET ORAL at 08:54

## 2022-09-13 RX ADMIN — MAGNESIUM SULFATE IN WATER 2 G: 40 INJECTION, SOLUTION INTRAVENOUS at 09:04

## 2022-09-13 RX ADMIN — POTASSIUM CHLORIDE 40 MEQ: 750 TABLET, EXTENDED RELEASE ORAL at 09:04

## 2022-09-13 RX ADMIN — CEPHALEXIN 500 MG: 500 CAPSULE ORAL at 11:14

## 2022-09-13 RX ADMIN — ATORVASTATIN CALCIUM 80 MG: 80 TABLET, FILM COATED ORAL at 20:12

## 2022-09-13 RX ADMIN — Medication 5 ML: at 13:08

## 2022-09-13 RX ADMIN — GABAPENTIN 100 MG: 100 CAPSULE ORAL at 13:13

## 2022-09-13 RX ADMIN — FLUTICASONE FUROATE AND VILANTEROL TRIFENATATE 1 PUFF: 200; 25 POWDER RESPIRATORY (INHALATION) at 09:00

## 2022-09-13 RX ADMIN — TORSEMIDE 60 MG: 20 TABLET ORAL at 08:54

## 2022-09-13 RX ADMIN — ACETAMINOPHEN 650 MG: 325 TABLET, FILM COATED ORAL at 13:13

## 2022-09-13 RX ADMIN — CEPHALEXIN 500 MG: 500 CAPSULE ORAL at 00:07

## 2022-09-13 RX ADMIN — GABAPENTIN 100 MG: 100 CAPSULE ORAL at 08:54

## 2022-09-13 RX ADMIN — ACETAMINOPHEN 650 MG: 325 TABLET, FILM COATED ORAL at 01:53

## 2022-09-13 RX ADMIN — ACETAMINOPHEN 650 MG: 325 TABLET, FILM COATED ORAL at 09:04

## 2022-09-13 RX ADMIN — CEPHALEXIN 500 MG: 500 CAPSULE ORAL at 17:51

## 2022-09-13 RX ADMIN — POTASSIUM CHLORIDE 40 MEQ: 750 TABLET, EXTENDED RELEASE ORAL at 15:56

## 2022-09-13 RX ADMIN — SENNOSIDES AND DOCUSATE SODIUM 1 TABLET: 8.6; 5 TABLET ORAL at 20:12

## 2022-09-13 RX ADMIN — SODIUM CHLORIDE, PRESERVATIVE FREE 10 ML: 5 INJECTION INTRAVENOUS at 05:15

## 2022-09-13 ASSESSMENT — ACTIVITIES OF DAILY LIVING (ADL)
ADLS_ACUITY_SCORE: 23

## 2022-09-13 NOTE — PLAN OF CARE
Problem: Plan of Care - These are the overarching goals to be used throughout the patient stay.    Goal: Plan of Care Review/Shift Note  Description: The Plan of Care Review/Shift note should be completed every shift.  The Outcome Evaluation is a brief statement about your assessment that the patient is improving, declining, or no change.  This information will be displayed automatically on your shift note.  Outcome: Ongoing, Progressing   Goal Outcome Evaluation:      Major Shift Events:  Alert and oriented x4. Follows commands. Calls appropriately. Pt did not sleep much throughout night. Room air. Paced. VSS. Afebrile. Urinal. Adequate urine output. Pain controlled with PRN meds. CT. NPO at 0000.   Plan: Continue with POC. Notify team of any changes or concerns.   For vital signs and complete assessments, please see documentation flowsheets.

## 2022-09-13 NOTE — PROGRESS NOTES
09/13/22 1500   Quick Adds   Quick Adds Edema   Type of Visit Initial Occupational Therapy Evaluation   Integumentary/Edema   Integumentary/Edema other (describe)   Edema General Information   Onset of Edema   (acute on chronic)   Affected Body Part(s) Left LE;Right LE   Edema Etiology Surgery   Etiology Comments hypervolemia, decreased muscle pump activation   Edema Precaution Comments No known precautions.   General Comments/Previous Edema Treatment/Edema Equipment No edema treatment noted previously.   Edema Examination/Assessment   Skin Condition Pitting;Dryness;Intact   Skin Condition Comments Pt with soft 3+ deep pitting edema present from MTPs to knee crease, feet and ankles bulbous in appearance. Skin dry but intact, scar present on right ankle and foot from previous surgery.   Scar Yes   Ulcerations No   Skin Integrity Intact   Pitting Assessment 3+   Clinical Impression   Criteria for Skilled Therapeutic Interventions Met (OT) Yes, treatment indicated   OT Diagnosis Decreased ADL/IADL-I 2/2 BLE edema.   Edema: Patient Presentation Edema   Influenced by the following impairments decreased strength, flexibility, and ROM in BLEs from significant BLE edema.   Edema: Planned Interventions Gradient compression bandaging;Fit for compression garment   Clinical Decision Making Complexity (OT) low complexity   Risk & Benefits of therapy have been explained evaluation/treatment results reviewed;care plan/treatment goals reviewed;risks/benefits reviewed;current/potential barriers reviewed;participants voiced agreement with care plan;participants included;patient;spouse/significant other   Total Evaluation Time (Minutes)   Total Evaluation Time (Minutes) 3   OT Goals   Therapy Frequency (OT) 4 times/wk  (edema)   OT Predicted Duration/Target Date for Goal Attainment 09/20/22   OT Goals Edema   OT: Edema education to increase ability to manage edema after discharge from the hospital  Patient;Caregiver;Verbalize;Demonstrate;Major;Skin care routine;signs/symptoms of intolerance;wear schedule;limb positioning;garrnet/bandage care;discharge recommendations   OT: Management of edema bandages Patient;Caregiver;Verbalize;Demonstrate;Major;quick wrap;garment(s)

## 2022-09-13 NOTE — DISCHARGE INSTRUCTIONS
Home Care after a Pacemaker Implant    Wound care:  Keep your incision (surgery wound) dry for 3 days.  After 3 days, you may remove the outer bandage.  Keep the strips of tape on.  They will be removed at your clinic visit.  Check for signs of infection each day.  These include increased redness, swelling, drainage, bleeding or a fever over 101 F (38.3 C).  Call us immediately if you see any of these signs.  If there are no signs of infection, you may shower in 3 days.  Do not submerge the incision (in a bath tub, hot tub, or swimming pool) until fully healed.    Pain:   You may have mild to moderate pain for 3 to 5 days.  Take acetaminophen (Tylenol) or ibuprofen (Advil) for the pain.  Call us if the pain is severe or lasts more than 5 days.    Activity:  After 24 hours, slowly return to your normal activities.   Healing will take 4 to 6 weeks.  No driving for 3 days  Avoid climbing a ladder alone.  It is best to stay within 4 feet of the ground.  Avoid anything that may cause rough contact or a hard hit to your chest.  This includes football, hockey, and other contact sports.  For at least 4 weeks:  Do not raise your affected arm above your shoulder.  Do not use your affected arm to push, pull, or lift anything over 10 pounds.  Avoid repetitive upper body activities for 6 weeks (ie: golf, swimming, and weight lifting)    Follow Up Visits:  Return to the clinic in 7 to 10 days to have your device and wound checked.      Telling others about your device:  Before you have any medical tests or treatments, tell the doctors, dentists, and other care providers about your device.  There are a few tests and treatments that may interfere with your device.  (These include MRI, radiation therapy, electrocautery, and others.)  Your care team may need to take special steps to keep you safe.  Before you leave the hospital, you will receive a temporary ID card.  A permanent card will be mailed to you about 6 to 8 weeks later.   Always carry the ID card with you.  It has important details about your device.  You should also get a MedicAlert ID.  Please ask us for a MedicAlert brochure, or go to [email or web address].    Safety near electrical equipment:  All of these are safe to use when in good repair:  Microwaves  Radios  Cordless phone  Remote controls  Small electrical tools  Cell phones: Keep cell phones at least 6 inches from your device.  Do not carry the phone in a pocket near your device.  Security herrera: It is okay to walk through security herrera at the airports and department stores.  Tell airport security that you have a pacemaker.  They should keep the screening wand at least 6 inches from your device.  Full-body scanners are safe.  Avoid the following:   MRI tests in the hospital unless you have a MRI safe pacemaker.          Arc welding, chain saws and high-powered industrial or commercial tools.   Power lines, power plants and large power generators.   Electric body fat scales.   Magnetic mattress pads or pillow.    Questions?  Please call [facility].   Device Nurse:          Business Hours:  [phone]                       After Hours:  [phone]   Choose option 4, then ask for the on-call device nurse at job code [id number].    As we discussed, please establish care with your device clinic in [place] as soon as possible. You will need your incision and device checked in 7 to 10 days (around 9/23/22-9/26/22). Once you are established with your new clinic, we can help you transfer your home monitor. Please don't hesitate to call with any questions or concerns!       [facility]  [facility] - Clinic [address]  [address] [place] [address]  [id number]           AFTER YOU GO HOME FROM YOUR HEART SURGERY  (Redo-sternotomy, redo-aortic valve replacement with 23 mm On-X valve on 9/6 with Dr. Hogue)    You had a sternotomy, avoid lifting anything greater than ten pounds for 4-6  weeks after surgery and then less than 20 pounds for an additional 6 weeks.   Do not reach backwards or use arms to push out of chair.   Do not let people pull on your arms to assist with standing.   Avoid twisting or reaching too far across your body.    Avoid strenuous activities such as bowling, vacuuming, raking, shoveling, golf or tennis for 12 weeks after your surgery.   It is okay to resume sex if you feel comfortable in doing so. You may have to try different positions with your partner.    Splint your chest incision by hugging a pillow or bringing your arms across your chest when coughing or sneezing.     No driving for 4 weeks after surgery or while on pain medication.    Shower or wash your incisions daily with soap and water (or as instructed), pat dry.   Keep wound clean and dry, showers are okay after discharge, but don't let spray hit directly on incision.   No baths or swimming for 1 month.   Cover chest tube sites with dry gauze until they stop draining, then leave open to air. It is not abnormal for chest tube sites to drain yellowish/clear fluid for up to 2-3 weeks after surgery.   Watch for signs of infection: increased redness, tenderness, warmth or any drainage that appears infected (pus like) or is persistent.  Also a temperature > 100.5 F or chills. Call your surgeon or primary care provider's office immediately.   Remove any skin glue left on incisions after 10-14 days. This will not affect your incision and can speed up healing.    Exercise is very important in your recovery. Please follow the guidelines set up for you in your cardiac rehab classes at the hospital. If outpatient cardiac rehab was ordered for you, we highly recommend you participate. If you have problems arranging your cardiac rehab, please call [phone] for all locations, with the exception of [place], please call [phone] and [place], please call [phone].    Avoid sitting for prolonged periods of time, try  "to walk every hour during the day. If you have a leg incision, elevate your leg often when you are not walking.    Check your weight when you get home from the hospital and continue to check it daily through your recovery for at least a month. If you notice a weight gain of 2-3 pounds in a week, notify your primary care physician, cardiologist or surgeon.    Bowel activity may be slow after surgery. If necessary, you may take an over the counter laxative such as Milk of Magnesia or Miralax. You may have stool softeners prescribed (docusate sodium, Senokot). We recommend using stool softeners while using narcotics for pain (oxycodone/percocet, hydrocodone/vicodin, hydromorphone/dilaudid).      Wean OFF of narcotics (oxycodone, dilaudid, hydrocodone) as soon as possible. You should continue taking acetaminophen as long as you have any surgical pain as the first choice for pain control and add narcotics as necessary for pain to be tolerable.      DENTAL VISITS AFTER SURGERY  If you have had your heart valve repaired or replaced, we do not recommend having any dental work done for 6 months and you will need to take an antibiotic prior to dental visits from now on.  Please notify your dentist before any procedure for the proper treatment needed. The antibiotic is taken by mouth one hour prior to visit. This includes routine cleanings.  You can sometimes hear a mechanical valve \"clicking,\" this is normal and not a sign of something wrong.    DO NOT SMOKE.  IF YOU NEED HELP QUITTING, PLEASE TALK WITH YOUR CARDIOLOGIST OR PRIMARY DOCTOR.    You are on a blood thinner, follow the instructions you were given in the hospital and DO NOT SKIP this medication. Try and take it the same time everyday. Your primary care physician or coumadin clinic will manage the dosing. INR goal is 2-3 for the first 3 months. INR can be dropped to 1.5-2 after 3 months post-op.    REGARDING PRESCRIPTION REFILLS.  If you need a refill on your pain " medication contact us to discuss your pain and a possible one time refill.   All other medications will be adjusted, discontinued and re-filled by your primary care physician and/or your cardiologist as they were prior to your surgery. We have given you enough for one to three month with possibly one refill.    POST-OPERATIVE CLINIC VISITS   You will then return to the care of your primary provider and your cardiologist. Future medication refills should come from your PCP or Cardiologist.   You should see your primary care provider in 2-4 weeks after discharge.   It is important to see your cardiologist about 4-6 weeks after discharge.    If you do not hear from a  in 7 days, please call [phone] (choose option 1) and request to be seen with a general cardiologist or someone that you have seen in the past.   If there is a need to return to see CT Surgery please call our  at [phone].    SURGICAL QUESTIONS  Please call Julio George, Carola Messina, Ai Burns or Lucinda Powell with surgical recovery and medication questions, their phone numbers are listed below.  They will assist you with your needs and contact other surgery care team members as indicated.    On weekends or after hours, please call [phone] and ask the  to page the Cardiothoracic Surgery fellow on call.      Thank you,    Your Cardiothoracic Surgery Team   Julio George RN Care Coordinator - [phone]  Carola Messina RN Care Coordinator - [phone]   Lucinda Powell RN Care Coordinator - [phone]   Ai Burns, STACIE Care Coordinator - [phone]

## 2022-09-13 NOTE — PLAN OF CARE
Neuro: A&Ox4. No numbness/tingling.  Cardiac: 100% V paced, epicardial pacer wires removed by NP. VSS.   Respiratory: Sating 92%+ on RA, denies SOB.  GI/: Adequate urine output. BM X2. No N/V  Diet/appetite: Tolerating regular diet. Eating well.  Activity:  SBA to amublate, up to chair and in halls.  Pain:At acceptable level on current regimen. Incision/CT site pain controlled with PRN tylenol  Skin: No new deficits noted. Sternal incision approximated, CDI. L Chest dressing and R groin dressing CDI.   LDA's: R double lumen PICC heparin locked.     Plan: Continue with POC. Notify primary team with changes.       Goal Outcome Evaluation:    Plan of Care Reviewed With: patient

## 2022-09-13 NOTE — PROGRESS NOTES
Cardiovascular Surgery Progress Note  09/13/2022         Assessment and Plan:     Maximino Birch is a 61 year old male with PMH of COPD on home O2 (3L/min at night), HFrEF (EF 40%), aortic stenosis, bicuspid aortic valve, ascending aortic arch aneurysm and single-vessel CAD s/p AVR with bovine valve, arch repair and CABGx1 SVG to RCA in 2013. He is now s/p redo sternotomy and AVR with On-X valve on 9/6 with Dr Hogue.    Cardiovascular:   Hx of bicuspid aortic valve and ascending aortic aneurysm - s/p redo sternotomy, redo AVR with 23 mm OnX mechanical valve, right femoral CPB cannulation  History of previous AVR and ascending aortic hemiarch replacement and CABG x1 to PDA in 2013  Acute on Chronic HFrEF/HFpEF   HLD  Postoperative CHB s/p PPM placement 9/10  No arrhythmias overnight, HD stable, in V-paced rhythm  Most recent echo showed LVEF 30-35%, grade III diastolic dysfunction  Postoperative echo with LVEF 35% and normal RV function  Dobutamine drip turned off on 9/12  - ASA 81 mg daily  - Atorvastatin 80 mg daily  - Will receive PTA Repatha today 9/13 (wife brought from home)  - Resume PTA lisinopril 5 mg daily 9/13  - Hold PTA Toprol for now - need to optimize diuresis and afterload reduction first given recent discontinuation of dobutamine drip  - Diuresis as below  - Lymphedema wraps  - AC as below  - S/P placement of PPM on 9/10, concern for no capture of atrial leads, interrogation on 9/12 demonstrated dislodgement of atrial lead, on schedule to have replaced on 9/15, earlier if opening available    Chest tubes: Meds x3 in to suction, two clotted off removed 9/13, one remains, drainage around insertion sites has resolved with diuresis  TPW: Removed 9/13    Pulmonary:  COPD, FEV1 37%, no home O2 use  Extubated POD 1. Now saturating well on RA.   - Supplemental O2 PRN to keep sats > 92%. Wean off as tolerated.  - Pulm hygiene, IS, activity and deep breathing    Neurology / MSK:  Acute post-operative  pain   Pain well controlled with current regimen:  - Acetaminophen, PO oxycodone PRN, methocarbamol, gabapentin scheduled     / Renal / Fluid / Electrolytes:  SCARLET on CKD, resolved  Hypervolemia  BL creat ~ 1.3, most recent creatinine 0.85; adequate UOP.   FLUID STATUS: Pre-op weight 211 lbs, weight today 207 lbs; I/O past 24 hours: -1.4L  Has profound BLE edema, +JVD and had been weeping from CT sites (improved with increased diuresis)  - Diuresis: continue torsemide 60 mg daily (PTA dose) to BID with goal negative ~1-1.5 L in 24h  - Replete lytes per protocol  - Strict I/O, daily weights  - Avoid/limit nephrotoxins as able    GI / Nutrition:   - Tolerating regular diet  - Continue bowel regimen, last BM 9/9  - Add MoM x2 doses today 9/13    Endocrine:  Stress induced hyperglycemia   Hgb A1c 6.1%  - Initially managed on insulin drip postop, transitioned to sliding scale; goal BG <180 for optimal healing    Infectious Disease:  Stress induced leukocytosis  WBC 7.7-->11.6, remains afebrile, no signs or symptoms of infection  - Completed perioperative antibiotics  - Continue to monitor fever curve, CBC  - To complete keflex per PPM placement protocol  - Procal 0.18 today (9/13), repeat in AM to trend    Hematology:   Acute blood loss anemia and thrombocytopenia  Hgb 8.3; Plt 224, no signs or symptoms of active bleeding  - CBC in AM    Anticoagulation:   - ASA 81 mg daily  - Coumadin for mechanical OnX valve with goal INR 2-3 for first three months then 1.5-2 lifelong thereafter    MSK/Skin:  Sternotomy  Surgical incision  - Sternal precautions  - Incisional cares per protocol    Prophylaxis:   - Stress ulcer prophylaxis: Pantoprazole 40 mg daily  - DVT prophylaxis: warfarin, SCD    Disposition:   - Transfer to  orders in place on 9/11  - Therapies recommending discharge to home with OP CR pending therapeutic INR, diuresis, CT removal and PPM adjustment    Updated Dr. Hogue  Rounded with Keyur Valverde and  MARY Santos, [facility]-, CCRN  Nurse Practitioner  Cardiothoracic Surgery  Pager: [phone]        Interval History:     No overnight events. CT sites and right groin cannulation sites without further weeping with increased diuresis.  States pain is well managed on current regimen. Slept fairly well overnight.  Tolerating diet, is passing flatus, + BM but needs to have another one. No nausea or vomiting.  Breathing well without complaints.   Working with therapies and ambulating in halls with assistance.   Denies chest pain, palpitations, dizziness, syncopal symptoms, fevers, chills, myalgias, or sternal popping/clicking.         Physical Exam:     Gen: A&Ox4, NAD  Neuro: Intact, no focal deficits   CV: RRR, normal S1 S2, no murmurs, rubs or gallops. + JVD  Pulm: CTA, no wheezing or rhonchi, normal breathing on RA  Abd: nondistended, normal BS, soft, nontender  Ext: + profund peripheral edema, 2-3+ pitting  Incision: clean, dry, intact, no erythema, sternum stable  Tubes/drain sites: dressing clean and dry, serosanguinous output, no air leak, no crepitus         Data:    Imaging:  reviewed recent imaging, no acute concerns    Recent Results (from the past 24 hour(s))   XR Chest Port 1 View   Result Value    Radiologist flags Likely dislodged right atrial lead (Urgent)    Narrative    Exam: XR CHEST PORT 1 VIEW, 9/12/2022 11:00 AM    Comparison: 9/10/2022 at 1758, 9/10/2022 at 8:27 AM, 9/9/2022    History: ? RA lead dislodgement    Findings:  AP portable semiupright view of the chest. Patient is rotated.  Interval removal of [place]-Markell catheter. Right upper extremity PICC  with tip in the lower SVC. Mediastinal drains. Intact median  sternotomy wires. Left chest wall pacemaker. The right ventricular  lead appears in stable position. The right atrial lead is now no  longer apically directed and is now parallel with the ventricular lead  projecting over the proximal right ventricle. This may  represent  dislodgment of the right atrial lead.    Trachea is midline. Stable enlarged cardiac silhouette. Slightly  improved perihilar bibasilar interstitial opacities. No pneumothorax.  Mild blunting of bilateral costophrenic angles.       Impression    Impression:   1. Left chest wall cardiac pacemaker with right atrial lead now  directed inferiorly. This likely represents a dislodgment of the right  atrial lead.  2. Improved diffuse interstitial opacities may represent improving  pulmonary edema.  3. Stable small bilateral pleural effusions.    [Urgent Result: Likely dislodged right atrial lead]    Finding was identified on 9/12/2022 11:04 AM.     Dr. Arreguin was contacted by Dr. Jorge Luis Ruffin on 9/12/2022 11:19 AM  and verbalized understanding of the critical result.      I have personally reviewed the examination and initial interpretation  and I agree with the findings.    TOM HERNANDEZ DO         SYSTEM ID:  [id number]   Cardiac Device Check - Inpatient   Result Value    Date Time Interrogation Session [id number]    Implantable Pulse Generator  [employer]    Implantable Pulse Generator Model L331 ACCOLADE MRI EL    Implantable Pulse Generator Serial Number [id number]    Type Interrogation Session In Clinic    Clinic Name [facility]    Implantable Pulse Generator Type Pacemaker    Implantable Pulse Generator Implant Date 20220909    Implantable Lead  [employer]    Implantable Lead Model 7841 Ingevity + MRI    Implantable Lead Serial Number [id number]    Implantable Lead Implant Date 20220909    Implantable Lead Polarity Type Bipolar Lead    Implantable Lead Location Detail 1 UNKNOWN    Implantable Lead Location Right Atrium    Implantable Lead  [employer]    Implantable Lead Model 7842 Ingevity + MRI    Implantable Lead Serial Number [id number]    Implantable Lead Implant Date 20220909    Implantable Lead Polarity Type Bipolar Lead     Implantable Lead Location Detail 1 UNKNOWN    Implantable Lead Location Right Ventricle    Cricket Setting Mode (NBG Code) VVI    Cricket Setting Lower Rate Limit 60    Lead Channel Setting Sensing Polarity Bipolar    Lead Channel Setting Sensing Sensitivity 0.5    Lead Channel Setting Sensing Adaptation Mode Fixed     Lead Channel Setting Sensing Polarity Bipolar    Lead Channel Setting Sensing Sensitivity 2.5    Lead Channel Setting Sensing Adaptation Mode Fixed     Lead Channel Setting Pacing Polarity Bipolar    Lead Channel Setting Pacing Pulse Width 0.4    Lead Channel Setting Pacing Amplitude 3.5    Lead Channel Setting Pacing Capture Mode Monitor    Zone Setting Type Category VT    Zone Setting Vendor Type Category VT    Zone Setting Detection Interval 375    Lead Channel Impedance Value 521    Lead Channel Pacing Threshold Amplitude 0.4    Lead Channel Pacing Threshold Pulse Width 0.4    Lead Channel Impedance Value 788    Lead Channel Pacing Threshold Amplitude 0.4    Lead Channel Pacing Threshold Pulse Width 0.4    Battery Date Time of Measurements [id number]    Battery Status Beginning of Service    Battery Remaining Longevity 132    Battery Remaining Percentage 100    Cricket Statistic Date Time Start 20220909000000    Cricket Statistic Date Time End 20220912000000    Cricket Statistic RA Percent Paced 10    Cricket Statistic RV Percent Paced 51    Episode Statistic Recent Count 0    Episode Statistic Type Category AT/AF    Episode Statistic Vendor Type Category AF    Episode Statistic Recent Count 0    Episode Statistic Type Category Other    Episode Statistic Recent Count 0    Episode Statistic Type Category SVT    Episode Statistic Vendor Type Category SVT    Episode Statistic Recent Count 0    Episode Statistic Type Category VT    Episode Statistic Vendor Type Category NSVT    Episode Statistic Recent Count 0    Episode Statistic Type Category VT    Episode Statistic Vendor Type Category VT    Episode  Statistic Recent Date Time Start 20220909000000    Episode Statistic Recent Date Time End 20220912000000    Episode Statistic Recent Date Time Start 20220909000000    Episode Statistic Recent Date Time End 20220912000000    Episode Statistic Recent Date Time Start 20220909000000    Episode Statistic Recent Date Time End 20220912000000    Episode Statistic Recent Date Time Start 20220909000000    Episode Statistic Recent Date Time End 20220912000000    Episode Statistic Recent Date Time Start 20220909000000    Episode Statistic Recent Date Time End 20220912000000    Episode Identifier APM-1    Episode Type Category Periodic EGM    Episode Date Time 20220909113000    Narrative    Patient seen on 4E for evaluation and iterative programming of their   pacemaker per MD orders. Patient is s/p pacemaker implant on 9/9/22.    Device: [employer] L331 ACCOLADE MRI EL  Normal device function  Mode: VVI 60 bpm  : 51%  Intrinsic Rhythm: VS @ 57 bpm  Unable to obtain P wave measurements today. Lead impedance = 521 ohms.   VS-AS noted on EGM. Threshold while pacing AAI @ 90 bpm = 0.4 V @ 0.4 ms.   Question if RA lead is in the RV.  RV Lead Appears Stable  Estimated battery longevity to RRT = > 11 years  Ventricular Arrhythmia: 0  Setting Changes: None  Vivien Arreguin notified of interrogation results.    MARK Kaminski, RN    Dual lead pacemaker    I have reviewed and interpreted the device interrogation, settings,   programming and nurse's summary. The device is functioning within normal   device parameters. I agree with the current findings, assessment and plan.        Labs:  Recent Labs   Lab 09/13/22  0849 09/13/22  0514 09/12/22  2102 09/12/22  2100 09/12/22  1236 09/12/22  1233 09/12/22  0806 09/12/22  0439 09/11/22  0724 09/11/22  0354   WBC  --  11.6*  --   --   --   --   --  7.7  --  5.2   HGB  --  8.3*  --  8.3*  --  8.1*  --  7.9*   < > 7.4*   MCV  --  96  --   --   --   --   --  94  --  97   PLT  --  224  --   --    --   --   --  149*  --  103*   INR  --  1.60*  --   --   --   --   --  1.71*  --  1.87*   NA  --  134*  --   --   --   --   --  133*  --  136   POTASSIUM  --  3.4  --   --   --   --   --  3.5  --  3.6   CHLORIDE  --  94*  --   --   --   --   --  96*  --  98   CO2  --  34*  --   --   --   --   --  34*  --  33*   BUN  --  14.9  --   --   --   --   --  15.4  --  17.0   CR  --  0.85  --   --   --   --   --  0.68  --  0.69   ANIONGAP  --  6*  --   --   --   --   --  3*  --  5*   ISREAL  --  8.1*  --   --   --   --   --  8.2*  --  8.0*   * 107* 114*  --    < >  --    < > 102*   < > 112*   ALBUMIN  --   --   --   --   --   --   --  2.7*  --  2.7*   PROTTOTAL  --   --   --   --   --   --   --  5.0*  --  4.8*   BILITOTAL  --   --   --   --   --   --   --  0.8  --  0.7   ALKPHOS  --   --   --   --   --   --   --  92  --  80   ALT  --   --   --   --   --   --   --  23  --  15   AST  --   --   --   --   --   --   --  38  --  29    < > = values in this interval not displayed.      GLUCOSE:   Recent Labs   Lab 09/13/22  0849 09/13/22  0514 09/12/22  2102 09/12/22  1727 09/12/22  1236 09/12/22  0806   * 107* 114* 153* 108* 107*

## 2022-09-13 NOTE — PROGRESS NOTES
SPIRITUAL HEALTH SERVICES  SPIRITUAL ASSESSMENT Progress Note  [facility] 4E     REFERRAL SOURCE: Length of Stay     Pt reported he is waiting to be transferred to the floor.  He expressed no needs at this time but said he appreciated  stopping by.      PLAN: [facility] will remain available     Lashonda Orellanain  Pager: [phone]

## 2022-09-13 NOTE — PROGRESS NOTES
Transfer  Transferred from:   Via:bed  Reason for transfer: Pt appropriate for 6B- improved/worsened patient condition  Family: Aware of transfer  Belongings: Received with pt  Chart: Received with pt  Medications: Meds received from old unit with pt  Code Status verified on armband: yes  2 RN Skin Assessment Completed By: Myself and Yue BERG RN  Med rec completed: yes  Bed surface reassessed with algorithm and charted: yes  New bed surface ordered: no  Suction/Ambu bag/Flowmeter at bedside: yes

## 2022-09-14 ENCOUNTER — APPOINTMENT (OUTPATIENT)
Dept: GENERAL RADIOLOGY | Facility: CLINIC | Age: 62
End: 2022-09-14
Attending: SURGERY
Payer: COMMERCIAL

## 2022-09-14 ENCOUNTER — APPOINTMENT (OUTPATIENT)
Dept: OCCUPATIONAL THERAPY | Facility: CLINIC | Age: 62
End: 2022-09-14
Attending: SURGERY
Payer: COMMERCIAL

## 2022-09-14 ENCOUNTER — APPOINTMENT (OUTPATIENT)
Dept: GENERAL RADIOLOGY | Facility: CLINIC | Age: 62
End: 2022-09-14
Attending: NURSE PRACTITIONER
Payer: COMMERCIAL

## 2022-09-14 LAB
ANION GAP SERPL CALCULATED.3IONS-SCNC: 5 MMOL/L (ref 7–15)
BUN SERPL-MCNC: 15.7 MG/DL (ref 8–23)
CALCIUM SERPL-MCNC: 8.2 MG/DL (ref 8.6–10)
CHLORIDE SERPL-SCNC: 98 MMOL/L (ref 98–107)
CREAT SERPL-MCNC: 0.94 MG/DL (ref 0.67–1.17)
DEPRECATED HCO3 PLAS-SCNC: 34 MMOL/L (ref 22–29)
ERYTHROCYTE [DISTWIDTH] IN BLOOD BY AUTOMATED COUNT: 15.3 % (ref 10–15)
GFR SERPL CREATININE-BSD FRML MDRD: >90 ML/MIN/1.73M2
GLUCOSE BLDC GLUCOMTR-MCNC: 100 MG/DL (ref 70–99)
GLUCOSE BLDC GLUCOMTR-MCNC: 100 MG/DL (ref 70–99)
GLUCOSE BLDC GLUCOMTR-MCNC: 116 MG/DL (ref 70–99)
GLUCOSE BLDC GLUCOMTR-MCNC: 128 MG/DL (ref 70–99)
GLUCOSE SERPL-MCNC: 84 MG/DL (ref 70–99)
HCT VFR BLD AUTO: 25 % (ref 40–53)
HGB BLD-MCNC: 7.9 G/DL (ref 13.3–17.7)
HGB BLD-MCNC: 7.9 G/DL (ref 13.3–17.7)
INR PPP: 1.91 (ref 0.85–1.15)
MAGNESIUM SERPL-MCNC: 2.1 MG/DL (ref 1.7–2.3)
MAGNESIUM SERPL-MCNC: 2.1 MG/DL (ref 1.7–2.3)
MCH RBC QN AUTO: 30.7 PG (ref 26.5–33)
MCHC RBC AUTO-ENTMCNC: 31.6 G/DL (ref 31.5–36.5)
MCV RBC AUTO: 97 FL (ref 78–100)
PHOSPHATE SERPL-MCNC: 3.4 MG/DL (ref 2.5–4.5)
PLATELET # BLD AUTO: 273 10E3/UL (ref 150–450)
POTASSIUM SERPL-SCNC: 3.7 MMOL/L (ref 3.4–5.3)
PROCALCITONIN SERPL IA-MCNC: 0.13 NG/ML
RBC # BLD AUTO: 2.57 10E6/UL (ref 4.4–5.9)
SODIUM SERPL-SCNC: 137 MMOL/L (ref 136–145)
WBC # BLD AUTO: 8.8 10E3/UL (ref 4–11)

## 2022-09-14 PROCEDURE — 250N000013 HC RX MED GY IP 250 OP 250 PS 637: Performed by: NURSE PRACTITIONER

## 2022-09-14 PROCEDURE — 97140 MANUAL THERAPY 1/> REGIONS: CPT | Mod: GO

## 2022-09-14 PROCEDURE — 71046 X-RAY EXAM CHEST 2 VIEWS: CPT

## 2022-09-14 PROCEDURE — 83735 ASSAY OF MAGNESIUM: CPT | Performed by: NURSE PRACTITIONER

## 2022-09-14 PROCEDURE — 250N000013 HC RX MED GY IP 250 OP 250 PS 637: Performed by: PHYSICIAN ASSISTANT

## 2022-09-14 PROCEDURE — 84145 PROCALCITONIN (PCT): CPT | Performed by: NURSE PRACTITIONER

## 2022-09-14 PROCEDURE — 85610 PROTHROMBIN TIME: CPT | Performed by: STUDENT IN AN ORGANIZED HEALTH CARE EDUCATION/TRAINING PROGRAM

## 2022-09-14 PROCEDURE — 80048 BASIC METABOLIC PNL TOTAL CA: CPT | Performed by: NURSE PRACTITIONER

## 2022-09-14 PROCEDURE — 120N000003 HC R&B IMCU UMMC

## 2022-09-14 PROCEDURE — 250N000011 HC RX IP 250 OP 636: Performed by: STUDENT IN AN ORGANIZED HEALTH CARE EDUCATION/TRAINING PROGRAM

## 2022-09-14 PROCEDURE — 71046 X-RAY EXAM CHEST 2 VIEWS: CPT | Mod: 77

## 2022-09-14 PROCEDURE — 250N000013 HC RX MED GY IP 250 OP 250 PS 637: Performed by: STUDENT IN AN ORGANIZED HEALTH CARE EDUCATION/TRAINING PROGRAM

## 2022-09-14 PROCEDURE — 71046 X-RAY EXAM CHEST 2 VIEWS: CPT | Mod: 26 | Performed by: RADIOLOGY

## 2022-09-14 PROCEDURE — 84100 ASSAY OF PHOSPHORUS: CPT | Performed by: SURGERY

## 2022-09-14 PROCEDURE — 97110 THERAPEUTIC EXERCISES: CPT | Mod: GO

## 2022-09-14 PROCEDURE — 85027 COMPLETE CBC AUTOMATED: CPT | Performed by: NURSE PRACTITIONER

## 2022-09-14 PROCEDURE — 36592 COLLECT BLOOD FROM PICC: CPT | Performed by: NURSE PRACTITIONER

## 2022-09-14 PROCEDURE — 97530 THERAPEUTIC ACTIVITIES: CPT | Mod: GO

## 2022-09-14 PROCEDURE — 250N000013 HC RX MED GY IP 250 OP 250 PS 637: Performed by: SURGERY

## 2022-09-14 RX ORDER — WARFARIN SODIUM 5 MG/1
5 TABLET ORAL
Status: COMPLETED | OUTPATIENT
Start: 2022-09-14 | End: 2022-09-14

## 2022-09-14 RX ADMIN — ACETAMINOPHEN 650 MG: 325 TABLET, FILM COATED ORAL at 16:30

## 2022-09-14 RX ADMIN — SENNOSIDES AND DOCUSATE SODIUM 1 TABLET: 8.6; 5 TABLET ORAL at 20:12

## 2022-09-14 RX ADMIN — POLYETHYLENE GLYCOL 3350 17 G: 17 POWDER, FOR SOLUTION ORAL at 08:59

## 2022-09-14 RX ADMIN — PANTOPRAZOLE SODIUM 40 MG: 40 TABLET, DELAYED RELEASE ORAL at 08:58

## 2022-09-14 RX ADMIN — LISINOPRIL 5 MG: 2.5 TABLET ORAL at 08:58

## 2022-09-14 RX ADMIN — GABAPENTIN 100 MG: 100 CAPSULE ORAL at 08:58

## 2022-09-14 RX ADMIN — ACETAMINOPHEN 650 MG: 325 TABLET, FILM COATED ORAL at 20:28

## 2022-09-14 RX ADMIN — ACETAMINOPHEN 650 MG: 325 TABLET, FILM COATED ORAL at 04:02

## 2022-09-14 RX ADMIN — WARFARIN SODIUM 5 MG: 5 TABLET ORAL at 17:41

## 2022-09-14 RX ADMIN — GABAPENTIN 100 MG: 100 CAPSULE ORAL at 14:14

## 2022-09-14 RX ADMIN — ASPIRIN 81 MG CHEWABLE TABLET 81 MG: 81 TABLET CHEWABLE at 08:58

## 2022-09-14 RX ADMIN — ATORVASTATIN CALCIUM 80 MG: 80 TABLET, FILM COATED ORAL at 20:12

## 2022-09-14 RX ADMIN — FLUTICASONE FUROATE AND VILANTEROL TRIFENATATE 1 PUFF: 200; 25 POWDER RESPIRATORY (INHALATION) at 08:58

## 2022-09-14 RX ADMIN — GABAPENTIN 100 MG: 100 CAPSULE ORAL at 20:12

## 2022-09-14 RX ADMIN — SENNOSIDES AND DOCUSATE SODIUM 1 TABLET: 8.6; 5 TABLET ORAL at 08:57

## 2022-09-14 RX ADMIN — TORSEMIDE 60 MG: 20 TABLET ORAL at 20:13

## 2022-09-14 RX ADMIN — SODIUM CHLORIDE, PRESERVATIVE FREE 10 ML: 5 INJECTION INTRAVENOUS at 04:02

## 2022-09-14 RX ADMIN — TORSEMIDE 60 MG: 20 TABLET ORAL at 08:57

## 2022-09-14 RX ADMIN — Medication 10 ML: at 16:30

## 2022-09-14 RX ADMIN — ACETAMINOPHEN 650 MG: 325 TABLET, FILM COATED ORAL at 11:43

## 2022-09-14 ASSESSMENT — ACTIVITIES OF DAILY LIVING (ADL)
ADLS_ACUITY_SCORE: 23
DEPENDENT_IADLS:: INDEPENDENT
ADLS_ACUITY_SCORE: 23

## 2022-09-14 NOTE — PLAN OF CARE
Time: 0700 - 1100    Goal Outcome Evaluation:    Plan of Care Reviewed With: patient     Overall Patient Progress: improving    A&Ox4. Regular diet, tolerating well. Clear yellow UOP via urinal. Lymph wraps placed on BLE's for 3+ pitting edema. K and Mg replaced this AM. LBM 9/10 but pt passing gas; senna and miralax given. Pt transferred to  at 1100; report given to  RN, meds and belongings gathered and sent with pt.

## 2022-09-14 NOTE — CONSULTS
Care Management Initial Consult    General Information  Assessment completed with: Patient, Spouse, Latoya  Type of CM/SW Visit: Initial Assessment  Primary Care Provider verified and updated as needed: Yes   Readmission within the last 30 days: current reason for admission unrelated to previous admission   Reason for Consult: discharge planning  Advance Care Planning: No documents on file, declines at this time    Communication Assessment  Patient's communication style: spoken language (English or Bilingual)    Hearing Difficulty or Deaf: no   Wear Glasses or Blind: no    Cognitive  Cognitive/Neuro/Behavioral: WDL      Living Environment:   People in home: spouse, Latoya  Current living Arrangements: house      Able to return to prior arrangements: yes    Family/Social Support:  Care provided by: self  Provides care for: pet(s)  Marital Status:   Support System: Wife, Latoya       Description of Support System: Supportive, Involved    Support Assessment: Adequate family and caregiver support    Current Resources:   Patient receiving home care services: No  Community Resources: None  Equipment currently used at home: none  Supplies currently used at home: None    Employment/Financial:  Employment Status: employed full-time     Employment/ Comments: [employer]    Functional Status:  Prior to admission patient needed assistance: Independent with all ADLs.     Mental Health Status:  Mental Health Status: No Current Concerns       Chemical Dependency Status:  Chemical Dependency Status: No Current Concerns           Additional Information:  Care management assessment completed at the bedside w/patient and his spouse, Latoya. Patient lives in [place], [place], w/his spouse, independently. Patient has been on warfarin previously, will not need PLC, did request a print out just as a refresher. Patient notes that he sees Lizette Mason CNP w/[facility] [place]. Writer contacted the clinic at  this time, will need to fax the OP note, H&P, and INR referral to his primary cardiologist asap w/urgent request. Writer placed INR order, awaiting signature, will fax to OP clinic. Discharge is anticipated for Friday, 9/16, will need an INR on Monday, 9/19. Per discussion with the OP clinic setting, writer will fax the INR referral and OP CR order to the cardiology clinic for set up of appointments. Care coordination will continue to follow for discharge planning.     Anticoagulation Clinic:   Phone: [phone]    OP Cardiologist (will order INR and OP CR)  Phone: [phone]  Fax: [phone]    Anticoagulation flow sheet attached below for OP set up purposes.     Anticoagulation Dose History     Recent Dosing and Labs Latest Ref Rng & Units 9/8/2022 9/9/2022 9/10/2022 9/11/2022 9/12/2022 9/13/2022 9/14/2022    Warfarin 1 mg - - - 1 mg - - - -    Warfarin 2.5 mg - 2.5 mg - - 2.5 mg - - -    Warfarin 3 mg - - - - - 3 mg - -    Warfarin 5 mg - - - - - - 5 mg -    INR 0.85 - 1.15 1.58(H) 3.79(H) 2.44(H) 1.87(H) 1.71(H) 1.60(H) 1.91(H)          Janey Carrasquillo, STACIECC, BSN    [facility]    [address] [place] [id number]    [email or web address]    Office: [phone] Pager: [phone]    To contact the weekend RNCC  [place] (0800 - 1630) Saturday and Sunday    Units: 4A, 4C, 4E, 5A and 5B- Pager 1: [phone]    Units: 6A, 6B, 6C, 6D- Pager 2: [phone]    Units: 7A, 7B, 7C, 7D, and 5C-Pager 3: [phone]

## 2022-09-14 NOTE — PLAN OF CARE
Neuro: A&Ox4.   Cardiac: 100% V-paced. Runs of a-fib and a-flutter observed overnight. SBPs 100-110s. Provider notified. Denied dizziness. CT to -20 suction with 170 ml serosanguinous output overnight.  Respiratory: Sating >92% on RA. Denies SOB  GI/: Adequate urine output. BM Denies N/V.  Diet/appetite: Tolerating regular diet. Following 1.8 L FR.  Activity:  SBA. Steady.  Pain: Pain at incision sites; worsens with activity. Managed with PRN Tylenol given x2 and Robaxin given x1.  Skin: No new deficits noted. Sternal incision approximated, CDI. L Chest dressing and R groin dressing CDI.   LDA's: R PICC.

## 2022-09-14 NOTE — PROGRESS NOTES
Cardiovascular Surgery Progress Note  09/14/2022         Assessment and Plan:     Maximino Birch is a 61 year old male with PMH of COPD on home O2 (3L/min at night), HFrEF (EF 40%), aortic stenosis, bicuspid aortic valve, ascending aortic arch aneurysm and single-vessel CAD s/p AVR with bovine valve, arch repair and CABGx1 SVG to RCA in 2013. He is now s/p redo sternotomy and AVR with On-X valve on 9/6 with Dr Hogue.    Cardiovascular:   Hx of bicuspid aortic valve and ascending aortic aneurysm - s/p redo sternotomy, redo AVR with 23 mm OnX mechanical valve, right femoral CPB cannulation  History of previous AVR and ascending aortic hemiarch replacement and CABG x1 to PDA in 2013  Acute on Chronic HFrEF/HFpEF   HLD  Postoperative CHB s/p PPM placement 9/10  No arrhythmias overnight, HD stable, in V-paced rhythm  - Concern for no capture of atrial leads, interrogation on 9/12 demonstrated dislodgement of atrial lead, on schedule to have replaced on 9/15, earlier if opening available    Most recent echo showed LVEF 30-35%, grade III diastolic dysfunction  Postoperative echo with LVEF 35% and normal RV function  Dobutamine drip turned off on 9/12  - ASA 81 mg daily  - Atorvastatin 80 mg daily  - Will receive PTA Repatha today 9/13 (wife brought from home)  - Resume PTA lisinopril 5 mg daily 9/13  - Hold PTA Toprol for now - need to optimize diuresis and afterload reduction first given recent discontinuation of dobutamine drip  - Diuresis as below  - Lymphedema wraps  - AC as below    Chest tubes: Meds x3 in to suction, two clotted off removed 9/13, last pleural CT to be removed today 9/14  TPW: Removed 9/13    Pulmonary:  COPD, FEV1 37%, no home O2 use  Extubated POD 1. Now saturating well on RA.   - Supplemental O2 PRN to keep sats > 92%. Wean off as tolerated.  - Pulm hygiene, IS, activity and deep breathing    Neurology / MSK:  Acute post-operative pain   Pain well controlled with current regimen:  -  Acetaminophen, PO oxycodone PRN, methocarbamol, gabapentin scheduled     / Renal / Fluid / Electrolytes:  SCARLET on CKD, resolved  Hypervolemia  BL creat ~ 1.3, most recent creatinine 0.94; adequate UOP.   FLUID STATUS: Pre-op weight 211 lbs, weight today 207 lbs; I/O past 24 hours: -1.3L  Has profound BLE edema, +JVD and had been weeping from CT sites (improved with increased diuresis)  - Diuresis: continue torsemide 60 mg daily (PTA dose) to BID with goal negative ~1-1.5 L in 24h  - Replete lytes per protocol  - Strict I/O, daily weights  - Avoid/limit nephrotoxins as able    GI / Nutrition:   - Tolerating regular diet  - Continue bowel regimen, last BM 9/13    Endocrine:  Stress induced hyperglycemia   Hgb A1c 6.1%  - Initially managed on insulin drip postop, transitioned to sliding scale; goal BG <180 for optimal healing    Infectious Disease:  Stress induced leukocytosis  WBC 8.8, remains afebrile, no signs or symptoms of infection  - Completed perioperative antibiotics  - Continue to monitor fever curve, CBC  - To complete keflex per PPM placement protocol    Hematology:   Acute blood loss anemia and thrombocytopenia  Hgb 7.9; Plt 273, no signs or symptoms of active bleeding  - CBC in AM    Anticoagulation:   - ASA 81 mg daily  - Coumadin for mechanical OnX valve with goal INR 2-3 for first three months then 1.5-2 lifelong thereafter  - INR 1.91    MSK/Skin:  Sternotomy  Surgical incision  - Sternal precautions  - Incisional cares per protocol    Prophylaxis:   - Stress ulcer prophylaxis: Pantoprazole 40 mg daily  - DVT prophylaxis: warfarin, SCD    Disposition:   - Transfer to  orders in place on 9/11  - Therapies recommending discharge to home with OP CR pending therapeutic INR, diuresis, CT removal and PPM adjustment    Updated Dr. Mykel Price PAHERMINIA   Cardiothoracic Surgery   September 14, 2022 at 8:57 AM        Interval History:     No overnight events. Remaining CT to be removed today.  States  pain is well managed on current regimen. Not sleeping well but does not want any medication to assist.  Tolerating diet, is passing flatus, + BM 9/13. No nausea or vomiting.  Breathing well without complaints.   Working with therapies and ambulating in halls with assistance.   Denies chest pain, palpitations, dizziness, syncopal symptoms, fevers, chills, myalgias, or sternal popping/clicking.         Physical Exam:     Gen: A&Ox4, NAD  Neuro: Intact, no focal deficits   CV: RRR, normal S1 S2, no murmurs, rubs or gallops. + JVD  Pulm: CTA, no wheezing or rhonchi, normal breathing on RA  Abd: nondistended, normal BS, soft, nontender  Ext: + profund peripheral edema, 2-3+ pitting  Incision: clean, dry, intact, no erythema, sternum stable  Tubes/drain sites: dressing clean and dry, serosanguinous output, no air leak, no crepitus         Data:    Imaging:  reviewed recent imaging, no acute concerns    Recent Results (from the past 24 hour(s))   XR Chest Port 1 View    Narrative    Exam: XR CHEST PORT 1 VIEW, 9/13/2022 2:58 PM    Comparison: 9/12/2022, chest x-ray 9/10/2022    History: s/p CT removal    Findings:  AP portable upright view of the chest. Left chest wall pacemaker with  leads projecting over the right ventricle with likely dislodgment of  the right atrial lead. Right upper extremity PICC with tip in the  lower SVC. Intact median sternotomy wires.    Trachea is midline. Stable enlarged cardiac silhouette  Stable diffuse  interstitial opacities. No pneumothorax. Mild blunting of the  bilateral costophrenic angles. The upper abdomen is unremarkable.      Impression    Impression:   1. Stable diffuse interstitial opacities may represent pulmonary  edema.  2. Redemonstrated left chest wall cardiac pacemaker with right atrial  lead directed inferiorly, likely representing dislodgment of the right  atrial lead.  3. Stable small bilateral pleural effusions.    I have personally reviewed the examination and initial  interpretation  and I agree with the findings.    LEE JOY MD         SYSTEM ID:  [id number]        Labs:  Recent Labs   Lab 09/14/22  0738 09/14/22  0707 09/13/22  2219 09/13/22  2040 09/13/22  1818 09/13/22  1313 09/13/22  0849 09/13/22  0514 09/12/22  0806 09/12/22  0439 09/11/22  0724 09/11/22  0354   WBC  --  8.8  --   --   --   --   --  11.6*  --  7.7  --  5.2   HGB  --  7.9*  7.9*  --  7.8*  --  7.9*  --  8.3*   < > 7.9*   < > 7.4*   MCV  --  97  --   --   --   --   --  96  --  94  --  97   PLT  --  273  --   --   --   --   --  224  --  149*  --  103*   INR  --  1.91*  --   --   --   --   --  1.60*  --  1.71*  --  1.87*   NA  --  137  --   --   --   --   --  134*  --  133*  --  136   POTASSIUM  --  3.7  --  3.9  --  3.4  --  3.4  --  3.5  --  3.6   CHLORIDE  --  98  --   --   --   --   --  94*  --  96*  --  98   CO2  --  34*  --   --   --   --   --  34*  --  34*  --  33*   BUN  --  15.7  --   --   --   --   --  14.9  --  15.4  --  17.0   CR  --  0.94  --   --   --   --   --  0.85  --  0.68  --  0.69   ANIONGAP  --  5*  --   --   --   --   --  6*  --  3*  --  5*   ISREAL  --  8.2*  --   --   --   --   --  8.1*  --  8.2*  --  8.0*   * 84 108*  --    < >  --    < > 107*   < > 102*   < > 112*   ALBUMIN  --   --   --   --   --   --   --   --   --  2.7*  --  2.7*   PROTTOTAL  --   --   --   --   --   --   --   --   --  5.0*  --  4.8*   BILITOTAL  --   --   --   --   --   --   --   --   --  0.8  --  0.7   ALKPHOS  --   --   --   --   --   --   --   --   --  92  --  80   ALT  --   --   --   --   --   --   --   --   --  23  --  15   AST  --   --   --   --   --   --   --   --   --  38  --  29    < > = values in this interval not displayed.      GLUCOSE:   Recent Labs   Lab 09/14/22  0738 09/14/22  0707 09/13/22  2219 09/13/22  1818 09/13/22  1206 09/13/22  0849   * 84 108* 124* 140* 104*

## 2022-09-14 NOTE — PROGRESS NOTES
CLINICAL NUTRITION SERVICES    Reviewed nutrition risk factors due to LOS. Pt is tolerating regular diet, eating well per nursing documentation, % of meals provided. Per review of room service orders, patient is ordering 3 meals per day consistently with intake range if 7850-2042 Kcals and 53-74 gm protein per day IF consuming 100% of meals provided.     Per pt interview, pt agrees that his PO intake/appetite are doing well. He reports that his appetite has started to improve during this admission and endorses eating three meals per day without concern. He reports that his UBW fluctuates between ~200-211 lbs, no recent losses per pt. Denied any need for snacks or oral nutrition supplements at this time and acknowledges that RD is available upon request if he changes his mind.     No nutrition issues identified at this time. RD will follow via rounds at this time, unless consulted.    Nikunj Brown RDN, LD, CNSC  6B RD pager: [phone] RD Phone: [phone]

## 2022-09-14 NOTE — PLAN OF CARE
Neuro: A&Ox4. Calm and cooperative. Makes needs known.   Cardiac: 100% V-Paced at rate 60. A fib  Respiratory: Sating 98% on RA.  GI/: Adequate urine output. BM X2  Diet/appetite: Tolerating reg diet. Eating well. NPO after midnight except clear liquids 2hrs before cath lab 09/15 for lead exchange.   Activity: Stand-by assist, pt up ad socorro.  Pain: At acceptable level on current regimen. Denies pain most of the time.   Skin: No new deficits noted.  LDA's: CT removed today. R double PICC- Heparin locked.     Plan: Continue with POC. Notify primary team with changes.

## 2022-09-15 ENCOUNTER — APPOINTMENT (OUTPATIENT)
Dept: OCCUPATIONAL THERAPY | Facility: CLINIC | Age: 62
End: 2022-09-15
Attending: SURGERY
Payer: COMMERCIAL

## 2022-09-15 ENCOUNTER — APPOINTMENT (OUTPATIENT)
Dept: GENERAL RADIOLOGY | Facility: CLINIC | Age: 62
End: 2022-09-15
Attending: NURSE PRACTITIONER
Payer: COMMERCIAL

## 2022-09-15 LAB
ANION GAP SERPL CALCULATED.3IONS-SCNC: 5 MMOL/L (ref 7–15)
BUN SERPL-MCNC: 17.4 MG/DL (ref 8–23)
CALCIUM SERPL-MCNC: 8.1 MG/DL (ref 8.6–10)
CHLORIDE SERPL-SCNC: 97 MMOL/L (ref 98–107)
CREAT SERPL-MCNC: 0.98 MG/DL (ref 0.67–1.17)
DEPRECATED HCO3 PLAS-SCNC: 33 MMOL/L (ref 22–29)
ERYTHROCYTE [DISTWIDTH] IN BLOOD BY AUTOMATED COUNT: 15.4 % (ref 10–15)
GFR SERPL CREATININE-BSD FRML MDRD: 89 ML/MIN/1.73M2
GLUCOSE BLDC GLUCOMTR-MCNC: 124 MG/DL (ref 70–99)
GLUCOSE BLDC GLUCOMTR-MCNC: 173 MG/DL (ref 70–99)
GLUCOSE BLDC GLUCOMTR-MCNC: 83 MG/DL (ref 70–99)
GLUCOSE BLDC GLUCOMTR-MCNC: 96 MG/DL (ref 70–99)
GLUCOSE SERPL-MCNC: 87 MG/DL (ref 70–99)
HCT VFR BLD AUTO: 23.7 % (ref 40–53)
HGB BLD-MCNC: 7.7 G/DL (ref 13.3–17.7)
INR PPP: 2.22 (ref 0.85–1.15)
MCH RBC QN AUTO: 30.9 PG (ref 26.5–33)
MCHC RBC AUTO-ENTMCNC: 32.5 G/DL (ref 31.5–36.5)
MCV RBC AUTO: 95 FL (ref 78–100)
PLATELET # BLD AUTO: 310 10E3/UL (ref 150–450)
POTASSIUM SERPL-SCNC: 3.8 MMOL/L (ref 3.4–5.3)
RBC # BLD AUTO: 2.49 10E6/UL (ref 4.4–5.9)
SODIUM SERPL-SCNC: 135 MMOL/L (ref 136–145)
WBC # BLD AUTO: 8.5 10E3/UL (ref 4–11)

## 2022-09-15 PROCEDURE — C1898 LEAD, PMKR, OTHER THAN TRANS: HCPCS | Performed by: INTERNAL MEDICINE

## 2022-09-15 PROCEDURE — 02PAXMZ REMOVAL OF CARDIAC LEAD FROM HEART, EXTERNAL APPROACH: ICD-10-PCS | Performed by: INTERNAL MEDICINE

## 2022-09-15 PROCEDURE — 80048 BASIC METABOLIC PNL TOTAL CA: CPT | Performed by: PHYSICIAN ASSISTANT

## 2022-09-15 PROCEDURE — 250N000013 HC RX MED GY IP 250 OP 250 PS 637: Performed by: NURSE PRACTITIONER

## 2022-09-15 PROCEDURE — 33216 INSERT 1 ELECTRODE PM-DEFIB: CPT | Performed by: INTERNAL MEDICINE

## 2022-09-15 PROCEDURE — 272N000002 HC OR SUPPLY OTHER OPNP: Performed by: INTERNAL MEDICINE

## 2022-09-15 PROCEDURE — 36592 COLLECT BLOOD FROM PICC: CPT | Performed by: PHYSICIAN ASSISTANT

## 2022-09-15 PROCEDURE — 02H63MZ INSERTION OF CARDIAC LEAD INTO RIGHT ATRIUM, PERCUTANEOUS APPROACH: ICD-10-PCS | Performed by: INTERNAL MEDICINE

## 2022-09-15 PROCEDURE — 250N000009 HC RX 250: Performed by: INTERNAL MEDICINE

## 2022-09-15 PROCEDURE — 99152 MOD SED SAME PHYS/QHP 5/>YRS: CPT | Performed by: INTERNAL MEDICINE

## 2022-09-15 PROCEDURE — 85610 PROTHROMBIN TIME: CPT | Performed by: STUDENT IN AN ORGANIZED HEALTH CARE EDUCATION/TRAINING PROGRAM

## 2022-09-15 PROCEDURE — 85027 COMPLETE CBC AUTOMATED: CPT | Performed by: PHYSICIAN ASSISTANT

## 2022-09-15 PROCEDURE — 97140 MANUAL THERAPY 1/> REGIONS: CPT | Mod: GO

## 2022-09-15 PROCEDURE — C1894 INTRO/SHEATH, NON-LASER: HCPCS | Performed by: INTERNAL MEDICINE

## 2022-09-15 PROCEDURE — 250N000013 HC RX MED GY IP 250 OP 250 PS 637: Performed by: PHYSICIAN ASSISTANT

## 2022-09-15 PROCEDURE — 250N000011 HC RX IP 250 OP 636: Performed by: STUDENT IN AN ORGANIZED HEALTH CARE EDUCATION/TRAINING PROGRAM

## 2022-09-15 PROCEDURE — 71045 X-RAY EXAM CHEST 1 VIEW: CPT | Mod: 26 | Performed by: RADIOLOGY

## 2022-09-15 PROCEDURE — 272N000001 HC OR GENERAL SUPPLY STERILE: Performed by: INTERNAL MEDICINE

## 2022-09-15 PROCEDURE — 999N000065 XR CHEST PORT 1 VIEW

## 2022-09-15 PROCEDURE — 33234 REMOVAL OF PACEMAKER SYSTEM: CPT | Performed by: INTERNAL MEDICINE

## 2022-09-15 PROCEDURE — 97530 THERAPEUTIC ACTIVITIES: CPT | Mod: GO

## 2022-09-15 PROCEDURE — 93005 ELECTROCARDIOGRAM TRACING: CPT

## 2022-09-15 PROCEDURE — 97535 SELF CARE MNGMENT TRAINING: CPT | Mod: GO

## 2022-09-15 PROCEDURE — 250N000013 HC RX MED GY IP 250 OP 250 PS 637: Performed by: SURGERY

## 2022-09-15 PROCEDURE — 93010 ELECTROCARDIOGRAM REPORT: CPT | Mod: XU | Performed by: INTERNAL MEDICINE

## 2022-09-15 PROCEDURE — 120N000003 HC R&B IMCU UMMC

## 2022-09-15 PROCEDURE — 99153 MOD SED SAME PHYS/QHP EA: CPT | Performed by: INTERNAL MEDICINE

## 2022-09-15 PROCEDURE — 250N000011 HC RX IP 250 OP 636: Performed by: INTERNAL MEDICINE

## 2022-09-15 DEVICE — WRENCH BI-DIRECTIONAL #2 6628: Type: IMPLANTABLE DEVICE | Status: FUNCTIONAL

## 2022-09-15 RX ORDER — DIPHENHYDRAMINE HYDROCHLORIDE 50 MG/ML
INJECTION INTRAMUSCULAR; INTRAVENOUS
Status: DISCONTINUED | OUTPATIENT
Start: 2022-09-15 | End: 2022-09-15 | Stop reason: HOSPADM

## 2022-09-15 RX ORDER — DEXTROSE MONOHYDRATE 25 G/50ML
25-50 INJECTION, SOLUTION INTRAVENOUS
Status: DISCONTINUED | OUTPATIENT
Start: 2022-09-15 | End: 2022-09-16 | Stop reason: HOSPADM

## 2022-09-15 RX ORDER — CEFAZOLIN SODIUM 2 G/100ML
INJECTION, SOLUTION INTRAVENOUS CONTINUOUS PRN
Status: COMPLETED | OUTPATIENT
Start: 2022-09-15 | End: 2022-09-15

## 2022-09-15 RX ORDER — FENTANYL CITRATE 50 UG/ML
INJECTION, SOLUTION INTRAMUSCULAR; INTRAVENOUS
Status: DISCONTINUED | OUTPATIENT
Start: 2022-09-15 | End: 2022-09-15 | Stop reason: HOSPADM

## 2022-09-15 RX ORDER — LIDOCAINE HYDROCHLORIDE 20 MG/ML
INJECTION, SOLUTION INFILTRATION; PERINEURAL
Status: DISCONTINUED | OUTPATIENT
Start: 2022-09-15 | End: 2022-09-15 | Stop reason: HOSPADM

## 2022-09-15 RX ORDER — CEPHALEXIN 500 MG/1
500 CAPSULE ORAL 3 TIMES DAILY
Status: DISCONTINUED | OUTPATIENT
Start: 2022-09-15 | End: 2022-09-16 | Stop reason: HOSPADM

## 2022-09-15 RX ORDER — WARFARIN SODIUM 5 MG/1
5 TABLET ORAL
Status: COMPLETED | OUTPATIENT
Start: 2022-09-15 | End: 2022-09-15

## 2022-09-15 RX ORDER — OXYCODONE AND ACETAMINOPHEN 5; 325 MG/1; MG/1
1 TABLET ORAL EVERY 4 HOURS PRN
Status: DISCONTINUED | OUTPATIENT
Start: 2022-09-15 | End: 2022-09-15

## 2022-09-15 RX ORDER — NICOTINE POLACRILEX 4 MG
15-30 LOZENGE BUCCAL
Status: DISCONTINUED | OUTPATIENT
Start: 2022-09-15 | End: 2022-09-16 | Stop reason: HOSPADM

## 2022-09-15 RX ADMIN — Medication 5 ML: at 06:58

## 2022-09-15 RX ADMIN — SENNOSIDES AND DOCUSATE SODIUM 1 TABLET: 8.6; 5 TABLET ORAL at 07:55

## 2022-09-15 RX ADMIN — SODIUM CHLORIDE, PRESERVATIVE FREE 10 ML: 5 INJECTION INTRAVENOUS at 03:37

## 2022-09-15 RX ADMIN — CEPHALEXIN 500 MG: 500 CAPSULE ORAL at 19:57

## 2022-09-15 RX ADMIN — ACETAMINOPHEN 650 MG: 325 TABLET, FILM COATED ORAL at 03:42

## 2022-09-15 RX ADMIN — LISINOPRIL 5 MG: 2.5 TABLET ORAL at 07:55

## 2022-09-15 RX ADMIN — ACETAMINOPHEN 650 MG: 325 TABLET, FILM COATED ORAL at 19:56

## 2022-09-15 RX ADMIN — TORSEMIDE 60 MG: 20 TABLET ORAL at 07:55

## 2022-09-15 RX ADMIN — GABAPENTIN 100 MG: 100 CAPSULE ORAL at 07:55

## 2022-09-15 RX ADMIN — GABAPENTIN 100 MG: 100 CAPSULE ORAL at 13:44

## 2022-09-15 RX ADMIN — WARFARIN SODIUM 5 MG: 5 TABLET ORAL at 18:10

## 2022-09-15 RX ADMIN — TORSEMIDE 60 MG: 20 TABLET ORAL at 19:57

## 2022-09-15 RX ADMIN — ACETAMINOPHEN 650 MG: 325 TABLET, FILM COATED ORAL at 23:58

## 2022-09-15 RX ADMIN — ATORVASTATIN CALCIUM 80 MG: 80 TABLET, FILM COATED ORAL at 19:57

## 2022-09-15 RX ADMIN — ASPIRIN 81 MG CHEWABLE TABLET 81 MG: 81 TABLET CHEWABLE at 07:54

## 2022-09-15 RX ADMIN — PANTOPRAZOLE SODIUM 40 MG: 40 TABLET, DELAYED RELEASE ORAL at 07:55

## 2022-09-15 RX ADMIN — METHOCARBAMOL 750 MG: 750 TABLET, FILM COATED ORAL at 23:58

## 2022-09-15 RX ADMIN — ACETAMINOPHEN 650 MG: 325 TABLET, FILM COATED ORAL at 08:29

## 2022-09-15 RX ADMIN — GABAPENTIN 100 MG: 100 CAPSULE ORAL at 19:57

## 2022-09-15 RX ADMIN — FLUTICASONE FUROATE AND VILANTEROL TRIFENATATE 1 PUFF: 200; 25 POWDER RESPIRATORY (INHALATION) at 07:55

## 2022-09-15 RX ADMIN — ACETAMINOPHEN 650 MG: 325 TABLET, FILM COATED ORAL at 13:44

## 2022-09-15 ASSESSMENT — ACTIVITIES OF DAILY LIVING (ADL)
ADLS_ACUITY_SCORE: 24
ADLS_ACUITY_SCORE: 24
ADLS_ACUITY_SCORE: 22
ADLS_ACUITY_SCORE: 24
ADLS_ACUITY_SCORE: 22
ADLS_ACUITY_SCORE: 24
ADLS_ACUITY_SCORE: 22
ADLS_ACUITY_SCORE: 22

## 2022-09-15 NOTE — PROGRESS NOTES
./67 (BP Location: Left arm)   Pulse 60   Temp 97.7  F (36.5  C) (Oral)   Resp 20   Ht 1.829 m (6')   Wt 96 kg (211 lb 10.3 oz)   SpO2 95%   BMI 28.70 kg/m      Hours of Care: 8066-4746.    Reason for admission: Reduced sternotomy   Vitals: q4  Activity: As tolerated, SBA   Pain: Denies  Neuro: AOX4  Cardiac: Vpaced before atrial revision, now AV paced  Respiratory: RA  GI/: Voiding   Diet: Clears advanced to regular  Lines: R PICC   Skin/Wounds: Chest tubes sites dressing cleaned with NS and dressing changed, fresh pacemaker site C/D/I, and midline   Plan: Monitor overnight, discontinue home.       Continue to monitor and follow POC    Laurel Agrawal RN on 9/15/2022 at 6:29 PM

## 2022-09-15 NOTE — PROGRESS NOTES
Cardiovascular Surgery Progress Note  09/15/2022         Assessment and Plan:     Maximino Birch is a 61 year old male with PMH of COPD on home O2 (3L/min at night), HFrEF (EF 40%), aortic stenosis, bicuspid aortic valve, ascending aortic arch aneurysm and single-vessel CAD s/p AVR with bovine valve, arch repair and CABGx1 SVG to RCA in 2013. He is now s/p redo sternotomy and AVR with On-X valve on 9/6 with Dr Hogue.    Cardiovascular:   Hx of bicuspid aortic valve and ascending aortic aneurysm - s/p redo sternotomy, redo AVR with 23 mm OnX mechanical valve, right femoral CPB cannulation  History of previous AVR and ascending aortic hemiarch replacement and CABG x1 to PDA in 2013  Acute on Chronic HFrEF/HFpEF   HLD  Postoperative CHB s/p PPM placement 9/10  No arrhythmias overnight, HD stable, in V-paced rhythm  - Concern for no capture of atrial leads, interrogation on 9/12 demonstrated dislodgement of atrial lead, on schedule to have replaced on 9/15    Most recent echo showed LVEF 30-35%, grade III diastolic dysfunction  Postoperative echo with LVEF 35% and normal RV function  Dobutamine drip turned off on 9/12  - ASA 81 mg daily  - Atorvastatin 80 mg daily  - Receive PTA Repatha 9/13 (wife brought from home)  - Resume PTA lisinopril 5 mg daily 9/13  - Hold PTA Toprol for now - need to optimize diuresis and afterload reduction first given recent discontinuation of dobutamine drip  - Diuresis as below  - Lymphedema wraps  - AC as below    Chest tubes: Meds x3 in to suction, two clotted off removed 9/13, pleural CT removed 9/14  TPW: Removed 9/13    Pulmonary:  COPD, FEV1 37%, no home O2 use  Extubated POD 1. Now saturating well on RA.   - Supplemental O2 PRN to keep sats > 92%. Wean off as tolerated.  - Pulm hygiene, IS, activity and deep breathing    Neurology / MSK:  Acute post-operative pain   Pain well controlled with current regimen:  - Acetaminophen, PO oxycodone PRN, methocarbamol, gabapentin  scheduled     / Renal / Fluid / Electrolytes:  SCARLET on CKD, resolved  Hypervolemia  BL creat ~ 1.3, most recent creatinine 0.98; adequate UOP.   FLUID STATUS: Pre-op weight 211 lbs, weight today 211 lbs; I/O past 24 hours: inaccurate due to multiple missed voids  Has profound BLE edema, +JVD (improved with increased diuresis)  - Diuresis: continue torsemide 60 mg daily (PTA dose) to BID   - Replete lytes per protocol  - Strict I/O, daily weights  - Avoid/limit nephrotoxins as able    GI / Nutrition:   - Tolerating regular diet  - Continue bowel regimen, last BM 9/13    Endocrine:  Stress induced hyperglycemia   Hgb A1c 6.1%  - Initially managed on insulin drip postop, transitioned to sliding scale; goal BG <180 for optimal healing    Infectious Disease:  Stress induced leukocytosis  WBC 8.8, remains afebrile, no signs or symptoms of infection  - Completed perioperative antibiotics  - Continue to monitor fever curve, CBC  - To complete keflex per PPM placement protocol    Hematology:   Acute blood loss anemia and thrombocytopenia  Hgb 7.9; Plt 273, no signs or symptoms of active bleeding  - CBC in AM    Anticoagulation:   - ASA 81 mg daily  - Coumadin for mechanical OnX valve with goal INR 2-3 for first three months then 1.5-2 lifelong thereafter  - INR 2.22    MSK/Skin:  Sternotomy  Surgical incision  - Sternal precautions  - Incisional cares per protocol    Prophylaxis:   - Stress ulcer prophylaxis: Pantoprazole 40 mg daily  - DVT prophylaxis: warfarin, SCD    Disposition:   - Transfer to  orders in place on 9/11  - Therapies recommending discharge to home with OP CR pending atrial lead revision    Updated Dr. Mykel Price PA-C   Cardiothoracic Surgery   September 15, 2022 at 9:33 AM        Interval History:     No overnight events.   States that he has not had any pain, just feels sore in some areas. Slept really well last night.  Tolerating diet, is passing flatus, + BM 9/14. No nausea or  vomiting.  Breathing well without complaints.   Working with therapies and ambulating in halls with assistance.   Continues to have LE edema, improving with diuresis.  Denies chest pain, palpitations, dizziness, syncopal symptoms, fevers, chills, myalgias, or sternal popping/clicking.         Physical Exam:     Gen: A&Ox4, NAD  Neuro: Intact, no focal deficits   CV: RRR, normal S1 S2, no murmurs, rubs or gallops. + JVD  Pulm: CTA, no wheezing or rhonchi, normal breathing on RA  Abd: nondistended, normal BS, soft, nontender  Ext: + profund peripheral edema, 2-3+ pitting  Incision: clean, dry, intact, no erythema, sternum stable  Tubes/drain sites: dressing clean and dry, serosanguinous output, no air leak, no crepitus         Data:    Imaging:  reviewed recent imaging, no acute concerns    Recent Results (from the past 24 hour(s))   XR Chest 2 Views    Narrative    Chest 2 views 9/14/2012 1517 hours    INDICATION: Post chest tube removal    COMPARISON: 0846 hours earlier today    FINDINGS: Heart remains borderline enlarged. Median sternotomy again  noted. Right more than left costophrenic angle blunting and subtle  elevation of the right hemidiaphragm are unchanged. Bipolar pacemaker  from left subclavian transvenous approach again noted. Right PICC line  tip in the upper SVC. No visible pneumothorax.      Impression    IMPRESSION: Unchanged small right > left pleural effusion and elevated  right hemidiaphragm. Pacemaker. Cardiomegaly.    MARIAJOSE SWENSON MD         SYSTEM ID:  [id number]        Labs:  Recent Labs   Lab 09/15/22  0741 09/15/22  0703 09/14/22  2251 09/14/22  0738 09/14/22  0707 09/13/22  2219 09/13/22  2040 09/13/22  0849 09/13/22  0514 09/12/22  0806 09/12/22  0439 09/11/22  0724 09/11/22  0354   WBC  --  8.5  --   --  8.8  --   --   --  11.6*  --  7.7  --  5.2   HGB  --  7.7*  --   --  7.9*  7.9*  --  7.8*   < > 8.3*   < > 7.9*   < > 7.4*   MCV  --  95  --   --  97  --   --   --  96  --  94  --   97   PLT  --  310  --   --  273  --   --   --  224  --  149*  --  103*   INR  --  2.22*  --   --  1.91*  --   --   --  1.60*  --  1.71*  --  1.87*   NA  --  135*  --   --  137  --   --   --  134*  --  133*  --  136   POTASSIUM  --  3.8  --   --  3.7  --  3.9   < > 3.4  --  3.5  --  3.6   CHLORIDE  --  97*  --   --  98  --   --   --  94*  --  96*  --  98   CO2  --  33*  --   --  34*  --   --   --  34*  --  34*  --  33*   BUN  --  17.4  --   --  15.7  --   --   --  14.9  --  15.4  --  17.0   CR  --  0.98  --   --  0.94  --   --   --  0.85  --  0.68  --  0.69   ANIONGAP  --  5*  --   --  5*  --   --   --  6*  --  3*  --  5*   ISREAL  --  8.1*  --   --  8.2*  --   --   --  8.1*  --  8.2*  --  8.0*   GLC 96 87 128*   < > 84   < >  --    < > 107*   < > 102*   < > 112*   ALBUMIN  --   --   --   --   --   --   --   --   --   --  2.7*  --  2.7*   PROTTOTAL  --   --   --   --   --   --   --   --   --   --  5.0*  --  4.8*   BILITOTAL  --   --   --   --   --   --   --   --   --   --  0.8  --  0.7   ALKPHOS  --   --   --   --   --   --   --   --   --   --  92  --  80   ALT  --   --   --   --   --   --   --   --   --   --  23  --  15   AST  --   --   --   --   --   --   --   --   --   --  38  --  29    < > = values in this interval not displayed.      GLUCOSE:   Recent Labs   Lab 09/15/22  0741 09/15/22  0703 09/14/22  2251 09/14/22  1740 09/14/22  1324 09/14/22  0738   GLC 96 87 128* 116* 100* 100*

## 2022-09-15 NOTE — DISCHARGE SUMMARY
[facility], [place]   Cardiothoracic Surgery Hospital Discharge Summary     Maximino Birch MRN# [id number]   Age: 61 year old YOB: 1960     Admitting Physician:  April Hogue MD  Discharge Physician:  Yue Price PA-C  Primary Care Physician:        Gisel Pettit     DATE OF ADMISSION: 9/6/2022      DATE OF DISCHARGE: 9/16/22    Admit Wt: 211 lbs  Discharge Wt: 211 lbs          Primary Diagnoses:     Severe prosthetic Aortic valve insufficiency, s/p redo sternotomy, redo aortic valve replacement           Secondary Diagnoses:     COPD     HFrEF (EF 40%),     Hx bicuspid aortic valve    ascending aortic arch aneurysm and single-vessel CAD s/p AVR with bovine valve 2013    HLD    Post-operative CHB s/p PPM placement 9/10 with atrial lead revision done 9/15    Acute post-operative pain, resolved    Hypervolemia, improving    SCARLET on CKD, resolved    Stress induced leukocytosis, improved    PROCEDURES PERFORMED:   Date: 9/6/22.  Surgeon: Dr. Hogue   Redo sternotomy, redo aortic valve replacement with a 23 mm On-X mechanical valve, right common femoral arterial and femoral cannulation for cardiopulmonary bypass, intraoperative NATY.    OPERATIVE FINDINGS: The patient had an LVEF of around 40 to 45%. The previous prosthetic aortic valve had severe degeneration and one of the leaflets was torn off the post.  There were no signs of active or previous infection, however.    EP PROCEDURE NOTE   Procedures:  1. Dual chamber PPM device implantation.  2. Cephalic Cut down approach.      Attending: Dr Daigle  EP Fellow: Dr Ruiz  Procedure Date:9/9/22     Pre-operative Diagnosis: Complete AV block with a stable junctional rhythm   Device Indication: CHB  Post-operative diagnosis:   Successful implantation of Dual chamber PPM.  Complications: None.     Fluoroscopy time/dose: please see procedure log    Procedures:  1. Atrial lead extraction.  2. Implantation of a  new atrial lead.      Attending: Edwin Daigle MD  Procedure Date: 9/15/2022     Pre-operative Diagnosis:  Atrial lead dislodgement.  Device Indication: Complete AV block.  Post-operative diagnosis:   Successful extraction of the dislodged atrial lead. Successful implantation of a new atrial lead.  Complications: None.     Fluoroscopy time/dose: Please refer to the procedure log.    CONSULTS:      PT/OT    Electrophysiology for CHB, s/p PPM placement    Nephrology for post-op SCARLET    BRIEF HISTORY OF ILLNESS:  Maximino Birch is a 61 year old male with PMH of COPD on home O2 (3L/min at night), HFrEF (EF 40%), aortic stenosis, bicuspid aortic valve, ascending aortic arch aneurysm and single-vessel CAD s/p AVR with bovine valve, arch repair and CABGx1 SVG to RCA in 2013. He is now s/p redo sternotomy and AVR with On-X valve on 9/6 with Dr Hogue.    HOSPITAL COURSE: Maximino Birch is a 61 year old male who on 9/6/2022 underwent the above-named procedures and tolerated the operation well.     Postoperatively was admitted to the CVICU.  Patient was extubated within protocol. Blood pressure and cardiac index were managed with vasopressors and inotropic agents which were continuously weaned until no longer needed.     He developed complete heart block after surgery in the ICU and electrophysiology was consulted and a permanent pacemaker was placed before transferring out of the ICU. His atrial lead got dislodged and was revised prior to discharge, with good function. Patient was subsequently  transferred to the surgical telemetry floor.    While on the surgical unit, the patient continued to progress well. Chest tubes and temporary pacemaker wires were removed when deemed appropriate.     Patient was fluid overloaded and treated with diuretics. He remains at pre-op weight and will discharge with PTA diuretic amount; will re-evaluate need in clinic follow-up.      Patient was transiently hyperglycemic and treated  with insulin infusion then transitioned to sliding scale insulin per protocol. Blood sugars remained stable. No further glycemic control agents needed at this time.     Prior to discharge, his pain was controlled well, he was working well with therapies, able to perform most ADLs, ambulate without difficulty, and had full return of bowel and bladder function.  On September 16, 2022, he was discharged to home in stable condition. Follow up with cardiology and cardiac surgery have been arranged. Pt encouraged to follow up with PCP and cardiac rehab upon discharge.    Patient discharged on aspirin:  Yes 81 mg  Patient discharged on beta blocker: yes  Patient discharged on ACE Inhibitor/ARB: yes  Patient discharged on statin: yes         Discharge Disposition:     Discharge to home            Condition on Discharge:     Discharge condition: stable   Discharge vitals:    Code status on discharge: full     Vitals:    09/13/22 0334 09/14/22 0351 09/15/22 0500   Weight: 94 kg (207 lb 3.2 oz) 94.2 kg (207 lb 10.8 oz) 96 kg (211 lb 10.3 oz)       DAY OF DISCHARGE PHYSICAL EXAM:    Gen: A&Ox4, NAD  Neuro: no focal deficits   CV: RRR, normal S1 S2, no murmurs, rubs or gallops  Pulm: CTA,no wheezing or rhonchi, normal breathing on RA  Abd: nondistended, normal BS, soft, nontender  Ext: No peripheral edema  Incision: clean, dry, intact, no erythema, sternum stable  Tubes/drain sites: dressing clean and dry    Most Recent 3 CBC's:  Recent Labs   Lab Test 09/15/22  0703 09/14/22  0707 09/13/22  2040 09/13/22  1313 09/13/22  0514   WBC 8.5 8.8  --   --  11.6*   HGB 7.7* 7.9*  7.9* 7.8*   < > 8.3*   MCV 95 97  --   --  96    273  --   --  224    < > = values in this interval not displayed.      Most Recent 3 BMP's:  Recent Labs   Lab Test 09/15/22  0741 09/15/22  0703 09/14/22  2251 09/14/22  0738 09/14/22  0707 09/13/22  2219 09/13/22  2040 09/13/22  0849 09/13/22  0514   NA  --  135*  --   --  137  --   --   --  134*    POTASSIUM  --  3.8  --   --  3.7  --  3.9   < > 3.4   CHLORIDE  --  97*  --   --  98  --   --   --  94*   CO2  --  33*  --   --  34*  --   --   --  34*   BUN  --  17.4  --   --  15.7  --   --   --  14.9   CR  --  0.98  --   --  0.94  --   --   --  0.85   ANIONGAP  --  5*  --   --  5*  --   --   --  6*   ISREAL  --  8.1*  --   --  8.2*  --   --   --  8.1*   GLC 96 87 128*   < > 84   < >  --    < > 107*    < > = values in this interval not displayed.     Most Recent 2 LFT's:  Recent Labs   Lab Test 09/12/22  0439 09/11/22  0354   AST 38 29   ALT 23 15   ALKPHOS 92 80   BILITOTAL 0.8 0.7     Most Recent INR's and Anticoagulation Dosing History:  Anticoagulation Dose History     Recent Dosing and Labs Latest Ref Rng & Units 9/9/2022 9/10/2022 9/11/2022 9/12/2022 9/13/2022 9/14/2022 9/15/2022    Warfarin 1 mg - - 1 mg - - - - -    Warfarin 2.5 mg - - - 2.5 mg - - - -    Warfarin 3 mg - - - - 3 mg - - -    Warfarin 5 mg - - - - - 5 mg 5 mg -    INR 0.85 - 1.15 3.79(H) 2.44(H) 1.87(H) 1.71(H) 1.60(H) 1.91(H) 2.22(H)        Most Recent 3 Troponin's:No lab results found.  Most Recent Cholesterol Panel:  Recent Labs   Lab Test 04/19/22  0838   TRIG 60     Most Recent 6 Bacteria Isolates From Any Culture (See EPIC Reports for Culture Details):No lab results found.  Most Recent TSH, T4 and A1c Labs:  Recent Labs   Lab Test 08/18/22  1119   A1C 6.1*      Recent Labs   Lab 09/15/22  0741 09/15/22  0703 09/14/22  2251 09/14/22  1740 09/14/22  1324 09/14/22  0738   GLC 96 87 128* 116* 100* 100*       Imaging:  PA and lateral chest 9/16/22     HISTORY: Status post pacer.     COMPARISON STUDY: 9/15/2022     FINDINGS: Left transvenous dual-lead. Cardiac silhouette is large.  Median sternotomy wires. Moderate right pleural effusion, small left  pleural effusion. Aortic valve replacement.                                                                      IMPRESSION: Dual lead pacer in the RV and RA.     LEE JOY MD        PRE-ADMISSION MEDICATIONS:  Medications Prior to Admission   Medication Sig Dispense Refill Last Dose     albuterol (PROAIR HFA/PROVENTIL HFA/VENTOLIN HFA) 108 (90 Base) MCG/ACT inhaler Inhale 2 puffs into the lungs every 4 hours as needed   Past Week at Unknown time     aspirin 81 MG EC tablet Take 81 mg by mouth daily   9/5/2022 at Unknown time     atorvastatin (LIPITOR) 80 MG tablet Take 80 mg by mouth every evening   9/5/2022 at 2100     budesonide-formoterol (SYMBICORT) 160-4.5 MCG/ACT Inhaler Inhale 2 puffs into the lungs 2 times daily   9/6/2022 at Unknown time     evolocumab (REPATHA SURECLICK) 140 MG/ML prefilled autoinjector Inject 140 mg Subcutaneous every 14 days   8/30/2022 at Unknown time     ibuprofen (ADVIL/MOTRIN) 200 MG tablet Take 200 mg by mouth every 4 hours as needed for mild pain   Past Month at Unknown time     lisinopril (ZESTRIL) 5 MG tablet Take 5 mg by mouth every morning   9/6/2022 at Unknown time     metoprolol succinate ER (TOPROL XL) 50 MG 24 hr tablet Take 50 mg by mouth every morning   9/6/2022 at Unknown time     nitroGLYcerin (NITROSTAT) 0.4 MG sublingual tablet Place under the tongue every 5 minutes as needed   More than a month at Unknown time     potassium chloride ER (KLOR-CON M) 20 MEQ CR tablet Take 60 mEq by mouth every morning   9/6/2022 at Unknown time     torsemide (DEMADEX) 20 MG tablet Take 60 mg by mouth every morning   9/6/2022 at Unknown time       DISCHARGE MEDICATIONS:   Current Discharge Medication List      CONTINUE these medications which have NOT CHANGED    Details   albuterol (PROAIR HFA/PROVENTIL HFA/VENTOLIN HFA) 108 (90 Base) MCG/ACT inhaler Inhale 2 puffs into the lungs every 4 hours as needed      aspirin 81 MG EC tablet Take 81 mg by mouth daily      atorvastatin (LIPITOR) 80 MG tablet Take 80 mg by mouth every evening      budesonide-formoterol (SYMBICORT) 160-4.5 MCG/ACT Inhaler Inhale 2 puffs into the lungs 2 times daily      evolocumab  (REPATHA SURECLICK) 140 MG/ML prefilled autoinjector Inject 140 mg Subcutaneous every 14 days      ibuprofen (ADVIL/MOTRIN) 200 MG tablet Take 200 mg by mouth every 4 hours as needed for mild pain      lisinopril (ZESTRIL) 5 MG tablet Take 5 mg by mouth every morning      metoprolol succinate ER (TOPROL XL) 50 MG 24 hr tablet Take 50 mg by mouth every morning      nitroGLYcerin (NITROSTAT) 0.4 MG sublingual tablet Place under the tongue every 5 minutes as needed      potassium chloride ER (KLOR-CON M) 20 MEQ CR tablet Take 60 mEq by mouth every morning      torsemide (DEMADEX) 20 MG tablet Take 60 mg by mouth every morning              CC:Gisel Osorio      [facility]   Cardiothoracic Surgery  Office phone: [phone]  Office fax: [phone]

## 2022-09-15 NOTE — PROGRESS NOTES
Care Coordinator  D/I: Arash Price,KRISTINA CVTS plan for discharge tomorrow. Per Yue and OT pt needs OP lymphedema therapy(try to begin early next week) and OP CR.  P: I called [facility] OP PT clinic: [address] [place], [place]  Ph: [phone] Alexa Fax: [phone] and made appointment on 9/26/22 @ 1:30pm  ---their only therapist is out for 1 week  --I fax'd facesheet and MD notes and OT notes    I also called [facility] Ph: [phone] and they are booked out until 9/26, so went with [facility].    [facility] IP OT will have to teach wife(is an RN) lymphedema wrapping.    OP .[phone]  TrishFax: [phone] --I have fax'd his facesheet and MD note--they will call to schedule him when they receive his discharge orders.    Also need to fax discharge orders to INR clinic at [facility]--needs INR Monday, 9/19.    I have called and informed pt and he is aware and grateful.  He is mad that he is suppose to have a lead revision today and he is NOT on the schedule. I asked cvts NP Socorro Lopez to help me and she found out that ---  Per EP service: Vivien he is on for 0800 tomorrow morning and they are to inform pt. I will call hannah QUINONES.

## 2022-09-15 NOTE — PLAN OF CARE
Neuro: A&Ox4.   Cardiac: 100% V-paced. HR-60. SBPs 100-120s. Denied dizziness. CT dressing CDI.  Respiratory: Sating >92% on RA. Denies SOB  GI/: Adequate urine output. BM x1 overnight. Denies N/V.  Diet/appetite: NPO after midnight except clear liquids up to 2 hours before cath lab today at 14:00.  Activity:  SBA. Steady.  Pain: Pain at incision sites; worsens with activity. Managed with PRN Tylenol given x2.  Skin: No new deficits noted. Sternal incision approximated, CDI. L Chest dressing and R groin dressing, and CT dressings CDI.   LDA's: R DL PICC.

## 2022-09-16 ENCOUNTER — APPOINTMENT (OUTPATIENT)
Dept: OCCUPATIONAL THERAPY | Facility: CLINIC | Age: 62
End: 2022-09-16
Attending: SURGERY
Payer: COMMERCIAL

## 2022-09-16 ENCOUNTER — ANCILLARY PROCEDURE (OUTPATIENT)
Dept: CARDIOLOGY | Facility: CLINIC | Age: 62
End: 2022-09-16
Attending: NURSE PRACTITIONER
Payer: COMMERCIAL

## 2022-09-16 ENCOUNTER — APPOINTMENT (OUTPATIENT)
Dept: GENERAL RADIOLOGY | Facility: CLINIC | Age: 62
End: 2022-09-16
Attending: NURSE PRACTITIONER
Payer: COMMERCIAL

## 2022-09-16 VITALS
HEART RATE: 76 BPM | DIASTOLIC BLOOD PRESSURE: 84 MMHG | RESPIRATION RATE: 20 BRPM | TEMPERATURE: 98.4 F | SYSTOLIC BLOOD PRESSURE: 114 MMHG | HEIGHT: 72 IN | BODY MASS INDEX: 28.67 KG/M2 | OXYGEN SATURATION: 96 % | WEIGHT: 211.64 LBS

## 2022-09-16 DIAGNOSIS — I44.2 ATRIOVENTRICULAR BLOCK, COMPLETE (H): Primary | ICD-10-CM

## 2022-09-16 LAB
ANION GAP SERPL CALCULATED.3IONS-SCNC: 6 MMOL/L (ref 7–15)
ATRIAL RATE - MUSE: 104 BPM
ATRIAL RATE - MUSE: 93 BPM
BUN SERPL-MCNC: 14.5 MG/DL (ref 8–23)
CALCIUM SERPL-MCNC: 7.9 MG/DL (ref 8.6–10)
CHLORIDE SERPL-SCNC: 94 MMOL/L (ref 98–107)
CREAT SERPL-MCNC: 0.97 MG/DL (ref 0.67–1.17)
DEPRECATED HCO3 PLAS-SCNC: 32 MMOL/L (ref 22–29)
DIASTOLIC BLOOD PRESSURE - MUSE: NORMAL MMHG
DIASTOLIC BLOOD PRESSURE - MUSE: NORMAL MMHG
ERYTHROCYTE [DISTWIDTH] IN BLOOD BY AUTOMATED COUNT: 15.3 % (ref 10–15)
GFR SERPL CREATININE-BSD FRML MDRD: 90 ML/MIN/1.73M2
GLUCOSE BLDC GLUCOMTR-MCNC: 83 MG/DL (ref 70–99)
GLUCOSE SERPL-MCNC: 89 MG/DL (ref 70–99)
HCT VFR BLD AUTO: 24.9 % (ref 40–53)
HGB BLD-MCNC: 7.9 G/DL (ref 13.3–17.7)
INR PPP: 2.83 (ref 0.85–1.15)
INTERPRETATION ECG - MUSE: NORMAL
INTERPRETATION ECG - MUSE: NORMAL
MAGNESIUM SERPL-MCNC: 2 MG/DL (ref 1.7–2.3)
MCH RBC QN AUTO: 31 PG (ref 26.5–33)
MCHC RBC AUTO-ENTMCNC: 31.7 G/DL (ref 31.5–36.5)
MCV RBC AUTO: 98 FL (ref 78–100)
MDC_IDC_LEAD_IMPLANT_DT: NORMAL
MDC_IDC_LEAD_IMPLANT_DT: NORMAL
MDC_IDC_LEAD_LOCATION: NORMAL
MDC_IDC_LEAD_LOCATION: NORMAL
MDC_IDC_LEAD_LOCATION_DETAIL_1: NORMAL
MDC_IDC_LEAD_LOCATION_DETAIL_1: NORMAL
MDC_IDC_LEAD_MFG: NORMAL
MDC_IDC_LEAD_MFG: NORMAL
MDC_IDC_LEAD_MODEL: NORMAL
MDC_IDC_LEAD_MODEL: NORMAL
MDC_IDC_LEAD_POLARITY_TYPE: NORMAL
MDC_IDC_LEAD_POLARITY_TYPE: NORMAL
MDC_IDC_LEAD_SERIAL: NORMAL
MDC_IDC_LEAD_SERIAL: NORMAL
MDC_IDC_MSMT_BATTERY_DTM: NORMAL
MDC_IDC_MSMT_BATTERY_REMAINING_LONGEVITY: 132 MO
MDC_IDC_MSMT_BATTERY_REMAINING_PERCENTAGE: 100 %
MDC_IDC_MSMT_BATTERY_STATUS: NORMAL
MDC_IDC_MSMT_LEADCHNL_RA_IMPEDANCE_VALUE: 561 OHM
MDC_IDC_MSMT_LEADCHNL_RA_PACING_THRESHOLD_AMPLITUDE: 0.4 V
MDC_IDC_MSMT_LEADCHNL_RA_PACING_THRESHOLD_PULSEWIDTH: 0.4 MS
MDC_IDC_MSMT_LEADCHNL_RA_SENSING_INTR_AMPL: 4.7 MV
MDC_IDC_MSMT_LEADCHNL_RV_IMPEDANCE_VALUE: 783 OHM
MDC_IDC_MSMT_LEADCHNL_RV_PACING_THRESHOLD_AMPLITUDE: 0.7 V
MDC_IDC_MSMT_LEADCHNL_RV_PACING_THRESHOLD_PULSEWIDTH: 0.4 MS
MDC_IDC_MSMT_LEADCHNL_RV_SENSING_INTR_AMPL: 17.7 MV
MDC_IDC_PG_IMPLANT_DTM: NORMAL
MDC_IDC_PG_MFG: NORMAL
MDC_IDC_PG_MODEL: NORMAL
MDC_IDC_PG_SERIAL: NORMAL
MDC_IDC_PG_TYPE: NORMAL
MDC_IDC_SESS_CLINIC_NAME: NORMAL
MDC_IDC_SESS_DTM: NORMAL
MDC_IDC_SESS_TYPE: NORMAL
MDC_IDC_SET_BRADY_AT_MODE_SWITCH_MODE: NORMAL
MDC_IDC_SET_BRADY_AT_MODE_SWITCH_RATE: 170 {BEATS}/MIN
MDC_IDC_SET_BRADY_LOWRATE: 60 {BEATS}/MIN
MDC_IDC_SET_BRADY_MAX_SENSOR_RATE: 130 {BEATS}/MIN
MDC_IDC_SET_BRADY_MAX_TRACKING_RATE: 130 {BEATS}/MIN
MDC_IDC_SET_BRADY_MODE: NORMAL
MDC_IDC_SET_BRADY_PAV_DELAY_HIGH: 200 MS
MDC_IDC_SET_BRADY_PAV_DELAY_LOW: 250 MS
MDC_IDC_SET_BRADY_SAV_DELAY_HIGH: 200 MS
MDC_IDC_SET_BRADY_SAV_DELAY_LOW: 250 MS
MDC_IDC_SET_LEADCHNL_RA_PACING_AMPLITUDE: 2 V
MDC_IDC_SET_LEADCHNL_RA_PACING_CAPTURE_MODE: NORMAL
MDC_IDC_SET_LEADCHNL_RA_PACING_POLARITY: NORMAL
MDC_IDC_SET_LEADCHNL_RA_PACING_PULSEWIDTH: 0.4 MS
MDC_IDC_SET_LEADCHNL_RA_SENSING_ADAPTATION_MODE: NORMAL
MDC_IDC_SET_LEADCHNL_RA_SENSING_POLARITY: NORMAL
MDC_IDC_SET_LEADCHNL_RA_SENSING_SENSITIVITY: 0.5 MV
MDC_IDC_SET_LEADCHNL_RV_PACING_AMPLITUDE: 1.2 V
MDC_IDC_SET_LEADCHNL_RV_PACING_CAPTURE_MODE: NORMAL
MDC_IDC_SET_LEADCHNL_RV_PACING_POLARITY: NORMAL
MDC_IDC_SET_LEADCHNL_RV_PACING_PULSEWIDTH: 0.4 MS
MDC_IDC_SET_LEADCHNL_RV_SENSING_ADAPTATION_MODE: NORMAL
MDC_IDC_SET_LEADCHNL_RV_SENSING_POLARITY: NORMAL
MDC_IDC_SET_LEADCHNL_RV_SENSING_SENSITIVITY: 2.5 MV
MDC_IDC_SET_ZONE_DETECTION_INTERVAL: 375 MS
MDC_IDC_SET_ZONE_TYPE: NORMAL
MDC_IDC_SET_ZONE_VENDOR_TYPE: NORMAL
MDC_IDC_STAT_BRADY_DTM_END: NORMAL
MDC_IDC_STAT_BRADY_DTM_START: NORMAL
MDC_IDC_STAT_BRADY_RA_PERCENT_PACED: 0 %
MDC_IDC_STAT_BRADY_RV_PERCENT_PACED: 98 %
MDC_IDC_STAT_EPISODE_RECENT_COUNT: 0
MDC_IDC_STAT_EPISODE_RECENT_COUNT_DTM_END: NORMAL
MDC_IDC_STAT_EPISODE_RECENT_COUNT_DTM_START: NORMAL
MDC_IDC_STAT_EPISODE_TYPE: NORMAL
MDC_IDC_STAT_EPISODE_VENDOR_TYPE: NORMAL
P AXIS - MUSE: NORMAL DEGREES
P AXIS - MUSE: NORMAL DEGREES
PLATELET # BLD AUTO: 363 10E3/UL (ref 150–450)
POTASSIUM SERPL-SCNC: 3.6 MMOL/L (ref 3.4–5.3)
PR INTERVAL - MUSE: 208 MS
PR INTERVAL - MUSE: NORMAL MS
QRS DURATION - MUSE: 172 MS
QRS DURATION - MUSE: 180 MS
QT - MUSE: 424 MS
QT - MUSE: 496 MS
QTC - MUSE: 557 MS
QTC - MUSE: 616 MS
R AXIS - MUSE: -80 DEGREES
R AXIS - MUSE: -81 DEGREES
RBC # BLD AUTO: 2.55 10E6/UL (ref 4.4–5.9)
SODIUM SERPL-SCNC: 132 MMOL/L (ref 136–145)
SYSTOLIC BLOOD PRESSURE - MUSE: NORMAL MMHG
SYSTOLIC BLOOD PRESSURE - MUSE: NORMAL MMHG
T AXIS - MUSE: 85 DEGREES
T AXIS - MUSE: 88 DEGREES
VENTRICULAR RATE- MUSE: 104 BPM
VENTRICULAR RATE- MUSE: 93 BPM
WBC # BLD AUTO: 8 10E3/UL (ref 4–11)

## 2022-09-16 PROCEDURE — 250N000011 HC RX IP 250 OP 636: Performed by: NURSE PRACTITIONER

## 2022-09-16 PROCEDURE — 36592 COLLECT BLOOD FROM PICC: CPT | Performed by: NURSE PRACTITIONER

## 2022-09-16 PROCEDURE — 97535 SELF CARE MNGMENT TRAINING: CPT | Mod: GO | Performed by: OCCUPATIONAL THERAPIST

## 2022-09-16 PROCEDURE — 93280 PM DEVICE PROGR EVAL DUAL: CPT | Mod: 26 | Performed by: INTERNAL MEDICINE

## 2022-09-16 PROCEDURE — 71046 X-RAY EXAM CHEST 2 VIEWS: CPT | Mod: 26 | Performed by: RADIOLOGY

## 2022-09-16 PROCEDURE — 250N000013 HC RX MED GY IP 250 OP 250 PS 637: Performed by: SURGERY

## 2022-09-16 PROCEDURE — 250N000013 HC RX MED GY IP 250 OP 250 PS 637: Performed by: NURSE PRACTITIONER

## 2022-09-16 PROCEDURE — 250N000013 HC RX MED GY IP 250 OP 250 PS 637: Performed by: PHYSICIAN ASSISTANT

## 2022-09-16 PROCEDURE — 97140 MANUAL THERAPY 1/> REGIONS: CPT | Mod: GO

## 2022-09-16 PROCEDURE — 85610 PROTHROMBIN TIME: CPT | Performed by: NURSE PRACTITIONER

## 2022-09-16 PROCEDURE — 85027 COMPLETE CBC AUTOMATED: CPT | Performed by: NURSE PRACTITIONER

## 2022-09-16 PROCEDURE — 80048 BASIC METABOLIC PNL TOTAL CA: CPT | Performed by: NURSE PRACTITIONER

## 2022-09-16 PROCEDURE — 71046 X-RAY EXAM CHEST 2 VIEWS: CPT

## 2022-09-16 PROCEDURE — 93280 PM DEVICE PROGR EVAL DUAL: CPT

## 2022-09-16 PROCEDURE — 93010 ELECTROCARDIOGRAM REPORT: CPT | Mod: XU | Performed by: INTERNAL MEDICINE

## 2022-09-16 PROCEDURE — 93005 ELECTROCARDIOGRAM TRACING: CPT

## 2022-09-16 PROCEDURE — 83735 ASSAY OF MAGNESIUM: CPT | Performed by: SURGERY

## 2022-09-16 RX ORDER — GABAPENTIN 100 MG/1
100 CAPSULE ORAL 3 TIMES DAILY PRN
Qty: 42 CAPSULE | Refills: 0 | Status: SHIPPED | OUTPATIENT
Start: 2022-09-16 | End: 2022-09-30

## 2022-09-16 RX ORDER — AMOXICILLIN 250 MG
1 CAPSULE ORAL 2 TIMES DAILY PRN
COMMUNITY
Start: 2022-09-16

## 2022-09-16 RX ORDER — POLYETHYLENE GLYCOL 3350 17 G/17G
17 POWDER, FOR SOLUTION ORAL DAILY
Qty: 510 G | Refills: 0 | Status: SHIPPED | OUTPATIENT
Start: 2022-09-16 | End: 2022-10-16

## 2022-09-16 RX ORDER — WARFARIN SODIUM 2.5 MG/1
2.5 TABLET ORAL
Status: DISCONTINUED | OUTPATIENT
Start: 2022-09-16 | End: 2022-09-16 | Stop reason: HOSPADM

## 2022-09-16 RX ORDER — WARFARIN SODIUM 5 MG/1
5 TABLET ORAL DAILY
Qty: 30 TABLET | Refills: 0 | Status: SHIPPED | OUTPATIENT
Start: 2022-09-16 | End: 2022-09-16

## 2022-09-16 RX ORDER — CEPHALEXIN 500 MG/1
500 CAPSULE ORAL 3 TIMES DAILY
Qty: 15 CAPSULE | Refills: 0 | Status: SHIPPED | OUTPATIENT
Start: 2022-09-16 | End: 2022-09-21

## 2022-09-16 RX ORDER — WARFARIN SODIUM 3 MG/1
3 TABLET ORAL DAILY
Qty: 30 TABLET | Refills: 0 | Status: SHIPPED | OUTPATIENT
Start: 2022-09-16

## 2022-09-16 RX ORDER — PANTOPRAZOLE SODIUM 40 MG/1
40 TABLET, DELAYED RELEASE ORAL
Qty: 30 TABLET | Refills: 0 | Status: SHIPPED | OUTPATIENT
Start: 2022-09-16 | End: 2022-10-16

## 2022-09-16 RX ORDER — POTASSIUM CHLORIDE 750 MG/1
20 TABLET, EXTENDED RELEASE ORAL ONCE
Status: COMPLETED | OUTPATIENT
Start: 2022-09-16 | End: 2022-09-16

## 2022-09-16 RX ORDER — METHOCARBAMOL 750 MG/1
750 TABLET, FILM COATED ORAL EVERY 6 HOURS PRN
Qty: 30 TABLET | Refills: 0 | Status: SHIPPED | OUTPATIENT
Start: 2022-09-16 | End: 2022-09-30

## 2022-09-16 RX ORDER — ACETAMINOPHEN 325 MG/1
650 TABLET ORAL EVERY 4 HOURS PRN
COMMUNITY
Start: 2022-09-16

## 2022-09-16 RX ORDER — OXYCODONE HYDROCHLORIDE 5 MG/1
5 TABLET ORAL EVERY 6 HOURS PRN
Qty: 10 TABLET | Refills: 0 | Status: SHIPPED | OUTPATIENT
Start: 2022-09-16 | End: 2022-09-23

## 2022-09-16 RX ADMIN — LISINOPRIL 5 MG: 2.5 TABLET ORAL at 07:48

## 2022-09-16 RX ADMIN — SODIUM CHLORIDE, PRESERVATIVE FREE 10 ML: 5 INJECTION INTRAVENOUS at 04:24

## 2022-09-16 RX ADMIN — FLUTICASONE FUROATE AND VILANTEROL TRIFENATATE 1 PUFF: 200; 25 POWDER RESPIRATORY (INHALATION) at 07:49

## 2022-09-16 RX ADMIN — CEPHALEXIN 500 MG: 500 CAPSULE ORAL at 10:44

## 2022-09-16 RX ADMIN — GABAPENTIN 100 MG: 100 CAPSULE ORAL at 07:48

## 2022-09-16 RX ADMIN — POTASSIUM CHLORIDE 20 MEQ: 750 TABLET, EXTENDED RELEASE ORAL at 06:42

## 2022-09-16 RX ADMIN — TORSEMIDE 60 MG: 20 TABLET ORAL at 07:48

## 2022-09-16 RX ADMIN — PANTOPRAZOLE SODIUM 40 MG: 40 TABLET, DELAYED RELEASE ORAL at 07:48

## 2022-09-16 RX ADMIN — ASPIRIN 81 MG CHEWABLE TABLET 81 MG: 81 TABLET CHEWABLE at 07:59

## 2022-09-16 RX ADMIN — ACETAMINOPHEN 650 MG: 325 TABLET, FILM COATED ORAL at 04:24

## 2022-09-16 ASSESSMENT — ACTIVITIES OF DAILY LIVING (ADL)
ADLS_ACUITY_SCORE: 24

## 2022-09-16 NOTE — PLAN OF CARE
Problem: Risk for Delirium  Goal: Optimal Coping  Outcome: Met  Goal: Improved Behavioral Control  Outcome: Met  Goal: Improved Attention and Thought Clarity  Outcome: Met  Goal: Improved Sleep  Outcome: Met     Problem: Gas Exchange Impaired  Goal: Optimal Gas Exchange  Outcome: Met     Problem: Activity Intolerance (Cardiovascular Surgery)  Goal: Improved Activity Tolerance  Outcome: Met     Problem: Adjustment to Surgery (Cardiovascular Surgery)  Goal: Optimal Coping with Heart Surgery  Outcome: Met     Problem: Bleeding (Cardiovascular Surgery)  Goal: Bleeding (Cardiovascular Surgery)  Outcome: Met     Problem: Bowel Motility Impaired (Cardiovascular Surgery)  Goal: Effective Bowel Elimination (Cardiovascular Surgery)  Outcome: Met     Problem: Cardiac Function Impaired (Cardiovascular Surgery)  Goal: Effective Cardiac Function  Outcome: Met     Problem: Cerebral Tissue Perfusion (Cardiovascular Surgery)  Goal: Optimal Cerebral Tissue Perfusion (Cardiovascular Surgery)  Outcome: Met     Problem: Fluid and Electrolyte Imbalance (Cardiovascular Surgery)  Goal: Fluid and Electrolyte Balance (Cardiovascular Surgery)  Outcome: Met     Problem: Glycemic Control Impaired (Cardiovascular Surgery)  Goal: Blood Glucose Level Within Targeted Range (Cardiovascular Surgery)  Outcome: Met     Problem: Infection (Cardiovascular Surgery)  Goal: Absence of Infection Signs and Symptoms  Outcome: Met  Intervention: Prevent or Manage Infection  Recent Flowsheet Documentation  Taken 9/16/2022 0840 by Laurel Agrawal, RN  Infection Prevention:   environmental surveillance performed   hand hygiene promoted   single patient room provided   rest/sleep promoted     Problem: Ongoing Anesthesia Effects (Cardiovascular Surgery)  Goal: Anesthesia/Sedation Recovery  Outcome: Met  Intervention: Optimize Anesthesia Recovery  Recent Flowsheet Documentation  Taken 9/16/2022 0840 by Laurel Agrawal, RN  Safety Promotion/Fall Prevention:   activity  "supervised   clutter free environment maintained   increased rounding and observation   fall prevention program maintained   increase visualization of patient   lighting adjusted     Problem: Pain (Cardiovascular Surgery)  Goal: Acceptable Pain Control  Outcome: Met     Problem: Postoperative Nausea and Vomiting (Cardiovascular Surgery)  Goal: Nausea and Vomiting Relief (Cardiovascular Surgery)  Outcome: Met     Problem: Postoperative Urinary Retention (Cardiovascular Surgery)  Goal: Effective Urinary Elimination (Cardiovascular Surgery)  Outcome: Met     Problem: Respiratory Compromise (Cardiovascular Surgery)  Goal: Effective Oxygenation and Ventilation (Cardiovascular Surgery)  Outcome: Met  Intervention: Promote Airway Secretion Clearance  Recent Flowsheet Documentation  Taken 9/16/2022 0840 by Laurel Agrawal, RN  Cough And Deep Breathing: done with encouragement     Problem: Plan of Care - These are the overarching goals to be used throughout the patient stay.    Goal: Plan of Care Review/Shift Note  Description: The Plan of Care Review/Shift note should be completed every shift.  The Outcome Evaluation is a brief statement about your assessment that the patient is improving, declining, or no change.  This information will be displayed automatically on your shift note.  Outcome: Met  Goal: Patient-Specific Goal (Individualized)  Description: You can add care plan individualizations to a care plan. Examples of Individualization might be:  \"Parent requests to be called daily at 9am for status\", \"I have a hard time hearing out of my right ear\", or \"Do not touch me to wake me up as it startles me\".  Outcome: Met  Goal: Absence of Hospital-Acquired Illness or Injury  Outcome: Met  Intervention: Identify and Manage Fall Risk  Recent Flowsheet Documentation  Taken 9/16/2022 0840 by Laurel Agrawal, STACIE  Safety Promotion/Fall Prevention:   activity supervised   clutter free environment maintained   increased rounding and " observation   fall prevention program maintained   increase visualization of patient   lighting adjusted  Intervention: Prevent and Manage VTE (Venous Thromboembolism) Risk  Recent Flowsheet Documentation  Taken 9/16/2022 0840 by Laurel Agrawal RN  VTE Prevention/Management:   SCDs (sequential compression devices) off   patient refused intervention  Activity Management:   activity adjusted per tolerance   activity encouraged  Intervention: Prevent Infection  Recent Flowsheet Documentation  Taken 9/16/2022 0840 by Laurel Agrawal RN  Infection Prevention:   environmental surveillance performed   hand hygiene promoted   single patient room provided   rest/sleep promoted  Goal: Optimal Comfort and Wellbeing  Outcome: Met  Intervention: Provide Person-Centered Care  Recent Flowsheet Documentation  Taken 9/16/2022 0840 by Laurel Agrawal RN  Trust Relationship/Rapport:   care explained   choices provided   emotional support provided   empathic listening provided   questions answered   questions encouraged   reassurance provided   thoughts/feelings acknowledged  Goal: Readiness for Transition of Care  Outcome: Met   Goal Outcome Evaluation:

## 2022-09-16 NOTE — PROGRESS NOTES
.DISCHARGE                         9/16/2022 11:49 AM  ----------------------------------------------------------------------------  Discharged to: Home  Via: private transportation  Accompanied by: Family  Discharge Instructions: cardiac diet, no strenuous activity for 4 months, new medications (wafarin and torsemide),  follow up appointments, when to call the MD, aftercare instructions.  Prescriptions: To be filled by discharge   pharmacy; medication list reviewed & sent with pt  Follow Up Appointments: arranged; information given  Belongings: All sent with pt  PICC removed at 0920 - pt placed in supine, removed with catheter intact, pt laid flat for 20 min. No bleeding observed.   Telemetry: d/c'd  Pt exhibits understanding of above discharge instructions; all questions answered.    Discharge Paperwork: Signed, copied, and sent home with patient.

## 2022-09-16 NOTE — PLAN OF CARE
Plan of Care [id number]  Neuro:  Aox4  CVC:  % paced, HR in 70-90s  SBPs soft in 80-100s, MAPs remained over 65. Team aware  Resp:  Satting >95% on RA  GI:  No BM this shift   :  AUO in urinal  Diet/Appetite:  Tolerating regular diet   Activity:  SBA  Pain:  Prn tylenol and robaxin used for pain  Skin:  Sternotomy site. Abd bandage CDI. No new skin deficits noted   LDA:  R PICC in place  Plan:   Continue with POC, notify primary team with any changes in pt condition

## 2022-09-16 NOTE — PLAN OF CARE
Cardiac Rehab Discharge Summary    Reason for therapy discharge:    All goals and outcomes met, no further needs identified.    Progress towards therapy goal(s). See goals on Care Plan in [facility] electronic health record for goal details.  Pt. S/I with BADLs and functional mobility, amb. Without AD.     Therapy recommendation(s):    Continued therapy is recommended.  Rationale/Recommendations:  Continue with OP CR to increase act.twan./str. within post op prec.

## 2022-09-16 NOTE — PROGRESS NOTES
Care Management Discharge Note    Discharge Date: 09/16/2022     Discharge Disposition: Home    Discharge Services:  OP CR, INR.     Discharge DME: None noted.     Discharge Transportation: family or friend will provide    Private pay costs discussed: Not applicable    Education Provided on the Discharge Plan:  Yes  Persons Notified of Discharge Plans: Patient, OP cardiology   Patient/Family in Agreement with the Plan:  Yes    Handoff Referral Completed: Yes    Additional Information:  Patient will discharge today. Daughter and wife present at the bedside, will transport. INR referral faxed to patient's cardiology clinic ([facility], [facility]), writer confirmed the fax was received, the clinic will get orders from the provider and call the patient to schedule an INR for Monday. Patient and family updated. No additional discharge needs noted.     Anticoagulation Clinic:   Phone: [phone]     OP Cardiologist (will order INR and OP CR)  Phone: [phone]  Fax: [phone]    [facility] OP PT/lymph therapy (appt on 9/26/22 @ 1:30pm)  [address], [place]  Ph: [phone]  Fax: [phone]    Janey Carrasquillo, STACIECC, BSN    [facility]    [address]  [address] [place] [address]    [email or web address]    Office: [phone] Pager: [phone]    To contact the weekend RNCC  [place] (0800  1630) Saturday and Sunday    Units: 4A, 4C, 4E, 5A and 5B- Pager 1: [phone]    Units: 6A, 6B, 6C, 6D- Pager 2: [phone]    Units: 7A, 7B, 7C, 7D, and 5C-Pager 3: [phone]

## 2022-09-18 ENCOUNTER — PATIENT OUTREACH (OUTPATIENT)
Dept: CARE COORDINATION | Facility: CLINIC | Age: 62
End: 2022-09-18

## 2022-09-18 NOTE — PROGRESS NOTES
"Clinic Care Coordination Contact  [facility]: Post-Discharge Note  SITUATION                                                      Admission:    Admission Date: 09/06/22   Reason for Admission:   Severe prosthetic Aortic valve insufficiency, s/p redo sternotomy, redo aortic valve replacement  Discharge:   Discharge Date: 09/16/22  Discharge Diagnosis:   Severe prosthetic Aortic valve insufficiency, s/p redo sternotomy, redo aortic valve replacement    BACKGROUND                                                      Per hospital discharge summary and inpatient provider notes:    Maximino Birch is a 61 year old male with PMH of COPD on home O2 (3L/min at night), HFrEF (EF 40%), aortic stenosis, bicuspid aortic valve, ascending aortic arch aneurysm and single-vessel CAD s/p AVR with bovine valve, arch repair and CABGx1 SVG to RCA in 2013. He is now s/p redo sternotomy and AVR with On-X valve on 9/6 with Dr Hogue.  ASSESSMENT           Discharge Assessment  How are you doing now that you are home?: \"I'm feeling pretty good\"  How are your symptoms? (Red Flag symptoms escalate to triage hotline per guidelines): Improved  Do you feel your condition is stable enough to be safe at home until your provider visit?: Yes  Does the patient have their discharge instructions? : Yes  Does the patient have questions regarding their discharge instructions? : No  Were you started on any new medications or were there changes to any of your previous medications? : Yes  Does the patient have all of their medications?: Yes  Do you have questions regarding any of your medications? : No  Do you have all of your needed medical supplies or equipment (DME)?  (i.e. oxygen tank, CPAP, cane, etc.): Yes  Discharge follow-up appointment scheduled within 14 calendar days? : Yes  Discharge Follow Up Appointment Date: 09/26/22  Discharge Follow Up Appointment Scheduled with?: Specialty Care Provider         Post-op (Clinicians Only)  Did " the patient have surgery or a procedure: Yes (Redo sternotomy, redo aortic valve replacement with a 23 mm On-X mechanical valve, right common femoral arterial and femoral cannulation for cardiopulmonary bypass, intraoperative NATY.)  Incision: closed  Drainage: Yes  Drainage Color:  (slight drainage from chest tube sites. Patient not sure of color but is not concerned)  Bleeding: none  Fever: No  Chills: No  Redness: No  Warmth: No  Swelling: No  Incision site pain: Yes (mild-treated with Tylenol. Trying to avoid oxycodone)  Closure: none  Eating & Drinking: eating and drinking without complaints/concerns  PO Intake: regular diet  Additional Symptoms:  (none)  Bowel Function: normal  Date of last BM: 09/18/22  Urinary Status: voiding without complaint/concerns    Patient continuing to elevate legs for lymphedema. Wife is doing wraps. Daughter who works in clinic is trying to get him wraps for upper thighs from her work. Patient feeling well, taking medications as prescribed and has no current concerns.     PLAN                                                      Outpatient Plan:  Discharge summary not yet complete. Patient has ICD remote follow up appointment 9-26. Thinks he has hospital follow up and lymphedema follow up appointments scheduled by wife, but doesn't have information handy at time of call.     Future Appointments   Date Time Provider Department Center   9/26/2022 12:00 AM UC ICD REMOTE [facility]         For any urgent concerns, please contact our 24 hour nurse triage line: [phone] ([phone])         Twyla Worthy RN  [facility]

## 2022-09-19 ENCOUNTER — ANTICOAGULATION VISIT (OUTPATIENT)
Dept: CARDIOLOGY | Facility: CLINIC | Age: 62
End: 2022-09-19
Payer: COMMERCIAL

## 2022-09-19 ENCOUNTER — APPOINTMENT (OUTPATIENT)
Dept: LAB | Facility: CLINIC | Age: 62
End: 2022-09-19
Payer: COMMERCIAL

## 2022-09-19 DIAGNOSIS — Z95.2 S/P AVR: Primary | ICD-10-CM

## 2022-09-19 DIAGNOSIS — Z95.2 PRESENCE OF PROSTHETIC HEART VALVE: Primary | ICD-10-CM

## 2022-09-19 DIAGNOSIS — Z79.01 ANTICOAGULATION MANAGEMENT ENCOUNTER: ICD-10-CM

## 2022-09-19 DIAGNOSIS — Z95.2 PRESENCE OF PROSTHETIC HEART VALVE: ICD-10-CM

## 2022-09-19 DIAGNOSIS — Z51.81 ANTICOAGULATION MANAGEMENT ENCOUNTER: ICD-10-CM

## 2022-09-19 LAB
INR PPP: 4.1
INR PPP: 4.1 (ref 0.9–1.1)

## 2022-09-19 PROCEDURE — 36416 COLLJ CAPILLARY BLOOD SPEC: CPT | Mod: NCNR | Performed by: INTERNAL MEDICINE

## 2022-09-19 PROCEDURE — 85610 PROTHROMBIN TIME: CPT | Performed by: INTERNAL MEDICINE

## 2022-09-19 NOTE — PROGRESS NOTES
1:48 spoke to pt verified ID pt is new to [facility] coag states he was on warfarin about 9 yrs ago no questions at this time, I was unable to find warfarin dosing before CA 9/17 pt reports taking 5mg warfarin daily mailed new pt teaching material. DMD      continue warfarin dose as above and recheck INR in one week. DMD

## 2022-09-21 ENCOUNTER — TELEPHONE (OUTPATIENT)
Dept: CARDIOLOGY | Facility: CLINIC | Age: 62
End: 2022-09-21
Payer: COMMERCIAL

## 2022-09-21 ENCOUNTER — OFFICE VISIT (OUTPATIENT)
Dept: FAMILY MEDICINE | Facility: CLINIC | Age: 62
End: 2022-09-21
Payer: COMMERCIAL

## 2022-09-21 ENCOUNTER — ANCILLARY PROCEDURE (OUTPATIENT)
Dept: CARDIOLOGY | Facility: CLINIC | Age: 62
End: 2022-09-21
Payer: COMMERCIAL

## 2022-09-21 ENCOUNTER — APPOINTMENT (OUTPATIENT)
Dept: LAB | Facility: CLINIC | Age: 62
End: 2022-09-21
Payer: COMMERCIAL

## 2022-09-21 VITALS
HEIGHT: 72 IN | TEMPERATURE: 98.6 F | RESPIRATION RATE: 22 BRPM | DIASTOLIC BLOOD PRESSURE: 56 MMHG | SYSTOLIC BLOOD PRESSURE: 108 MMHG | HEART RATE: 108 BPM | OXYGEN SATURATION: 96 % | WEIGHT: 185.6 LBS | BODY MASS INDEX: 25.14 KG/M2

## 2022-09-21 DIAGNOSIS — D64.9 ANEMIA, UNSPECIFIED TYPE: ICD-10-CM

## 2022-09-21 DIAGNOSIS — Z95.0 S/P PLACEMENT OF CARDIAC PACEMAKER: ICD-10-CM

## 2022-09-21 DIAGNOSIS — K59.00 CONSTIPATION, UNSPECIFIED CONSTIPATION TYPE: ICD-10-CM

## 2022-09-21 DIAGNOSIS — Z95.2 S/P AVR: ICD-10-CM

## 2022-09-21 DIAGNOSIS — Z45.018 ENCOUNTER FOR INTERROGATION OF CARDIAC PACEMAKER: ICD-10-CM

## 2022-09-21 DIAGNOSIS — I35.1 NONRHEUMATIC AORTIC VALVE INSUFFICIENCY: ICD-10-CM

## 2022-09-21 DIAGNOSIS — Z09 HOSPITAL DISCHARGE FOLLOW-UP: Primary | ICD-10-CM

## 2022-09-21 DIAGNOSIS — Z79.01 CHRONIC ANTICOAGULATION: ICD-10-CM

## 2022-09-21 DIAGNOSIS — I89.0 LYMPHEDEMA OF BOTH LOWER EXTREMITIES: ICD-10-CM

## 2022-09-21 DIAGNOSIS — S30.1XXA HEMATOMA OF GROIN, INITIAL ENCOUNTER: ICD-10-CM

## 2022-09-21 LAB
BASOPHILS # BLD AUTO: 0.1 10*3/UL
BASOPHILS NFR BLD AUTO: 1.3 % (ref 0–2)
EOSINOPHIL # BLD AUTO: 0.5 10*3/UL
EOSINOPHIL NFR BLD AUTO: 5.7 % (ref 0–3)
ERYTHROCYTE [DISTWIDTH] IN BLOOD BY AUTOMATED COUNT: 15.3 % (ref 11.5–15)
HCT VFR BLD AUTO: 29.9 % (ref 38–50)
HGB BLD-MCNC: 9.8 G/DL (ref 13.2–17.2)
LYMPHOCYTES # BLD AUTO: 1.2 10*3/UL
LYMPHOCYTES NFR BLD AUTO: 13.4 % (ref 15–47)
MCH RBC QN AUTO: 30.4 PG (ref 29–34)
MCHC RBC AUTO-ENTMCNC: 32.8 G/DL (ref 32–36)
MCV RBC AUTO: 92.6 FL (ref 82–97)
MONOCYTES # BLD AUTO: 0.9 10*3/UL
MONOCYTES NFR BLD AUTO: 10.2 % (ref 5–13)
NEUTROPHILS # BLD AUTO: 6.2 10*3/UL
NEUTROPHILS NFR BLD AUTO: 69.4 % (ref 46–70)
PLATELET # BLD AUTO: 647 10*3/UL (ref 130–350)
PMV BLD AUTO: 6.1 FL (ref 6.9–10.8)
RBC # BLD AUTO: 3.23 10*6/ΜL (ref 4.1–5.8)
WBC # BLD AUTO: 9 10*3/UL (ref 3.7–9.6)

## 2022-09-21 PROCEDURE — 36415 COLL VENOUS BLD VENIPUNCTURE: CPT | Performed by: FAMILY MEDICINE

## 2022-09-21 PROCEDURE — 99214 OFFICE O/P EST MOD 30 MIN: CPT | Performed by: FAMILY MEDICINE

## 2022-09-21 PROCEDURE — 85025 COMPLETE CBC W/AUTO DIFF WBC: CPT | Performed by: FAMILY MEDICINE

## 2022-09-21 RX ORDER — WARFARIN SODIUM 5 MG/1
5 TABLET ORAL
COMMUNITY
Start: 2022-09-16 | End: 2022-10-11 | Stop reason: ALTCHOICE

## 2022-09-21 RX ORDER — WARFARIN SODIUM 3 MG/1
3 TABLET ORAL
COMMUNITY
Start: 2022-09-16 | End: 2022-10-11 | Stop reason: ALTCHOICE

## 2022-09-21 RX ORDER — IBUPROFEN 200 MG
200 TABLET ORAL
COMMUNITY
End: 2022-09-29 | Stop reason: ALTCHOICE

## 2022-09-21 RX ORDER — PANTOPRAZOLE SODIUM 40 MG/1
40 TABLET, DELAYED RELEASE ORAL DAILY PRN
COMMUNITY
Start: 2022-09-16 | End: 2023-05-23 | Stop reason: ALTCHOICE

## 2022-09-21 RX ORDER — CHLORHEXIDINE GLUCONATE ORAL RINSE 1.2 MG/ML
SOLUTION DENTAL
COMMUNITY
Start: 2022-08-16 | End: 2022-09-29 | Stop reason: ALTCHOICE

## 2022-09-21 RX ORDER — METHOCARBAMOL 750 MG/1
TABLET, FILM COATED ORAL
COMMUNITY
Start: 2022-09-16 | End: 2023-05-23 | Stop reason: ALTCHOICE

## 2022-09-21 RX ORDER — AMOXICILLIN 250 MG
1 CAPSULE ORAL
COMMUNITY
Start: 2022-09-16 | End: 2023-04-14 | Stop reason: ALTCHOICE

## 2022-09-21 RX ORDER — GABAPENTIN 100 MG/1
100 CAPSULE ORAL 3 TIMES DAILY PRN
COMMUNITY
Start: 2022-09-16 | End: 2023-05-23 | Stop reason: ALTCHOICE

## 2022-09-21 RX ORDER — HYDROCODONE BITARTRATE AND ACETAMINOPHEN 5; 325 MG/1; MG/1
1 TABLET ORAL EVERY 6 HOURS PRN
COMMUNITY
Start: 2022-08-16 | End: 2023-05-23 | Stop reason: ALTCHOICE

## 2022-09-21 RX ORDER — OXYCODONE HYDROCHLORIDE 5 MG/1
TABLET ORAL
COMMUNITY
Start: 2022-09-16 | End: 2022-09-29

## 2022-09-21 RX ORDER — CEPHALEXIN 500 MG/1
500 CAPSULE ORAL 3 TIMES DAILY
COMMUNITY
Start: 2022-09-16 | End: 2022-09-29 | Stop reason: ALTCHOICE

## 2022-09-21 RX ORDER — WARFARIN 3 MG/1
3 TABLET ORAL DAILY
COMMUNITY
Start: 2022-09-16 | End: 2022-10-11 | Stop reason: SDUPTHER

## 2022-09-21 RX ORDER — POLYETHYLENE GLYCOL 3350 17 G/17G
17 POWDER, FOR SOLUTION ORAL DAILY
COMMUNITY
Start: 2022-09-16 | End: 2022-10-16

## 2022-09-21 ASSESSMENT — PAIN SCALES - GENERAL: PAINLEVEL: 0-NO PAIN

## 2022-09-21 NOTE — TELEPHONE ENCOUNTER
Following up with patient's post pacemaker implant and follow up needs.   Device clinic extension given to return call.   [phone]

## 2022-09-21 NOTE — TELEPHONE ENCOUNTER
Gopal returned phone call to confirm he has a [employer] device and has already received his Latitude monitor. RN to reach out to [facility] to have remotes transferred to our device clinic.   Patient also confirmed device clinic appt in December. -reminder to be sent in mail    Teaching provided on Latitude monitor as well as device checks. Patient very pleasant and verbalized understanding of all teaching provided.     No further questions/concerns at this time.

## 2022-09-21 NOTE — PROGRESS NOTES
Chief Complaint   Patient presents with    Follow-up     Lyndon Pt; GRABIEL for Pacemaker Lead Relocation Surgery; Patient states that there is a Groin insition that has a small Lump; Patient states that their Iron is low and needs checked; Patient states that their Platelets are Low as well and will need to be checked;       Hayden Franklin is a 62 y.o. male who is a presenting today for post hospital follow-up. Discharged from [facility] 5 days ago. The main problem requiring admission was severe stenosis of aortic prosthesis.   Admitted 9/6/2022 due to severe prosthetic aortic valve insufficiency. During his admission he underwent aortic valve replacement with a 23 mm On-X mechanical valve.    He had previously had bovine prosthetic aortic valve that had developed severe degeneration with malfunction of one of the leaflets.  He developed complete heart block after surgery requiring placement of dual-chamber pacemaker.  The atrial lead became dislodged requiring revision. Atrial leads were successfully replaced and he has had no additional issues with the pacemaker.  Other pertinent details related to his hospital admission include transient hyperglycemia treated with insulin, EMMA, followed by fluid overload which was treated with diuretics.  At the time of discharge his pain was well controlled.  He was performing most of his ADLs.  Ambulating without difficulty.  Discharged on beta-blocker, ACE inhibitor, statin, aspirin.  Chronically anticoagulated on Coumadin.     Today he reports he generally feels well.  Appetite level is good. Experiencing some constipation.  Endurance is improving but he admits he still gets fatigued easily.  Denies chest pain, palpitations, shortness of breath, orthopnea.      Has swelling to right groin in the area of femoral access for his procedure.  No significant ecchymosis, no warmth, no induration.  He does have lymphedema bilateral lower extremities sinc  "surgery. On lasix 60 mg BID which is helping. His wife has been wrapping his legs and he is to start working with lymphedema clinic.    The discharge summary and/or Transitional Care Management documentation was reviewed. Medication reconciliation was performed as indicated via the \"Srinath as Reviewed\" timestamp in encounter.      The complexity of medical decision making for this patient's transitional care is moderate.    Review of systems  Comprehensive review of systems completed with pertinent positive/negatives as mentioned above.    Past Medical History:   Diagnosis Date    Aortic valve stenosis     s/p AV replacement 11/2013    Arthritis     Bilat hands, ankle    Ascending aorta dilatation (CMS/[facility]) ([facility])     s/p AAA repair 11/2013    Asthma     CAD (coronary artery disease)     1 vessel bypass    Cancer (CMS/[facility]) ([facility])     Skin    CHF (congestive heart failure) (CMS/[facility]) ([facility])     COPD (chronic obstructive pulmonary disease) (CMS/[facility]) ([facility])     Hyperlipidemia     Ischemic cardiomyopathy     Shortness of breath        Past Surgical History:   Procedure Laterality Date    AAA REPAIR - ENDOGRAFT  11/2013    ANKLE SURGERY      AORTIC ROOT ANGIOGRAM N/A 5/20/2022    Procedure: Aortic Arch  Angiogram;  Surgeon: Derick Gallegos MD;  Location: [facility];  Service: Cardiovascular;  Laterality: N/A;    AORTIC VALVE REPLACEMENT  11/2013    CORONARY ANGIOPLASTY  11/2013    CORONARY ARTERY BYPASS GRAFT  11/2013    1V CABG SVG- RCA    LEFT HEART CATH N/A 5/20/2022    Procedure: Left heart cath;  Surgeon: Derick Gallegos MD;  Location: [facility];  Service: Cardiovascular;  Laterality: N/A;       Family History   Problem Relation Age of Onset    No Known Problems Mother     Heart disease Father     Aneurysm Father     Parkinsonism Sister     No Known Problems Sister     No Known Problems Sister     No Known Problems Daughter     No Known Problems Daughter        Objective   /56 (BP Location: Right arm, " "Patient Position: Sitting, Cuff Size: Regular Adult)   Pulse 108   Temp 37 °C (98.6 °F) (Temporal)   Resp 22   Ht 1.829 m (6')   Wt 84.2 kg (185 lb 9.6 oz)   SpO2 96%   BMI 25.17 kg/m²     Physical Exam  Constitutional:       General: He is not in acute distress.     Appearance: He is normal weight. He is not ill-appearing, toxic-appearing or diaphoretic.   HENT:      Head: Normocephalic and atraumatic.      Right Ear: External ear normal.      Left Ear: External ear normal.      Nose: Nose normal.      Mouth/Throat:      Mouth: Mucous membranes are moist.   Eyes:      General: No scleral icterus.     Extraocular Movements: Extraocular movements intact.      Conjunctiva/sclera: Conjunctivae normal.   Cardiovascular:      Rate and Rhythm: Normal rate and regular rhythm.      Pulses: Normal pulses.      Heart sounds: Murmur heard.   Pulmonary:      Effort: Pulmonary effort is normal.      Breath sounds: Normal breath sounds. No wheezing, rhonchi or rales.   Chest:      Chest wall: Tenderness (at sternotomy) present.      Comments: Sternotomy incision clean and dry.  Pacemaker implantation site incision clean and dry with Steri-Strips still in place.  Abdominal:      General: Abdomen is flat. There is no distension.      Palpations: Abdomen is soft.      Tenderness: There is no abdominal tenderness. There is no guarding or rebound.   Genitourinary:     Comments: Swelling in the area of groin incision measuring approximately 2\" x 1\" without and surrounding warmth, erythema, induration.  Incision well approximated, clean/dry.  Musculoskeletal:         General: No swelling or tenderness. Normal range of motion.      Cervical back: Normal range of motion and neck supple.   Skin:     General: Skin is warm.      Capillary Refill: Capillary refill takes less than 2 seconds.      Coloration: Skin is pale.   Neurological:      General: No focal deficit present.      Mental Status: He is alert and oriented to person, " place, and time.      Cranial Nerves: No cranial nerve deficit.      Motor: Weakness (generalized) present.   Psychiatric:         Mood and Affect: Mood normal.         Behavior: Behavior normal.         Thought Content: Thought content normal.       Assessment/Plan   Diagnoses and all orders for this visit:    Hospital discharge follow-up  Admission to [facility] 9/6/2022 through 9/16/2022 for severe prosthetic aortic valve insufficiency.  Details of his hospital admission are outlined as above.  The discharge summary, labs, images, and hospitalist recommendations were reviewed with the patient.    Nonrheumatic aortic valve insufficiency  S/P AVR  History of nonrheumatic aortic valve insufficiency for which he had previously undergone AVR with bioprosthetic valve and repair of aneurysm of ascending aortic arch.  Bioprosthetic valve was found to have significant degradation.  S/p AVR with a 23 mm On-X mechanical valve 9/6/2022.  No notable complications since his surgery.  Vitals stable. Heart sounds consistent with mechanical valve.  Continue beta-blocker, ACE inhibitor, statin, aspirin, Coumadin.  Follow-up with [facility] cardiothoracic team as scheduled.    S/P placement of cardiac pacemaker  Dual-chamber pacemaker placed 9/10/2022  due to development of completed heart block postoperatively. Required atrial lead replacement 9/15/2022.  Insertion site healing well.  Recent interrogation with no concerning findings.    Chronic anticoagulation  Chronically anticoagulated in the setting of a mechanical heart valve on Coumadin.  This will be managed through cardiology anticoagulation clinic.  He is scheduled for his next INR 9/27/2022.    Hematoma of groin, initial encounter  Stable hematoma right groin at femoral access site.   Conservative management discussed.  Advised if he feels this is worsening he should contact his cardiothoracic team in [place].    Anemia,  unspecified type  Postoperative anemia which was improving at the time of discharge.  Recheck CBC today for ongoing monitoring.  -     CBC w/auto differential Blood, Venous; Future    Lymphedema of both lower extremities  Improving but still with significant swelling of bilateral lower extremities.  Continue with Lasix 60 mg twice daily.  Continue wrapping lower extremities, and elevation as able.  Further management per lymphedema clinic.    Constipation, unspecified constipation type  Postoperative constipation.    Concerns for straining in the setting of his recent cardiac procedure.  Advised using MiraLAX daily, ensuring adequate dietary fiber intake, and adequate fluid intake.    Follow-up: with PCP, Dr. Haney in approximately 4 weeks or sooner as needed.    Cher Frankel MD

## 2022-09-23 ENCOUNTER — CARE COORDINATION (OUTPATIENT)
Dept: CARDIOLOGY | Facility: CLINIC | Age: 62
End: 2022-09-23

## 2022-09-23 NOTE — PROGRESS NOTES
Patient s/p Redo sternotomy redo AVR on 9/6/22 with Dr. Hogue. Patient was seen by a PCP on 9/22/22 who recommended he follow up with his CVTS team for a possible hematoma right groin.   Patient noticed swelling on 9/18/22 right groin incision area and states that it has stayed the same for the past 4 days. Reports no redness, warmth, pain, drainage or open area; no fever. Patient sent pictures, see below.   [place] ACEVEDO was consulted who recommended patient wear compression garment (compression underwear/shorts) and monitor the hematoma. If patient's swelling increases, changes to the skin such as redness, warmth, pain, drainage or open areas, patient is to call  or go to the ER. Patient verbalized understanding and agreed to the plan.

## 2022-09-26 ENCOUNTER — HOSPITAL ENCOUNTER (OUTPATIENT)
Dept: OCCUPATIONAL THERAPY | Facility: HOSPITAL | Age: 62
Setting detail: THERAPIES SERIES
Discharge: STILL ADMITTED OR ADMINISTRATIVE | End: 2022-09-26
Payer: COMMERCIAL

## 2022-09-26 ENCOUNTER — TELEPHONE (OUTPATIENT)
Dept: CARDIOLOGY | Facility: CLINIC | Age: 62
End: 2022-09-26
Payer: COMMERCIAL

## 2022-09-26 DIAGNOSIS — I89.0 LYMPHEDEMA: Primary | ICD-10-CM

## 2022-09-26 DIAGNOSIS — I50.42 CHRONIC COMBINED SYSTOLIC AND DIASTOLIC HEART FAILURE (CMS/HCC): Primary | ICD-10-CM

## 2022-09-26 PROCEDURE — 97530 THERAPEUTIC ACTIVITIES: CPT | Mod: GO

## 2022-09-26 PROCEDURE — 97165 OT EVAL LOW COMPLEX 30 MIN: CPT | Mod: GO

## 2022-09-26 NOTE — TELEPHONE ENCOUNTER
Patient called wanting to discuss his weights and his diuretics. He states he had surgery and was quite swollen after surgery from the waist down. He states he had significant swelling and his torsemide was doubled when he left from 60 mg daily to 60 mg twice daily. He states that the increase did help his swelling and his weight is down 8 lbs. He went to lymphedema clinic today and they gave him compression stockings. He edema has resolved in his scrotum and thighs but at the lymphedema clinic they said he still had some swelling in his knees and calves.     His weight Saturday was 186 lbs, Sunday 178 lbs, and today 180 lbs. Yesterday due to his wt loss he did not take the second dose of torsemide and he hasn't taken the second dose yet. He is wondering what to do at this point. He is scheduled for a INR tomorrow and is agreeable to getting a BMP at that time to evaluate his kidney's and potassium. Told him that may also help determine his dosing as well.    Advised Gopal would update Kizzy and call him with recommendations. He voiced understanding.

## 2022-09-26 NOTE — INTERDISCIPLINARY/THERAPY
"  [address] [facility]  [place] [facility] [id number] [phone]        Lymphedema Initial Evaluation     Date of Service: 9/26/2022    Patient Name: Hayden Franklin  Referring Provider: No ref. provider found    Onset Date: 9/10/22  SOC Date: 09/26/22   Certification Date: 12/19/22    Prior Hospitalizations: 9/6/22-9/16/22  HICN: [id number]    Primary Medical Diagnosis: Lymphedema [I89.0]     Treatment Diagnosis:   Bilateral lower extremity dependent edema.          SUBJECTIVE:  HISTORY OF CURRENT COMPLAINT:   \"Gopal\" is a pleasant 62 year old patient who presents to the lymphedema clinic for evaluation and treatment of bilateral lower extremity swelling. He has a significant cardiac history, in which he was hospitalized in August and September of this year.Medical history including bicuspid aortic valve and ascending aortic aneurysm s/p redo sternotomy and redo AVR with right femoral CPD cannulation, acute on chronic HFrEF/HFpEF, COPD on 3L/min at night, EF 40%, pacemaker placement, and CABAG x1 in 2013.  He presents with his daughter this date. He reports the swelling started before surgery in August.The swelling included 3+ pitting edema from the dorsum of his feet to his groin and genitals He reports he was wrapped in acute care during his stay at the [facility]. He and his wife completed maintenance wrapping up until 4 days ago.He reports he has lost a significant amount of weight. Starting out at 211 pounds prior to surgery, 186 lbs Saturday, Sunday 178 lbs, and today 180 lbs. He reports he is taking a diuretic and has a call out to the heart failure clinic. He  elevates his legs during the day as able. Plans to start Cardiac rehab.     PRIOR LEVEL OF FUNCTION:   Independent with all ADLs and IADLs. Currently has sternal precautions. Lives with spouse who is an RN. His daughter is also an RN.     SOCIAL/OCCUPATIONAL/RECREATIONAL:  Works as an , currently on [employer].     Pain:  Pain " Assessment Scale  Pain Scale:  (no pain)         Past Medical History:   Diagnosis Date    Aortic valve stenosis     s/p AV replacement 11/2013    Arthritis     Bilat hands, ankle    Ascending aorta dilatation (CMS/[facility]) ([facility])     s/p AAA repair 11/2013    Asthma     CAD (coronary artery disease)     1 vessel bypass    Cancer (CMS/[facility]) ([facility])     Skin    CHF (congestive heart failure) (CMS/[facility]) ([facility])     COPD (chronic obstructive pulmonary disease) (CMS/[facility]) ([facility])     Hyperlipidemia     Ischemic cardiomyopathy     Shortness of breath          Current Outpatient Medications:     warfarin (COUMADIN) 3 mg tablet, Take 3 mg by mouth daily, Disp: , Rfl:     Jantoven 5 mg tablet, Take 5 mg by mouth, Disp: , Rfl:     Jantoven 3 mg tablet, Take 3 mg by mouth, Disp: , Rfl:     sennosides-docusate sodium (SENNA PLUS) 8.6-50 mg, Take 1 tablet by mouth, Disp: , Rfl:     polyethylene glycol (GLYCOLAX) 17 gram/dose powder, Take 17 g by mouth daily, Disp: , Rfl:     pantoprazole (PROTONIX) 40 mg EC tablet, Take 40 mg by mouth daily, Disp: , Rfl:     oxyCODONE (ROXICODONE) 5 mg immediate release tablet, SMARTSIG:Tablet(s) By Mouth, Disp: , Rfl:     methocarbamoL (ROBAXIN) 750 mg tablet, SMARTSIG:Tablet(s) By Mouth, Disp: , Rfl:     ibuprofen (ADVIL,MOTRIN) 200 mg tablet, Take 200 mg by mouth, Disp: , Rfl:     cephalexin (KEFLEX) 500 mg capsule, Take 500 mg by mouth 3 (three) times a day, Disp: , Rfl:     HYDROcodone-acetaminophen (NORCO) 5-325 mg per tablet, Take 1 tablet by mouth every 6 (six) hours as needed, Disp: , Rfl:     gabapentin (NEURONTIN) 100 mg capsule, Take 100 mg by mouth 3 (three) times a day, Disp: , Rfl:     chlorhexidine (PERIDEX) 0.12 % solution, SMARTSIG:By Mouth, Disp: , Rfl:     atorvastatin (LIPITOR) 80 mg tablet, Take 1 tablet (80 mg total) by mouth daily, Disp: 90 tablet, Rfl: 3    lisinopriL (PRINIVIL,ZESTRIL) 5 mg tablet, Take 1 tablet (5 mg total) by mouth daily, Disp: 90 tablet, Rfl: 0    nitroglycerin  (NITROSTAT) 0.4 mg SL tablet, Place 1 tablet (0.4 mg total) under the tongue every 5 (five) minutes as needed for chest pain Limit 3 tabs per episode. (Patient not taking: Reported on 2022), Disp: 25 tablet, Rfl: 1    torsemide (DEMADEX) 20 mg tablet, Take 3 tablets (60 mg total) by mouth daily, Disp: 270 tablet, Rfl: 0    potassium chloride (KLOR-CON M20) 20 mEq CR tablet, Take 3 tablets (60 mEq total) by mouth daily In divided doses, Disp: 90 tablet, Rfl: 5    evolocumab (Repatha SureClick) 140 mg/mL pen injector, Inject 140 mg under the skin every 14 (fourteen) days, Disp: 6 mL, Rfl: 3    albuterol HFA (Ventolin HFA) 90 mcg/actuation inhaler, Inhale 2 puffs every 4 (four) hours (Patient taking differently: Inhale 2 puffs every 4 (four) hours PRN Daily), Disp: 1 Inhaler, Rfl: 11    clotrimazole (MYCELEX) 10 mg esdras, Not currently using, Disp: , Rfl:     amoxicillin (AMOXIL) 500 mg tablet, SMARTSI Tablet(s) By Mouth, Disp: , Rfl:     budesonide-formoteroL (SYMBICORT) 160-4.5 mcg/actuation inhaler, Inhale 2 puffs 2 (two) times a day Rinse mouth with water after use. Do not swallow., Disp: 1 Inhaler, Rfl: 5    metoprolol succinate XL (TOPROL-XL) 50 mg 24 hr tablet, Take 1 tablet (50 mg total) by mouth daily (Patient not taking: Reported on 2022), Disp: 90 tablet, Rfl: 3    aspirin 81 mg EC tablet, Take 1 tablet (81 mg total) by mouth daily, Disp: 90 tablet, Rfl: 3    ALLERGIES:  Ezetimibe  CURRENT ASSISTIVE OR ADAPTIVE EQUIPMENT:  Currently does not have medical grade compression.   Vera Fall Risk Score: 15  FALL RISK Interventions: n/a    DIAGNOSTIC TESTING:  Circumferential measurements gathered this date.   ACTIVITIES LIMITED BY PATIENT COMPLAINT: None reported.     PATIENT’S GOALS FOR THERAPY:   Manage swelling       Objective:  OUTCOME MEASURES COMPLETED:  LLIS: Patient’s intake functional measure is 21/31% where a lower number = greater function.    FINDINGS:            Edema: 3+ pitting edema  right foot and lateral calf; 2+ pitting edema small portion of left foot.     Skin Integrity: Well moisturized and intact.     Circumferential Measurement: Lymphedema  Extremity Assessed: Left Lower Extremity, Right Lower Extremity  RLE Length (cm): 44 cm  Right Knee (cm): 36.2 cm  Right Calf (cm): 39 cm  Right Achilles (cm): 28.5 cm  Right Ankle (cm): 30 cm  Right Instep (cm): 37.5 cm  Right MTP (cm): 26.2 cm  Left Knee (cm): 34.9 cm  Left Calf (cm): 38.5 cm  Left Achilles (cm): 26.5 cm  Left Ankle (cm): 28 cm  Left Instep (cm): 36.7 cm  Left MTP (cm): 27.5 cm    Range of Motion: within functional limits -limited secondary to sternal precautions    Current Equipment and Needs: Fit pt with Sigvaris 20-30 mmHg full foot knee high compression stockings . Also ordered Juzo dynamic 20-30mmHg full foot SB size IV regular length stockings.       EDUCATION:  The patient was provided education on Education: Educated on importance of elevation, compression and exercise as tolerated to decrease BLE edema as well as importance of good skin care.   Educated patient on intensive treatment phase and self-management phase of edema management. Pt. Wishes to pursue compression garment wearing at this time as he has had a significant reduction in swelling since discharge from the hospital.  Educated patient on signs and symptoms of fluid overload including increased shortness of breath.   First Treatment: Recommendations for home exercise program and compression garment wear.Fit patient with knee high compression stockings. Due to his sternal precautions, he will require his spouse or daughter assist for donning and doffing of stockings at this time. He was educated on wearing during the day and off at night. Educated on recommendation of purchasing 2 sets of compression, 1 to wash and 1 to wear.     Time Calculation  Start Time: 1345  Stop Time: 1435  Time Calculation (min): 50 min     Therapeutic Interventions (Time Spent in  Minutes)  Therapeutic Activity Dynamic: 30     OT Untimed Charges - Quick List (Time Spent in Minutes)  OT Eval Low Complexity: 20         EVALUATION:  Low complexity: no personal factors or comorbidities affecting plan of care; evaluation of 1-2 body structures, functions, or activity limitations; stable or uncomplicated clinical presentation.               ASSESSMENT:  Patient presents with pitting edema, primarily in his right lower extremity. Per his report, his swelling is significantly down since start of lymphedema treatment, as well as his overall weight. He has not tried medical grade compression and wished to pursue that this date. He was fit for knee high medical grade compression and will follow up in the clinic in 1 week to monitor response to compression.  This patient would benefit from continued lymphedema treatment for monitoring response to compression, intensive treatment phase if needed pending response to compression, and education.    Rehab Potential:    good    Barriers to outcome:extensive cardiac history          GOALS:      The following goals were established with the patient and include:    Short Term Goals:  Patient will be compliant with medical grade compression wearing during the day and off at night, every day until returning to clinic within 1 week.  Patient to be compliant with decongestive exercise home exercise program within 1 week.        Long Term Goals:   Patient to have a LLIS change score greater than or equal to18 indicating an improvement in function to be completed in 12 weeks.  Patient to participate in lymphedema clinic to maximize edema reduction and be compliant with compression garment wear with in 12 weeks for independent management of lymphedema.          PLAN:  It is recommended that the client attend rehabilitative therapy for up to 2-3 visits per week with an expected duration through Certification Date: 12/19/22. Interventions during the course of treatment  may include any combination of the following:  . ADL retraining  Therapeutic activity  Manual therapy  Patient/family training  Equipment evaluation/education  Compensatory technique education  Lymphedema      Thank you for allowing us to share in the care of this patient. If you have any questions, recommendations, or further concerns regarding this patient, please feel free to contact us at [address] [facility]  [place] [facility] [address]  Dept: [phone]      Signed by: SEJAL Merida 9/26/2022  3:50 PM      * I have reviewed the plan of care and certify a continuing need for medically necessary services    Co-signed by:_________________________ Date and Time:________________

## 2022-09-27 ENCOUNTER — TELEPHONE (OUTPATIENT)
Dept: CARDIOLOGY | Facility: CLINIC | Age: 62
End: 2022-09-27
Payer: COMMERCIAL

## 2022-09-27 ENCOUNTER — ANTICOAGULATION VISIT (OUTPATIENT)
Dept: CARDIOLOGY | Facility: CLINIC | Age: 62
End: 2022-09-27
Payer: COMMERCIAL

## 2022-09-27 ENCOUNTER — TELEPHONE (OUTPATIENT)
Dept: CARDIAC REHAB | Facility: HOSPITAL | Age: 62
End: 2022-09-27
Payer: COMMERCIAL

## 2022-09-27 ENCOUNTER — APPOINTMENT (OUTPATIENT)
Dept: LAB | Facility: CLINIC | Age: 62
End: 2022-09-27
Payer: COMMERCIAL

## 2022-09-27 DIAGNOSIS — I50.42 CHRONIC COMBINED SYSTOLIC AND DIASTOLIC HEART FAILURE (CMS/HCC): ICD-10-CM

## 2022-09-27 DIAGNOSIS — Z95.2 S/P AVR: Primary | ICD-10-CM

## 2022-09-27 DIAGNOSIS — Z95.2 PRESENCE OF PROSTHETIC HEART VALVE: Primary | ICD-10-CM

## 2022-09-27 DIAGNOSIS — Z95.2 PRESENCE OF PROSTHETIC HEART VALVE: ICD-10-CM

## 2022-09-27 DIAGNOSIS — Z98.890 S/P AORTIC VALVE REPAIR: Primary | ICD-10-CM

## 2022-09-27 DIAGNOSIS — Z79.01 ANTICOAGULATION MANAGEMENT ENCOUNTER: ICD-10-CM

## 2022-09-27 DIAGNOSIS — Z51.81 ANTICOAGULATION MANAGEMENT ENCOUNTER: ICD-10-CM

## 2022-09-27 LAB
ANION GAP SERPL CALC-SCNC: 7 MMOL/L (ref 3–11)
BUN SERPL-MCNC: 14 MG/DL (ref 7–25)
CALCIUM SERPL-MCNC: 9.7 MG/DL (ref 8.6–10.3)
CHLORIDE SERPL-SCNC: 99 MMOL/L (ref 98–107)
CO2 SERPL-SCNC: 31 MMOL/L (ref 21–32)
CREAT SERPL-MCNC: 1.06 MG/DL (ref 0.7–1.3)
GFR SERPL CREATININE-BSD FRML MDRD: 80 ML/MIN/1.73M*2
GLUCOSE SERPL-MCNC: 95 MG/DL (ref 70–105)
INR BLD: 3
INR PPP: 3
POTASSIUM SERPL-SCNC: 4.9 MMOL/L (ref 3.5–5.1)
PROTHROMBIN TIME: 35.1 SECONDS (ref 9.4–12.5)
SODIUM SERPL-SCNC: 137 MMOL/L (ref 135–145)

## 2022-09-27 PROCEDURE — 80048 BASIC METABOLIC PNL TOTAL CA: CPT | Performed by: NURSE PRACTITIONER

## 2022-09-27 PROCEDURE — 85610 PROTHROMBIN TIME: CPT | Performed by: INTERNAL MEDICINE

## 2022-09-27 PROCEDURE — 36415 COLL VENOUS BLD VENIPUNCTURE: CPT | Performed by: NURSE PRACTITIONER

## 2022-09-27 RX ORDER — POTASSIUM CHLORIDE 20 MEQ/1
20 TABLET, EXTENDED RELEASE ORAL DAILY
Qty: 90 TABLET | Refills: 1 | Status: SHIPPED | OUTPATIENT
Start: 2022-09-27 | End: 2023-03-10 | Stop reason: ALTCHOICE

## 2022-09-27 NOTE — PROGRESS NOTES
12:33 spoke to pt verified ID no med or diet changes no bleeding or bruising problems enc to call if changes verified warfarin dose and tab size mailed appt reminder DMD      continue warfarin dose as above and recheck INR in 2 weeks with other appt verb under. DMD

## 2022-09-27 NOTE — TELEPHONE ENCOUNTER
Reviewed lab results with Kizzy and verbal order to decrease torsemide to 60 mg daily and potassium to 20 mEq daily. Encouraged Gopal to call in the meantime if he has problems before his f/u appt with Yeny. He voiced understanding.

## 2022-09-29 ENCOUNTER — HOSPITAL ENCOUNTER (OUTPATIENT)
Dept: CARDIAC REHAB | Facility: HOSPITAL | Age: 62
Discharge: STILL ADMITTED OR ADMINISTRATIVE | End: 2022-09-29
Payer: COMMERCIAL

## 2022-09-29 VITALS — HEART RATE: 104 BPM | BODY MASS INDEX: 24.38 KG/M2 | RESPIRATION RATE: 20 BRPM | HEIGHT: 72 IN | WEIGHT: 180 LBS

## 2022-09-29 DIAGNOSIS — Z98.890 S/P AORTIC VALVE REPAIR: ICD-10-CM

## 2022-09-29 PROCEDURE — 93798 PHYS/QHP OP CAR RHAB W/ECG: CPT

## 2022-09-29 ASSESSMENT — DUKE ACTIVITY SCORE INDEX (DASI)
TOTAL_SCORE: 15.45
CAN YOU RUN A SHORT DISTANCE: NO
CAN YOU PARTICIPATE IN STRENOUS SPORTS LIKE SWIMMING, SINGLES TENNIS, FOOTBALL, BASKETBALL, OR SKIING: NO
CAN YOU DO HEAVY WORK AROUND THE HOUSE LIKE SCRUBBING FLOORS OR LIFTING AND MOVING HEAVY FURNITURE: NO
CAN YOU PARTICIPATE IN MODERATE RECREATIONAL ACTIVITIES LIKE GOLF, BOWLING, DANCING, DOUBLES TENNIS OR THROWING A BASEBALL OR FOOTBALL: NO
CAN YOU CLIMB A FLIGHT OF STAIRS OR WALK UP A HILL: YES
CAN YOU HAVE SEXUAL RELATIONS: NO
CAN YOU DO YARD WORK LIKE RAKING LEAVES, WEEDING OR PUSHING A MOWER: NO
CAN YOU WALK INDOORS, SUCH AS AROUND YOUR HOUSE: YES
CAN YOU DO LIGHT WORK AROUND THE HOUSE LIKE DUSTING OR WASHING DISHES: YES
CAN YOU WALK A BLOCK OR TWO ON LEVEL GROUND: YES

## 2022-09-29 NOTE — CARDIAC/PULMONARY REHAB ITP
Cardiac Rehab ITP Patient Name: Hayden Franklin  Location: [facility] CARDIAC REHAB    CARDIAC REHAB INDIVIDUALIZED TREATMENT PLAN    PCP  Rosa Haney MD    Assessment       Neuro (WDL): WDL    Respiratory (WDL): WDL    Cardiac (WDL): X  Cardiac Regularity: Regular  Jugular Venous Distention (JVD): No  Cardiac Symptoms: None    Peripheral Vascular (WDL): X  Edema: Right lower extremity; Left lower extremity  RLE Edema: +3  LLE Edema: +3    Cardiopulmonary Treatment  Date Entered Program: 09/29/22  Date of Event: 09/06/22  Cardiopulmonary Diagnosis: Valve  Assessment Type: Admission  Session Number: 1  Risk Stratification: High    Exercise Assessment:  Exercise: CR exercise session  Angina with Exercise: No  Patient reports pain: Yes (1-2 sternum incisional pain)  Duke Activity Status Index (DASI) Score: 15.45    Exercise Plan:  Exercise Goals: Aerobic activity 30 plus min/day 5 days/week; Participate in resistance training program 2+ days per week; Start individual exercise rx  Aerobic Activity Goal: Unmet  Participates in Resistance Training Goal: Unmet  Individual Exercise RX Goal: Unmet  Exercise Goal Comment: Pt will meet with CR staff to establish a SMART goal.    Exercise Interventions:  Exercise Interventions: Yes  Exercise Prescription Mode: Treadmill  Exercise Prescription Frequency: 3x per week  Exercise Prescription Duration: 30-40  Exercise Prescription Intensity: RPE 4-7  Exercise Prescription METS: 1.61  Exercise Prescription Progression: 2.4  Exercise Prescription THR: n/a  Home Exercise: No  Exercise Education: Equipment orientation; Exercise safety; Physical activity; RPE scale; S/S to report; Warm up/cool down  Patient Verbalizes Understanding: Yes  Exercise Comment: Pt is active and walks around his yard and shop during activities but no regular exercise routine.    Nutrition  Nutrition Assessment:  BMI (Calculated): 24.4  Dietary Screen Score: 52    Nutrition Plan:  Nutrition Consult/Referral:  Dietitian consult  Nutrition Goals (select all): BMI less than 25; Waist circ: M-less than 40in W-less than 35in  BMI Less Than 25 Goal: Met  Waist Circumference Goal: Met     Nutrition Interventions:  Nutrition Education: Heart healthy eating  Patient Verbalizes Understanding: Yes  Nutrition Comments: Patient will have a dietitian consult with Mary Kate and attend cardiac rehab nutritional classes.    Psychosocial   Psychosocial Assessment:  [facility] Score: 24  PHQ-9 Total Score: 3  Family Support?: Yes  Lives With: Spouse/others    Psychosocial Plan:  Psychosocial Goals: Maximize coping skills; Positive support system; Use stress management  Maximize Coping Skills Goal: Met  Positive Support System Goal: Met  Stress Management Goal: Met  Psychosocial Education: Coping Techniques; Drug interventions; Effects of stress; Identifies stressors; Positive support system; S/S depression; Uses stress management skills  Psychosocial Goal Comment: Patient has no current concerns for depression. Patient states he has great support from his wife of 32 to years and two grown children.    Psychosocial Interventions:  Psychosocial Consults/Referral?: No  Psychosocial Education: Coping Techniques; Drug interventions; Effects of stress; Identifies stressors; Positive support system; S/S depression; Uses stress management skills  Patient Verbalizes Understanding: Yes  Psychosocial Comment: Patient enjoys fishing, hunting, camping and go up to the hills to ride his side by side. Patient also enjoys spending time with his two young grandsons.    Other Core Components:  Diagnosed With Congestive Heart Failure?: No  Diagnosed With Diabetes Mellitus?: No  Diagnosed with Hyperlipidemia?: Yes  Diagnosed with Hypertension?: No  Currently Uses or Formerly Used Tobacco?: Yes    Hyperlipidemia  Hyperlipidemia Assessment:  Lab results:  Cholesterol   Date Value Ref Range Status   04/19/2022 84 0 - 199 mg/dL Final     Triglycerides   Date Value  Ref Range Status   2022 60 <=149 mg/dL Final     HDL   Date Value Ref Range Status   2022 56 >=40 mg/dL Final     LDL Calculated   Date Value Ref Range Status   2022 <20 (L) 20 - 99 mg/dL Final      Lab Results Date: 22  Total Cholesterol Result: 84  Triglyceride Result: 60  HDL Result: 56  LDL Result: 20    Hyperlipidemia Plan:  Hyperlipidemia Goal: LDL less than 70; HDL greater than 40; Total cholesterol less than 150; Triglycerides less than 150  HDL  Greater Than 40 Goal: Met  Total Cholesterol Less Than 150 Goal: Met  LDL Less Than 70 Goal: Met  Triglycerides Less Than 150 Goal: Met  Hyperlipidemia Goal Comment: Patient states he takes his prescribed cholesterol medication at home as prescribed.    Hyperlipidemia Interventions:  Hyperlipidemia Consult/Referral: Dietitian consult  Hyperlipidemia Education: Low cholesterol diet; Low triglyceride diet  Patient Verbalizes Understanding: Yes  Hyperlipidemia Comment: Patient will have a dietitian consult with Mary Kate in the near future with Mary Kate.    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   Tobacco Use    Smoking status: Former     Packs/day: 0.75     Years: 30.00     Pack years: 22.50     Types: Cigarettes     Quit date: 2022     Years since quittin.5    Smokeless tobacco: Never    Tobacco comments:     Last smoked 3/1/22   Vaping Use    Vaping Use: Never used   Substance and Sexual Activity    Alcohol use: Yes     Alcohol/week: 4.0 - 5.0 standard drinks     Types: 4 - 5 Cans of beer per week     Comment: 3-4 beers weekly or less    Drug use: No    Sexual activity: Not Currently     Social Determinants of Health     Tobacco Use: Medium Risk    Smoking Tobacco Use: Former    Smokeless  Tobacco Use: Never   Depression: At risk    PHQ-2 Score: 3     Tobacco History Updated?: Yes    Tobacco Cessation Plan:  Tobacco Cessation Goals: Nicotine free; Tobacco free  Nicotine Free Goal: Met  Tobacco Free Goal: Met  Tobacco Cessation Goal Comment: Patient reports he quit smoking in February 2022    Tobacco Cessation Interventions:  Tobacco Educaton: Effects of tobacco on CAD; Identify road blocks; Determine personal benefits of being tobacco free; Identify triggers  Patient Verbalizes Understanding: Yes  Tobacco Comment: Patient remains free of all nicotine and tobacco products at this time. Patient will attend cardiac rehab tobacco class.    General Education     Exercise   Pre-Session Evaluation:  Medication Changed Since Last Visit: No  Is the patient having pain: 2  Where is pain: sternum incisional soreness  Exercise Treatment: Exercise as tolerated.    Resting Vital Signs:  Resting Heart Rate: 108  Resting BP: 108/68  O2 Flow Rate (L/min): 2 L/min  Weight: 81.6 kg (180 lb)     Karvonen:  Heart Rate Sulphur (HRR): 52  Lower Training Range: 139.2  Upper Training Range: 152.2    Treadmill Exercise Assessment:  Exercise Duration (mins): 12  Speed: 0.8  stGstrstastdstest:st st1st RPE (Modified Manoj): 3  Treadmill METs: 1.61  Exercise HR: 115  Exercise BP: 124/74     Recovery Vital Signs:  Recovery HR: 105  Recovery BP: 108/68    Additional comments  ITP Comment: Patient is very motivated to begin cardiac rehab. Patient has previously done cardiac rehab 9 years ago. Patient reports that he tries to walk as much as possible at home, but does not currently exercise otherwise. Patient recently quit smoking, in February 2022 after smoking for 30+ years. Patient states that he has also been trying to make healthier food choices and limit the amount of salt that he adds to his food. Patient has a very supportive wife of 32 years. Patient enjoys outdoor activities like fishing, hunting, and riding his side by side. Patient also has  two young grandsons that he enjoys spending time with. Patient is scheduled to begin cardiac rehab tomorrow, 9/30/22 in the 0745 class.    Heavenly Garber  9/29/2022 10:16 AM

## 2022-09-30 ENCOUNTER — HOSPITAL ENCOUNTER (OUTPATIENT)
Dept: CARDIAC REHAB | Facility: HOSPITAL | Age: 62
Discharge: STILL ADMITTED OR ADMINISTRATIVE | End: 2022-09-30
Payer: COMMERCIAL

## 2022-09-30 DIAGNOSIS — Z98.890 S/P AORTIC VALVE REPAIR: ICD-10-CM

## 2022-09-30 PROCEDURE — 93798 PHYS/QHP OP CAR RHAB W/ECG: CPT

## 2022-10-03 ENCOUNTER — HEALTH MAINTENANCE LETTER (OUTPATIENT)
Age: 62
End: 2022-10-03

## 2022-10-03 ENCOUNTER — TELEPHONE (OUTPATIENT)
Dept: CARDIOLOGY | Facility: CLINIC | Age: 62
End: 2022-10-03

## 2022-10-03 ENCOUNTER — HOSPITAL ENCOUNTER (OUTPATIENT)
Dept: OCCUPATIONAL THERAPY | Facility: HOSPITAL | Age: 62
Setting detail: THERAPIES SERIES
Discharge: STILL ADMITTED OR ADMINISTRATIVE | End: 2022-10-03
Payer: COMMERCIAL

## 2022-10-03 ENCOUNTER — HOSPITAL ENCOUNTER (OUTPATIENT)
Dept: CARDIAC REHAB | Facility: HOSPITAL | Age: 62
Discharge: STILL ADMITTED OR ADMINISTRATIVE | End: 2022-10-03
Payer: COMMERCIAL

## 2022-10-03 DIAGNOSIS — Z98.890 S/P AORTIC VALVE REPAIR: ICD-10-CM

## 2022-10-03 PROCEDURE — 93798 PHYS/QHP OP CAR RHAB W/ECG: CPT

## 2022-10-03 PROCEDURE — 97530 THERAPEUTIC ACTIVITIES: CPT | Mod: GO

## 2022-10-03 NOTE — INTERDISCIPLINARY/THERAPY
[address] [facility] [id number] [phone]      Lymphedema Daily Treatment Note       Date of Service: 10/3/2022    Patient Name: Hayden Franklin  Referring Provider: No ref. provider found  Visit Number: 2      Certification Date: 12/19/22       Subjective:    Gopal reports the swelling in his legs have gone down a lot. He has been compliant with compression wearing. His wife has been assisting him with donning and doffing of compression. He will go to the [facility] location to  his Juzo compression stockings.     Has patient fallen since last visit? No  FALL RISK Interventions: n/a     Have there been any changes in medications? Yes-reduction in diuretic       Pain: no pain   Pain Assessment Scale  Pain Scale:  (no pain)    Objective:    Hayden Franklin arrived to lymphedema clinic with compression stockings on.   Assessed skin with no concerns.    Edema: Right lower extremity : 3+ pitting lateral calf 4x4cm area; 2+ pitting edema dorsum of foot in 2x2 cm area.   Left lower extremity : 2+ pitting 2x2 cm area of dorsum of foot, otherwise no pitting edema present.       Circumferential Measurement: Lymphedema  Right Knee (cm): 34.5 cm  Right Calf (cm): 36.8 cm  Right Ankle (cm): 27.5 cm  Right MTP (cm): 25.7 cm  Left Knee (cm): 34.2 cm  Left Calf (cm): 38.2 cm  Left Ankle (cm): 23.5 cm  Left MTP (cm): 26 cm      Completed LLIS with score of 9 or 13% impairment and an eval score of 21/31% impairment.     Therapist educated on donning gloves, pt will purchase these for his wife.     Time Calculation  Start Time: 1045  Stop Time: 1127  Time Calculation (min): 42 min     Therapeutic Interventions (Time Spent in Minutes)  Therapeutic Activity Dynamic: 42                   Assessment:    Gopal demonstrates an improvement in bilateral lower extremity swelling with a reduction in pitting as well. He has been compliant in daily compression wearing, has started cardiac rehab, and is walking  daily. He now will have 2 sets of compression, one to wash and one to wear. He will follow up in 1-2 weeks to ensure containment of swelling and tolerance of new compression.     Plan for next treatment session:  Follow up in 1-2 weeks     Thank you for allowing us to share in the care of this patient. If you have any questions, recommendations, or further concerns regarding this patient, please feel free to contact me at [phone] [facility] [address]  Dept: [phone]  .  Signed by: SEJAL Merida  10/3/2022  11:35 AM

## 2022-10-05 ENCOUNTER — HOSPITAL ENCOUNTER (OUTPATIENT)
Dept: CARDIAC REHAB | Facility: HOSPITAL | Age: 62
Discharge: STILL ADMITTED OR ADMINISTRATIVE | End: 2022-10-05
Payer: COMMERCIAL

## 2022-10-05 DIAGNOSIS — Z98.890 S/P AORTIC VALVE REPAIR: ICD-10-CM

## 2022-10-05 PROCEDURE — 93798 PHYS/QHP OP CAR RHAB W/ECG: CPT

## 2022-10-06 ENCOUNTER — OFFICE VISIT (OUTPATIENT)
Dept: PULMONOLOGY | Facility: CLINIC | Age: 62
End: 2022-10-06
Payer: COMMERCIAL

## 2022-10-06 VITALS
SYSTOLIC BLOOD PRESSURE: 104 MMHG | WEIGHT: 182 LBS | HEART RATE: 98 BPM | RESPIRATION RATE: 16 BRPM | OXYGEN SATURATION: 97 % | HEIGHT: 72 IN | BODY MASS INDEX: 24.65 KG/M2 | DIASTOLIC BLOOD PRESSURE: 62 MMHG

## 2022-10-06 DIAGNOSIS — R91.1 PULMONARY NODULE: ICD-10-CM

## 2022-10-06 DIAGNOSIS — I50.32 CHRONIC DIASTOLIC HEART FAILURE DUE TO VALVULAR DISEASE (CMS/HCC): ICD-10-CM

## 2022-10-06 DIAGNOSIS — I38 CHRONIC DIASTOLIC HEART FAILURE DUE TO VALVULAR DISEASE (CMS/HCC): ICD-10-CM

## 2022-10-06 DIAGNOSIS — J44.89 ASTHMA-COPD OVERLAP SYNDROME (CMS/HCC): Primary | ICD-10-CM

## 2022-10-06 PROCEDURE — 99213 OFFICE O/P EST LOW 20 MIN: CPT | Performed by: NURSE PRACTITIONER

## 2022-10-06 RX ORDER — BUDESONIDE AND FORMOTEROL FUMARATE DIHYDRATE 160; 4.5 UG/1; UG/1
2 AEROSOL RESPIRATORY (INHALATION) 2 TIMES DAILY
Qty: 10.2 G | Refills: 11 | Status: SHIPPED | OUTPATIENT
Start: 2022-10-06 | End: 2023-12-14 | Stop reason: SDUPTHER

## 2022-10-06 NOTE — PROGRESS NOTES
PULMONARY NOTE      HPI:  Hayden Franklin is a 62 y.o. male patient who presents for pulmonary follow-up on COPD with asthma overlap.  Patient's most PFT demonstrates severe airflow obstruction with FEV1 37% predicted with significant postbronchodilator response.  He has approximately 30-pack-year history of smoking and quit 2022.  Chest CT in  with a new less than 6 mm nodule of the left upper lobe plan to do a 6-month follow-up in December.    Patient had valve replacement surgery 1 month ago and is slowly recovering.  He continues to have midsternal and left-sided chest pain.  He is on Coumadin.  He has not been requiring oxygen during the day for some time now.  When I saw him in clinic 3 months ago he had a normal ambulatory oxygen assessment.  He reports when he was hospitalized postsurgery they also discontinued his nighttime oxygen due to normal levels on room air throughout the night.  He continues on Symbicort and has not needed albuterol.  He reports an occasional cough with a dry throat minimally productive.  He denies wheeze or significant dyspnea.  He is doing very well in cardiac rehab.        Review of Systems  Pertinent positives and negatives listed in the HPI.    The following portions of the patient's history were reviewed and updated as appropriate: allergies, current medications, past family history, past medical history, past social history, past surgical history and problem list.    History:  Social History     Tobacco Use    Smoking status: Former     Packs/day: 0.75     Years: 30.00     Pack years: 22.50     Types: Cigarettes     Quit date: 2022     Years since quittin.6    Smokeless tobacco: Never    Tobacco comments:     Last smoked 3/1/22   Vaping Use    Vaping Use: Never used   Substance Use Topics    Alcohol use: Yes     Alcohol/week: 4.0 - 5.0 standard drinks     Types: 4 - 5 Cans of beer per week     Comment: 3-4 beers weekly or less    Drug use: No          Immunizations:  Immunization History   Administered Date(s) Administered    Flu vaccine (PF) greater than or equal to 6 months IM 11/09/2018, 11/01/2019, 02/26/2022    Influenza TIV (IM) 11/14/2020    PFIZER-BIONTECH SARS-COV-2 PURPLE CAP VACCINATION (D1, D2, Immuno) 03/03/2021, 03/24/2021, 02/04/2022    Pneumococcal Polysaccharide - PPSV23 03/07/2022    Tdap 08/13/2018       Medications:    Current Outpatient Medications:     potassium chloride (KLOR-CON M20) 20 mEq CR tablet, Take 1 tablet (20 mEq total) by mouth daily, Disp: 90 tablet, Rfl: 1    warfarin (COUMADIN) 3 mg tablet, Take 3 mg by mouth daily, Disp: , Rfl:     Jantoven 5 mg tablet, Take 5 mg by mouth Patient states that he takes 2.5 mg on Mondays, Wednesdays and Fridays. On Sundays, Tuesdays, Thursdays, and Saturdays he takes 5 mg., Disp: , Rfl:     Jantoven 3 mg tablet, Take 3 mg by mouth, Disp: , Rfl:     sennosides-docusate sodium (SENNA PLUS) 8.6-50 mg, Take 1 tablet by mouth, Disp: , Rfl:     polyethylene glycol (GLYCOLAX) 17 gram/dose powder, Take 17 g by mouth daily, Disp: , Rfl:     pantoprazole (PROTONIX) 40 mg EC tablet, Take 40 mg by mouth daily, Disp: , Rfl:     methocarbamoL (ROBAXIN) 750 mg tablet, SMARTSIG:Tablet(s) By Mouth, Disp: , Rfl:     HYDROcodone-acetaminophen (NORCO) 5-325 mg per tablet, Take 1 tablet by mouth every 6 (six) hours as needed, Disp: , Rfl:     gabapentin (NEURONTIN) 100 mg capsule, Take 100 mg by mouth 3 (three) times a day, Disp: , Rfl:     atorvastatin (LIPITOR) 80 mg tablet, Take 1 tablet (80 mg total) by mouth daily, Disp: 90 tablet, Rfl: 3    lisinopriL (PRINIVIL,ZESTRIL) 5 mg tablet, Take 1 tablet (5 mg total) by mouth daily, Disp: 90 tablet, Rfl: 0    torsemide (DEMADEX) 20 mg tablet, Take 3 tablets (60 mg total) by mouth daily, Disp: 270 tablet, Rfl: 0    evolocumab (Repatha SureClick) 140 mg/mL pen injector, Inject 140 mg under the skin every 14 (fourteen) days, Disp: 6 mL, Rfl: 3    albuterol HFA  (Ventolin HFA) 90 mcg/actuation inhaler, Inhale 2 puffs every 4 (four) hours (Patient taking differently: Inhale 2 puffs every 4 (four) hours PRN Daily), Disp: 1 Inhaler, Rfl: 11    clotrimazole (MYCELEX) 10 mg esdras, Not currently using, Disp: , Rfl:     amoxicillin (AMOXIL) 500 mg tablet, SMARTSI Tablet(s) By Mouth, Disp: , Rfl:     aspirin 81 mg EC tablet, Take 1 tablet (81 mg total) by mouth daily, Disp: 90 tablet, Rfl: 3    budesonide-formoteroL (SYMBICORT) 160-4.5 mcg/actuation inhaler, Inhale 2 puffs 2 (two) times a day Rinse mouth with water after use. Do not swallow., Disp: 10.2 g, Rfl: 11    nitroglycerin (NITROSTAT) 0.4 mg SL tablet, Place 1 tablet (0.4 mg total) under the tongue every 5 (five) minutes as needed for chest pain Limit 3 tabs per episode. (Patient not taking: No sig reported), Disp: 25 tablet, Rfl: 1    Allergies:  Allergies   Allergen Reactions    Ezetimibe Rash       Objective:  Vitals:    10/06/22 0816   BP: 104/62   Pulse: 98   Resp: 16   SpO2: 97%   Weight: 82.6 kg (182 lb)   Height: 1.829 m (6')       Physical Exam  Constitutional:       General: He is not in acute distress.     Appearance: He is not ill-appearing.   Cardiovascular:      Rate and Rhythm: Normal rate and regular rhythm.      Heart sounds: Murmur heard.     No friction rub. No gallop.   Pulmonary:      Effort: Pulmonary effort is normal. No respiratory distress.      Breath sounds: Normal breath sounds. No wheezing, rhonchi or rales.   Chest:      Chest wall: No tenderness.   Musculoskeletal:      Right lower leg: Edema present.      Left lower leg: No edema.   Skin:     General: Skin is warm and dry.   Neurological:      General: No focal deficit present.      Mental Status: He is oriented to person, place, and time.       Lab Review:   Lab Results   Component Value Date    GLUCOSE 95 2022    CALCIUM 9.7 2022     2022    K 4.9 2022    CO2 31 2022    CL 99 2022    BUN 14  09/27/2022    CREATININE 1.06 09/27/2022    ANIONGAP 7 09/27/2022     Lab Results   Component Value Date     (H) 03/08/2022     No results found for: IGE  Lab Results   Component Value Date    WBC 9.0 09/21/2022    HGB 9.8 (L) 09/21/2022    HCT 29.9 (L) 09/21/2022    MCV 92.6 09/21/2022     (H) 09/21/2022           PFT:  4/6/2022:  FEV1 1.39 or 37% predicted, FVC 3.08 or 64% predicted, FEV1/FVC ratio 45%, TLC 6.2 or 87% predicted, RV 2.73 or 121% predicted, DLCO 27.64 or 78% predicted.  Impression: Spirometry is consistent with severe airflow obstruction with significant postbronchodilator response.  Lung volumes are normal and diffusion capacity is normal.      Discussion:  Patient doing well from a respiratory standpoint.  Patient needs order to discontinue oxygen for company to  his concentrator and tanks at home.  Okay to discontinue.  Patient will continue to monitor SPO2 notify clinic if he does see the starting to dip below 88% we will reassess.  Recommended he get influenza vaccine and COVID booster this fall.    Assessment:  1.  COPD with asthma overlap  2.  History of tobacco use    PLAN:  1.  Continue Symbicort 2 puffs twice daily  2.  Albuterol 2 puffs as needed every 4-6 hours for shortness of breath, cough, chest tightness or wheezing.  3.  Get COVID bivalent booster and flu vaccine this fall.  Call [phone] to schedule COVID booster.  4.  Follow up chest CT in December to follow-up on pulmonary nodule.      Follow-up in 3 months, after chest CT, unless needed sooner.        Pt voices understanding and agreement to plan as stated above. All questions answered.            A voice recognition program was used to aid in medical record documentation. Sometimes words are printed not exactly as they were spoken. While efforts were made to carefully edit and correct any inaccuracies, some errors may be present. Errors should be taken within the context of the discussion.  Please contact  our office if you need assistance interpreting this medical record or notice any mistakes.

## 2022-10-06 NOTE — PROGRESS NOTES
Cardiology Outpatient CHF Note    Subjective     Patient ID: Hayden Franklin is a 62 y.o. male patient of Dr. Joshi who presents to the heart failure clinic for follow-up.     ROSE Herron is a 62-year-old gentleman with history of inferior MI with single-vessel bypass as well as concomitant AVR in 2013.  He ended up with severe aortic stenosis.  He was evaluated and recommended to pursue redo sternotomy with aortic valve replacement using the 23 mm On-X aortic valve.  This was on 9/6/2022 at the [facility].  His hospitalization was complemented by EMMA on CKD, cardiogenic shock, development of complete AV block status post AVR with subsequent placement of dual-chamber PPM on 9/10/2022 interrogation on 9/12 demonstrated dislodgement of atrial lead, on schedule to have replaced on 9/15.     Gopal has been doing well in cardiac rehab.  He has been exercising without any limitations.  He unfortunately, he did develop a hematoma at his right groin access site.  This is known at the [facility].  I did complete a right groin ultrasound today which demonstrated hematoma.  They have not been doing any home measures.  He does not state it is significantly bothersome however, he did wear jeans yesterday to cardiac rehab and he did state that it may have rubbed on the area and it is more red today as well sore.  However, this is not a continual problem.  He denies any significant bleeding concerns.  He does have ongoing chest discomfort.  He does not describe it as a chest pressure as more of a chest pain.  It is more located on the left side and radiates throughout the center of his chest.  He states that it does not happen with exertion and is more with rest.  He states that it actually gets better with exertion.  He is on gabapentin posthospitalization.  Unfortunately he did have some development of fluid accumulation after his hospitalization and really required 20 mg of torsemide and that seems to help his  fluid status as well as the shortness of breath.  He denies any abdominal bloating.    Cardiology Problem List:    Cardiology Problem List     Last Edited By Markell Cano LPN on 3/2/2022 at 2:07 PM     Primary Cardiologist: Dr. Gomez     Coronary   -11/1/2013 Inferior MI: 1V CABG (SVG-RCA) with concomitant AVR and ascending aortic dacron graft     Peripheral Vascular  - 8/2017 Carotid duplex: Right ICA stenosis 16-49%, left ICA stenosis 0-15% stenosis. Suggest follow-up study 1-2 years.    - 11/2013 s/p repair with a 34mm Dacron graft d/t Dilated Ascending Aorta    Valvular  - 11/2013 Aortic Valve Replacement with a Justin Mitroflow bioprosthetic d/t Nonrheumatic Aortic Valve Stenosis  - 9/6/2022: [facility] - redo AVR using #23mm On-X aortic valve    CHF/CM NA    Electrophysiology  - Complete AVB w/ junction escape s/p DC PPM 9/9/2022 w/ lead dislodement of atrial lead; redo atrial lead 9/15/2022    Pulmonary Vascular NA    Risk Factors   - Hyperlipidemia  - Tobacco use  - Carotid stenosis    Prior Imaging/Testing History   -2/26/2022 Lexiscan: EF 48%, LV perfusion is abnormal. Post stress imaging demonstrates moderately sized area of mild to moderately decreased intensity involving the proximal to distal inferior wall and inferoapical wall. On rest imaging this defect appears predominantly fixed to partially worse. Findings consistent with prior inferior wall infarct.    - 2/25/2022 Echo: EF 52%, RA dilated, there is a 27 mm Justin Mitroflow bioprosthetic aortic valve present. Valve leaflets are slightly thickened and calcified although all 3 leaflets are noted to have a decent range of motion. Mild to moderate aortic regurgitation visually. Mild pulmonary hypertension is present. Right ventricular systolic pressure is estimated at 40 mmHg.    -8/18/2021 Echo: Biplane EF 38%, moderate LV systolic dysfunction. Segmental wall motion abnormalities noted, RV is hypokinetic, LA is moderately dilated, Prosthetic graft  present. 34 mm Dacron Graft, RV systolic pressure is estimated at 28 mmHg.    -09/10/2022 Echo: Left ventricular function is decreased. The ejection fraction is 30-35%. The right ventricle is normal size.Global right ventricular function is normal.  AVR with On-X valve, the valve is well seated with normal Dopper   interrogation. Mean gradient 13 mmHg. No pericardial effusion is present.     Past Medical History:   Diagnosis Date    Aortic valve stenosis     s/p AV replacement 11/2013    Arthritis     Bilat hands, ankle    Ascending aorta dilatation (CMS/[facility]) ([facility])     s/p AAA repair 11/2013    Asthma     CAD (coronary artery disease)     1 vessel bypass    Cancer (CMS/HCC) ([facility])     Skin    CHF (congestive heart failure) (CMS/[facility]) ([facility])     COPD (chronic obstructive pulmonary disease) (CMS/[facility]) ([facility])     Hyperlipidemia     Ischemic cardiomyopathy     Shortness of breath        Past Surgical History:   Procedure Laterality Date    AAA REPAIR - ENDOGRAFT  11/2013    ANKLE SURGERY      AORTIC ROOT ANGIOGRAM N/A 5/20/2022    Procedure: Aortic Arch  Angiogram;  Surgeon: Derick Gallegos MD;  Location: [facility] Cath Lab;  Service: Cardiovascular;  Laterality: N/A;    AORTIC VALVE REPLACEMENT  11/2013    CORONARY ANGIOPLASTY  11/2013    CORONARY ARTERY BYPASS GRAFT  11/2013    1V CABG SVG- RCA    LEFT HEART CATH N/A 5/20/2022    Procedure: Left heart cath;  Surgeon: Derick Gallegos MD;  Location: [facility] Cath Lab;  Service: Cardiovascular;  Laterality: N/A;       Allergies as of 10/11/2022 - Reviewed 10/11/2022   Allergen Reaction Noted    Ezetimibe Rash 03/16/2020       Current Outpatient Medications   Medication Sig Dispense Refill    budesonide-formoteroL (SYMBICORT) 160-4.5 mcg/actuation inhaler Inhale 2 puffs 2 (two) times a day Rinse mouth with water after use. Do not swallow. 10.2 g 11    potassium chloride (KLOR-CON M20) 20 mEq CR tablet Take 1 tablet (20 mEq total) by mouth daily 90 tablet 1    sennosides-docusate  sodium (SENNA PLUS) 8.6-50 mg Take 1 tablet by mouth      polyethylene glycol (GLYCOLAX) 17 gram/dose powder Take 17 g by mouth daily      pantoprazole (PROTONIX) 40 mg EC tablet Take 40 mg by mouth daily      methocarbamoL (ROBAXIN) 750 mg tablet SMARTSIG:Tablet(s) By Mouth      HYDROcodone-acetaminophen (NORCO) 5-325 mg per tablet Take 1 tablet by mouth every 6 (six) hours as needed      gabapentin (NEURONTIN) 100 mg capsule Take 100 mg by mouth 3 (three) times a day      atorvastatin (LIPITOR) 80 mg tablet Take 1 tablet (80 mg total) by mouth daily 90 tablet 3    lisinopriL (PRINIVIL,ZESTRIL) 5 mg tablet Take 1 tablet (5 mg total) by mouth daily 90 tablet 0    torsemide (DEMADEX) 20 mg tablet Take 3 tablets (60 mg total) by mouth daily 270 tablet 0    evolocumab (Repatha SureClick) 140 mg/mL pen injector Inject 140 mg under the skin every 14 (fourteen) days 6 mL 3    albuterol HFA (Ventolin HFA) 90 mcg/actuation inhaler Inhale 2 puffs every 4 (four) hours (Patient taking differently: Inhale 2 puffs every 4 (four) hours PRN Daily) 1 Inhaler 11    aspirin 81 mg EC tablet Take 1 tablet (81 mg total) by mouth daily 90 tablet 3    metoprolol succinate XL (TOPROL-XL) 50 mg 24 hr tablet Take 1 tablet (50 mg total) by mouth daily 90 tablet 3    warfarin (COUMADIN) 5 mg tablet Take 1 tablet (5 mg total) by mouth daily 90 tablet 3    dapagliflozin (Farxiga) 10 mg tablet tablet Take 1 tablet (10 mg total) by mouth daily 90 tablet 3    nitroglycerin (NITROSTAT) 0.4 mg SL tablet Place 1 tablet (0.4 mg total) under the tongue every 5 (five) minutes as needed for chest pain Limit 3 tabs per episode. (Patient not taking: No sig reported) 25 tablet 1    clotrimazole (MYCELEX) 10 mg esdras Not currently using      amoxicillin (AMOXIL) 500 mg tablet SMARTSI Tablet(s) By Mouth       No current facility-administered medications for this visit.       Family History   Problem Relation Age of Onset    No Known Problems Mother      Heart disease Father     Aneurysm Father     Parkinsonism Sister     No Known Problems Sister     No Known Problems Sister     No Known Problems Daughter     No Known Problems Daughter        Social History     Tobacco Use    Smoking status: Former     Packs/day: 0.75     Years: 30.00     Pack years: 22.50     Types: Cigarettes     Quit date: 2022     Years since quittin.6    Smokeless tobacco: Never    Tobacco comments:     Last smoked 3/1/22   Vaping Use    Vaping Use: Never used   Substance Use Topics    Alcohol use: Yes     Alcohol/week: 4.0 - 5.0 standard drinks     Types: 4 - 5 Cans of beer per week     Comment: 3-4 beers weekly or less    Drug use: No       The following have been reviewed and updated as appropriate in this visit  Tobacco  Allergies  Meds  Problems  Med Hx  Surg Hx  Fam Hx      .    Review of Systems   Constitutional: Positive for weight gain. Negative for decreased appetite and malaise/fatigue.   HENT:  Negative for nosebleeds.    Cardiovascular:  Positive for chest pain. Negative for dyspnea on exertion, irregular heartbeat, leg swelling, near-syncope, orthopnea, palpitations, paroxysmal nocturnal dyspnea and syncope.   Respiratory:  Negative for cough, shortness of breath, sleep disturbances due to breathing and snoring.    Hematologic/Lymphatic: Negative for bleeding problem. Bruises/bleeds easily.   Skin:         Right groin hematoma   Gastrointestinal:  Negative for bloating, melena, nausea and vomiting.   Neurological:  Negative for dizziness and light-headedness.     Objective     Vitals:    10/11/22 0946   BP: 108/58   Pulse: 100   SpO2: 96%     Weight:   Weights (Current Encounter Only) (last 180 days)       Date/Time Weight    10/11/22 0946 83.5 kg (184 lb)             Physical Exam    Constitutional: Well built, nourished, no acute distress.   HEENT: Head is normocephalic and atraumatic. Sclera anicteric.   Neck: Supple. No carotid bruits.  No JVD.  Lungs: Effort  normal. Breath sounds are clear without wheezes or rales. No respiratory distress.   Sternal incision is well-healed CDI  Heart: S1-S2, Regular rate and rhythm.  No murmur.  Valve click present.  Extremities: Without Edema.  Chronic edema of the right ankle.  Radial pulses 2+.  Right groin hematoma.  Slight redness at the site and tenderness.  Musculoskeletal: Normal ROM.    Neurologic: No focal deficits.  Psychiartic: cooperative, alert and orientated X 3.  Skin: warm, dry, intact.      Data Review:     Sodium   Date Value Ref Range Status   10/11/2022 132 (L) 135 - 145 mmol/L Final     Potassium   Date Value Ref Range Status   10/11/2022 3.9 3.5 - 5.1 MMOL/L Final     Chloride   Date Value Ref Range Status   10/11/2022 96 (L) 98 - 107 mmol/L Final     CO2   Date Value Ref Range Status   10/11/2022 28 21 - 32 mmol/L Final     BUN   Date Value Ref Range Status   10/11/2022 16 7 - 25 mg/dL Final     Creatinine   Date Value Ref Range Status   10/11/2022 0.85 0.70 - 1.30 mg/dL Final     Glucose   Date Value Ref Range Status   10/11/2022 88 70 - 105 mg/dL Final     Calcium   Date Value Ref Range Status   10/11/2022 9.4 8.6 - 10.3 mg/dL Final      hsTnI 0 Hour   Date Value Ref Range Status   02/26/2022 14.7 <=19.8 pg/mL Final     BNP   Date Value Ref Range Status   03/08/2022 375 (H) 0 - 100 pg/mL Final      WBC   Date Value Ref Range Status   10/11/2022 10.7 (H) 3.7 - 9.6 10*3/uL Final     Hemoglobin   Date Value Ref Range Status   10/11/2022 12.0 (L) 13.2 - 17.2 g/dL Final     Hematocrit   Date Value Ref Range Status   10/11/2022 35.5 (L) 38.0 - 50.0 % Final     MCV   Date Value Ref Range Status   10/11/2022 90.5 82.0 - 97.0 fL Final     Platelets   Date Value Ref Range Status   10/11/2022 245 130 - 350 10*3/uL Final      Cholesterol   Date Value Ref Range Status   04/19/2022 84 0 - 199 mg/dL Final     Triglycerides   Date Value Ref Range Status   04/19/2022 60 <=149 mg/dL Final     HDL   Date Value Ref Range Status    04/19/2022 56 >=40 mg/dL Final     LDL Calculated   Date Value Ref Range Status   04/19/2022 <20 (L) 20 - 99 mg/dL Final        Electrocardiogram: 10/11/2022  V Pace [id number]      Assessment/Plan     Presents for Acute on chronic systolic heart failure.  NYHA Functional Class:  II  Stage:  C heart failure    1. Chronic combined systolic and diastolic heart failure (CMS/HCC) (HCC)  Chronic combined systolic and diastolic CHF.  Combined ischemic and nonischemic in nature.  Recent decline in his ejection fraction related to severe aortic stenosis.  Status post AVR.  Last EF 30 to 35% on 9/10/2022.  Prior to surgery EF 45%.  Patient had complete AV block with junctional escape status post dual-chamber pacemaker and had not been initiated on beta-blocker therapy at discharge.  However, with reduced ejection fraction, as well as occasional angina (likely nonischemic in nature related to sternal pain), I would like this to initiate the patient on beta-blocker therapy.  Resume home dose of metoprolol succinate 50 mg once daily.  We will also initiate SGLT2 inhibitor.  Patient is already on ACE/ARB.  BMP today.  Follow-up with CHF clinic in December 2022  - ECG 12 lead -Normal, Today  - metoprolol succinate XL (TOPROL-XL) 50 mg 24 hr tablet; Take 1 tablet (50 mg total) by mouth daily  Dispense: 90 tablet; Refill: 3  - Basic metabolic panel Blood, Venous; Future  - dapagliflozin (Farxiga) 10 mg tablet tablet; Take 1 tablet (10 mg total) by mouth daily  Dispense: 90 tablet; Refill: 3    2. Nonischemic cardiomyopathy (CMS/HCC) (HCC)  Nonischemic cardiomyopathy related to severe aortic stenosis.  Status post aortic VR on 9/6/2022.  NYHA class II.  Did struggle with increased volume after surgery and required increased doses of torsemide.  Torsemide to 60 mg p.o. once daily and is staying euvolemic with this.  We will continue on the current dose with repeat basic metabolic panel today.  Would like a follow-up echocardiogram within  the next 6 to 8 weeks to follow-up on his LV function.  He already has follow-up with Dr. Joshi to establish in general cardiology in November.  - metoprolol succinate XL (TOPROL-XL) 50 mg 24 hr tablet; Take 1 tablet (50 mg total) by mouth daily  Dispense: 90 tablet; Refill: 3  - Basic metabolic panel Blood, Venous; Future  - dapagliflozin (Farxiga) 10 mg tablet tablet; Take 1 tablet (10 mg total) by mouth daily  Dispense: 90 tablet; Refill: 3    3. S/P AVR  Follows with the Coumadin clinic. Coumadin for mechanical OnX valve with goal INR 2-3 for first three months then 1.5-2 lifelong thereafter per [facility].  Patient needed refill of Coumadin today.  - warfarin (COUMADIN) 5 mg tablet; Take 1 tablet (5 mg total) by mouth daily  Dispense: 90 tablet; Refill: 3    4.  2:1 AV block status post dual-chamber pacemaker  Due for device check in December.  He did have an initial atrial lead dislodgment with redo lead before discharge on 9/15/2022.       Patient Instructions   - Adults who have only received PPSV23 may receive a PCV (either PCV20 or PCV15) >=1 year after their last PPSV23 dose      Advised daily weights, and to contact the clinic with a 3 pound weight gain overnight or 5 pounds in week.  Discussed low-sodium diet (less than 2 g) and 2 L fluid restriction.  Advised compression stockings daily, and elevation of legs several times daily.  We will continue to follow in the heart failure clinic.  Advised they contact the clinic with increasing symptoms or concerns.    Return in about 9 weeks (around 12/13/2022) for Recheck - In Office.    The patient verbalized correct understanding and agreement to today's instructions and plan.  The patient verbalized that all questions have been addressed.    Yeny Fisher CNP      A voice recognition program was used to aid in documentation of this record. Sometimes words are not printed exactly as they were spoken. While efforts were made to carefully edit and correct any  inaccuracies, some errors may be present; please take these into context. Please contact the provider if errors are identified.

## 2022-10-06 NOTE — PATIENT INSTRUCTIONS
PLAN:  1.  Continue Symbicort 2 puffs twice daily  2.  Albuterol 2 puffs as needed every 4-6 hours for shortness of breath, cough, chest tightness or wheezing.  3.  Get COVID bivalent booster and flu vaccine this fall.  Call [phone] to schedule COVID booster.  4.  Follow up chest CT in December to follow-up on pulmonary nodule.      Follow-up in 3 months, unless needed sooner.

## 2022-10-07 ENCOUNTER — HOSPITAL ENCOUNTER (OUTPATIENT)
Dept: CARDIAC REHAB | Facility: HOSPITAL | Age: 62
Discharge: STILL ADMITTED OR ADMINISTRATIVE | End: 2022-10-07
Payer: COMMERCIAL

## 2022-10-07 DIAGNOSIS — T14.8XXA HEMATOMA: Primary | ICD-10-CM

## 2022-10-07 DIAGNOSIS — Z98.890 S/P AORTIC VALVE REPAIR: ICD-10-CM

## 2022-10-07 PROCEDURE — 93798 PHYS/QHP OP CAR RHAB W/ECG: CPT

## 2022-10-10 ENCOUNTER — HOSPITAL ENCOUNTER (OUTPATIENT)
Dept: CARDIAC REHAB | Facility: HOSPITAL | Age: 62
Discharge: STILL ADMITTED OR ADMINISTRATIVE | End: 2022-10-10
Payer: COMMERCIAL

## 2022-10-10 DIAGNOSIS — Z98.890 S/P AORTIC VALVE REPAIR: ICD-10-CM

## 2022-10-10 PROCEDURE — 93798 PHYS/QHP OP CAR RHAB W/ECG: CPT

## 2022-10-11 ENCOUNTER — ANCILLARY PROCEDURE (OUTPATIENT)
Dept: CARDIOLOGY | Facility: CLINIC | Age: 62
End: 2022-10-11
Payer: COMMERCIAL

## 2022-10-11 ENCOUNTER — OFFICE VISIT (OUTPATIENT)
Dept: CARDIOLOGY | Facility: CLINIC | Age: 62
End: 2022-10-11
Payer: COMMERCIAL

## 2022-10-11 ENCOUNTER — ANTICOAGULATION VISIT (OUTPATIENT)
Dept: CARDIOLOGY | Facility: CLINIC | Age: 62
End: 2022-10-11
Payer: COMMERCIAL

## 2022-10-11 ENCOUNTER — APPOINTMENT (OUTPATIENT)
Dept: LAB | Facility: CLINIC | Age: 62
End: 2022-10-11
Payer: COMMERCIAL

## 2022-10-11 VITALS
OXYGEN SATURATION: 96 % | DIASTOLIC BLOOD PRESSURE: 58 MMHG | BODY MASS INDEX: 24.92 KG/M2 | HEIGHT: 72 IN | HEART RATE: 100 BPM | SYSTOLIC BLOOD PRESSURE: 108 MMHG | WEIGHT: 184 LBS

## 2022-10-11 DIAGNOSIS — Z79.01 ANTICOAGULATION MANAGEMENT ENCOUNTER: ICD-10-CM

## 2022-10-11 DIAGNOSIS — Z95.2 PRESENCE OF PROSTHETIC HEART VALVE: ICD-10-CM

## 2022-10-11 DIAGNOSIS — Z95.2 PRESENCE OF PROSTHETIC HEART VALVE: Primary | ICD-10-CM

## 2022-10-11 DIAGNOSIS — Z95.2 S/P AVR: Primary | ICD-10-CM

## 2022-10-11 DIAGNOSIS — I50.42 CHRONIC COMBINED SYSTOLIC AND DIASTOLIC HEART FAILURE (CMS/HCC): Primary | ICD-10-CM

## 2022-10-11 DIAGNOSIS — I50.42 CHRONIC COMBINED SYSTOLIC AND DIASTOLIC HEART FAILURE (CMS/HCC): ICD-10-CM

## 2022-10-11 DIAGNOSIS — Z95.2 S/P AVR: ICD-10-CM

## 2022-10-11 DIAGNOSIS — T14.8XXA HEMATOMA: ICD-10-CM

## 2022-10-11 DIAGNOSIS — I42.8 NONISCHEMIC CARDIOMYOPATHY (CMS/HCC): ICD-10-CM

## 2022-10-11 DIAGNOSIS — D64.9 ANEMIA, UNSPECIFIED TYPE: ICD-10-CM

## 2022-10-11 DIAGNOSIS — Z51.81 ANTICOAGULATION MANAGEMENT ENCOUNTER: ICD-10-CM

## 2022-10-11 LAB
ANION GAP SERPL CALC-SCNC: 8 MMOL/L (ref 3–11)
BASOPHILS # BLD AUTO: 0.1 10*3/UL
BASOPHILS NFR BLD AUTO: 0.8 % (ref 0–2)
BUN SERPL-MCNC: 16 MG/DL (ref 7–25)
CALCIUM SERPL-MCNC: 9.4 MG/DL (ref 8.6–10.3)
CHLORIDE SERPL-SCNC: 96 MMOL/L (ref 98–107)
CO2 SERPL-SCNC: 28 MMOL/L (ref 21–32)
CREAT SERPL-MCNC: 0.85 MG/DL (ref 0.7–1.3)
EOSINOPHIL # BLD AUTO: 0.1 10*3/UL
EOSINOPHIL NFR BLD AUTO: 1.4 % (ref 0–3)
ERYTHROCYTE [DISTWIDTH] IN BLOOD BY AUTOMATED COUNT: 15.5 % (ref 11.5–15)
GFR SERPL CREATININE-BSD FRML MDRD: 99 ML/MIN/1.73M*2
GLUCOSE SERPL-MCNC: 88 MG/DL (ref 70–105)
HCT VFR BLD AUTO: 35.5 % (ref 38–50)
HGB BLD-MCNC: 12 G/DL (ref 13.2–17.2)
INR BLD: 3.1
INR PPP: 3.1
LYMPHOCYTES # BLD AUTO: 0.8 10*3/UL
LYMPHOCYTES NFR BLD AUTO: 7.5 % (ref 15–47)
MCH RBC QN AUTO: 30.6 PG (ref 29–34)
MCHC RBC AUTO-ENTMCNC: 33.8 G/DL (ref 32–36)
MCV RBC AUTO: 90.5 FL (ref 82–97)
MONOCYTES # BLD AUTO: 1 10*3/UL
MONOCYTES NFR BLD AUTO: 9.8 % (ref 5–13)
NEUTROPHILS # BLD AUTO: 8.6 10*3/UL
NEUTROPHILS NFR BLD AUTO: 80.5 % (ref 46–70)
PLATELET # BLD AUTO: 245 10*3/UL (ref 130–350)
PMV BLD AUTO: 8.4 FL (ref 6.9–10.8)
POTASSIUM SERPL-SCNC: 3.9 MMOL/L (ref 3.5–5.1)
PROTHROMBIN TIME: 36.8 SECONDS (ref 9.4–12.5)
RBC # BLD AUTO: 3.92 10*6/ΜL (ref 4.1–5.8)
RH ARTERIAL VENOUS RT EIA SYS: 63 CM/S
RH CV RT GROIN HEMATOMA LENGTH: 7.1 CM
RIGHT GROIN CFA SYS: 62 CM/S
RIGHT GROIN HEMATOMA AP: 3.1 CM
RIGHT GROIN HEMATOMA TRANS: 4.7 CM
RIGHT GROIN PFA SYS: 54 CM/S
RIGHT GROIN SFA SYS: 112 CM/S
SODIUM SERPL-SCNC: 132 MMOL/L (ref 135–145)
WBC # BLD AUTO: 10.7 10*3/UL (ref 3.7–9.6)

## 2022-10-11 PROCEDURE — 85610 PROTHROMBIN TIME: CPT | Performed by: STUDENT IN AN ORGANIZED HEALTH CARE EDUCATION/TRAINING PROGRAM

## 2022-10-11 PROCEDURE — 80048 BASIC METABOLIC PNL TOTAL CA: CPT | Performed by: NURSE PRACTITIONER

## 2022-10-11 PROCEDURE — 36415 COLL VENOUS BLD VENIPUNCTURE: CPT | Performed by: INTERNAL MEDICINE

## 2022-10-11 PROCEDURE — 99214 OFFICE O/P EST MOD 30 MIN: CPT | Performed by: NURSE PRACTITIONER

## 2022-10-11 PROCEDURE — 93971 EXTREMITY STUDY: CPT | Performed by: INTERNAL MEDICINE

## 2022-10-11 PROCEDURE — 93000 ELECTROCARDIOGRAM COMPLETE: CPT | Performed by: STUDENT IN AN ORGANIZED HEALTH CARE EDUCATION/TRAINING PROGRAM

## 2022-10-11 PROCEDURE — 85025 COMPLETE CBC W/AUTO DIFF WBC: CPT | Performed by: INTERNAL MEDICINE

## 2022-10-11 PROCEDURE — 93926 LOWER EXTREMITY STUDY: CPT | Performed by: INTERNAL MEDICINE

## 2022-10-11 RX ORDER — DAPAGLIFLOZIN 10 MG/1
10 TABLET, FILM COATED ORAL DAILY
Qty: 90 TABLET | Refills: 3 | Status: SHIPPED | OUTPATIENT
Start: 2022-10-11 | End: 2022-11-15

## 2022-10-11 RX ORDER — WARFARIN SODIUM 5 MG/1
5 TABLET ORAL
Qty: 90 TABLET | Refills: 3 | Status: SHIPPED | OUTPATIENT
Start: 2022-10-11 | End: 2023-11-24 | Stop reason: SDUPTHER

## 2022-10-11 RX ORDER — METOPROLOL SUCCINATE 50 MG/1
50 TABLET, EXTENDED RELEASE ORAL DAILY
Qty: 90 TABLET | Refills: 3 | Status: SHIPPED | OUTPATIENT
Start: 2022-10-11 | End: 2024-01-12 | Stop reason: SDUPTHER

## 2022-10-11 ASSESSMENT — ENCOUNTER SYMPTOMS
VOMITING: 0
LIGHT-HEADEDNESS: 0
ROS SKIN COMMENTS: RIGHT GROIN HEMATOMA
NEAR-SYNCOPE: 0
DIZZINESS: 0
COUGH: 0
DECREASED APPETITE: 0
NAUSEA: 0
PND: 0
PALPITATIONS: 0
WEIGHT GAIN: 1
BRUISES/BLEEDS EASILY: 1
SLEEP DISTURBANCES DUE TO BREATHING: 0
ORTHOPNEA: 0
SYNCOPE: 0
SHORTNESS OF BREATH: 0
SNORING: 0
IRREGULAR HEARTBEAT: 0
BLOATING: 0
DYSPNEA ON EXERTION: 0

## 2022-10-11 NOTE — PROGRESS NOTES
11:52 msg for pt to call [facility] re INR. DMD  1:39 spoke to pt verified ID no diet changes restarted on metoprolol(no change) and started on farziga(no change) states has a hematoma in groin area when procedure was done states [facility] examined it today at appt no bleeding problems enc to call if changes verified warfarin dose and tab size. mailed appt reminder. DMD      continue warfarin dose with changes based on SS calculations and recheck INR in 2 weeks with other appt. verb under. DMD

## 2022-10-11 NOTE — PATIENT INSTRUCTIONS
- Adults who have only received PPSV23 may receive a PCV (either PCV20 or PCV15) >=1 year after their last PPSV23 dose

## 2022-10-12 ENCOUNTER — HOSPITAL ENCOUNTER (OUTPATIENT)
Dept: CARDIAC REHAB | Facility: HOSPITAL | Age: 62
Discharge: STILL ADMITTED OR ADMINISTRATIVE | End: 2022-10-12
Payer: COMMERCIAL

## 2022-10-12 DIAGNOSIS — Z98.890 S/P AORTIC VALVE REPAIR: ICD-10-CM

## 2022-10-12 PROCEDURE — 93798 PHYS/QHP OP CAR RHAB W/ECG: CPT

## 2022-10-13 ENCOUNTER — HOSPITAL ENCOUNTER (OUTPATIENT)
Dept: OCCUPATIONAL THERAPY | Facility: HOSPITAL | Age: 62
Setting detail: THERAPIES SERIES
Discharge: STILL ADMITTED OR ADMINISTRATIVE | End: 2022-10-13
Payer: COMMERCIAL

## 2022-10-13 PROCEDURE — 97530 THERAPEUTIC ACTIVITIES: CPT | Mod: GO

## 2022-10-13 NOTE — INTERDISCIPLINARY/THERAPY
"  [address] [facility] [id number] [phone]        Lymphedema Discharge Note    Date of Service: 10/13/2022  Patient Name: Hayden Franklin  Referring Provider: No ref. provider found  Visit Number: 3  Certification Date: 12/19/22       Onset Date: 9/10/22  SOC Date:   9/26/22  Primary Medical Diagnosis: Lymphedema [I89.0]     Treatment Diagnosis:   Bilateral lower extremity dependent edema.     Date of last visit: 10/13/22  Total number of therapy visits:  3  Treatment included: manual , therapeutic activity, lymphedema, evaluation      This patient has been discharged from Lymphedema Therapy because: goals have been met     Patient Compliance: good     Subjective:  Gopal reports \"I have been going to the bathroom a lot still. My diuretics have been adjusted. I get my second set of compression the end of October.\"     Pain: Pain Assessment Scale  Pain Scale:  (no pain)           Objective:  LLIS: Patient’s current functional status measure is 9/13% (compared to initial measure of 21/31%) where a lower number = greater function.      Edema: no pitting edema present bilaterally    Skin Integrity: skin is dry but intact-reviewed importance of moisturizing legs     Circumferential Measurement: Lymphedema  Right Knee (cm): 33.7 cm  Right Calf (cm): 35.7 cm  Right Ankle (cm): 24.5 cm  Right MTP (cm): 25.5 cm  Left Knee (cm): 34.5 cm  Left Calf (cm): 37.5 cm  Left Ankle (cm): 23.5 cm  Left MTP (cm): 26.5 cm    Range of Motion: within normal limits but does have sternal precautions.       Equipment: Fit pt with Sigvaris 20-30 mmHg full foot knee high compression stockings . Also ordered Juzo dynamic 20-30mmHg full foot SB size IV regular length stockings.   Today's Treatment:  Hayden Franklin arrived to lymphedema clinic with compression stockings on.   -Doffed compression. Assessed lower extremity swelling and gathered circumferential measurements.   Reviewed wearing schedule and when to replace " compression. Provided pt with my contact information.     Education:   The patient was provided education on Educated on importance of elevation, compression and exercise as tolerated to decrease BLE edema as well as importance of good skin care. .  A home exercise program compression garment wearing, elevation, walking as tolerated, ankle pumps while elevating lower extremities.    Time Calculation  Start Time: 1202  Stop Time: 1225  Time Calculation (min): 23 min   Therapeutic Interventions (Time Spent in Minutes)  Therapeutic Activity Dynamic: 23                Assessment:  Prior Status:   Edema: 3+ pitting edema right foot and lateral calf; 2+ pitting edema small portion of left foot.      Skin Integrity: Well moisturized and intact.      Circumferential Measurement: Lymphedema  Extremity Assessed: Left Lower Extremity, Right Lower Extremity  RLE Length (cm): 44 cm  Right Knee (cm): 36.2 cm  Right Calf (cm): 39 cm  Right Achilles (cm): 28.5 cm  Right Ankle (cm): 30 cm  Right Instep (cm): 37.5 cm  Right MTP (cm): 26.2 cm  Left Knee (cm): 34.9 cm  Left Calf (cm): 38.5 cm  Left Achilles (cm): 26.5 cm  Left Ankle (cm): 28 cm  Left Instep (cm): 36.7 cm  Left MTP (cm): 27.5 cm  Current Status:Gopal is doing well managing bilateral lower extremity swelling. He reports he is not always compliant with compression wearing, but is for the most part. He has been taking his diuretics as prescribed. He plans to purchase a second set of compression at the end of the month. He is comfortable with his home exercise program and denies any new questions or concerns. He was encouraged to reach out to this therapist if any new concerns arise but he was agreeable to discharge .       Barriers to outcome:extensive cardiac history-must rely on others for assist with compression due to sternal precautions       Current Goals/Progress Towards:     Short Term Goals:  Patient will be compliant with medical grade compression wearing during  the day and off at night, every day until returning to clinic within 1 week.  Goal met  Patient to be compliant with decongestive exercise home exercise program within 1 week.  Goal met            Long Term Goals:   Patient to have a LLIS change score greater than or equal to18 indicating an improvement in function to be completed in 12 weeks.  Goal met  Patient to participate in lymphedema clinic to maximize edema reduction and be compliant with compression garment wear with in 12 weeks for independent management of lymphedema.  Goal met     Discharge recommendations:  Continue with compression wear and home exercise program     Thank you for allowing us to share in the care of this patient. If you have any questions, recommendations, or further concerns regarding this patient, please feel free to contact us at [address] [facility] [id number]  Dept: [phone] .      Signed by: SEJAL Merida 10/13/2022 12:28 PM

## 2022-10-14 ENCOUNTER — HOSPITAL ENCOUNTER (OUTPATIENT)
Dept: CARDIAC REHAB | Facility: HOSPITAL | Age: 62
Discharge: STILL ADMITTED OR ADMINISTRATIVE | End: 2022-10-14
Payer: COMMERCIAL

## 2022-10-14 ENCOUNTER — TELEPHONE (OUTPATIENT)
Dept: CARDIOLOGY | Facility: CLINIC | Age: 62
End: 2022-10-14
Payer: COMMERCIAL

## 2022-10-14 DIAGNOSIS — Z98.890 S/P AORTIC VALVE REPAIR: ICD-10-CM

## 2022-10-14 PROCEDURE — 93798 PHYS/QHP OP CAR RHAB W/ECG: CPT

## 2022-10-14 NOTE — TELEPHONE ENCOUNTER
Pt called because he normally receives a text message from the [facility] when a prescription has been filled. At this time he has not received a notification on the 3 sent in by [facility] on 10/11.   Farxiga 10mg tablet  Toprol-XL 50mg 24hr tablet  Coumadin 5mg tablet  He was also curious as to whether his insurance will cover the Farxiga or if there would be something compatible.

## 2022-10-14 NOTE — TELEPHONE ENCOUNTER
Called and spoke with patient and advised him this nurse had followed up with [facility] pharmacy and was informed that patient's warfarin and metoprolol were ready, however, Farxiga required prior authorization.  Prior authorization has been submitted for Farxiga and are awaiting response at this time.  Patient is informed of this and verbalized understanding and appreciation for this information.  Patient has no further questions or concerns at this time other than questions about torsemide dosing once he starts Farxiga.  Advised patient that this nurse would have WAYLON Mariano follow-up with him on this.  Patient again verbalized understanding and was in agreement with this plan.  Patient has no further questions or concerns at this time.  All questions were answered.

## 2022-10-17 ENCOUNTER — HOSPITAL ENCOUNTER (OUTPATIENT)
Dept: CARDIAC REHAB | Facility: HOSPITAL | Age: 62
Discharge: STILL ADMITTED OR ADMINISTRATIVE | End: 2022-10-17
Payer: COMMERCIAL

## 2022-10-17 DIAGNOSIS — Z98.890 S/P AORTIC VALVE REPAIR: ICD-10-CM

## 2022-10-17 PROCEDURE — 93798 PHYS/QHP OP CAR RHAB W/ECG: CPT

## 2022-10-19 ENCOUNTER — HOSPITAL ENCOUNTER (OUTPATIENT)
Dept: CARDIAC REHAB | Facility: HOSPITAL | Age: 62
Discharge: STILL ADMITTED OR ADMINISTRATIVE | End: 2022-10-19
Payer: COMMERCIAL

## 2022-10-19 DIAGNOSIS — Z98.890 S/P AORTIC VALVE REPAIR: ICD-10-CM

## 2022-10-19 PROCEDURE — 93798 PHYS/QHP OP CAR RHAB W/ECG: CPT

## 2022-10-21 ENCOUNTER — HOSPITAL ENCOUNTER (OUTPATIENT)
Dept: CARDIAC REHAB | Facility: HOSPITAL | Age: 62
Discharge: STILL ADMITTED OR ADMINISTRATIVE | End: 2022-10-21
Payer: COMMERCIAL

## 2022-10-21 DIAGNOSIS — Z98.890 S/P AORTIC VALVE REPAIR: ICD-10-CM

## 2022-10-21 PROCEDURE — 93798 PHYS/QHP OP CAR RHAB W/ECG: CPT

## 2022-10-24 ENCOUNTER — ANTICOAGULATION VISIT (OUTPATIENT)
Dept: CARDIOLOGY | Facility: CLINIC | Age: 62
End: 2022-10-24
Payer: COMMERCIAL

## 2022-10-24 ENCOUNTER — HOSPITAL ENCOUNTER (OUTPATIENT)
Dept: CARDIAC REHAB | Facility: HOSPITAL | Age: 62
Discharge: STILL ADMITTED OR ADMINISTRATIVE | End: 2022-10-24
Payer: COMMERCIAL

## 2022-10-24 ENCOUNTER — APPOINTMENT (OUTPATIENT)
Dept: LAB | Facility: CLINIC | Age: 62
End: 2022-10-24
Payer: COMMERCIAL

## 2022-10-24 VITALS — BODY MASS INDEX: 25.19 KG/M2 | WEIGHT: 186 LBS | HEIGHT: 72 IN

## 2022-10-24 DIAGNOSIS — Z51.81 ANTICOAGULATION MANAGEMENT ENCOUNTER: ICD-10-CM

## 2022-10-24 DIAGNOSIS — Z95.2 PRESENCE OF PROSTHETIC HEART VALVE: ICD-10-CM

## 2022-10-24 DIAGNOSIS — Z98.890 S/P AORTIC VALVE REPAIR: ICD-10-CM

## 2022-10-24 DIAGNOSIS — Z79.01 ANTICOAGULATION MANAGEMENT ENCOUNTER: ICD-10-CM

## 2022-10-24 DIAGNOSIS — Z95.2 PRESENCE OF PROSTHETIC HEART VALVE: Primary | ICD-10-CM

## 2022-10-24 DIAGNOSIS — Z95.2 S/P AVR: Primary | ICD-10-CM

## 2022-10-24 LAB
INR PPP: 1.7
INR PPP: 1.7 (ref 0.9–1.1)

## 2022-10-24 PROCEDURE — 85610 PROTHROMBIN TIME: CPT | Performed by: STUDENT IN AN ORGANIZED HEALTH CARE EDUCATION/TRAINING PROGRAM

## 2022-10-24 PROCEDURE — 36416 COLLJ CAPILLARY BLOOD SPEC: CPT | Mod: NC | Performed by: STUDENT IN AN ORGANIZED HEALTH CARE EDUCATION/TRAINING PROGRAM

## 2022-10-24 PROCEDURE — 93798 PHYS/QHP OP CAR RHAB W/ECG: CPT

## 2022-10-24 NOTE — CARDIAC/PULMONARY REHAB ITP
Cardiac Rehab ITP Patient Name: Hayden Franklin  Location: [facility] CARDIAC REHAB    CARDIAC REHAB INDIVIDUALIZED TREATMENT PLAN    PCP  Rosa Haney MD    Cardiopulmonary Treatment  Date Entered Program: 09/29/22  Date of Event: 09/06/22  Cardiopulmonary Diagnosis: Valve  Assessment Type: 30 Day  Session Number: 1  Risk Stratification: High    Exercise Assessment:  Exercise: CR exercise session  Angina with Exercise: No  Patient reports pain: Yes  D[facility] Activity Status Index (DASI) Score: 15.45       Exercise Plan:  Exercise Goals: Aerobic activity 30 plus min/day 5 days/week; Participate in resistance training program 2+ days per week  Aerobic Activity Goal: Met  Participates in Resistance Training Goal: Met  Individual Exercise RX Goal: Met  Exercise Goal Comment: SMART GOAL:  Pt has been walking 30-40 min 5-7 days per week.    Exercise Interventions:  Exercise Interventions: Yes  Exercise Prescription Mode: Treadmill  Exercise Prescription Frequency: 3x per week  Exercise Prescription Duration: 30-40  Exercise Prescription Intensity: RPE 4-7  Exercise Prescription METS: 4.8  Exercise Prescription Progression: 5.0  Exercise Prescription THR: n/a  Home Exercise: No  Exercise Education: Equipment orientation; Exercise safety; Physical activity; RPE scale; S/S to report; Warm up/cool down  Patient Verbalizes Understanding: Yes  Exercise Comment: Pt is active and walks around his yard and shop during activities but no regular exercise routine.    Nutrition  Nutrition Assessment:  BMI (Calculated): 25.2  Dietary Screen Score: 52    Nutrition Plan:  Nutrition Consult/Referral: Dietitian consult  Nutrition Goals (select all): BMI less than 25  BMI Less Than 25 Goal: Met  Waist Circumference Goal: Met             Dietary Goals - Miscellaneous: Gain weight while in cardiac rehab  Number of  Pounds Weight to Gain: 8 lbs  Gain Weight While in Card Rehab Goal: Met  Misc Other Goal: Unmet  Misc Goal Comment: SMART GOAL:  Pt  is making good progress towards his goal.  Pt has gained 6lbs since starting CR Program.    Nutrition Interventions:  Nutrition Education: Heart healthy eating  Patient Verbalizes Understanding: Yes  Nutrition Comments: Pt had Dietary Consult  with Dietician Mary Kate.  Pt has been eating heart healthy with lean meats, veggies, and fruits.  Pt reports he eats fish 2 weekly.  Pt has gained 6 lbs with taking in Protein shakes daily.    Psychosocial   Psychosocial Assessment:  [facility] Score: 24  PHQ-9 Total Score: 3  Family Support?: Yes  Lives With: Spouse/others    Psychosocial Plan:  Psychosocial Goals: Maximize coping skills; Positive support system; Use stress management  Maximize Coping Skills Goal: Met  Positive Support System Goal: Met  Stress Management Goal: Met  Psychosocial Education: Coping Techniques; Drug interventions; Effects of stress; Identifies stressors; Positive support system; S/S depression; Uses stress management skills  Psychosocial Goal Comment: Pt reports he has a very supportive wife at home.  Pt reports he manages his stress well at this time.    Psychosocial Interventions:  Psychosocial Consults/Referral?: No  Psychosocial Education: Coping Techniques; Drug interventions; Effects of stress; Identifies stressors; Positive support system; S/S depression; Uses stress management skills  Patient Verbalizes Understanding: Yes  Psychosocial Comment: Pt reports he manges stress at home with walking, and doing home projects.    Other Core Components:  Diagnosed With Congestive Heart Failure?: No  Diagnosed With Diabetes Mellitus?: No  Diagnosed with Hyperlipidemia?: Yes  Diagnosed with Hypertension?: No  Currently Uses or Formerly Used Tobacco?: Yes         Hyperlipidemia  Hyperlipidemia Assessment:  Lab results:  Cholesterol   Date Value Ref Range Status   04/19/2022 84 0 - 199 mg/dL Final     Triglycerides   Date Value Ref Range Status   04/19/2022 60 <=149 mg/dL Final     HDL   Date Value Ref  Range Status   2022 56 >=40 mg/dL Final     LDL Calculated   Date Value Ref Range Status   2022 <20 (L) 20 - 99 mg/dL Final      Lab Results Date: 22  Total Cholesterol Result: 84  Triglyceride Result: 60  HDL Result: 56  LDL Result: 20    Hyperlipidemia Plan:  Hyperlipidemia Goal: LDL less than 70; HDL greater than 40; Total cholesterol less than 150; Triglycerides less than 150  HDL  Greater Than 40 Goal: Met  Total Cholesterol Less Than 150 Goal: Met  LDL Less Than 70 Goal: Met  Triglycerides Less Than 150 Goal: Met  Hyperlipidemia Goal Comment: Patient states he takes his prescribed cholesterol medication at home as prescribed.    Hyperlipidemia Interventions:  Hyperlipidemia Consult/Referral: Dietitian consult  Hyperlipidemia Education: Low cholesterol diet; Low triglyceride diet  Patient Verbalizes Understanding: Yes  Hyperlipidemia Comment: Patient reports that he is still taking his prescribed cholesterol medication every evening at home and his injection every two weeks. Patient has attended cardiac rehab nutritional classes.    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   Tobacco Use    Smoking status: Former     Packs/day: 0.75     Years: 30.00     Pack years: 22.50     Types: Cigarettes     Quit date: 2022     Years since quittin.6    Smokeless tobacco: Never    Tobacco comments:     Last smoked 3/1/22   Vaping Use    Vaping Use: Never used   Substance and Sexual Activity    Alcohol use: Yes     Alcohol/week: 4.0 - 5.0 standard drinks     Types: 4 - 5 Cans of beer per week     Comment: 3-4 beers weekly or less    Drug use: No    Sexual activity: Not Currently     Social Determinants of Health     Tobacco Use: Medium Risk     Smoking Tobacco Use: Former    Smokeless Tobacco Use: Never   Depression: At risk    PHQ-2 Score: 3     Tobacco History Updated?: Yes    Tobacco Cessation Plan:  Tobacco Cessation Goals: Nicotine free; Tobacco free  Nicotine Free Goal: Met  Tobacco Free Goal: Met  Tobacco Cessation Goal Comment: Patient remains free of all tobacco and nicotine products.    Tobacco Cessation Interventions:  Tobacco Educaton: Effects of tobacco on CAD; Identify road blocks; Determine personal benefits of being tobacco free; Identify triggers  Patient Verbalizes Understanding: Yes  Tobacco Comment: Patient has attended cardiac rehab tobacco class.        General Education  Education Intervention Classes: 12. Tobacco products; 13. Choose fats wisely; 14. FITT principle, interval training, and circuit training; 15. Clinical health; 16. Sodium; 17. Sugar; 18. Anatomy and physiology of heart disease; 1.  Medications (Pharmacy consult part 1); 2.  Medications (Pharmacy consult part 2); 3.  Exercise benefits and precautions; 4.  Grocery shopping and food labels    Exercise   Pre-Session Evaluation:  Medication Changed Since Last Visit: No  Is the patient having pain: 2  Where is pain: Sternum incisional soreness  Exercise Treatment: Exercise as tolerated.      Resting Vital Signs:  Resting Heart Rate: 72  Resting BP: 110/70  Weight: 84.4 kg (186 lb)     [place]:  Heart Rate [place] (HRR): 88  Lower Training Range: 124.8  Upper Training Range: 146.8    Treadmill Exercise Assessment:  Exercise Duration (mins): 30  Speed: 2  thGthrthathdtheth:th th5th RPE (Modified Manoj): 4  Adjusted Workload: 3.6  Treadmill METs: 3.6  Exercise HR: 122  Exercise BP: 126/82        Weights/Resistance Exercise Assessment:  Exercise Duration (mins): 15 minutes  Equipment: Free Weights         Recovery Vital Signs:  Recovery HR: 54  Recovery BP: 112/76    Additional comments  ITP Comment: Patient has made great progress thus far in cardiac rehab. Patient has began walking at home  and states he walks as much as possible when he is not in cardiac rehab. He has reached a METs of 4.8 on the treadmill. Patient remains free of all nicotine and tobacco products since starting cardiac rehab. Patient reports that he has been feeling great and that since starting cardiac rehab he can tell how much it has helped his energy levels. He states that on the days that he attends cardiac rehab he has more energy throughout the day and does not have the need to take naps.    Keyla Gonzales RN  10/24/2022 10:20 AM

## 2022-10-24 NOTE — PROGRESS NOTES
11:11 spoke to pt verified ID no med changes states has had salad twice in the last 3 days will return diet to usual vit K foods no bleeding or bruising problems enc to call if changes verified warfarin dose and tab size states may have missed a dose 3 days ago mailed appt reminder. DMD      today only 10/24/2022 take 5mg=1 tabs based on SS calculations then continue warfarin dose as above and recheck INR in 2 weeks verb under. DMD   no

## 2022-10-25 ENCOUNTER — TELEPHONE (OUTPATIENT)
Dept: CARDIOLOGY | Facility: CLINIC | Age: 62
End: 2022-10-25
Payer: COMMERCIAL

## 2022-10-26 ENCOUNTER — HOSPITAL ENCOUNTER (OUTPATIENT)
Dept: CARDIAC REHAB | Facility: HOSPITAL | Age: 62
Discharge: STILL ADMITTED OR ADMINISTRATIVE | End: 2022-10-26
Payer: COMMERCIAL

## 2022-10-26 DIAGNOSIS — Z98.890 S/P AORTIC VALVE REPAIR: ICD-10-CM

## 2022-10-26 PROCEDURE — 93798 PHYS/QHP OP CAR RHAB W/ECG: CPT

## 2022-10-26 NOTE — TELEPHONE ENCOUNTER
[facility]  Clarified order for Farxiga and awaiting prior Auth.

## 2022-10-28 ENCOUNTER — HOSPITAL ENCOUNTER (OUTPATIENT)
Dept: CARDIAC REHAB | Facility: HOSPITAL | Age: 62
Discharge: STILL ADMITTED OR ADMINISTRATIVE | End: 2022-10-28
Payer: COMMERCIAL

## 2022-10-28 DIAGNOSIS — Z98.890 S/P AORTIC VALVE REPAIR: ICD-10-CM

## 2022-10-28 PROCEDURE — 93798 PHYS/QHP OP CAR RHAB W/ECG: CPT

## 2022-10-31 ENCOUNTER — HOSPITAL ENCOUNTER (OUTPATIENT)
Dept: CARDIAC REHAB | Facility: HOSPITAL | Age: 62
Discharge: STILL ADMITTED OR ADMINISTRATIVE | End: 2022-10-31
Payer: COMMERCIAL

## 2022-10-31 DIAGNOSIS — Z98.890 S/P AORTIC VALVE REPAIR: ICD-10-CM

## 2022-10-31 PROCEDURE — 93798 PHYS/QHP OP CAR RHAB W/ECG: CPT

## 2022-11-02 ENCOUNTER — CLINICAL SUPPORT (OUTPATIENT)
Dept: FAMILY MEDICINE | Facility: CLINIC | Age: 62
End: 2022-11-02
Payer: COMMERCIAL

## 2022-11-02 ENCOUNTER — IMMUNIZATION (OUTPATIENT)
Dept: FAMILY MEDICINE | Facility: CLINIC | Age: 62
End: 2022-11-02
Payer: COMMERCIAL

## 2022-11-02 ENCOUNTER — HOSPITAL ENCOUNTER (OUTPATIENT)
Dept: CARDIAC REHAB | Facility: HOSPITAL | Age: 62
Discharge: STILL ADMITTED OR ADMINISTRATIVE | End: 2022-11-02
Payer: COMMERCIAL

## 2022-11-02 DIAGNOSIS — Z98.890 S/P AORTIC VALVE REPAIR: ICD-10-CM

## 2022-11-02 DIAGNOSIS — Z23 ENCOUNTER FOR VACCINATION: Primary | ICD-10-CM

## 2022-11-02 PROCEDURE — G0008 ADMIN INFLUENZA VIRUS VAC: HCPCS | Performed by: INTERNAL MEDICINE

## 2022-11-02 PROCEDURE — 0124A PFIZER-BIONTECH BIVALENT BOOSTER SARS-COV-2 VACCINE (BOOSTER): CPT | Performed by: PHYSICIAN ASSISTANT

## 2022-11-02 PROCEDURE — 90694 VACC AIIV4 NO PRSRV 0.5ML IM: CPT | Performed by: INTERNAL MEDICINE

## 2022-11-02 PROCEDURE — 91312 PFIZER-BIONTECH BIVALENT BOOSTER SARS-COV-2 VACCINE (BOOSTER): CPT | Performed by: PHYSICIAN ASSISTANT

## 2022-11-02 PROCEDURE — 93798 PHYS/QHP OP CAR RHAB W/ECG: CPT

## 2022-11-04 ENCOUNTER — HOSPITAL ENCOUNTER (OUTPATIENT)
Dept: CARDIAC REHAB | Facility: HOSPITAL | Age: 62
Discharge: STILL ADMITTED OR ADMINISTRATIVE | End: 2022-11-04
Payer: COMMERCIAL

## 2022-11-04 DIAGNOSIS — Z98.890 S/P AORTIC VALVE REPAIR: ICD-10-CM

## 2022-11-04 PROCEDURE — 93798 PHYS/QHP OP CAR RHAB W/ECG: CPT

## 2022-11-07 ENCOUNTER — ANTICOAGULATION VISIT (OUTPATIENT)
Dept: CARDIOLOGY | Facility: CLINIC | Age: 62
End: 2022-11-07
Payer: COMMERCIAL

## 2022-11-07 ENCOUNTER — HOSPITAL ENCOUNTER (OUTPATIENT)
Dept: CARDIAC REHAB | Facility: HOSPITAL | Age: 62
Discharge: STILL ADMITTED OR ADMINISTRATIVE | End: 2022-11-07
Payer: COMMERCIAL

## 2022-11-07 ENCOUNTER — APPOINTMENT (OUTPATIENT)
Dept: LAB | Facility: CLINIC | Age: 62
End: 2022-11-07
Payer: COMMERCIAL

## 2022-11-07 DIAGNOSIS — Z51.81 ANTICOAGULATION MANAGEMENT ENCOUNTER: ICD-10-CM

## 2022-11-07 DIAGNOSIS — Z95.2 PRESENCE OF PROSTHETIC HEART VALVE: ICD-10-CM

## 2022-11-07 DIAGNOSIS — Z95.2 S/P AVR: Primary | ICD-10-CM

## 2022-11-07 DIAGNOSIS — Z98.890 S/P AORTIC VALVE REPAIR: ICD-10-CM

## 2022-11-07 DIAGNOSIS — Z95.2 PRESENCE OF PROSTHETIC HEART VALVE: Primary | ICD-10-CM

## 2022-11-07 DIAGNOSIS — Z79.01 ANTICOAGULATION MANAGEMENT ENCOUNTER: ICD-10-CM

## 2022-11-07 LAB
INR PPP: 2.7
INR PPP: 2.7 (ref 0.9–1.1)

## 2022-11-07 PROCEDURE — 85610 PROTHROMBIN TIME: CPT | Performed by: INTERNAL MEDICINE

## 2022-11-07 PROCEDURE — 93798 PHYS/QHP OP CAR RHAB W/ECG: CPT

## 2022-11-07 PROCEDURE — 36416 COLLJ CAPILLARY BLOOD SPEC: CPT | Mod: NCNR | Performed by: INTERNAL MEDICINE

## 2022-11-07 NOTE — PROGRESS NOTES
11/7/2022 10:08 msg for pt to call [facility] re INR. DMD  10:14 pt called in verified ID no med or diet changes no bleeding or bruising problems enc to call if changes verified warfarin dose reports taking 27.5mg/week instead of 25mg. mailed appt reminder. DMD      continue warfarin dose as above based on what pt reports taking and recheck INR in 2 weeks verb under. DMD

## 2022-11-09 ENCOUNTER — OFFICE VISIT (OUTPATIENT)
Dept: CARDIOLOGY | Facility: CLINIC | Age: 62
End: 2022-11-09
Payer: COMMERCIAL

## 2022-11-09 ENCOUNTER — ANCILLARY PROCEDURE (OUTPATIENT)
Dept: CARDIOLOGY | Facility: CLINIC | Age: 62
End: 2022-11-09
Payer: COMMERCIAL

## 2022-11-09 VITALS
SYSTOLIC BLOOD PRESSURE: 124 MMHG | DIASTOLIC BLOOD PRESSURE: 82 MMHG | HEIGHT: 72 IN | HEART RATE: 76 BPM | WEIGHT: 192 LBS | BODY MASS INDEX: 26.01 KG/M2 | OXYGEN SATURATION: 96 %

## 2022-11-09 DIAGNOSIS — I25.5 ISCHEMIC CARDIOMYOPATHY: ICD-10-CM

## 2022-11-09 DIAGNOSIS — Z95.2 S/P AVR (AORTIC VALVE REPLACEMENT): ICD-10-CM

## 2022-11-09 DIAGNOSIS — I50.42 CHRONIC COMBINED SYSTOLIC AND DIASTOLIC CONGESTIVE HEART FAILURE (CMS/HCC): Primary | ICD-10-CM

## 2022-11-09 LAB
ASCENDING AORTA: 3.04 CM
AV LVOT PEAK GRADIENT: 2.7 MMHG
AV MEAN GRADIENT: 4.82 MMHG
AV PEAK GRADIENT: 8.76 MMHG
BSA FOR ECHO PROCEDURE: 2.1 M2
DOP CALC AO PEAK VEL: 1.48 M/S
DOP CALC AO VTI: 25.5 CM
DOP CALC LVOT DIAMETER: 2.05 CM
DOP CALC LVOT STROKE VOLUME: 53 CM3
DOP CALC MV VTI: 25.67 CM
DOP CALCLVOT PEAK VEL VTI: 16.1 CM
E/A RATIO: 3
E/E' RATIO (AVERAGE): 9.1
E/E' RATIO: 11.5
EJECTION FRACTION: 29 %
ERAP: 5 MMHG
INTERVENTRICULAR SEPTUM: 1 CM (ref 0.6–1.1)
LA AREA A4C SYSTOLE: 85.7 CM3
LEFT ATRIUM SIZE: 4.35 CM
LEFT ATRIUM VOLUME INDEX: 52 ML/M2
LEFT ATRIUM VOLUME: 109 CM3
LEFT INTERNAL DIMENSION IN SYSTOLE: 4.7 CM (ref 3.2–4.85)
LEFT VENTRICLE DIASTOLIC VOLUME: 223 CM3
LEFT VENTRICLE SYSTOLIC VOLUME: 169 CM3
LEFT VENTRICULAR INTERNAL DIMENSION IN DIASTOLE: 5.8 CM (ref 5.47–7.6)
LVAD-AP2: 50.4 CM2
ML OF DILUTED DEFINITY INJECTED: 3 ML
MR PISA EROA: 0.07 CM2
MR VTI: 161 CM
MV DEC SLOPE: 5140 MM/S2
MV DT: 202 MS
MV MEAN GRADIENT: 1.4 MMHG
MV PEAK A VEL: 33 CM/S
MV PEAK E VEL: 97.5 CM/S
MV PEAK GRADIENT: 4 MMHG
MV REGURGITANT VOLUME: 11 ML
MV STENOSIS PRESSURE HALF TIME: 54 MS
MV VALVE AREA P 1/2 METHOD: 4.07 CM2
MV VMAX: 94 CM/S
MVA (VTI): 2.07 CM2
PISA MRMAX VEL: 453 CM/S
POSTERIOR WALL: 1.3 CM (ref 0.6–1.1)
PULM VEIN S/D RATIO: 0.8
PV PEAK D VEL: 39 CM/S
PV PEAK S VEL: 31 CM/S
RA AREA: 24.2 CM2
RH CV ECHO AV VALVE AREA VEL: 1.8 CM2
RH CV ECHO AV VALVE AREA VTI: 2.1 CM2
RH LVOT PEAK VELOCITY REST: 0.8 M/S
RIGHT VENTRICULAR INTERNAL DIMENSION IN DIASTOLE: 4.2 CM
RV AP4 BASE: 4.1 CM
S': 5 CM/S
TDI: 8.5 CM/S
TDILATERAL: 14.8 CM/S
TR MAX PG: 28.52 MMHG
TRICUSPID ANNULAR PLANE SYSTOLIC EXCURSION: 0.9 CM
TRICUSPID VALVE PEAK REGURGITATION VELOCITY: 2.67 M/S
TV REST PULMONARY ARTERY PRESSURE: 34 MMHG
Z-SCORE OF LEFT VENTRICULAR DIMENSION IN END DIASTOLE: -1.06
Z-SCORE OF LEFT VENTRICULAR DIMENSION IN END SYSTOLE: 1.39

## 2022-11-09 PROCEDURE — 93308 TTE F-UP OR LMTD: CPT | Performed by: INTERNAL MEDICINE

## 2022-11-09 PROCEDURE — 93325 DOPPLER ECHO COLOR FLOW MAPG: CPT | Performed by: INTERNAL MEDICINE

## 2022-11-09 PROCEDURE — 93321 DOPPLER ECHO F-UP/LMTD STD: CPT | Performed by: INTERNAL MEDICINE

## 2022-11-09 PROCEDURE — 99214 OFFICE O/P EST MOD 30 MIN: CPT | Mod: 25 | Performed by: INTERNAL MEDICINE

## 2022-11-09 PROCEDURE — 93000 ELECTROCARDIOGRAM COMPLETE: CPT | Performed by: INTERNAL MEDICINE

## 2022-11-09 ASSESSMENT — ENCOUNTER SYMPTOMS
IRREGULAR HEARTBEAT: 0
BRUISES/BLEEDS EASILY: 1
PALPITATIONS: 0
SYNCOPE: 0
LIGHT-HEADEDNESS: 0
SNORING: 1
NEAR-SYNCOPE: 0
SLEEP DISTURBANCES DUE TO BREATHING: 0
DIZZINESS: 0

## 2022-11-09 ASSESSMENT — PAIN SCALES - GENERAL: PAINLEVEL: 1

## 2022-11-09 NOTE — PROGRESS NOTES
[facility]   Heart and Vascular [facility]     CARDIOLOGY OUTPATIENT FOLLOW-UP NOTE                                       Chief Complaint   Patient presents with    Congestive Heart Failure     chronic combined    ICM       Congestive Heart Failure  Pertinent negatives include no chest pain, near-syncope or palpitations.     Subjective    Patient ID: Hayden Franklin is a 62 y.o. male.  Gopal is here for follow-up today.  He was last seen by Yeny last month.    He had a inferior MI November 1, 2013 and underwent SVG to RCA graft with concomitant AVR (with Justin Mitroflow bioprosthesis) and ascending aortic Dacron graft.    He ended up having severe prosthetic aortic valve stenosis and underwent redo sternotomy with AVR using 23 mm On-X mechanical valve on 9/6/2022 at [facility].    His coronary angiogram prior to the valve replacement did show that his SVG to RPDA was widely patent and had  of proximal RCA.  Rest of the circulation was good.    He did have a postop complete AV block requiring dual-chamber permanent pacemaker placement on 9/10/2022.  He apparently had initial atrial lead dislodgment requiring redo lead before discharge on 9/15/2022.    Immediate postsurgical EF apparently was 45% which apparently got worse at the time of pacemaker implantation dropping down to 30 to 35%.    Gopal is doing great from cardiac standpoint.  He is in cardiac rehab.  He continues to have a weight restriction for a total of 3 months.  He is also been careful with his left arm movement because of the atrial lead dislodgment.    No significant chest pain other than postsurgical chest wall pain.  No orthopnea PND.  No leg swelling remains on torsemide at the same dose.    He was prescribed Farxiga for his systolic heart failure which got denied.  Right now he is actually not optimized on his lisinopril dose.    CARDIOLOGY PROBLEM LIST:  Cardiology Problem List     Last Edited By Markell Cano LPN on  3/2/2022 at 2:07 PM     Primary Cardiologist: Dr. Gomez     Coronary   -11/1/2013 Inferior MI: 1V CABG (SVG-RCA) with concomitant AVR and ascending aortic dacron graft     Peripheral Vascular  - 8/2017 Carotid duplex: Right ICA stenosis 16-49%, left ICA stenosis 0-15% stenosis. Suggest follow-up study 1-2 years.    - 11/2013 s/p repair with a 34mm Dacron graft d/t Dilated Ascending Aorta    Valvular  - 11/2013 Aortic Valve Replacement with a Justin Mitroflow bioprosthetic d/t Nonrheumatic Aortic Valve Stenosis  - 9/6/2022: [facility] - redo AVR using #23mm On-X aortic valve    CHF/CM NA    Electrophysiology  - Complete AVB w/ junction escape s/p DC PPM 9/9/2022 w/ lead dislodement of atrial lead; redo atrial lead 9/15/2022    Pulmonary Vascular NA    Risk Factors   - Hyperlipidemia  - Tobacco use  - Carotid stenosis    Prior Imaging/Testing History   -2/26/2022 Lexiscan: EF 48%, LV perfusion is abnormal. Post stress imaging demonstrates moderately sized area of mild to moderately decreased intensity involving the proximal to distal inferior wall and inferoapical wall. On rest imaging this defect appears predominantly fixed to partially worse. Findings consistent with prior inferior wall infarct.    - 2/25/2022 Echo: EF 52%, RA dilated, there is a 27 mm Justin Mitroflow bioprosthetic aortic valve present. Valve leaflets are slightly thickened and calcified although all 3 leaflets are noted to have a decent range of motion. Mild to moderate aortic regurgitation visually. Mild pulmonary hypertension is present. Right ventricular systolic pressure is estimated at 40 mmHg.    -8/18/2021 Echo: Biplane EF 38%, moderate LV systolic dysfunction. Segmental wall motion abnormalities noted, RV is hypokinetic, LA is moderately dilated, Prosthetic graft present. 34 mm Dacron Graft, RV systolic pressure is estimated at 28 mmHg.    -09/10/2022 Echo: Left ventricular function is decreased. The ejection fraction is 30-35%. The right  ventricle is normal size.Global right ventricular function is normal.  AVR with On-X valve, the valve is well seated with normal Dopper   interrogation. Mean gradient 13 mmHg. No pericardial effusion is present.      Past Medical History:   Diagnosis Date    Aortic valve stenosis     s/p AV replacement 11/2013    Arthritis     Bilat hands, ankle    Ascending aorta dilatation (CMS/HCC) ([facility])     s/p AAA repair 11/2013    Asthma     CAD (coronary artery disease)     1 vessel bypass    Cancer (CMS/[facility]) ([facility])     Skin    CHF (congestive heart failure) (CMS/HCC) ([facility])     COPD (chronic obstructive pulmonary disease) (CMS/[facility]) ([facility])     Hyperlipidemia     Ischemic cardiomyopathy     Shortness of breath      Past Surgical History:   Procedure Laterality Date    AAA REPAIR - ENDOGRAFT  11/2013    ANKLE SURGERY      AORTIC ROOT ANGIOGRAM N/A 5/20/2022    Procedure: Aortic Arch  Angiogram;  Surgeon: Derick Gallegos MD;  Location: [facility] Cath Lab;  Service: Cardiovascular;  Laterality: N/A;    AORTIC VALVE REPLACEMENT  11/2013    CORONARY ANGIOPLASTY  11/2013    CORONARY ARTERY BYPASS GRAFT  11/2013    1V CABG SVG- RCA    LEFT HEART CATH N/A 5/20/2022    Procedure: Left heart cath;  Surgeon: Derick Gallegos MD;  Location: [facility] Cath Lab;  Service: Cardiovascular;  Laterality: N/A;       Allergies as of 11/09/2022 - Reviewed 11/09/2022   Allergen Reaction Noted    Ezetimibe Rash 03/16/2020     Current Outpatient Medications   Medication Sig Dispense Refill    metoprolol succinate XL (TOPROL-XL) 50 mg 24 hr tablet Take 1 tablet (50 mg total) by mouth daily 90 tablet 3    warfarin (COUMADIN) 5 mg tablet Take 1 tablet (5 mg total) by mouth daily 90 tablet 3    budesonide-formoteroL (SYMBICORT) 160-4.5 mcg/actuation inhaler Inhale 2 puffs 2 (two) times a day Rinse mouth with water after use. Do not swallow. 10.2 g 11    potassium chloride (KLOR-CON M20) 20 mEq CR tablet Take 1 tablet (20 mEq total) by mouth daily 90 tablet 1     sennosides-docusate sodium (SENNA PLUS) 8.6-50 mg Take 1 tablet by mouth      atorvastatin (LIPITOR) 80 mg tablet Take 1 tablet (80 mg total) by mouth daily 90 tablet 3    lisinopriL (PRINIVIL,ZESTRIL) 5 mg tablet Take 1 tablet (5 mg total) by mouth daily 90 tablet 0    nitroglycerin (NITROSTAT) 0.4 mg SL tablet Place 1 tablet (0.4 mg total) under the tongue every 5 (five) minutes as needed for chest pain Limit 3 tabs per episode. 25 tablet 1    torsemide (DEMADEX) 20 mg tablet Take 3 tablets (60 mg total) by mouth daily 270 tablet 0    evolocumab (Repatha SureClick) 140 mg/mL pen injector Inject 140 mg under the skin every 14 (fourteen) days 6 mL 3    albuterol HFA (Ventolin HFA) 90 mcg/actuation inhaler Inhale 2 puffs every 4 (four) hours (Patient taking differently: Inhale 2 puffs every 4 (four) hours PRN Daily) 1 Inhaler 11    clotrimazole (MYCELEX) 10 mg esdras Not currently using      amoxicillin (AMOXIL) 500 mg tablet SMARTSI Tablet(s) By Mouth      aspirin 81 mg EC tablet Take 1 tablet (81 mg total) by mouth daily 90 tablet 3    dapagliflozin (Farxiga) 10 mg tablet tablet Take 1 tablet (10 mg total) by mouth daily (Patient not taking: Reported on 2022) 90 tablet 3    pantoprazole (PROTONIX) 40 mg EC tablet Take 40 mg by mouth daily      methocarbamoL (ROBAXIN) 750 mg tablet SMARTSIG:Tablet(s) By Mouth      HYDROcodone-acetaminophen (NORCO) 5-325 mg per tablet Take 1 tablet by mouth every 6 (six) hours as needed      gabapentin (NEURONTIN) 100 mg capsule Take 100 mg by mouth 3 (three) times a day       No current facility-administered medications for this visit.       Family History   Problem Relation Age of Onset    No Known Problems Mother     Heart disease Father     Aneurysm Father     Parkinsonism Sister     No Known Problems Sister     No Known Problems Sister     No Known Problems Daughter     No Known Problems Daughter      Social History     Tobacco Use    Smoking status: Former      Packs/day: 0.75     Years: 30.00     Pack years: 22.50     Types: Cigarettes     Quit date: 2022     Years since quittin.7    Smokeless tobacco: Never    Tobacco comments:     Last smoked 3/1/22   Vaping Use    Vaping Use: Never used   Substance Use Topics    Alcohol use: Yes     Alcohol/week: 4.0 - 5.0 standard drinks     Types: 4 - 5 Cans of beer per week     Comment: 3-4 beers weekly or less    Drug use: No       Review of Systems  Review of Systems   Cardiovascular:  Negative for chest pain, dyspnea on exertion, irregular heartbeat, leg swelling/pain, near-syncope, palpitations and syncope/fainting.   Respiratory:  Positive for snoring. Negative for sleep disturbances due to breathing and sleep apnea.    Hematologic/Lymphatic: Negative for major bleeding. Bruises/bleeds easily.   Neurological:  Negative for dizziness and light-headedness.     Objective   Vitals:    22 0810   BP: 124/82   Pulse: 76   SpO2: 96%   Height: 1.829 m (6')   Weight: 87.1 kg (192 lb)   PainSc:   1   PainLoc: Sternum   Pain Edu?: Yes   Patient Position: Sitting     Weight: 87.1 kg (192 lb)  Physical Exam  General: Comfortable not in any acute distress  HEENT: Normocephalic atraumatic  Neck: Normal JVP, no carotid bruit  Chest: Clear to auscultation, no adventitious breath sounds  CVS: Normal S1, mechanical S2, regular rate rhythm, no murmurs rubs or gallop  Abdomen: Soft, nontender nondistended, normoactive bowel sounds present  Extremities: No edema, no cyanosis or clubbing  Vascular: 2+ radial pulsation bilaterally    Data Review:   Sodium   Date Value Ref Range Status   10/11/2022 132 (L) 135 - 145 mmol/L Final     Potassium   Date Value Ref Range Status   10/11/2022 3.9 3.5 - 5.1 MMOL/L Final     Chloride   Date Value Ref Range Status   10/11/2022 96 (L) 98 - 107 mmol/L Final     CO2   Date Value Ref Range Status   10/11/2022 28 21 - 32 mmol/L Final     BUN   Date Value Ref Range Status   10/11/2022 16 7 - 25 mg/dL  Final     Creatinine   Date Value Ref Range Status   10/11/2022 0.85 0.70 - 1.30 mg/dL Final     Glucose   Date Value Ref Range Status   10/11/2022 88 70 - 105 mg/dL Final     Calcium   Date Value Ref Range Status   10/11/2022 9.4 8.6 - 10.3 mg/dL Final     BNP   Date Value Ref Range Status   03/08/2022 375 (H) 0 - 100 pg/mL Final     Lipids:    Lab Results   Component Value Date    CHOL 84 04/19/2022    CHOL 123 04/05/2021    CHOL 239 03/12/2020     Lab Results   Component Value Date    HDL 56 04/19/2022    HDL 65 04/05/2021    HDL 61 03/12/2020     Lab Results   Component Value Date    LDLCALC <20 (L) 04/19/2022    LDLCALC 41 04/05/2021    LDLCALC 160 03/12/2020     Lab Results   Component Value Date    TRIG 60 04/19/2022    TRIG 91 04/05/2021    TRIG 90 03/12/2020        TSH: No results found for: TSH  Magnesium: No results found for: MG  Protime-INR:   Lab Results   Component Value Date    PT 36.8 (H) 10/11/2022    INR 2.7 (H) 11/07/2022     A1c: No results found for: HGBA1C    Electrocardiogram: 11/9/2022  Atrial sensed, ventricular paced rhythm regurgitation    Assessment/Plan   Diagnoses and all orders for this visit:    Chronic combined systolic and diastolic congestive heart failure (CMS/HCC) ([facility])  -     ECG 12 lead -Normal, Today    Ischemic cardiomyopathy  -     ECG 12 lead -Normal, Today    S/P AVR (aortic valve replacement)  -      Echo [facility]      Detailed Assessment and Plan:  Status post aortic valve redo replacement with 23 mm On-X mechanical valve for treatment of severe aortic regurgitation of the bioprosthetic Justin valve back in September at [facility]:  His valve is functioning well based on auscultation  He is due for repeat echocardiogram in our office later today to assess his stable gradients  Continue baby aspirin and warfarin  He will require lifelong antibiotic prophylaxis for dental work involving gingival manipulation.  He uses 2 g of amoxicillin 1 hour prior to the dental  procedure.    Systolic CHF:  His postsurgical EF apparently dropped down to 45 followed by post pacemaker EF dropping down to 30 to 35%  I do not have a follow-up echo which we will reassess today  He is clinically euvolemic and NYHA class I  He was requested to be started on Farxiga however I think he should first be optimized on his basic guideline directed medical therapy including his beta-blocker and ACE inhibitor's and potentially addition of Aldactone before adding Farxiga.  In any case he was not approved for Farxiga  I will increase his lisinopril to 10 mg p.o. daily today  Toprol-XL will continue at the same dose    Status post permanent dual-chamber pacemaker placement:  Due to AV node injury during redo AVR  He is remote from 10/11/2020 to suggest that he has 97% V pacing between 9/16/2022 and 10/11/2022  This is completely expected  We will keep an eye on his left ventricular function as 100% RV pacing can hamper his LV recovery  He continues to maintain sinus rhythm otherwise    Known history of CAD:  Patent SVG to RPDA  Continue Lipitor at the same dose  LDL at goal  For now continue Repatha at the same dose  Depending on his reassessment we will decide if we need to back off on his statin or PCSK9 therapy    Patient instructions:     Everything looks good from a heart standpoint.  I am increasing your lisinopril to 10 mg once a day  Rest of the medications will stay the same  You have an ultrasound of your heart later today at 11  Your routine follow-up with me would be in 3-6 months         Sincerely,        Electronically signed by: Michael Joshi MD  11/9/2022  8:22 AM

## 2022-11-09 NOTE — PATIENT INSTRUCTIONS
Everything looks good from a heart standpoint.  I am increasing your lisinopril to 10 mg once a day  Rest of the medications will stay the same  You have an ultrasound of your heart later today at 11  Your routine follow-up with me would be in 3-6 months

## 2022-11-11 ENCOUNTER — HOSPITAL ENCOUNTER (OUTPATIENT)
Dept: CARDIAC REHAB | Facility: HOSPITAL | Age: 62
Discharge: STILL ADMITTED OR ADMINISTRATIVE | End: 2022-11-11
Payer: COMMERCIAL

## 2022-11-11 DIAGNOSIS — Z98.890 S/P AORTIC VALVE REPAIR: ICD-10-CM

## 2022-11-11 PROCEDURE — 93798 PHYS/QHP OP CAR RHAB W/ECG: CPT

## 2022-11-14 ENCOUNTER — HOSPITAL ENCOUNTER (OUTPATIENT)
Dept: CARDIAC REHAB | Facility: HOSPITAL | Age: 62
Discharge: STILL ADMITTED OR ADMINISTRATIVE | End: 2022-11-14
Payer: COMMERCIAL

## 2022-11-14 ENCOUNTER — CLINICAL SUPPORT (OUTPATIENT)
Dept: FAMILY MEDICINE | Facility: CLINIC | Age: 62
End: 2022-11-14
Payer: COMMERCIAL

## 2022-11-14 DIAGNOSIS — Z23 IMMUNIZATION DUE: Primary | ICD-10-CM

## 2022-11-14 DIAGNOSIS — I25.10 CORONARY ARTERY DISEASE INVOLVING NATIVE CORONARY ARTERY OF NATIVE HEART WITHOUT ANGINA PECTORIS: ICD-10-CM

## 2022-11-14 DIAGNOSIS — Z98.890 S/P AORTIC VALVE REPAIR: ICD-10-CM

## 2022-11-14 PROCEDURE — 90750 HZV VACC RECOMBINANT IM: CPT | Performed by: FAMILY MEDICINE

## 2022-11-14 PROCEDURE — 93798 PHYS/QHP OP CAR RHAB W/ECG: CPT

## 2022-11-14 PROCEDURE — 90471 IMMUNIZATION ADMIN: CPT | Performed by: FAMILY MEDICINE

## 2022-11-14 RX ORDER — LISINOPRIL 5 MG/1
5 TABLET ORAL DAILY
Qty: 90 TABLET | Refills: 3 | Status: SHIPPED | OUTPATIENT
Start: 2022-11-14 | End: 2022-11-15

## 2022-11-14 NOTE — TELEPHONE ENCOUNTER
Pt is calling in an his pharmacy will not cover his prescription Farxiga, he wants to know if there is something else you can prescribe he also needs a refill sent on lisinorpiL. Please call anabel back @ [phone]

## 2022-11-15 DIAGNOSIS — I25.10 CORONARY ARTERY DISEASE INVOLVING NATIVE CORONARY ARTERY OF NATIVE HEART WITHOUT ANGINA PECTORIS: ICD-10-CM

## 2022-11-15 RX ORDER — LISINOPRIL 10 MG/1
10 TABLET ORAL DAILY
Qty: 90 TABLET | Refills: 1 | Status: SHIPPED | OUTPATIENT
Start: 2022-11-15 | End: 2023-03-10 | Stop reason: SDUPTHER

## 2022-11-16 ENCOUNTER — HOSPITAL ENCOUNTER (OUTPATIENT)
Dept: CARDIAC REHAB | Facility: HOSPITAL | Age: 62
Discharge: STILL ADMITTED OR ADMINISTRATIVE | End: 2022-11-16
Payer: COMMERCIAL

## 2022-11-16 DIAGNOSIS — Z98.890 S/P AORTIC VALVE REPAIR: ICD-10-CM

## 2022-11-16 PROCEDURE — 93798 PHYS/QHP OP CAR RHAB W/ECG: CPT

## 2022-11-18 ENCOUNTER — HOSPITAL ENCOUNTER (OUTPATIENT)
Dept: CARDIAC REHAB | Facility: HOSPITAL | Age: 62
Discharge: STILL ADMITTED OR ADMINISTRATIVE | End: 2022-11-18
Payer: COMMERCIAL

## 2022-11-18 VITALS — WEIGHT: 192 LBS | BODY MASS INDEX: 26.04 KG/M2

## 2022-11-18 DIAGNOSIS — Z98.890 S/P AORTIC VALVE REPAIR: ICD-10-CM

## 2022-11-18 PROCEDURE — 93798 PHYS/QHP OP CAR RHAB W/ECG: CPT

## 2022-11-21 ENCOUNTER — ANTICOAGULATION VISIT (OUTPATIENT)
Dept: CARDIOLOGY | Facility: CLINIC | Age: 62
End: 2022-11-21
Payer: COMMERCIAL

## 2022-11-21 ENCOUNTER — HOSPITAL ENCOUNTER (OUTPATIENT)
Dept: CARDIAC REHAB | Facility: HOSPITAL | Age: 62
Discharge: STILL ADMITTED OR ADMINISTRATIVE | End: 2022-11-21
Payer: COMMERCIAL

## 2022-11-21 ENCOUNTER — APPOINTMENT (OUTPATIENT)
Dept: LAB | Facility: CLINIC | Age: 62
End: 2022-11-21
Payer: COMMERCIAL

## 2022-11-21 DIAGNOSIS — Z95.2 PRESENCE OF PROSTHETIC HEART VALVE: Primary | ICD-10-CM

## 2022-11-21 DIAGNOSIS — Z51.81 ANTICOAGULATION MANAGEMENT ENCOUNTER: ICD-10-CM

## 2022-11-21 DIAGNOSIS — Z98.890 S/P AORTIC VALVE REPAIR: ICD-10-CM

## 2022-11-21 DIAGNOSIS — Z95.2 PRESENCE OF PROSTHETIC HEART VALVE: ICD-10-CM

## 2022-11-21 DIAGNOSIS — Z95.2 S/P AVR: Primary | Chronic | ICD-10-CM

## 2022-11-21 DIAGNOSIS — Z79.01 ANTICOAGULATION MANAGEMENT ENCOUNTER: ICD-10-CM

## 2022-11-21 LAB
INR PPP: 2.1
INR PPP: 2.1 (ref 0.9–1.1)

## 2022-11-21 PROCEDURE — 93798 PHYS/QHP OP CAR RHAB W/ECG: CPT

## 2022-11-21 PROCEDURE — 85610 PROTHROMBIN TIME: CPT | Performed by: INTERNAL MEDICINE

## 2022-11-21 PROCEDURE — 36416 COLLJ CAPILLARY BLOOD SPEC: CPT | Mod: NC | Performed by: INTERNAL MEDICINE

## 2022-11-21 NOTE — PROGRESS NOTES
9:57 spoke to pt verified ID no med or diet changes no bleeding or bruising problems enc to call if changes verified warfarin dose and tab size mailed appt remidner. discussed INR checks after he goes back to work and it was decided that he will go to urgent care after work this will not happen until about Jan. 2023. DMD      continue warfarin dose as above and recheck INR in 3 weeks. verb under. DMD

## 2022-11-22 DIAGNOSIS — I50.42 CHRONIC COMBINED SYSTOLIC AND DIASTOLIC CONGESTIVE HEART FAILURE (CMS/HCC): ICD-10-CM

## 2022-11-22 DIAGNOSIS — I25.5 ISCHEMIC CARDIOMYOPATHY: Primary | ICD-10-CM

## 2022-11-22 RX ORDER — SPIRONOLACTONE 25 MG/1
12.5 TABLET ORAL DAILY
Qty: 45 TABLET | Refills: 3 | Status: SHIPPED | OUTPATIENT
Start: 2022-11-22 | End: 2023-03-10 | Stop reason: SDUPTHER

## 2022-11-22 NOTE — PROGRESS NOTES
I reviewed Hayden ISAI Franklin's echocardiogram.  His left ventricular function is worse compared to reported 30 to 35% at [facility].  I will increase his lisinopril during his office visit to 10 mg p.o. daily.  Please add Aldactone 12.5 mg p.o. daily.  Have him continue to monitor his blood pressure at home.  Recheck BMP in 1 week after adding Aldactone.  Please let the patient know.  Follow-up with me in 3 months with another limited echocardiogram.    Electronically signed by: Michael Joshi MD  11/22/2022  11:31 AM

## 2022-11-23 ENCOUNTER — HOSPITAL ENCOUNTER (OUTPATIENT)
Dept: CARDIAC REHAB | Facility: HOSPITAL | Age: 62
Discharge: STILL ADMITTED OR ADMINISTRATIVE | End: 2022-11-23
Payer: COMMERCIAL

## 2022-11-23 VITALS — WEIGHT: 193 LBS | BODY MASS INDEX: 26.18 KG/M2

## 2022-11-23 DIAGNOSIS — Z98.890 S/P AORTIC VALVE REPAIR: ICD-10-CM

## 2022-11-23 PROCEDURE — 93797 PHYS/QHP OP CAR RHAB WO ECG: CPT

## 2022-11-25 ENCOUNTER — HOSPITAL ENCOUNTER (OUTPATIENT)
Dept: CARDIAC REHAB | Facility: HOSPITAL | Age: 62
Discharge: STILL ADMITTED OR ADMINISTRATIVE | End: 2022-11-25
Payer: COMMERCIAL

## 2022-11-25 DIAGNOSIS — Z98.890 S/P AORTIC VALVE REPAIR: ICD-10-CM

## 2022-11-25 PROCEDURE — 93797 PHYS/QHP OP CAR RHAB WO ECG: CPT

## 2022-11-28 ENCOUNTER — HOSPITAL ENCOUNTER (OUTPATIENT)
Dept: CARDIAC REHAB | Facility: HOSPITAL | Age: 62
Discharge: STILL ADMITTED OR ADMINISTRATIVE | End: 2022-11-28
Payer: COMMERCIAL

## 2022-11-28 DIAGNOSIS — Z98.890 S/P AORTIC VALVE REPAIR: ICD-10-CM

## 2022-11-28 PROCEDURE — 93797 PHYS/QHP OP CAR RHAB WO ECG: CPT

## 2022-11-30 ENCOUNTER — TELEPHONE (OUTPATIENT)
Dept: CARDIOLOGY | Facility: CLINIC | Age: 62
End: 2022-11-30
Payer: COMMERCIAL

## 2022-11-30 ENCOUNTER — HOSPITAL ENCOUNTER (OUTPATIENT)
Dept: CARDIAC REHAB | Facility: HOSPITAL | Age: 62
Discharge: STILL ADMITTED OR ADMINISTRATIVE | End: 2022-11-30
Payer: COMMERCIAL

## 2022-11-30 DIAGNOSIS — Z98.890 S/P AORTIC VALVE REPAIR: ICD-10-CM

## 2022-11-30 DIAGNOSIS — I50.811 ACUTE RIGHT-SIDED CONGESTIVE HEART FAILURE (CMS/HCC): ICD-10-CM

## 2022-11-30 PROCEDURE — 93797 PHYS/QHP OP CAR RHAB WO ECG: CPT

## 2022-11-30 NOTE — TELEPHONE ENCOUNTER
Pt needs refill sent to [facility] for torsemide, if you have any questions please call pt back @[phone]

## 2022-12-02 ENCOUNTER — HOSPITAL ENCOUNTER (OUTPATIENT)
Dept: CARDIAC REHAB | Facility: HOSPITAL | Age: 62
Discharge: STILL ADMITTED OR ADMINISTRATIVE | End: 2022-12-02
Payer: COMMERCIAL

## 2022-12-02 ENCOUNTER — APPOINTMENT (OUTPATIENT)
Dept: LAB | Facility: CLINIC | Age: 62
End: 2022-12-02
Payer: COMMERCIAL

## 2022-12-02 DIAGNOSIS — Z98.890 S/P AORTIC VALVE REPAIR: ICD-10-CM

## 2022-12-02 DIAGNOSIS — I25.5 ISCHEMIC CARDIOMYOPATHY: Chronic | ICD-10-CM

## 2022-12-02 DIAGNOSIS — I25.5 ISCHEMIC CARDIOMYOPATHY: Primary | Chronic | ICD-10-CM

## 2022-12-02 LAB
ANION GAP SERPL CALC-SCNC: 8 MMOL/L (ref 3–11)
BUN SERPL-MCNC: 27 MG/DL (ref 7–25)
CALCIUM SERPL-MCNC: 9.8 MG/DL (ref 8.6–10.3)
CHLORIDE SERPL-SCNC: 99 MMOL/L (ref 98–107)
CO2 SERPL-SCNC: 26 MMOL/L (ref 21–32)
CREAT SERPL-MCNC: 1.02 MG/DL (ref 0.7–1.3)
GFR SERPL CREATININE-BSD FRML MDRD: 84 ML/MIN/1.73M*2
GLUCOSE SERPL-MCNC: 89 MG/DL (ref 70–105)
POTASSIUM SERPL-SCNC: 4.2 MMOL/L (ref 3.5–5.1)
SODIUM SERPL-SCNC: 133 MMOL/L (ref 135–145)

## 2022-12-02 PROCEDURE — 80048 BASIC METABOLIC PNL TOTAL CA: CPT | Performed by: NURSE PRACTITIONER

## 2022-12-02 PROCEDURE — 36415 COLL VENOUS BLD VENIPUNCTURE: CPT | Performed by: NURSE PRACTITIONER

## 2022-12-02 PROCEDURE — 93797 PHYS/QHP OP CAR RHAB WO ECG: CPT

## 2022-12-05 ENCOUNTER — HOSPITAL ENCOUNTER (OUTPATIENT)
Dept: CARDIAC REHAB | Facility: HOSPITAL | Age: 62
Discharge: STILL ADMITTED OR ADMINISTRATIVE | End: 2022-12-05
Payer: COMMERCIAL

## 2022-12-05 DIAGNOSIS — Z98.890 S/P AORTIC VALVE REPAIR: ICD-10-CM

## 2022-12-05 PROCEDURE — 93797 PHYS/QHP OP CAR RHAB WO ECG: CPT

## 2022-12-07 ENCOUNTER — HOSPITAL ENCOUNTER (OUTPATIENT)
Dept: CARDIAC REHAB | Facility: HOSPITAL | Age: 62
Discharge: STILL ADMITTED OR ADMINISTRATIVE | End: 2022-12-07
Payer: COMMERCIAL

## 2022-12-07 ENCOUNTER — TELEPHONE (OUTPATIENT)
Dept: CARDIOLOGY | Facility: CLINIC | Age: 62
End: 2022-12-07

## 2022-12-07 ENCOUNTER — ANCILLARY PROCEDURE (OUTPATIENT)
Dept: CARDIOLOGY | Facility: CLINIC | Age: 62
End: 2022-12-07
Payer: COMMERCIAL

## 2022-12-07 ENCOUNTER — OFFICE VISIT (OUTPATIENT)
Dept: CARDIOLOGY | Facility: CLINIC | Age: 62
End: 2022-12-07
Payer: COMMERCIAL

## 2022-12-07 VITALS
DIASTOLIC BLOOD PRESSURE: 58 MMHG | HEART RATE: 80 BPM | WEIGHT: 195 LBS | SYSTOLIC BLOOD PRESSURE: 94 MMHG | BODY MASS INDEX: 26.41 KG/M2 | HEIGHT: 72 IN | OXYGEN SATURATION: 97 %

## 2022-12-07 DIAGNOSIS — Z45.018 ENCOUNTER FOR INTERROGATION OF CARDIAC PACEMAKER: Primary | ICD-10-CM

## 2022-12-07 DIAGNOSIS — I44.2 COMPLETE HEART BLOCK (CMS/HCC): Primary | ICD-10-CM

## 2022-12-07 DIAGNOSIS — Z98.890 S/P AORTIC VALVE REPAIR: ICD-10-CM

## 2022-12-07 DIAGNOSIS — Z45.018 ENCOUNTER FOR INTERROGATION OF CARDIAC PACEMAKER: ICD-10-CM

## 2022-12-07 PROCEDURE — 93797 PHYS/QHP OP CAR RHAB WO ECG: CPT

## 2022-12-07 PROCEDURE — 93280 PM DEVICE PROGR EVAL DUAL: CPT | Performed by: INTERNAL MEDICINE

## 2022-12-07 PROCEDURE — 99203 OFFICE O/P NEW LOW 30 MIN: CPT | Mod: 25 | Performed by: INTERNAL MEDICINE

## 2022-12-07 ASSESSMENT — PAIN SCALES - GENERAL: PAINLEVEL: 0-NO PAIN

## 2022-12-07 NOTE — PROGRESS NOTES
ELECTROPHYSIOLOGY CONSULT    Chief Complaint:    The encounter diagnosis was Complete heart block (CMS/HCC) (formerly [facility])..    HPI:    Hayden Franklin is a very pleasant 62 y.o. y/o male who presents with a PMH significant for   Patient Active Problem List   Diagnosis    Coronary atherosclerosis of native coronary artery    Mixed hyperlipidemia    Nicotine dependence, unspecified, uncomplicated    S/P AVR    History of coronary artery bypass graft x 1    COPD exacerbation (CMS/HCC) (HCC)    Lung nodule    Ischemic cardiomyopathy    Chronic combined systolic and diastolic heart failure (CMS/HCC) (HCC)    Asthma-COPD overlap syndrome (CMS/HCC) (formerly [facility])    Nonrheumatic aortic valve insufficiency         Hayden  presents today to establish care for the follow-up of his recently implanted dual-chamber pacemaker.  He was born with a bicuspid aortic valve and underwent a tissue valve replacement which, over time, degenerated and he had a repeat valve replacement performed recently at the [facility].  Post procedurally he developed complete heart block and received a dual-chamber pacemaker.    Review of the record indicates that there was an atrial lead lead dislodgment that was recognized prior to his hospital discharge.  He underwent lead revision and has seemingly done well since that time.    He tells me that he has been feeling well.  He has not had palpitations, syncope presyncope or other symptoms suggestive of arrhythmia.  He notes no issues with healing at the site of the pacemaker in the left infraclavicular fossa.      Medications:    Current Outpatient Medications   Medication Sig Dispense Refill    torsemide 60 mg tablet Take 60 mg by mouth daily 90 tablet 3    spironolactone (ALDACTONE) 25 mg tablet Take 0.5 tablets (12.5 mg total) by mouth daily 45 tablet 3    lisinopriL (PRINIVIL,ZESTRIL) 10 mg tablet Take 1 tablet (10 mg total) by mouth daily 90 tablet 1    metoprolol succinate XL (TOPROL-XL) 50 mg 24  hr tablet Take 1 tablet (50 mg total) by mouth daily 90 tablet 3    warfarin (COUMADIN) 5 mg tablet Take 1 tablet (5 mg total) by mouth daily 90 tablet 3    budesonide-formoteroL (SYMBICORT) 160-4.5 mcg/actuation inhaler Inhale 2 puffs 2 (two) times a day Rinse mouth with water after use. Do not swallow. 10.2 g 11    potassium chloride (KLOR-CON M20) 20 mEq CR tablet Take 1 tablet (20 mEq total) by mouth daily 90 tablet 1    sennosides-docusate sodium (SENNA PLUS) 8.6-50 mg Take 1 tablet by mouth      pantoprazole (PROTONIX) 40 mg EC tablet Take 40 mg by mouth daily      methocarbamoL (ROBAXIN) 750 mg tablet SMARTSIG:Tablet(s) By Mouth      HYDROcodone-acetaminophen (NORCO) 5-325 mg per tablet Take 1 tablet by mouth every 6 (six) hours as needed      gabapentin (NEURONTIN) 100 mg capsule Take 100 mg by mouth 3 (three) times a day      atorvastatin (LIPITOR) 80 mg tablet Take 1 tablet (80 mg total) by mouth daily 90 tablet 3    nitroglycerin (NITROSTAT) 0.4 mg SL tablet Place 1 tablet (0.4 mg total) under the tongue every 5 (five) minutes as needed for chest pain Limit 3 tabs per episode. 25 tablet 1    evolocumab (Repatha SureClick) 140 mg/mL pen injector Inject 140 mg under the skin every 14 (fourteen) days 6 mL 3    albuterol HFA (Ventolin HFA) 90 mcg/actuation inhaler Inhale 2 puffs every 4 (four) hours (Patient taking differently: Inhale 2 puffs every 4 (four) hours PRN Daily) 1 Inhaler 11    aspirin 81 mg EC tablet Take 1 tablet (81 mg total) by mouth daily 90 tablet 3    clotrimazole (MYCELEX) 10 mg esdras Not currently using      amoxicillin (AMOXIL) 500 mg tablet SMARTSI Tablet(s) By Mouth       No current facility-administered medications for this visit.        Fam Hx:    No family history of sudden cardiac death.   Family History   Problem Relation Age of Onset    No Known Problems Mother     Heart disease Father     Aneurysm Father     Parkinsonism Sister     No Known Problems Sister     No Known  Problems Sister     No Known Problems Daughter     No Known Problems Daughter           Soc Hx:    Social History     Socioeconomic History    Marital status:      Spouse name: Robina    Number of children: 2   Occupational History    Occupation:  at [employer]   Tobacco Use    Smoking status: Former     Packs/day: 0.75     Years: 30.00     Pack years: 22.50     Types: Cigarettes     Quit date: 2022     Years since quittin.7    Smokeless tobacco: Never    Tobacco comments:     Last smoked 3/1/22   Vaping Use    Vaping Use: Never used   Substance and Sexual Activity    Alcohol use: Yes     Alcohol/week: 4.0 - 5.0 standard drinks     Types: 4 - 5 Cans of beer per week     Comment: 3-4 beers weekly or less    Drug use: No    Sexual activity: Defer     Social Determinants of Health     Tobacco Use: Medium Risk    Smoking Tobacco Use: Former    Smokeless Tobacco Use: Never   Depression: At risk    PHQ-2 Score: 4        ROS:    Non contributory except as noted in the HPI    Physical Exam:    Vitals:    Vitals:    22 1041   BP: 94/58   Pulse: 80   SpO2: 97%       General:  Well developed, well nourished male in no apparent distress.  HEENT:  NC/AT, sclerae anicteric, no redness  Cardiac:  RRR, no M/R/G, the pacemaker site is clean dry and intact with no stigmata of erosion or infection  Lungs:  Clear, good air entry, no wheezes or rales  Neuro:  Cranial nerves grossly intact  Psych:  A&Ox3, normal affect  Skin:  Warm and dry, no rash  Ext:  No edema, radial pulses +2/4, Pedal pulses +2/4    Testing Personally Reviewed:    Echo Results    Lab Results   Component Value Date    EF 29 2022    LASIZE 4.35 2022    LAVOL 109 2022        ECG: The tracing performed today shows sinus rhythm with ventricular pacing    I have interrogated the pacemaker and found to be functioning normally.  No arrhythmias have been noted.    Assessment and Plan:    Hayden is a very pleasant 62 y.o.  male with:      Diagnoses and all orders for this visit:    Complete heart block (CMS/HCC) (HCC)  -     ECG 12 lead -Normal, Today    We have recommended to him that he continue with remote pacemaker follow-up and annual in office checks.  We did discuss the importance of his follow-up echocardiogram in February.  There has been some decrease in his ejection fraction recently and it is not clear if this is related to his high percentage of right ventricular pacing.  If it does turn out to be that he is believed to have a pacing induced myopathy there could be a role for revising his system to a biventricular or conduction system pacing in the future.  If his repeat echo shows improvement then a course of watchful waiting would be reasonable.    Thank you for allowing our participation in the care of this very pleasant patient.  Please do not hesitate to contact us at [phone] with any questions or concerns that you may have.    Mitchel Saldaña, DO     Please note that portions of this document were generated with speech recognition software.  Reasonable efforts have been made to correct any transcriptional errors however some typographical errors may remain.

## 2022-12-07 NOTE — TELEPHONE ENCOUNTER
Pt would like to speak to Dr. Joshi nurse regarding medications.  Please call him at your convenience.

## 2022-12-09 ENCOUNTER — TELEPHONE (OUTPATIENT)
Dept: CARDIOLOGY | Facility: CLINIC | Age: 62
End: 2022-12-09
Payer: COMMERCIAL

## 2022-12-09 ENCOUNTER — HOSPITAL ENCOUNTER (OUTPATIENT)
Dept: CARDIAC REHAB | Facility: HOSPITAL | Age: 62
Discharge: STILL ADMITTED OR ADMINISTRATIVE | End: 2022-12-09
Payer: COMMERCIAL

## 2022-12-09 DIAGNOSIS — Z98.890 S/P AORTIC VALVE REPAIR: ICD-10-CM

## 2022-12-09 PROCEDURE — 93797 PHYS/QHP OP CAR RHAB WO ECG: CPT

## 2022-12-09 NOTE — TELEPHONE ENCOUNTER
Patient calling to have a Dr. Joshi write him a letter for him to return to work either with or without restrictions.  He states he has about a week left of cardiac rehab.     Let him know that I would led Dr. Joshi know and either myself or WAYLON Pro will let him know when this is available.  Patient verbalizes understanding.

## 2022-12-12 ENCOUNTER — APPOINTMENT (OUTPATIENT)
Dept: LAB | Facility: CLINIC | Age: 62
End: 2022-12-12
Payer: COMMERCIAL

## 2022-12-12 ENCOUNTER — ANTICOAGULATION VISIT (OUTPATIENT)
Dept: CARDIOLOGY | Facility: CLINIC | Age: 62
End: 2022-12-12
Payer: COMMERCIAL

## 2022-12-12 ENCOUNTER — HOSPITAL ENCOUNTER (OUTPATIENT)
Dept: CARDIAC REHAB | Facility: HOSPITAL | Age: 62
Discharge: STILL ADMITTED OR ADMINISTRATIVE | End: 2022-12-12
Payer: COMMERCIAL

## 2022-12-12 DIAGNOSIS — Z98.890 S/P AORTIC VALVE REPAIR: ICD-10-CM

## 2022-12-12 DIAGNOSIS — Z51.81 ANTICOAGULATION MANAGEMENT ENCOUNTER: ICD-10-CM

## 2022-12-12 DIAGNOSIS — Z79.01 ANTICOAGULATION MANAGEMENT ENCOUNTER: ICD-10-CM

## 2022-12-12 DIAGNOSIS — Z95.2 PRESENCE OF PROSTHETIC HEART VALVE: Primary | ICD-10-CM

## 2022-12-12 DIAGNOSIS — Z95.2 S/P AVR: Primary | Chronic | ICD-10-CM

## 2022-12-12 DIAGNOSIS — Z95.2 PRESENCE OF PROSTHETIC HEART VALVE: ICD-10-CM

## 2022-12-12 LAB
INR PPP: 1.8
INR PPP: 1.8 (ref 0.9–1.1)

## 2022-12-12 PROCEDURE — 85610 PROTHROMBIN TIME: CPT | Performed by: INTERNAL MEDICINE

## 2022-12-12 PROCEDURE — 93797 PHYS/QHP OP CAR RHAB WO ECG: CPT

## 2022-12-12 PROCEDURE — 36416 COLLJ CAPILLARY BLOOD SPEC: CPT | Mod: NCP | Performed by: INTERNAL MEDICINE

## 2022-12-12 NOTE — PROGRESS NOTES
1:52 spoke to pt verified ID no med or diet changes no bleeding or bruising problems enc to call if changes verified warfarin dose and tab size mailed appt reminder. DMD      continue warfarin dose with changes above based on SS calculations and recheck INR in 2 weeks verb under. DMD

## 2022-12-16 ENCOUNTER — HOSPITAL ENCOUNTER (OUTPATIENT)
Dept: CARDIAC REHAB | Facility: HOSPITAL | Age: 62
Discharge: STILL ADMITTED OR ADMINISTRATIVE | End: 2022-12-16
Payer: COMMERCIAL

## 2022-12-16 DIAGNOSIS — Z98.890 S/P AORTIC VALVE REPAIR: ICD-10-CM

## 2022-12-16 PROCEDURE — 93797 PHYS/QHP OP CAR RHAB WO ECG: CPT

## 2022-12-19 ENCOUNTER — HOSPITAL ENCOUNTER (OUTPATIENT)
Dept: CARDIAC REHAB | Facility: HOSPITAL | Age: 62
Discharge: STILL ADMITTED OR ADMINISTRATIVE | End: 2022-12-19
Payer: COMMERCIAL

## 2022-12-19 ENCOUNTER — OFFICE VISIT (OUTPATIENT)
Dept: PULMONOLOGY | Facility: CLINIC | Age: 62
End: 2022-12-19
Payer: COMMERCIAL

## 2022-12-19 ENCOUNTER — HOSPITAL ENCOUNTER (OUTPATIENT)
Dept: CT IMAGING | Facility: HOSPITAL | Age: 62
Discharge: HOME | End: 2022-12-19
Payer: COMMERCIAL

## 2022-12-19 VITALS
HEART RATE: 83 BPM | DIASTOLIC BLOOD PRESSURE: 62 MMHG | BODY MASS INDEX: 26.41 KG/M2 | WEIGHT: 195 LBS | OXYGEN SATURATION: 94 % | SYSTOLIC BLOOD PRESSURE: 104 MMHG | HEIGHT: 72 IN | RESPIRATION RATE: 17 BRPM

## 2022-12-19 DIAGNOSIS — J44.89 ASTHMA-COPD OVERLAP SYNDROME (CMS/HCC): Primary | ICD-10-CM

## 2022-12-19 DIAGNOSIS — Z98.890 S/P AORTIC VALVE REPAIR: ICD-10-CM

## 2022-12-19 DIAGNOSIS — R91.1 PULMONARY NODULE: ICD-10-CM

## 2022-12-19 PROCEDURE — 99213 OFFICE O/P EST LOW 20 MIN: CPT | Performed by: NURSE PRACTITIONER

## 2022-12-19 PROCEDURE — 71250 CT THORAX DX C-: CPT | Mod: ME

## 2022-12-19 PROCEDURE — 93797 PHYS/QHP OP CAR RHAB WO ECG: CPT

## 2022-12-19 RX ORDER — TORSEMIDE 20 MG/1
20 TABLET ORAL DAILY
COMMUNITY
Start: 2022-11-30 | End: 2023-03-10 | Stop reason: ALTCHOICE

## 2022-12-19 ASSESSMENT — DUKE ACTIVITY SCORE INDEX (DASI)
CAN YOU RUN A SHORT DISTANCE: NO
CAN YOU DO HEAVY WORK AROUND THE HOUSE LIKE SCRUBBING FLOORS OR LIFTING AND MOVING HEAVY FURNITURE: YES
CAN YOU DO YARD WORK LIKE RAKING LEAVES, WEEDING OR PUSHING A MOWER: YES
CAN YOU CLIMB A FLIGHT OF STAIRS OR WALK UP A HILL: YES
CAN YOU HAVE SEXUAL RELATIONS: YES
CAN YOU PARTICIPATE IN MODERATE RECREATIONAL ACTIVITIES LIKE GOLF, BOWLING, DANCING, DOUBLES TENNIS OR THROWING A BASEBALL OR FOOTBALL: YES
CAN YOU DO LIGHT WORK AROUND THE HOUSE LIKE DUSTING OR WASHING DISHES: YES
CAN YOU WALK A BLOCK OR TWO ON LEVEL GROUND: YES
TOTAL_SCORE: 50.2
CAN YOU WALK INDOORS, SUCH AS AROUND YOUR HOUSE: YES
CAN YOU PARTICIPATE IN STRENOUS SPORTS LIKE SWIMMING, SINGLES TENNIS, FOOTBALL, BASKETBALL, OR SKIING: YES

## 2022-12-19 NOTE — PATIENT INSTRUCTIONS
PLAN:  1.  Continue Symbicort 2 puffs twice daily  2.  Albuterol 2 puffs as needed every 4-6 hours for shortness of breath, cough, chest tightness or wheezing.  Use 15 minutes prior to activity.   3.  Chest CT for lung cancer screening will be due December 2023.  4.  Continue routine physical activity.       Follow-up in 6 months, with PFT prior, unless needed sooner.

## 2022-12-19 NOTE — PROGRESS NOTES
PULMONARY NOTE      HPI:  Hayden Franklin is a 62 y.o. male patient who presents for pulmonary follow-up on COPD with asthma overlap.  Patient's most PFT demonstrates severe airflow obstruction with FEV1 37% predicted and significant postbronchodilator response.  He has approximately 30-pack-year history of smoking and quit February 2022.  Chest CT in June with a new 6 mm nodule of the left upper lobe.  Follow-up chest CT was completed today with resolution of left upper lobe nodule and no new or concerning findings.  He does have emphysematous changes present.  Patient has history of congestive heart failure and had CABG and valve replacement surgery September of this year.    He has been on leave from work since her surgery in September.  He works at a [employer] and is hoping to go back to work in the next few weeks.  Patient reports he is doing well from a respiratory standpoint.  He continues on Symbicort has not needed albuterol in quite some time.  He has almost completed cardiac rehab.  He has chronic cough with a fair amount of mucus production.  He reports his cough has been a little worse the last week and thinks he may have caught a virus.  He denies any chest tightness or wheezing.  He does experience some dyspnea if he is pushing too hard or going too long cardiac rehab.  Reports reflux is well controlled with Protonix.  He struggles with dry nose and occasional bloody noses in the winter.  He does not use any nasal sprays.        Review of Systems  Pertinent positives and negatives listed in the HPI.    The following portions of the patient's history were reviewed and updated as appropriate: allergies, current medications, past family history, past medical history, past social history, past surgical history and problem list.    History:  Social History     Tobacco Use    Smoking status: Former     Packs/day: 0.75     Years: 30.00     Pack years: 22.50     Types: Cigarettes     Quit date:  2022     Years since quittin.8    Smokeless tobacco: Never    Tobacco comments:     Last smoked 3/1/22   Vaping Use    Vaping Use: Never used   Substance Use Topics    Alcohol use: Yes     Alcohol/week: 4.0 - 5.0 standard drinks     Types: 4 - 5 Cans of beer per week     Comment: 3-4 beers weekly or less    Drug use: No         Immunizations:  Immunization History   Administered Date(s) Administered    Flu vaccine (PF) greater than or equal to 6 months IM 2018, 2019, 2022    Flu vaccine Adjuvanted (PF) greater than or equal to 65 years old IM 2022    Influenza TIV (IM) 2020    [employer] BIVALENT BOOSTER SARS-COV-2 VACCINATION (Booster) 2022    PFIZER-BIONTECH SARS-COV-2 PURPLE CAP VACCINATION (D1, D2, Immuno) 2021, 2021, 2022    Pneumococcal Polysaccharide - PPSV23 2022    Shingrix IM 2022    Tdap 2018       Medications:    Current Outpatient Medications:     torsemide (DEMADEX) 20 mg tablet, Take 60 mg by mouth daily, Disp: , Rfl:     torsemide 60 mg tablet, Take 60 mg by mouth daily, Disp: 90 tablet, Rfl: 3    spironolactone (ALDACTONE) 25 mg tablet, Take 0.5 tablets (12.5 mg total) by mouth daily, Disp: 45 tablet, Rfl: 3    lisinopriL (PRINIVIL,ZESTRIL) 10 mg tablet, Take 1 tablet (10 mg total) by mouth daily, Disp: 90 tablet, Rfl: 1    metoprolol succinate XL (TOPROL-XL) 50 mg 24 hr tablet, Take 1 tablet (50 mg total) by mouth daily, Disp: 90 tablet, Rfl: 3    warfarin (COUMADIN) 5 mg tablet, Take 1 tablet (5 mg total) by mouth daily, Disp: 90 tablet, Rfl: 3    budesonide-formoteroL (SYMBICORT) 160-4.5 mcg/actuation inhaler, Inhale 2 puffs 2 (two) times a day Rinse mouth with water after use. Do not swallow., Disp: 10.2 g, Rfl: 11    potassium chloride (KLOR-CON M20) 20 mEq CR tablet, Take 1 tablet (20 mEq total) by mouth daily, Disp: 90 tablet, Rfl: 1    sennosides-docusate sodium (SENNA PLUS) 8.6-50 mg, Take 1 tablet by  mouth, Disp: , Rfl:     pantoprazole (PROTONIX) 40 mg EC tablet, Take 40 mg by mouth daily, Disp: , Rfl:     methocarbamoL (ROBAXIN) 750 mg tablet, SMARTSIG:Tablet(s) By Mouth, Disp: , Rfl:     HYDROcodone-acetaminophen (NORCO) 5-325 mg per tablet, Take 1 tablet by mouth every 6 (six) hours as needed, Disp: , Rfl:     gabapentin (NEURONTIN) 100 mg capsule, Take 100 mg by mouth 3 (three) times a day, Disp: , Rfl:     atorvastatin (LIPITOR) 80 mg tablet, Take 1 tablet (80 mg total) by mouth daily, Disp: 90 tablet, Rfl: 3    nitroglycerin (NITROSTAT) 0.4 mg SL tablet, Place 1 tablet (0.4 mg total) under the tongue every 5 (five) minutes as needed for chest pain Limit 3 tabs per episode., Disp: 25 tablet, Rfl: 1    evolocumab (Repatha SureClick) 140 mg/mL pen injector, Inject 140 mg under the skin every 14 (fourteen) days, Disp: 6 mL, Rfl: 3    albuterol HFA (Ventolin HFA) 90 mcg/actuation inhaler, Inhale 2 puffs every 4 (four) hours (Patient taking differently: Inhale 2 puffs every 4 (four) hours PRN Daily), Disp: 1 Inhaler, Rfl: 11    amoxicillin (AMOXIL) 500 mg tablet, SMARTSI Tablet(s) By Mouth, Disp: , Rfl:     aspirin 81 mg EC tablet, Take 1 tablet (81 mg total) by mouth daily, Disp: 90 tablet, Rfl: 3    clotrimazole (MYCELEX) 10 mg esdras, Not currently using, Disp: , Rfl:     Allergies:  Allergies   Allergen Reactions    Ezetimibe Rash       Objective:  Vitals:    22 1116   BP: 104/62   Pulse: 83   Resp: 17   SpO2: 94%   Weight: 88.5 kg (195 lb)   Height: 1.829 m (6')       Physical Exam  Constitutional:       General: He is not in acute distress.     Appearance: He is not ill-appearing.   HENT:      Mouth/Throat:      Pharynx: No oropharyngeal exudate or posterior oropharyngeal erythema.   Cardiovascular:      Rate and Rhythm: Normal rate and regular rhythm.      Heart sounds: Murmur heard.     No friction rub. No gallop.   Pulmonary:      Effort: Pulmonary effort is normal. No respiratory distress.       Breath sounds: Normal breath sounds. No wheezing, rhonchi or rales.   Chest:      Chest wall: No tenderness.   Musculoskeletal:      Right lower leg: No edema.      Left lower leg: No edema.   Skin:     General: Skin is warm and dry.   Neurological:      General: No focal deficit present.      Mental Status: He is oriented to person, place, and time.       Lab Review:   Lab Results   Component Value Date    GLUCOSE 89 12/02/2022    CALCIUM 9.8 12/02/2022     (L) 12/02/2022    K 4.2 12/02/2022    CO2 26 12/02/2022    CL 99 12/02/2022    BUN 27 (H) 12/02/2022    CREATININE 1.02 12/02/2022    ANIONGAP 8 12/02/2022     Lab Results   Component Value Date     (H) 03/08/2022     No results found for: IGE  Lab Results   Component Value Date    WBC 10.7 (H) 10/11/2022    HGB 12.0 (L) 10/11/2022    HCT 35.5 (L) 10/11/2022    MCV 90.5 10/11/2022     10/11/2022         Imaging Review:  Chest CT completed today: Radiologist Report:   Findings:  Mediastinum: Coronary artery calcifications. Mild left atrial dilatation measuring 4.1 cm. Station 4 mediastinal lymph nodes are not significantly changed compared to the previous examination.  There has been CABG and aortic valve replacement. There is a left-sided intracardiac device.  Atherosclerotic plaque is seen multifocally.  Lungs: No pneumothorax. No central bronchial mass. No pleural effusion.  Chest wall and axillary regions: There are no chest wall masses or inflammatory changes. There is no axillary lymphadenopathy. There are age-appropriate changes of the dorsal spine. There are no acute osseous abnormalities.  Visualized upper abdomen: There are no adrenal masses. The visualized upper abdominal solid organs are unremarkable.  There is been previous median sternotomy.  No aggressive osseous lesion. Gynecomastia.   IMPRESSION:  Interval resolution of previously seen right upper lobe pulmonary nodule. No suspicious pulmonary nodules are seen on the current  examination. Mild centrilobular pulmonary emphysema.  Unchanged mediastinal lymph nodes since 6/23/2022.      PFT:  4/6/2022:  FEV1 1.39 or 37% predicted, FVC 3.08 or 64% predicted, FEV1/FVC ratio 45%, TLC 6.2 or 87% predicted, RV 2.73 or 121% predicted, DLCO 27.64 or 78% predicted.  Impression: Spirometry is consistent with severe airflow obstruction with significant postbronchodilator response.  Lung volumes are normal and diffusion capacity is normal.      Discussion:  Reviewed results of follow-up chest CT with patient in clinic today.  There is resolution of the left upper lobe nodule.  We discussed that he would continue to qualify for the lung cancer screening program with his smoking history.  We discussed neck CT will be due in 1 year.  Patient has had influenza vaccine and COVID booster this fall.    Assessment:  1.  COPD with asthma overlap  2.  History of tobacco use    PLAN:  1.  Continue Symbicort 2 puffs twice daily  2.  Albuterol 2 puffs as needed every 4-6 hours for shortness of breath, cough, chest tightness or wheezing.  Use 15 minutes prior to activity.   3.  Chest CT for lung cancer screening will be due December 2023.  4.  Continue routine physical activity.       Follow-up in 6 months, with PFT prior, unless needed sooner.           Pt voices understanding and agreement to plan as stated above. All questions answered.            A voice recognition program was used to aid in medical record documentation. Sometimes words are printed not exactly as they were spoken. While efforts were made to carefully edit and correct any inaccuracies, some errors may be present. Errors should be taken within the context of the discussion.  Please contact our office if you need assistance interpreting this medical record or notice any mistakes.

## 2022-12-21 ENCOUNTER — HOSPITAL ENCOUNTER (OUTPATIENT)
Dept: CARDIAC REHAB | Facility: HOSPITAL | Age: 62
Discharge: STILL ADMITTED OR ADMINISTRATIVE | End: 2022-12-21
Payer: COMMERCIAL

## 2022-12-21 DIAGNOSIS — Z98.890 S/P AORTIC VALVE REPAIR: ICD-10-CM

## 2022-12-21 PROCEDURE — 93797 PHYS/QHP OP CAR RHAB WO ECG: CPT

## 2022-12-27 ENCOUNTER — APPOINTMENT (OUTPATIENT)
Dept: LAB | Facility: CLINIC | Age: 62
End: 2022-12-27
Payer: COMMERCIAL

## 2022-12-27 ENCOUNTER — ANTICOAGULATION VISIT (OUTPATIENT)
Dept: CARDIOLOGY | Facility: CLINIC | Age: 62
End: 2022-12-27
Payer: COMMERCIAL

## 2022-12-27 DIAGNOSIS — Z95.2 S/P AVR: Primary | Chronic | ICD-10-CM

## 2022-12-27 DIAGNOSIS — Z51.81 ANTICOAGULATION MANAGEMENT ENCOUNTER: ICD-10-CM

## 2022-12-27 DIAGNOSIS — Z95.2 PRESENCE OF PROSTHETIC HEART VALVE: Primary | ICD-10-CM

## 2022-12-27 DIAGNOSIS — Z95.2 PRESENCE OF PROSTHETIC HEART VALVE: ICD-10-CM

## 2022-12-27 DIAGNOSIS — Z79.01 ANTICOAGULATION MANAGEMENT ENCOUNTER: ICD-10-CM

## 2022-12-27 LAB
INR PPP: 1.6
INR PPP: 1.6 (ref 0.9–1.1)

## 2022-12-27 PROCEDURE — 36416 COLLJ CAPILLARY BLOOD SPEC: CPT | Mod: NC | Performed by: INTERNAL MEDICINE

## 2022-12-27 PROCEDURE — 85610 PROTHROMBIN TIME: CPT | Performed by: INTERNAL MEDICINE

## 2022-12-27 NOTE — PROGRESS NOTES
9:39 spoke to pt verified ID no med changes states thinks he had more vit K food recently then usual but will return to usual diet. no bleeding or bruising problems enc to call if changes verified warfarin dose and tab size discussed recheck and pt will use urgent care going forward since he is planning to go back to work. mailed a hard copy of the lab order. and mailed recheck reminder. DMD      continue warfarin dose with changes above based on SS calculations and recheck INR in 2 weeks verb under. DMD

## 2023-01-05 ENCOUNTER — OFFICE VISIT (OUTPATIENT)
Dept: URGENT CARE | Facility: URGENT CARE | Age: 63
End: 2023-01-05
Payer: COMMERCIAL

## 2023-01-05 VITALS
RESPIRATION RATE: 18 BRPM | HEIGHT: 72 IN | TEMPERATURE: 98.2 F | OXYGEN SATURATION: 94 % | DIASTOLIC BLOOD PRESSURE: 69 MMHG | SYSTOLIC BLOOD PRESSURE: 101 MMHG | BODY MASS INDEX: 26.41 KG/M2 | WEIGHT: 195 LBS | HEART RATE: 107 BPM

## 2023-01-05 DIAGNOSIS — J02.0 STREPTOCOCCAL PHARYNGITIS: ICD-10-CM

## 2023-01-05 DIAGNOSIS — J02.0 STREP PHARYNGITIS: Primary | ICD-10-CM

## 2023-01-05 LAB — GP B STREP AG SPEC QL: POSITIVE

## 2023-01-05 PROCEDURE — 87880 STREP A ASSAY W/OPTIC: CPT | Mod: QW | Performed by: PHYSICIAN ASSISTANT

## 2023-01-05 PROCEDURE — 99213 OFFICE O/P EST LOW 20 MIN: CPT | Performed by: PHYSICIAN ASSISTANT

## 2023-01-05 RX ORDER — AMOXICILLIN 500 MG/1
500 CAPSULE ORAL 2 TIMES DAILY
Qty: 20 CAPSULE | Refills: 0 | Status: SHIPPED | OUTPATIENT
Start: 2023-01-05 | End: 2023-01-15

## 2023-01-05 ASSESSMENT — PAIN SCALES - GENERAL: PAINLEVEL: 10-WORST PAIN EVER

## 2023-01-06 ENCOUNTER — NURSE TRIAGE (OUTPATIENT)
Dept: FAMILY MEDICINE | Facility: HOSPITAL | Age: 63
End: 2023-01-06
Payer: COMMERCIAL

## 2023-01-06 NOTE — TELEPHONE ENCOUNTER
[facility] Nurse Triage Note    INITIAL REQUIRED QUESTIONS:    Are you currently in [place]? Yes    Are you currently driving?: No      CHIEF COMPLAINT AND HISTORY:                                               Reason for call:  Dx with strep, on abx.  Having ringing in both ears.     Do you follow with a specialist for this condition: No    Onset:  2-3 days ago    Duration: See Above Charting and INTERMITTENT    Severity: Moderate    Pain Level Reported: See Above Charting    Location:  N/A    Associated Symptoms: See Above Charting    Are symptoms interfering with Activities of Daily Living?: See Above Charting    History of similar symptoms: See Above Charting    Aggravating/Relieving Factors: See Above Charting    LMP (for females ages 10-60): N/A     Allergies - Patient reported: not reviewed      CARE ADVICE:   Please review Encounters in Chart review for Specific Care Advice given by Triage RN.       DISPOSITION:   See PCP Within 2 Weeks - Pt was dx with strep at  last night, having ringing in ears, wondering if he can take benedryl to help or if there is any other med he can safely take.        PCP COMMUNICATION:             Nicolasa Neal RN  January 6, 2023 12:58 PM  Reason for Disposition   All other adults with tinnitus (Exception: mild tinnitus in both ears, heard only in a quiet room)    Protocols used: Tinnitus-A-

## 2023-01-06 NOTE — PATIENT INSTRUCTIONS
1.  Strep throat  - Begin amoxicillin as prescribed.  This has been sent to the pharmacy for you.  Please finish entire 10-day course of antibiotics even if your symptoms improve prior to this  - Tylenol and ibuprofen for pain and fever  - Salt water gargles are recommended  - After 24 hours of antibiotic use recommend replacing your toothbrush to prevent reinfection  - Return as needed

## 2023-01-06 NOTE — TELEPHONE ENCOUNTER
The patient has been notified of this information and all questions answered. Pt is able to verbalize understanding

## 2023-01-06 NOTE — PROGRESS NOTES
Urgent Care Visit Note    Subjective   Chief Complaint  Chief Complaint   Patient presents with    Sore Throat     Pt CO ST and bilat ear pain X 2 days.     Ear Problem       History of Present Illness  Hayden Franklin is a 62 y.o. male presenting for sore throat and ear pain for 2 days.  He notes this was preceded by a cough.  He notes that it hurts to swallow.  Notes that it hurts when he coughs.  Denies chest pain or shortness of breath    Review of Systems  Pertinent positives and negatives as per the [facility]    Past Medical History:   Diagnosis Date    Aortic valve stenosis     s/p AV replacement 11/2013    Arthritis     Bilat hands, ankle    Ascending aorta dilatation (CMS/[facility]) ([facility])     s/p AAA repair 11/2013    Asthma     CAD (coronary artery disease)     1 vessel bypass    Cancer (CMS/[facility]ly [facility]) ([facility]ly [facility])     Skin    CHF (congestive heart failure) (CMS/[facility]ly [facility]) ([facility])     COPD (chronic obstructive pulmonary disease) (CMS/[facility]ly [facility]) ([facility])     Hyperlipidemia     Ischemic cardiomyopathy     Shortness of breath          Current Outpatient Medications:     torsemide (DEMADEX) 20 mg tablet, Take 60 mg by mouth daily, Disp: , Rfl:     torsemide 60 mg tablet, Take 60 mg by mouth daily, Disp: 90 tablet, Rfl: 3    spironolactone (ALDACTONE) 25 mg tablet, Take 0.5 tablets (12.5 mg total) by mouth daily, Disp: 45 tablet, Rfl: 3    lisinopriL (PRINIVIL,ZESTRIL) 10 mg tablet, Take 1 tablet (10 mg total) by mouth daily, Disp: 90 tablet, Rfl: 1    metoprolol succinate XL (TOPROL-XL) 50 mg 24 hr tablet, Take 1 tablet (50 mg total) by mouth daily, Disp: 90 tablet, Rfl: 3    warfarin (COUMADIN) 5 mg tablet, Take 1 tablet (5 mg total) by mouth daily, Disp: 90 tablet, Rfl: 3    budesonide-formoteroL (SYMBICORT) 160-4.5 mcg/actuation inhaler, Inhale 2 puffs 2 (two) times a day Rinse mouth with water after use. Do not swallow., Disp: 10.2 g, Rfl: 11    potassium chloride (KLOR-CON M20) 20 mEq CR tablet, Take 1 tablet (20 mEq total) by mouth daily,  Disp: 90 tablet, Rfl: 1    sennosides-docusate sodium (SENNA PLUS) 8.6-50 mg, Take 1 tablet by mouth, Disp: , Rfl:     pantoprazole (PROTONIX) 40 mg EC tablet, Take 40 mg by mouth daily, Disp: , Rfl:     methocarbamoL (ROBAXIN) 750 mg tablet, SMARTSIG:Tablet(s) By Mouth, Disp: , Rfl:     HYDROcodone-acetaminophen (NORCO) 5-325 mg per tablet, Take 1 tablet by mouth every 6 (six) hours as needed, Disp: , Rfl:     gabapentin (NEURONTIN) 100 mg capsule, Take 100 mg by mouth 3 (three) times a day, Disp: , Rfl:     atorvastatin (LIPITOR) 80 mg tablet, Take 1 tablet (80 mg total) by mouth daily, Disp: 90 tablet, Rfl: 3    nitroglycerin (NITROSTAT) 0.4 mg SL tablet, Place 1 tablet (0.4 mg total) under the tongue every 5 (five) minutes as needed for chest pain Limit 3 tabs per episode., Disp: 25 tablet, Rfl: 1    evolocumab (Repatha SureClick) 140 mg/mL pen injector, Inject 140 mg under the skin every 14 (fourteen) days, Disp: 6 mL, Rfl: 3    albuterol HFA (Ventolin HFA) 90 mcg/actuation inhaler, Inhale 2 puffs every 4 (four) hours (Patient taking differently: Inhale 2 puffs every 4 (four) hours PRN Daily), Disp: 1 Inhaler, Rfl: 11    clotrimazole (MYCELEX) 10 mg esdras, Not currently using, Disp: , Rfl:     amoxicillin (AMOXIL) 500 mg tablet, SMARTSI Tablet(s) By Mouth, Disp: , Rfl:     aspirin 81 mg EC tablet, Take 1 tablet (81 mg total) by mouth daily, Disp: 90 tablet, Rfl: 3    amoxicillin (AMOXIL) 500 mg capsule, Take 1 capsule (500 mg total) by mouth 2 (two) times a day for 10 days, Disp: 20 capsule, Rfl: 0    Objective   Vital Signs  /69   Pulse 107   Temp 36.8 °C (98.2 °F)   Resp 18   Ht 1.829 m (6')   Wt 88.5 kg (195 lb)   SpO2 94%   BMI 26.45 kg/m²     Physical Exam  Constitutional:       General: He is not in acute distress.     Appearance: Normal appearance.   HENT:      Head: Normocephalic and atraumatic.      Right Ear: Tympanic membrane and ear canal normal.      Left Ear: Tympanic membrane and  ear canal normal.      Nose: Nose normal. No congestion or rhinorrhea.      Mouth/Throat:      Mouth: Mucous membranes are moist.      Pharynx: Posterior oropharyngeal erythema present. No oropharyngeal exudate.   Eyes:      Extraocular Movements: Extraocular movements intact.      Pupils: Pupils are equal, round, and reactive to light.   Cardiovascular:      Rate and Rhythm: Normal rate and regular rhythm.      Heart sounds: Normal heart sounds. No murmur heard.  Pulmonary:      Effort: Pulmonary effort is normal. No respiratory distress.      Breath sounds: Normal breath sounds. No wheezing or rhonchi.   Abdominal:      General: Abdomen is flat. Bowel sounds are normal. There is no distension.      Palpations: Abdomen is soft.      Tenderness: There is no abdominal tenderness.   Musculoskeletal:         General: No deformity. Normal range of motion.      Cervical back: Normal range of motion and neck supple. No rigidity.   Skin:     General: Skin is warm and dry.      Capillary Refill: Capillary refill takes less than 2 seconds.      Findings: No lesion.   Neurological:      General: No focal deficit present.      Mental Status: He is alert and oriented to person, place, and time.   Psychiatric:         Mood and Affect: Mood normal.       Lab Review  Recent Results (from the past 12 hour(s))   Rapid Strep Screen Swab    Collection Time: 01/05/23  5:19 PM   Result Value Ref Range    Strep A Screen Positive (A) Negative       Radiology Review  CT chest without IV contrast    Result Date: 12/19/2022  Exam: CT of the chest without contrast from 12/19/2022 Clinical History: Pulmonary nodule; Lung nodule  < 6mm  high cancer risk Comparison(s): 6/23/2022 Technique: Helical axial imaging was performed through the chest without contrast administration. Five mm axial standard and thick slab MIP lung algorithm reconstructions and coronal reformations were constructed and reviewed. Dose Reduction Technique: Dose reduction  technique utilized; automatic/anatomic modulation of X-ray tube current (Auto mA). Findings: Mediastinum: Coronary artery calcifications. Mild left atrial dilatation measuring 4.1 cm. Station 4 mediastinal lymph nodes are not significantly changed compared to the previous examination. There has been CABG and aortic valve replacement. There is a left-sided intracardiac device. Atherosclerotic plaque is seen multifocally. Lungs: No pneumothorax. No central bronchial mass. No pleural effusion. Chest wall and axillary regions: There are no chest wall masses or inflammatory changes. There is no axillary lymphadenopathy. There are age-appropriate changes of the dorsal spine. There are no acute osseous abnormalities. Visualized upper abdomen: There are no adrenal masses. The visualized upper abdominal solid organs are unremarkable. There is been previous median sternotomy. No aggressive osseous lesion. Gynecomastia.     IMPRESSION: Interval resolution of previously seen right upper lobe pulmonary nodule. No suspicious pulmonary nodules are seen on the current examination. Mild centrilobular pulmonary emphysema. Unchanged mediastinal lymph nodes since 6/23/2022.       Medical Decision Making  No signs of ear infections today.  Strep positive.  Placed on amoxicillin    Assessment/Plan   1.  Strep throat  - Begin amoxicillin as prescribed.  This has been sent to the pharmacy for you.  Please finish entire 10-day course of antibiotics even if your symptoms improve prior to this  - Tylenol and ibuprofen for pain and fever  - Salt water gargles are recommended  - After 24 hours of antibiotic use recommend replacing your toothbrush to prevent reinfection  - Return as needed    Patient Education: Ready to learn, no apparent learning barriers were identified; learning preference includes listening.  Explained diagnosis and treatment plan; patient/caregiver expressed understanding of the content. All questions answered.     Patient  Instructions   1.  Strep throat  - Begin amoxicillin as prescribed.  This has been sent to the pharmacy for you.  Please finish entire 10-day course of antibiotics even if your symptoms improve prior to this  - Tylenol and ibuprofen for pain and fever  - Salt water gargles are recommended  - After 24 hours of antibiotic use recommend replacing your toothbrush to prevent reinfection  - Return as needed    MIRELLA Hsu  1/5/2023

## 2023-01-10 ENCOUNTER — LAB (OUTPATIENT)
Dept: LAB | Facility: URGENT CARE | Age: 63
End: 2023-01-10
Payer: COMMERCIAL

## 2023-01-10 DIAGNOSIS — Z95.2 PRESENCE OF PROSTHETIC HEART VALVE: ICD-10-CM

## 2023-01-10 LAB
INR BLD: 2.9
PROTHROMBIN TIME: 34.8 SECONDS (ref 9.4–12.5)

## 2023-01-10 PROCEDURE — 85610 PROTHROMBIN TIME: CPT | Performed by: FAMILY MEDICINE

## 2023-01-10 PROCEDURE — 36415 COLL VENOUS BLD VENIPUNCTURE: CPT | Performed by: FAMILY MEDICINE

## 2023-01-11 ENCOUNTER — ANTICOAGULATION VISIT (OUTPATIENT)
Dept: CARDIOLOGY | Facility: CLINIC | Age: 63
End: 2023-01-11
Payer: COMMERCIAL

## 2023-01-11 DIAGNOSIS — Z51.81 ANTICOAGULATION MANAGEMENT ENCOUNTER: ICD-10-CM

## 2023-01-11 DIAGNOSIS — Z95.2 S/P AVR: Primary | Chronic | ICD-10-CM

## 2023-01-11 DIAGNOSIS — Z79.01 ANTICOAGULATION MANAGEMENT ENCOUNTER: ICD-10-CM

## 2023-01-11 LAB — INR PPP: 2.9

## 2023-01-11 PROCEDURE — 93296 REM INTERROG EVL PM/IDS: CPT | Performed by: INTERNAL MEDICINE

## 2023-01-11 PROCEDURE — 93294 REM INTERROG EVL PM/LDLS PM: CPT | Performed by: INTERNAL MEDICINE

## 2023-01-11 NOTE — PROGRESS NOTES
1/11/2023 9:13 pt tested after work 1/10 received INR 1/11/2023. spoke to pt verified ID no diet changes states is on amoxicillin and has been for 6 days no bleeding or bruising problems enc to call if changes verified warfarin dose and tab size 35mg/week. states going to urgent care for INR worked out great. DMD  continue warfarin dose as above and recheck INR in 3 weeks verb under. DMD

## 2023-01-31 ENCOUNTER — LAB (OUTPATIENT)
Dept: LAB | Facility: URGENT CARE | Age: 63
End: 2023-01-31
Payer: COMMERCIAL

## 2023-01-31 DIAGNOSIS — Z95.2 PRESENCE OF PROSTHETIC HEART VALVE: ICD-10-CM

## 2023-01-31 LAB
INR BLD: 3.2
PROTHROMBIN TIME: 37.9 SECONDS (ref 9.4–12.5)

## 2023-01-31 PROCEDURE — 36415 COLL VENOUS BLD VENIPUNCTURE: CPT | Performed by: FAMILY MEDICINE

## 2023-01-31 PROCEDURE — 85610 PROTHROMBIN TIME: CPT | Performed by: FAMILY MEDICINE

## 2023-02-01 ENCOUNTER — ANTICOAGULATION VISIT (OUTPATIENT)
Dept: CARDIOLOGY | Facility: CLINIC | Age: 63
End: 2023-02-01
Payer: COMMERCIAL

## 2023-02-01 DIAGNOSIS — Z51.81 ANTICOAGULATION MANAGEMENT ENCOUNTER: ICD-10-CM

## 2023-02-01 DIAGNOSIS — Z95.2 S/P AVR: Primary | Chronic | ICD-10-CM

## 2023-02-01 DIAGNOSIS — Z79.01 ANTICOAGULATION MANAGEMENT ENCOUNTER: ICD-10-CM

## 2023-02-01 LAB — INR PPP: 3.2

## 2023-02-01 NOTE — PROGRESS NOTES
2/1/2023 9:10 pt tested 1/31 received INR 2/1/2023 spoke to pt verified ID no med or diet changes but discussed diet and he states has been holding back on green vegies after discussion he will add back a small amount of vit K food either  2-3 pieces of broccoli or a side salad no bleeding or bruising problems enc to call if changes verified warfarin dose and tab size 35mg/week. DMD      continue warfarin dose as above change vit K food intake slightly and recheck INR in 4 weeks. verb under. DMD

## 2023-02-14 DIAGNOSIS — I25.5 ISCHEMIC CARDIOMYOPATHY: Primary | Chronic | ICD-10-CM

## 2023-02-20 ENCOUNTER — ANCILLARY PROCEDURE (OUTPATIENT)
Dept: CARDIOLOGY | Facility: CLINIC | Age: 63
End: 2023-02-20
Payer: COMMERCIAL

## 2023-02-20 ENCOUNTER — LAB (OUTPATIENT)
Dept: LAB | Facility: CLINIC | Age: 63
End: 2023-02-20
Payer: COMMERCIAL

## 2023-02-20 DIAGNOSIS — I25.5 ISCHEMIC CARDIOMYOPATHY: ICD-10-CM

## 2023-02-20 DIAGNOSIS — I50.42 CHRONIC COMBINED SYSTOLIC AND DIASTOLIC CONGESTIVE HEART FAILURE (CMS/HCC): ICD-10-CM

## 2023-02-20 DIAGNOSIS — I25.5 ISCHEMIC CARDIOMYOPATHY: Chronic | ICD-10-CM

## 2023-02-20 LAB
ANION GAP SERPL CALC-SCNC: 9 MMOL/L (ref 3–11)
BASOPHILS # BLD AUTO: 0.1 10*3/UL
BASOPHILS NFR BLD AUTO: 0.9 % (ref 0–2)
BUN SERPL-MCNC: 36 MG/DL (ref 7–25)
CALCIUM SERPL-MCNC: 9.7 MG/DL (ref 8.6–10.3)
CHLORIDE SERPL-SCNC: 99 MMOL/L (ref 98–107)
CO2 SERPL-SCNC: 30 MMOL/L (ref 21–32)
CREAT SERPL-MCNC: 1.52 MG/DL (ref 0.7–1.3)
EOSINOPHIL # BLD AUTO: 0.1 10*3/UL
EOSINOPHIL NFR BLD AUTO: 0.9 % (ref 0–3)
ERYTHROCYTE [DISTWIDTH] IN BLOOD BY AUTOMATED COUNT: 16.5 % (ref 11.5–15)
GFR SERPL CREATININE-BSD FRML MDRD: 52 ML/MIN/1.73M*2
GLUCOSE SERPL-MCNC: 78 MG/DL (ref 70–105)
HCT VFR BLD AUTO: 47.3 % (ref 38–50)
HGB BLD-MCNC: 16 G/DL (ref 13.2–17.2)
LYMPHOCYTES # BLD AUTO: 1.7 10*3/UL
LYMPHOCYTES NFR BLD AUTO: 18.6 % (ref 15–47)
MCH RBC QN AUTO: 31.1 PG (ref 29–34)
MCHC RBC AUTO-ENTMCNC: 33.9 G/DL (ref 32–36)
MCV RBC AUTO: 91.9 FL (ref 82–97)
MONOCYTES # BLD AUTO: 0.8 10*3/UL
MONOCYTES NFR BLD AUTO: 8.4 % (ref 5–13)
NEUTROPHILS # BLD AUTO: 6.7 10*3/UL
NEUTROPHILS NFR BLD AUTO: 71.2 % (ref 46–70)
NT-PROBNP SERPL-MCNC: 171 NG/L
PLATELET # BLD AUTO: 218 10*3/UL (ref 130–350)
PMV BLD AUTO: 8.5 FL (ref 6.9–10.8)
POTASSIUM SERPL-SCNC: 3.8 MMOL/L (ref 3.5–5.1)
RBC # BLD AUTO: 5.14 10*6/ΜL (ref 4.1–5.8)
SODIUM SERPL-SCNC: 138 MMOL/L (ref 135–145)
WBC # BLD AUTO: 9.4 10*3/UL (ref 3.7–9.6)

## 2023-02-20 PROCEDURE — 80048 BASIC METABOLIC PNL TOTAL CA: CPT | Performed by: NURSE PRACTITIONER

## 2023-02-20 PROCEDURE — 93306 TTE W/DOPPLER COMPLETE: CPT | Performed by: INTERNAL MEDICINE

## 2023-02-20 PROCEDURE — 36415 COLL VENOUS BLD VENIPUNCTURE: CPT | Performed by: NURSE PRACTITIONER

## 2023-02-20 PROCEDURE — 85025 COMPLETE CBC W/AUTO DIFF WBC: CPT | Performed by: NURSE PRACTITIONER

## 2023-02-20 PROCEDURE — 83880 ASSAY OF NATRIURETIC PEPTIDE: CPT | Performed by: NURSE PRACTITIONER

## 2023-02-21 ENCOUNTER — TELEPHONE (OUTPATIENT)
Dept: CARDIOLOGY | Facility: CLINIC | Age: 63
End: 2023-02-21
Payer: COMMERCIAL

## 2023-02-21 DIAGNOSIS — I25.5 ISCHEMIC CARDIOMYOPATHY: Primary | Chronic | ICD-10-CM

## 2023-02-21 LAB
ASCENDING AORTA: 3.82 CM
AV LVOT PEAK GRADIENT: 3 MMHG
AV MEAN GRADIENT: 8.44 MMHG
AV PEAK GRADIENT: 15.05 MMHG
DOP CALC AO PEAK VEL: 1.94 M/S
DOP CALC AO VTI: 33.6 CM
DOP CALC LVOT DIAMETER: 2.44 CM
DOP CALC LVOT STROKE VOLUME: 78 CM3
DOP CALC MV VTI: 23.98 CM
DOP CALC RVOT PEAK VEL: 0.6 M/S
DOP CALCLVOT PEAK VEL VTI: 16.6 CM
EJECTION FRACTION: 31 %
ERAP: 5 MMHG
INTERVENTRICULAR SEPTUM: 0.8 CM (ref 0.6–1.1)
IVC PROX: 1.28 CM
LA AREA A4C SYSTOLE: 80.9 CM3
LEFT ATRIUM SIZE: 4.79 CM
LEFT ATRIUM VOLUME INDEX: 49 ML/M2
LEFT ATRIUM VOLUME: 103 CM3
LEFT INTERNAL DIMENSION IN SYSTOLE: 4.7 CM (ref 2.1–4)
LEFT VENTRICLE DIASTOLIC VOLUME: 188 CM3
LEFT VENTRICLE SYSTOLIC VOLUME: 130 CM3
LEFT VENTRICULAR INTERNAL DIMENSION IN DIASTOLE: 6 CM (ref 3.5–6)
LVAD-AP2: 43.1 CM2
ML OF DILUTED DEFINITY INJECTED: 2 ML
MV MEAN GRADIENT: 1.9 MMHG
MV PEAK GRADIENT: 5 MMHG
MV VMAX: 108 CM/S
MVA (VTI): 3.24 CM2
POSTERIOR WALL: 0.9 CM (ref 0.6–1.1)
PULM VEIN S/D RATIO: 1.4
PV PEAK D VEL: 42 CM/S
PV PEAK GRADIENT: 3.1 MMHG
PV PEAK S VEL: 57 CM/S
PV VMAX: 0.88 M/S
RA AREA: 26.7 CM2
RH ABDOMINAL AORTA: 2.1 CM
RH CV ECHO AV VALVE AREA VEL: 2.1 CM2
RH CV ECHO AV VALVE AREA VTI: 2.3 CM2
RH LVOT PEAK VELOCITY REST: 0.9 M/S
RIGHT VENTRICULAR INTERNAL DIMENSION IN DIASTOLE: 4.5 CM
RV AP4 BASE: 3.9 CM
TDI: 10.7 CM/S
TDILATERAL: 11.4 CM/S
TR MAX PG: 29.16 MMHG
TRICUSPID VALVE PEAK REGURGITATION VELOCITY: 2.7 M/S
TV REST PULMONARY ARTERY PRESSURE: 34 MMHG

## 2023-02-21 NOTE — PROGRESS NOTES
Cardiology Outpatient Follow-up Note    Subjective    Patient ID: Hayden Franklin is a 62 y.o. male.    Chief Complaint   Patient presents with    Congestive Heart Failure    Cardiomyopathy    Hyperlipidemia        ROSE Herron is a 62-year-old gentleman with history of inferior MI with single-vessel bypass as well as concomitant AVR in 2013.  He ended up with severe aortic stenosis.  He underwent redo sternotomy with aortic valve replacement using the 23 mm On-X aortic valve on 9/6/2022 at the [facility].  His hospitalization was complicated by EMMA on CKD, cardiogenic shock, development of complete AV block status post AVR with subsequent placement of dual-chamber PPM on 9/10/2022 with dislodgement of atrial lead which was fixed prior to discharge.     He had a repeat echocardiogram on 2/20/2023 which demonstrated an ejection fraction of approximately 31%, relatively unchanged from prior echoes.  His valve was functioning normally.  He did have a trivial increase of his kidney function and therefore his loop diuretic was discontinued.  He has been off of his loop diuretic now for approximately 2 weeks without any weight gain or any subsequent shortness of breath or lower extremity swelling.  Today his lab work reveals that his kidney function returned back to normal.  Overall he has been doing well back to work.  He has not had any significant shortness of breath, weight gain, chest pain,, or activity intolerance.  He is looking forward to the summer months where he enjoys being outdoors.    Cardiology Problem List:      Cardiology Problem List    Last Edited By Montserrat Valdivia RN 12-    Primary Cardiologist: Dr. Joshi    Coronary   -11/1/2013 Inferior MI: 1V CABG (SVG-RCA) with concomitant AVR and ascending aortic dacron graft    Peripheral Vascular  - 8/2017 Carotid duplex: Right ICA stenosis 16-49%, left ICA stenosis 0-15% stenosis. Suggest follow-up study 1-2 years.    - 11/2013 s/p  repair with a 34mm Dacron graft d/t Dilated Ascending Aorta    Valvular  - 11/2013 Aortic Valve Replacement with a Justin Mitroflow bioprosthetic d/t Nonrheumatic Aortic Valve Stenosis  - 9/6/2022: [facility] - redo AVR using #23mm On-X aortic valve    CHF/CM NA    Electrophysiology  - Complete AVB w/ junction escape s/p DC PPM 9/9/2022 w/ lead dislodement of atrial lead; redo atrial lead 9/15/2022    Pulmonary Vascular NA    Risk Factors   - Hyperlipidemia  - Tobacco use    Prior Imaging/Testing History   -2/26/2022 Lexiscan: EF 48%, LV perfusion is abnormal. Post stress imaging demonstrates moderately sized area of mild to moderately decreased intensity involving the proximal to distal inferior wall and inferoapical wall. On rest imaging this defect appears predominantly fixed to partially worse. Findings consistent with prior inferior wall infarct.    - 2/25/2022 Echo: EF 52%, RA dilated, there is a 27 mm Justin Mitroflow bioprosthetic aortic valve present. Valve leaflets are slightly thickened and calcified although all 3 leaflets are noted to have a decent range of motion. Mild to moderate aortic regurgitation visually. Mild pulmonary hypertension is present. Right ventricular systolic pressure is estimated at 40 mmHg.    -8/18/2021 Echo: Biplane EF 38%, moderate LV systolic dysfunction. Segmental wall motion abnormalities noted, RV is hypokinetic, LA is moderately dilated, Prosthetic graft present. 34 mm Dacron Graft, RV systolic pressure is estimated at 28 mmHg.    -09/10/2022 Echo: Left ventricular function is decreased. The ejection fraction is 30-35%. The right ventricle is normal size.Global right ventricular function is normal.  AVR with On-X valve, the valve is well seated with normal Dopper   interrogation. Mean gradient 13 mmHg. No pericardial effusion is present.   - 11-9-2022 Echo: Mild left ventricular dilation, LVIDD 5.8 cm. Severe left ventricular systolic dysfunction with segmental wall motion  abnormalities, biplane EF 29%. Akinesis of basal inferior wall. Presence of diastolic dysfunction, unable to assess grade due to summation of E and A waves. Normal right ventricular size with moderate hypokinesis. Severe left atrial enlargement, indexed left atrial volume 52 ml/m2. Dilated right atrium. Normally functioning 23 mm On-X mechanical aortic valve prosthesis. Peak aortic velocity 1.5 m/s, mean gradient 5 mmHg, effective orifice area 2.1 cm². No significant stenosis or regurgitation. Mild mitral regurgitation. Mild tricuspid regurgitation. Normal RVSP estimated at 34 mmHg.    Past Medical History:   Diagnosis Date    Aortic valve stenosis     s/p AV replacement 11/2013    Arthritis     Bilat hands, ankle    Ascending aorta dilatation (CMS/[facility]ly [facility]) ([facility]ly [facility])     s/p AAA repair 11/2013    Asthma     CAD (coronary artery disease)     1 vessel bypass    Cancer (CMS/Formerly [facility]) (Formerly [facility])     Skin    CHF (congestive heart failure) (CMS/Formerly [facility]) (Formerly [facility])     COPD (chronic obstructive pulmonary disease) (CMS/Formerly [facility]) ([facility])     Hyperlipidemia     Ischemic cardiomyopathy     Shortness of breath        Past Surgical History:   Procedure Laterality Date    AAA REPAIR - ENDOGRAFT  11/2013    ANKLE SURGERY      AORTIC ROOT ANGIOGRAM N/A 5/20/2022    Procedure: Aortic Arch  Angiogram;  Surgeon: Derick Gallegos MD;  Location: [facility];  Service: Cardiovascular;  Laterality: N/A;    AORTIC VALVE REPLACEMENT  11/2013    CORONARY ANGIOPLASTY  11/2013    CORONARY ARTERY BYPASS GRAFT  11/2013    1V CABG SVG- RCA    LEFT HEART CATH N/A 5/20/2022    Procedure: Left heart cath;  Surgeon: Derick Gallegos MD;  Location: [facility];  Service: Cardiovascular;  Laterality: N/A;       Allergies as of 03/10/2023 - Reviewed 03/10/2023   Allergen Reaction Noted    Ezetimibe Rash 03/16/2020       Current Outpatient Medications   Medication Sig Dispense Refill    spironolactone (ALDACTONE) 25 mg tablet Take 1 tablet (25 mg total) by mouth daily 90 tablet 3     lisinopriL (PRINIVIL,ZESTRIL) 20 mg tablet Take 1 tablet (20 mg total) by mouth daily 90 tablet 1    metoprolol succinate XL (TOPROL-XL) 50 mg 24 hr tablet Take 1 tablet (50 mg total) by mouth daily 90 tablet 3    warfarin (COUMADIN) 5 mg tablet Take 1 tablet (5 mg total) by mouth daily 90 tablet 3    budesonide-formoteroL (SYMBICORT) 160-4.5 mcg/actuation inhaler Inhale 2 puffs 2 (two) times a day Rinse mouth with water after use. Do not swallow. 10.2 g 11    sennosides-docusate sodium (SENNA PLUS) 8.6-50 mg Take 1 tablet by mouth      pantoprazole (PROTONIX) 40 mg EC tablet Take 40 mg by mouth daily      methocarbamoL (ROBAXIN) 750 mg tablet SMARTSIG:Tablet(s) By Mouth      HYDROcodone-acetaminophen (NORCO) 5-325 mg per tablet Take 1 tablet by mouth every 6 (six) hours as needed      gabapentin (NEURONTIN) 100 mg capsule Take 100 mg by mouth 3 (three) times a day      atorvastatin (LIPITOR) 80 mg tablet Take 1 tablet (80 mg total) by mouth daily 90 tablet 3    nitroglycerin (NITROSTAT) 0.4 mg SL tablet Place 1 tablet (0.4 mg total) under the tongue every 5 (five) minutes as needed for chest pain Limit 3 tabs per episode. 25 tablet 1    evolocumab (Repatha SureClick) 140 mg/mL pen injector Inject 140 mg under the skin every 14 (fourteen) days 6 mL 3    albuterol HFA (Ventolin HFA) 90 mcg/actuation inhaler Inhale 2 puffs every 4 (four) hours (Patient taking differently: Inhale 2 puffs every 4 (four) hours PRN Daily) 1 Inhaler 11    clotrimazole (MYCELEX) 10 mg esdras Not currently using      amoxicillin (AMOXIL) 500 mg tablet SMARTSI Tablet(s) By Mouth      aspirin 81 mg EC tablet Take 1 tablet (81 mg total) by mouth daily 90 tablet 3     No current facility-administered medications for this visit.       Family History   Problem Relation Age of Onset    No Known Problems Mother     Heart disease Father     Aneurysm Father     Parkinsonism Sister     No Known Problems Sister     No Known Problems Sister     No  Known Problems Daughter     No Known Problems Daughter        Social History     Tobacco Use    Smoking status: Former     Packs/day: 0.75     Years: 30.00     Pack years: 22.50     Types: Cigarettes     Quit date: 2022     Years since quittin.0    Smokeless tobacco: Never    Tobacco comments:     Last smoked 3/1/22   Vaping Use    Vaping Use: Never used   Substance Use Topics    Alcohol use: Yes     Alcohol/week: 4.0 - 5.0 standard drinks     Types: 4 - 5 Cans of beer per week     Comment: 3-4 beers weekly or less    Drug use: No       The following have been reviewed and updated as appropriate in this visit  Tobacco  Allergies  Meds  Problems  Med Hx  Surg Hx  Fam Hx      .    Review of Systems   Constitutional: Negative for decreased appetite, malaise/fatigue and weight gain.   Cardiovascular:  Negative for chest pain, dyspnea on exertion, irregular heartbeat, leg swelling, near-syncope, orthopnea, palpitations, paroxysmal nocturnal dyspnea and syncope.   Respiratory:  Positive for cough (Dry). Negative for shortness of breath, snoring, sputum production and wheezing.    Hematologic/Lymphatic: Negative for bleeding problem. Bruises/bleeds easily.   Gastrointestinal:  Negative for nausea and vomiting.   Neurological:  Negative for dizziness, focal weakness, headaches, light-headedness, loss of balance and weakness.   All other systems reviewed and are negative.    Objective     Vitals:    03/10/23 1448   BP: 110/58   Pulse: 82   SpO2: 93%     Weight:   Weights (Current Encounter Only) (last 180 days)       Date/Time Weight    03/10/23 1448 92.1 kg (203 lb)             Physical Exam    Constitutional: Appears stated age.  No acute distress.   HEENT: Head is normocephalic and atraumatic. Sclera anicteric.   Neck: Supple. No carotid bruits.  No JVD.  Lungs: Effort normal. Breath sounds are clear without wheezes or rales. No respiratory distress.   Heart: S1-S2, Regular rate and rhythm.  Crisp valve  sounds.  Extremities: Without edema. Radial pulses 2+.  Musculoskeletal: Moves all 4 extremities spontaneously  Neurologic: No focal or gross deficits.  Psychiartic: cooperative, alert and orientated X 3.  Skin: Warm and dry      Data Review:     Sodium   Date Value Ref Range Status   03/10/2023 135 135 - 145 mmol/L Final     Potassium   Date Value Ref Range Status   03/10/2023 4.1 3.5 - 5.1 MMOL/L Final     Chloride   Date Value Ref Range Status   03/10/2023 103 98 - 107 mmol/L Final     CO2   Date Value Ref Range Status   03/10/2023 26 21 - 32 mmol/L Final     BUN   Date Value Ref Range Status   03/10/2023 24 7 - 25 mg/dL Final     Creatinine   Date Value Ref Range Status   03/10/2023 1.11 0.70 - 1.30 mg/dL Final     Glucose   Date Value Ref Range Status   03/10/2023 114 (H) 70 - 105 mg/dL Final     Calcium   Date Value Ref Range Status   03/10/2023 9.6 8.6 - 10.3 mg/dL Final      hsTnI 0 Hour   Date Value Ref Range Status   02/26/2022 14.7 <=19.8 pg/mL Final     BNP   Date Value Ref Range Status   03/08/2022 375 (H) 0 - 100 pg/mL Final      WBC   Date Value Ref Range Status   02/20/2023 9.4 3.7 - 9.6 10*3/uL Final     Hemoglobin   Date Value Ref Range Status   02/20/2023 16.0 13.2 - 17.2 g/dL Final     Hematocrit   Date Value Ref Range Status   02/20/2023 47.3 38.0 - 50.0 % Final     MCV   Date Value Ref Range Status   02/20/2023 91.9 82.0 - 97.0 fL Final     Platelets   Date Value Ref Range Status   02/20/2023 218 130 - 350 10*3/uL Final      Cholesterol   Date Value Ref Range Status   04/19/2022 84 0 - 199 mg/dL Final     Triglycerides   Date Value Ref Range Status   04/19/2022 60 <=149 mg/dL Final     HDL   Date Value Ref Range Status   04/19/2022 56 >=40 mg/dL Final     LDL Calculated   Date Value Ref Range Status   04/19/2022 <20 (L) 20 - 99 mg/dL Final       Assessment/Plan     1. Chronic combined systolic and diastolic congestive heart failure (CMS/HCC) (HCC)  - spironolactone (ALDACTONE) 25 mg tablet;  Take 1 tablet (25 mg total) by mouth daily  Dispense: 90 tablet; Refill: 3  - lisinopriL (PRINIVIL,ZESTRIL) 20 mg tablet; Take 1 tablet (20 mg total) by mouth daily  Dispense: 90 tablet; Refill: 1    2. Nonischemic cardiomyopathy (CMS/HCC) (HCC)  - lisinopriL (PRINIVIL,ZESTRIL) 20 mg tablet; Take 1 tablet (20 mg total) by mouth daily  Dispense: 90 tablet; Refill: 1     Chronic combined CHF  NYHA class I  Clinically euvolemic  EF postsurgery approximately 45%, post pacemaker EF approximately 30 to 35% and most recently in February 2023 stable at approximately 30%  GDMT:  - Toprol-XL 50 mg daily  - Lisinopril 20 mg daily (increased from 10 mg daily)  - Spironolactone 25 mg daily (increased from 12.5 mg daily)  Loop diuretic: Torsemide 20 mg p.o. as needed  Follow-up in the CHF clinic in approximately 2 months.  Continue to follow his RV pacing and optimize GDMT as much as possible.    Patient Instructions   - Increase lisinopril to 20 mg daily  - Increase spironolactone to 25 mg daily  - Discontinue torsemide and potassium (keep for as needed weight gain but call Yeny Fisher CNP or Dr. Joshi)    Return in about 3 months (around 5/31/2023) for Recheck - In Office; Yeny Fisher CNP.    The patient verbalized correct understanding and agreement to today's instructions and plan.  The patient verbalized that all questions have been addressed.    Yeny Fisher CNP      A voice recognition program was used to aid in documentation of this record. Sometimes words are not printed exactly as they were spoken. While efforts were made to carefully edit and correct any inaccuracies, some errors may be present; please take these into context. Please contact the provider if errors are identified.

## 2023-02-21 NOTE — TELEPHONE ENCOUNTER
----- Message from Yeny Fisher CNP sent at 2/21/2023  7:45 AM MST -----  Please call the patient with test results.  He had an echo which will be resulted by Dr. Joshi. His renal function is elevated.   His BNP is not that high therefore we should try to cut back on his torsemide from 60 daily to 20 repeat BMP in 1 week.  Keep potassium the same.    Yeny Fisher CNP     Spoke with pt regarding lab results and recommendations per Yeny Fisher CNP. In the conversation with the pt, pt informed that due to being at work throughout the week, he does not take his torsemide during the week. He takes torsemide 60mg on Fri, Sat, and Sun.   Spoke with Yeny Fisher CNP regarding the pt's regimen of torsemide. Verbal order for pt to hold torsemide for 2 weeks and repeat labs in 2 weeks.  Discussed with pt regarding pt to hold torsemide for 2 weeks and repeat lab in 2 weeks. Pt will also weigh himself every morning after the morning urination and record his weight. PT is to call back with weights and if he develops any issues.  Pt verbalizes understanding.

## 2023-02-21 NOTE — TELEPHONE ENCOUNTER
----- Message from Yeny Fisher CNP sent at 2/21/2023  7:45 AM MST -----  Please call the patient with test results.  He had an echo which will be resulted by Dr. Joshi. His renal function is elevated.   His BNP is not that high therefore we should try to cut back on his torsemide from 60 daily to 20 repeat BMP in 1 week.  Keep potassium the same.    Yeny Fisher CNP     Left message on pt's personal voicemail to call back with lab results and recommendations per Yeny Fisher CNP.

## 2023-02-22 NOTE — RESULT ENCOUNTER NOTE
Inocencia,  I reviewed Hayden Franklin's echocardiogram.  His LV function is still unchanged at 31%.  He is due for follow-up with me to discuss further intervention.  Please set him up for an appointment next available with me.  Please let the patient know.    Electronically signed by: Michael Joshi MD  2/22/2023  3:43 PM

## 2023-03-02 ENCOUNTER — LAB (OUTPATIENT)
Dept: LAB | Facility: URGENT CARE | Age: 63
End: 2023-03-02
Payer: COMMERCIAL

## 2023-03-02 DIAGNOSIS — Z95.2 PRESENCE OF PROSTHETIC HEART VALVE: ICD-10-CM

## 2023-03-02 LAB
INR BLD: 1.8
PROTHROMBIN TIME: 21.1 SECONDS (ref 9.4–12.5)

## 2023-03-02 PROCEDURE — 85610 PROTHROMBIN TIME: CPT | Performed by: FAMILY MEDICINE

## 2023-03-02 PROCEDURE — 36415 COLL VENOUS BLD VENIPUNCTURE: CPT | Performed by: FAMILY MEDICINE

## 2023-03-03 ENCOUNTER — ANTICOAGULATION VISIT (OUTPATIENT)
Dept: CARDIOLOGY | Facility: CLINIC | Age: 63
End: 2023-03-03
Payer: COMMERCIAL

## 2023-03-03 DIAGNOSIS — Z79.01 ANTICOAGULATION MANAGEMENT ENCOUNTER: ICD-10-CM

## 2023-03-03 DIAGNOSIS — Z51.81 ANTICOAGULATION MANAGEMENT ENCOUNTER: ICD-10-CM

## 2023-03-03 DIAGNOSIS — Z95.2 S/P AVR: Primary | Chronic | ICD-10-CM

## 2023-03-03 LAB — INR PPP: 1.8

## 2023-03-03 NOTE — PROGRESS NOTES
3/3/2023 10:26 pt tested 3/2 received INR 3/3/2023. spoke to pt verified ID no diet changes states off torsemide(increase) no bleeding or bruising problems enc to call if changes verified warfarin dose and tab size 35mg/week. DMD      continue warfarin dose with changes above and recheck INR in 2 weeks verb under. DMD

## 2023-03-10 ENCOUNTER — LAB (OUTPATIENT)
Dept: LAB | Facility: CLINIC | Age: 63
End: 2023-03-10
Payer: COMMERCIAL

## 2023-03-10 ENCOUNTER — OFFICE VISIT (OUTPATIENT)
Dept: CARDIOLOGY | Facility: CLINIC | Age: 63
End: 2023-03-10
Payer: COMMERCIAL

## 2023-03-10 VITALS
OXYGEN SATURATION: 93 % | DIASTOLIC BLOOD PRESSURE: 58 MMHG | HEIGHT: 72 IN | SYSTOLIC BLOOD PRESSURE: 110 MMHG | HEART RATE: 82 BPM | WEIGHT: 203 LBS | BODY MASS INDEX: 27.5 KG/M2

## 2023-03-10 DIAGNOSIS — I50.42 CHRONIC COMBINED SYSTOLIC AND DIASTOLIC CONGESTIVE HEART FAILURE (CMS/HCC): Primary | ICD-10-CM

## 2023-03-10 DIAGNOSIS — I25.5 ISCHEMIC CARDIOMYOPATHY: Chronic | ICD-10-CM

## 2023-03-10 DIAGNOSIS — I42.8 NONISCHEMIC CARDIOMYOPATHY (CMS/HCC): ICD-10-CM

## 2023-03-10 LAB
ANION GAP SERPL CALC-SCNC: 6 MMOL/L (ref 3–11)
BUN SERPL-MCNC: 24 MG/DL (ref 7–25)
CALCIUM SERPL-MCNC: 9.6 MG/DL (ref 8.6–10.3)
CHLORIDE SERPL-SCNC: 103 MMOL/L (ref 98–107)
CO2 SERPL-SCNC: 26 MMOL/L (ref 21–32)
CREAT SERPL-MCNC: 1.11 MG/DL (ref 0.7–1.3)
GFR SERPL CREATININE-BSD FRML MDRD: 76 ML/MIN/1.73M*2
GLUCOSE SERPL-MCNC: 114 MG/DL (ref 70–105)
POTASSIUM SERPL-SCNC: 4.1 MMOL/L (ref 3.5–5.1)
SODIUM SERPL-SCNC: 135 MMOL/L (ref 135–145)

## 2023-03-10 PROCEDURE — 80048 BASIC METABOLIC PNL TOTAL CA: CPT | Performed by: NURSE PRACTITIONER

## 2023-03-10 PROCEDURE — 36415 COLL VENOUS BLD VENIPUNCTURE: CPT | Performed by: NURSE PRACTITIONER

## 2023-03-10 PROCEDURE — 99214 OFFICE O/P EST MOD 30 MIN: CPT | Performed by: NURSE PRACTITIONER

## 2023-03-10 RX ORDER — LISINOPRIL 20 MG/1
20 TABLET ORAL DAILY
Qty: 90 TABLET | Refills: 1 | Status: SHIPPED | OUTPATIENT
Start: 2023-03-10 | End: 2023-08-15 | Stop reason: SDUPTHER

## 2023-03-10 RX ORDER — SPIRONOLACTONE 25 MG/1
25 TABLET ORAL DAILY
Qty: 90 TABLET | Refills: 3 | Status: SHIPPED | OUTPATIENT
Start: 2023-03-10 | End: 2024-04-19 | Stop reason: SDUPTHER

## 2023-03-10 ASSESSMENT — ENCOUNTER SYMPTOMS
DYSPNEA ON EXERTION: 0
DECREASED APPETITE: 0
COUGH: 1
WHEEZING: 0
BRUISES/BLEEDS EASILY: 1
SPUTUM PRODUCTION: 0
ORTHOPNEA: 0
HEADACHES: 0
IRREGULAR HEARTBEAT: 0
PND: 0
SHORTNESS OF BREATH: 0
WEIGHT GAIN: 0
WEAKNESS: 0
DIZZINESS: 0
PALPITATIONS: 0
LOSS OF BALANCE: 0
VOMITING: 0
LIGHT-HEADEDNESS: 0
FOCAL WEAKNESS: 0
NAUSEA: 0
SYNCOPE: 0
NEAR-SYNCOPE: 0
SNORING: 0

## 2023-03-10 NOTE — PATIENT INSTRUCTIONS
- Increase lisinopril to 20 mg daily  - Increase spironolactone to 25 mg daily  - Discontinue torsemide and potassium (keep for as needed weight gain but call Yeny Fisher CNP or Dr. Joshi)

## 2023-03-15 ENCOUNTER — LAB (OUTPATIENT)
Dept: LAB | Facility: URGENT CARE | Age: 63
End: 2023-03-15
Payer: COMMERCIAL

## 2023-03-15 DIAGNOSIS — Z95.2 PRESENCE OF PROSTHETIC HEART VALVE: ICD-10-CM

## 2023-03-15 LAB
INR BLD: 2.2
PROTHROMBIN TIME: 26.2 SECONDS (ref 9.4–12.5)

## 2023-03-15 PROCEDURE — 85610 PROTHROMBIN TIME: CPT | Performed by: FAMILY MEDICINE

## 2023-03-15 PROCEDURE — 36415 COLL VENOUS BLD VENIPUNCTURE: CPT | Performed by: FAMILY MEDICINE

## 2023-03-16 ENCOUNTER — ANTICOAGULATION VISIT (OUTPATIENT)
Dept: CARDIOLOGY | Facility: CLINIC | Age: 63
End: 2023-03-16
Payer: COMMERCIAL

## 2023-03-16 DIAGNOSIS — Z79.01 ANTICOAGULATION MANAGEMENT ENCOUNTER: ICD-10-CM

## 2023-03-16 DIAGNOSIS — Z95.2 S/P AVR: Primary | Chronic | ICD-10-CM

## 2023-03-16 DIAGNOSIS — Z51.81 ANTICOAGULATION MANAGEMENT ENCOUNTER: ICD-10-CM

## 2023-03-16 LAB — INR PPP: 2.2

## 2023-03-16 NOTE — PROGRESS NOTES
3/16/2023 8:49 pt tested 3/15 after work received INR 3/16/2023 spoke to pt verified ID no diet changes states is totally off torsemide(increase) but had not been taking it daily anyway but his spironolactone dose was increased(decrease) no bleeding or bruising problems enc to call if changes verified warfarin dose and tab size 40mg/week. DMD      continue warfarin dose as above and recheck INR in 2 weeks due to med changes verb under. DMD

## 2023-03-24 ENCOUNTER — CLINICAL SUPPORT (OUTPATIENT)
Dept: FAMILY MEDICINE | Facility: CLINIC | Age: 63
End: 2023-03-24
Payer: COMMERCIAL

## 2023-03-24 DIAGNOSIS — Z23 NEED FOR VACCINATION: Primary | ICD-10-CM

## 2023-03-24 PROCEDURE — 90471 IMMUNIZATION ADMIN: CPT | Performed by: FAMILY MEDICINE

## 2023-03-24 PROCEDURE — 90472 IMMUNIZATION ADMIN EACH ADD: CPT | Performed by: FAMILY MEDICINE

## 2023-03-24 PROCEDURE — 90677 PCV20 VACCINE IM: CPT | Performed by: FAMILY MEDICINE

## 2023-03-24 PROCEDURE — 90750 HZV VACC RECOMBINANT IM: CPT | Performed by: FAMILY MEDICINE

## 2023-03-24 NOTE — PROGRESS NOTES
Pt in for 2nd shingles shot and pneumonia shot.  Per Dr. Lyndon pride to give pneumonia 20 as he has had pneumonia 23. Pt tolerated injection well with no complications. All questions answered.

## 2023-03-27 ENCOUNTER — ANTICOAGULATION VISIT (OUTPATIENT)
Dept: CARDIOLOGY | Facility: CLINIC | Age: 63
End: 2023-03-27
Payer: COMMERCIAL

## 2023-03-27 ENCOUNTER — LAB (OUTPATIENT)
Dept: LAB | Facility: URGENT CARE | Age: 63
End: 2023-03-27
Payer: COMMERCIAL

## 2023-03-27 ENCOUNTER — TELEPHONE (OUTPATIENT)
Dept: CARDIOLOGY | Facility: CLINIC | Age: 63
End: 2023-03-27
Payer: COMMERCIAL

## 2023-03-27 DIAGNOSIS — Z95.2 PRESENCE OF PROSTHETIC HEART VALVE: ICD-10-CM

## 2023-03-27 DIAGNOSIS — Z51.81 ANTICOAGULATION MANAGEMENT ENCOUNTER: ICD-10-CM

## 2023-03-27 DIAGNOSIS — Z95.2 S/P AVR: Primary | Chronic | ICD-10-CM

## 2023-03-27 DIAGNOSIS — Z79.01 ANTICOAGULATION MANAGEMENT ENCOUNTER: ICD-10-CM

## 2023-03-27 LAB
INR BLD: 3.5
INR PPP: 3.5
PROTHROMBIN TIME: 41 SECONDS (ref 9.4–12.5)

## 2023-03-27 PROCEDURE — 85610 PROTHROMBIN TIME: CPT | Performed by: FAMILY MEDICINE

## 2023-03-27 PROCEDURE — 36415 COLL VENOUS BLD VENIPUNCTURE: CPT | Performed by: FAMILY MEDICINE

## 2023-03-27 NOTE — PROGRESS NOTES
12:15 spoke to pt verified ID no med or diet changes no bleeding or bruising problems enc to call if changes verified warfarin dose and tab size 40mg/week DMD      continue warfarin as above with changes based on SS calculations and dose history and recheck INR in 4 weeks verb under. DMD   room air

## 2023-03-27 NOTE — TELEPHONE ENCOUNTER
----- Message from Mitchel Saldaña DO sent at 3/27/2023  3:35 PM MDT -----  Regarding: Ventricular tachycardia  He has nonsustained VT on his recent remote and his echocardiogram shows decreased systolic function.  Can we arrange for follow-up to discuss upgrade to biventricular ICD.

## 2023-03-27 NOTE — TELEPHONE ENCOUNTER
Called patient regarding appointment to follow up per device transmission. Patient was agreeable to an appointment. Patient was unable to make appointment this week. Patient was scheduled for 4/26/23 at 8:30am. Patient voiced no other concerns.     KARLOS Lowry

## 2023-04-12 PROCEDURE — 93294 REM INTERROG EVL PM/LDLS PM: CPT | Performed by: INTERNAL MEDICINE

## 2023-04-12 PROCEDURE — 93296 REM INTERROG EVL PM/IDS: CPT | Performed by: INTERNAL MEDICINE

## 2023-04-14 ENCOUNTER — OFFICE VISIT (OUTPATIENT)
Dept: FAMILY MEDICINE | Facility: CLINIC | Age: 63
End: 2023-04-14
Payer: COMMERCIAL

## 2023-04-14 ENCOUNTER — MEDICATION ACCESS (OUTPATIENT)
Dept: FAMILY MEDICINE | Facility: CLINIC | Age: 63
End: 2023-04-14
Payer: COMMERCIAL

## 2023-04-14 ENCOUNTER — LAB (OUTPATIENT)
Dept: LAB | Facility: CLINIC | Age: 63
End: 2023-04-14
Payer: COMMERCIAL

## 2023-04-14 VITALS
BODY MASS INDEX: 27.36 KG/M2 | HEIGHT: 72 IN | DIASTOLIC BLOOD PRESSURE: 70 MMHG | OXYGEN SATURATION: 94 % | TEMPERATURE: 96.9 F | HEART RATE: 76 BPM | SYSTOLIC BLOOD PRESSURE: 112 MMHG | WEIGHT: 202 LBS

## 2023-04-14 DIAGNOSIS — I65.23 OCCLUSION AND STENOSIS OF BILATERAL CAROTID ARTERIES: ICD-10-CM

## 2023-04-14 DIAGNOSIS — Z12.5 PROSTATE CANCER SCREENING: ICD-10-CM

## 2023-04-14 DIAGNOSIS — E78.5 HYPERLIPIDEMIA, UNSPECIFIED HYPERLIPIDEMIA TYPE: ICD-10-CM

## 2023-04-14 DIAGNOSIS — R26.89 BALANCE DISORDER: ICD-10-CM

## 2023-04-14 DIAGNOSIS — Z00.00 WELLNESS EXAMINATION: Primary | ICD-10-CM

## 2023-04-14 DIAGNOSIS — I25.10 CORONARY ARTERY DISEASE INVOLVING NATIVE CORONARY ARTERY OF NATIVE HEART WITHOUT ANGINA PECTORIS: ICD-10-CM

## 2023-04-14 DIAGNOSIS — E61.1 IRON DEFICIENCY: ICD-10-CM

## 2023-04-14 PROBLEM — J44.1 COPD EXACERBATION (CMS/HCC): Status: RESOLVED | Noted: 2022-02-25 | Resolved: 2023-04-14

## 2023-04-14 PROBLEM — J44.89 ASTHMA-COPD OVERLAP SYNDROME (CMS/HCC): Chronic | Status: ACTIVE | Noted: 2022-04-06

## 2023-04-14 PROBLEM — I50.42 CHRONIC COMBINED SYSTOLIC AND DIASTOLIC HEART FAILURE (CMS/HCC): Chronic | Status: ACTIVE | Noted: 2022-03-21

## 2023-04-14 PROBLEM — F17.200 NICOTINE DEPENDENCE, UNSPECIFIED, UNCOMPLICATED: Status: RESOLVED | Noted: 2017-08-03 | Resolved: 2023-04-14

## 2023-04-14 PROBLEM — R91.1 LUNG NODULE: Status: RESOLVED | Noted: 2022-03-07 | Resolved: 2023-04-14

## 2023-04-14 LAB
ALBUMIN SERPL-MCNC: 4.6 G/DL (ref 3.5–5.3)
ALP SERPL-CCNC: 99 U/L (ref 45–115)
ALT SERPL-CCNC: 26 U/L (ref 7–52)
ANION GAP SERPL CALC-SCNC: 7 MMOL/L (ref 3–11)
AST SERPL-CCNC: 24 U/L
BASOPHILS # BLD AUTO: 0.1 10*3/UL
BASOPHILS NFR BLD AUTO: 1 % (ref 0–2)
BILIRUB SERPL-MCNC: 1.01 MG/DL (ref 0.2–1.4)
BUN SERPL-MCNC: 19 MG/DL (ref 7–25)
CALCIUM ALBUM COR SERPL-MCNC: 9.1 MG/DL (ref 8.6–10.3)
CALCIUM SERPL-MCNC: 9.6 MG/DL (ref 8.6–10.3)
CHLORIDE SERPL-SCNC: 103 MMOL/L (ref 98–107)
CHOLEST SERPL-MCNC: 114 MG/DL (ref 0–199)
CO2 SERPL-SCNC: 24 MMOL/L (ref 21–32)
CREAT SERPL-MCNC: 0.94 MG/DL (ref 0.7–1.3)
EOSINOPHIL # BLD AUTO: 0.1 10*3/UL
EOSINOPHIL NFR BLD AUTO: 1.2 % (ref 0–3)
ERYTHROCYTE [DISTWIDTH] IN BLOOD BY AUTOMATED COUNT: 13.9 % (ref 11.5–15)
FASTING STATUS PATIENT QL REPORTED: NO
FERRITIN SERPL-MCNC: 154.3 NG/ML (ref 24–336)
GFR SERPL CREATININE-BSD FRML MDRD: 93 ML/MIN/1.73M*2
GLUCOSE SERPL-MCNC: 97 MG/DL (ref 70–105)
HCT VFR BLD AUTO: 48.7 % (ref 38–50)
HDLC SERPL-MCNC: 71 MG/DL
HGB BLD-MCNC: 16.9 G/DL (ref 13.2–17.2)
IRON SATN MFR SERPL: 40 % (ref 13–50)
IRON SERPL-MCNC: 138 UG/DL (ref 50–175)
LDLC SERPL CALC-MCNC: 25 MG/DL (ref 20–99)
LYMPHOCYTES # BLD AUTO: 1.4 10*3/UL
LYMPHOCYTES NFR BLD AUTO: 22.5 % (ref 15–47)
MCH RBC QN AUTO: 33.1 PG (ref 29–34)
MCHC RBC AUTO-ENTMCNC: 34.6 G/DL (ref 32–36)
MCV RBC AUTO: 95.6 FL (ref 82–97)
MONOCYTES # BLD AUTO: 0.6 10*3/UL
MONOCYTES NFR BLD AUTO: 9.2 % (ref 5–13)
NEUTROPHILS # BLD AUTO: 4 10*3/UL
NEUTROPHILS NFR BLD AUTO: 66.1 % (ref 46–70)
PLATELET # BLD AUTO: 204 10*3/UL (ref 130–350)
PMV BLD AUTO: 8.4 FL (ref 6.9–10.8)
POTASSIUM SERPL-SCNC: 4.4 MMOL/L (ref 3.5–5.1)
PROT SERPL-MCNC: 7.4 G/DL (ref 6–8.3)
PSA SERPL-MCNC: 0.15 NG/ML (ref 0–4)
RBC # BLD AUTO: 5.09 10*6/ΜL (ref 4.1–5.8)
SODIUM SERPL-SCNC: 134 MMOL/L (ref 135–145)
TIBC SERPL-MCNC: 344 UG/DL (ref 250–450)
TRIGL SERPL-MCNC: 92 MG/DL
UIBC SERPL-MCNC: 206 UG/DL (ref 155–355)
VIT B12 SERPL-MCNC: 345 PG/ML (ref 180–914)
WBC # BLD AUTO: 6 10*3/UL (ref 3.7–9.6)

## 2023-04-14 PROCEDURE — 36415 COLL VENOUS BLD VENIPUNCTURE: CPT | Performed by: FAMILY MEDICINE

## 2023-04-14 PROCEDURE — 99396 PREV VISIT EST AGE 40-64: CPT | Performed by: FAMILY MEDICINE

## 2023-04-14 PROCEDURE — 82607 VITAMIN B-12: CPT | Performed by: FAMILY MEDICINE

## 2023-04-14 PROCEDURE — 80061 LIPID PANEL: CPT | Performed by: FAMILY MEDICINE

## 2023-04-14 PROCEDURE — 82728 ASSAY OF FERRITIN: CPT | Performed by: FAMILY MEDICINE

## 2023-04-14 PROCEDURE — 84153 ASSAY OF PSA TOTAL: CPT | Performed by: FAMILY MEDICINE

## 2023-04-14 PROCEDURE — 85025 COMPLETE CBC W/AUTO DIFF WBC: CPT | Performed by: FAMILY MEDICINE

## 2023-04-14 PROCEDURE — 83550 IRON BINDING TEST: CPT | Performed by: FAMILY MEDICINE

## 2023-04-14 PROCEDURE — 80053 COMPREHEN METABOLIC PANEL: CPT | Performed by: FAMILY MEDICINE

## 2023-04-14 PROCEDURE — 83540 ASSAY OF IRON: CPT | Performed by: FAMILY MEDICINE

## 2023-04-14 RX ORDER — EVOLOCUMAB 140 MG/ML
140 INJECTION, SOLUTION SUBCUTANEOUS
Qty: 6 ML | Refills: 3 | Status: SHIPPED | OUTPATIENT
Start: 2023-04-14 | End: 2024-04-19 | Stop reason: SDUPTHER

## 2023-04-14 NOTE — PATIENT INSTRUCTIONS
Health Maintenance, Male    Adopting a healthy lifestyle and getting preventive care can go a long way to promote health and wellness. Talk with your health care provider about what schedule of regular examinations is right for you. This is a good chance for you to check in with your provider about disease prevention and staying healthy.    In between checkups, there are plenty of things you can do on your own. Experts have done a lot of research about which lifestyle changes and preventive measures are most likely to keep you healthy. Ask your health care provider for more information.    Weight and diet  Eat a healthy diet  Be sure to include plenty of vegetables, fruits, low-fat dairy products, and lean protein.  Do not eat a lot of foods high in solid fats, added sugars, or salt.  Get regular exercise. This is one of the most important things you can do for your health.  Most adults should exercise for at least 150 minutes each week. The exercise should increase your heart rate and make you sweat (moderate-intensity exercise).  Most adults should also do strengthening exercises at least twice a week. This is in addition to the moderate-intensity exercise.    Maintain a healthy weight  Body mass index (BMI) is a measurement that can be used to identify possible weight problems. It estimates body fat based on height and weight. Your health care provider can help determine your BMI and help you achieve or maintain a healthy weight.  For females 20 years of age and older:  A BMI below 18.5 is considered underweight.  A BMI of 18.5 to 24.9 is normal.  A BMI of 25 to 29.9 is considered overweight.  A BMI of 30 and above is considered obese.    Your BMI today was:  Body mass index is 27.39 kg/m².     Watch Your Levels of Cholesterol and Blood Lipids  Have your blood tested for lipids and cholesterol every 5 years starting at 35 years of age. If you are at high risk for heart disease, you should start having your  blood tested when you are 20 years old. You may need to have your cholesterol levels checked more often if:  Your lipid or cholesterol levels are high.  You are older than 50 years of age.  You are at high risk for heart disease.    What should I know about cancer screening?  Many types of cancers can be detected early and may often be prevented.  Lung Cancer  You should be screened every year for lung cancer if:  You are a current smoker who has smoked for at least 30 years.  You are a former smoker who has quit within the past 15 years.  Talk to your health care provider about your screening options, when you should start screening, and how often you should be screened.    Colorectal Cancer  Routine colorectal cancer screening usually begins at 45 years of age and should be repeated every 5-10 years until you are 75 years old. You may need to be screened more often if early forms of precancerous polyps or small growths are found. Your health care provider may recommend screening at an earlier age if you have risk factors for colon cancer.  Your health care provider may recommend using home test kits to check for hidden blood in the stool if you are normal or low risk for colon cancer. (FIT or Cologuard testing)  A small camera at the end of a tube can be used to examine your colon (sigmoidoscopy or colonoscopy). This checks for the earliest forms of colorectal cancer.    Prostate Cancer  Depending on your age and overall health, your health care provider may do certain tests to screen for prostate cancer  Talk to your health care provider about any symptoms or concerns you have about  prostate cancer.    Skin Cancer  Check your skin from head to toe regularly.  Tell your health care provider about any new moles or changes in moles, especially if:  There is a change in a mole’s size, shape, or color.  You have a mole that is larger than a pencil eraser.  Always use sunscreen. Apply sunscreen liberally and repeat  throughout the day.  Protect yourself by wearing long sleeves, pants, a wide-brimmed hat, and sunglasses when outside.    What should I know about heart disease, diabetes, and high blood pressure?  If you are 18-39 years of age, have your blood pressure checked every 3-5 years. If you are 40 years of age or older, have your blood pressure checked every year. You should have your blood pressure measured twice--once when you are at a hospital or clinic, and once when you are not at a hospital or clinic. Record the average of the two measurements. To check your blood pressure when you are not at a hospital or clinic, you can use:  An automated blood pressure machine at a pharmacy.  A home blood pressure monitor.  Talk to your health care provider about your target blood pressure.  If you are between 45-79 years old, ask your health care provider if you should take aspirin to prevent heart disease.  Have regular diabetes screenings by checking your fasting blood sugar level.  If you are at a normal weight and have a low risk for diabetes, have this test once every three years after the age of 45.  If you are overweight and have a high risk for diabetes, consider being tested at a younger age or more often.  A one-time screening for abdominal aortic aneurysm (AAA) by ultrasound is recommended for men aged 65-75 years who are current or former smokers.    What should I know about preventing infection?  Hepatitis B  If you have a higher risk for hepatitis B, you should be screened for this virus. Talk with your health care provider to find out if you are at risk for hepatitis B infection.  Hepatitis C  Blood testing is recommended for:  Everyone born from 1945 through 1965.  Anyone with known risk factors for hepatitis C.    Sexually Transmitted Diseases (STDs)  You should be screened each year for STDs including gonorrhea and chlamydia if:  You are sexually active and are younger than 24 years of age.  You are older than  24 years of age and your health care provider tells you that you are at risk for this type of infection.  Your sexual activity has changed since you were last screened and you are at an increased risk for chlamydia or gonorrhea. Ask your health care provider if you are at risk.  Talk with your health care provider about whether you are at high risk of being infected with HIV. Your health care provider may recommend a prescription medicine to help prevent HIV infection.    What else can I do?  Schedule regular health, dental, and eye exams.  Stay current with your vaccines (immunizations).  Do not use any tobacco products, such as cigarettes, chewing tobacco, and e-cigarettes. Do not use street drugs.  If you need help quitting, ask your health care provider.  Limit alcohol intake to no more than 2 drinks per day. One drink equals 12 ounces of beer, 5 ounces of wine, or 1½ ounces of hard liquor.  Tell your health care provider if you often feel depressed.  Tell your health care provider if you have ever been abused or do not feel safe at home.      [employer] Interactive Patient Education © 2018 [employer]

## 2023-04-14 NOTE — PROGRESS NOTES
SUBJECTIVE:    Chief Complaint   Patient presents with    Annual Exam         Hayden Franklin is a 62 y.o. male presenting for an annual exam.  He has heart disease, managed by cardiology.  He also has chronic lung disease, managed by pulmonary.  He has no specific health concerns at today's visit.    Patient Active Problem List   Diagnosis    Coronary atherosclerosis of native coronary artery    Mixed hyperlipidemia    S/P AVR    History of coronary artery bypass graft x 1    Ischemic cardiomyopathy    Chronic combined systolic and diastolic heart failure (CMS/HCC)    Asthma-COPD overlap syndrome (CMS/HCC)    Nonrheumatic aortic valve insufficiency          Outpatient Medications Prior to Visit   Medication Sig Dispense Refill    spironolactone (ALDACTONE) 25 mg tablet Take 1 tablet (25 mg total) by mouth daily 90 tablet 3    lisinopriL (PRINIVIL,ZESTRIL) 20 mg tablet Take 1 tablet (20 mg total) by mouth daily 90 tablet 1    metoprolol succinate XL (TOPROL-XL) 50 mg 24 hr tablet Take 1 tablet (50 mg total) by mouth daily 90 tablet 3    warfarin (COUMADIN) 5 mg tablet Take 1 tablet (5 mg total) by mouth daily 90 tablet 3    budesonide-formoteroL (SYMBICORT) 160-4.5 mcg/actuation inhaler Inhale 2 puffs 2 (two) times a day Rinse mouth with water after use. Do not swallow. 10.2 g 11    pantoprazole (PROTONIX) 40 mg EC tablet Take 40 mg by mouth daily as needed      methocarbamoL (ROBAXIN) 750 mg tablet SMARTSIG:Tablet(s) By Mouth      HYDROcodone-acetaminophen (NORCO) 5-325 mg per tablet Take 1 tablet by mouth every 6 (six) hours as needed      gabapentin (NEURONTIN) 100 mg capsule Take 100 mg by mouth 3 (three) times a day as needed      atorvastatin (LIPITOR) 80 mg tablet Take 1 tablet (80 mg total) by mouth daily 90 tablet 3    nitroglycerin (NITROSTAT) 0.4 mg SL tablet Place 1 tablet (0.4 mg total) under the tongue every 5 (five) minutes as needed for chest pain Limit 3 tabs per episode. 25 tablet 1     albuterol HFA (Ventolin HFA) 90 mcg/actuation inhaler Inhale 2 puffs every 4 (four) hours (Patient taking differently: Inhale 2 puffs every 4 (four) hours PRN Daily) 1 Inhaler 11    amoxicillin (AMOXIL) 500 mg tablet SMARTSI Tablet(s) By Mouth      aspirin 81 mg EC tablet Take 1 tablet (81 mg total) by mouth daily 90 tablet 3    sennosides-docusate sodium (SENNA PLUS) 8.6-50 mg Take 1 tablet by mouth      evolocumab (Repatha SureClick) 140 mg/mL pen injector Inject 140 mg under the skin every 14 (fourteen) days (Patient not taking: Reported on 2023) 6 mL 3    clotrimazole (MYCELEX) 10 mg esdras Not currently using       No facility-administered medications prior to visit.       Family Status   Relation Name Status    Mother  Alive    Father      Sister Preethi Alive    Sister Analy Alive    Sister Catalina Alive    Daughter Alejandra Alive    Daughter Sana Alive       Family History   Problem Relation Age of Onset    No Known Problems Mother     Heart disease Father     Aneurysm Father     Other Sister         undiagnosed neurological disease    Breast cancer Sister     No Known Problems Sister     Gallbladder disease Daughter     No Known Problems Daughter            The patient's past medical history, medications, allergies, family history and social history were updated in his electronic medical record at today's visit.      REVIEW OF SYSTEMS:  Constitutional: weight is up 10 pounds from fall but had lost significant weight prior to that.  Goal weight 195-200.  Some chronic fatigue- works 50 hours a week, busy.  HEENT:  No change in vision or hearing.  Pulmonary: No dyspnea on exertion, no wheezing, no shortness of breath  Cardiovascular: some chest heaviness with exertion- has appointment scheduled with cardiology  Gastrointestinal: No abdominal pain, no change in bowel habits, no significant GERD.  :  The patient denies urinary hesitancy, frequency or incomplete voiding.  Nocturia 0-1.  Positive  "for ED- declines RX for now  Skin/Integumentary: No abnormal skin lesions noted, no evidence of rash  Neurologic: No chronic headaches.  Psychiatric: Denies depression or anxiety.  Musculoskeletal: generalized joint/back pain      OBJECTIVE:   /70 (BP Location: Left arm, Patient Position: Sitting, Cuff Size: Long Adult)   Pulse 76   Temp 36.1 °C (96.9 °F) (Temporal)   Ht 1.829 m (6' 0.01\")   Wt 91.6 kg (202 lb)   SpO2 94%   BMI 27.39 kg/m²    The patient appears well, in NAD. Mood and affect appropriate.  HEENT: Eyes grossly normal.  Ears normal; TM's are clear. Throat and pharynx normal. Teeth in good repair.  Neck supple. No adenopathy or thyromegaly.  Lungs: Clear, good air entry, no wheezes, rhonchi or rales.   Heart: S1 and S2 normal, no murmurs, regular rate and rhythm. Carotids are without bruits bilaterally.  Breasts without mass or axillary adenopathy.  Abdomen: Soft without tenderness, guarding, mass or hepatosplenomegaly. Extremities: No edema, no varicosities. Distal pulses intact.  Skin: I note only benign skin findings. No unusual rashes or suspicious skin lesions noted. Nails appear normal.   exam:  deferred, no symptoms.     LAB:  Recent Results (from the past 48 hour(s))   Comprehensive metabolic panel Blood, Venous    Collection Time: 04/14/23 10:13 AM   Result Value Ref Range    Sodium 134 (L) 135 - 145 mmol/L    Potassium 4.4 3.5 - 5.1 MMOL/L    Chloride 103 98 - 107 mmol/L    CO2 24 21 - 32 mmol/L    Anion Gap 7 3 - 11 mmol/L    BUN 19 7 - 25 mg/dL    Creatinine 0.94 0.70 - 1.30 mg/dL    Glucose 97 70 - 105 mg/dL    Calcium 9.6 8.6 - 10.3 mg/dL    AST 24 <40 U/L    ALT (SGPT) 26 7 - 52 U/L    Alkaline Phosphatase 99 45 - 115 U/L    Total Protein 7.4 6.0 - 8.3 g/dL    Albumin 4.6 3.5 - 5.3 g/dL    Total Bilirubin 1.01 0.20 - 1.40 mg/dL    Corrected Calcium 9.1 8.6 - 10.3 mg/dL    eGFR 93 >60 mL/min/1.73m*2   CBC w/auto differential Blood, Venous    Collection Time: 04/14/23 10:13 AM "   Result Value Ref Range    WBC 6.0 3.7 - 9.6 10*3/uL    RBC 5.09 4.10 - 5.80 10*6/µL    Hemoglobin 16.9 13.2 - 17.2 g/dL    Hematocrit 48.7 38.0 - 50.0 %    MCV 95.6 82.0 - 97.0 fL    MCH 33.1 29.0 - 34.0 pg    MCHC 34.6 32.0 - 36.0 g/dL    RDW 13.9 11.5 - 15.0 %    Platelets 204 130 - 350 10*3/uL    MPV 8.4 6.9 - 10.8 fL    Neutrophils% 66.1 46.0 - 70.0 %    Lymphocytes% 22.5 15.0 - 47.0 %    Monocytes% 9.2 5.0 - 13.0 %    Eosinophils% 1.2 0.0 - 3.0 %    Basophils% 1.0 0.0 - 2.0 %    ANC (auto diff) 4.00 10*3/UL    Lymphocytes Absolute 1.40 10*3/uL    Monocytes Absolute 0.60 10*3/uL    Eosinophils Absolute 0.10 10*3/uL    Basophils Absolute 0.10 10*3/uL   Lipid panel Blood, Venous    Collection Time: 04/14/23 10:13 AM   Result Value Ref Range    Triglycerides 92 <=149 mg/dL    Cholesterol 114 0 - 199 mg/dL    HDL 71 >=40 mg/dL    LDL Calculated 25 20 - 99 mg/dL    Fasting? No    PSA screen Blood, Venous    Collection Time: 04/14/23 10:13 AM   Result Value Ref Range    PSA 0.15 0.00 - 4.00 ng/mL   Vitamin B12 Blood, Venous    Collection Time: 04/14/23 10:13 AM   Result Value Ref Range    Vitamin B-12 345 180 - 914 pg/mL   Iron and TIBC Blood, Venous    Collection Time: 04/14/23 10:13 AM   Result Value Ref Range    Iron 138 50 - 175 ug/dL    TIBC 344 250 - 450 ug/dL    Iron Saturation 40 13 - 50 %    UIBC 206 155 - 355 ug/dL   Ferritin Blood, Venous    Collection Time: 04/14/23 10:13 AM   Result Value Ref Range    Ferritin 154.3 24.0 - 336.0 ng/mL        ASSESSMENT:    Diagnosis Plan   1. Wellness examination  Comprehensive metabolic panel Blood, Venous    CBC w/auto differential Blood, Venous    Lipid panel Blood, Venous    Comprehensive metabolic panel Blood, Venous    CBC w/auto differential Blood, Venous    Lipid panel Blood, Venous    PSA screen Blood, Venous      2. Coronary artery disease involving native coronary artery of native heart without angina pectoris  evolocumab (Repatha SureClick) 140 mg/mL pen  injector    Cologuard Cancer Screen Stool      3. Occlusion and stenosis of bilateral carotid arteries  evolocumab (Repatha SureClick) 140 mg/mL pen injector      4. Hyperlipidemia, unspecified hyperlipidemia type  evolocumab (Repatha SureClick) 140 mg/mL pen injector    Comprehensive metabolic panel Blood, Venous      5. Prostate cancer screening  PSA screen Blood, Venous    PSA screen Blood, Venous      6. Balance disorder  Vitamin B12 Blood, Venous      7. Iron deficiency  Iron panel Blood, Venous             PLAN:     The patient was provided written information regarding healthcare maintenance in today's AVS.  This includes diet, exercise, vaccinations and age appropriate cancer screenings.      He has been taking iron since his bypass surgery.  Iron levels are very adequate and he can discontinue his supplement.  Remainder of labs are stable and acceptable.      Health Maintenance reviewed -   Health Maintenance   Vaccines:  up to date  Diet/Exercise:  Body mass index is 27.39 kg/m²..  The patient was provided written materials regarding healthy BMI and exercise recommendations.  Colon Cancer Screening:  cologuard ordered.    Prostate Cancer Screening: The natural history of prostate cancer and ongoing controversy regarding screening and potential treatment outcomes of prostate cancer has been discussed with the patient. The meaning of a false positive PSA and a false negative PSA has been discussed. He indicates understanding of the limitations of this screening test and does want to proceed with screening PSA testing.     Cholesterol Screening:  results reviewed  Diabetes Screening:  results reviewed  Lung Cancer Screening:  not applicable  Smoking Cessation:  N/A  Hep C screening:   declined  Depression screening:  see ROS  .    Return in about 1 year (around 4/14/2024) for 30 MIN- PHYSICAL.    Rosa Haney MD

## 2023-04-14 NOTE — PROGRESS NOTES
04/14/23  11:50 AM    03.22.2024/Junaid/Lyndon VU renewal reminder scheduled for: 03.08.2024    [facility] [address] [place] [address]   [address] [place] [id number]   Phone:  [phone]  Fax:  [phone]    This Rx is approved and an authorization is on file.    No changes to the following:  Insurance   Prescriber  Medication Name   Dosage Strength  Dosage Form  Route of Administration   Frequency of Administration    Patient has not been notified, as there is no expected delay or interruption in therapy.

## 2023-04-24 ENCOUNTER — LAB (OUTPATIENT)
Dept: LAB | Facility: URGENT CARE | Age: 63
End: 2023-04-24
Payer: COMMERCIAL

## 2023-04-24 DIAGNOSIS — Z95.2 HEART VALVE REPLACED: Primary | ICD-10-CM

## 2023-04-24 LAB
INR BLD: 3.8
PROTHROMBIN TIME: 44.9 SECONDS (ref 9.4–12.5)

## 2023-04-24 PROCEDURE — 85610 PROTHROMBIN TIME: CPT | Performed by: FAMILY MEDICINE

## 2023-04-24 PROCEDURE — 36415 COLL VENOUS BLD VENIPUNCTURE: CPT | Performed by: FAMILY MEDICINE

## 2023-04-25 ENCOUNTER — ANTICOAGULATION VISIT (OUTPATIENT)
Dept: CARDIOLOGY | Facility: CLINIC | Age: 63
End: 2023-04-25
Payer: COMMERCIAL

## 2023-04-25 DIAGNOSIS — Z79.01 ANTICOAGULATION MANAGEMENT ENCOUNTER: ICD-10-CM

## 2023-04-25 DIAGNOSIS — Z51.81 ANTICOAGULATION MANAGEMENT ENCOUNTER: ICD-10-CM

## 2023-04-25 DIAGNOSIS — Z95.2 S/P AVR: Primary | Chronic | ICD-10-CM

## 2023-04-25 LAB — INR PPP: 3.8

## 2023-04-25 NOTE — PROGRESS NOTES
4/25/2023 9:40 pt tested 4/24 received results 4/25/2023. spoke to pt verified ID no med or diet changes no bleeding or bruising problems enc to cAll if changes verified warfarin dose and tab size 37.5mg/week  DMD      today only 4/25 NO warfarin then continue warfarin dose with changes above 35mg/week all based on SS calculations and dose history and recheck INR in 3 weeks verb under. DMD

## 2023-04-26 ENCOUNTER — OFFICE VISIT (OUTPATIENT)
Dept: CARDIOLOGY | Facility: CLINIC | Age: 63
End: 2023-04-26
Payer: COMMERCIAL

## 2023-04-26 VITALS
HEART RATE: 75 BPM | DIASTOLIC BLOOD PRESSURE: 60 MMHG | OXYGEN SATURATION: 94 % | WEIGHT: 200 LBS | SYSTOLIC BLOOD PRESSURE: 124 MMHG | BODY MASS INDEX: 27.09 KG/M2 | HEIGHT: 72 IN

## 2023-04-26 DIAGNOSIS — I47.29 NSVT (NONSUSTAINED VENTRICULAR TACHYCARDIA) (CMS/HCC): Primary | ICD-10-CM

## 2023-04-26 DIAGNOSIS — I50.22 CHRONIC SYSTOLIC CONGESTIVE HEART FAILURE (CMS/HCC): ICD-10-CM

## 2023-04-26 PROCEDURE — 99214 OFFICE O/P EST MOD 30 MIN: CPT | Performed by: INTERNAL MEDICINE

## 2023-04-26 PROCEDURE — 93000 ELECTROCARDIOGRAM COMPLETE: CPT | Performed by: INTERNAL MEDICINE

## 2023-04-26 RX ORDER — ACETAMINOPHEN 325 MG/1
650 TABLET ORAL EVERY 6 HOURS PRN
COMMUNITY
End: 2024-02-01 | Stop reason: HOSPADM

## 2023-04-26 ASSESSMENT — PAIN SCALES - GENERAL: PAINLEVEL: 0-NO PAIN

## 2023-04-26 NOTE — PROGRESS NOTES
ELECTROPHYSIOLOGY CONSULT    Chief Complaint:    The primary encounter diagnosis was NSVT (nonsustained ventricular tachycardia) (CMS/HCC). A diagnosis of Chronic systolic congestive heart failure (CMS/HCC) was also pertinent to this visit..    HPI:    Hayden Franklin is a very pleasant 62 y.o. y/o male who presents with a PMH significant for   Patient Active Problem List   Diagnosis    Coronary atherosclerosis of native coronary artery    Mixed hyperlipidemia    S/P AVR    History of coronary artery bypass graft x 1    Ischemic cardiomyopathy    Chronic combined systolic and diastolic heart failure (CMS/HCC)    Asthma-COPD overlap syndrome (CMS/HCC)    Nonrheumatic aortic valve insufficiency         Hayden  presents today to establish care for the follow-up of recently documented nonsustained VT on his remote pacemaker transmission.  Additionally he is following up with respect to a repeat echocardiogram performed several weeks ago.    When I last sat down with him it was not long after he had received his pacemaker the [facility] where he was undergoing a redo aortic valve replacement.  During that office visit we had noted that his ejection fraction had decreased from his baseline.  We had arranged for repeat echocardiography to see if this is something that would recover with medical therapy and with some time as he recovers from the surgery.      Since I last saw him we have noted 2 brief episodes of nonsustained VT on his remote pacemaker transmissions.  He tells me he has been feeling about the same as the last time we sat down in the office.  He feels reasonably well but still notes a decreased exertional capacity from his baseline.  He has not had syncope, presyncope or palpitations.      Medications:    Current Outpatient Medications   Medication Sig Dispense Refill    acetaminophen (TYLENOL) 325 mg tablet Take 2 tablets (650 mg total) by mouth every 6 (six) hours as needed for pain scale  1-3/10      evolocumab (Repatha SureClick) 140 mg/mL pen injector Inject 140 mg under the skin every 14 (fourteen) days 6 mL 3    spironolactone (ALDACTONE) 25 mg tablet Take 1 tablet (25 mg total) by mouth daily 90 tablet 3    lisinopriL (PRINIVIL,ZESTRIL) 20 mg tablet Take 1 tablet (20 mg total) by mouth daily 90 tablet 1    metoprolol succinate XL (TOPROL-XL) 50 mg 24 hr tablet Take 1 tablet (50 mg total) by mouth daily 90 tablet 3    warfarin (COUMADIN) 5 mg tablet Take 1 tablet (5 mg total) by mouth daily 90 tablet 3    budesonide-formoteroL (SYMBICORT) 160-4.5 mcg/actuation inhaler Inhale 2 puffs 2 (two) times a day Rinse mouth with water after use. Do not swallow. 10.2 g 11    atorvastatin (LIPITOR) 80 mg tablet Take 1 tablet (80 mg total) by mouth daily 90 tablet 3    albuterol HFA (Ventolin HFA) 90 mcg/actuation inhaler Inhale 2 puffs every 4 (four) hours (Patient taking differently: Inhale 2 puffs every 4 (four) hours PRN Daily) 1 Inhaler 11    amoxicillin (AMOXIL) 500 mg tablet SMARTSI Tablet(s) By Mouth      aspirin 81 mg EC tablet Take 1 tablet (81 mg total) by mouth daily 90 tablet 3    pantoprazole (PROTONIX) 40 mg EC tablet Take 1 tablet (40 mg total) by mouth daily as needed      methocarbamoL (ROBAXIN) 750 mg tablet SMARTSIG:Tablet(s) By Mouth      HYDROcodone-acetaminophen (NORCO) 5-325 mg per tablet Take 1 tablet by mouth every 6 (six) hours as needed      gabapentin (NEURONTIN) 100 mg capsule Take 1 capsule (100 mg total) by mouth 3 (three) times a day as needed      nitroglycerin (NITROSTAT) 0.4 mg SL tablet Place 1 tablet (0.4 mg total) under the tongue every 5 (five) minutes as needed for chest pain Limit 3 tabs per episode. (Patient not taking: Reported on 2023) 25 tablet 1     No current facility-administered medications for this visit.        Fam Hx:    No family history of sudden cardiac death.   Family History   Problem Relation Age of Onset    No Known Problems Mother     Heart  disease Father     Aneurysm Father     Other Sister         undiagnosed neurological disease    Breast cancer Sister     No Known Problems Sister     Gallbladder disease Daughter     No Known Problems Daughter           Soc Hx:    Social History     Socioeconomic History    Marital status:      Spouse name: Robina    Number of children: 2   Occupational History    Occupation:  at [employer] plant   Tobacco Use    Smoking status: Former     Packs/day: 0.75     Years: 30.00     Pack years: 22.50     Types: Cigarettes     Quit date: 2022     Years since quittin.1    Smokeless tobacco: Never    Tobacco comments:     Last smoked 3/1/22   Vaping Use    Vaping Use: Never used   Substance and Sexual Activity    Alcohol use: Yes     Alcohol/week: 4.0 - 5.0 standard drinks     Types: 4 - 5 Cans of beer per week     Comment: 3-4 beers weekly or less    Drug use: No    Sexual activity: Defer     Social Determinants of Health     Tobacco Use: Medium Risk    Smoking Tobacco Use: Former    Smokeless Tobacco Use: Never   Depression: At risk    PHQ-2 Score: 4        ROS:    Non contributory except as noted in the HPI    Physical Exam:    Vitals:    Vitals:    23 0838   BP: 124/60   Pulse: 75   SpO2: 94%         General:  Well developed, well nourished male in no apparent distress.  HEENT:  NC/AT, sclerae anicteric, no redness  Cardiac:  RRR, no M/R/G, the pacemaker site is clean dry and intact with no stigmata of erosion or infection  Lungs:  Clear, good air entry, no wheezes or rales  Neuro:  Cranial nerves grossly intact  Psych:  A&Ox3, normal affect  Skin:  Warm and dry, no rash  Ext:  No edema, radial pulses +2/4, Pedal pulses +2/4    Testing Personally Reviewed:    Echo Results  Echo Interpretation Summary    Results for orders placed in visit on 23    US Echo complete    Interpretation Summary  · Mild left ventricular dilation, LVIDD 6.0 cm.  · Moderate left ventricular systolic dysfunction  with segmental wall motion abnormalities, biplane EF 31%.  · Severe hypokinesis of basal to mid inferior And basal inferoseptal walls.  · Moderate hypokinesis of rest of the segments.  · Presence of diastolic dysfunction, unable to assess grade (summation E and a waves).  · Normal right ventricular size and function.  · Severe left atrial enlargement, indexed left atrial volume 49 ml/m2.  · Dilated right atrium with normal estimated pressure.  · Normally functioning 23 mm On-X bileaflet mechanical aortic valve.  · Peak aortic velocity 1.9 m/s, mean gradient 8 mmHg, EOA 2.1 cm².  · Trivial aortic regurgitation.  · Trace mitral and tricuspid regurgitation.  · Normal RVSP estimated 34 mmHg.    No significant changes noted compared to last echocardiogram.         Lab Results   Component Value Date    EF 31 02/20/2023    LASIZE 4.79 02/20/2023    LAVOL 103 02/20/2023        ECG: The tracing performed today shows sinus rhythm with ventricular pacing    I have interrogated the pacemaker and found to be functioning normally.  No arrhythmias have been noted.    Assessment and Plan:    Hayden is a very pleasant 62 y.o. male with:      Diagnoses and all orders for this visit:    NSVT (nonsustained ventricular tachycardia) (CMS/HCC)  -     ECG 12 lead -Normal, Today    Chronic systolic congestive heart failure (CMS/HCC)    I have recommended that we consider upgrading him to a biventricular ICD or at a minimum a biventricular pacemaker.  I explained the rationale for these recommendations and the risks, benefits and alternatives.  He is going to continue to discuss this with friends and family and will then reach back out to us to let us know how he would like to proceed.  In the meantime of asked him to continue the current therapy and to keep us updated with any changes in his symptoms.    Thank you for allowing our participation in the care of this very pleasant patient.  Please do not hesitate to contact us at [phone] with any questions or concerns that you may have.    Mitchel Saldaña, DO     Please note that portions of this document were generated with speech recognition software.  Reasonable efforts have been made to correct any transcriptional errors however some typographical errors may remain.

## 2023-04-26 NOTE — PATIENT INSTRUCTIONS
ICD IMPLANT PROCEDURE    The normal electrical system of the heart:    The heart has its own electrical conduction system.  The conduction system sends signals throughout the upper chambers (atria) and lower chambers (ventricles) of the heart to make it beat in a regular, coordinated rhythm.  The conduction system consists of two nodes that contain conduction cells and special pathways that transmit the impulse.    A normal heartbeat begins when an electrical impulse is fired from the sinus node in the right atrium.  The sinus node is responsible for setting the rate and rhythm of the heart and is therefore referred to as the heart's pacemaker.      The electrical impulse fired from the sinus node spreads throughout the atria, causing them to contract and squeeze blood into the ventricles.  The electrical impulse then reaches the atrioventricular node (AV node), which acts as a gateway, slowing and regulating the impulses travelling between the atria and the ventricles.  As the impulse travels down the pathways into the ventricles the heart contracts and pumps blood around the body.  The cycle then begins again.    A normal adult heart beats in a regular pattern  times a minute;  this is called sinus rhythm.      Implantable cardioverter defibrillator (ICD)    Your doctor has decided that you should have an ICD implanted as you have either survived a cardiac arrest or are considered to be at risk of developing a potentially life threatening heart rhythm.  The life threatening heart rhythms you are at risk for developing are ventricular tachycardia (VT) and ventricular fibrillation (VF).    The ICD will treat the fast impulses from the ventricles, by either pacing you out of your fast rhythm or by delivering a shock.  Although this is not pleasant, this is lifesaving.      VT occurs when the ventricles start beating at an abnormally fast rate.  This effectively creastes an electrical short circuit  within the ventricular muscle.  As the ventricles beat fast, the heart does not work as efficiently.  This can cause weakness, dizziness, chest pain, shortness of breath or may even cause you to collapse.      VT is found in patients who have had a heart attack or have a weak heart muscle.  You have had either an echocardiogram or a MUGA scan to determine how your heart muscle is functioning.  If you have had a cardiac arrest in the past, there is about a 30% chance that you will have it again.  If your heart muscle is weak (<35%), then you have about an 18% chance of having one of these life threatening arrhythmias over the next 3 years.        There are some patients who require an ICD based on a genetic abnormality that puts them at risk for sudden cardiac death (LQTS, Brugada syndrome).  There are some infiltrative cardiomyopathies (sarcoidosis or amyloidosis) that also put patients at risk for sudden cardiac death.      ICDs have been shown to protect people from dying suddenly.  Most patients do not experience a warning that they are going to have VT or VF.  Most patients who have an ICD implanted never need to use it.  However, if necessary, it is there to save your life.      Risks of the procedure:    As with any invasive procedure there are risks, but in this case the risks are small.  Obviously, anything can happen related to procedure including death, heart attack and stroke but this is extremely rare.      *Blood vessel damage:  Occasionally the leads can accidentally damage the blood vessels when being moved into position. The risk of this happening is 3-5%.  Serious injury to the blood vessels requiring surgery is extremely rare and occurs in less than 1% of patients.    *Pneumothorax:  Very occasionally the leads can puncture the lung wall when being moved into position.  Air leaks out of the lungs and collects in the space between the lungs and the chest wall causing collapse of the lung.  Most of  these events are small and resolve on their own.  Rarely, a tube has to be placed to help the lung re-expand.      *Bruising or bleeding:  This is common around the implant site especially in patients taking blood thinners.  Usually it resolves on its own and no intervention is needed.  The risk is 2-3%.  If this is noted at your wound check you may be placed on some extra antibiotics.  This could take several weeks to resolve.      *Infection:  The risk of infection for a first time implant is about 1-2%.  You will be given antibiotics prior to the procedure to minimize the risk of infection.  Unfortunately, if an infection occurs, this requires that the entire system including the battery and the wires are removed.      *Movement of the leads:  Occasionally, the leads can move out of place and a second procedure is required to reposition the leads.  The risk of this is 2-3%.  Careful post op care can help minimize this risk.     *Cardiac tamponade:  During placement the leads may puncture the heart muscle causing blood to collect around the heart.  Usually, the heart muscle heals itself on its own and no intervention is needed.  Rarely there can be a collection of blood that causes your blood pressure to drop and requires the blood to be drained by placing a tube underneath the breast bone.  The risk of this happening is less than 1%.      Before the procedure:    If you are on warfarin, we will give you instructions for managing the dosing prior to the procedure.    If you are taking Eliquis, Pradaxa, or Xarelto, you should not take this the day prior to the procedure or the day of the procedure.      If you are diabetic and you take long acting insulin, you should take 1/2 the dose the night before.      You should not eat or drink anything after midnight the night before the procedure.  You can take all of your medications with sips of water the morning of your procedure.  It is ok to take aspirin and plavix.       You will need to have someone drive you. Your doctor will decide whether you can go home the same day or need to stay one night in the hospital.  This decision is based on how well you tolerate the procedure.  If you are going home the same day, you will need to have someone drive you and pick you up and have someone stay at home with you the night after the procedure.      On the day of the procedure you will check in at the hospital and you will be taken to the [facility] where you will change into a hospital gown and have an IV placed.  They will take some blood tests and clean your procedural area.      You will then be taken to the Electrophysiology Laboratory.  Outside the laboratory you will meet with your doctor who will discuss the procedure and the risks with you again.  You will be given time to have all of your questions answered.  If you would like to have your family with you, they are welcome to accompany you.  You will be asked to sign the consent form and your family will be escorted to the waiting area.      Please ask your family to provide a phone number to the staff member in the waiting area if they choose to leave the waiting area at any time.  This will help the doctor get in touch with them if they are not in the waiting room.      The procedure:    You will be taken into the Electrophysiology laboratory where there will be several staff members including nurses and cardiac radiology technicians.  Please be aware that in the room there may be representatives from the company that makes the ICD.  They are trained nurses and technicians who provide the device and help Dr. Saldaña with the equipment during the procedure.      There is a lot of equipment in the room which is used to monitor your heart rhythm.  You will be given a short acting sedative to make you comfortable.  You will drift in and out of sleep during the procedure. It is not necessary to be asleep for the procedure if you are  comfortable.      The ICD is a small battery powered device that will continuously monitor your heart rhythm.  A local anesthetic will be used to numb the left chest.  A small incision is made below the collar bone where there is a vein that leads to your heart. The wires of the ICD are inserted through this vein using X-ray guidance and then attached to the battery that sits underneath the skin in your left chest.  The left side is the preferred placement for the ICD even in left handed patients.  This gives the best access to the veins and places the ICD in the best position to shock your heart should you need it.  The only reason to place the ICD on the right would be if there is a blockage in the vein on the left.      The incision is closed with sutures that will dissolve on their own.       After the procedure:    You will go to the recovery area where you will be monitored.  Once you are awake, you can eat.  If everything went well, Dr. Saldaña may decide to send you home.  The representative from the ICD  will come and see you and will set you up with a home remote monitor.  If you are going home, you will be asked to plug the monitor in at home and send a transmission that evening and again at 8am the next day.  You will need someone to drive you home and someone to stay with you the night after the procedure.      If you stay overnight, you will have a chest X-ray done in the morning.  The representative from the  will come and check the ICD.  You should plug the remote monitor in when you get home.      Remote monitoring:    Remote monitoring is very important.  In randomized clinical trials, remote monitoring has been shown to make people live longer.  This is because issues with the device or arrhythmias are picked up faster than they would be with routine office visits.  Your doctor will work with the clinic staff to schedule your remote transmissions. You will have a yearly  in-person visit with Dr. Saldaña as long as there are no issues.  You may be seen more often if you are being treated for arrhythmias.      Post op care:    You will have steri-strips (butterfly bandaids) placed on the incision.  These will curl up and fall off on their own.  Please do not remove them.  Beneath the steri-strips is skin glue. You will also have a clear plastic bandage over the wound.  This should stay on for 2-3 days and then can be removed at home.  You should not place any other bandage, dressing, ointments or creams on the wound.    You can shower the day after the procedure but do not fully submerge the incision in a bath, swimming pool etc. for the first week. Do not scrub at the area when showering, just let the water run gently over the area.  After the shower you can gently pat to try or simply allow it to air dry.    There may be some bruising or swelling in the area especially if you take blood thinners.  Placing ice packs on the wound a few times per day is helpful with pain control and can help with very mild oozing.  If edges of the wound start to separate, you should call our office immediately.  If the swelling is larger than a lemon, you should call our office.  Please do not let any other practitioner care for your wound.      If you find any redness, swelling, drainage, warmth, or have a fever greater than 100 degrees, notify the  Main Office [phone].     You may continue regular daily activities, but limit strenuous movements of the arm closest to your ICD.  Avoid pulling yourself up with that arm, do not raise that elbow higher than shoulder height, and do not lift anything weighing more than 2 pounds for the first week and anything weighing more than 10 pounds for 5 weeks.  Do not do any activities such as golfing, vacuuming, or mowing the lawn with a push mower for 5 weeks.  Prevent any hard blows to the procedure site. Please don't immobilize your arm (no sling) during  the day. You may wear a sling or arm immobilizer while sleeping if you feel you tend to move your arms excessively at night. Gentle movements are beneficial. Immobilization can lead to a frozen shoulder.      If you have never passed out and you feel up to it, you may drive once the tenderness in the area is resolved. For most people this in within 3-5 days.  Start with local/short trips to familiar places.  Avoid high traffic areas for the first few days.  Do not drive until you have stopped taking prescription pain medication.     If you have had an arrhythmia that has caused you to pass out, you will not be able to drive for 6 months.  This is for your safety and the safety of others on the road.      Interactions:    For the most part, you can do almost anything with an ICD except for a few limitations.    You need to avoid areas with a strong electromagnetic field (industrial level magnets).  You may have an MRI conditional device but please check prior to having an MRI performed.    You should not do arc welding or use a chain saw or sean hammer.    Security systems are ok to pass through but don't stop and stand next to the pillars at the entrance to a store.    It is ok to go through airport security but you should notify [employer]SA that you have an ICD and you should bring your identification card with you.    It is ok to use microwaves, mobile phones, computers, etc.    If you have any questions about interference with the ICD, you can go to the website for the  of the device or call their 800 number.    What if you receive an ICD shock?    If you have a single ICD shock and you feel ok (no chest pain, shortness of breath, etc.) then it is ok to simply call the office during normal business hours.  If you have more than one shock or you don't feel well, you should call 911.

## 2023-05-08 ENCOUNTER — TELEPHONE (OUTPATIENT)
Dept: CARDIOLOGY | Facility: CLINIC | Age: 63
End: 2023-05-08
Payer: COMMERCIAL

## 2023-05-08 ENCOUNTER — PREP FOR CASE (OUTPATIENT)
Dept: CARDIOLOGY | Facility: CLINIC | Age: 63
End: 2023-05-08
Payer: COMMERCIAL

## 2023-05-08 DIAGNOSIS — I50.22 CHRONIC SYSTOLIC CONGESTIVE HEART FAILURE (CMS/HCC): Primary | ICD-10-CM

## 2023-05-08 NOTE — TELEPHONE ENCOUNTER
Patient called in today and stated that he would like to schedule his CRT-D.  He is waiting for some information from Dr. Saldaña and his workplace environment.  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 I have forwarded this note to Dr. Saldaña and Essence GAVIRIA LPN

## 2023-05-09 ENCOUNTER — TELEPHONE (OUTPATIENT)
Dept: CARDIOLOGY | Facility: CLINIC | Age: 63
End: 2023-05-09
Payer: COMMERCIAL

## 2023-05-09 DIAGNOSIS — Z95.2 S/P AVR: Primary | Chronic | ICD-10-CM

## 2023-05-09 DIAGNOSIS — I25.5 ISCHEMIC CARDIOMYOPATHY: Chronic | ICD-10-CM

## 2023-05-09 NOTE — LETTER
05/09/23      [facility]  [address]  [facility] [address] [phone]    You are scheduled to have an upgrade to a BiV implantable cardiac defibrillator to be done on 5/24/2023 by Dr. Saldaña at [facility].  The preadmission staff will tell you what time to arrive the day of the procedure.    Please arrive at the [place] entrance and check in at the  of [facility] at 2:15 PM on 5/23/2023 for your appointment with the Preadmission department at 2:30 PM.  Please bring your medications in their original containers to your preadmission appointment or have a complete, current and accurate list of all your medications, dosages and instructions.      [facility] currently has a two visitor policy due to COVID-19 mitigation.  It is recommended but not required that you wear a mask while you are in the hospital.     Please do not eat anything after midnight the day of your procedure.  You may have clear liquids until 9:00 AM on the day of the procedure.  The procedure may be delayed if these guidelines are not followed.    You may take your morning medications with a sip of water with the exception of any vitamins, supplements or any diuretics.  Please hold your lisinopril and spironolactone the morning of your procedure.    You may continue to take your warfarin as directed.  Dr. Saldaña prefers that your INR be between 2.0 and 3.0 at the time of the procedure.    Please do a scrub over your entire torso the evening before your procedure.  This prep can be obtained in any pharmacy OTC or you may pick it up at the  at the [facility] [place].    Please plan for a possible overnight stay. You will need a  and someone to stay with you when you are discharged after your procedure.    If you wear hearing aids, please have them in for all appointments and ensure that the batteries are in working order.          The  hospital no longer has oxygen tanks on the wheelchairs. If you are on continuous O2, please bring in your own oxygen tank on the day of your appointments.    We do have  parking from 5:00 AM-4:30 PM for your convenience. This is a free service available to you and your support person.    If you are diagnosed with COVID or have any COVID symptoms between now and the time of your scheduled procedure, please contact me.    If you have any questions or concerns, please call me at [phone].    WAYLON Olivera

## 2023-05-09 NOTE — LETTER
[address] [facility] [address]  Dept: [phone]  May 9, 2023    Hayden Franklin  [address]   [facility] [address]      Dear Mr. Franklin:    Please review the enclosed document.    If you have any questions or concerns, please don't hesitate to call.    Sincerely,             Joselin Hu RN        CC: No Recipients

## 2023-05-09 NOTE — TELEPHONE ENCOUNTER
You are scheduled to have an upgrade to a BiV implantable cardiac defibrillator to be done on 5/24/2023 at 2:00 PM by Dr. Saldaña at [facility].    Please arrive at the [place] entrance and check in at the  of [facility] at 2:15 PM on 5/23/2023 for your appointment with the Preadmission department at 2:30 PM.  Please bring your medications in their original containers to your preadmission appointment or have a complete, current and accurate list of all your medications, dosages and instructions.      [facility] currently has a two visitor policy due to COVID-19 mitigation.  It is recommended but not required that you wear a mask while you are in the hospital.     Please do not eat anything after midnight the day of your procedure.  You may have clear liquids until 9:00 AM on the day of the procedure.  The procedure may be delayed if these guidelines are not followed.    You may take your morning medications with a sip of water with the exception of any vitamins, supplements or any diuretics.  Please hold your lisinopril and spironolactone the morning of your procedure.    You may continue to take your warfarin as directed.  Dr. Saldaña prefers that your INR be between 2.0 and 3.0 at the time of the procedure.    Please do a scrub over your entire torso the evening before your procedure.  This prep can be obtained in any pharmacy OTC or you may pick it up at the  at the [facility] [place].    Please plan for a possible overnight stay. You will need a  and someone to stay with you when you are discharged after your procedure.    If you wear hearing aids, please have them in for all appointments and ensure that the batteries are in working order.    The hospital no longer has oxygen tanks on the wheelchairs. If you are on continuous O2, please bring in your own oxygen tank on the day of your appointments.    We do have  parking from 5:00 AM-4:30 PM for  your convenience. This is a free service available to you and your support person.    If you are diagnosed with COVID or have any COVID symptoms between now and the time of your scheduled procedure, please contact me.    Gopal verbalizes understanding of all instructions and denies further questions.

## 2023-05-10 ENCOUNTER — LAB (OUTPATIENT)
Dept: LAB | Facility: URGENT CARE | Age: 63
End: 2023-05-10
Payer: COMMERCIAL

## 2023-05-10 DIAGNOSIS — Z95.2 S/P AVR: Chronic | ICD-10-CM

## 2023-05-10 LAB
INR BLD: 2.3
PROTHROMBIN TIME: 26.8 SECONDS (ref 9.4–12.5)

## 2023-05-10 PROCEDURE — 85610 PROTHROMBIN TIME: CPT | Performed by: FAMILY MEDICINE

## 2023-05-10 PROCEDURE — 36415 COLL VENOUS BLD VENIPUNCTURE: CPT | Performed by: FAMILY MEDICINE

## 2023-05-11 ENCOUNTER — ANTICOAGULATION VISIT (OUTPATIENT)
Dept: CARDIOLOGY | Facility: CLINIC | Age: 63
End: 2023-05-11
Payer: COMMERCIAL

## 2023-05-11 DIAGNOSIS — Z79.01 ANTICOAGULATION MANAGEMENT ENCOUNTER: ICD-10-CM

## 2023-05-11 DIAGNOSIS — Z51.81 ANTICOAGULATION MANAGEMENT ENCOUNTER: ICD-10-CM

## 2023-05-11 DIAGNOSIS — Z95.2 S/P AVR: Primary | Chronic | ICD-10-CM

## 2023-05-11 LAB — INR PPP: 2.3

## 2023-05-11 NOTE — PROGRESS NOTES
5/11/2023 8:46 pt tested 5/10 received 5/11/2023 msg for pt to call I re INR. DMD 8:57 pt called in verified ID no med or diet changes no bleeding or bruising problems enc to call if changes verified warfarin dose and tab size 35mg/week. DMD      continue warfarin dose as above and recheck INR in 4 weeks verb under. DMD

## 2023-05-23 ENCOUNTER — PRE-ADMISSION TESTING (OUTPATIENT)
Dept: PREADMISSION TESTING | Facility: HOSPITAL | Age: 63
End: 2023-05-23
Payer: COMMERCIAL

## 2023-05-23 VITALS
SYSTOLIC BLOOD PRESSURE: 100 MMHG | HEART RATE: 81 BPM | DIASTOLIC BLOOD PRESSURE: 65 MMHG | HEIGHT: 72 IN | TEMPERATURE: 97.1 F | BODY MASS INDEX: 26.55 KG/M2 | WEIGHT: 196 LBS | RESPIRATION RATE: 14 BRPM | OXYGEN SATURATION: 93 %

## 2023-05-23 DIAGNOSIS — R19.5 POSITIVE COLORECTAL CANCER SCREENING USING COLOGUARD TEST: Primary | ICD-10-CM

## 2023-05-23 LAB — COLOGUARD RESULT: POSITIVE

## 2023-05-23 NOTE — TELEPHONE ENCOUNTER
Per Shannan Rivera, Gopal's authorizaton will not be done until 5/25/2023. I called and spoke to Gopal. He would like to come to his preadmission appointment today. He will have his wife call the insurance company. I will monitor his account and I will schedule him on 5/31/2023 if authorization does not come through prior to his currently scheduled upgrade.

## 2023-05-23 NOTE — TELEPHONE ENCOUNTER
I called and spoke to Robina.  She has been talking to the insurance people but they would not discuss anything with her until Gopal is available.  Gopal will be available at 11:30 AM.  They will attend preadmission today as planned and we will get labs prior to his scheduled upgrade.

## 2023-05-23 NOTE — PRE-PROCEDURE INSTRUCTIONS
Pre-Surgery Instructions:   Medication Instructions    acetaminophen (TYLENOL) 325 mg tablet Take as needed morning of surgery/procedure    evolocumab (Repatha SureClick) 140 mg/mL pen injector Do not take morning of surgery/procedure    spironolactone (ALDACTONE) 25 mg tablet Do not take morning of surgery/procedure    lisinopriL (PRINIVIL,ZESTRIL) 20 mg tablet Do not take morning of surgery/procedure    metoprolol succinate XL (TOPROL-XL) 50 mg 24 hr tablet Take morning of surgery/procedure    warfarin (COUMADIN) 5 mg tablet You may take Warfarin on the evening of 5-23-23    budesonide-formoteroL (SYMBICORT) 160-4.5 mcg/actuation inhaler Take morning of surgery/procedure    atorvastatin (LIPITOR) 80 mg tablet Do not take morning of surgery/procedure    nitroglycerin (NITROSTAT) 0.4 mg SL tablet Take as needed morning of surgery/procedure    albuterol HFA (Ventolin HFA) 90 mcg/actuation inhaler Take as needed morning of surgery/procedure    amoxicillin (AMOXIL) 500 mg tablet Do not take morning of surgery/procedure    aspirin 81 mg EC tablet Take morning of surgery/procedure     Preop instructions:    CHECK IN with Admissions, located just inside the [facility] Entrance ([address] Entrance).    Parking is FREE, Monday - Friday...5 a.m.- 4:30 p.m.    Date/time: May 24, 2023 at 12:30 PM.     ++++++++++++++++++++++++++++++++++++++++++++++++++++++++++++++++++++++  Do NOT eat solid foods or drink dairy products after midnight the night before surgery/procedure.    You may drink clear liquids up until 10:00 AM on 5-24-23--day of surgery. (water, apple or cranberry juice, sports drinks, clear broths, jello).    To decrease bacteria/germs on your skin to prevent infection we recommend:    PRE-OP Shower  1.  Please shower the night before surgery--regular shower using your own soap & shampoo. Pat dry with a clean, laundered towel. Once you are cooled off & your skin is completely dry, use Theraworx  wipes as directed. Directions are on the back of the package. You may also use the instructions sheet provided. Use the wipes on DRY skin & allow to air-dry.   2.  Make sure bed clothing/bed sheets are freshly washed.  3.  Do not use/apply any powders, lotions, deodorants to your skin after your shower.       Other reminders:   If you use a CPAP/BIPAP, bring the mask to the hospital.  Bring your portable O2 tank with you for discharge.  Leave valuables, jewelry, credit cards, money and medications at home.  No makeup, hairspray or nail polish.  Bring storage case for eyeglasses, contact lenses and hearing aids.    Your surgery is scheduled to begin at 2:00 PM.     +++++++++++++++++++++++++++++++++++++++++++++++++++++++++++++++++++++  Additional Information    Call your doctor as soon as possible if you get a cold, cough, fever, or have a change in your health since your last visit to your doctor.    If you are having an outpatient surgery or procedure, you MUST have someone to drive you home and stay with you for 24 hours after your surgery/procedure.    If you would like, you may bring your Advance Directive/Living Will to the hospital to be scanned into your record.  You will receive your copy back.       +++++++++++++++++++++++++++++++++++++++++++++++++++++++++++++++++++++++      [facility] Pre-Admission Department..Monday-Friday 8 a.m. to 4:30 p.m.  Please call us with questions or concerns. [phone].  Messages will be answered.     Admissions:  [phone]    Cardiac Prep Unit:  [phone]    [facility] [place]:  [phone]    [email or web address]

## 2023-05-24 ENCOUNTER — ANESTHESIA EVENT (OUTPATIENT)
Dept: CARDIOLOGY | Facility: HOSPITAL | Age: 63
End: 2023-05-24
Payer: COMMERCIAL

## 2023-05-24 ENCOUNTER — LAB (OUTPATIENT)
Dept: LAB | Facility: HOSPITAL | Age: 63
End: 2023-05-24
Payer: COMMERCIAL

## 2023-05-24 ENCOUNTER — APPOINTMENT (OUTPATIENT)
Dept: RADIOLOGY | Facility: HOSPITAL | Age: 63
End: 2023-05-24
Payer: COMMERCIAL

## 2023-05-24 ENCOUNTER — ANESTHESIA (OUTPATIENT)
Dept: CARDIOLOGY | Facility: HOSPITAL | Age: 63
End: 2023-05-24
Payer: COMMERCIAL

## 2023-05-24 ENCOUNTER — HOSPITAL ENCOUNTER (OUTPATIENT)
Facility: HOSPITAL | Age: 63
Setting detail: OUTPATIENT SURGERY
Discharge: HOME | End: 2023-05-24
Attending: INTERNAL MEDICINE | Admitting: INTERNAL MEDICINE
Payer: COMMERCIAL

## 2023-05-24 VITALS
DIASTOLIC BLOOD PRESSURE: 73 MMHG | HEART RATE: 67 BPM | SYSTOLIC BLOOD PRESSURE: 113 MMHG | RESPIRATION RATE: 10 BRPM | TEMPERATURE: 97.7 F | OXYGEN SATURATION: 93 %

## 2023-05-24 DIAGNOSIS — I25.5 ISCHEMIC CARDIOMYOPATHY: Chronic | ICD-10-CM

## 2023-05-24 DIAGNOSIS — I50.22 CHRONIC SYSTOLIC CONGESTIVE HEART FAILURE (CMS/HCC): ICD-10-CM

## 2023-05-24 LAB
ANION GAP SERPL CALC-SCNC: 7 MMOL/L (ref 3–11)
BASOPHILS # BLD AUTO: 0 10*3/UL
BASOPHILS NFR BLD AUTO: 0.7 % (ref 0–2)
BUN SERPL-MCNC: 21 MG/DL (ref 7–25)
CALCIUM SERPL-MCNC: 9.2 MG/DL (ref 8.6–10.3)
CHLORIDE SERPL-SCNC: 103 MMOL/L (ref 98–107)
CO2 SERPL-SCNC: 24 MMOL/L (ref 21–32)
CREAT SERPL-MCNC: 0.93 MG/DL (ref 0.7–1.3)
EOSINOPHIL # BLD AUTO: 0.1 10*3/UL
EOSINOPHIL NFR BLD AUTO: 1.6 % (ref 0–3)
ERYTHROCYTE [DISTWIDTH] IN BLOOD BY AUTOMATED COUNT: 13 % (ref 11.5–15)
GFR SERPL CREATININE-BSD FRML MDRD: 94 ML/MIN/1.73M*2
GLUCOSE SERPL-MCNC: 94 MG/DL (ref 70–105)
HCT VFR BLD AUTO: 45.4 % (ref 38–50)
HGB BLD-MCNC: 15.7 G/DL (ref 13.2–17.2)
INR BLD: 2.7
LYMPHOCYTES # BLD AUTO: 1.6 10*3/UL
LYMPHOCYTES NFR BLD AUTO: 25.2 % (ref 15–47)
MCH RBC QN AUTO: 32.6 PG (ref 29–34)
MCHC RBC AUTO-ENTMCNC: 34.7 G/DL (ref 32–36)
MCV RBC AUTO: 93.9 FL (ref 82–97)
MONOCYTES # BLD AUTO: 0.5 10*3/UL
MONOCYTES NFR BLD AUTO: 7.7 % (ref 5–13)
NEUTROPHILS # BLD AUTO: 4.1 10*3/UL
NEUTROPHILS NFR BLD AUTO: 64.8 % (ref 46–70)
PLATELET # BLD AUTO: 171 10*3/UL (ref 130–350)
PMV BLD AUTO: 8.8 FL (ref 6.9–10.8)
POTASSIUM SERPL-SCNC: 4.3 MMOL/L (ref 3.5–5.1)
PROTHROMBIN TIME: 31.8 SECONDS (ref 9.4–12.5)
RBC # BLD AUTO: 4.83 10*6/ΜL (ref 4.1–5.8)
SODIUM SERPL-SCNC: 134 MMOL/L (ref 135–145)
WBC # BLD AUTO: 6.3 10*3/UL (ref 3.7–9.6)

## 2023-05-24 PROCEDURE — 93005 ELECTROCARDIOGRAM TRACING: CPT | Mod: NCP | Performed by: INTERNAL MEDICINE

## 2023-05-24 PROCEDURE — 33225 L VENTRIC PACING LEAD ADD-ON: CPT | Performed by: INTERNAL MEDICINE

## 2023-05-24 PROCEDURE — (BLANK) HC RECOVERY PHASE-2 EACH ADDITIONAL 1/2 HOUR ACUITY LEVEL 1: Performed by: INTERNAL MEDICINE

## 2023-05-24 PROCEDURE — 2720000000 HC SUPP 272 WO HCPCS: Performed by: INTERNAL MEDICINE

## 2023-05-24 PROCEDURE — 33249 INSJ/RPLCMT DEFIB W/LEAD(S): CPT | Performed by: INTERNAL MEDICINE

## 2023-05-24 PROCEDURE — 36415 COLL VENOUS BLD VENIPUNCTURE: CPT

## 2023-05-24 PROCEDURE — 33233 REMOVAL OF PM GENERATOR: CPT | Mod: 51 | Performed by: INTERNAL MEDICINE

## 2023-05-24 PROCEDURE — 80048 BASIC METABOLIC PNL TOTAL CA: CPT

## 2023-05-24 PROCEDURE — C1882 AICD, OTHER THAN SING/DUAL: HCPCS | Performed by: INTERNAL MEDICINE

## 2023-05-24 PROCEDURE — 85610 PROTHROMBIN TIME: CPT

## 2023-05-24 PROCEDURE — 99024 POSTOP FOLLOW-UP VISIT: CPT | Performed by: NURSE PRACTITIONER

## 2023-05-24 PROCEDURE — (BLANK) HC MAC ANESTHESIA FACILITY CHARGE EACH ADDITIONAL MIN: Performed by: INTERNAL MEDICINE

## 2023-05-24 PROCEDURE — 2550000100 HC RX 255: Performed by: INTERNAL MEDICINE

## 2023-05-24 PROCEDURE — 4810002000 HC EP LAB LEVEL 4 EACH ADDITIONAL MIN: Performed by: INTERNAL MEDICINE

## 2023-05-24 PROCEDURE — 85025 COMPLETE CBC W/AUTO DIFF WBC: CPT

## 2023-05-24 PROCEDURE — (BLANK) HC RECOVERY PHASE-2 1ST 1/2 HOUR ACUITY LEVEL 1: Performed by: INTERNAL MEDICINE

## 2023-05-24 PROCEDURE — C1900 LEAD, CORONARY VENOUS: HCPCS | Performed by: INTERNAL MEDICINE

## 2023-05-24 PROCEDURE — 2500000200 HC RX 250 WO HCPCS: Performed by: INTERNAL MEDICINE

## 2023-05-24 PROCEDURE — 2580000300 HC RX 258: Performed by: NURSE ANESTHETIST, CERTIFIED REGISTERED

## 2023-05-24 PROCEDURE — 33235 REMOVAL PACEMAKER ELECTRODE: CPT | Mod: 51 | Performed by: INTERNAL MEDICINE

## 2023-05-24 PROCEDURE — 00534 ANES INSERTION/RPLCMT CVDFB: CPT | Performed by: NURSE ANESTHETIST, CERTIFIED REGISTERED

## 2023-05-24 PROCEDURE — 4810001900 HC EP LAB LEVEL 4 FIRST 15 MIN: Performed by: INTERNAL MEDICINE

## 2023-05-24 PROCEDURE — (BLANK) HC MAC ANESTHESIA FACILITY CHARGE 1ST 15 MIN: Performed by: INTERNAL MEDICINE

## 2023-05-24 PROCEDURE — C1777 LEAD, AICD, ENDO SINGLE COIL: HCPCS | Performed by: INTERNAL MEDICINE

## 2023-05-24 PROCEDURE — 6360000200 HC RX 636 W HCPCS (ALT 250 FOR IP): Performed by: REGISTERED NURSE

## 2023-05-24 DEVICE — INTEGRATED BIPOLAR PACE/SENSE AND DEFIBRILLATION LEAD
Type: IMPLANTABLE DEVICE | Site: HEART | Status: FUNCTIONAL
Brand: RELIANCE 4-FRONT™

## 2023-05-24 DEVICE — CARDIAC RESYNCHRONIZATION THERAPY DEFIBRILLATOR
Type: IMPLANTABLE DEVICE | Site: CHEST | Status: FUNCTIONAL
Brand: RESONATE™ X4 CRT-D

## 2023-05-24 DEVICE — LEAD ACUITY LV X4 95CM LNG TIP SPIRAL: Type: IMPLANTABLE DEVICE | Site: HEART | Status: FUNCTIONAL

## 2023-05-24 RX ORDER — PROPOFOL 10 MG/ML
INJECTION, EMULSION INTRAVENOUS AS NEEDED
Status: DISCONTINUED | OUTPATIENT
Start: 2023-05-24 | End: 2023-05-24 | Stop reason: SURG

## 2023-05-24 RX ORDER — CEFAZOLIN 2 G/1
INJECTION, POWDER, FOR SOLUTION INTRAMUSCULAR; INTRAVENOUS AS NEEDED
Status: DISCONTINUED | OUTPATIENT
Start: 2023-05-24 | End: 2023-05-24 | Stop reason: SURG

## 2023-05-24 RX ORDER — ACETAMINOPHEN 325 MG/1
325-650 TABLET ORAL EVERY 4 HOURS PRN
Status: DISCONTINUED | OUTPATIENT
Start: 2023-05-24 | End: 2023-05-24 | Stop reason: HOSPADM

## 2023-05-24 RX ORDER — PROPOFOL 10 MG/ML
INJECTION, EMULSION INTRAVENOUS CONTINUOUS PRN
Status: DISCONTINUED | OUTPATIENT
Start: 2023-05-24 | End: 2023-05-24 | Stop reason: SURG

## 2023-05-24 RX ORDER — SODIUM CHLORIDE, SODIUM LACTATE, POTASSIUM CHLORIDE, CALCIUM CHLORIDE 600; 310; 30; 20 MG/100ML; MG/100ML; MG/100ML; MG/100ML
INJECTION, SOLUTION INTRAVENOUS CONTINUOUS PRN
Status: DISCONTINUED | OUTPATIENT
Start: 2023-05-24 | End: 2023-05-24 | Stop reason: SURG

## 2023-05-24 RX ORDER — HYDROCODONE BITARTRATE AND ACETAMINOPHEN 7.5; 325 MG/15ML; MG/15ML
10-20 SOLUTION ORAL EVERY 4 HOURS PRN
Status: DISCONTINUED | OUTPATIENT
Start: 2023-05-24 | End: 2023-05-24 | Stop reason: HOSPADM

## 2023-05-24 RX ORDER — LIDOCAINE HYDROCHLORIDE 10 MG/ML
INJECTION, SOLUTION EPIDURAL; INFILTRATION; INTRACAUDAL; PERINEURAL CODE/TRAUMA/SEDATION MEDICATION
Status: DISCONTINUED | OUTPATIENT
Start: 2023-05-24 | End: 2023-05-24 | Stop reason: HOSPADM

## 2023-05-24 RX ADMIN — CEFAZOLIN 2 G: 2 INJECTION, POWDER, FOR SOLUTION INTRAMUSCULAR; INTRAVENOUS at 16:24

## 2023-05-24 RX ADMIN — SODIUM CHLORIDE, POTASSIUM CHLORIDE, SODIUM LACTATE AND CALCIUM CHLORIDE: 600; 310; 30; 20 INJECTION, SOLUTION INTRAVENOUS at 16:13

## 2023-05-24 RX ADMIN — PROPOFOL 120 MCG/KG/MIN: 10 INJECTION, EMULSION INTRAVENOUS at 16:25

## 2023-05-24 RX ADMIN — PROPOFOL 300 MCG/KG/MIN: 10 INJECTION, EMULSION INTRAVENOUS at 16:18

## 2023-05-24 RX ADMIN — Medication 100 MCG: at 17:12

## 2023-05-24 RX ADMIN — PROPOFOL 30 MG: 10 INJECTION, EMULSION INTRAVENOUS at 16:18

## 2023-05-24 ASSESSMENT — ENCOUNTER SYMPTOMS
WHEEZING: 0
NEAR-SYNCOPE: 0
SNORING: 0
HEMOPTYSIS: 0
DYSPNEA ON EXERTION: 0
MUSCLE CRAMPS: 0
SHORTNESS OF BREATH: 1
SLEEP DISTURBANCES DUE TO BREATHING: 0
DOUBLE VISION: 0
CLAUDICATION: 0
POLYDIPSIA: 0
ORTHOPNEA: 0
DYSRHYTHMIAS: 1
HEMATURIA: 0
PND: 0
DIZZINESS: 0
POLYPHAGIA: 0
WEAKNESS: 0
BACK PAIN: 0
LIGHT-HEADEDNESS: 0
SPUTUM PRODUCTION: 0
COUGH: 0
ABDOMINAL PAIN: 0
NUMBNESS: 0
DIAPHORESIS: 0
MYALGIAS: 0
EYE DISCHARGE: 0
NAUSEA: 0
IRREGULAR HEARTBEAT: 0
SHORTNESS OF BREATH: 0
FALLS: 0
SYNCOPE: 0
ALTERED MENTAL STATUS: 0
FEVER: 0
HEARTBURN: 0
VOMITING: 0
PALPITATIONS: 0

## 2023-05-24 ASSESSMENT — COPD QUESTIONNAIRES: COPD: 1

## 2023-05-24 NOTE — Clinical Note
Prepped: chest. The site was clipped. Prepped with: ChloraPrep. The patient was draped  in the usual sterile fashion.

## 2023-05-24 NOTE — Clinical Note
A venogram was performed on the coronary sinus. Injected with one hand injection. Injected volume = 12 mL.

## 2023-05-24 NOTE — H&P
[unfilled]  [unfilled]  [address] [place] [address]                                                        Cardiology History and Physical Note    Patient name: Hayden Franklin  MRN: [id number]  Admit date: 5/24/2023    Marlen Herron is a 62-year-old gentleman with a history of coronary artery disease with coronary bypass grafting with concomitant AVR and ascending aortic dacron graft surgery and hyperlipidemia.  Overall he does well.  He remains quite busy on his job as an  and with that does quite a bit of lifting.  He denies chest discomfort, no PND orthopnea, no presyncopal or syncopal episodes.  He does have a mild element of shortness of breath with moderate exertion although this is not new and has not changed in its characteristics.  Denies any fever, chills or other viral type symptoms.  Presents today for an elective ICD upgrade per Dr. Saldaña.    Chief Complaint: ICD upgrade    HPI    Specialty Problems          Cardiology Problems    Coronary atherosclerosis of native coronary artery        Mixed hyperlipidemia        History of coronary artery bypass graft x 1        S/P AVR        Ischemic cardiomyopathy        * (Principal) Chronic combined systolic and diastolic heart failure (CMS/HCC)        Asthma-COPD overlap syndrome (CMS/HCC)        Nonrheumatic aortic valve insufficiency           Past Medical History:   Diagnosis Date   • Aortic valve stenosis     s/p AV replacement 11/2013   • Arthritis     Bilat hands, ankle   • Ascending aorta dilatation (CMS/HCC)     s/p AAA repair 11/2013   • Asthma    • CAD (coronary artery disease)     1 vessel bypass   • Cancer (CMS/HCC)     Skin   • CHF (congestive heart failure) (CMS/HCC)    • COPD (chronic obstructive pulmonary disease) (CMS/HCC)    • Dysrhythmia     nonsustained v-tach   • Hyperlipidemia    • Ischemic cardiomyopathy    • Lung nodule 03/07/2022   • Myocardial infarction (CMS/HCC)    • Presence of heart assist device  (CMS/[facility])    • Shortness of breath        Past Surgical History:   Procedure Laterality Date   • AAA REPAIR - ENDOGRAFT  11/2013   • ANKLE SURGERY     • AORTIC ROOT ANGIOGRAM N/A 5/20/2022    Procedure: Aortic Arch  Angiogram;  Surgeon: Derick Gallegos MD;  Location: [facility] Cath Lab;  Service: Cardiovascular;  Laterality: N/A;   • AORTIC VALVE REPLACEMENT  11/2013   • CORONARY ANGIOPLASTY  11/2013   • CORONARY ARTERY BYPASS GRAFT  11/2013    1V CABG SVG- RCA   • LEFT HEART CATH N/A 5/20/2022    Procedure: Left heart cath;  Surgeon: Derick Gallegos MD;  Location: [facility] Cath Lab;  Service: Cardiovascular;  Laterality: N/A;       Allergies as of 05/08/2023 - Reviewed 04/26/2023   Allergen Reaction Noted   • Ezetimibe Rash 03/16/2020       Medications Prior to Admission   Medication Sig Dispense Refill Last Dose   • acetaminophen (TYLENOL) 325 mg tablet Take 2 tablets (650 mg total) by mouth every 6 (six) hours as needed for pain scale 1-3/10   Past Week   • spironolactone (ALDACTONE) 25 mg tablet Take 1 tablet (25 mg total) by mouth daily 90 tablet 3 5/23/2023   • lisinopriL (PRINIVIL,ZESTRIL) 20 mg tablet Take 1 tablet (20 mg total) by mouth daily 90 tablet 1 5/23/2023 at 0600   • metoprolol succinate XL (TOPROL-XL) 50 mg 24 hr tablet Take 1 tablet (50 mg total) by mouth daily 90 tablet 3 5/24/2023 at 0600   • warfarin (COUMADIN) 5 mg tablet Take 1 tablet (5 mg total) by mouth daily 90 tablet 3 5/23/2023 at 2000   • atorvastatin (LIPITOR) 80 mg tablet Take 1 tablet (80 mg total) by mouth daily 90 tablet 3 5/23/2023 at 2000   • aspirin 81 mg EC tablet Take 1 tablet (81 mg total) by mouth daily 90 tablet 3 5/24/2023 at 0600   • evolocumab (Repatha SureClick) 140 mg/mL pen injector Inject 140 mg under the skin every 14 (fourteen) days 6 mL 3    • budesonide-formoteroL (SYMBICORT) 160-4.5 mcg/actuation inhaler Inhale 2 puffs 2 (two) times a day Rinse mouth with water after use. Do not swallow. 10.2 g 11    • nitroglycerin  (NITROSTAT) 0.4 mg SL tablet Place 1 tablet (0.4 mg total) under the tongue every 5 (five) minutes as needed for chest pain Limit 3 tabs per episode. 25 tablet 1    • albuterol HFA (Ventolin HFA) 90 mcg/actuation inhaler Inhale 2 puffs every 4 (four) hours (Patient taking differently: Inhale 2 puffs every 4 (four) hours PRN Daily) 1 Inhaler 11    • amoxicillin (AMOXIL) 500 mg tablet SMARTSI Tablet(s) By Mouth          No current outpatient medications on file.    Family History   Problem Relation Age of Onset   • No Known Problems Mother    • Heart disease Father    • Aneurysm Father    • Other Sister         undiagnosed neurological disease   • Breast cancer Sister    • No Known Problems Sister    • Gallbladder disease Daughter    • No Known Problems Daughter        Social History     Tobacco Use   • Smoking status: Former     Packs/day: 0.75     Years: 30.00     Pack years: 22.50     Types: Cigarettes     Quit date: 2022     Years since quittin.2   • Smokeless tobacco: Never   • Tobacco comments:     Last smoked 3/1/22   Vaping Use   • Vaping Use: Never used   Substance Use Topics   • Alcohol use: Yes     Alcohol/week: 4.0 - 5.0 standard drinks of alcohol     Types: 4 - 5 Cans of beer per week     Comment: 3-4 beers weekly or less   • Drug use: No       Review of Systems  Review of Systems   Constitutional: Negative for diaphoresis, fever and malaise/fatigue.   HENT:  Negative for hearing loss.    Eyes:  Negative for discharge and double vision.   Cardiovascular:  Negative for chest pain, claudication, dyspnea on exertion, irregular heartbeat, leg swelling, near-syncope, orthopnea, palpitations, paroxysmal nocturnal dyspnea and syncope.   Respiratory:  Negative for cough, hemoptysis, shortness of breath, sleep disturbances due to breathing (APNEA), snoring, sputum production and wheezing.    Endocrine: Negative for polydipsia, polyphagia and polyuria.   Hematologic/Lymphatic: Negative for bleeding  problem.   Skin:  Negative for itching and rash.   Musculoskeletal:  Negative for back pain, falls, muscle cramps, muscle weakness and myalgias.   Gastrointestinal:  Negative for abdominal pain, heartburn, melena, nausea and vomiting.   Genitourinary:  Negative for hematuria and nocturia.   Neurological:  Negative for dizziness, light-headedness, numbness and weakness.   Psychiatric/Behavioral:  Negative for altered mental status.    Allergic/Immunologic: Negative for hives.       Objective     /73 (BP Location: Left arm, Patient Position: Sitting)   Pulse 71   Temp 36.5 °C (97.7 °F)   Resp 18   SpO2 94%   Weight:      Physical Exam  Vitals and nursing note reviewed.   Constitutional:       General: He is not in acute distress.     Appearance: Normal appearance. He is well-developed. He is not diaphoretic.   HENT:      Head: Normocephalic and atraumatic.   Eyes:      General:         Right eye: No discharge.         Left eye: No discharge.      Conjunctiva/sclera: Conjunctivae normal.      Pupils: Pupils are equal, round, and reactive to light.   Neck:      Vascular: No JVD.   Cardiovascular:      Rate and Rhythm: Normal rate and regular rhythm.      Pulses: Intact distal pulses.      Heart sounds:      No friction rub.      Comments: Pacemaker pocket site well-healed, no fluctuance or erythema  Pulmonary:      Effort: No respiratory distress.      Breath sounds: Normal breath sounds. No wheezing or rales.   Chest:      Chest wall: No tenderness.   Abdominal:      General: Bowel sounds are normal. There is no distension.      Palpations: Abdomen is soft.      Tenderness: There is no abdominal tenderness.   Musculoskeletal:         General: Normal range of motion.      Cervical back: Normal range of motion and neck supple.      Right lower leg: No edema.      Left lower leg: No edema.   Skin:     General: Skin is warm and dry.      Coloration: Skin is not pale.      Findings: No erythema or rash.    Neurological:      Mental Status: He is alert and oriented to person, place, and time.   Psychiatric:         Mood and Affect: Mood normal.         Behavior: Behavior normal.         Thought Content: Thought content normal.     Data Review:   LAB RESULTS:   Sodium 134 with potassium 4.3 and a BUN and creatinine of 21/0.93  WBCs of 6.3 with a hemoglobin hematocrit of 15.7/45.4 with a platelet count of 171    Assessment/Plan   Principal Problem:    N.p.o. since last evening    Labs reviewed    [place] Heart Failure Classification of 1    ASA classification of 3    Mallampati score of 2    Plan:  ICD upgrade per Dr. Saldaña    Patient seen in conjunction with Dr. Piper ARTHUR, CNP  5/24/2023

## 2023-05-24 NOTE — TELEPHONE ENCOUNTER
Per Adia, Gopal has had his ICD authorized.  He will have labs drawn at 12:40 PM upon arrival at the hospital for his procedure.

## 2023-05-24 NOTE — ANESTHESIA PREPROCEDURE EVALUATION
Pre-Procedure Assessment    Patient: Hayden Franklin, male, 62 y.o.    Ht Readings from Last 1 Encounters:   05/23/23 1.829 m (6')     Wt Readings from Last 1 Encounters:   05/23/23 88.9 kg (196 lb)       Last Vitals  /73 (05/24/23 1242)    Temp 36.5 °C (97.7 °F) (05/24/23 1242)    Pulse 71 (05/24/23 1242)   Resp 18 (05/24/23 1242)    SpO2 94 % (05/24/23 1242)    Pain Score 0 - No pain (05/24/23 1249)       Problem list reviewed and Medical history reviewed           Airway   Mallampati: II  TM distance: >3 FB  Neck ROM: full      Dental      Pulmonary     breath sounds clear to auscultation  (+) COPD, asthma, shortness of breath,   Cardiovascular   (+) pacemaker, valvular problems/murmurs, past MI, CAD, dysrhythmias, CHF,     Rhythm: regular  Rate: normal  ROS comment: Interpretation Summary    ? Mild left ventricular dilation, LVIDD 6.0 cm.  ? Moderate left ventricular systolic dysfunction with segmental wall motion abnormalities, biplane EF 31%.  ? Severe hypokinesis of basal to mid inferior And basal inferoseptal walls.    ? Moderate hypokinesis of rest of the segments.   ? Presence of diastolic dysfunction, unable to assess grade (summation E and a waves).  ? Normal right ventricular size and function.  ? Severe left atrial enlargement, indexed left atrial volume 49 ml/m2.  ? Dilated right atrium with normal estimated pressure.  ? Normally functioning 23 mm On-X bileaflet mechanical aortic valve.  ? Peak aortic velocity 1.9 m/s, mean gradient 8 mmHg, EOA 2.1 cm².  ? Trivial aortic regurgitation.  ? Trace mitral and tricuspid regurgitation.  ? Normal RVSP estimated 34 mmHg.         Mental Status/Neuro/Psych    Pt is alert.      (+) arthritis,     GI/Hepatic/Renal      Endo/Other    (+) history of cancer,   Abdominal           Social History     Tobacco Use   • Smoking status: Former     Packs/day: 0.75     Years: 30.00     Pack years: 22.50     Types: Cigarettes     Quit date: 2/24/2022     Years since  quittin.2   • Smokeless tobacco: Never   • Tobacco comments:     Last smoked 3/1/22   Vaping Use   • Vaping Use: Never used   Substance Use Topics   • Alcohol use: Yes     Alcohol/week: 4.0 - 5.0 standard drinks of alcohol     Types: 4 - 5 Cans of beer per week     Comment: 3-4 beers weekly or less      Hematology   WBC   Date Value Ref Range Status   2023 6.3 3.7 - 9.6 10*3/uL Final     RBC   Date Value Ref Range Status   2023 4.83 4.10 - 5.80 10*6/µL Final     MCV   Date Value Ref Range Status   2023 93.9 82.0 - 97.0 fL Final     Hemoglobin   Date Value Ref Range Status   2023 15.7 13.2 - 17.2 g/dL Final     Hematocrit   Date Value Ref Range Status   2023 45.4 38.0 - 50.0 % Final     Platelets   Date Value Ref Range Status   2023 171 130 - 350 10*3/uL Final      Coagulation   Protime   Date Value Ref Range Status   05/10/2023 26.8 (H) 9.4 - 12.5 SECONDS Final     INR   Date Value Ref Range Status   05/10/2023 2.3 (H) <=1.1 Final   05/10/2023 2.30  Final      General Chemistry   Calcium   Date Value Ref Range Status   2023 9.6 8.6 - 10.3 mg/dL Final     BUN   Date Value Ref Range Status   2023 19 7 - 25 mg/dL Final     Creatinine   Date Value Ref Range Status   2023 0.94 0.70 - 1.30 mg/dL Final     Glucose   Date Value Ref Range Status   2023 97 70 - 105 mg/dL Final     Sodium   Date Value Ref Range Status   2023 134 (L) 135 - 145 mmol/L Final     Potassium   Date Value Ref Range Status   2023 4.4 3.5 - 5.1 MMOL/L Final     CO2   Date Value Ref Range Status   2023 24 21 - 32 mmol/L Final     Chloride   Date Value Ref Range Status   2023 103 98 - 107 mmol/L Final     Anesthesia Plan    ASA 4   NPO status reviewed: > 8 hours    MAC                          Anesthetic plan and risks discussed with patient.      Plan discussed with CRNA.

## 2023-05-24 NOTE — Clinical Note
A venogram was performed on the left subclavian. Injected with one hand injection. Injected volume = 20 mL.

## 2023-05-25 PROCEDURE — 93010 ELECTROCARDIOGRAM REPORT: CPT | Mod: NC | Performed by: INTERNAL MEDICINE

## 2023-05-25 NOTE — ANESTHESIA POSTPROCEDURE EVALUATION
Patient: Hayden Franklin    Procedure Summary     Date: 05/24/23 Room / Location: [facility] EP LAB 1 / [facility] EP Lab    Anesthesia Start: 1613 Anesthesia Stop: 1759    Procedure: Bi-V ICD upgrade (Chest) Diagnosis:       Chronic systolic congestive heart failure (CMS/HCC)      (Chronic systolic congestive heart failure (CMS/HCC) [I50.22])    Providers: Mitchel Saldaña DO Responsible Provider: Subhash Yee MD    Anesthesia Type: MAC ASA Status: 4          Anesthesia Type: MAC    Last vitals  Vitals Value Taken Time   /73 05/24/23 1900   Temp     Pulse 67 05/24/23 1900   Resp 10 05/24/23 1900   SpO2 93 % 05/24/23 1900   0-10 Pain Score 0 - No pain 05/24/23 1800       Anesthesia Post Evaluation    Patient location during evaluation: PACU  Patient participation: complete - patient participated  Level of consciousness: awake and alert  Pain management: adequate  Airway patency: patent  Anesthetic complications: no  Cardiovascular status: acceptable  Respiratory status: acceptable  Hydration status: acceptable  May dismiss recovered patient based on consultation with the appropriate physicians and/or meeting appropriate discharge criteria      Cosmetic?  This procedure is not cosmetic.

## 2023-05-25 NOTE — PROGRESS NOTES
Cardiology Outpatient CHF Note    Subjective     Patient ID: Hayden Franklin is a 62 y.o. male patient of Dr. Joshi who presents to the heart failure clinic for follow-up.     ROSE Herron is a 62-year-old gentleman with history of inferior MI with single-vessel bypass with AVR in 2013.  He had recurrent severe aortic stenosis and underwent redo sternotomy with aortic valve replacement using the 23 mm On-X aortic valve on 9/6/2022 at the [facility].  Post-op he had complete AV block with subsequent placement pacemaker with dislodgement of right atrial lead which was fixed prior to discharge.  5/24/2023 he underwent upgrade to BiV ICD related to nonischemic cardiomyopathy despite optimal GDMT.    Today, he is doing well. He gets short of breath with activity. He states he does not feel any different since his procedure. He and his wife had a couple of questions about his ongoing care. He was wondering how often his follow up would be and with who. He was also wondering if he should go to his PFT appointment next week since he is only about 1 week out from his procedure. He is on warfarin for anticoagulation at this time. He occasionally has a productive cough with clear sputum in the mornings. He denies chest pain, orthopnea, shortness of breath, edema, palpitations, or syncope. Occasional epistaxis that quickly resolves.    Cardiology Problem List:    Cardiology Problem List    Last Edited By Montserrat Valdivia RN 12-    Primary Cardiologist: Dr. Joshi    Coronary   -11/1/2013 Inferior MI: 1V CABG (SVG-RCA) with concomitant AVR and ascending aortic dacron graft    Peripheral Vascular  - 8/2017 Carotid duplex: Right ICA stenosis 16-49%, left ICA stenosis 0-15% stenosis. Suggest follow-up study 1-2 years.    - 11/2013 s/p repair with a 34mm Dacron graft d/t Dilated Ascending Aorta    Valvular  - 11/2013 Aortic Valve Replacement with a Justin Mitroflow bioprosthetic d/t Nonrheumatic Aortic  Valve Stenosis  - 9/6/2022: [facility] - redo AVR using #23mm On-X aortic valve    CHF/CM NA    Electrophysiology  - Complete AVB w/ junction escape s/p DC PPM 9/9/2022 w/ lead dislodement of atrial lead; redo atrial lead 9/15/2022    Pulmonary Vascular NA    Risk Factors   - Hyperlipidemia  - Tobacco use    Prior Imaging/Testing History   -2/26/2022 Lexiscan: EF 48%, LV perfusion is abnormal. Post stress imaging demonstrates moderately sized area of mild to moderately decreased intensity involving the proximal to distal inferior wall and inferoapical wall. On rest imaging this defect appears predominantly fixed to partially worse. Findings consistent with prior inferior wall infarct.    - 2/25/2022 Echo: EF 52%, RA dilated, there is a 27 mm Justin Mitroflow bioprosthetic aortic valve present. Valve leaflets are slightly thickened and calcified although all 3 leaflets are noted to have a decent range of motion. Mild to moderate aortic regurgitation visually. Mild pulmonary hypertension is present. Right ventricular systolic pressure is estimated at 40 mmHg.    -8/18/2021 Echo: Biplane EF 38%, moderate LV systolic dysfunction. Segmental wall motion abnormalities noted, RV is hypokinetic, LA is moderately dilated, Prosthetic graft present. 34 mm Dacron Graft, RV systolic pressure is estimated at 28 mmHg.    -09/10/2022 Echo: Left ventricular function is decreased. The ejection fraction is 30-35%. The right ventricle is normal size.Global right ventricular function is normal.  AVR with On-X valve, the valve is well seated with normal Dopper   interrogation. Mean gradient 13 mmHg. No pericardial effusion is present.   - 11-9-2022 Echo: Mild left ventricular dilation, LVIDD 5.8 cm. Severe left ventricular systolic dysfunction with segmental wall motion abnormalities, biplane EF 29%. Akinesis of basal inferior wall. Presence of diastolic dysfunction, unable to assess grade due to summation of E and A waves. Normal right  ventricular size with moderate hypokinesis. Severe left atrial enlargement, indexed left atrial volume 52 ml/m2. Dilated right atrium. Normally functioning 23 mm On-X mechanical aortic valve prosthesis. Peak aortic velocity 1.5 m/s, mean gradient 5 mmHg, effective orifice area 2.1 cm². No significant stenosis or regurgitation. Mild mitral regurgitation. Mild tricuspid regurgitation. Normal RVSP estimated at 34 mmHg.    Past Medical History:   Diagnosis Date    Aortic valve stenosis     s/p AV replacement 11/2013    Arthritis     Bilat hands, ankle    Ascending aorta dilatation (CMS/[facility])     s/p AAA repair 11/2013    Asthma     CAD (coronary artery disease)     1 vessel bypass    Cancer (CMS/[facility])     Skin    CHF (congestive heart failure) (CMS/[facility])     COPD (chronic obstructive pulmonary disease) (CMS/[facility])     Dysrhythmia     nonsustained v-tach    Hyperlipidemia     Ischemic cardiomyopathy     Lung nodule 03/07/2022    Myocardial infarction (CMS/[facility])     Presence of heart assist device (CMS/[facility])     Shortness of breath        Past Surgical History:   Procedure Laterality Date    AAA REPAIR - ENDOGRAFT  11/2013    ANKLE SURGERY      AORTIC ROOT ANGIOGRAM N/A 5/20/2022    Procedure: Aortic Arch  Angiogram;  Surgeon: Derick Gallegos MD;  Location: [facility] Cath Lab;  Service: Cardiovascular;  Laterality: N/A;    AORTIC VALVE REPLACEMENT  11/2013    BIVENTRICULAR ICD UPGRADE N/A 5/24/2023    Procedure: Bi-V ICD upgrade;  Surgeon: Mitchel Saldaña DO;  Location: [facility] EP Lab;  Service: Cardiovascular;  Laterality: N/A;    CORONARY ANGIOPLASTY  11/2013    CORONARY ARTERY BYPASS GRAFT  11/2013    1V CABG SVG- RCA    LEFT HEART CATH N/A 5/20/2022    Procedure: Left heart cath;  Surgeon: Derick Gallegos MD;  Location: [facility] Cath Lab;  Service: Cardiovascular;  Laterality: N/A;       Allergies as of 06/02/2023 - Reviewed 06/02/2023   Allergen Reaction Noted    Ezetimibe Rash 03/16/2020       Current Outpatient Medications    Medication Sig Dispense Refill    acetaminophen (TYLENOL) 325 mg tablet Take 2 tablets (650 mg total) by mouth every 6 (six) hours as needed for pain scale 1-3/10      evolocumab (Repatha SureClick) 140 mg/mL pen injector Inject 140 mg under the skin every 14 (fourteen) days 6 mL 3    spironolactone (ALDACTONE) 25 mg tablet Take 1 tablet (25 mg total) by mouth daily 90 tablet 3    lisinopriL (PRINIVIL,ZESTRIL) 20 mg tablet Take 1 tablet (20 mg total) by mouth daily 90 tablet 1    metoprolol succinate XL (TOPROL-XL) 50 mg 24 hr tablet Take 1 tablet (50 mg total) by mouth daily 90 tablet 3    warfarin (COUMADIN) 5 mg tablet Take 1 tablet (5 mg total) by mouth daily 90 tablet 3    atorvastatin (LIPITOR) 80 mg tablet Take 1 tablet (80 mg total) by mouth daily (Patient taking differently: Take 1 tablet (80 mg total) by mouth nightly) 90 tablet 3    albuterol HFA (Ventolin HFA) 90 mcg/actuation inhaler Inhale 2 puffs every 4 (four) hours (Patient taking differently: Inhale 2 puffs every 4 (four) hours PRN Daily) 1 Inhaler 11    aspirin 81 mg EC tablet Take 1 tablet (81 mg total) by mouth daily 90 tablet 3    budesonide-formoteroL (SYMBICORT) 160-4.5 mcg/actuation inhaler Inhale 2 puffs 2 (two) times a day Rinse mouth with water after use. Do not swallow. 10.2 g 11    nitroglycerin (NITROSTAT) 0.4 mg SL tablet Place 1 tablet (0.4 mg total) under the tongue every 5 (five) minutes as needed for chest pain Limit 3 tabs per episode. (Patient not taking: Reported on 2023) 25 tablet 1    amoxicillin (AMOXIL) 500 mg tablet SMARTSI Tablet(s) By Mouth       No current facility-administered medications for this visit.       Family History   Problem Relation Age of Onset    No Known Problems Mother     Heart disease Father     Aneurysm Father     Other Sister         undiagnosed neurological disease    Breast cancer Sister     No Known Problems Sister     Gallbladder disease Daughter     No Known Problems Daughter         Social History     Tobacco Use    Smoking status: Former     Packs/day: 0.75     Years: 30.00     Pack years: 22.50     Types: Cigarettes     Quit date: 2022     Years since quittin.2    Smokeless tobacco: Never    Tobacco comments:     Last smoked 3/1/22   Vaping Use    Vaping Use: Never used   Substance Use Topics    Alcohol use: Yes     Alcohol/week: 4.0 - 5.0 standard drinks of alcohol     Types: 4 - 5 Cans of beer per week     Comment: 3-4 beers weekly or less    Drug use: No       The following have been reviewed and updated as appropriate in this visit  Tobacco  Allergies  Meds  Problems  Med Hx  Surg Hx  Fam Hx      .    Review of Systems   Constitutional: Negative for decreased appetite, malaise/fatigue and weight gain.   HENT:  Positive for nosebleeds (occasional).    Cardiovascular:  Negative for chest pain, dyspnea on exertion, irregular heartbeat, leg swelling and palpitations.   Respiratory:  Positive for cough and sputum production. Negative for shortness of breath and snoring.    Hematologic/Lymphatic: Negative for bleeding problem. Does not bruise/bleed easily.   Neurological:  Negative for dizziness and light-headedness.   All other systems reviewed and are negative.      Objective     Vitals:    23 1418   BP: 102/52   Pulse: 83   SpO2: 94%     Weight:   Weights (Current Encounter Only) (last 180 days)       Date/Time Weight    23 1418 92.1 kg (203 lb)             Physical Exam    Constitutional: Well built, nourished, no acute distress.   HEENT: Head is normocephalic and atraumatic. Sclera anicteric.   Neck: Supple. No carotid bruits.  No JVD.  Lungs: Effort normal. Wheezes  auscultated bilaterally worse on the right side. No respiratory distress.   Heart: S1-S2, Regular rate and rhythm. Crisp valve sounds.  Extremities: Without any cyanosis, clubbing, lesions or edema. Radial pulses 2+.  Musculoskeletal: Normal ROM.    Neurologic: No focal deficits.  Psychiartic:  cooperative, alert and orientated X 3.  Skin: warm, dry, intact.      Data Review:     Sodium   Date Value Ref Range Status   05/24/2023 134 (L) 135 - 145 mmol/L Final     Potassium   Date Value Ref Range Status   05/24/2023 4.3 3.5 - 5.1 MMOL/L Final     Chloride   Date Value Ref Range Status   05/24/2023 103 98 - 107 mmol/L Final     CO2   Date Value Ref Range Status   05/24/2023 24 21 - 32 mmol/L Final     BUN   Date Value Ref Range Status   05/24/2023 21 7 - 25 mg/dL Final     Creatinine   Date Value Ref Range Status   05/24/2023 0.93 0.70 - 1.30 mg/dL Final     Glucose   Date Value Ref Range Status   05/24/2023 94 70 - 105 mg/dL Final     Calcium   Date Value Ref Range Status   05/24/2023 9.2 8.6 - 10.3 mg/dL Final      hsTnI 0 Hour   Date Value Ref Range Status   02/26/2022 14.7 <=19.8 pg/mL Final     BNP   Date Value Ref Range Status   03/08/2022 375 (H) 0 - 100 pg/mL Final      WBC   Date Value Ref Range Status   05/24/2023 6.3 3.7 - 9.6 10*3/uL Final     Hemoglobin   Date Value Ref Range Status   05/24/2023 15.7 13.2 - 17.2 g/dL Final     Hematocrit   Date Value Ref Range Status   05/24/2023 45.4 38.0 - 50.0 % Final     MCV   Date Value Ref Range Status   05/24/2023 93.9 82.0 - 97.0 fL Final     Platelets   Date Value Ref Range Status   05/24/2023 171 130 - 350 10*3/uL Final      Cholesterol   Date Value Ref Range Status   04/14/2023 114 0 - 199 mg/dL Final     Triglycerides   Date Value Ref Range Status   04/14/2023 92 <=149 mg/dL Final     HDL   Date Value Ref Range Status   04/14/2023 71 >=40 mg/dL Final     LDL Calculated   Date Value Ref Range Status   04/14/2023 25 20 - 99 mg/dL Final     Assessment/Plan     Presents for Chronic diastolic heart failure and Chronic systolic heart failure.  NYHA Functional Class:  I  Stage:  A heart failure    1. Chronic combined systolic and diastolic congestive heart failure (CMS/HCC)    2. Nonischemic cardiomyopathy (CMS/HCC)  Chronic combined CHF  NYHA class  I  Clinically euvolemic  EF 31% on 2/2022  GDMT:  - Toprol-XL 50 mg daily  - Continue lisinopril until patient runs out (per request) then start Entresto 24-26 1/2 tablet per day for two weeks then full tablet if tolerates. Patient will notify clinic of when to start  - Spironolactone 25 mg daily  - Start SGLT 2 after entresto    Patient needs to have colonoscopy after abnormal Cologuard. Ok to proceed without cardiovascular testing.  Will need bridging with Lovenox given AVR.  Procedure is not scheduled yet, but when it is, please contact clinic and [facility] anticoagulation clinic for further recommendations and treatment plan.  Gopal verbalized understanding.    Patient Instructions   - Follow-up with Dr. Joshi in 4 months with ltd echo.    Advised daily weights, and to contact the clinic with a 3 pound weight gain overnight or 5 pounds in week.  Discussed low-sodium diet (less than 2 g) and 2 L fluid restriction.  Advised compression stockings daily, and elevation of legs several times daily.  We will continue to follow in the heart failure clinic.  Advised they contact the clinic with increasing symptoms or concerns.    Return in about 4 months (around 10/2/2023) for Recheck - In Office; Dr. Joshi.    The patient verbalized correct understanding and agreement to today's instructions and plan.  The patient verbalized that all questions have been addressed.    Yeny Fisher CNP      A voice recognition program was used to aid in documentation of this record. Sometimes words are not printed exactly as they were spoken. While efforts were made to carefully edit and correct any inaccuracies, some errors may be present; please take these into context. Please contact the provider if errors are identified.

## 2023-05-25 NOTE — DISCHARGE INSTRUCTIONS
Cardiac Electrophysiology     Pacemaker Discharge Instructions        IF YOU HAVE ANY ISSUES WITH YOUR DEVICE OR YOUR WOUND PLEASE CALL OUR OFFICE FIRST [phone].  IF THERE IS AN EMERGENT ISSUE WE ASK THAT YOU TRY TO USE THE  EMERGENCY ROOM.  THIS WILL AVOID ANY INCORRECT INFORMATION OR INSTRUCTIONS FROM OUTSIDE INSTITUTIONS.     Incision Check Appointment-      You will be seen in 7 - 14 days for an incision check and pacemaker evaluation. This will be done at the Electrophysiology Clinic. If you have staples, stitches, or steri-strips, they will be removed at this time.      This appointment will be mailed to you. IF YOU DO NOT RECEIVE AN APPOINTMENT FOR YOUR INCISION CHECK WITHIN 7 DAYS AFTER DISCHARGE, CALL [phone].       Incicision Care -      You may remove the dressing in 2 days.     If you have the steri-strips (small bandages) covering your incision, do not remove them.       You may apply ice packs to reduce pain and swelling. Do not apply ice packs directly to the skin, and limit exposure to 20 minutes per hour.     Keep the incision area clean and dry until your incision check appointment.     You may shower, but do not scrub your incision area.     Do not put any ointment, cream, or lotion on your incision.      If you find any redness, swelling, drainage, warmth, or have a fever greater than 100 degrees, notify the EP Main Office [phone] during normal business hours.      Activity - You may continue regular daily activities, but limit strenuous movements of the arm closest to your pacemaker.  Avoid pulling yourself up with that arm, do not raise that elbow higher than shoulder height, and do not lift anything weighing more than 5 pounds for 2 weeks.  Do not do any activities such as golfing, vacuuming, or mowing the lawn for 2 weeks.  Prevent any hard blows to the pacemaker site. Please don't immobilize your arm (no sling) during the daytime. Gentle movements are beneficial.  Immobilization can lead to a frozen shoulder.       Driving- If you feel up to it, you may drive in 3 days. Start with local/short trips to familiar places.  Avoid high traffic areas for the first few days.  Do not drive until you have stopped taking prescription pain medication.      Possible Pacemaker Interferences - Avoid high frequency ham radios, arc welders, battery-powered tools, and strong electromagnetic fields.  Avoid any device that may electrically stimulate your body, such as electric muscle stimulators or TENS units. Standing over a running car motor with the salter up may interfere with the pacemaker. When using a cell phone, use the ear opposite the side of your pacemaker if possible.     Special Instructions -     Let your dentist, doctor, or medical specialist know that you have a pacemaker so special precautions can be taken to protect the pacemaker.     Your pacemaker may set off a metal detector, such as those used in airports and courtrooms. Let security know you have a pacemaker and show them your ID card.     ID card- You will be given a temporary ID card at discharge.  A permanent ID card will be mailed to you by the device company within 8 weeks. CARRY YOUR ID CARD WITH YOU AT ALL TIMES.     Follow-up care -     Your first clinic pacemaker check will be in 3 months at the EP clinic. You should receive this appointment by mail within 1 month of your surgery. After this appointment, you will be seen yearly at the EP clinic.      You will be given a remote monitor at your wound check appointment in approximately 1 week.  Please make sure you have discussed any phone issues with your physician so that alternative arrangements can be made if necessary.  We program your remote monitor to send us a full report of your device once every 3 months.  This schedule is mandated by Medicare guidelines. If we do not receive the transmission on the scheduled date, or if there are any problems or concerns, we  will contact you.  If your device is wireless (you will be aware of this at remote enrollment), please leave your monitor connected at all times within 10-15 feet of where you sleep.  This will allow your monitor to automatically “check-in” with your device on a nightly basis and alert the clinic with any problems. If your device is not wireless, please make every effort to transmit on the scheduled dates provided to you by the device clinic.      You will receive instructions regarding your transmission schedule within one month.     If you have any questions or problems, several people are available to help you. Most of our staff is available Monday through Friday, from 7:30 a.m. - 5:30 p.m. You should expect a call back within 24 hours if you leave a message. For emergencies after-hours or on weekends, please call [phone] and ask for the Cardiologist on- call.     IMPORTANT PHONE NUMBERS:             Main Office (Monday-Friday, 7:30 a.m. - 5:30 p.m.):    Office [phone]               Cardiology On-Call:   For after-hours or weekend emergencies        Call hospital  [phone]

## 2023-05-26 ENCOUNTER — ANCILLARY PROCEDURE (OUTPATIENT)
Dept: CARDIOLOGY | Facility: CLINIC | Age: 63
End: 2023-05-26
Payer: COMMERCIAL

## 2023-05-26 DIAGNOSIS — Z45.02 ENCOUNTER FOR INTERROGATION OF CARDIAC DEFIBRILLATOR: ICD-10-CM

## 2023-06-01 ENCOUNTER — TELEPHONE (OUTPATIENT)
Dept: FAMILY MEDICINE | Facility: CLINIC | Age: 63
End: 2023-06-01
Payer: COMMERCIAL

## 2023-06-01 NOTE — TELEPHONE ENCOUNTER
Called and spoke with patient in regards to questions about colonoscopy. Patient has not scheduled appointment yet, however, would like to have this done in the next 4-5 weeks. He has appointment with Yeny Fisher CNP tomorrow (6/2/23) and will discuss this further.     Will also send message to [facility] team as PHILIPP.

## 2023-06-01 NOTE — TELEPHONE ENCOUNTER
Caller would like to discuss (a) return call Writer has advised caller of a callback from within 24 hours.    Patient: Hayden Franklin    Caller Name (Last and first, relation/role): self     Name of Facility: na    Callback Number: [phone]    Best Availability: any    Fax Number: na    Additional Info: Do I need cardiology clearance for colonscopy?     Did you confirm the message with the caller: Yes    Is it okay that the nurse communicates your response through MyChart? No

## 2023-06-02 ENCOUNTER — CLINICAL SUPPORT (OUTPATIENT)
Dept: CARDIOLOGY | Facility: CLINIC | Age: 63
End: 2023-06-02
Payer: COMMERCIAL

## 2023-06-02 ENCOUNTER — OFFICE VISIT (OUTPATIENT)
Dept: CARDIOLOGY | Facility: CLINIC | Age: 63
End: 2023-06-02
Payer: COMMERCIAL

## 2023-06-02 VITALS
WEIGHT: 203 LBS | DIASTOLIC BLOOD PRESSURE: 52 MMHG | SYSTOLIC BLOOD PRESSURE: 102 MMHG | OXYGEN SATURATION: 94 % | HEIGHT: 72 IN | BODY MASS INDEX: 27.5 KG/M2 | HEART RATE: 83 BPM

## 2023-06-02 DIAGNOSIS — I42.8 NONISCHEMIC CARDIOMYOPATHY (CMS/HCC): ICD-10-CM

## 2023-06-02 DIAGNOSIS — I50.42 CHRONIC COMBINED SYSTOLIC AND DIASTOLIC CONGESTIVE HEART FAILURE (CMS/HCC): Primary | ICD-10-CM

## 2023-06-02 PROCEDURE — 99024 POSTOP FOLLOW-UP VISIT: CPT | Performed by: NURSE PRACTITIONER

## 2023-06-02 ASSESSMENT — ENCOUNTER SYMPTOMS
PALPITATIONS: 0
SNORING: 0
SPUTUM PRODUCTION: 1
DYSPNEA ON EXERTION: 0
DIZZINESS: 0
WEIGHT GAIN: 0
BRUISES/BLEEDS EASILY: 0
IRREGULAR HEARTBEAT: 0
LIGHT-HEADEDNESS: 0
DECREASED APPETITE: 0
SHORTNESS OF BREATH: 0
COUGH: 1

## 2023-06-02 NOTE — PROGRESS NOTES
Patient came in today for his wound check post device placement.  Pt had steri-strips in place.  I let him know to keep the strips in place until they fall off on their own.  I asked him to keep the area clean and dry and to call the office if he notices any sign of infection.  Patient states understanding.

## 2023-06-09 ENCOUNTER — HOSPITAL ENCOUNTER (OUTPATIENT)
Dept: RESPIRATORY THERAPY | Facility: HOSPITAL | Age: 63
Discharge: HOME | End: 2023-06-09
Payer: COMMERCIAL

## 2023-06-09 DIAGNOSIS — J44.89 ASTHMA-COPD OVERLAP SYNDROME (CMS/HCC): ICD-10-CM

## 2023-06-09 PROCEDURE — 94060 EVALUATION OF WHEEZING: CPT | Mod: 26 | Performed by: INTERNAL MEDICINE

## 2023-06-09 PROCEDURE — 94060 EVALUATION OF WHEEZING: CPT

## 2023-06-15 ENCOUNTER — OFFICE VISIT (OUTPATIENT)
Dept: PULMONOLOGY | Facility: CLINIC | Age: 63
End: 2023-06-15
Payer: COMMERCIAL

## 2023-06-15 VITALS
WEIGHT: 205.2 LBS | BODY MASS INDEX: 27.79 KG/M2 | DIASTOLIC BLOOD PRESSURE: 64 MMHG | RESPIRATION RATE: 17 BRPM | OXYGEN SATURATION: 95 % | HEIGHT: 72 IN | HEART RATE: 77 BPM | SYSTOLIC BLOOD PRESSURE: 118 MMHG

## 2023-06-15 DIAGNOSIS — Z87.891 PERSONAL HISTORY OF TOBACCO USE, PRESENTING HAZARDS TO HEALTH: ICD-10-CM

## 2023-06-15 DIAGNOSIS — J44.89 ASTHMA-COPD OVERLAP SYNDROME (CMS/HCC): Primary | ICD-10-CM

## 2023-06-15 PROCEDURE — 99213 OFFICE O/P EST LOW 20 MIN: CPT | Mod: 25 | Performed by: NURSE PRACTITIONER

## 2023-06-15 NOTE — PROGRESS NOTES
PULMONARY NOTE      HPI:  Hayden Franklin is a 62 y.o. male patient who presents for pulmonary follow-up on COPD with asthma overlap.  He has approximately 30-pack-year history of smoking and quit 2022.  Patient has history of congestive heart failure and had CABG and valve replacement surgery 2022.  Then he had his pacemaker replaced approximately 3 weeks ago.    Patient reports overall he is doing well from respiratory standpoint.  He continues to be compliant with smoking cessation.  He has cough increased in the mornings.  Cough is productive of clear mucus.  He denies symptoms of chest tightness or wheeze.  He does get short of breath with increased exertion.  He uses his albuterol before activity otherwise has not needed for breakthrough respiratory symptoms.  He uses Symbicort consistently twice daily.        Review of Systems  Pertinent positives and negatives listed in the HPI.    The following portions of the patient's history were reviewed and updated as appropriate: allergies, current medications, past family history, past medical history, past social history, past surgical history and problem list.    History:  Social History     Tobacco Use    Smoking status: Former     Packs/day: 0.75     Years: 30.00     Pack years: 22.50     Types: Cigarettes     Quit date: 2022     Years since quittin.3    Smokeless tobacco: Never    Tobacco comments:     Last smoked 3/1/22   Vaping Use    Vaping Use: Never used   Substance Use Topics    Alcohol use: Yes     Alcohol/week: 4.0 - 5.0 standard drinks of alcohol     Types: 4 - 5 Cans of beer per week     Comment: 3-4 beers weekly or less    Drug use: No         Immunizations:  Immunization History   Administered Date(s) Administered    Flu vaccine (PF) (egg-free cell culture) greater than or equal to 6 months old IM 2020    Flu vaccine (PF) greater than or equal to 6 months IM 2018, 2019, 2022    Flu vaccine  Adjuvanted (PF) greater than or equal to 65 years old IM 2022    Influenza TIV (IM) 2020    [employer] BIVALENT BOOSTER SARS-COV-2 VACCINATION (Booster) 2022    PFIZER-BIONTECH SARS-COV-2 PURPLE CAP VACCINATION (D1, D2, Immuno) 2021, 2021, 2022    Pneumococcal Conjugate 20-Valent 2023    Pneumococcal Polysaccharide - PPSV23 2022    Shingrix IM 2022, 2023    Tdap 2018       Medications:    Current Outpatient Medications:     acetaminophen (TYLENOL) 325 mg tablet, Take 2 tablets (650 mg total) by mouth every 6 (six) hours as needed for pain scale 1-3/10, Disp: , Rfl:     evolocumab (Repatha SureClick) 140 mg/mL pen injector, Inject 140 mg under the skin every 14 (fourteen) days, Disp: 6 mL, Rfl: 3    spironolactone (ALDACTONE) 25 mg tablet, Take 1 tablet (25 mg total) by mouth daily, Disp: 90 tablet, Rfl: 3    lisinopriL (PRINIVIL,ZESTRIL) 20 mg tablet, Take 1 tablet (20 mg total) by mouth daily, Disp: 90 tablet, Rfl: 1    metoprolol succinate XL (TOPROL-XL) 50 mg 24 hr tablet, Take 1 tablet (50 mg total) by mouth daily, Disp: 90 tablet, Rfl: 3    warfarin (COUMADIN) 5 mg tablet, Take 1 tablet (5 mg total) by mouth daily, Disp: 90 tablet, Rfl: 3    budesonide-formoteroL (SYMBICORT) 160-4.5 mcg/actuation inhaler, Inhale 2 puffs 2 (two) times a day Rinse mouth with water after use. Do not swallow., Disp: 10.2 g, Rfl: 11    atorvastatin (LIPITOR) 80 mg tablet, Take 1 tablet (80 mg total) by mouth daily (Patient taking differently: Take 1 tablet (80 mg total) by mouth nightly), Disp: 90 tablet, Rfl: 3    albuterol HFA (Ventolin HFA) 90 mcg/actuation inhaler, Inhale 2 puffs every 4 (four) hours (Patient taking differently: Inhale 2 puffs every 4 (four) hours PRN Daily), Disp: 1 Inhaler, Rfl: 11    amoxicillin (AMOXIL) 500 mg tablet, SMARTSI Tablet(s) By Mouth, Disp: , Rfl:     aspirin 81 mg EC tablet, Take 1 tablet (81 mg total) by mouth daily, Disp:  90 tablet, Rfl: 3    nitroglycerin (NITROSTAT) 0.4 mg SL tablet, Place 1 tablet (0.4 mg total) under the tongue every 5 (five) minutes as needed for chest pain Limit 3 tabs per episode. (Patient not taking: Reported on 6/2/2023), Disp: 25 tablet, Rfl: 1    Allergies:  Allergies   Allergen Reactions    Ezetimibe Rash       Objective:  Vitals:    06/15/23 1439   BP: 118/64   Pulse: 77   Resp: 17   SpO2: 95%   Weight: 93.1 kg (205 lb 3.2 oz)   Height: 1.829 m (6')       Physical Exam  Constitutional:       General: He is not in acute distress.     Appearance: He is not ill-appearing.   Cardiovascular:      Rate and Rhythm: Normal rate and regular rhythm.      Heart sounds: No murmur heard.     No friction rub. No gallop.   Pulmonary:      Effort: Pulmonary effort is normal. No respiratory distress.      Breath sounds: Normal breath sounds. No wheezing, rhonchi or rales.   Chest:      Chest wall: No tenderness.   Musculoskeletal:      Right lower leg: No edema.      Left lower leg: No edema.   Skin:     General: Skin is warm and dry.   Neurological:      General: No focal deficit present.      Mental Status: He is oriented to person, place, and time.         Lab Review:   Lab Results   Component Value Date    GLUCOSE 94 05/24/2023    CALCIUM 9.2 05/24/2023     (L) 05/24/2023    K 4.3 05/24/2023    CO2 24 05/24/2023     05/24/2023    BUN 21 05/24/2023    CREATININE 0.93 05/24/2023    ANIONGAP 7 05/24/2023     Lab Results   Component Value Date     (H) 03/08/2022     No results found for: IGE  Lab Results   Component Value Date    WBC 6.3 05/24/2023    HGB 15.7 05/24/2023    HCT 45.4 05/24/2023    MCV 93.9 05/24/2023     05/24/2023           PFT:  Pulmonary function test completed for COPD.  ATS standards met throughout testing.  Flow volume loop appears obstructed.  Spirometry shows evidence of mild airway obstruction that normalizes with the use of albuterol.  FEV1 improves by 25% and 570 cc  with use of albuterol.  This spirometry is consistent with mild COPD or well-controlled asthma.  When compared to pulmonary function testing completed in April 2022, there does seem to be an improvement in FEV1 and FVC.      Discussion:  Patient is overall doing well from a respiratory standpoint.  We reviewed results of PFT completed today.  There is significant improvement compared to prior testing.  Discussed with patient he does have emphysema on chest imaging likely has combination of mild COPD and asthma.  Recommend he stay on his Symbicort.  At this time with well-controlled symptoms no need to escalate therapy.  Patient does qualify for lung cancer screening program.  His next CT will be due in December.  We will plan on seeing him in follow-up after repeat chest CT, unless seen sooner.    Assessment:  1.  Mild COPD with asthma overlap  2.  History of tobacco use    PLAN:  1.  Continue Symbicort 2 puffs twice daily  2.  Albuterol 2 puffs as needed every 4-6 hours for shortness of breath, cough, chest tightness or wheezing.  Use 15 minutes prior to activity.   3.  Chest CT for lung cancer screening will be due December 2023.  4.  Continue routine physical activity.    Follow-up in 6 months after chest CT for lung cancer screening, unless seen sooner.       Pt voices understanding and agreement to plan as stated above. All questions answered.          A voice recognition program was used to aid in medical record documentation. Sometimes words are printed not exactly as they were spoken. While efforts were made to carefully edit and correct any inaccuracies, some errors may be present. Errors should be taken within the context of the discussion.  Please contact our office if you need assistance interpreting this medical record or notice any mistakes.

## 2023-06-15 NOTE — PATIENT INSTRUCTIONS
PLAN:  1.  Continue Symbicort 2 puffs twice daily  2.  Albuterol 2 puffs as needed every 4-6 hours for shortness of breath, cough, chest tightness or wheezing.  Use 15 minutes prior to activity.   3.  Chest CT for lung cancer screening will be due December 2023.  4.  Continue routine physical activity.

## 2023-06-19 NOTE — TELEPHONE ENCOUNTER
Received voicemail from patient, requesting to know if Dr. Dillard has set up a date yet for his surgery.    Called and spoke with patient.  He is informed that I have not had a date from Dr. Dillard yet, and I don't expect one today.  He is currently assisting another surgeon and so we won't hear from him until at least tomorrow or Friday.  Patient voices understanding.  He is just wanting to get information so that he can update family, friends, and work.  He is informed that someone will call him as soon as we get information from Dr. Dillard.  He voices understanding.    Patient asks several questions, regarding the hospital stay and other details after surgery.  His questions are answered.  He voices no other questions at this moment.    
Spoke with Dr. Dillard.  He states that he is still doing research on what surgery options and things are the best for the patient, since this surgery is to be a valve redo.  He requests to have the patient scheduled to see him on 7/18/22.    Called and spoke with patient.  He is informed that Dr. Dillard is currently doing further research for redoing the patient's valve surgery, to make sure that he has looked at every possible angle regarding this complex surgery.  Patient is informed that Dr. Dillard would like fore the patient to come into the clinic and see him on 7/18/22, so they can discuss the information that he is gathering and make a plan from there.  Patient voices understanding and agrees to an appointment at 3:45 that day.      Patient and I talk a bit more detailed about how he is feeling about the surgery.   
epigastric

## 2023-06-23 ENCOUNTER — CLINICAL SUPPORT (OUTPATIENT)
Dept: RESEARCH | Facility: OTHER | Age: 63
End: 2023-06-23
Payer: COMMERCIAL

## 2023-06-23 VITALS
DIASTOLIC BLOOD PRESSURE: 73 MMHG | TEMPERATURE: 97.7 F | HEART RATE: 74 BPM | HEIGHT: 72 IN | SYSTOLIC BLOOD PRESSURE: 125 MMHG | WEIGHT: 205 LBS | BODY MASS INDEX: 27.77 KG/M2

## 2023-06-23 DIAGNOSIS — Z00.6 RESEARCH SUBJECT: Primary | ICD-10-CM

## 2023-06-23 PROCEDURE — 5601 RSCH OCEAN(A) OUTCOMES SCREENING: Mod: RPD | Performed by: NURSE PRACTITIONER

## 2023-06-23 NOTE — RESEARCH NOTE
Active Trial: Gurpreet    Name: Hayden Franklin  : 1960  Age:  62 y.o.  Sex: male    Visit Name: SCREENING Visit 1  Date of Service: 2023     [facility] CLINICAL RESEARCH  [address] [place] [id number]  [phone]         Enrollment Date: 2023        Trial Information: OCEAN (a)-Outcomes:  Olpasiran trials of Cardiovascular Events And LipoproteiN(a) reduction - Outcomes trial.    Protocol Title: A Double-blind, Randomized, Placebo-controlled, Multicenter Study Assessing the Impact of Olpasiran on Major Cardiovascular Events in Patients with Atherosclerotic Cardiovascular Disease and Elevated Lipoprotein (a).    Study Phase: 3     Indication: Cardiovascular disease    Number of Subjects: Approximately 6 000 subjects will be randomized  Treatments: Subjects will be randomized 1:1 to receive 142 mg olpasiran (150 mg olpasiran sodium) every 12 weeks (Q12W) plus standard of care or placebo Q12W plus standard of care. Olpasiran and placebo will be administered using a prefilled syringe. Olpasiran and placebo will be provided for dosing Q12W.  Study Rational:  Study [id number] is a double-blind, randomized, placebo-controlled, multicenter outcomes study to assess the impact of olpasiran on major cardiovascular events in patients with atherosclerotic cardiovascular disease (ASCVD) and elevated lipoprotein (a) (Lp[a]) >=200 nmol/L. The primary objective is to compare the effect of treatment with olpasiran, to placebo, on the risk for coronary heart disease death, myocardial infarction, or urgent coronary revascularization in these patients. Lipoprotein (a) reductions as anticipated with olpasiran are expected to result in clinically meaningful cardiovascular benefit in patients with ASCVD.                Visit Summary: Gopal presents for (SCREENING) visit for the (OCEANa) trial.  The subject was initially contacted by telephone for potential trial participation after the investigative staff  determined that the subject meets the initial inclusion and exclusion criteria.  The study was explained in detail including the protocol concept and all the contents of the informed consent document.  The subject was given adequate time to read the informed consent document and the opportunity to discuss it.  The subject was encouraged to ask questions and all questions were answered to the subject’s satisfaction.  After going over the informed consent and answering all questions, the subject did elect to participate and signed the informed consent.  A copy of the signed informed consent and ClinCard ICF was given to the subject.  The informed consent was signed prior to any protocol procedures being performed.      Inclusion/exclusion gone over. All required procedures were completed. Gopal did consent to the optional Future Research and Pharmacogenetic sample. I told him that I will call him once we get the lab results back and he confirmed understanding. Gopal was thanked for coming in and for his participation.                Events:    Adverse Events: none    Serious Adverse Events: none    Outcome Events: none      Vitals:   Vitals:    06/23/23 0815   BP: 125/73   Pulse: 74   Temp: 36.5 °C (97.7 °F)   Weight: 93 kg (205 lb)   Height: 1.829 m (6')       Medications:   Current Outpatient Medications   Medication Sig Dispense Refill    acetaminophen (TYLENOL) 325 mg tablet Take 2 tablets (650 mg total) by mouth every 6 (six) hours as needed for pain scale 1-3/10      evolocumab (Repatha SureClick) 140 mg/mL pen injector Inject 140 mg under the skin every 14 (fourteen) days 6 mL 3    spironolactone (ALDACTONE) 25 mg tablet Take 1 tablet (25 mg total) by mouth daily 90 tablet 3    lisinopriL (PRINIVIL,ZESTRIL) 20 mg tablet Take 1 tablet (20 mg total) by mouth daily 90 tablet 1    metoprolol succinate XL (TOPROL-XL) 50 mg 24 hr tablet Take 1 tablet (50 mg total) by mouth daily 90 tablet 3    warfarin (COUMADIN) 5 mg  tablet Take 1 tablet (5 mg total) by mouth daily 90 tablet 3    budesonide-formoteroL (SYMBICORT) 160-4.5 mcg/actuation inhaler Inhale 2 puffs 2 (two) times a day Rinse mouth with water after use. Do not swallow. 10.2 g 11    atorvastatin (LIPITOR) 80 mg tablet Take 1 tablet (80 mg total) by mouth daily (Patient taking differently: Take 1 tablet (80 mg total) by mouth nightly) 90 tablet 3    nitroglycerin (NITROSTAT) 0.4 mg SL tablet Place 1 tablet (0.4 mg total) under the tongue every 5 (five) minutes as needed for chest pain Limit 3 tabs per episode. (Patient not taking: Reported on 2023) 25 tablet 1    albuterol HFA (Ventolin HFA) 90 mcg/actuation inhaler Inhale 2 puffs every 4 (four) hours (Patient taking differently: Inhale 2 puffs every 4 (four) hours PRN Daily) 1 Inhaler 11    amoxicillin (AMOXIL) 500 mg tablet SMARTSI Tablet(s) By Mouth      aspirin 81 mg EC tablet Take 1 tablet (81 mg total) by mouth daily 90 tablet 3     No current facility-administered medications for this visit.       Lab Tests: collected per protocol      Staff Member completing visit: Irlanda [facility]

## 2023-06-26 ENCOUNTER — LAB (OUTPATIENT)
Dept: LAB | Facility: URGENT CARE | Age: 63
End: 2023-06-26
Payer: COMMERCIAL

## 2023-06-26 ENCOUNTER — ANTICOAGULATION VISIT (OUTPATIENT)
Dept: CARDIOLOGY | Facility: CLINIC | Age: 63
End: 2023-06-26
Payer: COMMERCIAL

## 2023-06-26 DIAGNOSIS — Z51.81 ANTICOAGULATION MANAGEMENT ENCOUNTER: ICD-10-CM

## 2023-06-26 DIAGNOSIS — Z79.01 ANTICOAGULATION MANAGEMENT ENCOUNTER: ICD-10-CM

## 2023-06-26 DIAGNOSIS — Z95.2 S/P AVR: Primary | Chronic | ICD-10-CM

## 2023-06-26 DIAGNOSIS — Z95.2 PRESENCE OF PROSTHETIC HEART VALVE: ICD-10-CM

## 2023-06-26 LAB
INR BLD: 3.3
INR PPP: 3.3
PROTHROMBIN TIME: 37.3 SECONDS (ref 9.4–12.5)

## 2023-06-26 PROCEDURE — 85610 PROTHROMBIN TIME: CPT | Performed by: FAMILY MEDICINE

## 2023-06-26 PROCEDURE — 36415 COLL VENOUS BLD VENIPUNCTURE: CPT | Performed by: FAMILY MEDICINE

## 2023-06-26 NOTE — PROGRESS NOTES
2:40 spoke to pt verified ID no med or diet changes no bleeding or bruising problems enc to call if changes verified warfarin dose and tab size 35mg/week admits had 2 glasses of beer last pm. DMD      continue warfarin dose as above and recheck INR in 4 weeks verb under. DMD

## 2023-07-03 ENCOUNTER — DOCUMENTATION (OUTPATIENT)
Dept: CARDIOLOGY | Facility: CLINIC | Age: 63
End: 2023-07-03
Payer: COMMERCIAL

## 2023-07-03 ENCOUNTER — TELEPHONE (OUTPATIENT)
Dept: CARDIOLOGY | Facility: CLINIC | Age: 63
End: 2023-07-03
Payer: COMMERCIAL

## 2023-07-03 NOTE — TELEPHONE ENCOUNTER
Received call from patient who is requesting a letter to return to work on 7/10/23 and whether he has any restrictions or not. Patient had Bi-V ICD upgrade by Dr. Saldaña on 5/24/23. No clear reason as to why patient hasn't previously returned to work. Patient works at a [employer]. Let patient know this author would talk with our nurse practitioners. Patient voiced no other concerns.     KARLOS Lowry

## 2023-07-03 NOTE — PROGRESS NOTES
Bridget is a pleasant 62-year-old male who underwent biventricular ICD upgrade 5/24/2023 by Dr. Saldaña.  From our perspective he could return to work.  We will get Yeny to weigh in on this as she saw him most recently and has a better idea of his functional capacity at this point.  Would not want him to push too hard or to the point of exhaustion.  He is also a patient of Dr. Mcdonald.

## 2023-07-12 PROCEDURE — 93295 DEV INTERROG REMOTE 1/2/MLT: CPT | Performed by: INTERNAL MEDICINE

## 2023-07-12 PROCEDURE — 93296 REM INTERROG EVL PM/IDS: CPT | Performed by: INTERNAL MEDICINE

## 2023-07-13 LAB — LPA SERPL-SCNC: 7 NMOL/L

## 2023-07-24 ENCOUNTER — LAB (OUTPATIENT)
Dept: LAB | Facility: URGENT CARE | Age: 63
End: 2023-07-24
Payer: COMMERCIAL

## 2023-07-24 DIAGNOSIS — Z95.2 PRESENCE OF PROSTHETIC HEART VALVE: ICD-10-CM

## 2023-07-24 LAB
INR BLD: 3.2
PROTHROMBIN TIME: 36.9 SECONDS (ref 9.4–12.5)

## 2023-07-24 PROCEDURE — 85610 PROTHROMBIN TIME: CPT | Performed by: FAMILY MEDICINE

## 2023-07-24 PROCEDURE — 36415 COLL VENOUS BLD VENIPUNCTURE: CPT | Performed by: FAMILY MEDICINE

## 2023-07-25 ENCOUNTER — ANTICOAGULATION VISIT (OUTPATIENT)
Dept: CARDIOLOGY | Facility: CLINIC | Age: 63
End: 2023-07-25
Payer: COMMERCIAL

## 2023-07-25 DIAGNOSIS — Z51.81 ANTICOAGULATION MANAGEMENT ENCOUNTER: ICD-10-CM

## 2023-07-25 DIAGNOSIS — Z79.01 ANTICOAGULATION MANAGEMENT ENCOUNTER: ICD-10-CM

## 2023-07-25 DIAGNOSIS — Z95.2 S/P AVR: Primary | Chronic | ICD-10-CM

## 2023-07-25 LAB — INR PPP: 3.2

## 2023-07-25 NOTE — PROGRESS NOTES
7/25/2023 9;10 pt tested 7/24 received 7/25/2023 spoke to pt verified ID no med or diet changes no bleeding or bruising problems enc to call if changes verified warfarin dose and tab size 35mg/week states trying to drink enough water. DMD      continue warfarin dose as above and recheck INR in 4 weeks and hydrate. verb under. DMD

## 2023-08-15 ENCOUNTER — TELEPHONE (OUTPATIENT)
Dept: CARDIOLOGY | Facility: CLINIC | Age: 63
End: 2023-08-15
Payer: COMMERCIAL

## 2023-08-15 DIAGNOSIS — I50.42 CHRONIC COMBINED SYSTOLIC AND DIASTOLIC CONGESTIVE HEART FAILURE (CMS/HCC): ICD-10-CM

## 2023-08-15 DIAGNOSIS — I42.8 NONISCHEMIC CARDIOMYOPATHY (CMS/HCC): ICD-10-CM

## 2023-08-15 RX ORDER — LISINOPRIL 20 MG/1
20 TABLET ORAL DAILY
Qty: 30 TABLET | Refills: 0 | Status: SHIPPED | OUTPATIENT
Start: 2023-08-15 | End: 2023-08-23 | Stop reason: ALTCHOICE

## 2023-08-15 NOTE — TELEPHONE ENCOUNTER
"Called pt after leaving a message with the clinic regarding refill on lisinopril. Pt states that he is out of lisinopril. Last dose was 8-. According to Yeny Fisher note from clinic visit on 6-2-2023, pt was to finish lisinopril then attempt to switch to Entresto 24/26mg 0.5tab BID. Discussed with pt. Pt is concerned with the cost of entresto. Informed pt that there is a PAP if needed if his insurance does not cover it. Pt states he wants to \"look into the cost\" before he switches. He would also like for Yeny Fisher CNP to weigh in on the change as well. Informed him that we can send in 1 month worth of lisinopril while he investigates the cost of entresto. Will call him back with Yeny Fisher CNP recommendations as well. Pt verbalizes understanding.     Attempted to call pt regarding Yeny Fisher CNP recommendations of pursuing the switch of lisinopril to entresto. No answer. Left message.  "

## 2023-08-21 ENCOUNTER — LAB (OUTPATIENT)
Dept: LAB | Facility: URGENT CARE | Age: 63
End: 2023-08-21
Payer: COMMERCIAL

## 2023-08-21 DIAGNOSIS — T82.857A STENOSIS OF PROSTHETIC AORTIC VALVE, INITIAL ENCOUNTER: ICD-10-CM

## 2023-08-21 DIAGNOSIS — Z95.2 PRESENCE OF PROSTHETIC HEART VALVE: Primary | ICD-10-CM

## 2023-08-21 LAB
INR BLD: 3.4
PROTHROMBIN TIME: 37.4 SECONDS (ref 9.4–12.5)

## 2023-08-21 PROCEDURE — 85610 PROTHROMBIN TIME: CPT | Performed by: FAMILY MEDICINE

## 2023-08-21 PROCEDURE — 36415 COLL VENOUS BLD VENIPUNCTURE: CPT | Performed by: FAMILY MEDICINE

## 2023-08-22 ENCOUNTER — ANTICOAGULATION VISIT (OUTPATIENT)
Dept: CARDIOLOGY | Facility: CLINIC | Age: 63
End: 2023-08-22
Payer: COMMERCIAL

## 2023-08-22 DIAGNOSIS — Z79.01 ANTICOAGULATION MANAGEMENT ENCOUNTER: ICD-10-CM

## 2023-08-22 DIAGNOSIS — Z51.81 ANTICOAGULATION MANAGEMENT ENCOUNTER: ICD-10-CM

## 2023-08-22 DIAGNOSIS — Z95.2 S/P AVR: Primary | Chronic | ICD-10-CM

## 2023-08-22 LAB — INR PPP: 3.4

## 2023-08-22 NOTE — PROGRESS NOTES
8/22/2023 pt tested 8/21 received 8/22/2023. spoke to pt verified ID no med or diet changes no bleeding or bruising problems enc to call if changes verified warfarin dose and tab size 35mg/week DMD      continue warfarin dose with changes above 32.5mg/week based on SS calculations and recheck INR in 4 weeks verb under. DMD

## 2023-08-22 NOTE — PROGRESS NOTES
Subjective    Patient ID: aHyden Franklin is a 62 y.o. male.    Chief Complaint:   Chief Complaint   Patient presents with    Hospital Follow Up     BIV implantation     Hayden Franklin is a very pleasant 62 y.o. y/o male who presents with a PMH significant for   Patient Active Problem List  Diagnosis  · Coronary atherosclerosis of native coronary artery  · Mixed hyperlipidemia  · S/P AVR  · History of coronary artery bypass graft x 1  · Ischemic cardiomyopathy  · Chronic combined systolic and diastolic heart failure (CMS/HCC)  · Asthma-COPD overlap syndrome (CMS/HCC)  · Nonrheumatic aortic valve insufficiency      Hayden was seen recently by Dr. Saldaña 4/26/2023 as he was found to have documented nonsustained VT on remote pacemaker transmission.  He had been seen previously after patient had received a pacemaker at the [facility] where he underwent redo aortic valve replacement.  It was noted then that his EF had been down and hoped it would recover with medical therapy and time postsurgery.  When seen in April for follow-up we continued to note decreased exertional capacity and had been feeling about the same he was on GDMT with spironolactone, lisinopril and metoprolol.  He was on statin and aspirin for CAD.  He has been anticoagulated with warfarin for his aortic valve replacement.    He discussed it with his friends and family and then ultimately decided to proceed with a biventricular ICD which was done 5/24/2023.  He had NYHA class II-III congestive heart failure.  He was ventricular pacemaker dependent due to high degree AV block, LV ejection fraction less than 30% in spite of GDMT.  He was programmed DDD 60-1 30, VT zone 170 and VF zone 200 the existing RV pacing electrode was able to be successfully removed and extracted.  It appears he has an existing atrial lead which was also plugged into the device.  He is here today for follow-up.  He is also scheduled for echo and in clinic  device check today.    Hayden's site has healed up without any issue except it does itch along the lateral edge pocket.  There is no evidence of erosion or infection.  The device is larger than his pacemaker was.  The pocket had to be enlarged to some to accommodate it.  He denies any exertional chest pain or any significant dyspnea.  He does feel better overall since the device was placed.  He denies PND orthopnea or lower extremity swelling.  He notes occasional lightheadedness though no syncopal or near syncopal events.  He works at the [employer] as an .  They have modified his work routine so he is not working around the 5000 V machines.  He has not received any therapy from his device.  EKG today shows atrial sensed biventricular paced rhythm at 70 bpm.  He is still on lisinopril though is switching to Entresto through the CHF clinic.    A limited echo today showed his ejection fraction improved to 39% since implant.    His ICD check today found him to be 99% BiV paced.  Less than 1% atrial paced.  He had 41,800 PVCs though none were 3 or more in a row.    Ejection fraction 2/20/2023 was 31% with LA volume 103 and LA size 4.79 cm.  It was recommended that he undergo upgrade of his pacemaker to a biventricular ICD or at minimum a biventricular pacemaker.    Echo 11/9/2022: EF 29% with akinesis basal inferior wall.  Severe left atrial enlargement with LA VI 52 mL per metered squared, dilated RA, normally functioning 23 mm On-X mechanical aortic valve prosthesis with peak velocity 1.5 m/s, mean gradient 5 mmHg and effective orifice area 2.1 cm².  No significant stenosis or regurgitation.  Mild MR.  Mild TR.  RVSP 34 mmHg.        Most recent remote transmission 6/27/2023 is 0% atrial paced, 100% BiV paced, 1% AT/AF burden.  They came up expected battery 10.5 years.  Normal device function.  Presenting rhythm a sensed BiV paced 85 bpm with occasional PVC.  No significant arrhythmias.  Heart logic  index 0.              Specialty Problems          Cardiology Problems    Coronary atherosclerosis of native coronary artery        Mixed hyperlipidemia        History of coronary artery bypass graft x 1        S/P AVR        Ischemic cardiomyopathy        Chronic combined systolic and diastolic heart failure (CMS/[facility])        Asthma-COPD overlap syndrome (CMS/[facility])        Nonrheumatic aortic valve insufficiency         Past Medical History:   Diagnosis Date    Aortic valve stenosis     s/p AV replacement 11/2013    Arthritis     Bilat hands, ankle    Ascending aorta dilatation (CMS/[facility])     s/p AAA repair 11/2013    Asthma     CAD (coronary artery disease)     1 vessel bypass    Cancer (CMS/[facility])     Skin    CHF (congestive heart failure) (CMS/[facility])     COPD (chronic obstructive pulmonary disease) (CMS/[facility])     Dysrhythmia     nonsustained v-tach    Hyperlipidemia     Ischemic cardiomyopathy     Lung nodule 03/07/2022    Myocardial infarction (CMS/[facility])     Presence of heart assist device (CMS/[facility])     Shortness of breath      Past Surgical History:   Procedure Laterality Date    AAA REPAIR - ENDOGRAFT  11/2013    ANKLE SURGERY      AORTIC ROOT ANGIOGRAM N/A 5/20/2022    Procedure: Aortic Arch  Angiogram;  Surgeon: Derick Gallegos MD;  Location: [facility] Cath Lab;  Service: Cardiovascular;  Laterality: N/A;    AORTIC VALVE REPLACEMENT  11/2013    BIVENTRICULAR ICD UPGRADE N/A 5/24/2023    Procedure: Bi-V ICD upgrade;  Surgeon: Mitchel Saldaña DO;  Location: [facility] EP Lab;  Service: Cardiovascular;  Laterality: N/A;    CORONARY ANGIOPLASTY  11/2013    CORONARY ARTERY BYPASS GRAFT  11/2013    1V CABG SVG- RCA    LEFT HEART CATH N/A 5/20/2022    Procedure: Left heart cath;  Surgeon: Derick Gallegos MD;  Location: [facility] Cath Lab;  Service: Cardiovascular;  Laterality: N/A;     Allergies as of 08/23/2023 - Reviewed 08/23/2023   Allergen Reaction Noted    Ezetimibe Rash 03/16/2020     Current Outpatient Medications   Medication Sig  Dispense Refill    sacubitriL-valsartan (ENTRESTO) 24-26 mg tablet Take 1 tablet by mouth 2 (two) times a day Pt to take 0.5tab BID for 2 weeks then increase to 1 tab BID. Pt will discontinue lisinopril for 3 day washout period prior to starting entresto. 60 tablet 11    lisinopriL (PRINIVIL,ZESTRIL) 10 mg tablet Take 1 tablet (10 mg total) by mouth daily      acetaminophen (TYLENOL) 325 mg tablet Take 2 tablets (650 mg total) by mouth every 6 (six) hours as needed for pain scale 1-3/10      evolocumab (Repatha SureClick) 140 mg/mL pen injector Inject 140 mg under the skin every 14 (fourteen) days 6 mL 3    spironolactone (ALDACTONE) 25 mg tablet Take 1 tablet (25 mg total) by mouth daily 90 tablet 3    metoprolol succinate XL (TOPROL-XL) 50 mg 24 hr tablet Take 1 tablet (50 mg total) by mouth daily 90 tablet 3    warfarin (COUMADIN) 5 mg tablet Take 1 tablet (5 mg total) by mouth daily 90 tablet 3    budesonide-formoteroL (SYMBICORT) 160-4.5 mcg/actuation inhaler Inhale 2 puffs 2 (two) times a day Rinse mouth with water after use. Do not swallow. 10.2 g 11    atorvastatin (LIPITOR) 80 mg tablet Take 1 tablet (80 mg total) by mouth daily (Patient taking differently: Take 1 tablet (80 mg total) by mouth nightly) 90 tablet 3    nitroglycerin (NITROSTAT) 0.4 mg SL tablet Place 1 tablet (0.4 mg total) under the tongue every 5 (five) minutes as needed for chest pain Limit 3 tabs per episode. 25 tablet 1    albuterol HFA (Ventolin HFA) 90 mcg/actuation inhaler Inhale 2 puffs every 4 (four) hours (Patient taking differently: Inhale 2 puffs every 4 (four) hours PRN Daily) 1 Inhaler 11    amoxicillin (AMOXIL) 500 mg tablet SMARTSI Tablet(s) By Mouth      aspirin 81 mg EC tablet Take 1 tablet (81 mg total) by mouth daily 90 tablet 3     No current facility-administered medications for this visit.       Review of Systems  Review of Systems   Cardiovascular:  Negative for chest pain, dyspnea on exertion, irregular heartbeat,  leg swelling/pain, near-syncope, orthopnea, palpitations, PND and syncope/fainting.   Respiratory:  Negative for shortness of breath.    Hematologic/Lymphatic: Bruises/bleeds easily.   Gastrointestinal: Negative.    Genitourinary: Negative.    Neurological:  Negative for dizziness.        Occasional lightheadedness       Objective     Physical Exam  Vitals reviewed.   Constitutional:       Appearance: Normal appearance. He is normal weight.   HENT:      Head: Normocephalic and atraumatic.   Eyes:      General: No scleral icterus.  Neck:      Vascular: Carotid bruit present.   Cardiovascular:      Rate and Rhythm: Normal rate and regular rhythm.      Heart sounds: Murmur heard.   Pulmonary:      Effort: Pulmonary effort is normal.      Breath sounds: Wheezing present. No rhonchi or rales.   Abdominal:      Palpations: Abdomen is soft.      Tenderness: There is no abdominal tenderness.   Musculoskeletal:      Cervical back: Normal range of motion and neck supple.      Right lower leg: No edema.      Left lower leg: No edema.   Skin:     General: Skin is warm and dry.   Neurological:      General: No focal deficit present.      Mental Status: He is alert and oriented to person, place, and time.   Psychiatric:         Mood and Affect: Mood normal.         Behavior: Behavior normal.         Data Review:   Vitals:    08/23/23 1215   BP: 92/63   Pulse: 70   SpO2: 95%   Height: 1.829 m (6')   Weight: 90.7 kg (200 lb)   Patient Position: Sitting     Sodium   Date Value Ref Range Status   05/24/2023 134 (L) 135 - 145 mmol/L Final     Potassium   Date Value Ref Range Status   05/24/2023 4.3 3.5 - 5.1 MMOL/L Final     Chloride   Date Value Ref Range Status   05/24/2023 103 98 - 107 mmol/L Final     CO2   Date Value Ref Range Status   05/24/2023 24 21 - 32 mmol/L Final     BUN   Date Value Ref Range Status   05/24/2023 21 7 - 25 mg/dL Final     Creatinine   Date Value Ref Range Status   05/24/2023 0.93 0.70 - 1.30 mg/dL Final      Glucose   Date Value Ref Range Status   05/24/2023 94 70 - 105 mg/dL Final     Calcium   Date Value Ref Range Status   05/24/2023 9.2 8.6 - 10.3 mg/dL Final     hsTnI 0 Hour   Date Value Ref Range Status   02/26/2022 14.7 <=19.8 pg/mL Final     BNP   Date Value Ref Range Status   03/08/2022 375 (H) 0 - 100 pg/mL Final     WBC   Date Value Ref Range Status   05/24/2023 6.3 3.7 - 9.6 10*3/uL Final     Hemoglobin   Date Value Ref Range Status   05/24/2023 15.7 13.2 - 17.2 g/dL Final     Hematocrit   Date Value Ref Range Status   05/24/2023 45.4 38.0 - 50.0 % Final     MCV   Date Value Ref Range Status   05/24/2023 93.9 82.0 - 97.0 fL Final     Platelets   Date Value Ref Range Status   05/24/2023 171 130 - 350 10*3/uL Final     Cholesterol   Date Value Ref Range Status   04/14/2023 114 0 - 199 mg/dL Final     Triglycerides   Date Value Ref Range Status   04/14/2023 92 <=149 mg/dL Final     HDL   Date Value Ref Range Status   04/14/2023 71 >=40 mg/dL Final     LDL Calculated   Date Value Ref Range Status   04/14/2023 25 20 - 99 mg/dL Final     Lab Results   Component Value Date    NTPROBNPM 171 (H) 02/20/2023     (H) 03/08/2022          Assessment/Plan   Diagnoses and all orders for this visit:    Ischemic cardiomyopathy    History of coronary artery bypass graft x 1    S/P AVR    Mixed hyperlipidemia    History of abdominal aortic aneurysm repair    Chronic combined systolic and diastolic heart failure (CMS/HCC)    Asthma-COPD overlap syndrome (CMS/HCC)    Anticoagulated    Biventricular implantable cardioverter-defibrillator (ICD) in situ    Plan/recommendation:  Ischemic cardiomyopathy: Gopal has a history of coronary artery disease with CABG x1 and AVR at the [facility].  This was a redo AVR.  He is anticoagulated with warfarin for his valve.  He is on GDMT for his cardiomyopathy.  As his ejection fraction continued to be low in the 30% range his dual-chamber pacemaker was replaced with a  biventricular ICD.  The RV pacing lead was explanted at the time of his upgrade.  His echo this morning showed ejection fraction no 39%.  CHF clinic is switching him from lisinopril to Entresto.  He is euvolemic on exam today.  Continue GDMT.    Biventricular ICD: Device check today found his device to be functioning normally.  He is 99% biventricular paced from his ICD.  He has not had any ventricular arrhythmias.  He has had a few ATR episodes.  EKG today shows sinus rhythm atrial sensed ventricular paced at 70 bpm.  We will continue to remotely follow through the ICD clinic.  He does have his transmitter hooked up and functional.  He was encouraged to call with any questions or concerns.  He has been in touch with [employer] regarding his work as an  at the [employer] plant.  His employer has been able to make some adjustments to which equipment he works around.  Questions were answered regarding ANGELA and travel.  I encouraged him to carry his ID card with him.    Valvular heart disease:  Initial aortic valve replaced 11/13 with a 34 mm Dacron graft due to dilated ascending aorta with concomitant AVR Justin Mitroflow bioprosthetic due to nonrheumatic aortic stenosis..Had a redo aortic valve replacement [facility] 9/6/2022.  Recent echo showed found the [place] mechanical aortic valve functioning normally with peak velocity 1.5 m/s, mean gradient 5 mmHg and effective orifice area 2.1 cm².  No significant stenosis or regurgitation.  Continue to follow with Dr. Joshi.    CAD/CABG: History of inferior wall MI 11/13 with CABG x1/SVG to the RCA and AVR with a sending aortic Dacron graft.  No exertional angina.  On aspirin, statin Lipitor 80 mg daily and Repatha.    Anticoagulation: Currently anticoagulated with warfarin for valvular heart disease.  No bleeding issues.    Plan on seeing him back in the clinic on as-needed basis.  We will follow remotely through the device clinic.  Encouraged to  call with any questions or concerns.  He will follow-up with Dr. Joshi in the CHF clinic.

## 2023-08-23 ENCOUNTER — ANCILLARY PROCEDURE (OUTPATIENT)
Dept: CARDIOLOGY | Facility: CLINIC | Age: 63
End: 2023-08-23
Payer: COMMERCIAL

## 2023-08-23 ENCOUNTER — OFFICE VISIT (OUTPATIENT)
Dept: CARDIOLOGY | Facility: CLINIC | Age: 63
End: 2023-08-23
Payer: COMMERCIAL

## 2023-08-23 VITALS
SYSTOLIC BLOOD PRESSURE: 92 MMHG | OXYGEN SATURATION: 95 % | HEART RATE: 70 BPM | WEIGHT: 200 LBS | BODY MASS INDEX: 27.09 KG/M2 | DIASTOLIC BLOOD PRESSURE: 63 MMHG | HEIGHT: 72 IN

## 2023-08-23 VITALS — DIASTOLIC BLOOD PRESSURE: 65 MMHG | SYSTOLIC BLOOD PRESSURE: 109 MMHG

## 2023-08-23 DIAGNOSIS — Z95.810 BIVENTRICULAR IMPLANTABLE CARDIOVERTER-DEFIBRILLATOR (ICD) IN SITU: ICD-10-CM

## 2023-08-23 DIAGNOSIS — I42.8 NONISCHEMIC CARDIOMYOPATHY (CMS/HCC): ICD-10-CM

## 2023-08-23 DIAGNOSIS — J44.89 ASTHMA-COPD OVERLAP SYNDROME (CMS/HCC): Chronic | ICD-10-CM

## 2023-08-23 DIAGNOSIS — Z79.01 ANTICOAGULATED: ICD-10-CM

## 2023-08-23 DIAGNOSIS — Z95.1 HISTORY OF CORONARY ARTERY BYPASS GRAFT X 1: Chronic | ICD-10-CM

## 2023-08-23 DIAGNOSIS — I25.5 ISCHEMIC CARDIOMYOPATHY: Primary | Chronic | ICD-10-CM

## 2023-08-23 DIAGNOSIS — Z45.02 ENCOUNTER FOR INTERROGATION OF CARDIAC DEFIBRILLATOR: Primary | ICD-10-CM

## 2023-08-23 DIAGNOSIS — I50.42 CHRONIC COMBINED SYSTOLIC AND DIASTOLIC HEART FAILURE (CMS/HCC): Primary | Chronic | ICD-10-CM

## 2023-08-23 DIAGNOSIS — E78.2 MIXED HYPERLIPIDEMIA: Chronic | ICD-10-CM

## 2023-08-23 DIAGNOSIS — Z95.2 S/P AVR: Chronic | ICD-10-CM

## 2023-08-23 DIAGNOSIS — Z98.890 HISTORY OF ABDOMINAL AORTIC ANEURYSM REPAIR: ICD-10-CM

## 2023-08-23 DIAGNOSIS — I50.42 CHRONIC COMBINED SYSTOLIC AND DIASTOLIC HEART FAILURE (CMS/HCC): Chronic | ICD-10-CM

## 2023-08-23 DIAGNOSIS — Z45.02 ENCOUNTER FOR INTERROGATION OF CARDIAC DEFIBRILLATOR: ICD-10-CM

## 2023-08-23 DIAGNOSIS — I50.42 CHRONIC COMBINED SYSTOLIC AND DIASTOLIC CONGESTIVE HEART FAILURE (CMS/HCC): ICD-10-CM

## 2023-08-23 LAB
AV PEAK GRADIENT: 9.73 MMHG
DOP CALC AO PEAK VEL: 1.56 M/S
DOP CALC LVOT DIAMETER: 2.25 CM
E/A RATIO: 0.7
E/E' RATIO (AVERAGE): 7.2
E/E' RATIO: 8.2
EJECTION FRACTION: 41 %
ERAP: 5 MMHG
INTERVENTRICULAR SEPTUM: 0.8 CM (ref 0.6–1.1)
IVC PROX: 1.66 CM
LA AREA A4C SYSTOLE: 66.5 CM3
LEFT ATRIUM VOLUME INDEX: 39 ML/M2
LEFT ATRIUM VOLUME: 84.4 CM3
LEFT INTERNAL DIMENSION IN SYSTOLE: 4.5 CM (ref 2.1–4)
LEFT VENTRICLE DIASTOLIC VOLUME: 185 CM3
LEFT VENTRICLE SYSTOLIC VOLUME: 113 CM3
LEFT VENTRICULAR INTERNAL DIMENSION IN DIASTOLE: 5.7 CM (ref 3.5–6)
LVAD-AP2: 43.6 CM2
ML OF DILUTED DEFINITY INJECTED: 1 ML
MV DT: 214 MS
MV PEAK A VEL: 86 CM/S
MV PEAK E VEL: 56.3 CM/S
POSTERIOR WALL: 0.9 CM (ref 0.6–1.1)
PULM VEIN S/D RATIO: 1.8
PV PEAK D VEL: 37 CM/S
PV PEAK S VEL: 65 CM/S
RA AREA: 27.2 CM2
RIGHT VENTRICULAR INTERNAL DIMENSION IN DIASTOLE: 4.1 CM
RV AP4 BASE: 4.7 CM
S': 7.3 CM/S
TDI: 6.9 CM/S
TDILATERAL: 9.3 CM/S
TR MAX PG: 22.47 MMHG
TRICUSPID ANNULAR PLANE SYSTOLIC EXCURSION: 1.2 CM
TRICUSPID VALVE PEAK REGURGITATION VELOCITY: 2.37 M/S
TV REST PULMONARY ARTERY PRESSURE: 27 MMHG

## 2023-08-23 PROCEDURE — 93325 DOPPLER ECHO COLOR FLOW MAPG: CPT | Performed by: INTERNAL MEDICINE

## 2023-08-23 PROCEDURE — 93284 PRGRMG EVAL IMPLANTABLE DFB: CPT | Performed by: INTERNAL MEDICINE

## 2023-08-23 PROCEDURE — 93321 DOPPLER ECHO F-UP/LMTD STD: CPT | Performed by: INTERNAL MEDICINE

## 2023-08-23 PROCEDURE — 93000 ELECTROCARDIOGRAM COMPLETE: CPT | Mod: 59,NC | Performed by: INTERNAL MEDICINE

## 2023-08-23 PROCEDURE — 99213 OFFICE O/P EST LOW 20 MIN: CPT | Performed by: NURSE PRACTITIONER

## 2023-08-23 PROCEDURE — 93308 TTE F-UP OR LMTD: CPT | Performed by: INTERNAL MEDICINE

## 2023-08-23 RX ORDER — SACUBITRIL AND VALSARTAN 24; 26 MG/1; MG/1
1 TABLET, FILM COATED ORAL 2 TIMES DAILY
Qty: 60 TABLET | Refills: 11 | Status: SHIPPED | OUTPATIENT
Start: 2023-08-23 | End: 2024-10-07 | Stop reason: SDUPTHER

## 2023-08-23 RX ORDER — LISINOPRIL 10 MG/1
10 TABLET ORAL DAILY
Status: ON HOLD | COMMUNITY
End: 2023-10-05

## 2023-08-23 ASSESSMENT — ENCOUNTER SYMPTOMS
BRUISES/BLEEDS EASILY: 1
ORTHOPNEA: 0
IRREGULAR HEARTBEAT: 0
PND: 0
GASTROINTESTINAL NEGATIVE: 1
DIZZINESS: 0
SYNCOPE: 0
NEAR-SYNCOPE: 0
SHORTNESS OF BREATH: 0
PALPITATIONS: 0

## 2023-08-23 NOTE — PATIENT INSTRUCTIONS
Followup with Electrophysiology as needed otherwise followup with CHF clinic and Dr. Sarmiento    Call if any questions or concerns    Cardiac prevention:  1.  Lipid profile check annually.  2.  Diabetes screening annually.  3.  Maintain healthy blood pressure with a goal 120/80 or below consistently.  4.  Follow-up no added salt diet.  5.  Maintain healthy eating habits:  Primarily plant-based diet.  Lean meats, chicken, seafood several times a week or fish oil capsules, avoid processed meats or excessive red meats, cut off visible fat  Whole grains/beans peas lentils quinoa, farro,  Avoid refined carbohydrates: Cookies, pies, cakes, donuts, ice cream, candy.  5+ servings of fruits/vegetables daily.  6.  If you smoke smoking cessation recommended.  If you need help call the [place] quit hotline [phone] CST Monday through Friday 7 AM to 11 PM and 8 AM to 5 PM Saturday.  7.  Maintain healthy weight with goal for BMI in the normal range less than 25.  8.  Exercise regularly: 30 to 45 minutes most days of the week.  Weightbearing exercise helps with bone health.  Weight training several times a week to maintain muscular strength.  9.  Maintain adequate hydration with 6 to 8 glasses of water per day.  Goal should be approximately 2 quarts daily of all fluids combined.  Coffee can act as a diuretic.  Recommend not more than 1 to 2 cups/day. Avoid any sugar sweetened beverages.    Exercise/weight management:  1.  Strive to maintain close to ideal body weight  2.  Exercise at least 150 minutes/week with a combination of cardio and strength training  3.  Avoid  alcohol if you have atrial fibrillation.  Recommend less than 1 alcoholic beverage per day for females and no more than 2 daily for males.    For patient with arterial disease (coronary, peripheral arterial disease ) a plant based diet may reverse plaque buildup: In addition to cholesterol lowering medications consider adding the following:    Look into   "Tono's diet and the Ornish diet which are plant based. The Pritikin diet allows for some meat and may be a better place to start initially.  Look into getting aged garlic extract (kyolic) which has been shown to reverse plaque on CAT scans  Look into plant sterols (cholest-off) which may help lower cholesterol levels  Try to increase fiber intake particularly before meals that do not have a lot of fruits and vegetables in them or before meals that have meat or dairy in them to help bind up some of the cholesterol and saturated fat in your bowels  Consider watching The Game Changer on [employer] and other documentaries like [employer] over knives, Food Inc, Fast Food Nation etc...  Try to maintain less than 2 gram salt restricted diet and avoid adding salt to food. Read food labels for hidden salt.  Look into books like [employer], Fast Food Nation, Un-Do-It, Fiber Fueled  IF SOMETHING COMES IN A PACKAGE THINK TWICE ABOUT EATING IT, ESPECIALLY IF IT CONTAINS WORDS YOU CANNOT PRONOUNCE OR \"NATURAL AND ARTIFICAL FLAVORS\"       Dietary and lifestyle recommendations  The Pritikin diet allows for some meat and may be a better place to start initially. Remember that smaller changes are often best because the stack up and lead to more sustainable large changes  Look into Dr. Power's diet and the Ornish diet which are plant based.   Try to increase fiber intake particularly before meals that do not have a lot of fruits and vegetables in them or before meals that have meat or dairy in them to help bind up some of the cholesterol and saturated fat in your bowels  Consider watching the game changer on [employer] and other documentaries like [employer] over knives, Food Inc, Fast Food Nation, What the Health etc...  Try to maintain less than 2 gram salt restricted diet and avoid adding salt to food. Read food labels for hidden salt.  Look into books like [employer], Fast Food Nation, Un-Do-It, Fiber Fueled, How Not to Diet, How Not to " "Die  Look into a nonprofit website called nutritionfacts.org for resources   Cookbooks:   The No Meat Athlete Cookbook: Whole Food, Plant-Based Recipes to Fuel Your Workouts?and the Rest of Your Life  IF SOMETHING COMES IN A PACKAGE THINK TWICE ABOUT EATING IT, ESPECIALLY IF IT CONTAINS WORDS YOU CANNOT PRONOUNCE OR \"NATURAL AND ARTIFICAL FLAVORS\"  For recipes go to:  [employer]  Happyhealthylonglife[employer]ACTcom  Look to get your medications on Xquva for cheaper perscriptions  Consider shopping at [facility] or the Market in town and or the [employer] market    "

## 2023-09-18 ENCOUNTER — LAB (OUTPATIENT)
Dept: LAB | Facility: URGENT CARE | Age: 63
End: 2023-09-18
Payer: COMMERCIAL

## 2023-09-18 DIAGNOSIS — Z95.2 PRESENCE OF PROSTHETIC HEART VALVE: ICD-10-CM

## 2023-09-18 LAB
INR BLD: 3.2
PROTHROMBIN TIME: 34.7 SECONDS (ref 9.4–12.5)

## 2023-09-18 PROCEDURE — 85610 PROTHROMBIN TIME: CPT | Performed by: FAMILY MEDICINE

## 2023-09-18 PROCEDURE — 36415 COLL VENOUS BLD VENIPUNCTURE: CPT | Performed by: FAMILY MEDICINE

## 2023-09-19 ENCOUNTER — ANTICOAGULATION VISIT (OUTPATIENT)
Dept: CARDIOLOGY | Facility: CLINIC | Age: 63
End: 2023-09-19
Payer: COMMERCIAL

## 2023-09-19 DIAGNOSIS — Z51.81 ANTICOAGULATION MANAGEMENT ENCOUNTER: ICD-10-CM

## 2023-09-19 DIAGNOSIS — Z79.01 ANTICOAGULATION MANAGEMENT ENCOUNTER: ICD-10-CM

## 2023-09-19 DIAGNOSIS — Z95.2 S/P AVR: Primary | Chronic | ICD-10-CM

## 2023-09-19 LAB — INR PPP: 3.2

## 2023-09-19 NOTE — PROGRESS NOTES
9/19/2023 9:34 pt tested 9/18 received 9/19/2023. msg for pt to call [facility] re INR. DMD  9:38 pt called in verified ID no med or diet changes discussed vit K foods and servings pt will add one little more each week no bleeding or bruising problems enc to call if changes verified warfarin dose and tab size 32.5mg/week  DMD      Continue warfarin dose as above and recheck INR in  4  weeks. Verb under. DMD

## 2023-09-28 ENCOUNTER — TELEPHONE (OUTPATIENT)
Dept: CARDIOLOGY | Facility: CLINIC | Age: 63
End: 2023-09-28
Payer: COMMERCIAL

## 2023-09-28 NOTE — TELEPHONE ENCOUNTER
Spoke to pt verified ID inst hold warfarin 5 days for procedure see order in media. Verb under. DMD

## 2023-10-03 DIAGNOSIS — I25.10 CORONARY ARTERY DISEASE INVOLVING NATIVE CORONARY ARTERY OF NATIVE HEART WITHOUT ANGINA PECTORIS: ICD-10-CM

## 2023-10-03 DIAGNOSIS — E78.2 MIXED HYPERLIPIDEMIA: ICD-10-CM

## 2023-10-03 RX ORDER — ATORVASTATIN CALCIUM 80 MG/1
80 TABLET, FILM COATED ORAL NIGHTLY
Qty: 90 TABLET | Refills: 1 | Status: SHIPPED | OUTPATIENT
Start: 2023-10-03 | End: 2024-03-25 | Stop reason: SDUPTHER

## 2023-10-05 ENCOUNTER — ANESTHESIA EVENT (OUTPATIENT)
Dept: GASTROENTEROLOGY | Facility: HOSPITAL | Age: 63
End: 2023-10-05
Payer: COMMERCIAL

## 2023-10-05 ENCOUNTER — HOSPITAL ENCOUNTER (OUTPATIENT)
Facility: HOSPITAL | Age: 63
Setting detail: OUTPATIENT SURGERY
Discharge: HOME | End: 2023-10-05
Attending: INTERNAL MEDICINE | Admitting: INTERNAL MEDICINE
Payer: COMMERCIAL

## 2023-10-05 ENCOUNTER — ANESTHESIA (OUTPATIENT)
Dept: GASTROENTEROLOGY | Facility: HOSPITAL | Age: 63
End: 2023-10-05
Payer: COMMERCIAL

## 2023-10-05 VITALS
RESPIRATION RATE: 18 BRPM | OXYGEN SATURATION: 92 % | BODY MASS INDEX: 27.38 KG/M2 | SYSTOLIC BLOOD PRESSURE: 109 MMHG | WEIGHT: 202.16 LBS | HEART RATE: 71 BPM | HEIGHT: 72 IN | TEMPERATURE: 97.6 F | DIASTOLIC BLOOD PRESSURE: 71 MMHG

## 2023-10-05 PROCEDURE — (BLANK) HC RECOVERY PHASE-2 EACH ADDITIONAL 1/2 HOUR ACUITY LEVEL 2: Performed by: INTERNAL MEDICINE

## 2023-10-05 PROCEDURE — 2500000200 HC RX 250 WO HCPCS: Performed by: NURSE ANESTHETIST, CERTIFIED REGISTERED

## 2023-10-05 PROCEDURE — 2580000300 HC RX 258: Performed by: NURSE ANESTHETIST, CERTIFIED REGISTERED

## 2023-10-05 PROCEDURE — (BLANK) HC MAC ANESTHESIA FACILITY CHARGE EACH ADDITIONAL MIN: Performed by: INTERNAL MEDICINE

## 2023-10-05 PROCEDURE — (BLANK) HC MAC ANESTHESIA FACILITY CHARGE 1ST 15 MIN: Performed by: INTERNAL MEDICINE

## 2023-10-05 PROCEDURE — 6360000200 HC RX 636 W HCPCS (ALT 250 FOR IP): Performed by: NURSE ANESTHETIST, CERTIFIED REGISTERED

## 2023-10-05 PROCEDURE — (BLANK) HC RECOVERY PHASE-2 1ST 1/2 HOUR ACUITY LEVEL 2: Performed by: INTERNAL MEDICINE

## 2023-10-05 PROCEDURE — C1889 IMPLANT/INSERT DEVICE, NOC: HCPCS | Performed by: INTERNAL MEDICINE

## 2023-10-05 PROCEDURE — (BLANK): Performed by: INTERNAL MEDICINE

## 2023-10-05 PROCEDURE — 2720000000 HC SUPP 272 WO HCPCS: Performed by: INTERNAL MEDICINE

## 2023-10-05 PROCEDURE — 00811 ANES LWR INTST NDSC NOS: CPT | Mod: PT | Performed by: NURSE ANESTHETIST, CERTIFIED REGISTERED

## 2023-10-05 DEVICE — CLIP
Type: IMPLANTABLE DEVICE | Site: PERIANAL | Status: FUNCTIONAL
Brand: RESOLUTION 360™ ULTRA CLIP

## 2023-10-05 RX ORDER — SODIUM CHLORIDE, SODIUM LACTATE, POTASSIUM CHLORIDE, CALCIUM CHLORIDE 600; 310; 30; 20 MG/100ML; MG/100ML; MG/100ML; MG/100ML
INJECTION, SOLUTION INTRAVENOUS CONTINUOUS PRN
Status: DISCONTINUED | OUTPATIENT
Start: 2023-10-05 | End: 2023-10-05 | Stop reason: SURG

## 2023-10-05 RX ORDER — PROPOFOL 10 MG/ML
INJECTION, EMULSION INTRAVENOUS AS NEEDED
Status: DISCONTINUED | OUTPATIENT
Start: 2023-10-05 | End: 2023-10-05 | Stop reason: SURG

## 2023-10-05 RX ORDER — LIDOCAINE HYDROCHLORIDE 20 MG/ML
INJECTION, SOLUTION EPIDURAL; INFILTRATION; INTRACAUDAL; PERINEURAL AS NEEDED
Status: DISCONTINUED | OUTPATIENT
Start: 2023-10-05 | End: 2023-10-05 | Stop reason: SURG

## 2023-10-05 RX ORDER — PROPOFOL 10 MG/ML
INJECTION, EMULSION INTRAVENOUS CONTINUOUS PRN
Status: DISCONTINUED | OUTPATIENT
Start: 2023-10-05 | End: 2023-10-05 | Stop reason: SURG

## 2023-10-05 RX ADMIN — SODIUM CHLORIDE, POTASSIUM CHLORIDE, SODIUM LACTATE AND CALCIUM CHLORIDE: 600; 310; 30; 20 INJECTION, SOLUTION INTRAVENOUS at 13:39

## 2023-10-05 RX ADMIN — NOREPINEPHRINE BITARTRATE 16 MCG: 1 INJECTION, SOLUTION, CONCENTRATE INTRAVENOUS at 14:06

## 2023-10-05 RX ADMIN — NOREPINEPHRINE BITARTRATE 8 MCG: 1 INJECTION, SOLUTION, CONCENTRATE INTRAVENOUS at 14:03

## 2023-10-05 RX ADMIN — NOREPINEPHRINE BITARTRATE 8 MCG: 1 INJECTION, SOLUTION, CONCENTRATE INTRAVENOUS at 13:47

## 2023-10-05 RX ADMIN — NOREPINEPHRINE BITARTRATE 8 MCG: 1 INJECTION, SOLUTION, CONCENTRATE INTRAVENOUS at 13:54

## 2023-10-05 RX ADMIN — LIDOCAINE HYDROCHLORIDE 40 MG: 20 INJECTION, SOLUTION EPIDURAL; INFILTRATION; INTRACAUDAL; PERINEURAL at 13:41

## 2023-10-05 RX ADMIN — NOREPINEPHRINE BITARTRATE 8 MCG: 1 INJECTION, SOLUTION, CONCENTRATE INTRAVENOUS at 14:00

## 2023-10-05 RX ADMIN — PROPOFOL 50 MG: 10 INJECTION, EMULSION INTRAVENOUS at 13:41

## 2023-10-05 RX ADMIN — PROPOFOL 140 MCG/KG/MIN: 10 INJECTION, EMULSION INTRAVENOUS at 13:41

## 2023-10-05 RX ADMIN — PROPOFOL 30 MG: 10 INJECTION, EMULSION INTRAVENOUS at 13:43

## 2023-10-05 ASSESSMENT — ENCOUNTER SYMPTOMS
CONFUSION: 0
DIFFICULTY URINATING: 0
TROUBLE SWALLOWING: 0
CONSTIPATION: 0
SHORTNESS OF BREATH: 1
UNEXPECTED WEIGHT CHANGE: 0
EYE ITCHING: 0
ABDOMINAL DISTENTION: 0
ADENOPATHY: 0
BRUISES/BLEEDS EASILY: 0
DIZZINESS: 0
APPETITE CHANGE: 0
EYE REDNESS: 0
TREMORS: 0
COLOR CHANGE: 0
HEMATURIA: 0
ANAL BLEEDING: 0
POLYPHAGIA: 0
DYSURIA: 0
SPEECH DIFFICULTY: 0
JOINT SWELLING: 0
DIARRHEA: 0
SLEEP DISTURBANCE: 0
BLOOD IN STOOL: 0
MYALGIAS: 0
AGITATION: 0
NECK PAIN: 0
PHOTOPHOBIA: 0
CHOKING: 0
SEIZURES: 0
ARTHRALGIAS: 0
LIGHT-HEADEDNESS: 0
DYSRHYTHMIAS: 1
HEADACHES: 0
POLYDIPSIA: 0
ABDOMINAL PAIN: 0
EXERCISE TOLERANCE: GOOD (4-7 METS)
VOMITING: 0
FREQUENCY: 0
SHORTNESS OF BREATH: 0
FEVER: 0
NUMBNESS: 0
NERVOUS/ANXIOUS: 0
NECK STIFFNESS: 0
COUGH: 0
NAUSEA: 0
FATIGUE: 0
EYE DISCHARGE: 0
PALPITATIONS: 0
WHEEZING: 0
WEAKNESS: 0

## 2023-10-05 ASSESSMENT — COPD QUESTIONNAIRES: COPD: 1

## 2023-10-05 NOTE — ANESTHESIA PREPROCEDURE EVALUATION
Pre-Procedure Assessment    Patient: Hayden Franklin, male, 63 y.o.    Ht Readings from Last 1 Encounters:   23 1.829 m (6')     Wt Readings from Last 1 Encounters:   23 90.7 kg (200 lb)       Last Vitals  BP      Temp      Pulse     Resp      SpO2      Pain Score         Problem list reviewed and Medical history reviewed           Airway   Mallampati: II  TM distance: >3 FB  Neck ROM: full      Dental      Pulmonary     breath sounds clear to auscultation  (+) COPD, asthma, shortness of breath,   Cardiovascular   Exercise tolerance: good (4-7 METS)  (+) pacemaker (pacer dependent), valvular problems/murmurs (mechanical AVR), past MI, CAD, dysrhythmias, CHF (EF 30%), murmur,     Rhythm: regular  Rate: normal    Mental Status/Neuro/Psych    Pt is alert.      (+) arthritis,     GI/Hepatic/Renal - negative ROS     Endo/Other    (+) history of cancer,   Abdominal           Social History     Tobacco Use   • Smoking status: Former     Packs/day: 0.75     Years: 30.00     Additional pack years: 0.00     Total pack years: 22.50     Types: Cigarettes     Quit date: 2022     Years since quittin.6   • Smokeless tobacco: Never   • Tobacco comments:     Last smoked 3/1/22   Substance Use Topics   • Alcohol use: Yes     Alcohol/week: 4.0 - 5.0 standard drinks of alcohol     Types: 4 - 5 Cans of beer per week     Comment: 3-4 beers weekly or less      Hematology   WBC   Date Value Ref Range Status   2023 6.3 3.7 - 9.6 10*3/uL Final     RBC   Date Value Ref Range Status   2023 4.83 4.10 - 5.80 10*6/µL Final     MCV   Date Value Ref Range Status   2023 93.9 82.0 - 97.0 fL Final     Hemoglobin   Date Value Ref Range Status   2023 15.7 13.2 - 17.2 g/dL Final     Hematocrit   Date Value Ref Range Status   2023 45.4 38.0 - 50.0 % Final     Platelets   Date Value Ref Range Status   2023 171 130 - 350 10*3/uL Final      Coagulation   Protime   Date Value Ref Range Status    09/18/2023 34.7 (H) 9.4 - 12.5 SECONDS Final     INR   Date Value Ref Range Status   09/18/2023 3.2 (H) <=1.1 Final   09/18/2023 3.20  Final      General Chemistry   Calcium   Date Value Ref Range Status   05/24/2023 9.2 8.6 - 10.3 mg/dL Final     BUN   Date Value Ref Range Status   05/24/2023 21 7 - 25 mg/dL Final     Creatinine   Date Value Ref Range Status   05/24/2023 0.93 0.70 - 1.30 mg/dL Final     Glucose   Date Value Ref Range Status   05/24/2023 94 70 - 105 mg/dL Final     Sodium   Date Value Ref Range Status   05/24/2023 134 (L) 135 - 145 mmol/L Final     Potassium   Date Value Ref Range Status   05/24/2023 4.3 3.5 - 5.1 MMOL/L Final     CO2   Date Value Ref Range Status   05/24/2023 24 21 - 32 mmol/L Final     Chloride   Date Value Ref Range Status   05/24/2023 103 98 - 107 mmol/L Final     Anesthesia Plan    ASA 4   NPO status reviewed: > 8 hours    MAC         Induction: intravenous                 Anesthetic plan and risks discussed with patient.

## 2023-10-05 NOTE — PERIOPERATIVE NURSING NOTE
Patient provided with verbal and written instructions by Dr. Hutton. Patient verbalized understanding. Vital signs stable. Discharged to home.

## 2023-10-05 NOTE — POST-PROCEDURE NOTE
Brief GI Post-op Note     *See provation record for full operative report*     10/05/23    Hayden Franklin is a 63 y.o. male     Pre-op Diagnosis:   Positive Cologuard    Procedure:    COLONOSCOPY with polypectomy and clip placement      Anesthesia: MAC-team     Specimens: 4 polyps    Complications: none    Findings:  Please see my procedure report    Recommendations: Please see my procedure report      Patricia Hutton MD

## 2023-10-05 NOTE — H&P
GI PRE-PROCEDURE H&P      10/05/23    Hayden Franklin is a 63 y.o. male with positive Cologuard. NO prior colonoscopy. Denies family history of CRC.     History  Past Medical History:   Diagnosis Date    Aortic valve stenosis     s/p AV replacement 11/2013    Arthritis     Bilat hands, ankle    Ascending aorta dilatation (CMS/[facility])     s/p AAA repair 11/2013    Asthma     CAD (coronary artery disease)     1 vessel bypass    Cancer (CMS/[facility])     Skin    CHF (congestive heart failure) (CMS/[facility])     COPD (chronic obstructive pulmonary disease) (CMS/[facility])     Dysrhythmia     nonsustained v-tach    Hyperlipidemia     Ischemic cardiomyopathy     Lung nodule 03/07/2022    Myocardial infarction (CMS/[facility])     Presence of heart assist device (CMS/[facility])     Shortness of breath      Past Surgical History:   Procedure Laterality Date    AAA REPAIR - ENDOGRAFT  11/2013    ANKLE SURGERY      AORTIC ROOT ANGIOGRAM N/A 5/20/2022    Procedure: Aortic Arch  Angiogram;  Surgeon: Derick Gallegos MD;  Location: [facility] Cath Lab;  Service: Cardiovascular;  Laterality: N/A;    AORTIC VALVE REPLACEMENT  11/2013    BIVENTRICULAR ICD UPGRADE N/A 5/24/2023    Procedure: Bi-V ICD upgrade;  Surgeon: Mitchel Saldaña DO;  Location: [facility] EP Lab;  Service: Cardiovascular;  Laterality: N/A;    CORONARY ANGIOPLASTY  11/2013    CORONARY ARTERY BYPASS GRAFT  11/2013    1V CABG SVG- RCA    LEFT HEART CATH N/A 5/20/2022    Procedure: Left heart cath;  Surgeon: Derick Gallegos MD;  Location: [facility] Cath Lab;  Service: Cardiovascular;  Laterality: N/A;     Family History   Problem Relation Age of Onset    No Known Problems Mother     Heart disease Father     Aneurysm Father     Other Sister         undiagnosed neurological disease    Breast cancer Sister     No Known Problems Sister     Gallbladder disease Daughter     No Known Problems Daughter      Social History     Socioeconomic History    Marital status:      Spouse name: Robina    Number of children:  2    Years of education: Not on file    Highest education level: Not on file   Occupational History    Occupation:  at [employer] plant   Tobacco Use    Smoking status: Former     Packs/day: 0.75     Years: 30.00     Additional pack years: 0.00     Total pack years: 22.50     Types: Cigarettes     Quit date: 2022     Years since quittin.6    Smokeless tobacco: Never    Tobacco comments:     Last smoked 3/1/22   Vaping Use    Vaping Use: Never used   Substance and Sexual Activity    Alcohol use: Yes     Alcohol/week: 4.0 - 5.0 standard drinks of alcohol     Types: 4 - 5 Cans of beer per week     Comment: 3-4 beers weekly or less    Drug use: No    Sexual activity: Defer   Other Topics Concern    Not on file   Social History Narrative    Not on file     Social Determinants of Health     Financial Resource Strain: Unknown (3/16/2020)    Overall Financial Resource Strain (CARDIA)     Difficulty of Paying Living Expenses: Patient refused   Food Insecurity: Unknown (3/16/2020)    Hunger Vital Sign     Worried About Running Out of Food in the Last Year: Patient refused     Ran Out of Food in the Last Year: Patient refused   Transportation Needs: Unknown (3/16/2020)    PRAPARE - Transportation     Lack of Transportation (Medical): Patient refused     Lack of Transportation (Non-Medical): Patient refused   Physical Activity: Not on file   Stress: Not on file   Social Connections: Not on file   Intimate Partner Violence: Not on file   Housing Stability: Not on file       Review of Systems  Review of Systems   Constitutional:  Negative for appetite change, fatigue, fever and unexpected weight change.   HENT:  Negative for ear pain, mouth sores and trouble swallowing.    Eyes:  Negative for photophobia, discharge, redness, itching and visual disturbance.   Respiratory:  Negative for cough, choking, shortness of breath and wheezing.    Cardiovascular:  Negative for chest pain, palpitations and leg swelling.    Gastrointestinal:  Negative for abdominal distention, abdominal pain, anal bleeding, blood in stool, constipation, diarrhea, nausea and vomiting.   Endocrine: Negative for cold intolerance, heat intolerance, polydipsia, polyphagia and polyuria.   Genitourinary:  Negative for difficulty urinating, dysuria, frequency, hematuria and urgency.   Musculoskeletal:  Negative for arthralgias, joint swelling, myalgias, neck pain and neck stiffness.   Skin:  Negative for color change, pallor and rash.   Allergic/Immunologic: Negative for environmental allergies, food allergies and immunocompromised state.   Neurological:  Negative for dizziness, tremors, seizures, syncope, speech difficulty, weakness, light-headedness, numbness and headaches.   Hematological:  Negative for adenopathy. Does not bruise/bleed easily.   Psychiatric/Behavioral:  Negative for agitation, confusion and sleep disturbance. The patient is not nervous/anxious.        Objective     Physical Exam  Nursing notes and vitals reviewed.  Constitutional: Alert and oriented to person, place, and time; no acute distress.   HEENT: Normocephalic and atraumatic. PERRLA. EOM intact. Conjunctivae are clear.   Neck: Supple with no lymphadenopathy.   Cardiovascular: Normal rate, regular rhythm and normal heart sounds.    Pulmonary: Clear to ascultation bilaterally. No wheezes or rhonchi.   Abdominal: Non-distended; soft; normoactive bowel sounds; non-tender to palpation with no rebound or guarding; no palpable masses or organomegaly.     Lab Review   No visits with results within 1 Day(s) from this visit.   Latest known visit with results is:   Anticoagulation Visit on 09/19/2023   Component Date Value Ref Range Status    INR 09/18/2023 3.20   Final        Imaging  No results found.    ASSESSMENT/PLAN:   Proceed with screening colonoscopy as scheduled    The risks and benefits of colonoscopy have been discussed with the patient, including possibility of the  procedure-related complications such as bleeding, infection that can be transmitted with the scope, and perforation; as well as sedation-related complications, including hypotension, hypoxia and even death. The patient appeared to understand all the risks involved and gave me permission to proceed.     Patricia Hutton MD

## 2023-10-06 NOTE — ANESTHESIA POSTPROCEDURE EVALUATION
Patient: Hayden Franklin    Procedure Summary     Date: 10/05/23 Room / Location: [facility] ENDO 02 / [facility]    Anesthesia Start: 1339 Anesthesia Stop: 1421    Procedure: COLONOSCOPY with polypectomies, endo clip placement x2 (Anus) Diagnosis:       Colon cancer screening      (Diverticulosis)      (Colon Polyp)    Providers: Patricia Hutton MD Responsible Provider: Obed Figueroa MD    Anesthesia Type: MAC ASA Status: 4          Anesthesia Type: MAC    Last vitals  Vitals Value Taken Time   /71 10/05/23 1430   Temp 36.4 °C (97.6 °F) 10/05/23 1418   Pulse 71 10/05/23 1430   Resp 18 10/05/23 1430   SpO2 92 % 10/05/23 1430   0-10 Pain Score 0 - No pain 10/05/23 1418       Anesthesia Post Evaluation    Patient location during evaluation: bedside  Patient participation: complete - patient participated  Level of consciousness: awake and alert  Pain management: adequate  Airway patency: patent  Anesthetic complications: no  Cardiovascular status: acceptable  Respiratory status: acceptable  Hydration status: acceptable  May dismiss recovered patient based on consultation with the appropriate physicians and/or meeting appropriate discharge criteria      Cosmetic?  This procedure is not cosmetic.

## 2023-10-18 ENCOUNTER — LAB (OUTPATIENT)
Dept: LAB | Facility: URGENT CARE | Age: 63
End: 2023-10-18
Payer: COMMERCIAL

## 2023-10-18 DIAGNOSIS — Z95.2 PRESENCE OF PROSTHETIC HEART VALVE: ICD-10-CM

## 2023-10-18 LAB
INR BLD: 1.7
PROTHROMBIN TIME: 18.4 SECONDS (ref 9.4–12.5)

## 2023-10-18 PROCEDURE — 36415 COLL VENOUS BLD VENIPUNCTURE: CPT | Performed by: FAMILY MEDICINE

## 2023-10-18 PROCEDURE — 85610 PROTHROMBIN TIME: CPT | Performed by: FAMILY MEDICINE

## 2023-10-19 ENCOUNTER — ANTICOAGULATION VISIT (OUTPATIENT)
Dept: CARDIOLOGY | Facility: CLINIC | Age: 63
End: 2023-10-19
Payer: COMMERCIAL

## 2023-10-19 DIAGNOSIS — Z95.2 S/P AVR: Primary | Chronic | ICD-10-CM

## 2023-10-19 DIAGNOSIS — Z51.81 ANTICOAGULATION MANAGEMENT ENCOUNTER: ICD-10-CM

## 2023-10-19 DIAGNOSIS — Z79.01 ANTICOAGULATION MANAGEMENT ENCOUNTER: ICD-10-CM

## 2023-10-19 LAB — INR PPP: 1.7

## 2023-10-19 NOTE — PROGRESS NOTES
10/19/2023 pt tested 10/18/2023 received 10/19/2023 msg for pt to call [facility] re INR. DMD 9:15 pt called in verified ID no med or diet changes  no bleeding or bruising problems enc to call if changes verified warfarin dose and tab size 32.5mg/week mailed recheck reminder pt did have procedure 10/5 and restarted warfarin that same day. DMD      today only 10/19 take 10mg=2 tabs based on SS calculations then continue warfarin dose as above and recheck INR 11 days. verb under. DMD

## 2023-10-20 ENCOUNTER — TELEPHONE (OUTPATIENT)
Dept: FAMILY MEDICINE | Facility: CLINIC | Age: 63
End: 2023-10-20
Payer: COMMERCIAL

## 2023-10-20 NOTE — TELEPHONE ENCOUNTER
Caller would like to discuss (a)  results  Writer has advised caller of a callback from within 24 hours.    Patient: Hayden Franklin    Caller Name (Last and first, relation/role): Self    Name of Facility: N/A    Callback Number: [phone]    Best Availability: Anytime    Fax Number: N/A    Additional Info: Pt would like a call to discuss results of his recent colonoscopy    Did you confirm the message with the caller: Yes    Is it okay that the nurse communicates your response through [employer]? No

## 2023-10-22 ENCOUNTER — HEALTH MAINTENANCE LETTER (OUTPATIENT)
Age: 63
End: 2023-10-22

## 2023-10-24 NOTE — TELEPHONE ENCOUNTER
Spoke with patient and he stated that he has his colonoscopy. They called him with results and recommendations and told him to have a follow up colonoscopy in 3 years. I will update pt chart. Patient does not have any other issues or concerns today.

## 2023-10-27 PROCEDURE — 93295 DEV INTERROG REMOTE 1/2/MLT: CPT | Performed by: INTERNAL MEDICINE

## 2023-10-30 ENCOUNTER — ANTICOAGULATION VISIT (OUTPATIENT)
Dept: CARDIOLOGY | Facility: CLINIC | Age: 63
End: 2023-10-30
Payer: COMMERCIAL

## 2023-10-30 ENCOUNTER — LAB (OUTPATIENT)
Dept: LAB | Facility: URGENT CARE | Age: 63
End: 2023-10-30
Payer: COMMERCIAL

## 2023-10-30 DIAGNOSIS — Z79.01 ANTICOAGULATION MANAGEMENT ENCOUNTER: ICD-10-CM

## 2023-10-30 DIAGNOSIS — Z51.81 ANTICOAGULATION MANAGEMENT ENCOUNTER: ICD-10-CM

## 2023-10-30 DIAGNOSIS — Z95.2 S/P AVR: Primary | Chronic | ICD-10-CM

## 2023-10-30 DIAGNOSIS — Z95.2 PRESENCE OF PROSTHETIC HEART VALVE: ICD-10-CM

## 2023-10-30 LAB
INR BLD: 2.3
INR PPP: 2.3
PROTHROMBIN TIME: 25.2 SECONDS (ref 9.4–12.5)

## 2023-10-30 PROCEDURE — 36415 COLL VENOUS BLD VENIPUNCTURE: CPT | Performed by: FAMILY MEDICINE

## 2023-10-30 PROCEDURE — 85610 PROTHROMBIN TIME: CPT | Performed by: FAMILY MEDICINE

## 2023-10-30 NOTE — PROGRESS NOTES
10/30/23  1452  ID verified. No medication or diet changes. No bleeding or bruising. Verified Warfarin dose and tablet size. Encouraged to call with any changes. Mailed reminder. QUINTEN    10/30/23  6337  Continue Warfarin dose 2.5 mg every Tuesday; 5 mg all other days. Check INR in 3 weeks, 11/21. Verbalized understanding. QUINTEN

## 2023-11-21 ENCOUNTER — LAB (OUTPATIENT)
Dept: LAB | Facility: URGENT CARE | Age: 63
End: 2023-11-21
Payer: COMMERCIAL

## 2023-11-21 DIAGNOSIS — Z95.2 PRESENCE OF PROSTHETIC HEART VALVE: ICD-10-CM

## 2023-11-21 LAB
INR BLD: 3.5
PROTHROMBIN TIME: 37.6 SECONDS (ref 9.4–12.5)

## 2023-11-21 PROCEDURE — 36415 COLL VENOUS BLD VENIPUNCTURE: CPT | Performed by: FAMILY MEDICINE

## 2023-11-21 PROCEDURE — 85610 PROTHROMBIN TIME: CPT | Performed by: FAMILY MEDICINE

## 2023-11-22 ENCOUNTER — HOSPITAL ENCOUNTER (OUTPATIENT)
Dept: RADIOLOGY | Facility: HOSPITAL | Age: 63
Discharge: HOME | End: 2023-11-22
Payer: COMMERCIAL

## 2023-11-22 ENCOUNTER — CONSULT (OUTPATIENT)
Dept: ORTHOPEDIC SURGERY | Facility: CLINIC | Age: 63
End: 2023-11-22
Payer: COMMERCIAL

## 2023-11-22 ENCOUNTER — ANTICOAGULATION VISIT (OUTPATIENT)
Dept: CARDIOLOGY | Facility: CLINIC | Age: 63
End: 2023-11-22
Payer: COMMERCIAL

## 2023-11-22 VITALS
BODY MASS INDEX: 27.36 KG/M2 | DIASTOLIC BLOOD PRESSURE: 74 MMHG | OXYGEN SATURATION: 94 % | HEIGHT: 72 IN | WEIGHT: 202 LBS | SYSTOLIC BLOOD PRESSURE: 122 MMHG | HEART RATE: 82 BPM

## 2023-11-22 DIAGNOSIS — M75.41 IMPINGEMENT SYNDROME OF RIGHT SHOULDER: ICD-10-CM

## 2023-11-22 DIAGNOSIS — M75.101 TEAR OF RIGHT ROTATOR CUFF, UNSPECIFIED TEAR EXTENT, UNSPECIFIED WHETHER TRAUMATIC: Primary | ICD-10-CM

## 2023-11-22 DIAGNOSIS — Z51.81 ANTICOAGULATION MANAGEMENT ENCOUNTER: ICD-10-CM

## 2023-11-22 DIAGNOSIS — M25.511 CHRONIC RIGHT SHOULDER PAIN: ICD-10-CM

## 2023-11-22 DIAGNOSIS — Z95.2 S/P AVR: Primary | Chronic | ICD-10-CM

## 2023-11-22 DIAGNOSIS — G89.29 CHRONIC RIGHT SHOULDER PAIN: ICD-10-CM

## 2023-11-22 DIAGNOSIS — Z79.01 ANTICOAGULATION MANAGEMENT ENCOUNTER: ICD-10-CM

## 2023-11-22 LAB — INR PPP: 3.5

## 2023-11-22 PROCEDURE — 99203 OFFICE O/P NEW LOW 30 MIN: CPT | Performed by: ORTHOPAEDIC SURGERY

## 2023-11-22 PROCEDURE — 73030 X-RAY EXAM OF SHOULDER: CPT | Mod: TC,RT | Performed by: ORTHOPAEDIC SURGERY

## 2023-11-22 ASSESSMENT — ENCOUNTER SYMPTOMS
SHORTNESS OF BREATH: 0
EYE PAIN: 0
VOMITING: 0
FEVER: 0
COUGH: 0
COLOR CHANGE: 0
ARTHRALGIAS: 0
ABDOMINAL PAIN: 0
DYSURIA: 0
SEIZURES: 0
WEAKNESS: 1
HEMATURIA: 0
BACK PAIN: 0
PALPITATIONS: 0
CHILLS: 0
SORE THROAT: 0

## 2023-11-22 ASSESSMENT — PAIN - FUNCTIONAL ASSESSMENT: PAIN_FUNCTIONAL_ASSESSMENT: 0-10

## 2023-11-22 ASSESSMENT — VISUAL ACUITY: OU: 1

## 2023-11-22 NOTE — PROGRESS NOTES
11/22/2023 8:19 pt tested 11/21 received 11/22/2023 msg for pt to call [facility] re INR. DMD 8:28 pt called in verified ID no med or diet changes  no bleeding or bruising problems enc to call if changes verified warfarin dose and tab size 32.5mg/week discussed diet and food desires and pt will add sm serving of vit K food weekly and will discuss again after next INR DMD    Continue warfarin dose as above pt is going to add sm serving of vit K food weekly and recheck INR In 3 weeks.  Verb under. DMD

## 2023-11-22 NOTE — PROGRESS NOTES
New Patient Office Note  Chief Complaint:  Chief Complaint   Patient presents with    Right Shoulder - Pain, Consult        HPI/ Subjective:    63-year-old male who comes in today for chief complaint of right shoulder pain sustained roughly 2 months ago where he fell directly on the right shoulder into his.  He has night pain.  Noted weakness.  No neck pain.  No numbness and tingling.  He did have a previous left shoulder rotator cuff repair      Review of Systems   Constitutional:  Negative for chills and fever.   HENT:  Negative for ear pain and sore throat.    Eyes:  Negative for pain and visual disturbance.   Respiratory:  Negative for cough and shortness of breath.    Cardiovascular:  Negative for chest pain and palpitations.   Gastrointestinal:  Negative for abdominal pain and vomiting.   Genitourinary:  Negative for dysuria and hematuria.   Musculoskeletal:  Negative for arthralgias and back pain.   Skin:  Negative for color change and rash.   Neurological:  Positive for weakness. Negative for seizures and syncope.   All other systems reviewed and are negative.      Allergies   Allergen Reactions    Ezetimibe Rash     Past Medical History:   Diagnosis Date    Aortic valve stenosis     s/p AV replacement 11/2013    Arthritis     Bilat hands, ankle    Ascending aorta dilatation (CMS/[facility])     s/p AAA repair 11/2013    Asthma     CAD (coronary artery disease)     1 vessel bypass    Cancer (CMS/[facility])     Skin    CHF (congestive heart failure) (CMS/[facility])     COPD (chronic obstructive pulmonary disease) (CMS/[facility])     Dysrhythmia     nonsustained v-tach    Hyperlipidemia     Ischemic cardiomyopathy     Lung nodule 03/07/2022    Myocardial infarction (CMS/[facility])     Presence of heart assist device (CMS/[facility])     Shortness of breath      Social History     Occupational History    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   Tobacco Use    Smoking status: Former     Packs/day: 0.75     Years: 30.00     Additional pack years:  0.00     Total pack years: 22.50     Types: Cigarettes     Quit date: 2022     Years since quittin.7    Smokeless tobacco: Never    Tobacco comments:     Last smoked 3/1/22   Vaping Use    Vaping Use: Never used   Substance and Sexual Activity    Alcohol use: Yes     Alcohol/week: 4.0 - 5.0 standard drinks of alcohol     Types: 4 - 5 Cans of beer per week     Comment: 3-4 beers weekly or less    Drug use: No    Sexual activity: Defer       Family History   Problem Relation Age of Onset    No Known Problems Mother     Heart disease Father     Aneurysm Father     Other Sister         undiagnosed neurological disease    Breast cancer Sister     No Known Problems Sister     Gallbladder disease Daughter     No Known Problems Daughter      Past Surgical History:   Procedure Laterality Date    AAA REPAIR - ENDOGRAFT  2013    ANKLE SURGERY      AORTIC ROOT ANGIOGRAM N/A 2022    Procedure: Aortic Arch  Angiogram;  Surgeon: Derick Gallegos MD;  Location: [facility] Cath Lab;  Service: Cardiovascular;  Laterality: N/A;    AORTIC VALVE REPLACEMENT  2013    BIVENTRICULAR ICD UPGRADE N/A 2023    Procedure: Bi-V ICD upgrade;  Surgeon: Mitchel Saldaña DO;  Location: [facility] EP Lab;  Service: Cardiovascular;  Laterality: N/A;    COLONOSCOPY N/A 10/5/2023    Procedure: COLONOSCOPY with polypectomies, endo clip placement x2;  Surgeon: Patricia Hutton MD;  Location: [facility] Endoscopy;  Service: Endoscopy;  Laterality: N/A;    CORONARY ANGIOPLASTY  2013    CORONARY ARTERY BYPASS GRAFT  2013    1V CABG SVG- RCA    LEFT HEART CATH N/A 2022    Procedure: Left heart cath;  Surgeon: Derick Gallegos MD;  Location: [facility] Cath Lab;  Service: Cardiovascular;  Laterality: N/A;     Current Outpatient Medications   Medication Sig Dispense Refill    atorvastatin (LIPITOR) 80 mg tablet Take 1 tablet (80 mg total) by mouth nightly 90 tablet 1    sacubitriL-valsartan (ENTRESTO) 24-26 mg tablet Take 1 tablet by mouth 2 (two) times a  day Pt to take 0.5tab BID for 2 weeks then increase to 1 tab BID. Pt will discontinue lisinopril for 3 day washout period prior to starting entresto. 60 tablet 11    acetaminophen (TYLENOL) 325 mg tablet Take 2 tablets (650 mg total) by mouth every 6 (six) hours as needed for pain scale 1-3/10      evolocumab (Repatha SureClick) 140 mg/mL pen injector Inject 140 mg under the skin every 14 (fourteen) days 6 mL 3    spironolactone (ALDACTONE) 25 mg tablet Take 1 tablet (25 mg total) by mouth daily 90 tablet 3    nitroglycerin (NITROSTAT) 0.4 mg SL tablet Place 1 tablet (0.4 mg total) under the tongue every 5 (five) minutes as needed for chest pain Limit 3 tabs per episode. 25 tablet 1    albuterol HFA (Ventolin HFA) 90 mcg/actuation inhaler Inhale 2 puffs every 4 (four) hours (Patient taking differently: Inhale 2 puffs every 4 (four) hours PRN Daily) 1 Inhaler 11    amoxicillin (AMOXIL) 500 mg tablet SMARTSI Tablet(s) By Mouth      aspirin 81 mg EC tablet Take 1 tablet (81 mg total) by mouth daily 90 tablet 3    metoprolol succinate XL (TOPROL-XL) 50 mg 24 hr tablet Take 1 tablet (50 mg total) by mouth daily 90 tablet 3    warfarin (COUMADIN) 5 mg tablet Take 1 tablet (5 mg total) by mouth daily 90 tablet 3    budesonide-formoteroL (SYMBICORT) 160-4.5 mcg/actuation inhaler Inhale 2 puffs 2 (two) times a day Rinse mouth with water after use. Do not swallow. 10.2 g 11     No current facility-administered medications for this visit.       Vitals:    23 1502   BP: 122/74   Pulse: 82   SpO2: 94%     Physical Exam  Vitals reviewed.   Constitutional:       General: He is not in acute distress.     Appearance: Normal appearance. He is well-developed and well-groomed.   HENT:      Head: Normocephalic and atraumatic.      Right Ear: External ear normal.      Left Ear: External ear normal.      Nose: Nose normal. No congestion.      Mouth/Throat:      Mouth: Mucous membranes are moist.      Pharynx: Oropharynx is clear.    Eyes:      General: Lids are normal. Vision grossly intact.      Conjunctiva/sclera: Conjunctivae normal.   Cardiovascular:      Pulses: Normal pulses.   Pulmonary:      Effort: Pulmonary effort is normal. No respiratory distress.      Breath sounds: No wheezing.   Abdominal:      General: Abdomen is flat. There is no distension.      Palpations: Abdomen is soft.      Tenderness: There is no abdominal tenderness.   Musculoskeletal:         General: Tenderness present.      Cervical back: Normal range of motion and neck supple.   Skin:     General: Skin is warm and dry.      Capillary Refill: Capillary refill takes less than 2 seconds.      Findings: No rash.   Neurological:      General: No focal deficit present.      Mental Status: He is alert and oriented to person, place, and time.      Motor: Weakness present.   Psychiatric:         Mood and Affect: Mood normal.         Behavior: Behavior normal.         Thought Content: Thought content normal.       Ortho Exam    Active forward flexion to 165 abduction to 150 with mild pain.  Negative crossarm adduction but positive Heath and Neer's.  Gross motor strength of the supraspinatus and infraspinatus were 4-5 with pain subscapularis 5 out of 5    No results found.       Imaging:   x-ray personally reviewed of the shoulder demonstrating type III acromion with large spurring, he has an elevated  humeral head     Diagnosis:  1. Tear of right rotator cuff, unspecified tear extent, unspecified whether traumatic    2. Chronic right shoulder pain    3. Impingement syndrome of right shoulder           Assessment/ Plan:  Hayden Franklin is a 63 y.o. male  with suspected right shoulder full-thickness rotator cuff tear in the setting of impingement.  I recommend getting a CT arthrogram as he has previous sternal wires and pacer.  Follow-up after.              A voice recognition program was used to aid in documentation of this record. Sometimes words are not printed  exactly as they were spoken.  While efforts were made to carefully edit and correct any inaccuracies, some errors may be present; please take these into context.  Please contact the provider's office if you have any questions or concerns.

## 2023-11-24 DIAGNOSIS — Z95.2 PRESENCE OF PROSTHETIC HEART VALVE: Primary | ICD-10-CM

## 2023-11-24 RX ORDER — WARFARIN SODIUM 5 MG/1
TABLET ORAL
Qty: 60 TABLET | Refills: 0 | Status: SHIPPED | OUTPATIENT
Start: 2023-11-24 | End: 2024-01-19 | Stop reason: SDUPTHER

## 2023-12-14 ENCOUNTER — HOSPITAL ENCOUNTER (OUTPATIENT)
Dept: CT IMAGING | Facility: HOSPITAL | Age: 63
Discharge: HOME | End: 2023-12-14
Payer: COMMERCIAL

## 2023-12-14 ENCOUNTER — HOSPITAL ENCOUNTER (OUTPATIENT)
Dept: FLUOROSCOPY | Facility: HOSPITAL | Age: 63
Discharge: HOME | End: 2023-12-14
Payer: COMMERCIAL

## 2023-12-14 ENCOUNTER — LAB (OUTPATIENT)
Dept: LAB | Facility: URGENT CARE | Age: 63
End: 2023-12-14
Payer: COMMERCIAL

## 2023-12-14 DIAGNOSIS — Z95.2 PRESENCE OF PROSTHETIC HEART VALVE: Primary | ICD-10-CM

## 2023-12-14 DIAGNOSIS — M75.101 TEAR OF RIGHT ROTATOR CUFF, UNSPECIFIED TEAR EXTENT, UNSPECIFIED WHETHER TRAUMATIC: ICD-10-CM

## 2023-12-14 DIAGNOSIS — J44.89 ASTHMA-COPD OVERLAP SYNDROME (CMS/HCC): ICD-10-CM

## 2023-12-14 LAB
INR BLD: 1.2
PROTHROMBIN TIME: 13.4 SECONDS (ref 9.4–12.5)

## 2023-12-14 PROCEDURE — 36415 COLL VENOUS BLD VENIPUNCTURE: CPT | Performed by: FAMILY MEDICINE

## 2023-12-14 PROCEDURE — 2550000100 HC RX 255: Mod: JZ | Performed by: ORTHOPAEDIC SURGERY

## 2023-12-14 PROCEDURE — 23350 INJECTION FOR SHOULDER X-RAY: CPT | Mod: RT

## 2023-12-14 PROCEDURE — 85610 PROTHROMBIN TIME: CPT | Performed by: FAMILY MEDICINE

## 2023-12-14 PROCEDURE — 73201 CT UPPER EXTREMITY W/DYE: CPT | Mod: RT

## 2023-12-14 RX ORDER — IOPAMIDOL 612 MG/ML
30 INJECTION, SOLUTION INTRAVASCULAR ONCE
Status: COMPLETED | OUTPATIENT
Start: 2023-12-14 | End: 2023-12-14

## 2023-12-14 RX ORDER — BUDESONIDE AND FORMOTEROL FUMARATE DIHYDRATE 160; 4.5 UG/1; UG/1
2 AEROSOL RESPIRATORY (INHALATION) 2 TIMES DAILY
Qty: 30.6 G | Refills: 3 | Status: SHIPPED | OUTPATIENT
Start: 2023-12-14 | End: 2024-06-11

## 2023-12-14 RX ORDER — LIDOCAINE HYDROCHLORIDE 10 MG/ML
5 INJECTION, SOLUTION INFILTRATION; PERINEURAL ONCE
Status: COMPLETED | OUTPATIENT
Start: 2023-12-14 | End: 2023-12-14

## 2023-12-14 RX ADMIN — IOPAMIDOL 6 ML: 612 INJECTION, SOLUTION INTRAVENOUS at 13:22

## 2023-12-14 RX ADMIN — LIDOCAINE HYDROCHLORIDE 5 ML: 10 INJECTION, SOLUTION INFILTRATION; PERINEURAL at 13:21

## 2023-12-18 ENCOUNTER — TELEPHONE (OUTPATIENT)
Dept: ORTHOPEDIC SURGERY | Facility: CLINIC | Age: 63
End: 2023-12-18
Payer: COMMERCIAL

## 2023-12-18 NOTE — TELEPHONE ENCOUNTER
----- Message from Mina Kendrick DO sent at 12/15/2023  7:56 AM [facility] -----  Mri review apt please, after first of the year  ----- Message -----  From: Rhonda, Radiology Results In  Sent: 12/14/2023   3:22 PM [facility]  To: Mina Kendrick DO

## 2023-12-22 ENCOUNTER — IMMUNIZATION (OUTPATIENT)
Dept: FAMILY MEDICINE | Facility: CLINIC | Age: 63
End: 2023-12-22
Payer: COMMERCIAL

## 2023-12-22 ENCOUNTER — HOSPITAL ENCOUNTER (OUTPATIENT)
Dept: CT IMAGING | Facility: HOSPITAL | Age: 63
Discharge: HOME | End: 2023-12-22
Payer: COMMERCIAL

## 2023-12-22 DIAGNOSIS — Z87.891 PERSONAL HISTORY OF TOBACCO USE, PRESENTING HAZARDS TO HEALTH: ICD-10-CM

## 2023-12-22 PROCEDURE — 71271 CT THORAX LUNG CANCER SCR C-: CPT

## 2023-12-22 PROCEDURE — 90471 IMMUNIZATION ADMIN: CPT | Mod: FLU | Performed by: FAMILY MEDICINE

## 2023-12-22 PROCEDURE — 90686 IIV4 VACC NO PRSV 0.5 ML IM: CPT | Mod: FLU | Performed by: FAMILY MEDICINE

## 2023-12-26 ENCOUNTER — OFFICE VISIT (OUTPATIENT)
Dept: ORTHOPEDIC SURGERY | Facility: CLINIC | Age: 63
End: 2023-12-26
Payer: COMMERCIAL

## 2023-12-26 VITALS
RESPIRATION RATE: 16 BRPM | WEIGHT: 202 LBS | SYSTOLIC BLOOD PRESSURE: 113 MMHG | BODY MASS INDEX: 27.36 KG/M2 | HEIGHT: 72 IN | HEART RATE: 82 BPM | DIASTOLIC BLOOD PRESSURE: 63 MMHG | OXYGEN SATURATION: 92 %

## 2023-12-26 DIAGNOSIS — M75.101 TEAR OF RIGHT ROTATOR CUFF, UNSPECIFIED TEAR EXTENT, UNSPECIFIED WHETHER TRAUMATIC: Primary | ICD-10-CM

## 2023-12-26 DIAGNOSIS — S46.111S RUPTURE LONG HEAD BICEPS TENDON, RIGHT, SEQUELA: ICD-10-CM

## 2023-12-26 DIAGNOSIS — M19.019 OSTEOARTHRITIS OF AC (ACROMIOCLAVICULAR) JOINT: ICD-10-CM

## 2023-12-26 DIAGNOSIS — M75.41 IMPINGEMENT SYNDROME OF RIGHT SHOULDER: ICD-10-CM

## 2023-12-26 PROCEDURE — 99214 OFFICE O/P EST MOD 30 MIN: CPT | Performed by: ORTHOPAEDIC SURGERY

## 2023-12-26 ASSESSMENT — ENCOUNTER SYMPTOMS
SORE THROAT: 0
DYSURIA: 0
ABDOMINAL PAIN: 0
COLOR CHANGE: 0
CHILLS: 0
WEAKNESS: 1
HEMATURIA: 0
BACK PAIN: 0
ARTHRALGIAS: 0
VOMITING: 0
SHORTNESS OF BREATH: 0
SEIZURES: 0
FEVER: 0
PALPITATIONS: 0
COUGH: 0
EYE PAIN: 0

## 2023-12-26 ASSESSMENT — PAIN - FUNCTIONAL ASSESSMENT: PAIN_FUNCTIONAL_ASSESSMENT: 0-10

## 2023-12-26 ASSESSMENT — VISUAL ACUITY: OU: 1

## 2023-12-26 NOTE — PROGRESS NOTES
Patient Follow-up Office Note  Chief Complaint:  Chief Complaint   Patient presents with    Right Shoulder - Follow-up     Discuss CT        HPI/ Subjective:  Continues to have significant night pain but is still working. Has upcoming apts with pulmonology and cardiology. Continues to work. Significant weakness per his report      Review of Systems   Constitutional:  Negative for chills and fever.   HENT:  Negative for ear pain and sore throat.    Eyes:  Negative for pain and visual disturbance.   Respiratory:  Negative for cough and shortness of breath.    Cardiovascular:  Negative for chest pain and palpitations.   Gastrointestinal:  Negative for abdominal pain and vomiting.   Genitourinary:  Negative for dysuria and hematuria.   Musculoskeletal:  Negative for arthralgias and back pain.   Skin:  Negative for color change and rash.   Neurological:  Positive for weakness. Negative for seizures and syncope.   All other systems reviewed and are negative.      Allergies   Allergen Reactions    Ezetimibe Rash     Past Medical History:   Diagnosis Date    Aortic valve stenosis     s/p AV replacement 2013    Arthritis     Bilat hands, ankle    Ascending aorta dilatation (CMS/[facility])     s/p AAA repair 2013    Asthma     CAD (coronary artery disease)     1 vessel bypass    Cancer (CMS/[facility])     Skin    CHF (congestive heart failure) (CMS/[facility])     COPD (chronic obstructive pulmonary disease) (CMS/[facility])     Dysrhythmia     nonsustained v-tach    Hyperlipidemia     Ischemic cardiomyopathy     Lung nodule 2022    Myocardial infarction (CMS/[facility])     Presence of heart assist device (CMS/[facility])     Shortness of breath      Social History     Occupational History    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   Tobacco Use    Smoking status: Former     Packs/day: 0.75     Years: 30.00     Additional pack years: 0.00     Total pack years: 22.50     Types: Cigarettes     Quit date: 2022     Years since quittin.8     Smokeless tobacco: Never    Tobacco comments:     Last smoked 3/1/22   Vaping Use    Vaping Use: Never used   Substance and Sexual Activity    Alcohol use: Yes     Alcohol/week: 4.0 - 5.0 standard drinks of alcohol     Types: 4 - 5 Cans of beer per week     Comment: 3-4 beers weekly or less    Drug use: No    Sexual activity: Defer       Family History   Problem Relation Age of Onset    No Known Problems Mother     Heart disease Father     Aneurysm Father     Other Sister         undiagnosed neurological disease    Breast cancer Sister     No Known Problems Sister     Gallbladder disease Daughter     No Known Problems Daughter      Past Surgical History:   Procedure Laterality Date    AAA REPAIR - ENDOGRAFT  11/2013    ANKLE SURGERY      AORTIC ROOT ANGIOGRAM N/A 5/20/2022    Procedure: Aortic Arch  Angiogram;  Surgeon: Derick Gallegos MD;  Location: [facility] Cath Lab;  Service: Cardiovascular;  Laterality: N/A;    AORTIC VALVE REPLACEMENT  11/2013    BIVENTRICULAR ICD UPGRADE N/A 5/24/2023    Procedure: Bi-V ICD upgrade;  Surgeon: Mitchel Saldaña DO;  Location: [facility] EP Lab;  Service: Cardiovascular;  Laterality: N/A;    COLONOSCOPY N/A 10/5/2023    Procedure: COLONOSCOPY with polypectomies, endo clip placement x2;  Surgeon: Patricia Hutton MD;  Location: [facility] Endoscopy;  Service: Endoscopy;  Laterality: N/A;    CORONARY ANGIOPLASTY  11/2013    CORONARY ARTERY BYPASS GRAFT  11/2013    1V CABG SVG- RCA    LEFT HEART CATH N/A 5/20/2022    Procedure: Left heart cath;  Surgeon: Derick Gallegos MD;  Location: [facility] Cath Lab;  Service: Cardiovascular;  Laterality: N/A;     Current Outpatient Medications   Medication Sig Dispense Refill    budesonide-formoteroL (SYMBICORT) 160-4.5 mcg/actuation inhaler Inhale 2 puffs 2 (two) times a day Rinse mouth with water after use. Do not swallow. 30.6 g 3    warfarin (COUMADIN) 5 mg tablet Take 1/2 to 1 tablet by mouth daily as directed by INR 60 tablet 0    atorvastatin (LIPITOR) 80 mg  tablet Take 1 tablet (80 mg total) by mouth nightly 90 tablet 1    sacubitriL-valsartan (ENTRESTO) 24-26 mg tablet Take 1 tablet by mouth 2 (two) times a day Pt to take 0.5tab BID for 2 weeks then increase to 1 tab BID. Pt will discontinue lisinopril for 3 day washout period prior to starting entresto. 60 tablet 11    acetaminophen (TYLENOL) 325 mg tablet Take 2 tablets (650 mg total) by mouth every 6 (six) hours as needed for pain scale 1-3/10      evolocumab (Repatha SureClick) 140 mg/mL pen injector Inject 140 mg under the skin every 14 (fourteen) days 6 mL 3    spironolactone (ALDACTONE) 25 mg tablet Take 1 tablet (25 mg total) by mouth daily 90 tablet 3    metoprolol succinate XL (TOPROL-XL) 50 mg 24 hr tablet Take 1 tablet (50 mg total) by mouth daily 90 tablet 3    nitroglycerin (NITROSTAT) 0.4 mg SL tablet Place 1 tablet (0.4 mg total) under the tongue every 5 (five) minutes as needed for chest pain Limit 3 tabs per episode. 25 tablet 1    albuterol HFA (Ventolin HFA) 90 mcg/actuation inhaler Inhale 2 puffs every 4 (four) hours (Patient taking differently: Inhale 2 puffs every 4 (four) hours PRN Daily) 1 Inhaler 11    amoxicillin (AMOXIL) 500 mg tablet SMARTSI Tablet(s) By Mouth      aspirin 81 mg EC tablet Take 1 tablet (81 mg total) by mouth daily 90 tablet 3     No current facility-administered medications for this visit.       Vitals:    23 1410   BP: 113/63   Pulse: 82   Resp: 16   SpO2: 92%     Physical Exam  Vitals reviewed.   Constitutional:       General: He is not in acute distress.     Appearance: Normal appearance. He is well-developed and well-groomed.   HENT:      Head: Normocephalic and atraumatic.      Right Ear: External ear normal.      Left Ear: External ear normal.      Nose: Nose normal. No congestion.      Mouth/Throat:      Mouth: Mucous membranes are moist.      Pharynx: Oropharynx is clear.   Eyes:      General: Lids are normal. Vision grossly intact.       Conjunctiva/sclera: Conjunctivae normal.   Cardiovascular:      Pulses: Normal pulses.   Pulmonary:      Effort: Pulmonary effort is normal. No respiratory distress.      Breath sounds: No wheezing.   Abdominal:      General: Abdomen is flat. There is no distension.      Palpations: Abdomen is soft.      Tenderness: There is no abdominal tenderness.   Musculoskeletal:      Cervical back: Normal range of motion and neck supple.   Skin:     General: Skin is warm and dry.      Capillary Refill: Capillary refill takes less than 2 seconds.      Findings: No rash.   Neurological:      General: No focal deficit present.      Mental Status: He is alert and oriented to person, place, and time.      Motor: Weakness present.   Psychiatric:         Mood and Affect: Mood normal.         Behavior: Behavior normal.         Thought Content: Thought content normal.         CT chest lung cancer low dose screening    Result Date: 12/22/2023  Narrative: EXAM: CT CHEST LUNG CANCER LOW DOSE SCREENING DATE: 12/22/2023 2:18 PM INDICATION: Personal history of tobacco use  presenting hazards to health; Lung cancer screening  >= 20 pk-yr smoking history  risk factor(s) (Age >= 50y) COMPARISON: Chest CT 12/19/2022. Clinical History: Lung cancer screening Smoking history: 40 pack-years Smoking status: Quit 1 year ago Family history: None Additional risk factors: Exposure to asbestos and diesel exhaust. TECHNIQUE: Low dose noncontrast CT of the chest optimized for lung nodule screening was performed. Axial reconstructions, axial 2D MIP reformations and coronal reformations were constructed and reviewed. FINDINGS: Respiratory: There is a new nodule in the lingular segment of the left upper lobe which measures 2.7 x 1.7 cm which corresponds to an average diameter of 2.2 cm.. Clear airways. No bronchectasis. Mild centrilobular emphysema. Mediastinum: Severe coronary artery calcifications. The heart is not enlarged. No mediastinal/hilar adenopathy.  Chest wall, axillary regions and upper abdomen: No significant abnormalities are seen in the chest wall or visualized upper abdomen.     Impression: IMPRESSION: New 2.2 cm left upper lobe nodule Recommend further evaluation with FDG PET/CT or tissue sampling. Significant Incidental Findings: Severe coronary artery calcifications. Osteoporosis. Recommend primary care provider evaluation for fracture risk and DEXA, if not already performed.    CT arthrogram shoulder right    Result Date: 12/14/2023  Narrative: Exam: CT arthrogram right shoulder 12/14/2023 1:34 PM Clinical History:  Tear of right rotator cuff  unspecified tear extent  unspecified whether traumatic; Shoulder pain  rotator cuff tear/impingement suspected Procedure/Views: Helical axial imaging of the right shoulder with multiplanar reformations is performed following the intra-articular injection of a dilute contrast medium. Injection procedure reported separately. Comparison/s: X-ray Findings: There is a large full-thickness rotator cuff tear which extends from the superior subscapularis through the rotator cuff interval into the supraspinatus. The AP diameter of the full-thickness component is fairly large approximately 2 cm. Partial-thickness tearing that extends into the infraspinatus tendon. The mid and inferior subscapularis does appear to be intact. Long of the biceps tendon cannot be identified and is felt to be torn. Superior labral defect compatible with a SLAP tear. Mild degenerative cartilage loss. Type II acromion. Narrow subacromial space. Moderate AC joint arthritis.     Impression: IMPRESSION: 1. Moderately large full-thickness rotator cuff tear involving the supraspinatus with anterior extension into the upper subscapularis. 2. Partial-thickness tearing of the infraspinatus. 3. Suspect complete tear long of the biceps tendon. 4. SLAP tear superior labrum.    FL injection shoulder right with contrast    Result Date: 12/14/2023  Narrative:  EXAM: FL INJECTION SHOULDER RIGHT W CONTRAST DATE: 12/14/2023 1:20 PM INDICATION: Tear of right rotator cuff  unspecified tear extent  unspecified whether traumatic; Shoulder pain  rotator cuff tear/impingement suspected COMPARISON: 11/22/2023 Radiation dose: DAP Total : [id number] Gym2 CAK : 0.72830 Gy TECHNIQUE: The risks, benefits, indications and alternatives to fluoroscopically guided joint injection were discussed with the patient who understands and has given verbal and written consent to proceed. The skin overlying the joint was prepped and draped in the usual sterile manner. The puncture site was anesthetized with 1% lidocaine. A 22-gauge spinal needle was then advanced into the joint under fluoroscopic guidance. Image was obtained for documentation. Iodinated contrast was then injected to confirm intra-articular location of the needle tip. 12 mL of A solution containing 10 mL of Isovue-300 and 10 mL of saline was then injected under intermittent fluoroscopic observation. The needle was removed, the skin was cleaned, and a sterile bandage was applied to the puncture site. The patient tolerated the procedure well and was then taken to CT     Impression: FINDINGS/IMPRESSION: Successful right shoulder injection.           Imaging:  CT scan shows supraspinatus and subscapularis teras with rupture of long head biceps     Diagnosis:  1. Tear of right rotator cuff, unspecified tear extent, unspecified whether traumatic    2. Impingement syndrome of right shoulder    3. Osteoarthritis of AC (acromioclavicular) joint    4. Rupture long head biceps tendon, right, sequela           Assessment/ Plan:  Hayden Franklin is a 63 y.o. male  right shoulder supraspinatus and proximal subscapularis tears, impingment an dAC joint OA. Discussed arthroscopic repair. However there is one area of concern that may not be fixable. Discussed SCR, balloon and replacement. Risks and benefits of all procedures. They would like to  proceed forward with graft if needed. Risks of surgery discussed. Will need clearance from pulmonology as there is a new nodule noted on cT, as well as cardiology and anticoagulant management done by PRP . Will await clearance and then choose date.               A voice recognition program was used to aid in documentation of this record. Sometimes words are not printed exactly as they were spoken.  While efforts were made to carefully edit and correct any inaccuracies, some errors may be present; please take these into context.  Please contact the provider's office if you have any questions or concerns.

## 2023-12-29 DIAGNOSIS — Z51.81 ANTICOAGULATION MANAGEMENT ENCOUNTER: Primary | ICD-10-CM

## 2023-12-29 DIAGNOSIS — Z79.01 ANTICOAGULATION MANAGEMENT ENCOUNTER: Primary | ICD-10-CM

## 2023-12-30 ENCOUNTER — LAB (OUTPATIENT)
Dept: LAB | Facility: URGENT CARE | Age: 63
End: 2023-12-30
Payer: COMMERCIAL

## 2023-12-30 DIAGNOSIS — Z79.01 ANTICOAGULATION MANAGEMENT ENCOUNTER: ICD-10-CM

## 2023-12-30 DIAGNOSIS — Z51.81 ANTICOAGULATION MANAGEMENT ENCOUNTER: ICD-10-CM

## 2023-12-30 LAB
INR BLD: 2.2
PROTHROMBIN TIME: 24.7 SECONDS (ref 9.4–12.5)

## 2023-12-30 PROCEDURE — 85610 PROTHROMBIN TIME: CPT | Performed by: FAMILY MEDICINE

## 2023-12-30 PROCEDURE — 36415 COLL VENOUS BLD VENIPUNCTURE: CPT | Performed by: FAMILY MEDICINE

## 2024-01-02 ENCOUNTER — ANTICOAGULATION VISIT (OUTPATIENT)
Dept: CARDIOLOGY | Facility: CLINIC | Age: 64
End: 2024-01-02
Payer: COMMERCIAL

## 2024-01-02 DIAGNOSIS — Z95.2 S/P AVR: Primary | Chronic | ICD-10-CM

## 2024-01-02 DIAGNOSIS — Z51.81 ANTICOAGULATION MANAGEMENT ENCOUNTER: ICD-10-CM

## 2024-01-02 DIAGNOSIS — Z79.01 ANTICOAGULATION MANAGEMENT ENCOUNTER: ICD-10-CM

## 2024-01-02 LAB — INR PPP: 2.2

## 2024-01-02 NOTE — PROGRESS NOTES
9:39 spoke to pt verified ID no med or diet changes  no bleeding or bruising problems enc to call if changes verified warfarin dose and tab size 32.5mg/week mailed recheck reminder DMD    Continue warfarin dose  as above and recheck INR In 4 weeks. verb under DMD

## 2024-01-04 ENCOUNTER — OFFICE VISIT (OUTPATIENT)
Dept: PULMONOLOGY | Facility: CLINIC | Age: 64
End: 2024-01-04
Payer: COMMERCIAL

## 2024-01-04 VITALS
OXYGEN SATURATION: 92 % | HEART RATE: 82 BPM | BODY MASS INDEX: 28.61 KG/M2 | SYSTOLIC BLOOD PRESSURE: 124 MMHG | RESPIRATION RATE: 17 BRPM | WEIGHT: 211.2 LBS | HEIGHT: 72 IN | DIASTOLIC BLOOD PRESSURE: 70 MMHG

## 2024-01-04 DIAGNOSIS — M75.41 IMPINGEMENT SYNDROME OF RIGHT SHOULDER: ICD-10-CM

## 2024-01-04 DIAGNOSIS — M19.019 OSTEOARTHRITIS OF AC (ACROMIOCLAVICULAR) JOINT: ICD-10-CM

## 2024-01-04 DIAGNOSIS — M75.101 TEAR OF RIGHT ROTATOR CUFF, UNSPECIFIED TEAR EXTENT, UNSPECIFIED WHETHER TRAUMATIC: ICD-10-CM

## 2024-01-04 DIAGNOSIS — Z01.818 PREOPERATIVE CLEARANCE: ICD-10-CM

## 2024-01-04 DIAGNOSIS — R91.1 PULMONARY NODULE: Primary | ICD-10-CM

## 2024-01-04 PROCEDURE — 99214 OFFICE O/P EST MOD 30 MIN: CPT | Performed by: NURSE PRACTITIONER

## 2024-01-04 NOTE — LETTER
[facility] PULMONOLOGY  [address] [place] [address]  [phone]  Dept: [phone]  January 4, 2024     Mina Kendrick DO  [address] [facility] [place] [address]    Patient: Hayden Franklin   YOB: 1960   Date of Visit: 1/4/2024       To:  Mina Kendrick DO    Our mutual patient, Hayden Franklin, was seen in my office on 1/4/2024. Below are my notes.     Kizzy Weber CNP  1/4/2024  5:50 PM  Sign when Signing Visit    PULMONARY NOTE      HPI:  Hayden Franklin is a 63 y.o. male patient who presents for pulmonary follow-up on COPD with asthma overlap.  He has approximately 30-pack-year history of smoking and quit February 2022.  Patient has history of congestive heart failure and had CABG and valve replacement surgery September 2022.  Then he had his pacemaker replaced approximately 3 weeks ago.  He had recent chest CT for lung cancer screening and is here to go over those results today.    Patient reports overall he is doing well from respiratory standpoint.  He did have COVID in October but generally had mild symptoms.  He continues to be compliant with smoking cessation.  He has cough is occasionally productive of thick clear to yellow mucus.  He denies hemoptysis.  He denies chest tightness and rarely will notice wheezing.  He does get short of breath with increased exertion but this is stable at baseline.  He continues on Symbicort.  He denies any fever, chills, night sweats or unexplained weight loss.        Review of Systems  Pertinent positives and negatives listed in the HPI.    The following portions of the patient's history were reviewed and updated as appropriate: allergies, current medications, past family history, past medical history, past social history, past surgical history and problem list.    History:  Social History     Tobacco Use   • Smoking status: Former     Packs/day: 0.75     Years: 30.00     Additional pack years: 0.00      Total pack years: 22.50     Types: Cigarettes     Quit date: 2022     Years since quittin.8   • Smokeless tobacco: Never   • Tobacco comments:     Last smoked 3/1/22   Vaping Use   • Vaping Use: Never used   Substance Use Topics   • Alcohol use: Yes     Alcohol/week: 4.0 - 5.0 standard drinks of alcohol     Types: 4 - 5 Cans of beer per week     Comment: 3-4 beers weekly or less   • Drug use: No         Immunizations:  Immunization History   Administered Date(s) Administered   • Flu vaccine (PF) (egg-free cell culture) greater than or equal to 6 months old IM 2020   • Flu vaccine (PF) greater than or equal to 6 months IM 2018, 2019, 2022, 2023   • Flu vaccine Adjuvanted (PF) greater than or equal to 65 years old IM 2022   • Influenza TIV (IM) 2020   • Tabber BIVALENT BOOSTER SARS-COV-2 VACCINATION (Booster) 2022   • PFIZER-BIONTECH SARS-COV-2 PURPLE CAP VACCINATION (D1, D2, Immuno) 2021, 2021, 2022   • Pneumococcal Conjugate 20-Valent 2023   • Pneumococcal Polysaccharide - PPSV23 2022   • Shingrix IM 2022, 2023   • Tdap 2018       Medications:    Current Outpatient Medications:   •  budesonide-formoteroL (SYMBICORT) 160-4.5 mcg/actuation inhaler, Inhale 2 puffs 2 (two) times a day Rinse mouth with water after use. Do not swallow., Disp: 30.6 g, Rfl: 3  •  warfarin (COUMADIN) 5 mg tablet, Take 1/2 to 1 tablet by mouth daily as directed by INR, Disp: 60 tablet, Rfl: 0  •  atorvastatin (LIPITOR) 80 mg tablet, Take 1 tablet (80 mg total) by mouth nightly, Disp: 90 tablet, Rfl: 1  •  sacubitriL-valsartan (ENTRESTO) 24-26 mg tablet, Take 1 tablet by mouth 2 (two) times a day Pt to take 0.5tab BID for 2 weeks then increase to 1 tab BID. Pt will discontinue lisinopril for 3 day washout period prior to starting entresto., Disp: 60 tablet, Rfl: 11  •  acetaminophen (TYLENOL) 325 mg tablet, Take 2 tablets (650 mg  total) by mouth every 6 (six) hours as needed for pain scale 1-3/10, Disp: , Rfl:   •  evolocumab (Repatha SureClick) 140 mg/mL pen injector, Inject 140 mg under the skin every 14 (fourteen) days, Disp: 6 mL, Rfl: 3  •  spironolactone (ALDACTONE) 25 mg tablet, Take 1 tablet (25 mg total) by mouth daily, Disp: 90 tablet, Rfl: 3  •  metoprolol succinate XL (TOPROL-XL) 50 mg 24 hr tablet, Take 1 tablet (50 mg total) by mouth daily, Disp: 90 tablet, Rfl: 3  •  nitroglycerin (NITROSTAT) 0.4 mg SL tablet, Place 1 tablet (0.4 mg total) under the tongue every 5 (five) minutes as needed for chest pain Limit 3 tabs per episode., Disp: 25 tablet, Rfl: 1  •  albuterol HFA (Ventolin HFA) 90 mcg/actuation inhaler, Inhale 2 puffs every 4 (four) hours (Patient taking differently: Inhale 2 puffs every 4 (four) hours PRN Daily), Disp: 1 Inhaler, Rfl: 11  •  amoxicillin (AMOXIL) 500 mg tablet, SMARTSI Tablet(s) By Mouth, Disp: , Rfl:   •  aspirin 81 mg EC tablet, Take 1 tablet (81 mg total) by mouth daily, Disp: 90 tablet, Rfl: 3    Allergies:  Allergies   Allergen Reactions   • Ezetimibe Rash       Objective:  Vitals:    24 1344   BP: 124/70   Pulse: 82   Resp: 17   SpO2: 92%   Weight: 95.8 kg (211 lb 3.2 oz)   Height: 1.829 m (6')       Physical Exam  Constitutional:       General: He is not in acute distress.     Appearance: He is not ill-appearing.   Cardiovascular:      Rate and Rhythm: Normal rate and regular rhythm.      Heart sounds: No murmur heard.     No friction rub. No gallop.   Pulmonary:      Effort: Pulmonary effort is normal. No respiratory distress.      Breath sounds: Normal breath sounds. No wheezing, rhonchi or rales.   Chest:      Chest wall: No tenderness.   Musculoskeletal:      Right lower leg: No edema.      Left lower leg: No edema.   Skin:     General: Skin is warm and dry.   Neurological:      General: No focal deficit present.      Mental Status: He is oriented to person, place, and time.  "        Lab Review:   Lab Results   Component Value Date    GLUCOSE 94 05/24/2023    CALCIUM 9.2 05/24/2023     (L) 05/24/2023    K 4.3 05/24/2023    CO2 24 05/24/2023     05/24/2023    BUN 21 05/24/2023    CREATININE 0.93 05/24/2023    ANIONGAP 7 05/24/2023     Lab Results   Component Value Date     (H) 03/08/2022     No results found for: \"IGE\"  Lab Results   Component Value Date    WBC 6.3 05/24/2023    HGB 15.7 05/24/2023    HCT 45.4 05/24/2023    MCV 93.9 05/24/2023     05/24/2023           PFT:  Pulmonary function test completed for COPD.  ATS standards met throughout testing.  Flow volume loop appears obstructed.  Spirometry shows evidence of mild airway obstruction that normalizes with the use of albuterol.  FEV1 improves by 25% and 570 cc with use of albuterol.  This spirometry is consistent with mild COPD or well-controlled asthma.  When compared to pulmonary function testing completed in April 2022, there does seem to be an improvement in FEV1 and FVC.      Imaging review:   Chest CT for lung cancer screening 12/22/2023:  FINDINGS:  Respiratory: There is a new nodule in the lingular segment of the left upper lobe which measures 2.7 x 1.7 cm which corresponds to an average diameter of 2.2 cm.. Clear airways. No bronchectasis. Mild centrilobular emphysema.  Mediastinum: Severe coronary artery calcifications. The heart is not enlarged. No mediastinal/hilar adenopathy.   Chest wall, axillary regions and upper abdomen: No significant abnormalities are seen in the chest wall or visualized upper abdomen.   IMPRESSION:  New 2.2 cm left upper lobe nodule  Recommend further evaluation with FDG PET/CT or tissue sampling.  Significant Incidental Findings:  Severe coronary artery calcifications.  Osteoporosis. Recommend primary care provider evaluation for fracture risk and DEXA, if not already performed.    Discussion:  I reviewed imaging report recent chest CT with patient clinic today.  He " has a new 2.2 cm left upper lobe nodule at the cardiac border.  I reviewed imaging as well with Dr. Vázquez.  Discussed that this would be possible to do his ION case but there are some increased risk factors with location.  Doing a PET scan first may be beneficial.  We discussed risks and benefits of bronchoscopy with Ion for biopsy versus starting with the PET scan.  We are scheduling out a month to do tissue sampling with bronchoscopy/Ion.  Following shared decision making we opted to start with a PET scan.  We discussed if there is evidence of FDG uptake we will discuss scheduling for biopsy.  Otherwise, we will plan on follow-up CT for continued monitoring.    Patient reports today he is planning to have shoulder surgery on 1 February.  He is very anxious to have the surgery done due to significant pain.  We discussed ultimately this does not increase his surgical risk factors but do not want shoulder surgery to delay treatment if this does prove to be cancer.  Patient reports understanding and wishes to proceed with shoulder surgery as planned next month.    The patient is mild risk of perioperative complications from a respiratory stand point. Including but not limited to prolonged time on the mechanical ventilator, perioperative COPD exacerbation and perioperative pneumonia. These risks are not prohibitive and should not prevent proceeding with surgical procedure. I would recommend aggressive postoperative pulmonary toilet, early ambulation and standard DVT precautions.        Assessment:  1.  Mild COPD with asthma overlap  2.  History of tobacco use    PLAN:  1.  Continue Symbicort 2 puffs twice daily  2.  Albuterol 2 puffs as needed every 4-6 hours for shortness of breath, cough, chest tightness or wheezing.  Use 15 minutes prior to activity.   3.  CT PET to evaluate new Pulmonary nodule.  4.  Continue routine physical activity.    I will call with results of CT PET as it is difficult for patient to get  time off work.  Patient requested I call his wife, Robina to discuss the results.    Pt voices understanding and agreement to plan as stated above. All questions answered.          A voice recognition program was used to aid in medical record documentation. Sometimes words are printed not exactly as they were spoken. While efforts were made to carefully edit and correct any inaccuracies, some errors may be present. Errors should be taken within the context of the discussion.  Please contact our office if you need assistance interpreting this medical record or notice any mistakes.     If you have questions, please do not hesitate to call me. I look forward to following your patient along with you.         Sincerely,        Kizzy Weber, CNP        CC: No Recipients

## 2024-01-05 NOTE — PROGRESS NOTES
PULMONARY NOTE      HPI:  Hayden Franklin is a 63 y.o. male patient who presents for pulmonary follow-up on COPD with asthma overlap.  He has approximately 30-pack-year history of smoking and quit 2022.  Patient has history of congestive heart failure and had CABG and valve replacement surgery 2022.  Then he had his pacemaker replaced approximately 3 weeks ago.  He had recent chest CT for lung cancer screening and is here to go over those results today.    Patient reports overall he is doing well from respiratory standpoint.  He did have COVID in October but generally had mild symptoms.  He continues to be compliant with smoking cessation.  He has cough is occasionally productive of thick clear to yellow mucus.  He denies hemoptysis.  He denies chest tightness and rarely will notice wheezing.  He does get short of breath with increased exertion but this is stable at baseline.  He continues on Symbicort.  He denies any fever, chills, night sweats or unexplained weight loss.        Review of Systems  Pertinent positives and negatives listed in the HPI.    The following portions of the patient's history were reviewed and updated as appropriate: allergies, current medications, past family history, past medical history, past social history, past surgical history and problem list.    History:  Social History     Tobacco Use    Smoking status: Former     Packs/day: 0.75     Years: 30.00     Additional pack years: 0.00     Total pack years: 22.50     Types: Cigarettes     Quit date: 2022     Years since quittin.8    Smokeless tobacco: Never    Tobacco comments:     Last smoked 3/1/22   Vaping Use    Vaping Use: Never used   Substance Use Topics    Alcohol use: Yes     Alcohol/week: 4.0 - 5.0 standard drinks of alcohol     Types: 4 - 5 Cans of beer per week     Comment: 3-4 beers weekly or less    Drug use: No         Immunizations:  Immunization History   Administered Date(s) Administered     Flu vaccine (PF) (egg-free cell culture) greater than or equal to 6 months old IM 11/14/2020    Flu vaccine (PF) greater than or equal to 6 months IM 11/09/2018, 11/01/2019, 02/26/2022, 12/22/2023    Flu vaccine Adjuvanted (PF) greater than or equal to 65 years old IM 11/02/2022    Influenza TIV (IM) 11/14/2020    [employer] BIVALENT BOOSTER SARS-COV-2 VACCINATION (Booster) 11/02/2022    PFIZER-BIONTECH SARS-COV-2 PURPLE CAP VACCINATION (D1, D2, Immuno) 03/03/2021, 03/24/2021, 02/04/2022    Pneumococcal Conjugate 20-Valent 03/24/2023    Pneumococcal Polysaccharide - PPSV23 03/07/2022    Shingrix IM 11/14/2022, 03/24/2023    Tdap 08/13/2018       Medications:    Current Outpatient Medications:     budesonide-formoteroL (SYMBICORT) 160-4.5 mcg/actuation inhaler, Inhale 2 puffs 2 (two) times a day Rinse mouth with water after use. Do not swallow., Disp: 30.6 g, Rfl: 3    warfarin (COUMADIN) 5 mg tablet, Take 1/2 to 1 tablet by mouth daily as directed by INR, Disp: 60 tablet, Rfl: 0    atorvastatin (LIPITOR) 80 mg tablet, Take 1 tablet (80 mg total) by mouth nightly, Disp: 90 tablet, Rfl: 1    sacubitriL-valsartan (ENTRESTO) 24-26 mg tablet, Take 1 tablet by mouth 2 (two) times a day Pt to take 0.5tab BID for 2 weeks then increase to 1 tab BID. Pt will discontinue lisinopril for 3 day washout period prior to starting entresto., Disp: 60 tablet, Rfl: 11    acetaminophen (TYLENOL) 325 mg tablet, Take 2 tablets (650 mg total) by mouth every 6 (six) hours as needed for pain scale 1-3/10, Disp: , Rfl:     evolocumab (Repatha SureClick) 140 mg/mL pen injector, Inject 140 mg under the skin every 14 (fourteen) days, Disp: 6 mL, Rfl: 3    spironolactone (ALDACTONE) 25 mg tablet, Take 1 tablet (25 mg total) by mouth daily, Disp: 90 tablet, Rfl: 3    metoprolol succinate XL (TOPROL-XL) 50 mg 24 hr tablet, Take 1 tablet (50 mg total) by mouth daily, Disp: 90 tablet, Rfl: 3    nitroglycerin (NITROSTAT) 0.4 mg SL tablet,  "Place 1 tablet (0.4 mg total) under the tongue every 5 (five) minutes as needed for chest pain Limit 3 tabs per episode., Disp: 25 tablet, Rfl: 1    albuterol HFA (Ventolin HFA) 90 mcg/actuation inhaler, Inhale 2 puffs every 4 (four) hours (Patient taking differently: Inhale 2 puffs every 4 (four) hours PRN Daily), Disp: 1 Inhaler, Rfl: 11    amoxicillin (AMOXIL) 500 mg tablet, SMARTSI Tablet(s) By Mouth, Disp: , Rfl:     aspirin 81 mg EC tablet, Take 1 tablet (81 mg total) by mouth daily, Disp: 90 tablet, Rfl: 3    Allergies:  Allergies   Allergen Reactions    Ezetimibe Rash       Objective:  Vitals:    24 1344   BP: 124/70   Pulse: 82   Resp: 17   SpO2: 92%   Weight: 95.8 kg (211 lb 3.2 oz)   Height: 1.829 m (6')       Physical Exam  Constitutional:       General: He is not in acute distress.     Appearance: He is not ill-appearing.   Cardiovascular:      Rate and Rhythm: Normal rate and regular rhythm.      Heart sounds: No murmur heard.     No friction rub. No gallop.   Pulmonary:      Effort: Pulmonary effort is normal. No respiratory distress.      Breath sounds: Normal breath sounds. No wheezing, rhonchi or rales.   Chest:      Chest wall: No tenderness.   Musculoskeletal:      Right lower leg: No edema.      Left lower leg: No edema.   Skin:     General: Skin is warm and dry.   Neurological:      General: No focal deficit present.      Mental Status: He is oriented to person, place, and time.         Lab Review:   Lab Results   Component Value Date    GLUCOSE 94 2023    CALCIUM 9.2 2023     (L) 2023    K 4.3 2023    CO2 24 2023     2023    BUN 21 2023    CREATININE 0.93 2023    ANIONGAP 7 2023     Lab Results   Component Value Date     (H) 2022     No results found for: \"IGE\"  Lab Results   Component Value Date    WBC 6.3 2023    HGB 15.7 2023    HCT 45.4 2023    MCV 93.9 2023     2023 "           PFT:  Pulmonary function test completed for COPD.  ATS standards met throughout testing.  Flow volume loop appears obstructed.  Spirometry shows evidence of mild airway obstruction that normalizes with the use of albuterol.  FEV1 improves by 25% and 570 cc with use of albuterol.  This spirometry is consistent with mild COPD or well-controlled asthma.  When compared to pulmonary function testing completed in April 2022, there does seem to be an improvement in FEV1 and FVC.      Imaging review:   Chest CT for lung cancer screening 12/22/2023:  FINDINGS:  Respiratory: There is a new nodule in the lingular segment of the left upper lobe which measures 2.7 x 1.7 cm which corresponds to an average diameter of 2.2 cm.. Clear airways. No bronchectasis. Mild centrilobular emphysema.  Mediastinum: Severe coronary artery calcifications. The heart is not enlarged. No mediastinal/hilar adenopathy.   Chest wall, axillary regions and upper abdomen: No significant abnormalities are seen in the chest wall or visualized upper abdomen.   IMPRESSION:  New 2.2 cm left upper lobe nodule  Recommend further evaluation with FDG PET/CT or tissue sampling.  Significant Incidental Findings:  Severe coronary artery calcifications.  Osteoporosis. Recommend primary care provider evaluation for fracture risk and DEXA, if not already performed.    Discussion:  I reviewed imaging report recent chest CT with patient clinic today.  He has a new 2.2 cm left upper lobe nodule at the cardiac border.  I reviewed imaging as well with Dr. Vázquez.  Discussed that this would be possible to do his ION case but there are some increased risk factors with location.  Doing a PET scan first may be beneficial.  We discussed risks and benefits of bronchoscopy with Ion for biopsy versus starting with the PET scan.  We are scheduling out a month to do tissue sampling with bronchoscopy/Ion.  Following shared decision making we opted to start with a PET  scan.  We discussed if there is evidence of FDG uptake we will discuss scheduling for biopsy.  Otherwise, we will plan on follow-up CT for continued monitoring.    Patient reports today he is planning to have shoulder surgery on 1 February.  He is very anxious to have the surgery done due to significant pain.  We discussed ultimately this does not increase his surgical risk factors but do not want shoulder surgery to delay treatment if this does prove to be cancer.  Patient reports understanding and wishes to proceed with shoulder surgery as planned next month.    The patient is mild risk of perioperative complications from a respiratory stand point. Including but not limited to prolonged time on the mechanical ventilator, perioperative COPD exacerbation and perioperative pneumonia. These risks are not prohibitive and should not prevent proceeding with surgical procedure. I would recommend aggressive postoperative pulmonary toilet, early ambulation and standard DVT precautions.        Assessment:  1.  Mild COPD with asthma overlap  2.  History of tobacco use    PLAN:  1.  Continue Symbicort 2 puffs twice daily  2.  Albuterol 2 puffs as needed every 4-6 hours for shortness of breath, cough, chest tightness or wheezing.  Use 15 minutes prior to activity.   3.  CT PET to evaluate new Pulmonary nodule.  4.  Continue routine physical activity.    I will call with results of CT PET as it is difficult for patient to get time off work.  Patient requested I call his wife, Robina to discuss the results.    Pt voices understanding and agreement to plan as stated above. All questions answered.          A voice recognition program was used to aid in medical record documentation. Sometimes words are printed not exactly as they were spoken. While efforts were made to carefully edit and correct any inaccuracies, some errors may be present. Errors should be taken within the context of the discussion.  Please contact our office if you  need assistance interpreting this medical record or notice any mistakes.

## 2024-01-12 ENCOUNTER — LAB (OUTPATIENT)
Dept: LAB | Facility: CLINIC | Age: 64
End: 2024-01-12
Payer: COMMERCIAL

## 2024-01-12 ENCOUNTER — OFFICE VISIT (OUTPATIENT)
Dept: FAMILY MEDICINE | Facility: CLINIC | Age: 64
End: 2024-01-12
Payer: COMMERCIAL

## 2024-01-12 VITALS
OXYGEN SATURATION: 94 % | HEART RATE: 75 BPM | WEIGHT: 203 LBS | TEMPERATURE: 97.7 F | SYSTOLIC BLOOD PRESSURE: 126 MMHG | HEIGHT: 72 IN | DIASTOLIC BLOOD PRESSURE: 78 MMHG | BODY MASS INDEX: 27.5 KG/M2

## 2024-01-12 DIAGNOSIS — M75.41 IMPINGEMENT SYNDROME OF RIGHT SHOULDER: ICD-10-CM

## 2024-01-12 DIAGNOSIS — I50.42 CHRONIC COMBINED SYSTOLIC AND DIASTOLIC HEART FAILURE (CMS/HCC): ICD-10-CM

## 2024-01-12 DIAGNOSIS — I25.5 ISCHEMIC CARDIOMYOPATHY: Chronic | ICD-10-CM

## 2024-01-12 DIAGNOSIS — M75.101 TEAR OF RIGHT ROTATOR CUFF, UNSPECIFIED TEAR EXTENT, UNSPECIFIED WHETHER TRAUMATIC: ICD-10-CM

## 2024-01-12 DIAGNOSIS — Z01.818 PREOPERATIVE CLEARANCE: Primary | ICD-10-CM

## 2024-01-12 DIAGNOSIS — I42.8 NONISCHEMIC CARDIOMYOPATHY (CMS/HCC): ICD-10-CM

## 2024-01-12 DIAGNOSIS — J44.89 ASTHMA-COPD OVERLAP SYNDROME (CMS/HCC): Chronic | ICD-10-CM

## 2024-01-12 DIAGNOSIS — Z79.01 ANTICOAGULATED: ICD-10-CM

## 2024-01-12 DIAGNOSIS — M19.019 OSTEOARTHRITIS OF AC (ACROMIOCLAVICULAR) JOINT: ICD-10-CM

## 2024-01-12 PROBLEM — Z95.810 BIVENTRICULAR IMPLANTABLE CARDIOVERTER-DEFIBRILLATOR (ICD) IN SITU: Chronic | Status: ACTIVE | Noted: 2023-08-23

## 2024-01-12 LAB
ALBUMIN SERPL-MCNC: 4.4 G/DL (ref 3.5–5.3)
ALP SERPL-CCNC: 78 U/L (ref 45–115)
ALT SERPL-CCNC: 22 U/L (ref 7–52)
ANION GAP SERPL CALC-SCNC: 6 MMOL/L (ref 3–11)
AST SERPL-CCNC: 19 U/L
BASOPHILS # BLD AUTO: 0.1 10*3/UL
BASOPHILS NFR BLD AUTO: 1.1 % (ref 0–2)
BILIRUB SERPL-MCNC: 0.91 MG/DL (ref 0.2–1.4)
BUN SERPL-MCNC: 16 MG/DL (ref 7–25)
CALCIUM ALBUM COR SERPL-MCNC: 9.2 MG/DL (ref 8.6–10.3)
CALCIUM SERPL-MCNC: 9.5 MG/DL (ref 8.6–10.3)
CHLORIDE SERPL-SCNC: 104 MMOL/L (ref 98–107)
CO2 SERPL-SCNC: 27 MMOL/L (ref 21–32)
CREAT SERPL-MCNC: 0.98 MG/DL (ref 0.7–1.3)
EGFRCR SERPLBLD CKD-EPI 2021: 88 ML/MIN/1.73M*2
EOSINOPHIL # BLD AUTO: 0.1 10*3/UL
EOSINOPHIL NFR BLD AUTO: 1.8 % (ref 0–3)
ERYTHROCYTE [DISTWIDTH] IN BLOOD BY AUTOMATED COUNT: 14.1 % (ref 11.5–15)
GLUCOSE SERPL-MCNC: 105 MG/DL (ref 70–105)
HCT VFR BLD AUTO: 49.1 % (ref 38–50)
HGB BLD-MCNC: 16.8 G/DL (ref 13.2–17.2)
LYMPHOCYTES # BLD AUTO: 1.5 10*3/UL
LYMPHOCYTES NFR BLD AUTO: 26.6 % (ref 15–47)
MCH RBC QN AUTO: 32.4 PG (ref 29–34)
MCHC RBC AUTO-ENTMCNC: 34.1 G/DL (ref 32–36)
MCV RBC AUTO: 95.1 FL (ref 82–97)
MONOCYTES # BLD AUTO: 0.5 10*3/UL
MONOCYTES NFR BLD AUTO: 9.3 % (ref 5–13)
NEUTROPHILS # BLD AUTO: 3.4 10*3/UL
NEUTROPHILS NFR BLD AUTO: 61.2 % (ref 46–70)
PLATELET # BLD AUTO: 195 10*3/UL (ref 130–350)
PMV BLD AUTO: 8.3 FL (ref 6.9–10.8)
POTASSIUM SERPL-SCNC: 4.4 MMOL/L (ref 3.5–5.1)
PROT SERPL-MCNC: 7.1 G/DL (ref 6–8.3)
RBC # BLD AUTO: 5.17 10*6/ΜL (ref 4.1–5.8)
SODIUM SERPL-SCNC: 137 MMOL/L (ref 135–145)
WBC # BLD AUTO: 5.6 10*3/UL (ref 3.7–9.6)

## 2024-01-12 PROCEDURE — 99214 OFFICE O/P EST MOD 30 MIN: CPT | Performed by: FAMILY MEDICINE

## 2024-01-12 PROCEDURE — 85025 COMPLETE CBC W/AUTO DIFF WBC: CPT | Performed by: FAMILY MEDICINE

## 2024-01-12 PROCEDURE — 36415 COLL VENOUS BLD VENIPUNCTURE: CPT | Performed by: FAMILY MEDICINE

## 2024-01-12 PROCEDURE — 80053 COMPREHEN METABOLIC PANEL: CPT | Performed by: FAMILY MEDICINE

## 2024-01-12 RX ORDER — CLOTRIMAZOLE 10 MG/1
10 LOZENGE ORAL
COMMUNITY
Start: 2024-01-11 | End: 2024-01-29 | Stop reason: ALTCHOICE

## 2024-01-12 RX ORDER — METOPROLOL SUCCINATE 50 MG/1
50 TABLET, EXTENDED RELEASE ORAL DAILY
Qty: 90 TABLET | Refills: 3 | Status: SHIPPED | OUTPATIENT
Start: 2024-01-12 | End: 2024-10-22

## 2024-01-12 NOTE — ASSESSMENT & PLAN NOTE
He has asthma COPD overlap, managed by pulmonary.  Recent CT lung screen was abnormal.  He is going to be having a PET scan on 1/25/2024.  He may end up needing lung biopsy after this.  Discussed if his PET scan is significantly abnormal, proceeding with elective rotator cuff repair may not be the best decision and we may need to delay that.  If his PET/CT shows something that may be very slow-growing or not need immediate intervention, he may proceed with surgery.

## 2024-01-12 NOTE — PROGRESS NOTES
PRE-OP EVALUATION:    Chief Complaint   Patient presents with    Pre-op Exam     Right shoulder 24         Hayden Franklin  : 1960    HPI:  Hayden Franklin is a 63 y.o. male presents for pre-operative evaluation as requested by Dr. Prieto.  He requires evaluation and anesthesia clearance prior to undergoing surgery/procedure for treatment of R rotator cuff tear.  Proposed procedure:   RIGHT SHOULDER ARTHROSCOPIC ROTATOR CUFF REPAIR, SUBACROMIAL DECOMPRESSION, DISTAL CLAVICLE EXCISION, POSSIBLE SUPERIOR CAPSULAR RECONSTRUCTION Rig       .  Due to his chronic medical problems, I have been asked to see the patient for a Pre-operative evaluation.      Date of Surgery/Procedure: 2024  Hospital/Surgical Facility: [facility]  Primary Care Physician:  Rosa Haney MD   Type of Anesthesia Anticipated: General  Preoperative Considerations:  Alcohol or substance use/abuse/dependence:  No  Aortic stenosis  No  Atrial fibrillation  No  Heart disease/stents:  Yes- has had bypass; has defibrillator.  Has had AVR and is on warfarin  CHF:  yes, last EF 41% 2023  MI in the past 6 months  No  History of MRSA:  No  Anesthesia complications, personal or family (malignant hyperthermia?) No  Anticoagulation  Yes, describe: on warfarin; cardiology to give recommendations  Bleeding tendency, personal or family history (Von Willebrand's or Hemophilia)  No  Current or recent illness  No  Recent steroids  No  C spine concerns (RA)  No  Asthma or COPD  Yes, describe: asthma-copd overlap  Current smoker  No  Diabetes  No  At risk for Delirium  No  Hepatitis, HIV, blood transfusion- has had transfusion  Medications requiring perioperative management (steroids, insulin, metformin, HRT)  Yes, describe: warfarin will need perioperative management per cardiolgoy  MO  No  DVT prophylaxis:resume warfarin post op  History of DVT or PE:  No  CKD  No  Liver disease/cirrhosis  No      Patient has a Health Care Directive or  Living Will: No    Problems addressed today:  Chronic combined systolic and diastolic heart failure (CMS/HCC)  Patient has a history of ischemic cardiomyopathy, defibrillator and aortic valve replacement x 2.  His last EF was 41%.  He is having cardiology clearance prior to proceeding with surgery.  He is currently euvolemic and reports no signs or symptoms of heart failure.  He is anticoagulated with warfarin due to aortic valve and will be read receiving perioperative anticoagulation instructions from his cardiology team.    Ischemic cardiomyopathy  He has a history of CABG.  He has greater than 4 METS exercise capacity.    Asthma-COPD overlap syndrome (CMS/HCC)  He has asthma COPD overlap, managed by pulmonary.  Recent CT lung screen was abnormal.  He is going to be having a PET scan on 1/25/2024.  He may end up needing lung biopsy after this.  Discussed if his PET scan is significantly abnormal, proceeding with elective rotator cuff repair may not be the best decision and we may need to delay that.  If his PET/CT shows something that may be very slow-growing or not need immediate intervention, he may proceed with surgery.       Patient Active Problem List   Diagnosis    Coronary atherosclerosis of native coronary artery    Mixed hyperlipidemia    S/P AVR    History of coronary artery bypass graft x 1    Ischemic cardiomyopathy    Chronic combined systolic and diastolic heart failure (CMS/HCC)    Asthma-COPD overlap syndrome    Nonrheumatic aortic valve insufficiency    Anticoagulated    Biventricular implantable cardioverter-defibrillator (ICD) in situ       PAST MEDICAL HISTORY:  Past Medical History:   Diagnosis Date    Aortic valve stenosis     s/p AV replacement 11/2013    Arthritis     Bilat hands, ankle    Ascending aorta dilatation (CMS/[facility])     s/p AAA repair 11/2013    Asthma     CAD (coronary artery disease)     1 vessel bypass    Cancer (CMS/[facility])     Skin    CHF (congestive heart failure) (CMS/[facility])      COPD (chronic obstructive pulmonary disease) (CMS/[facility])     Dysrhythmia     nonsustained v-tach    Hyperlipidemia     Ischemic cardiomyopathy     Lung nodule 03/07/2022    Myocardial infarction (CMS/[facility])     Presence of heart assist device (CMS/[facility])     Shortness of breath        IMMUNIZATION HISTORY:  Immunization History   Administered Date(s) Administered    Flu vaccine (PF) (egg-free cell culture) greater than or equal to 6 months old IM 11/14/2020    Flu vaccine (PF) greater than or equal to 6 months IM 11/09/2018, 11/01/2019, 02/26/2022, 12/22/2023    Flu vaccine Adjuvanted (PF) greater than or equal to 65 years old IM 11/02/2022    Influenza TIV (IM) 11/14/2020    [employer] BIVALENT BOOSTER SARS-COV-2 VACCINATION (Booster) 11/02/2022    PFIZER-BIONTECH SARS-COV-2 PURPLE CAP VACCINATION (D1, D2, Immuno) 03/03/2021, 03/24/2021, 02/04/2022    Pneumococcal Conjugate 20-Valent 03/24/2023    Pneumococcal Polysaccharide - PPSV23 03/07/2022    Shingrix IM 11/14/2022, 03/24/2023    Tdap 08/13/2018       PAST SURGICAL HISTORY:  Past Surgical History:   Procedure Laterality Date    AAA REPAIR - ENDOGRAFT  11/2013    ANKLE SURGERY      AORTIC ROOT ANGIOGRAM N/A 05/20/2022    Procedure: Aortic Arch  Angiogram;  Surgeon: Derick Gallegos MD;  Location: [facility] Cath Lab;  Service: Cardiovascular;  Laterality: N/A;    AORTIC VALVE REPLACEMENT  11/2013    BIVENTRICULAR ICD UPGRADE N/A 05/24/2023    Procedure: Bi-V ICD upgrade;  Surgeon: Mitchel Saldaña DO;  Location: [facility] EP Lab;  Service: Cardiovascular;  Laterality: N/A;    CARDIAC VALVE REPLACEMENT      COLONOSCOPY N/A 10/05/2023    Procedure: COLONOSCOPY with polypectomies, endo clip placement x2;  Surgeon: Patricia Hutton MD;  Location: [facility] End[facility];  Service: Endoscopy;  Laterality: N/A;    CORONARY ANGIOPLASTY  11/2013    CORONARY ARTERY BYPASS GRAFT  11/2013    1V CABG SVG- RCA    LEFT HEART CATH N/A 05/20/2022    Procedure: Left heart cath;  Surgeon: Derick ALLEN  MD El;  Location: [facility] Cath Lab;  Service: Cardiovascular;  Laterality: N/A;       FAMILY MEDICAL HISTORY:  Family Status   Relation Name Status    Mother Ting Franklin Alive    Father Hayden Franklin     Sister Preethi Alive    Sister Analy Alive    Sister Catalina Alive    Daughter Alejandra Alive    Daughter Sana Alive      Family History   Problem Relation Age of Onset    Cancer Mother     Heart disease Father     Aneurysm Father     Other Sister         undiagnosed neurological disease    Breast cancer Sister     No Known Problems Sister     Gallbladder disease Daughter     No Known Problems Daughter          SOCIAL HISTORY:  Social History     Tobacco Use    Smoking status: Former     Packs/day: 0.75     Years: 30.00     Additional pack years: 0.00     Total pack years: 22.50     Types: Cigarettes     Quit date: 2022     Years since quittin.8    Smokeless tobacco: Never    Tobacco comments:     Last smoked 3/1/22   Substance Use Topics    Alcohol use: Yes     Alcohol/week: 4.0 - 5.0 standard drinks of alcohol     Types: 4 - 5 Cans of beer per week     Comment: 3-4 beers weekly or less       DRUG HISTORY:  Social History     Substance and Sexual Activity   Drug Use No       HOME MEDICATIONS:  Current Outpatient Medications   Medication Sig Dispense Refill    clotrimazole (MYCELEX) 10 mg esdras Take 1 tablet (10 mg total) by mouth 5 (five) times a day      budesonide-formoteroL (SYMBICORT) 160-4.5 mcg/actuation inhaler Inhale 2 puffs 2 (two) times a day Rinse mouth with water after use. Do not swallow. 30.6 g 3    warfarin (COUMADIN) 5 mg tablet Take 1/2 to 1 tablet by mouth daily as directed by INR 60 tablet 0    atorvastatin (LIPITOR) 80 mg tablet Take 1 tablet (80 mg total) by mouth nightly 90 tablet 1    sacubitriL-valsartan (ENTRESTO) 24-26 mg tablet Take 1 tablet by mouth 2 (two) times a day Pt to take 0.5tab BID for 2 weeks then increase to 1 tab BID. Pt will discontinue lisinopril  for 3 day washout period prior to starting entresto. 60 tablet 11    acetaminophen (TYLENOL) 325 mg tablet Take 2 tablets (650 mg total) by mouth every 6 (six) hours as needed for pain scale 1-3/10      evolocumab (Repatha SureClick) 140 mg/mL pen injector Inject 140 mg under the skin every 14 (fourteen) days 6 mL 3    spironolactone (ALDACTONE) 25 mg tablet Take 1 tablet (25 mg total) by mouth daily 90 tablet 3    nitroglycerin (NITROSTAT) 0.4 mg SL tablet Place 1 tablet (0.4 mg total) under the tongue every 5 (five) minutes as needed for chest pain Limit 3 tabs per episode. 25 tablet 1    albuterol HFA (Ventolin HFA) 90 mcg/actuation inhaler Inhale 2 puffs every 4 (four) hours (Patient taking differently: Inhale 2 puffs every 4 (four) hours PRN Daily) 1 Inhaler 11    aspirin 81 mg EC tablet Take 1 tablet (81 mg total) by mouth daily 90 tablet 3    metoprolol succinate XL (TOPROL-XL) 50 mg 24 hr tablet Take 1 tablet (50 mg total) by mouth daily 90 tablet 3    amoxicillin (AMOXIL) 500 mg tablet SMARTSI Tablet(s) By Mouth       No current facility-administered medications for this visit.        ALLERGIES:  Allergies   Allergen Reactions    Ezetimibe Rash       Latex Allergy: No    REVIEW OF SYSTEMS:  General ROS: weight is stable; no recent fevers or other illness.  ENT ROS: denies sore throat or any loose/chipped teeth.  Respiratory ROS: some chronic cough and some shortness of breath which is chronic; no hemoptysis  Cardiovascular ROS: denies chest pain; no leg swelling.  Has greater than 4 mets exercise capacity  Gastrointestinal ROS: no abdominal pain, change in bowel habits, or black or bloody stools  Genito-Urinary ROS: no dysuria, trouble voiding, or hematuria  Neurological ROS: negative for headaches  Dermatological ROS: no rash, open skin areas, jaundice.  Musculoskeletal: R shoulder pain        VITALS:   /78 (BP Location: Left arm, Patient Position: Sitting, Cuff Size: Regular Adult)   Pulse 75    Temp 36.5 °C (97.7 °F) (Temporal)   Ht 1.829 m (6')   Wt 92.1 kg (203 lb)   SpO2 94%   BMI 27.53 kg/m²    Body mass index is 27.53 kg/m².    PHYSICAL EXAM:  General appearance: in no apparent distress.  Psych:  Affect/mood normal.  Eye exam - conjunctiva clear.  ENT exam reveals - ENT exam normal, no neck nodes or sinus tenderness.  Neck exam - supple and no adenopathy.  Thyroid exam-thyroid is normal in size without nodules or tenderness.  Respiratory exam reveals: normal inspiratory/expiratory effort; no wheezes or rhonchi.  CVS exam: RRR with no murmur audible; no JVD.  Abdominal exam: soft, non-tender without hepatosplenomegaly or mass  Exam of extremities: no pedal edema noted  Skin exam - normal coloration and turgor, no rashes, no suspicious skin lesions noted.      DIAGNOSTICS:  Recent Results (from the past 96 hour(s))   Comprehensive metabolic panel Blood, Venous    Collection Time: 01/12/24  7:44 AM   Result Value Ref Range    Sodium 137 135 - 145 mmol/L    Potassium 4.4 3.5 - 5.1 MMOL/L    Chloride 104 98 - 107 mmol/L    CO2 27 21 - 32 mmol/L    Anion Gap 6 3 - 11 mmol/L    BUN 16 7 - 25 mg/dL    Creatinine 0.98 0.70 - 1.30 mg/dL    Glucose 105 70 - 105 mg/dL    Calcium 9.5 8.6 - 10.3 mg/dL    AST 19 <40 U/L    ALT (SGPT) 22 7 - 52 U/L    Alkaline Phosphatase 78 45 - 115 U/L    Total Protein 7.1 6.0 - 8.3 g/dL    Albumin 4.4 3.5 - 5.3 g/dL    Total Bilirubin 0.91 0.20 - 1.40 mg/dL    Corrected Calcium 9.2 8.6 - 10.3 mg/dL    eGFR 88 >60 mL/min/1.73m*2   CBC w/auto differential Blood, Venous    Collection Time: 01/12/24  7:44 AM   Result Value Ref Range    WBC 5.6 3.7 - 9.6 10*3/uL    RBC 5.17 4.10 - 5.80 10*6/µL    Hemoglobin 16.8 13.2 - 17.2 g/dL    Hematocrit 49.1 38.0 - 50.0 %    MCV 95.1 82.0 - 97.0 fL    MCH 32.4 29.0 - 34.0 pg    MCHC 34.1 32.0 - 36.0 g/dL    RDW 14.1 11.5 - 15.0 %    Platelets 195 130 - 350 10*3/uL    MPV 8.3 6.9 - 10.8 fL    Neutrophils% 61.2 46.0 - 70.0 %    Lymphocytes%  26.6 15.0 - 47.0 %    Monocytes% 9.3 5.0 - 13.0 %    Eosinophils% 1.8 0.0 - 3.0 %    Basophils% 1.1 0.0 - 2.0 %    ANC (auto diff) 3.40 10*3/UL    Lymphocytes Absolute 1.50 10*3/uL    Monocytes Absolute 0.50 10*3/uL    Eosinophils Absolute 0.10 10*3/uL    Basophils Absolute 0.10 10*3/uL             IMPRESSION:   Diagnosis Plan   1. Preoperative clearance  Ambulatory referral to [facility]    Comprehensive metabolic panel Blood, Venous    CBC w/auto differential Blood, Venous      2. Tear of right rotator cuff, unspecified tear extent, unspecified whether traumatic  Ambulatory referral to [facility]      3. Osteoarthritis of AC (acromioclavicular) joint  Ambulatory referral to [facility]      4. Impingement syndrome of right shoulder  Ambulatory referral to [facility]      5. Ischemic cardiomyopathy        6. Anticoagulated        7. Chronic combined systolic and diastolic heart failure (CMS/HCC)  metoprolol succinate XL (TOPROL-XL) 50 mg 24 hr tablet      8. Nonischemic cardiomyopathy (CMS/HCC)  metoprolol succinate XL (TOPROL-XL) 50 mg 24 hr tablet      9. Asthma-COPD overlap syndrome              RECOMMENDATIONS:  The patient has been evaluated by pulmonology.  They recommend pulmonary toilet.  He has a pet scan pending due to a left lung nodule.  If PET scan is significantly abnormal, he may need to hold off on having elective rotator cuff surgery.    He is seeing cardiology for preoperative clearance as well.  They will make recommendations regarding his warfarin as they manage that.    Rosa Haney MD

## 2024-01-12 NOTE — ASSESSMENT & PLAN NOTE
Patient has a history of ischemic cardiomyopathy, defibrillator and aortic valve replacement x 2.  His last EF was 41%.  He is having cardiology clearance prior to proceeding with surgery.  He is currently euvolemic and reports no signs or symptoms of heart failure.  He is anticoagulated with warfarin due to aortic valve and will be read receiving perioperative anticoagulation instructions from his cardiology team.

## 2024-01-19 ENCOUNTER — OFFICE VISIT (OUTPATIENT)
Dept: CARDIOLOGY | Facility: CLINIC | Age: 64
End: 2024-01-19
Payer: COMMERCIAL

## 2024-01-19 VITALS
WEIGHT: 203 LBS | BODY MASS INDEX: 27.5 KG/M2 | HEIGHT: 72 IN | OXYGEN SATURATION: 93 % | SYSTOLIC BLOOD PRESSURE: 110 MMHG | HEART RATE: 72 BPM | DIASTOLIC BLOOD PRESSURE: 64 MMHG | RESPIRATION RATE: 16 BRPM

## 2024-01-19 DIAGNOSIS — Z01.818 PREOPERATIVE CLEARANCE: ICD-10-CM

## 2024-01-19 DIAGNOSIS — Z95.1 HISTORY OF CORONARY ARTERY BYPASS GRAFT X 1: Chronic | ICD-10-CM

## 2024-01-19 DIAGNOSIS — Z95.2 PRESENCE OF PROSTHETIC HEART VALVE: Primary | ICD-10-CM

## 2024-01-19 DIAGNOSIS — I25.10 CORONARY ARTERY DISEASE INVOLVING NATIVE CORONARY ARTERY OF NATIVE HEART WITHOUT ANGINA PECTORIS: ICD-10-CM

## 2024-01-19 DIAGNOSIS — Z45.02 ENCOUNTER FOR INTERROGATION OF CARDIAC DEFIBRILLATOR: ICD-10-CM

## 2024-01-19 DIAGNOSIS — Z95.2 S/P AVR: Chronic | ICD-10-CM

## 2024-01-19 DIAGNOSIS — M75.101 TEAR OF RIGHT ROTATOR CUFF, UNSPECIFIED TEAR EXTENT, UNSPECIFIED WHETHER TRAUMATIC: ICD-10-CM

## 2024-01-19 DIAGNOSIS — Z95.810 BIVENTRICULAR IMPLANTABLE CARDIOVERTER-DEFIBRILLATOR (ICD) IN SITU: Chronic | ICD-10-CM

## 2024-01-19 DIAGNOSIS — I50.42 CHRONIC COMBINED SYSTOLIC AND DIASTOLIC CONGESTIVE HEART FAILURE (CMS/HCC): Primary | ICD-10-CM

## 2024-01-19 PROCEDURE — 99214 OFFICE O/P EST MOD 30 MIN: CPT | Performed by: INTERNAL MEDICINE

## 2024-01-19 PROCEDURE — 93000 ELECTROCARDIOGRAM COMPLETE: CPT | Performed by: INTERNAL MEDICINE

## 2024-01-19 RX ORDER — WARFARIN SODIUM 5 MG/1
TABLET ORAL
Qty: 90 TABLET | Refills: 3 | Status: SHIPPED | OUTPATIENT
Start: 2024-01-19 | End: 2024-02-01 | Stop reason: HOSPADM

## 2024-01-19 RX ORDER — ENOXAPARIN SODIUM 100 MG/ML
1 INJECTION SUBCUTANEOUS EVERY 12 HOURS
Qty: 11.7 ML | Refills: 1 | Status: SHIPPED | OUTPATIENT
Start: 2024-01-29 | End: 2024-02-05

## 2024-01-19 ASSESSMENT — ENCOUNTER SYMPTOMS
IRREGULAR HEARTBEAT: 0
DIZZINESS: 0
LIGHT-HEADEDNESS: 0
PALPITATIONS: 0
SHORTNESS OF BREATH: 0

## 2024-01-19 ASSESSMENT — PAIN SCALES - GENERAL: PAINLEVEL: 10-WORST PAIN EVER

## 2024-01-19 NOTE — Clinical Note
Dear Dr. Prieto, I saw Gopal in clinic today for preop cardiovascular risk assessment.  I have attached my office note for your reference.  Please do not hesitate to contact me if there is any other questions.  Sincerely,  Dr. Joshi

## 2024-01-19 NOTE — PATIENT INSTRUCTIONS
Everything checks out good from your heart standpoint today.  Okay to proceed with shoulder repair surgery  Please follow instruction of the Coumadin clinic for your anticoagulation bridging  I will see you back in 6 months for routine follow-up

## 2024-01-19 NOTE — H&P (VIEW-ONLY)
[facility]   Heart and Vascular [facility]     CARDIOLOGY OUTPATIENT FOLLOW-UP NOTE                                       Chief Complaint   Patient presents with    Coronary Artery Disease    Congestive Heart Failure    S/P AVR     Re-do 9/6/22    Bi-V ICD       Coronary Artery Disease  Symptoms include leg swelling. Pertinent negatives include no chest pain, dizziness, palpitations or shortness of breath. His past medical history is significant for CHF.   Congestive Heart Failure  Pertinent negatives include no chest pain, palpitations or shortness of breath. His past medical history is significant for CAD.     Subjective    Patient ID: Hayden Franklin is a 63 y.o. male.    Gopal is here for preop risk assessment.    He had a inferior MI November 1, 2013 and underwent SVG to RCA graft with concomitant AVR (with Justin Mitroflow bioprosthesis) and ascending aortic Dacron graft.     He ended up having severe prosthetic aortic valve stenosis and underwent redo sternotomy with AVR using 23 mm On-X mechanical valve on 9/6/2022 at [facility].     His coronary angiogram prior to the valve replacement did show that his SVG to RPDA was widely patent and had  of proximal RCA.  Rest of the circulation was good.     He did have a postop complete AV block requiring dual-chamber permanent pacemaker placement on 9/10/2022.  He apparently had initial atrial lead dislodgment requiring redo lead before discharge on 9/15/2022.     Immediate postsurgical EF apparently was 45% which apparently got worse at the time of pacemaker implantation dropping down to 30 to 35%.    His last echo was done 8/23/2023.  Ejection fraction did improve to 41% on this echocardiogram.    He is currently on Entresto, Aldactone, Toprol-XL.  He remains on aspirin Lipitor and Repatha.    He is on warfarin for anticoagulation.     He was seen by Dr. Prieto because of his right shoulder issues.  He does have tear of the right  rotator cuff and has chronic pain in that side and now he is actually losing functionality.  He is scheduled for repair surgery February 1, 2024.    In the interim he was also noted to have a mass in the left upper lobe measuring 2.2 cm on his lung cancer screening CT.  He is awaiting his PET scan on the 25th.    He remains active and denies any exertional chest pain shortness of breath or claudication.  He is able to climb 2 full flight of stairs without having to stop in between.    CARDIOLOGY PROBLEM LIST:  Cardiology Problem List    Last Edited By Inocencia Keller RN on 1/12/2024    Primary Cardiologist: Dr. Joshi  Primary EP: Dr Saldaña    Coronary   - Inferior MI 11/1/2013  - 1V CABG 11/5/2013: SVG-RPDA     CHF/CM  - ICM  - Chronic combined CHF    Peripheral Vascular  - Ascending Aortic aneurysm repair 11/5/2013: 34-mm Dacron graft    Valvular  - Nonrheumatic AV stenosis  - AVR 11/5/2013: Justin Mitroflow bioprosthetic  - AVR (re-do) 9/6/2022: #23-mm On-X  ([facility])    Electrophysiology  - Complete AVB w/ junction escape  - PPM implantation 9/9/2022: [employer] MRI EL [id number] SN: [id number] ([facility])  - RA lead dislodgement 9/15/2022  - Bi-V ICD upgrade 5/24/2023    Pulmonary Vascular: N/A  Other: N/A    Risk Factors   - Dyslipidemia [Type: Hyperlipidemia]  - h/o Tobacco use [cigarettes, 1 ppd x 30 years, quit 2/24/2022]    Cardiovascular Procedure History  - 11/05/2013 CABG x 1: SVG-RPDA  - 11/05/2013 Ascending Aortic aneurysm repair: 34-mm Dacron graft  - 11/05/2013 AVR: Justin Mitroflow bioprosthetic  - 09/06/2022 AVR (re-do): #23-mm On-X  ([facility])  - 09/09/2022 PPM implantation: Accolade MRI EL L331 SN: [id number] ([facility])  - 09/15/2022 RA lead dislodgement  - 05/24/2023 Bi-V ICD upgrade: [employer] Resonate G447 SN: [id number]; RV: [employer] [place] 4 0673-64cm SN: [id number]; Coronary sinus: [employer] Acuity Lv X4 4678-95cm SN: [id number]    Prior Imaging/Testing History   - 08/18/2021 Echo: EF  38%, moderate LV systolic dysfunction, segmental wall motion abnormalities, RV hypokinetic, LA moderately dilated, AV prosthetic graft present [34-mm Dacron Graft], RVSP is estimated at 28 mmHg  - 02/25/2022 Echo: EF 52%, RA dilated, 27-mm Justin Mitroflow bioprosthetic AV present, valve leaflets slightly thickened and calcified although all 3 leaflets are noted to have a decent range of motion, mild-moderate AR, mild pHTN [RVSP  is estimated at 40 mmHg]  - 11/09/2022 Echo: EF 29%, mild LV dilation [LVIDD 5.8 cm], severe LV systolic dysfunction with segmental wall motion abnormalities, akinesis of basal inferior wall, presence of diastolic dysfunction, unable to assess grade due to summation of E and A waves,  moderate RV hypokinesis, severe LAE [LA VI 52 ml/m2], dilated RA, normally functioning 23-mm On-X mechanical AV prosthesis [peak aortic velocity 1.5 m/s, mean gradient 5 mmHg, effective orifice area 2.1 cm², no significant stenosis or regurgitation], mild MR/TR, RVSP estimated at 34 mmHg  - 02/23/2023 Echo: EF 31%, mild LV [LVIDD 6.0 cm], moderate LV systolic dysfunction with segmental wall motion abnormalities, severe hypokinesis of basal to mid inferior and basal inferoseptal walls, moderate hypokinesis of rest of the segments, diastolic dysfunction, unable to assess grade (summation E and a waves), severe LAE [LA VI 49 ml/m2], dilated RA, normally functioning 23-mm On-X bileaflet mechanical AV [peak aortic velocity 1.9 m/s, mean gradient 8 mmHg, EOA 2.1 cm²], trivial AR, trace MR/TR, RVSP estimated 34 mmHg  - 08/23/2023 Echo (ltd): EF 41%, mild LV systolic dysfunction with segmental wall motion abnormalities, akinesis of basal inferior wall. , severe hypokinesis of basal to mid inferior wall, hypokinesis of basal to mid inferolateral wall, Grade 1 diastolic dysfunction, mild SILVIO [LA VI 39 ml/m2], normally functioning 23-mm On-X mechanical AV prosthesis [peak aortic velocity 1.6 m/s, mean gradient 6 mmHg, no  significant stenosis or regurgitation], trace MR/TR, RVSP estimated 27 mmHg    12/07/2022 EKG (Sinus Rhythm, 80 bpm, 1st degree AVB [], nonspecific IVCD with LAD, borderline T abnormalities [lateral leads])  - 08/23/2023 EKG (A-sensed V-paced, 70 bpm, Bi-V paced rhythm)    - 02/26/2022 Lexiscan MPI: abnormal; EF 48%     Past Medical History:   Diagnosis Date    Aortic valve stenosis     s/p AV replacement 11/2013    Arthritis     Bilat hands, ankle    Ascending aorta dilatation (CMS/HCC)     s/p AAA repair 11/2013    Asthma     CAD (coronary artery disease)     1 vessel bypass    Cancer (CMS/[facility])     Skin    CHF (congestive heart failure) (CMS/[facility])     COPD (chronic obstructive pulmonary disease) (CMS/[facility])     Dysrhythmia     nonsustained v-tach    Hyperlipidemia     Ischemic cardiomyopathy     Lung nodule 03/07/2022    Myocardial infarction (CMS/[facility])     Presence of heart assist device (CMS/[facility])     Shortness of breath      Past Surgical History:   Procedure Laterality Date    AAA REPAIR - ENDOGRAFT  11/2013    ANKLE SURGERY      AORTIC ROOT ANGIOGRAM N/A 05/20/2022    Procedure: Aortic Arch  Angiogram;  Surgeon: Derick Gallegos MD;  Location: [facility];  Service: Cardiovascular;  Laterality: N/A;    AORTIC VALVE REPLACEMENT  11/2013    BIVENTRICULAR ICD UPGRADE N/A 05/24/2023    Procedure: Bi-V ICD upgrade;  Surgeon: Mitchel Saldaña DO;  Location: [facility];  Service: Cardiovascular;  Laterality: N/A;    CARDIAC VALVE REPLACEMENT      COLONOSCOPY N/A 10/05/2023    Procedure: COLONOSCOPY with polypectomies, endo clip placement x2;  Surgeon: Patricia Hutton MD;  Location: [facility];  Service: Endoscopy;  Laterality: N/A;    CORONARY ANGIOPLASTY  11/2013    CORONARY ARTERY BYPASS GRAFT  11/2013    1V CABG SVG- RCA    LEFT HEART CATH N/A 05/20/2022    Procedure: Left heart cath;  Surgeon: Derick Gallegos MD;  Location: [facility];  Service: Cardiovascular;  Laterality: N/A;       Allergies as of  2024 - Reviewed 2024   Allergen Reaction Noted    Ezetimibe Rash 2020     Current Outpatient Medications   Medication Sig Dispense Refill    ticagrelor (Brilinta) 90 mg tablet Take 1 tablet (90 mg total) by mouth 2 (two) times a day      clotrimazole (MYCELEX) 10 mg esdras Take 1 tablet (10 mg total) by mouth 5 (five) times a day      metoprolol succinate XL (TOPROL-XL) 50 mg 24 hr tablet Take 1 tablet (50 mg total) by mouth daily 90 tablet 3    warfarin (COUMADIN) 5 mg tablet Take 1/2 to 1 tablet by mouth daily as directed by INR 60 tablet 0    atorvastatin (LIPITOR) 80 mg tablet Take 1 tablet (80 mg total) by mouth nightly 90 tablet 1    sacubitriL-valsartan (ENTRESTO) 24-26 mg tablet Take 1 tablet by mouth 2 (two) times a day Pt to take 0.5tab BID for 2 weeks then increase to 1 tab BID. Pt will discontinue lisinopril for 3 day washout period prior to starting entresto. 60 tablet 11    acetaminophen (TYLENOL) 325 mg tablet Take 2 tablets (650 mg total) by mouth every 6 (six) hours as needed for pain scale 1-3/10      evolocumab (Repatha SureClick) 140 mg/mL pen injector Inject 140 mg under the skin every 14 (fourteen) days 6 mL 3    spironolactone (ALDACTONE) 25 mg tablet Take 1 tablet (25 mg total) by mouth daily 90 tablet 3    nitroglycerin (NITROSTAT) 0.4 mg SL tablet Place 1 tablet (0.4 mg total) under the tongue every 5 (five) minutes as needed for chest pain Limit 3 tabs per episode. 25 tablet 1    albuterol HFA (Ventolin HFA) 90 mcg/actuation inhaler Inhale 2 puffs every 4 (four) hours (Patient taking differently: Inhale 2 puffs every 4 (four) hours PRN Daily) 1 Inhaler 11    amoxicillin (AMOXIL) 500 mg tablet SMARTSI Tablet(s) By Mouth      budesonide-formoteroL (SYMBICORT) 160-4.5 mcg/actuation inhaler Inhale 2 puffs 2 (two) times a day Rinse mouth with water after use. Do not swallow. (Patient not taking: Reported on 2024) 30.6 g 3    aspirin 81 mg EC tablet Take 1 tablet (81 mg  total) by mouth daily 90 tablet 3     No current facility-administered medications for this visit.       Family History   Problem Relation Age of Onset    Cancer Mother     Heart disease Father     Aneurysm Father     Other Sister         undiagnosed neurological disease    Breast cancer Sister     No Known Problems Sister     Gallbladder disease Daughter     No Known Problems Daughter      Social History     Tobacco Use    Smoking status: Former     Packs/day: 0.75     Years: 30.00     Additional pack years: 0.00     Total pack years: 22.50     Types: Cigarettes     Quit date: 2022     Years since quittin.9    Smokeless tobacco: Never    Tobacco comments:     Last smoked 3/1/22   Vaping Use    Vaping Use: Never used   Substance Use Topics    Alcohol use: Yes     Alcohol/week: 4.0 - 5.0 standard drinks of alcohol     Types: 4 - 5 Cans of beer per week     Comment: 3-4 beers weekly or less    Drug use: No       Review of Systems  Review of Systems   Cardiovascular:  Positive for dyspnea on exertion and leg swelling/pain. Negative for chest pain, irregular heartbeat and palpitations.   Respiratory:  Negative for shortness of breath and sleep apnea.    Neurological:  Negative for dizziness and light-headedness.   All other systems reviewed and are negative.      Objective   Vitals:    24 1354   BP: 110/64   Pulse: 72   Resp: 16   SpO2: 93%   Height: 1.829 m (6')   Weight: 92.1 kg (203 lb)   PainSc: 10-Worst pain ever   PainLoc: Shoulder  Comment: right   Patient Position: Sitting     Weight: 92.1 kg (203 lb)  Physical Exam  General: Comfortable not in any acute distress  HEENT: Normocephalic atraumatic  Neck: Normal JVP, no carotid bruit  Chest: Clear to auscultation, no adventitious breath sounds  CVS: S1-S2, regular rate rhythm, no murmurs rubs or gallops  Abdomen: Soft, nontender nondistended, normoactive bowel sounds present  Extremities: No edema, no cyanosis or clubbing  Vascular: 2+ radial  pulsation bilaterally    Data Review:   Sodium   Date Value Ref Range Status   01/12/2024 137 135 - 145 mmol/L Final     Potassium   Date Value Ref Range Status   01/12/2024 4.4 3.5 - 5.1 MMOL/L Final     Chloride   Date Value Ref Range Status   01/12/2024 104 98 - 107 mmol/L Final     CO2   Date Value Ref Range Status   01/12/2024 27 21 - 32 mmol/L Final     BUN   Date Value Ref Range Status   01/12/2024 16 7 - 25 mg/dL Final     Creatinine   Date Value Ref Range Status   01/12/2024 0.98 0.70 - 1.30 mg/dL Final     Glucose   Date Value Ref Range Status   01/12/2024 105 70 - 105 mg/dL Final     Calcium   Date Value Ref Range Status   01/12/2024 9.5 8.6 - 10.3 mg/dL Final     BNP   Date Value Ref Range Status   03/08/2022 375 (H) 0 - 100 pg/mL Final     Lipids:    Lab Results   Component Value Date    CHOL 114 04/14/2023    CHOL 84 04/19/2022    CHOL 123 04/05/2021     Lab Results   Component Value Date    HDL 71 04/14/2023    HDL 56 04/19/2022    HDL 65 04/05/2021     Lab Results   Component Value Date    LDLCALC 25 04/14/2023    LDLCALC <20 (L) 04/19/2022    LDLCALC 41 04/05/2021     Lab Results   Component Value Date    TRIG 92 04/14/2023    TRIG 60 04/19/2022    TRIG 91 04/05/2021       Protime-INR:   Lab Results   Component Value Date    PT 24.7 (H) 12/30/2023    INR 2.2 (H) 12/30/2023     Electrocardiogram: 1/19/2024  Atrial sensed, biventricular paced rhythm, 72 bpm    Assessment/Plan   Diagnoses and all orders for this visit:    Chronic combined systolic and diastolic congestive heart failure (CMS/HCC)    Encounter for interrogation of cardiac defibrillator    Preoperative clearance  -     Ambulatory referral to Cardiology    Tear of right rotator cuff, unspecified tear extent, unspecified whether traumatic  -     Ambulatory referral to Cardiology    Coronary artery disease involving native coronary artery of native heart without angina pectoris  -     ECG 12 lead -Normal, Today    Biventricular implantable  cardioverter-defibrillator (ICD) in situ    History of coronary artery bypass graft x 1    S/P AVR        Detailed Assessment and Plan:  Preop cardiovascular risk assessment:  Clinically he is doing well and is NYHA class I.  He is status post mechanical aortic valve replacement with a 23 mm On-X prosthesis done 9/6/2022  He does have systolic CHF although well compensated and his last ejection fraction has improved to 41%  Okay to proceed with shoulder surgery.  His overall risk of cardiovascular complication is in low intermediate range.  Because of his cardiomyopathy and mechanical aortic valve he would require Lovenox bridging.  [facility] heart and vascular [place] anticoagulation clinic will manage his perioperative anticoagulation.  He will get his last dose of Lovenox 24 hours prior to surgery.  He will come off his warfarin 5 days prior to surgery.  He will require bridging postoperatively with Lovenox as well till his INR is back to therapeutic  Please continue with his atorvastatin And Toprol-XL perioperatively    Status post aortic valve redo replacement with 23 mm On-X mechanical valve for treatment of severe aortic regurgitation of the bioprosthetic Justin valve back in September at [facility]: :  Normal valve function  Continue aspirin and warfarin  Lifelong antibiotic prophylaxis for dental work involving gingival manipulation with 2 g amoxicillin 1 hour prior to dental procedure    Chronic systolic CHF:  Ejection fraction stable around 41%  Status post BiV ICD  Unstable guideline directed medical therapy  Clinically euvolemic and class I        Patient instructions:     Everything checks out good from your heart standpoint today.  Okay to proceed with shoulder repair surgery  Please follow instruction of the Coumadin clinic for your anticoagulation bridging  I will see you back in 6 months for routine follow-up         Sincerely,        Electronically signed by: Michael Joshi  MD  1/19/2024  2:03 PM

## 2024-01-26 PROCEDURE — 93295 DEV INTERROG REMOTE 1/2/MLT: CPT | Performed by: INTERNAL MEDICINE

## 2024-01-29 ENCOUNTER — LAB (OUTPATIENT)
Dept: LAB | Facility: URGENT CARE | Age: 64
End: 2024-01-29
Payer: COMMERCIAL

## 2024-01-29 DIAGNOSIS — Z51.81 ANTICOAGULATION MANAGEMENT ENCOUNTER: ICD-10-CM

## 2024-01-29 DIAGNOSIS — Z79.01 ANTICOAGULATION MANAGEMENT ENCOUNTER: ICD-10-CM

## 2024-01-29 LAB
INR BLD: 1.3
PROTHROMBIN TIME: 14.8 SECONDS (ref 9.4–12.5)

## 2024-01-29 PROCEDURE — 85610 PROTHROMBIN TIME: CPT | Performed by: FAMILY MEDICINE

## 2024-01-29 PROCEDURE — 36415 COLL VENOUS BLD VENIPUNCTURE: CPT | Performed by: FAMILY MEDICINE

## 2024-01-29 NOTE — PRE-PROCEDURE INSTRUCTIONS
Pre-Surgery Instructions:   Medication Instructions    warfarin (COUMADIN) 5 mg tablet Patient advised by MD to stop on 01/27/24 (date)    enoxaparin (LOVENOX) 100 mg/mL syringe Hold for 24 hours before surgery per Dr. Joshi.    metoprolol succinate XL (TOPROL-XL) 50 mg 24 hr tablet Continue as prescribed do not hold    budesonide-formoteroL (SYMBICORT) 160-4.5 mcg/actuation inhaler Continue as prescribed do not hold    atorvastatin (LIPITOR) 80 mg tablet Continue as prescribed do not hold    sacubitriL-valsartan (ENTRESTO) 24-26 mg tablet Hold for 24 hours prior to surgery    acetaminophen (TYLENOL) 325 mg tablet Take/administer as needed morning of surgery/procedure    evolocumab (Repatha SureClick) 140 mg/mL pen injector Due 01/30/24    spironolactone (ALDACTONE) 25 mg tablet Do not take/administer morning of surgery/procedure    nitroglycerin (NITROSTAT) 0.4 mg SL tablet Take/administer as needed morning of surgery/procedure    albuterol HFA (Ventolin HFA) 90 mcg/actuation inhaler Take/administer as needed morning of surgery/procedure    amoxicillin (AMOXIL) 500 mg tablet For dental procedures only    aspirin 81 mg EC tablet Continue as prescribed do not hold     Please check in at Admissions on 0515 AM Thurs. 02/01/24    Admissions is on the south side of the building.  Access is from the [address] entrance. [employer] parking is available from 5:00 am to 5:00 pm Monday through Friday.  The [employer] service is free of charge. f you use the [employer] service, please make arrangements to retrieve your vehicle prior to 5 pm.  After 5 pm, security must be called to retrieve your keys and may delay your departure.    Please bring a valid photo ID and your insurance card with you.    Your surgery / procedure is scheduled to start at 0700 AM and lasts 3 hours.     Two people may accompany you when you check in and remain with you until you go for your surgery / procedure. At that time your  may wait in the  surgery waiting room or exit the building. We will make sure to have their phone number so they can receive periodic updates and so the doctor can visit with them when the surgery / procedure is finished.       DIETARY CONSIDERATIONS:      To prevent any delays or cancellations of your surgery, Please do not have anything to eat or any milk products after midnight the night before surgery.  You may drink clear liquids only up until 2 hours (UNTIL 0300 AM)  prior to your check in time.  If you get a call from the hospital to arrive early for surgery, STOP drinking immediately and report to the hospital as instructed.    Examples of clear liquids: water, apple juice, cranberry juice, Gatorade / Powerade, soda pop, tea, black coffee, plain gelatin. The color of the fluid doesn't matter as long as you can see through it in a diluted state. The more fluids you drink, the better you will feel and the easier it will be to start your IV.       Please:   No smoking (vaping, tobacco, marijuana) chewing tobacco, snuff, chewing gum after midnight.  These products increase gastric secretions and could lead to complications.        +++++++++++++++++++++++++++++++++++++++++++++++++++++++++++++++++++    SHOWER    Please shower the night before surgery. Dry off with a clean towel. Use Theraworx wipes as instructed.     Sleep in clean pajamas and clean sheets    If your physician has provided special instructions for showering, follow the instructions that were given to you.     After your shower, please do not use any lotions, sprays, powder, deodorant or makeup on your skin.    Please brush your teeth the morning of surgery.    +++++++++++++++++++++++++++++++++++++++++++++++++++++++++++++++++++    If you are having an outpatient surgery or procedure, you MUST have someone drive you home.  We recommend that someone stay with you for the first 24 hours after your surgery/procedure.    If you are staying overnight in the hospital,  visitation is limited to include two (2) visitors in a 24 hour period between the hours of 7:30 am and 7:30 pm.    You will be transferred to your room after approximately 1-2 hours in the recovery room.        Check the [facility] web site ([email or web address]) for the latest visitor policy.       NEED TO KNOW    Leave all jewelry, money, credit cards, and valuables at home.     Please bring cases for your glasses or contacts, dentures, &/or hearing aids.     If you get a cough, cold, fever, etc before your surgery / procedure, notify your doctor's office as soon as possible.    If you'd like, you may bring your Advance Directive (Living Will, Power of  for healthcare). We can scan it into your chart, then return the original back to you.     For any other questions or concerns, please call the Surgery Pre-Admissions department at [phone].  Our office hours are Monday-Friday 8 am - 4:30 pm.  Messages will be returned.

## 2024-01-30 ENCOUNTER — TELEPHONE (OUTPATIENT)
Dept: CARDIOLOGY | Facility: CLINIC | Age: 64
End: 2024-01-30
Payer: COMMERCIAL

## 2024-01-30 NOTE — PERIOPERATIVE NURSING NOTE
Essence a nurse from [facility] left a VM 1/29/24 at 4:45 pm regarding a message from someone in the preadmission department regarding pts Repatha injection, which is due 1/30/24.    I returned Essence's call but had to leave a msg.    8:45 AM:  Essence returned call---pt was instructed to verify with the ordering MD on Repatha instructions.  Pt instructions scanned into Media.

## 2024-01-30 NOTE — TELEPHONE ENCOUNTER
"Spoke to pt inst INR from yesterday after hours 1.3 so pt should start the lovenox shots and he states \"they\" told me to start it so I started it Sat or Sun inst that RP inst were that he did not have to start lovenox until INR less then 2. Since pt started lovenox earlier then expected he will need to get more inst there is a refill available for him. Verb under. DMD  "

## 2024-01-31 ENCOUNTER — ANESTHESIA EVENT (OUTPATIENT)
Dept: OPERATING ROOM | Facility: HOSPITAL | Age: 64
End: 2024-01-31
Payer: COMMERCIAL

## 2024-02-01 ENCOUNTER — APPOINTMENT (OUTPATIENT)
Dept: CARDIOLOGY | Facility: HOSPITAL | Age: 64
End: 2024-02-01
Payer: COMMERCIAL

## 2024-02-01 ENCOUNTER — TELEPHONE (OUTPATIENT)
Dept: ORTHOPEDIC SURGERY | Facility: CLINIC | Age: 64
End: 2024-02-01
Payer: COMMERCIAL

## 2024-02-01 ENCOUNTER — ANESTHESIA (OUTPATIENT)
Dept: OPERATING ROOM | Facility: HOSPITAL | Age: 64
End: 2024-02-01
Payer: COMMERCIAL

## 2024-02-01 ENCOUNTER — HOSPITAL ENCOUNTER (OUTPATIENT)
Facility: HOSPITAL | Age: 64
Setting detail: OUTPATIENT SURGERY
Discharge: HOME | End: 2024-02-01
Attending: ORTHOPAEDIC SURGERY | Admitting: ORTHOPAEDIC SURGERY
Payer: COMMERCIAL

## 2024-02-01 VITALS
WEIGHT: 207 LBS | BODY MASS INDEX: 28.04 KG/M2 | TEMPERATURE: 97 F | HEIGHT: 72 IN | RESPIRATION RATE: 16 BRPM | SYSTOLIC BLOOD PRESSURE: 123 MMHG | DIASTOLIC BLOOD PRESSURE: 72 MMHG | OXYGEN SATURATION: 90 % | HEART RATE: 68 BPM

## 2024-02-01 DIAGNOSIS — M75.101 TEAR OF RIGHT ROTATOR CUFF, UNSPECIFIED TEAR EXTENT, UNSPECIFIED WHETHER TRAUMATIC: ICD-10-CM

## 2024-02-01 DIAGNOSIS — M75.41 IMPINGEMENT SYNDROME OF RIGHT SHOULDER: ICD-10-CM

## 2024-02-01 DIAGNOSIS — Z98.890 STATUS POST ARTHROSCOPY OF RIGHT SHOULDER: Primary | ICD-10-CM

## 2024-02-01 DIAGNOSIS — M19.019 OSTEOARTHRITIS OF AC (ACROMIOCLAVICULAR) JOINT: ICD-10-CM

## 2024-02-01 LAB
BSA FOR ECHO PROCEDURE: 2.18 M2
INR BLD: 1
PROTHROMBIN TIME: 10.9 SECONDS (ref 9.4–12.5)

## 2024-02-01 PROCEDURE — (BLANK) HC RECOVERY PHASE-1 1ST  HOUR ACUITY LEVEL 1: Performed by: ORTHOPAEDIC SURGERY

## 2024-02-01 PROCEDURE — 29823 SHO ARTHRS SRG XTNSV DBRDMT: CPT | Mod: RT | Performed by: ORTHOPAEDIC SURGERY

## 2024-02-01 PROCEDURE — (BLANK) HC RECOVERY PHASE-2 EACH ADDITIONAL 1/2 HOUR ACUITY LEVEL 1: Performed by: ORTHOPAEDIC SURGERY

## 2024-02-01 PROCEDURE — C1713 ANCHOR/SCREW BN/BN,TIS/BN: HCPCS | Performed by: ORTHOPAEDIC SURGERY

## 2024-02-01 PROCEDURE — 6360000200 HC RX 636 W HCPCS (ALT 250 FOR IP): Performed by: ANESTHESIOLOGY

## 2024-02-01 PROCEDURE — 76937 US GUIDE VASCULAR ACCESS: CPT

## 2024-02-01 PROCEDURE — 29823 SHO ARTHRS SRG XTNSV DBRDMT: CPT | Mod: AS,RT | Performed by: PHYSICIAN ASSISTANT

## 2024-02-01 PROCEDURE — C1788 PORT, INDWELLING, IMP: HCPCS | Performed by: ORTHOPAEDIC SURGERY

## 2024-02-01 PROCEDURE — 29827 SHO ARTHRS SRG RT8TR CUF RPR: CPT | Mod: AS,RT | Performed by: PHYSICIAN ASSISTANT

## 2024-02-01 PROCEDURE — (BLANK) HC OR LEVEL 3 PROC 1ST 15MIN: Performed by: ORTHOPAEDIC SURGERY

## 2024-02-01 PROCEDURE — 6360000200 HC RX 636 W HCPCS (ALT 250 FOR IP)

## 2024-02-01 PROCEDURE — 2580000300 HC RX 258: Performed by: ORTHOPAEDIC SURGERY

## 2024-02-01 PROCEDURE — 29827 SHO ARTHRS SRG RT8TR CUF RPR: CPT | Mod: RT | Performed by: ORTHOPAEDIC SURGERY

## 2024-02-01 PROCEDURE — 2500000200 HC RX 250 WO HCPCS: Performed by: ORTHOPAEDIC SURGERY

## 2024-02-01 PROCEDURE — 93287 PERI-PX DEVICE EVAL & PRGR: CPT | Mod: 26 | Performed by: INTERNAL MEDICINE

## 2024-02-01 PROCEDURE — (BLANK) HC GENERAL ANESTHESIA FACILITY CHARGE EACH ADDITIONAL MIN: Performed by: ORTHOPAEDIC SURGERY

## 2024-02-01 PROCEDURE — 29826 SHO ARTHRS SRG DECOMPRESSION: CPT | Mod: RT | Performed by: ORTHOPAEDIC SURGERY

## 2024-02-01 PROCEDURE — (BLANK) HC GENERAL ANESTHESIA FACILITY CHARGE 1ST 15 MIN: Performed by: ORTHOPAEDIC SURGERY

## 2024-02-01 PROCEDURE — 6360000200 HC RX 636 W HCPCS (ALT 250 FOR IP): Performed by: ORTHOPAEDIC SURGERY

## 2024-02-01 PROCEDURE — (BLANK) HC RECOVERY PHASE-2 1ST 1/2 HOUR ACUITY LEVEL 1: Performed by: ORTHOPAEDIC SURGERY

## 2024-02-01 PROCEDURE — 01630 ANES OPN/ARTHR PX SHO JT NOS: CPT

## 2024-02-01 PROCEDURE — 2500000200 HC RX 250 WO HCPCS

## 2024-02-01 PROCEDURE — 2720000000 HC SUPP 272 WO HCPCS: Performed by: ORTHOPAEDIC SURGERY

## 2024-02-01 PROCEDURE — 93288 INTERROG EVL PM/LDLS PM IP: CPT

## 2024-02-01 PROCEDURE — 76942 ECHO GUIDE FOR BIOPSY: CPT

## 2024-02-01 PROCEDURE — 85610 PROTHROMBIN TIME: CPT | Performed by: STUDENT IN AN ORGANIZED HEALTH CARE EDUCATION/TRAINING PROGRAM

## 2024-02-01 PROCEDURE — 36415 COLL VENOUS BLD VENIPUNCTURE: CPT | Performed by: STUDENT IN AN ORGANIZED HEALTH CARE EDUCATION/TRAINING PROGRAM

## 2024-02-01 PROCEDURE — (BLANK) HC OR LEVEL 3 PROC EACH ADDITIONAL MIN: Performed by: ORTHOPAEDIC SURGERY

## 2024-02-01 PROCEDURE — 29826 SHO ARTHRS SRG DECOMPRESSION: CPT | Mod: AS,RT | Performed by: PHYSICIAN ASSISTANT

## 2024-02-01 DEVICE — LNT IMPLANT SYSTEM, 2.9 BC PUSHLOCK
Type: IMPLANTABLE DEVICE | Site: SHOULDER | Status: FUNCTIONAL
Brand: ARTHREX®

## 2024-02-01 DEVICE — DBL LOADED 4.75MM BIO-COMP SWVLK
Type: IMPLANTABLE DEVICE | Site: SHOULDER | Status: FUNCTIONAL
Brand: ARTHREX®

## 2024-02-01 RX ORDER — SODIUM CHLORIDE 0.9 % (FLUSH) 0.9 %
2 SYRINGE (ML) INJECTION AS NEEDED
Status: DISCONTINUED | OUTPATIENT
Start: 2024-02-01 | End: 2024-02-01 | Stop reason: HOSPADM

## 2024-02-01 RX ORDER — ACETAMINOPHEN 500 MG
1000 TABLET ORAL EVERY 8 HOURS SCHEDULED
Qty: 84 TABLET | Refills: 0 | Status: SHIPPED | OUTPATIENT
Start: 2024-02-01 | End: 2024-04-19 | Stop reason: ALTCHOICE

## 2024-02-01 RX ORDER — SODIUM CHLORIDE 0.9 G/100ML
INJECTION, SOLUTION IRRIGATION AS NEEDED
Status: DISCONTINUED | OUTPATIENT
Start: 2024-02-01 | End: 2024-02-01 | Stop reason: HOSPADM

## 2024-02-01 RX ORDER — FENTANYL CITRATE/PF 50 MCG/ML
50 PLASTIC BAG, INJECTION (ML) INTRAVENOUS EVERY 5 MIN PRN
Status: DISCONTINUED | OUTPATIENT
Start: 2024-02-01 | End: 2024-02-01 | Stop reason: HOSPADM

## 2024-02-01 RX ORDER — ACETAMINOPHEN 500 MG
1000 TABLET ORAL ONCE
Status: COMPLETED | OUTPATIENT
Start: 2024-02-01 | End: 2024-02-01

## 2024-02-01 RX ORDER — FENTANYL CITRATE/PF 50 MCG/ML
PLASTIC BAG, INJECTION (ML) INTRAVENOUS AS NEEDED
Status: DISCONTINUED | OUTPATIENT
Start: 2024-02-01 | End: 2024-02-01 | Stop reason: SURG

## 2024-02-01 RX ORDER — SODIUM CHLORIDE, SODIUM LACTATE, POTASSIUM CHLORIDE, CALCIUM CHLORIDE 600; 310; 30; 20 MG/100ML; MG/100ML; MG/100ML; MG/100ML
100 INJECTION, SOLUTION INTRAVENOUS CONTINUOUS
Status: DISCONTINUED | OUTPATIENT
Start: 2024-02-01 | End: 2024-02-01 | Stop reason: HOSPADM

## 2024-02-01 RX ORDER — DIPHENHYDRAMINE HYDROCHLORIDE 50 MG/ML
25 INJECTION INTRAMUSCULAR; INTRAVENOUS ONCE
Status: COMPLETED | OUTPATIENT
Start: 2024-02-01 | End: 2024-02-01

## 2024-02-01 RX ORDER — FENTANYL CITRATE/PF 50 MCG/ML
25 PLASTIC BAG, INJECTION (ML) INTRAVENOUS EVERY 5 MIN PRN
Status: DISCONTINUED | OUTPATIENT
Start: 2024-02-01 | End: 2024-02-01 | Stop reason: HOSPADM

## 2024-02-01 RX ORDER — GABAPENTIN 100 MG/1
100 CAPSULE ORAL 3 TIMES DAILY
Qty: 42 CAPSULE | Refills: 0 | Status: SHIPPED | OUTPATIENT
Start: 2024-02-01 | End: 2024-02-08 | Stop reason: ALTCHOICE

## 2024-02-01 RX ORDER — ROCURONIUM BROMIDE 10 MG/ML
INJECTION, SOLUTION INTRAVENOUS AS NEEDED
Status: DISCONTINUED | OUTPATIENT
Start: 2024-02-01 | End: 2024-02-01 | Stop reason: SURG

## 2024-02-01 RX ORDER — METOPROLOL TARTRATE 1 MG/ML
1 INJECTION, SOLUTION INTRAVENOUS EVERY 5 MIN PRN
Status: DISCONTINUED | OUTPATIENT
Start: 2024-02-01 | End: 2024-02-01 | Stop reason: HOSPADM

## 2024-02-01 RX ORDER — OXYCODONE HYDROCHLORIDE 5 MG/1
10 TABLET ORAL ONCE AS NEEDED
Status: CANCELLED | OUTPATIENT
Start: 2024-02-01

## 2024-02-01 RX ORDER — ROPIVACAINE HYDROCHLORIDE 5 MG/ML
INJECTION, SOLUTION EPIDURAL; INFILTRATION; PERINEURAL
Status: DISCONTINUED | OUTPATIENT
Start: 2024-02-01 | End: 2024-02-01 | Stop reason: SURG

## 2024-02-01 RX ORDER — ONDANSETRON HYDROCHLORIDE 2 MG/ML
4 INJECTION, SOLUTION INTRAVENOUS ONCE
Status: COMPLETED | OUTPATIENT
Start: 2024-02-01 | End: 2024-02-01

## 2024-02-01 RX ORDER — DEXAMETHASONE SODIUM PHOSPHATE 4 MG/ML
4 INJECTION, SOLUTION INTRA-ARTICULAR; INTRALESIONAL; INTRAMUSCULAR; INTRAVENOUS; SOFT TISSUE ONCE
Status: COMPLETED | OUTPATIENT
Start: 2024-02-01 | End: 2024-02-01

## 2024-02-01 RX ORDER — ONDANSETRON HYDROCHLORIDE 2 MG/ML
4 INJECTION, SOLUTION INTRAVENOUS ONCE AS NEEDED
Status: DISCONTINUED | OUTPATIENT
Start: 2024-02-01 | End: 2024-02-01 | Stop reason: HOSPADM

## 2024-02-01 RX ORDER — OXYCODONE HYDROCHLORIDE 5 MG/1
5-10 TABLET ORAL EVERY 4 HOURS PRN
Qty: 40 TABLET | Refills: 0 | Status: SHIPPED | OUTPATIENT
Start: 2024-02-01 | End: 2024-02-04

## 2024-02-01 RX ORDER — PROPOFOL 10 MG/ML
INJECTION, EMULSION INTRAVENOUS AS NEEDED
Status: DISCONTINUED | OUTPATIENT
Start: 2024-02-01 | End: 2024-02-01 | Stop reason: SURG

## 2024-02-01 RX ADMIN — FENTANYL CITRATE 100 MCG: 50 INJECTION, SOLUTION INTRAMUSCULAR; INTRAVENOUS at 07:09

## 2024-02-01 RX ADMIN — SUGAMMADEX 200 MG: 100 INJECTION, SOLUTION INTRAVENOUS at 09:42

## 2024-02-01 RX ADMIN — DEXAMETHASONE SODIUM PHOSPHATE 4 MG: 4 INJECTION, SOLUTION INTRA-ARTICULAR; INTRALESIONAL; INTRAMUSCULAR; INTRAVENOUS; SOFT TISSUE at 06:50

## 2024-02-01 RX ADMIN — FENTANYL CITRATE 50 MCG: 50 INJECTION, SOLUTION INTRAMUSCULAR; INTRAVENOUS at 07:34

## 2024-02-01 RX ADMIN — DIPHENHYDRAMINE HYDROCHLORIDE 25 MG: 50 INJECTION, SOLUTION INTRAMUSCULAR; INTRAVENOUS at 06:50

## 2024-02-01 RX ADMIN — ROPIVACAINE HYDROCHLORIDE 10 ML: 5 INJECTION, SOLUTION EPIDURAL; INFILTRATION; PERINEURAL at 06:45

## 2024-02-01 RX ADMIN — FENTANYL CITRATE 50 MCG: 50 INJECTION, SOLUTION INTRAMUSCULAR; INTRAVENOUS at 09:04

## 2024-02-01 RX ADMIN — Medication 1000 MG: at 06:50

## 2024-02-01 RX ADMIN — CEFAZOLIN 2000 MG: 2 INJECTION, POWDER, FOR SOLUTION INTRAMUSCULAR; INTRAVENOUS at 07:14

## 2024-02-01 RX ADMIN — FENTANYL CITRATE 50 MCG: 50 INJECTION, SOLUTION INTRAMUSCULAR; INTRAVENOUS at 08:21

## 2024-02-01 RX ADMIN — ROCURONIUM BROMIDE 50 MG: 10 INJECTION INTRAVENOUS at 07:09

## 2024-02-01 RX ADMIN — SODIUM CHLORIDE, POTASSIUM CHLORIDE, SODIUM LACTATE AND CALCIUM CHLORIDE: 600; 310; 30; 20 INJECTION, SOLUTION INTRAVENOUS at 07:02

## 2024-02-01 RX ADMIN — ONDANSETRON 4 MG: 2 INJECTION INTRAMUSCULAR; INTRAVENOUS at 06:50

## 2024-02-01 RX ADMIN — PROPOFOL 150 MG: 10 INJECTION, EMULSION INTRAVENOUS at 07:09

## 2024-02-01 ASSESSMENT — ENCOUNTER SYMPTOMS
SHORTNESS OF BREATH: 1
EXERCISE TOLERANCE: GOOD (4-7 METS)
DYSRHYTHMIAS: 1

## 2024-02-01 ASSESSMENT — COPD QUESTIONNAIRES: COPD: 1

## 2024-02-01 NOTE — ANESTHESIA PREPROCEDURE EVALUATION
Pre-Procedure Assessment    Patient: Hayden Franklin, male, 63 y.o.    Ht Readings from Last 1 Encounters:   24 1.829 m (6')     Wt Readings from Last 1 Encounters:   24 92.1 kg (203 lb)       Last Vitals  BP      Temp      Pulse     Resp      SpO2      Pain Score         Problem list reviewed and Medical history reviewed           Airway   Mallampati: II  TM distance: >3 FB  Neck ROM: full      Dental      Pulmonary    (+) COPD, asthma, shortness of breath  Cardiovascular - negative ROS  Exercise tolerance: good (4-7 METS)  (+) pacemaker, valvular problems/murmurs, past MI, CAD, dysrhythmias, CHF    Rhythm: regular  Rate: normal  ROS comment: · Mild left ventricular systolic dysfunction with segmental wall motion abnormalities, biplane EF 41%.  º Akinesis of basal inferior wall.    º Severe hypokinesis of basal to mid inferior wall.    º Hypokinesis of basal to mid inferolateral wall.   · Grade 1 diastolic dysfunction, normal left ventricular filling pressure.  · Normal right ventricular size with low normal systolic function.  · Mild biatrial enlargement, indexed left atrial volume 39 ml/m2.  · Normally functioning 23 mm On-X mechanical aortic valve prosthesis.  º Peak aortic velocity 1.6 m/s, mean gradient 6 mmHg.  º No significant stenosis or regurgitation.  · Trace mitral and tricuspid regurgitation.  · Normal RVSP estimated 27 mmHg.      Mental Status/Neuro/Psych    Pt is alert.      (+) arthritis    GI/Hepatic/Renal - negative ROS     Endo/Other    (+) history of blood transfusion  Abdominal             Social History     Tobacco Use    Smoking status: Former     Packs/day: 0.75     Years: 40.00     Additional pack years: 0.00     Total pack years: 30.00     Types: Cigarettes     Quit date: 2022     Years since quittin.9    Smokeless tobacco: Never    Tobacco comments:     Last smoked 3/1/22   Substance Use Topics    Alcohol use: Yes     Alcohol/week: 4.0 - 5.0 standard drinks of  alcohol     Types: 4 - 5 Cans of beer per week      Hematology   WBC   Date Value Ref Range Status   01/12/2024 5.6 3.7 - 9.6 10*3/uL Final     RBC   Date Value Ref Range Status   01/12/2024 5.17 4.10 - 5.80 10*6/µL Final     MCV   Date Value Ref Range Status   01/12/2024 95.1 82.0 - 97.0 fL Final     Hemoglobin   Date Value Ref Range Status   01/12/2024 16.8 13.2 - 17.2 g/dL Final     Hematocrit   Date Value Ref Range Status   01/12/2024 49.1 38.0 - 50.0 % Final     Platelets   Date Value Ref Range Status   01/12/2024 195 130 - 350 10*3/uL Final      Coagulation   Protime   Date Value Ref Range Status   01/29/2024 14.8 (H) 9.4 - 12.5 SECONDS Final     INR   Date Value Ref Range Status   01/29/2024 1.3 (H) <=1.1 Final   12/30/2023 2.20  Final      General Chemistry   Calcium   Date Value Ref Range Status   01/12/2024 9.5 8.6 - 10.3 mg/dL Final     BUN   Date Value Ref Range Status   01/12/2024 16 7 - 25 mg/dL Final     Creatinine   Date Value Ref Range Status   01/12/2024 0.98 0.70 - 1.30 mg/dL Final     Glucose   Date Value Ref Range Status   01/12/2024 105 70 - 105 mg/dL Final     Sodium   Date Value Ref Range Status   01/12/2024 137 135 - 145 mmol/L Final     Potassium   Date Value Ref Range Status   01/12/2024 4.4 3.5 - 5.1 MMOL/L Final     CO2   Date Value Ref Range Status   01/12/2024 27 21 - 32 mmol/L Final     Chloride   Date Value Ref Range Status   01/12/2024 104 98 - 107 mmol/L Final     Anesthesia Plan    ASA 4   NPO status reviewed: > 6 hours    General         Induction: intravenous   Airway Planning: oral ET tube  Lines/Monitors: arterial line  Additional Comments: Pre op art line  Pre op aicd assessment with defib fxn disabled   Pre op low volume low conc IS block.      Plan for postoperative opioid use.    Anesthetic plan and risks discussed with patient.

## 2024-02-01 NOTE — OP NOTE
Orthopaedic Surgery Operative Report    Hayden Franklin  02/01/24    PREOPERATIVE DIAGNOSIS:    Right shoulder rotator cuff tear,  AC joint osteoarthritis, impingement    POSTOPERATIVE DIAGNOSIS:    The same with labral tear, long head biceps tendon rupture      PROCEDURES:    Right shoulder arthroscopic rotator cuff repair including subscapularis  Right shoulder arthroscopic subacromial decompression  Right shoulder arthroscopic extensive debridement    FINDINGS  The patient did have AC joint osteoarthritis that was moving quite well and no significant spurring so I did leave this alone.  Full-thickness supraspinatus and anterior portion of the infraspinatus tear.  Proximal portion of the subscapularis tear.  Long head biceps tendon rupture with labral tearing    SURGEON: Dr. Mina Kendrick D.O    ASSISTANT: Osmani WILKINSON, provided essential assistance during the case including some or all of the following critical tasks:  preparation of graft materials, careful retractions of vital structures, safe manipulation of anatomic structures, suctioning of body fluids, and wound closure.         ANESTHETIST:  Anesthesiologist: Jonah Velazquez MD  CRNA: Jordana Chen CRNA; Suellen Ferreira CRNA       ANESTHESIA TYPE:  General with Interscalene Block       ESTIMATED BLOOD LOSS: Minimal    URINE OUTPUT:  Per anesthesia record.    IV FLUIDS:  Per anesthesia record.    SPECIMENS: None    DRAINS: None    IMPLANTS: Arthrex 4.75 swivel lock x 7 with 2.8 push lock    TOURNIQUET TIME: 0      COMPLICATIONS: None    OPERATIVE INDICATIONS: 63-year-old male with full-thickness rotator cuff tear    DESCRIPTION OF PROCEDURE: I met the patient in the Preoperative area and once again discussed indications for surgery, the surgical procedure and reiterated the risks and benefits. Informed consent was obtained. The appropriate operative limb was verified and marked. The patient was then taken to the operative theatre were they  were placed in a supine position. All bony prominences were padded. General anesthesia was successfully obtained.  Placed in lateral position.  All bony prominences were padded.  Right upper extremity then prepped and draped in sterile fashion.  Timeout occurred verifying patient's name, laterality and procedure which were correct.  Standard posterior portal was established entering the joint with the arthroscopic camera.  Through needle localization an anterior portal was established in standard fashion, cannula placed and full diagnostic evaluation was performed with findings above.      Shaver placed into the joint made a debrided back the labral complex bulging and biceps tendon stump and extensive synovitis anteriorly for better visualization of the subscapularis.  This was extensive debridement of 3 distinct structures.  I then used a ring curette to get a bleeding bed of the anterior lesser tuberosity area.  I placed a fiber stitch into the subscapularis and reduce this to the lesser tuberosity with a 4.75 swivel lock.    The camera was then placed into the subacromial space and a lateral portal was established under standard fashion through needle localization.  Shaver was placed in and I performed  a subacromial bursectomy.  I then placed the power rasp and remove the spurring off of the acromion as well as the lateral edge giving a full subacromial decompression.   Electrocautery used for hemostasis.        I then turned my attention to the rotator cuff where I performed multiple releases and this allowed the rotator cuff to get back to roughly 3/4 of the greater tuberosity.    Ring curette was taken to the greater tuberosity and I made a bleeding bed removing all fibrous tissue.  I then made a stab incision after needle localization just lateral to the acromion and using the punch I placed 3 4.75 swivel locks just lateral to the articular edge both anteriorly and posterioly recreating the rotator cable  attachment points.   I then passed the sutures into the rotator cuff using the scorpion device.  The pattern was fiber tape, stay sutures, fiber tape; repeating for all sutures.  I then proceeded forward first with tying the stay sutures reducing the rotator cuff to the medial edge of the greater tuberosity.   I then took fiber tapes 1 and 5 with the anterior stay suture and brought these anterior/ laterally reducing the cuff to the greater tuberosity with an additional 4.75 swivel lock.  I then took fiber tapes 3 and 6 with the middle stay sutures and brought them posterior lateral reducing the cuff further with an additional 4.75 swivel lock.  Fiber tapes 2 and 4 were then used posteriorly with an additional swivel lock.  This advanced the good healthy tissue posteriorly more anteriorly to help cover the greater tuberosity.  The anterior rotator cuff complex reduced to roughly 3 fortunately greater tuberosity.  There was a small dogear with some loosening of the suture due to the sequential tightening so I did place 2 cinch stitches into the anterior rotator cuff complex and rotator interval tissue and reduce this with a push lock to help further tensioning.  Sutures were cut flush.  I then thoroughly irrigated out the subacromial space.  The rotator cuff was moving as a unit.  Arthroscopic equipment was removed and portals washed out.  Closed with 3-0 Vicryl and 3-0 nylon.  Covered sterilely and placed in brace.      Sponge needle counts were correct.  Was successfully extubated and taken to the PACU in stable condition    Spoke with contacts describing operative findings, procedure, postoperative care and all questions answered.   Did discuss signs of infection, DVT and when to call immediately or go to the ED right away.They expressed good understanding.         POSTOPERATIVE MANAGEMENT:  WB and ROM Restrictions: Follow massive rotator cuff repair protocol, delay PT for 4 weeks, may start pendulum exercises.   Nonweightbearing maintain in sling  Wound care: Leave dressing on for 3 days then may remove and shower no bathing or soaking cover incision with sterile bandages  VTE Prophylaxis: To restart per Coumadin clinic recommendations  Follow up with Orthopaedic Surgery: 2 weeks        Mina Kendrick DO  Phone Number: [phone]    DATE: 02/01/24      TIME: 9:31 AM    A voice recognition program was used to aid in documentation of this record. Sometimes words are not printed exactly as they were spoken.  While efforts were made to carefully edit and correct any inaccuracies, some errors may be present; please take these into context.

## 2024-02-01 NOTE — ADDENDUM NOTE
Addendum  created 02/01/24 1329 by Jordana Chen CRNA    Intraprocedure Meds edited, Orders acknowledged in Narrator

## 2024-02-01 NOTE — PERIOPERATIVE NURSING NOTE
Patient VSS. IV removed with tip intact. Discharge education and paperwork given to patient. Patient escorted to his ride via wheelchair by staff.

## 2024-02-01 NOTE — INTERVAL H&P NOTE
H&P reviewed. The patient was examined and there are no changes to the H&P.  Again reviewed risks and benefits of surgery. All questions were answered. Good understanding is expressed and he would like to proceed forward with said surgery. Consent verified and appropriate extremity marked.

## 2024-02-01 NOTE — ANESTHESIA PROCEDURE NOTES
Peripheral Block    Patient location during procedure: pre-op  Start time: 2/1/2024 6:40 AM  End time: 2/1/2024 6:45 AM    Staffing  Anesthesiologist: Jonah Velazquez MD  Performed: anesthesiologist   Preanesthetic Checklist  Completed: patient identified, IV checked, site marked, risks and benefits discussed, surgical consent, monitors and equipment checked, pre-op evaluation and timeout performed  Peripheral Block  Patient position: supine  Prep: ChloraPrep  Patient monitoring: EKG, blood pressure monitoring and continuous pulse oximetry  Supplemental oxygen: nasal cannula  Block type: interscalene brachial plexus  Laterality: right  Injection technique: single-shot  Procedures: ultrasound guided  Ultrasound image added to chart: yes  Local infiltration: lidocaine 1%  Needle  Needle type: EchoStVictrio   Needle gauge: 21 G  Needle length: 2 in      Medications Administered  ropivacaine (NAROPIN) injection 0.5 % - Perpherial nerve block - Peripheral nerve block   10 mL - 2/1/2024 6:45:00 AM  Assessment  Injection assessment: no apparent complications, negative aspiration for heme, no paresthesia on injection, incremental injection and local visualized surrounding nerve on ultrasound  Paresthesia pain: none  Heart rate change: no  Slow fractionated injection: yes  Additional Notes  0.25% ropivacaine was used, not 0.5%Reason for Block: At surgeon's request for acute post-op pain management

## 2024-02-01 NOTE — ANESTHESIA PROCEDURE NOTES
Airway  Date/Time: 2/1/2024 7:12 AM  Urgency: elective    Airway not difficult    General Information and Staff    Patient location during procedure: OR  Anesthesiologist: Jonah Velazquez MD  CRNA: Jordana Chen CRNA  Performed: other anesthesia staff and CRNA     Indications and Patient Condition  Indications for airway management: anesthesia and airway protection  Spontaneous Ventilation: absent  Sedation level: deep  Preoxygenated: yes  Patient position: sniffing  MILS maintained throughout  Mask difficulty assessment: 2 - vent by mask + OA or adjuvant +/- NMBA    Final Airway Details  Final airway type: endotracheal airway      Successful airway: ETT  Cuffed: yes   Successful intubation technique: direct laryngoscopy  Facilitating devices/methods: stylet  Blade: Cari  Blade size: #3  ETT size (mm): 7.5  Cormack-Lehane Classification: grade I - full view of glottis  Placement verified by: chest auscultation, capnometry and palpation of cuff   Measured from: lips  ETT to lips (cm): 22  Number of attempts at approach: 1

## 2024-02-01 NOTE — TELEPHONE ENCOUNTER
Osmani called asking if we can delay his physical therapy 4 weeks, called [facility] PT and they'll reschedule his appts. Out.

## 2024-02-01 NOTE — ANESTHESIA POSTPROCEDURE EVALUATION
Patient: Hayden Franklin    Procedure Summary       Date: 02/01/24 Room / Location: [facility] OR 06 / [facility] OR    Anesthesia Start: 0702 Anesthesia Stop: 0956    Procedure: RIGHT SHOULDER ARTHROSCOPIC ROTATOR CUFF REPAIR, SUBACROMIAL DECOMPRESSION, POSSIBLE SUPERIOR CAPSULAR RECONSTRUCTION, EXTENSIVE DEBRIDEMENT (Right: Shoulder) Diagnosis:       Tear of right rotator cuff, unspecified tear extent, unspecified whether traumatic      Impingement syndrome of right shoulder      Osteoarthritis of AC (acromioclavicular) joint      (Tear of right rotator cuff, unspecified tear extent, unspecified whether traumatic [M75.101])      (Impingement syndrome of right shoulder [M75.41])      (Osteoarthritis of AC (acromioclavicular) joint [M19.019])    Surgeons: Mina Kendrick DO Responsible Provider: Jonah Velazquez MD    Anesthesia Type: general ASA Status: 4            Anesthesia Type: general    Last vitals  Vitals Value Taken Time   /73 02/01/24 1045   Temp 36.1 °C (97 °F) 02/01/24 1040   Pulse 74 02/01/24 1050   Resp 16 02/01/24 1050   SpO2 91 % 02/01/24 1050   Pain Score 0 02/01/24 1050       Anesthesia Post Evaluation    Patient location during evaluation: PACU  Patient participation: complete - patient participated  Level of consciousness: awake and alert  Pain management: adequate  Airway patency: patent  Anesthetic complications: no  Cardiovascular status: acceptable  Respiratory status: acceptable  Hydration status: acceptable  May dismiss recovered patient based on consultation with the appropriate physicians and/or meeting appropriate discharge criteria      Cosmetic?  This procedure is not cosmetic.

## 2024-02-01 NOTE — ANESTHESIA PROCEDURE NOTES
Arterial Line:    Date/Time: 2/1/2024 6:45 AM    An arterial line was placed.   Procedure performed using ultrasound guidance and surface landmarks in the pre-op for the following indication(s): continuous blood pressure monitoring and blood sampling needed.  Sterile technique: cap, gloves, mask and hand hygiene prior to doning sterile gloves  Prep: ChloraPrep  Local infiltration: lidocaine 1%    A 20 gauge (size), 1 and 3/4 inch (length), Arrow (type) catheter was placed, into the Left radial artery, secured by tape and Tegaderm.  Seldinger technique used (Modified)    Events:  patient tolerated procedure well with no complications.    Staffing  Anesthesiologist: Jonah Velazquez MD  Performed: anesthesiologist

## 2024-02-05 ENCOUNTER — LAB (OUTPATIENT)
Dept: LAB | Facility: HOSPITAL | Age: 64
End: 2024-02-05
Payer: COMMERCIAL

## 2024-02-05 ENCOUNTER — ANTICOAGULATION VISIT (OUTPATIENT)
Dept: CARDIOLOGY | Facility: CLINIC | Age: 64
End: 2024-02-05
Payer: COMMERCIAL

## 2024-02-05 DIAGNOSIS — Z95.2 PRESENCE OF PROSTHETIC HEART VALVE: ICD-10-CM

## 2024-02-05 DIAGNOSIS — Z95.2 PRESENCE OF PROSTHETIC HEART VALVE: Primary | ICD-10-CM

## 2024-02-05 DIAGNOSIS — Z79.01 ANTICOAGULATION MANAGEMENT ENCOUNTER: ICD-10-CM

## 2024-02-05 DIAGNOSIS — Z95.2 S/P AVR: Primary | Chronic | ICD-10-CM

## 2024-02-05 DIAGNOSIS — Z51.81 ANTICOAGULATION MANAGEMENT ENCOUNTER: ICD-10-CM

## 2024-02-05 LAB
INR BLD: 2
INR PPP: 2
PROTHROMBIN TIME: 22.6 SECONDS (ref 9.4–12.5)

## 2024-02-05 PROCEDURE — 36415 COLL VENOUS BLD VENIPUNCTURE: CPT

## 2024-02-05 PROCEDURE — 85610 PROTHROMBIN TIME: CPT

## 2024-02-05 NOTE — PROGRESS NOTES
10:32 pt was off warfarin for surgery 2/1 and restarted warfarin 2/1 pm and was bridged with lovenox. pt has had 25mg warfarin in the last 7 days. spoke to pt verified ID no med or diet changes no bleeding or bruising problems enc to call if changes  mailed appt reminder. DMD      today only 2/5 take 7.5mg=1 1/2 tabs warfarin based on SS calculations then continue warfarin dose as above and DC lovenox and recheck INR in 2 weeks verb under. DMD

## 2024-02-08 ENCOUNTER — TELEPHONE (OUTPATIENT)
Dept: ORTHOPEDIC SURGERY | Facility: CLINIC | Age: 64
End: 2024-02-08
Payer: COMMERCIAL

## 2024-02-08 NOTE — TELEPHONE ENCOUNTER
I visited with Dr. Kendrick and he would like to increase the Gabapentin to 300 mg three times a day for another week (7 days).  I placed this order to the [facility] and notified the patient's daughter.

## 2024-02-08 NOTE — TELEPHONE ENCOUNTER
The patient is having pain that he feels is more controlled by the Gabapentin than the Oxycodone. Patient is s/p Right shoulder arthroscopic rotator cuff repair including subscapularis, subacromial decompression and extensive debridement with Dr. Kendrick on 2/1/24. Patient is wondering if he can take more Gabapentin at bedtime?  He is currently taking Gabapentin 100 mg three times a day, along with the Tylenol 1,000 mg three times a day, but does not like taking the Oxycodone, so takes this very sparingly. Patient uses the [facility]. Please advise.

## 2024-02-14 ENCOUNTER — OFFICE VISIT (OUTPATIENT)
Dept: ORTHOPEDIC SURGERY | Facility: CLINIC | Age: 64
End: 2024-02-14
Payer: COMMERCIAL

## 2024-02-14 VITALS — HEART RATE: 85 BPM | OXYGEN SATURATION: 96 % | DIASTOLIC BLOOD PRESSURE: 69 MMHG | SYSTOLIC BLOOD PRESSURE: 132 MMHG

## 2024-02-14 DIAGNOSIS — Z48.89 POSTOPERATIVE VISIT: Primary | ICD-10-CM

## 2024-02-14 PROCEDURE — 99024 POSTOP FOLLOW-UP VISIT: CPT | Performed by: PHYSICIAN ASSISTANT

## 2024-02-14 RX ORDER — GABAPENTIN 300 MG/1
300 CAPSULE ORAL 3 TIMES DAILY
Qty: 21 CAPSULE | Refills: 0 | Status: SHIPPED | OUTPATIENT
Start: 2024-02-14 | End: 2024-04-19 | Stop reason: ALTCHOICE

## 2024-02-14 ASSESSMENT — PAIN DESCRIPTION - DESCRIPTORS: DESCRIPTORS: ACHING

## 2024-02-14 ASSESSMENT — PAIN - FUNCTIONAL ASSESSMENT: PAIN_FUNCTIONAL_ASSESSMENT: 0-10

## 2024-02-14 NOTE — PROGRESS NOTES
POSTOP NOTE      INJURY/OPERATIVE HISTORY:  Right shoulder arthroscopic rotator cuff repair including subscapularis, subacromial decompression, right shoulder the scopic extensive debridement performed on February 1, 2024    SUBJECTIVE:   Patient comes in today for 2-week postop overall doing pretty well.  Patient states the gabapentin did not improve his pain and discomfort.  Patient states he stopped taking oxycodone quite sometime ago.  No complaints.    VITAL SIGNS:  /69 (BP Location: Right arm, Patient Position: Sitting, Cuff Size: Regular Adult)   Pulse 85   SpO2 96%   There is no height or weight on file to calculate BMI.    PHYSICAL EXAM:  Musculoskeletal: Full examination of the right upper extremity reveals his portals been excellent.  No signs infection, erythema or drainage.  Patient does have quite a bit ecchymosis to the upper shoulder region and chest wall.  Patient is neurovascular intact with good radial pulse palpated.  Has good range of motion wrist and elbow.    ASSESSMENT:  1. Postoperative visit        PLAN:  Reviewed arthroscopic pictures with the patient today.  Explained to him that he had a very large tear and we had used multiple anchors to get everything pulled back.  Due to the size of his tear is the reason for delaying his therapy until he is 4 weeks out from surgery.  At this point he can come out of his sling occasionally and do some pendulum arm swings as well as range of motion of the elbow but still avoid any lifting of his arm.  Continue supportive cares including ice and his gabapentin.  Will extend his gabapentin for another week.  He is to call with any questions problems concerns otherwise well and follow-up in 4 weeks with Dr. Prieto.  He is to continue use his sling and abduction pillow.    MIRELLA Santiago    A voice recognition program was used to aid in documentation of this record. Sometimes words are not printed exactly as they were spoken.  While  efforts were made to carefully edit and correct any inaccuracies, some errors may be present; please take these into context.  Please contact the provider's office if you have any questions or concerns.

## 2024-02-19 ENCOUNTER — ANTICOAGULATION VISIT (OUTPATIENT)
Dept: CARDIOLOGY | Facility: CLINIC | Age: 64
End: 2024-02-19
Payer: COMMERCIAL

## 2024-02-19 ENCOUNTER — LAB (OUTPATIENT)
Dept: LAB | Facility: URGENT CARE | Age: 64
End: 2024-02-19
Payer: COMMERCIAL

## 2024-02-19 DIAGNOSIS — Z95.2 S/P AVR: Primary | Chronic | ICD-10-CM

## 2024-02-19 DIAGNOSIS — Z51.81 ANTICOAGULATION MANAGEMENT ENCOUNTER: ICD-10-CM

## 2024-02-19 DIAGNOSIS — Z95.2 PRESENCE OF PROSTHETIC HEART VALVE: ICD-10-CM

## 2024-02-19 DIAGNOSIS — Z79.01 ANTICOAGULATION MANAGEMENT ENCOUNTER: ICD-10-CM

## 2024-02-19 LAB
INR BLD: 2.3
INR PPP: 2.3
PROTHROMBIN TIME: 25.2 SECONDS (ref 9.4–12.5)

## 2024-02-19 PROCEDURE — 85610 PROTHROMBIN TIME: CPT | Performed by: FAMILY MEDICINE

## 2024-02-19 PROCEDURE — 36415 COLL VENOUS BLD VENIPUNCTURE: CPT | Performed by: FAMILY MEDICINE

## 2024-02-19 NOTE — PROGRESS NOTES
11:30 Spoke to pt verified ID no diet changes reports has been on gabapentin since shoulder surgery no bleeding or bruising problems enc to call if changes verified warfarin dose and tab size 32.5mg/week mailed recheck reminder. DMD      Continue warfarin dose  as above and recheck INR In 4 weeks. DMD

## 2024-03-07 ENCOUNTER — HOSPITAL ENCOUNTER (OUTPATIENT)
Dept: PHYSICAL THERAPY | Facility: HOSPITAL | Age: 64
Setting detail: THERAPIES SERIES
Discharge: STILL ADMITTED OR ADMINISTRATIVE | End: 2024-03-07
Payer: COMMERCIAL

## 2024-03-07 DIAGNOSIS — G89.29 CHRONIC RIGHT SHOULDER PAIN: ICD-10-CM

## 2024-03-07 DIAGNOSIS — M75.101 ROTATOR CUFF SYNDROME OF RIGHT SHOULDER: Primary | ICD-10-CM

## 2024-03-07 DIAGNOSIS — M19.011 PRIMARY OSTEOARTHRITIS OF RIGHT SHOULDER: ICD-10-CM

## 2024-03-07 DIAGNOSIS — M25.511 CHRONIC RIGHT SHOULDER PAIN: ICD-10-CM

## 2024-03-07 DIAGNOSIS — Z47.89 ORTHOPEDIC AFTERCARE: ICD-10-CM

## 2024-03-07 DIAGNOSIS — M75.41 IMPINGEMENT SYNDROME OF RIGHT SHOULDER: ICD-10-CM

## 2024-03-07 PROCEDURE — 97161 PT EVAL LOW COMPLEX 20 MIN: CPT | Mod: GP

## 2024-03-07 PROCEDURE — 97110 THERAPEUTIC EXERCISES: CPT | Mod: GP

## 2024-03-07 PROCEDURE — 97140 MANUAL THERAPY 1/> REGIONS: CPT | Mod: GP

## 2024-03-07 NOTE — INTERDISCIPLINARY/THERAPY
"  [address] [facility]  [facility] [id number] [phone]       Outpatient Physical Therapy Initial Evaluation    Date of Service: 3/07/24  Patient Name: Hayden Franklin  : 1960  Referring Provider: Mina Kendrick, DO  Medicare or Medicaid note, cosigner has been added.: N/A  Onset Date: 2024  SOC Date: 3/7/2024  Certification Period: 2024  HICN: [id number]  Medical Diagnosis listed first. Additional are Treatment Diagnoses:  1. Rotator cuff syndrome of right shoulder    2. Impingement syndrome of right shoulder    3. Primary osteoarthritis of right shoulder    4. Orthopedic aftercare    5. Chronic right shoulder pain        Subjective   Subjective Comments: Pt arrives to therapy with R RTC repair 24 and arrives today with right arm sling. Pt did have extensive work done with multiple anchor points due to the severity of the tear with 4 weeks delay of therapy per surgeon request. Pt reports that he is doing well overall but says that the \"arm sling sucks\". Pt reports that he thinks that the cold weather makes the pain in the right shoulder more noticeable. Pt reports that the worst pain is 4/10, least amount 0/10. Pt reports that sleep is \"not good\" and reports that he did sleep in a recliner for a while but now is sleeping in bed. Tylenol helps to manage the pain. Pt reports that a hot shower helps to alleviate the discomfort as well. Pt reports that he would like to have full strength and as much movement as possible in efforts to provide best chance at returning to work full time as an .     PMH: Heart attack, pacemaker placed, tricuspid valve replaced       Activities Limited by Patient Complaint  Activities limited by patient complaint: Work duties, ADLs, Sleep, Lifting, Household/Yard work  Social/Occupational/Recreational  Lived With: Spouse  Receives Help From: Family  Vocational Status: Full time employment  Vocational Type:   Patient's Goal for " Therapy: Return to PLOF without compaint of right shoulder pain  STEADI Fall Risk  Have you fallen in the past year? : No  Do you feel unsteady when standing or walking?: No  Do you worry about falling? : No   Pain Assessment Scale  Pain Scale: 0-10  Pain Score: 1-Hardly notice pain  Patient's Stated Pain Goal: 0-No pain  Has Pain Adversely Affected Your Usual Activity, Sleep, Mood or Stress in the Last 24 Hours?: Yes  How Has Pain Adversely Affected You?: Decreased mobility, Sleep disturbances  Pain Type: Acute pain, Chronic pain  Pain Location: Shoulder  Pain Orientation: Right  Pain Descriptors: Throbbing  Pain Frequency: Constant/continuous  Pain Onset: Sudden  Clinical Progression: Gradually improving  Pain Interventions: Repositioned, Heat applied  Past Medical History:   Diagnosis Date    Aortic valve stenosis     s/p AV replacement 11/2013    Arthritis     Bilat hands, ankle    Ascending aorta dilatation (CMS/HCC)     s/p AAA repair 11/2013    Asthma     CAD (coronary artery disease)     1 vessel bypass    Cancer (CMS/[facility])     CHF (congestive heart failure) (CMS/[facility])     COPD (chronic obstructive pulmonary disease) (CMS/[facility])     Dysrhythmia     nonsustained v-tach    History of transfusion     2013. 2022    Hyperlipidemia     Ischemic cardiomyopathy     Lung nodule 03/07/2022    Myocardial infarction (CMS/[facility])     Presence of heart assist device (CMS/[facility])     Shortness of breath     Skin cancer        Current Outpatient Medications:     gabapentin (NEURONTIN) 300 mg capsule, Take 1 capsule (300 mg total) by mouth 3 (three) times a day for 7 days, Disp: 21 capsule, Rfl: 0    gabapentin (NEURONTIN) 300 mg capsule, Take 1 capsule (300 mg total) by mouth 3 (three) times a day for 7 days, Disp: 21 capsule, Rfl: 0    acetaminophen (Tylenol Extra Strength) 500 mg tablet, Take 2 tablets (1,000 mg total) by mouth every 8 (eight) hours for 14 days, Disp: 84 tablet, Rfl: 0    metoprolol succinate XL (TOPROL-XL) 50 mg  24 hr tablet, Take 1 tablet (50 mg total) by mouth daily, Disp: 90 tablet, Rfl: 3    budesonide-formoteroL (SYMBICORT) 160-4.5 mcg/actuation inhaler, Inhale 2 puffs 2 (two) times a day Rinse mouth with water after use. Do not swallow., Disp: 30.6 g, Rfl: 3    atorvastatin (LIPITOR) 80 mg tablet, Take 1 tablet (80 mg total) by mouth nightly, Disp: 90 tablet, Rfl: 1    sacubitriL-valsartan (ENTRESTO) 24-26 mg tablet, Take 1 tablet by mouth 2 (two) times a day Pt to take 0.5tab BID for 2 weeks then increase to 1 tab BID. Pt will discontinue lisinopril for 3 day washout period prior to starting entresto., Disp: 60 tablet, Rfl: 11    evolocumab (Repatha SureClick) 140 mg/mL pen injector, Inject 140 mg under the skin every 14 (fourteen) days, Disp: 6 mL, Rfl: 3    spironolactone (ALDACTONE) 25 mg tablet, Take 1 tablet (25 mg total) by mouth daily, Disp: 90 tablet, Rfl: 3    nitroglycerin (NITROSTAT) 0.4 mg SL tablet, Place 1 tablet (0.4 mg total) under the tongue every 5 (five) minutes as needed for chest pain Limit 3 tabs per episode., Disp: 25 tablet, Rfl: 1    albuterol HFA (Ventolin HFA) 90 mcg/actuation inhaler, Inhale 2 puffs every 4 (four) hours, Disp: 1 Inhaler, Rfl: 11    amoxicillin (AMOXIL) 500 mg tablet, Take 1 tablet (500 mg total) by mouth See administration instructions Indications: For dental procedures only, Disp: , Rfl:     aspirin 81 mg EC tablet, Take 1 tablet (81 mg total) by mouth daily, Disp: 90 tablet, Rfl: 3  Allergies   Allergen Reactions    Ezetimibe Rash          Objective Findings:  Cervical AROM  NT      Shoulder AROM/PROM    Right Shoulder AROM  NT    Left Shoulder AROM  WITHIN FUNCTIONAL LIMITS     Right Shoulder PROM  Flexion: 90 degrees    End feel: empty   Extension: NT   End feel: NT  ABD: 85 degrees    End feel: empty   ADD: NT   End feel: NT  IR: 45 degrees    End feel: empty    ER: 30 degrees    End feel: painful     Left Shoulder PROM  NT    Apley's Scratch test    NT    Manual  Muscle Tests     Right shoulder   NT    Left shoulder  Flexion:  WITHIN FUNCTIONAL LIMITS   Extension:  WITHIN FUNCTIONAL LIMITS   IR:   WITHIN FUNCTIONAL LIMITS   ER:   WITHIN FUNCTIONAL LIMITS   ABD:   WITHIN FUNCTIONAL LIMITS   [place]: WITHIN FUNCTIONAL LIMITS     ULNTT  Median: NT  Ulnar: NT  Radial: NT    Palpation: Tenderness to palpation of the right supraspinatus/infraspinatus     Swelling: Unremarkable          Objective   Treatment:   Therapeutic Exercise 10 min   Pendulums   Flexion/extension x 10   Medial/lateral x 10   Circular x 10   Scapular squeezes x 20 (tactile cueing required for engagement of scapular retractors and no engagement of the right shoulder)    Manual therapy 15 min   PROM   Flexion   Abduction  Internal rotation   External rotation      FOTO: Initial  Patient's FOTO functional outcome score is Score: 4/100 where a higher number = greater function.     Time Calculation  Start Time: 1115  Stop Time: 1200  Time Calculation (min): 45 min  Therapeutic Interventions (Time Spent in Minutes)  Manual Therapy: 15  Therapeutic Exercise: 10  Other Charges       Charge ID Procedure Code Description Qty. Modifiers Charge Entry User Diagnosis    [id number]  THERAPEUTIC PX 1/> AREAS EACH 15 MIN EXERCISES 1 GP Bernardo Yee, PT     [id number] HC MANUAL THERAPY TQS 1/> REGIONS EACH 15 MINUTES 1 GP Bernardo Yee, PT     [id number] HC PT EVAL VISIT LOW COMPLEXITY 1 GP Bernardo Yee, PT                Assessment/Plan   Assessment:    Level of Function and Prognosis  Prior Function Comments: Pt had reported chronic right shoulder pain that did slightly impede perfomance of ADLs and work related tasks  Current Level of Function: Unable to weight bear or actively move the right shoulder due to post op precautions  Prognosis: Good for established goals  Assessment: Pt demonstrates signs and symptoms consistent with referral diagnosis. Due to post op precautions pt is  unable to use the right upper extremity and will benefit from skilled therapy in helping to safely guide progressions of ROM, functional strength, and activity tolerance in helping to return to PLOF and improve overall quality of life.        Therapy Goals    Short Term Goals  Start goal date: 3/7/24 End goal date: 4/5/24 ST Goal 1: Pt will be independent and compliant with HOME EXERCISE PROGRAM   in order to maintain gains towards longer term goals.   Goal 1 Status: progressing  Start goal date: 3/7/24 End goal date: 4/5/24   ST Goal 2: Pt will demonstrate full PROM of the right shoulder in order to   safely progress towards AROM of the right shoulder and no more than 2/10   pain in the right shoulder in order to demonstrate progress towards longer   term goals.   Goal 2 Status: progressing  Long Term Goals  Long term goal(s) met by: 5/7/24  LT Goal 1: Pt will demonstrate improved functional performance indicated   by an improved self reported FOTO score of 59 as compared to initial   measure of 4  Goal Status: progressing  LT Goal 2: Pt will demonstrate AROM of the right shoulder to be within   functional limits, grossly 5/5 of the right upper extremity, and no pain   in the right shoulder with all activities in helping to facilitate safe   return to work with decreased risk of reinjury of the right shoulder.   Goal Status: progressing       Plan:  Plan  Treatment Interventions: Aquatic therapy, Endurance training, Manual therapy, Neuromuscular re-education, Modalities, Stair training, Therapeutic activities, Therapeutic exercises  PT Frequency: up to 2x/wk  Plan for next treatment comment: Continue to progress per protocol   Treatment duration will be through 5/7/2024       Thank you for allowing us to share in the care of this patient. If you have any questions, recommendations, or further concerns regarding this patient, please feel free to contact me at [address] [facility]  [id number] [phone]      * I have reviewed the plan of care and certify a continuing need for medically necessary services    Co-signed by:_________________________ Date and Time:________________

## 2024-03-08 ENCOUNTER — MEDICATION ACCESS (OUTPATIENT)
Dept: FAMILY MEDICINE | Facility: CLINIC | Age: 64
End: 2024-03-08
Payer: COMMERCIAL

## 2024-03-08 NOTE — PROGRESS NOTES
24 10:54 AM    PA initiated from:     [x]MAC Staff Message    This is a renewal for: Junaid    Auth on file will : 24    Prior authorization approved by [employer]    No PA Needed/Repatha/Lyndon           No changes to the following:  Insurance   Prescriber  Medication Name   Dosage Strength  Dosage Form  Route of Administration   Frequency of Administration    Patient has not been notified, as there is no expected delay or interruption in therapy.

## 2024-03-11 ENCOUNTER — HOSPITAL ENCOUNTER (OUTPATIENT)
Dept: PHYSICAL THERAPY | Facility: HOSPITAL | Age: 64
Setting detail: THERAPIES SERIES
Discharge: STILL ADMITTED OR ADMINISTRATIVE | End: 2024-03-11
Payer: COMMERCIAL

## 2024-03-11 DIAGNOSIS — Z47.89 ORTHOPEDIC AFTERCARE: ICD-10-CM

## 2024-03-11 DIAGNOSIS — M75.41 IMPINGEMENT SYNDROME OF RIGHT SHOULDER: ICD-10-CM

## 2024-03-11 DIAGNOSIS — G89.29 CHRONIC RIGHT SHOULDER PAIN: ICD-10-CM

## 2024-03-11 DIAGNOSIS — M25.511 CHRONIC RIGHT SHOULDER PAIN: ICD-10-CM

## 2024-03-11 DIAGNOSIS — M19.011 PRIMARY OSTEOARTHRITIS OF RIGHT SHOULDER: ICD-10-CM

## 2024-03-11 DIAGNOSIS — M75.101 ROTATOR CUFF SYNDROME OF RIGHT SHOULDER: Primary | ICD-10-CM

## 2024-03-11 PROCEDURE — 97110 THERAPEUTIC EXERCISES: CPT | Mod: GP

## 2024-03-11 PROCEDURE — 97140 MANUAL THERAPY 1/> REGIONS: CPT | Mod: GP

## 2024-03-11 NOTE — INTERDISCIPLINARY/THERAPY
[address] [facility] [id number] [phone]      Outpatient Physical Therapy Daily Treatment Note    Date of Service: 3/11/24  Patient Name: Hayden Franklin  : 1960  Referring Provider: Mina Kendrick DO  Today's Visit Number: 2  Onset Date: 2024  SOC Date: 3/7/2024  Certification Period: 2024  HICN: [id number]  Medical Diagnosis listed first. Additional are Treatment Diagnoses:  1. Rotator cuff syndrome of right shoulder    2. Impingement syndrome of right shoulder    3. Primary osteoarthritis of right shoulder    4. Orthopedic aftercare    5. Chronic right shoulder pain        Subjective   Subjective Comments: Pt reports that the right shoulder was very sore after last session but also notes that it did subside to just a general soreness over the weekend.  Has patient fallen since last visit? No     Have there been any changes in medications? No  Pain Assessment Scale  Pain Scale: 0-10  Pain Score: 2-Notice pain, does not interfere with activities       Objective    Treatment:   Therapeutic Exercise 15 min   Pendulums   Flexion/extension x 10   Medial/lateral x 10   Circular x 10   Scapular squeezes x 20 (tactile cueing required for engagement of scapular retractors and no engagement of the right shoulder)  Isometric flexion x 10   Isometric extension x 10     Manual therapy 15 min   PROM   Flexion   Abduction  Internal rotation   External rotation           Time Calculation  Start Time: 1300  Stop Time: 1330  Time Calculation (min): 30 min  Therapeutic Interventions (Time Spent in Minutes)  Manual Therapy: 15  Therapeutic Exercise: 15  Other Charges       Charge ID Procedure Code Description Qty. Modifiers Charge Entry User Diagnosis    [id number]  THERAPEUTIC PX 1/> AREAS EACH 15 MIN EXERCISES 1 GP Bernardo Yee, PT     [id number]  MANUAL THERAPY TQS 1/> REGIONS EACH 15 MINUTES 1 Bernardo Young, PT                Assessment/Plan    Assessment    Level of Function and Prognosis  Assessment: Pt was able to demonstrate improved PROM in all directions of the right shoulder with empty end feel in all directions (PT conservative with PROM). Pt tolerates submaximal isometrics well with flexion and extension. Pt is progressing well in therapy and reports understanding to HEP.        Therapy Goals    Short Term Goals  Start goal date: 3/7/24 End goal date: 4/5/24   ST Goal 1: Pt will be independent and compliant with HOME EXERCISE PROGRAM   in order to maintain gains towards longer term goals.   Goal 1 Status: progressing  Start goal date: 3/7/24 End goal date: 4/5/24   ST Goal 2: Pt will demonstrate full PROM of the right shoulder in order to   safely progress towards AROM of the right shoulder and no more than 2/10   pain in the right shoulder in order to demonstrate progress towards longer   term goals.   Goal 2 Status: progressing  Long Term Goals  Long term goal(s) met by: 5/7/24  LT Goal 1: Pt will demonstrate improved functional performance indicated   by an improved self reported FOTO score of 59 as compared to initial   measure of 4  Goal Status: progressing  LT Goal 2: Pt will demonstrate AROM of the right shoulder to be within   functional limits, grossly 5/5 of the right upper extremity, and no pain   in the right shoulder with all activities in helping to facilitate safe   return to work with decreased risk of reinjury of the right shoulder.   Goal Status: progressing       Plan    Plan for next treatment comment: Continue to progress per protocol.  Treatment duration will be through 5/7/2024

## 2024-03-13 ENCOUNTER — OFFICE VISIT (OUTPATIENT)
Dept: ORTHOPEDIC SURGERY | Facility: CLINIC | Age: 64
End: 2024-03-13
Payer: COMMERCIAL

## 2024-03-13 VITALS
OXYGEN SATURATION: 94 % | WEIGHT: 212 LBS | HEIGHT: 72 IN | BODY MASS INDEX: 28.71 KG/M2 | DIASTOLIC BLOOD PRESSURE: 69 MMHG | HEART RATE: 120 BPM | SYSTOLIC BLOOD PRESSURE: 131 MMHG | RESPIRATION RATE: 18 BRPM | TEMPERATURE: 96.8 F

## 2024-03-13 DIAGNOSIS — Z48.89 POSTOPERATIVE VISIT: Primary | ICD-10-CM

## 2024-03-13 PROCEDURE — 99024 POSTOP FOLLOW-UP VISIT: CPT | Performed by: ORTHOPAEDIC SURGERY

## 2024-03-13 ASSESSMENT — PAIN - FUNCTIONAL ASSESSMENT: PAIN_FUNCTIONAL_ASSESSMENT: 0-10

## 2024-03-13 NOTE — PROGRESS NOTES
Orthopaedic Surgery  Post Operative Note    Subjective:  Just some postop stiffness and discomfort.Wants to know if he can do some yard work.  Still sleeping difficulty    Objective:  Vitals:    03/13/24 1042   BP: 131/69   Pulse: 120   Resp: 18   Temp: 36 °C (96.8 °F)   SpO2: 94%        Patient was awake and alert, oriented X3. Mucuos membranes pink and moist, respirations even and unlabored. Skin was dry with cap refill < 2s.  Incision was clean,dry and intact with no oozing or erythema.  Sensation tact light touch    Imaging:  No images are attached to the encounter.       Assessment/Plan:   Hayden Franklin is a 63 y.o. male 6 weeks from massive rotator cuff repair, SCD and DCE.  May discontinue the brace.  Nonweightbearing.  Recommended against doing yard work as his repairs were extensive.  Goal really is motion at this point starting more table slides wall walks and pulley per protocol with physical therapy.  I will see him back in 4 weeks.  All active motion is below shoulder level          A voice recognition program was used to aid in documentation of this record. Sometimes words are not printed exactly as they were spoken.  While efforts were made to carefully edit and correct any inaccuracies, some errors may be present; please take these into context.  Please contact the provider's office if you have any questions or concerns.    Which Biologic Is The Patient Receiving Training For?: Dupixent Humira Training Text: We discussed Humira injection.  I demonstrated how to clean the skin and administer the medication. Simponi Training Text: We discussed Simponi injection.  I demonstrated how to clean the skin and administer the medication. Taltz Training Text: We discussed Taltz injection.  I demonstrated how to clean the skin and administer the medication. Stelara Training Text: We discussed Stelara injection.  I demonstrated how to clean the skin and administer the medication. Detail Level: Simple Enbrel Training Text: We discussed Enbrel injection.  I demonstrated how to clean the skin and administer the medication. Who Did You Train: The Patient Siliq Training Text: We discussed Siliq injection.  I demonstrated how to clean the skin and administer the medication. Cimzia Training Text: We discussed Cimzia injection.  I demonstrated how to clean the skin and administer the medication. Cosentyx Training Text: We discussed Cosentyx injection.  I demonstrated how to clean the skin and administer the medication. Ilumya Training Text: We discussed Ilumya injection.  I demonstrated how to clean the skin and administer the medication. Dupixent Training Text: We discussed Dupixent injection.  I demonstrated how to clean the skin and administer the medication. Tremfya Training Text: We discussed Tremfya injection.  I demonstrated how to clean the skin and administer the medication. Skyrizi Training Text: We discussed Skyrizi injection.  I demonstrated how to clean the skin and administer the medication.

## 2024-03-14 ENCOUNTER — HOSPITAL ENCOUNTER (OUTPATIENT)
Dept: PHYSICAL THERAPY | Facility: HOSPITAL | Age: 64
Setting detail: THERAPIES SERIES
Discharge: STILL ADMITTED OR ADMINISTRATIVE | End: 2024-03-14
Payer: COMMERCIAL

## 2024-03-14 DIAGNOSIS — M75.41 IMPINGEMENT SYNDROME OF RIGHT SHOULDER: ICD-10-CM

## 2024-03-14 DIAGNOSIS — M75.101 ROTATOR CUFF SYNDROME OF RIGHT SHOULDER: Primary | ICD-10-CM

## 2024-03-14 DIAGNOSIS — M19.011 PRIMARY OSTEOARTHRITIS OF RIGHT SHOULDER: ICD-10-CM

## 2024-03-14 DIAGNOSIS — G89.29 CHRONIC RIGHT SHOULDER PAIN: ICD-10-CM

## 2024-03-14 DIAGNOSIS — M25.511 CHRONIC RIGHT SHOULDER PAIN: ICD-10-CM

## 2024-03-14 DIAGNOSIS — Z47.89 ORTHOPEDIC AFTERCARE: ICD-10-CM

## 2024-03-14 PROCEDURE — 97530 THERAPEUTIC ACTIVITIES: CPT | Mod: GP

## 2024-03-14 PROCEDURE — 97140 MANUAL THERAPY 1/> REGIONS: CPT | Mod: GP

## 2024-03-14 NOTE — INTERDISCIPLINARY/THERAPY
[address] [facility] [address]  [phone]      Outpatient Physical Therapy Daily Treatment Note    Date of Service: 3/14/24  Patient Name: Hayden Franklin  : 1960  Referring Provider: Mina Kendrick DO  Today's Visit Number: 3  Onset Date: 2024  SOC Date: 3/7/2024  Certification Period: 2024  HICN: [id number]  Medical Diagnosis listed first. Additional are Treatment Diagnoses:  1. Rotator cuff syndrome of right shoulder    2. Impingement syndrome of right shoulder    3. Primary osteoarthritis of right shoulder    4. Orthopedic aftercare    5. Chronic right shoulder pain          Subjective   Subjective Comments: Pt arrives to therapy without the use of the right arm sling. Pt reports that she is doing well overall with transition out of the brace.  Has patient fallen since last visit? No     Have there been any changes in medications? No          Objective    Treatment:   Therapeutic Activity 15 min   Table slides flexion x10   Table slides abduction x10   Pulleys 3 min in flexion       Manual therapy 15 min   PROM   Flexion   Abduction  Internal rotation   External rotation             Time Calculation  Start Time: 1300  Stop Time: 1330  Time Calculation (min): 30 min  Therapeutic Interventions (Time Spent in Minutes)  Manual Therapy: 15  Therapeutic Activity: 15  Other Charges       Charge ID Procedure Code Description Qty. Modifiers Charge Entry User Diagnosis    [id number] HC MANUAL THERAPY TQS 1/> REGIONS EACH 15 MINUTES 1 GP Bernardo Yee, PT     [id number] HC THERAPEUT ACTVITY DIRECT PT CONTACT EACH 15 MIN 1 GP Bernardo Yee, PT                Assessment/Plan   Assessment    Level of Function and Prognosis  Assessment: Pt continues to demonstrate good PROM right shoulder flexion to approximately 125 degrees with a capsular/painful and feel and close to WFL PROM right shoulder external rotation with no pain at end range. Pt tolerates table  slides and pulleys well with no report of increased pain and minimal need for verbal or tactile cueing. Pt reports understanding to HEP. Pt is progressing well in therapy.        Therapy Goals    Short Term Goals  Start goal date: 3/7/24 End goal date: 4/5/24 ST Goal 1: Pt will be independent and compliant with HOME EXERCISE PROGRAM   in order to maintain gains towards longer term goals.   Goal 1 Status: progressing  Start goal date: 3/7/24 End goal date: 4/5/24   ST Goal 2: Pt will demonstrate full PROM of the right shoulder in order to   safely progress towards AROM of the right shoulder and no more than 2/10   pain in the right shoulder in order to demonstrate progress towards longer   term goals.   Goal 2 Status: progressing  Long Term Goals  Long term goal(s) met by: 5/7/24  LT Goal 1: Pt will demonstrate improved functional performance indicated   by an improved self reported FOTO score of 59 as compared to initial   measure of 4  Goal Status: progressing  LT Goal 2: Pt will demonstrate AROM of the right shoulder to be within   functional limits, grossly 5/5 of the right upper extremity, and no pain   in the right shoulder with all activities in helping to facilitate safe   return to work with decreased risk of reinjury of the right shoulder.   Goal Status: progressing       Plan    Plan for next treatment comment: Continue to progress per protocol  Treatment duration will be through 5/7/2024

## 2024-03-18 ENCOUNTER — LAB (OUTPATIENT)
Dept: LAB | Facility: URGENT CARE | Age: 64
End: 2024-03-18
Payer: COMMERCIAL

## 2024-03-18 ENCOUNTER — ANTICOAGULATION VISIT (OUTPATIENT)
Dept: CARDIOLOGY | Facility: CLINIC | Age: 64
End: 2024-03-18
Payer: COMMERCIAL

## 2024-03-18 ENCOUNTER — HOSPITAL ENCOUNTER (OUTPATIENT)
Dept: PHYSICAL THERAPY | Facility: HOSPITAL | Age: 64
Setting detail: THERAPIES SERIES
Discharge: STILL ADMITTED OR ADMINISTRATIVE | End: 2024-03-18
Payer: COMMERCIAL

## 2024-03-18 DIAGNOSIS — Z95.2 S/P AVR: Primary | Chronic | ICD-10-CM

## 2024-03-18 DIAGNOSIS — Z51.81 ANTICOAGULATION MANAGEMENT ENCOUNTER: ICD-10-CM

## 2024-03-18 DIAGNOSIS — M75.41 IMPINGEMENT SYNDROME OF RIGHT SHOULDER: ICD-10-CM

## 2024-03-18 DIAGNOSIS — Z47.89 ORTHOPEDIC AFTERCARE: ICD-10-CM

## 2024-03-18 DIAGNOSIS — M25.511 CHRONIC RIGHT SHOULDER PAIN: ICD-10-CM

## 2024-03-18 DIAGNOSIS — G89.29 CHRONIC RIGHT SHOULDER PAIN: ICD-10-CM

## 2024-03-18 DIAGNOSIS — Z79.01 ANTICOAGULATION MANAGEMENT ENCOUNTER: ICD-10-CM

## 2024-03-18 DIAGNOSIS — M19.011 PRIMARY OSTEOARTHRITIS OF RIGHT SHOULDER: ICD-10-CM

## 2024-03-18 DIAGNOSIS — M75.101 ROTATOR CUFF SYNDROME OF RIGHT SHOULDER: Primary | ICD-10-CM

## 2024-03-18 DIAGNOSIS — Z95.2 PRESENCE OF PROSTHETIC HEART VALVE: ICD-10-CM

## 2024-03-18 LAB
INR BLD: 1.6
INR PPP: 1.6
PROTHROMBIN TIME: 18.2 SECONDS (ref 9.4–12.5)

## 2024-03-18 PROCEDURE — 36415 COLL VENOUS BLD VENIPUNCTURE: CPT | Performed by: FAMILY MEDICINE

## 2024-03-18 PROCEDURE — 97140 MANUAL THERAPY 1/> REGIONS: CPT | Mod: GP

## 2024-03-18 PROCEDURE — 97530 THERAPEUTIC ACTIVITIES: CPT | Mod: GP

## 2024-03-18 PROCEDURE — 85610 PROTHROMBIN TIME: CPT | Performed by: FAMILY MEDICINE

## 2024-03-18 NOTE — INTERDISCIPLINARY/THERAPY
[address] [facility]  [facility] [address]  [phone]      Outpatient Physical Therapy Daily Treatment Note    Date of Service: 3/18/24  Patient Name: Hayden Franklin  : 1960  Referring Provider: Mina Kendrick DO  Today's Visit Number: 4  Onset Date: 2024  SOC Date: 3/7/2024  Certification Period: 2024  HICN: [id number]  Medical Diagnosis listed first. Additional are Treatment Diagnoses:  1. Rotator cuff syndrome of right shoulder    2. Impingement syndrome of right shoulder    3. Primary osteoarthritis of right shoulder    4. Orthopedic aftercare    5. Chronic right shoulder pain        Subjective   Subjective Comments: Pt reports that he is a little more sore today in the right shoulder not knowing why. Pt reports that he has been compliant with HOME EXERCISE PROGRAM.   Has patient fallen since last visit? No     Have there been any changes in medications? No  Pain Assessment Scale  Pain Score: 2-Notice pain, does not interfere with activities  Patient's Stated Pain Goal: 0-No pain  Has Pain Adversely Affected Your Usual Activity, Sleep, Mood or Stress in the Last 24 Hours?: Yes  How Has Pain Adversely Affected You?: Decreased mobility  Pain Type: Chronic pain  Pain Location: Shoulder  Pain Orientation: Right  Pain Descriptors: Aching, Sore  Pain Frequency: Intermittent  Clinical Progression: Gradually improving       Objective    Treatment:   Therapeutic Activity 15 min   Supine AAROM right shoulder flexion x 5   Standing AAROM right shoulder flexion x 10   Standing AAROM right shoulder abduction x 10       Manual therapy 15 min   PROM   Flexion   Abduction  Internal rotation   External rotation               Time Calculation  Start Time: 1300  Stop Time: 1330  Time Calculation (min): 30 min  Therapeutic Interventions (Time Spent in Minutes)  Manual Therapy: 15  Therapeutic Activity: 15  Other Charges       Charge ID Procedure Code Description Qty. Modifiers Charge Entry User  Diagnosis    [id number]  MANUAL THERAPY TQS 1/> REGIONS EACH 15 MINUTES 1 GP Bernardo Yee, PT     [id number]  THERAPEUT ACTVITY DIRECT PT CONTACT EACH 15 MIN 1 GP Bernardo Yee, PT                Assessment/Plan   Assessment    Level of Function and Prognosis  Assessment: Pt was able to progress in all activities without the complaint of right shoulder pain. However, during manual therapy, pt did note increased discomfort in the right shoulder with PROM flexion to approximately 130 degrees with capsular and painful end feel. Pt was able to peform AAROM progressions with minimal verbal or tactile cueing needed in order to perform efficiently. Pt is progressing well in therapy and will continue to benefit from skilled therapy in helping to improve ROM, functional strength, and activity tolerance in helping to improve overall quality of life and safely return to full participation in work related tasks.        Therapy Goals    Short Term Goals  Start goal date: 3/7/24 End goal date: 4/5/24   ST Goal 1: Pt will be independent and compliant with HOME EXERCISE PROGRAM   in order to maintain gains towards longer term goals.   Goal 1 Status: progressing  Start goal date: 3/7/24 End goal date: 4/5/24   ST Goal 2: Pt will demonstrate full PROM of the right shoulder in order to   safely progress towards AROM of the right shoulder and no more than 2/10   pain in the right shoulder in order to demonstrate progress towards longer   term goals.   Goal 2 Status: progressing  Long Term Goals  Long term goal(s) met by: 5/7/24  LT Goal 1: Pt will demonstrate improved functional performance indicated   by an improved self reported FOTO score of 59 as compared to initial   measure of 4  Goal Status: progressing  LT Goal 2: Pt will demonstrate AROM of the right shoulder to be within   functional limits, grossly 5/5 of the right upper extremity, and no pain   in the right shoulder with all activities in  helping to facilitate safe   return to work with decreased risk of reinjury of the right shoulder.   Goal Status: progressing       Plan    Plan for next treatment comment: Continue to prorgress per protocol  Treatment duration will be through 5/7/2024

## 2024-03-18 NOTE — PROGRESS NOTES
12:39 Spoke to pt verified ID no med  changes reports had more vit K food then usual but will return to usual amounts  no bleeding or bruising problems enc to call if changes verified warfarin dose and tab size 32.5mg/week mailed recheck reminder. DMD      today 3/18 take 7.5mg and tomorrow 3/19 take 5mg all based on SS calculations then continue warfarin dose as above and recheck INR in 2 weeks verb under. DMD

## 2024-03-21 ENCOUNTER — HOSPITAL ENCOUNTER (OUTPATIENT)
Dept: PHYSICAL THERAPY | Facility: HOSPITAL | Age: 64
Setting detail: THERAPIES SERIES
Discharge: STILL ADMITTED OR ADMINISTRATIVE | End: 2024-03-21
Payer: COMMERCIAL

## 2024-03-21 DIAGNOSIS — M19.011 PRIMARY OSTEOARTHRITIS OF RIGHT SHOULDER: ICD-10-CM

## 2024-03-21 DIAGNOSIS — Z47.89 ORTHOPEDIC AFTERCARE: ICD-10-CM

## 2024-03-21 DIAGNOSIS — M75.41 IMPINGEMENT SYNDROME OF RIGHT SHOULDER: ICD-10-CM

## 2024-03-21 DIAGNOSIS — G89.29 CHRONIC RIGHT SHOULDER PAIN: ICD-10-CM

## 2024-03-21 DIAGNOSIS — M25.511 CHRONIC RIGHT SHOULDER PAIN: ICD-10-CM

## 2024-03-21 DIAGNOSIS — M75.101 ROTATOR CUFF SYNDROME OF RIGHT SHOULDER: Primary | ICD-10-CM

## 2024-03-21 PROCEDURE — 97140 MANUAL THERAPY 1/> REGIONS: CPT | Mod: GP

## 2024-03-21 PROCEDURE — 97530 THERAPEUTIC ACTIVITIES: CPT | Mod: GP

## 2024-03-21 NOTE — INTERDISCIPLINARY/THERAPY
[address] [facility] [id number] [phone]      Outpatient Physical Therapy Daily Treatment Note    Date of Service: 3/21/24  Patient Name: Hayden Franklin  : 1960  Referring Provider: Mina Kendrick DO  Today's Visit Number: 5  Onset Date: 2024  SOC Date: 3/7/2024  Certification Period: 2024  HICN: [id number]  Medical Diagnosis listed first. Additional are Treatment Diagnoses:  1. Rotator cuff syndrome of right shoulder    2. Impingement syndrome of right shoulder    3. Primary osteoarthritis of right shoulder    4. Orthopedic aftercare    5. Chronic right shoulder pain        Subjective   Subjective Comments: Pt reports increased right shoulder soreness today due to not being able to perform the HEP yesterday due to other life events that took attention.  Has patient fallen since last visit? No     Have there been any changes in medications? No  Pain Assessment Scale  Pain Scale: 0-10  Pain Score: 5-Interrupts some activities  Clinical Progression: Gradually improving       Objective    Treatment:   Therapeutic Activity 15 min   Supine AAROM right shoulder flexion x 5   Standing AAROM right shoulder flexion x 10   Standing AAROM right shoulder abduction x 10   Wall wipes x 10 RUE       Manual therapy 15 min   PROM   Flexion   Abduction  Internal rotation   External rotation   STM right anterior/posterior shoulder               Time Calculation  Start Time: 1300  Stop Time: 1330  Time Calculation (min): 30 min  Therapeutic Interventions (Time Spent in Minutes)  Manual Therapy: 15  Therapeutic Activity: 15  Other Charges       Charge ID Procedure Code Description Qty. Modifiers Charge Entry User Diagnosis    [id number] HC MANUAL THERAPY TQS 1/> REGIONS EACH 15 MINUTES 1 GP Bernardo Yee, PT     [id number] HC THERAPEUT ACTVITY DIRECT PT CONTACT EACH 15 MIN 1 GP Bernardo Yee, PT                Assessment/Plan   Assessment    Level of Function and  Prognosis  Assessment: Pt was able to progress in all activities without the limitation or complaint of right shoulder pain. PT demonstrates up to 150 degrees of right shoulder PROM with painful and capsular end feel and WFL right shoulder external rotation PROM. Pt able to complete all AAROM with limited verbal or tactile cueing needed. Pt is progressing well in therapy.        Therapy Goals    Short Term Goals  Start goal date: 3/7/24 End goal date: 4/5/24   ST Goal 1: Pt will be independent and compliant with HOME EXERCISE PROGRAM   in order to maintain gains towards longer term goals.   Goal 1 Status: progressing  Start goal date: 3/7/24 End goal date: 4/5/24   ST Goal 2: Pt will demonstrate full PROM of the right shoulder in order to   safely progress towards AROM of the right shoulder and no more than 2/10   pain in the right shoulder in order to demonstrate progress towards longer   term goals.   Goal 2 Status: progressing  Long Term Goals  Long term goal(s) met by: 5/7/24  LT Goal 1: Pt will demonstrate improved functional performance indicated   by an improved self reported FOTO score of 59 as compared to initial   measure of 4  Goal Status: progressing  LT Goal 2: Pt will demonstrate AROM of the right shoulder to be within   functional limits, grossly 5/5 of the right upper extremity, and no pain   in the right shoulder with all activities in helping to facilitate safe   return to work with decreased risk of reinjury of the right shoulder.   Goal Status: progressing       Plan    Plan for next treatment comment: Continue to progress per protocol  Treatment duration will be through 5/7/2024

## 2024-03-25 DIAGNOSIS — E78.2 MIXED HYPERLIPIDEMIA: ICD-10-CM

## 2024-03-25 DIAGNOSIS — I25.10 CORONARY ARTERY DISEASE INVOLVING NATIVE CORONARY ARTERY OF NATIVE HEART WITHOUT ANGINA PECTORIS: ICD-10-CM

## 2024-03-25 RX ORDER — ATORVASTATIN CALCIUM 80 MG/1
80 TABLET, FILM COATED ORAL NIGHTLY
Qty: 90 TABLET | Refills: 0 | Status: SHIPPED | OUTPATIENT
Start: 2024-03-25 | End: 2024-06-21

## 2024-03-26 ENCOUNTER — HOSPITAL ENCOUNTER (OUTPATIENT)
Dept: PHYSICAL THERAPY | Facility: HOSPITAL | Age: 64
Setting detail: THERAPIES SERIES
Discharge: STILL ADMITTED OR ADMINISTRATIVE | End: 2024-03-26
Payer: COMMERCIAL

## 2024-03-26 DIAGNOSIS — G89.29 CHRONIC RIGHT SHOULDER PAIN: ICD-10-CM

## 2024-03-26 DIAGNOSIS — M25.511 CHRONIC RIGHT SHOULDER PAIN: ICD-10-CM

## 2024-03-26 DIAGNOSIS — M75.101 ROTATOR CUFF SYNDROME OF RIGHT SHOULDER: Primary | ICD-10-CM

## 2024-03-26 DIAGNOSIS — Z47.89 ORTHOPEDIC AFTERCARE: ICD-10-CM

## 2024-03-26 DIAGNOSIS — M75.41 IMPINGEMENT SYNDROME OF RIGHT SHOULDER: ICD-10-CM

## 2024-03-26 DIAGNOSIS — M19.011 PRIMARY OSTEOARTHRITIS OF RIGHT SHOULDER: ICD-10-CM

## 2024-03-26 PROCEDURE — 97140 MANUAL THERAPY 1/> REGIONS: CPT | Mod: GP

## 2024-03-26 PROCEDURE — 97530 THERAPEUTIC ACTIVITIES: CPT | Mod: GP

## 2024-03-26 NOTE — INTERDISCIPLINARY/THERAPY
[address] [facility] [id number] [phone]      Outpatient Physical Therapy Daily Treatment Note    Date of Service: 3/26/24  Patient Name: Hayden Franklin  : 1960  Referring Provider: Mina Kendrick DO  Today's Visit Number: 6  Onset Date: 2024  SOC Date: 3/7/2024  Certification Period: 2024  HICN: [id number]  Medical Diagnosis listed first. Additional are Treatment Diagnoses:  1. Rotator cuff syndrome of right shoulder    2. Impingement syndrome of right shoulder    3. Primary osteoarthritis of right shoulder    4. Orthopedic aftercare    5. Chronic right shoulder pain        Subjective   Subjective Comments: Pt reports that the right shoulder is doing well overall but notes that there is still pain at night in the right shoulder that can impede sleep. Pt reports that the pain at night can reach up to a 5/10 pain and right now the pain is 0/10. Pt reports that the home exercises are going well.  Has patient fallen since last visit? No     Have there been any changes in medications? No          Objective    Treatment:   Therapeutic Activity 17 min   Supine AAROM right shoulder flexion x 5   Standing AAROM right shoulder flexion x 10   Standing AAROM right shoulder abduction x 10   Wall wipes x 10 RUE   AAROM right shoulder flexion with eccentric lowering x 10     Manual therapy 12 min   PROM   Flexion   Abduction  Internal rotation   External rotation   STM right anterior/posterior shoulder                 Time Calculation  Start Time: 0900  Stop Time: 929  Time Calculation (min): 29 min  Therapeutic Interventions (Time Spent in Minutes)  Manual Therapy: 12  Therapeutic Activity: 17  Other Charges       Charge ID Procedure Code Description Qty. Modifiers Charge Entry User Diagnosis    [id number] HC MANUAL THERAPY TQS 1/> REGIONS EACH 15 MINUTES 1 GP Bernardo Yee, PT     [id number] HC THERAPEUT ACTVITY DIRECT PT CONTACT EACH 15 MIN 1 LOLI Yee  Bernardo DUNCAN, PT                Assessment/Plan   Assessment    Level of Function and Prognosis  Assessment: Pt continues to demonstrate a capsular end feel with right shoulder flexion PROM with pain at end range at approximately 155 degrees. Pt demonstrates WFL right shoulder external rotation  PROM. Pt progresses well with eccentric lowering to approximately 90 degrees with no pain in right shoulder reported. Pt will continue to benefit from PT in helping improve ROM, functional strength, activity tolerance, and decrease pain in helping to restore PLOF.        Therapy Goals    Short Term Goals  Start goal date: 3/7/24 End goal date: 4/5/24   ST Goal 1: Pt will be independent and compliant with HOME EXERCISE PROGRAM   in order to maintain gains towards longer term goals.   Goal 1 Status: progressing  Start goal date: 3/7/24 End goal date: 4/5/24   ST Goal 2: Pt will demonstrate full PROM of the right shoulder in order to   safely progress towards AROM of the right shoulder and no more than 2/10   pain in the right shoulder in order to demonstrate progress towards longer   term goals.   Goal 2 Status: progressing  Long Term Goals  Long term goal(s) met by: 5/7/24  LT Goal 1: Pt will demonstrate improved functional performance indicated   by an improved self reported FOTO score of 59 as compared to initial   measure of 4  Goal Status: progressing  LT Goal 2: Pt will demonstrate AROM of the right shoulder to be within   functional limits, grossly 5/5 of the right upper extremity, and no pain   in the right shoulder with all activities in helping to facilitate safe   return to work with decreased risk of reinjury of the right shoulder.   Goal Status: progressing       Plan    Plan for next treatment comment: Continue to progress per protocol  Treatment duration will be through 5/7/2024

## 2024-03-28 ENCOUNTER — HOSPITAL ENCOUNTER (OUTPATIENT)
Dept: PHYSICAL THERAPY | Facility: HOSPITAL | Age: 64
Setting detail: THERAPIES SERIES
Discharge: STILL ADMITTED OR ADMINISTRATIVE | End: 2024-03-28
Payer: COMMERCIAL

## 2024-03-28 DIAGNOSIS — M25.511 CHRONIC RIGHT SHOULDER PAIN: ICD-10-CM

## 2024-03-28 DIAGNOSIS — G89.29 CHRONIC RIGHT SHOULDER PAIN: ICD-10-CM

## 2024-03-28 DIAGNOSIS — M75.41 IMPINGEMENT SYNDROME OF RIGHT SHOULDER: ICD-10-CM

## 2024-03-28 DIAGNOSIS — M19.011 PRIMARY OSTEOARTHRITIS OF RIGHT SHOULDER: ICD-10-CM

## 2024-03-28 DIAGNOSIS — M75.101 ROTATOR CUFF SYNDROME OF RIGHT SHOULDER: Primary | ICD-10-CM

## 2024-03-28 DIAGNOSIS — Z47.89 ORTHOPEDIC AFTERCARE: ICD-10-CM

## 2024-03-28 PROCEDURE — 97140 MANUAL THERAPY 1/> REGIONS: CPT | Mod: GP

## 2024-03-28 PROCEDURE — 97530 THERAPEUTIC ACTIVITIES: CPT | Mod: GP

## 2024-03-28 NOTE — INTERDISCIPLINARY/THERAPY
[address] [facility] [address]  [phone]      Outpatient Physical Therapy Daily Treatment Note    Date of Service: 3/28/24  Patient Name: Hayden Franklin  : 1960  Referring Provider: Mina Kendrick DO  Today's Visit Number: 7  Onset Date: 2024  SOC Date: 3/7/2024  Certification Period: 2024  HICN: [id number]  Medical Diagnosis listed first. Additional are Treatment Diagnoses:  1. Rotator cuff syndrome of right shoulder    2. Impingement syndrome of right shoulder    3. Primary osteoarthritis of right shoulder    4. Chronic right shoulder pain    5. Orthopedic aftercare        Subjective   Subjective Comments: Pt reports that the right shoulder is doing well but is still a little more sore. Pt reports that he is now able to wash hair and scratch the ear.  Has patient fallen since last visit? No     Have there been any changes in medications? No          Objective    Treatment:   Therapeutic Activity 17 min   Supine AAROM right shoulder flexion x 10 with eccentric lowering   Standing AAROM right shoulder abduction x 10 with eccentric lowering  AROM right shoulder flexion to approximately 90 degrees x 10   AROM right shoulder abduction to 75 degrees x 10   Pulleys 3 minutes in flexion       Manual therapy 13 min   PROM   Flexion   Abduction  Internal rotation   External rotation   STM right anterior/posterior shoulder                   Time Calculation  Start Time: 1345  Stop Time: 1415  Time Calculation (min): 30 min  Therapeutic Interventions (Time Spent in Minutes)  Manual Therapy: 13  Therapeutic Activity: 17  Other Charges       Charge ID Procedure Code Description Qty. Modifiers Charge Entry User Diagnosis    [id number] HC MANUAL THERAPY TQS 1/> REGIONS EACH 15 MINUTES 1 GP Bernardo Yee, PT     [id number] HC THERAPEUT ACTVITY DIRECT PT CONTACT EACH 15 MIN 1 Bernardo Young, PT                Assessment/Plan   Assessment    Level of  Function and Prognosis  Assessment: Pt continues to demonstrate slight restriction of right shoulder flexion PROM to approximately 130 degrees with a capsular and painful end feel. Pt was able to perform all eccentric activities without the complaint of right shoulder pain with good technique observed. AAROM right shoulder flexion is at 125 degrees. Pt is progressing very well in therapy.        Therapy Goals    Short Term Goals  Start goal date: 3/7/24 End goal date: 4/5/24   ST Goal 1: Pt will be independent and compliant with HOME EXERCISE PROGRAM   in order to maintain gains towards longer term goals.   Goal 1 Status: progressing  Start goal date: 3/7/24 End goal date: 4/5/24   ST Goal 2: Pt will demonstrate full PROM of the right shoulder in order to   safely progress towards AROM of the right shoulder and no more than 2/10   pain in the right shoulder in order to demonstrate progress towards longer   term goals.   Goal 2 Status: progressing  Long Term Goals  Long term goal(s) met by: 5/7/24  LT Goal 1: Pt will demonstrate improved functional performance indicated   by an improved self reported FOTO score of 59 as compared to initial   measure of 4  Goal Status: progressing  LT Goal 2: Pt will demonstrate AROM of the right shoulder to be within   functional limits, grossly 5/5 of the right upper extremity, and no pain   in the right shoulder with all activities in helping to facilitate safe   return to work with decreased risk of reinjury of the right shoulder.   Goal Status: progressing       Plan    Plan for next treatment comment: Continue to progress AROM per protocol  Treatment duration will be through 5/7/2024

## 2024-04-01 ENCOUNTER — ANTICOAGULATION VISIT (OUTPATIENT)
Dept: CARDIOLOGY | Facility: CLINIC | Age: 64
End: 2024-04-01
Payer: COMMERCIAL

## 2024-04-01 ENCOUNTER — LAB (OUTPATIENT)
Dept: LAB | Facility: URGENT CARE | Age: 64
End: 2024-04-01
Payer: COMMERCIAL

## 2024-04-01 DIAGNOSIS — Z95.2 PRESENCE OF PROSTHETIC HEART VALVE: ICD-10-CM

## 2024-04-01 DIAGNOSIS — Z51.81 ANTICOAGULATION MANAGEMENT ENCOUNTER: ICD-10-CM

## 2024-04-01 DIAGNOSIS — Z79.01 ANTICOAGULATION MANAGEMENT ENCOUNTER: ICD-10-CM

## 2024-04-01 DIAGNOSIS — Z95.2 S/P AVR: Primary | Chronic | ICD-10-CM

## 2024-04-01 LAB
INR BLD: 2.4
INR PPP: 2.4
PROTHROMBIN TIME: 26.1 SECONDS (ref 9.4–12.5)

## 2024-04-01 PROCEDURE — 36415 COLL VENOUS BLD VENIPUNCTURE: CPT | Performed by: FAMILY MEDICINE

## 2024-04-01 PROCEDURE — 85610 PROTHROMBIN TIME: CPT | Performed by: FAMILY MEDICINE

## 2024-04-01 NOTE — PROGRESS NOTES
12:05 Spoke to pt verified ID no med or diet changes no bleeding or bruising problems enc to call if changes verified warfarin dose and tab size 32.5mg/week mailed recheck reminder. DMD      Continue warfarin dose as above and recheck INR in 4 weeks. Verb under DMD

## 2024-04-02 ENCOUNTER — HOSPITAL ENCOUNTER (OUTPATIENT)
Dept: PHYSICAL THERAPY | Facility: HOSPITAL | Age: 64
Setting detail: THERAPIES SERIES
Discharge: STILL ADMITTED OR ADMINISTRATIVE | End: 2024-04-02
Payer: COMMERCIAL

## 2024-04-02 DIAGNOSIS — G89.29 CHRONIC RIGHT SHOULDER PAIN: ICD-10-CM

## 2024-04-02 DIAGNOSIS — M75.101 ROTATOR CUFF SYNDROME OF RIGHT SHOULDER: ICD-10-CM

## 2024-04-02 DIAGNOSIS — M75.41 IMPINGEMENT SYNDROME OF RIGHT SHOULDER: ICD-10-CM

## 2024-04-02 DIAGNOSIS — M19.011 PRIMARY OSTEOARTHRITIS OF RIGHT SHOULDER: ICD-10-CM

## 2024-04-02 DIAGNOSIS — Z47.89 ORTHOPEDIC AFTERCARE: Primary | ICD-10-CM

## 2024-04-02 DIAGNOSIS — M25.511 CHRONIC RIGHT SHOULDER PAIN: ICD-10-CM

## 2024-04-02 PROCEDURE — 97110 THERAPEUTIC EXERCISES: CPT | Mod: GP

## 2024-04-02 PROCEDURE — 97140 MANUAL THERAPY 1/> REGIONS: CPT | Mod: GP

## 2024-04-02 NOTE — INTERDISCIPLINARY/THERAPY
[address] [facility] [address]  [phone]      Outpatient Physical Therapy Daily Treatment Note    Date of Service: 24  Patient Name: Hayden Franklin  : 1960  Referring Provider: Mina Kendrick DO  Today's Visit Number: 8  Onset Date: 2024  SOC Date: 3/7/2024  Certification Period: 2024  HICN: [id number]  Medical Diagnosis listed first. Additional are Treatment Diagnoses:  1. Orthopedic aftercare    2. Chronic right shoulder pain    3. Rotator cuff syndrome of right shoulder    4. Impingement syndrome of right shoulder    5. Primary osteoarthritis of right shoulder        Subjective   Subjective Comments: Pt reports that the right shoulder is a little more sore today. Pt reports that the sleep is doing better.  Has patient fallen since last visit? No     Have there been any changes in medications? No  Pain Assessment Scale  Pain Score: 1-Hardly notice pain  Pain Type: Chronic pain  Pain Location: Shoulder  Pain Orientation: Right       Objective    Treatment:   Therapeutic Exercise 17 min   Left side lying right shoulder external rotation 2x10   Rows with red band x 20   AROM right shoulder flexion to 90 degrees x 10       Manual therapy 13 min   PROM   Flexion   Abduction  Internal rotation   External rotation   STM right anterior/posterior shoulder                     Time Calculation  Start Time: 1100  Stop Time: 1130  Time Calculation (min): 30 min  Therapeutic Interventions (Time Spent in Minutes)  Manual Therapy: 13  Therapeutic Exercise: 17  Other Charges       Charge ID Procedure Code Description Qty. Modifiers Charge Entry User Diagnosis    [id number]  THERAPEUTIC PX 1/> AREAS EACH 15 MIN EXERCISES 1 GP Bernardo Yee, PT     [id number]  MANUAL THERAPY TQS 1/> REGIONS EACH 15 MINUTES 1 GP Bernardo Yee, PT                Assessment/Plan   Assessment    Level of Function and Prognosis  Assessment: Pt was able to complete all  exercises without the complaint of right shoulder pain. Pt demonstrates full ER AROM in left side lying external rotation without the complaint of pain. Good mechanics observed with 90 degrees right shoulder flexion AROM without complaint of pain. Pt is progressing well in therapy and reports understanding to [facility]. Pt will continue to benefit from PT in helping to further improve functional strength, right shoulder stability, scapular mechanics, and activity tolerance in helping to return to PLOF.        Therapy Goals    Short Term Goals  Start goal date: 3/7/24 End goal date: 4/5/24 ST Goal 1: Pt will be independent and compliant with HOME EXERCISE PROGRAM   in order to maintain gains towards longer term goals.   Goal 1 Status: progressing  Start goal date: 3/7/24 End goal date: 4/5/24   ST Goal 2: Pt will demonstrate full PROM of the right shoulder in order to   safely progress towards AROM of the right shoulder and no more than 2/10   pain in the right shoulder in order to demonstrate progress towards longer   term goals.   Goal 2 Status: progressing  Long Term Goals  Long term goal(s) met by: 5/7/24  LT Goal 1: Pt will demonstrate improved functional performance indicated   by an improved self reported FOTO score of 59 as compared to initial   measure of 4  Goal Status: progressing  LT Goal 2: Pt will demonstrate AROM of the right shoulder to be within   functional limits, grossly 5/5 of the right upper extremity, and no pain   in the right shoulder with all activities in helping to facilitate safe   return to work with decreased risk of reinjury of the right shoulder.   Goal Status: progressing       Plan    Plan for next treatment comment: Continue to progress per protocol  Treatment duration will be through 5/7/2024

## 2024-04-04 ENCOUNTER — HOSPITAL ENCOUNTER (OUTPATIENT)
Dept: PHYSICAL THERAPY | Facility: HOSPITAL | Age: 64
Setting detail: THERAPIES SERIES
Discharge: STILL ADMITTED OR ADMINISTRATIVE | End: 2024-04-04
Payer: COMMERCIAL

## 2024-04-04 DIAGNOSIS — M75.41 IMPINGEMENT SYNDROME OF RIGHT SHOULDER: ICD-10-CM

## 2024-04-04 DIAGNOSIS — M25.511 CHRONIC RIGHT SHOULDER PAIN: ICD-10-CM

## 2024-04-04 DIAGNOSIS — M75.101 ROTATOR CUFF SYNDROME OF RIGHT SHOULDER: ICD-10-CM

## 2024-04-04 DIAGNOSIS — G89.29 CHRONIC RIGHT SHOULDER PAIN: ICD-10-CM

## 2024-04-04 DIAGNOSIS — M19.011 PRIMARY OSTEOARTHRITIS OF RIGHT SHOULDER: ICD-10-CM

## 2024-04-04 DIAGNOSIS — Z47.89 ORTHOPEDIC AFTERCARE: Primary | ICD-10-CM

## 2024-04-04 PROCEDURE — 97140 MANUAL THERAPY 1/> REGIONS: CPT | Mod: GP

## 2024-04-04 PROCEDURE — 97110 THERAPEUTIC EXERCISES: CPT | Mod: GP

## 2024-04-04 NOTE — INTERDISCIPLINARY/THERAPY
[address] [facility] [address]  [phone]      Outpatient Physical Therapy Daily Treatment Note    Date of Service: 24  Patient Name: Hayden Franklin  : 1960  Referring Provider: Mina Kendrick DO  Today's Visit Number: 9  Onset Date: 2024  SOC Date: 3/7/2024  Certification Period: 2024  HICN: [id number]  Medical Diagnosis listed first. Additional are Treatment Diagnoses:  1. Orthopedic aftercare    2. Chronic right shoulder pain    3. Rotator cuff syndrome of right shoulder    4. Impingement syndrome of right shoulder    5. Primary osteoarthritis of right shoulder        Subjective   Subjective Comments: Pt reports that the right shoulder is doing well with no new complaints of pain. Pt notes that he feels as though the home exercises are going well.  Has patient fallen since last visit? No     Have there been any changes in medications? No  Pain Assessment Scale  Pain Scale: 0-10  Pain Score: 0-No pain  Pain Location: Shoulder  Pain Orientation: Right       Objective    Treatment:   Therapeutic Exercise 17 min   Left side lying right shoulder external rotation 2x10   Rows with green band x 20   AROM right shoulder flexion to 120 degrees x 10   Reactive external rotation with yellow band 2x10       Manual therapy 13 min   PROM   Flexion   Abduction  Internal rotation   External rotation                         Time Calculation  Start Time: 1100  Stop Time: 1130  Time Calculation (min): 30 min  Therapeutic Interventions (Time Spent in Minutes)  Manual Therapy: 13  Therapeutic Exercise: 17  Other Charges       Charge ID Procedure Code Description Qty. Modifiers Charge Entry User Diagnosis    [id number]  THERAPEUTIC PX 1/> AREAS EACH 15 MIN EXERCISES 1 GP Bernardo Yee, PT     [id number]  MANUAL THERAPY TQS 1/> REGIONS EACH 15 MINUTES 1 GP Bernardo Yee, PT                Assessment/Plan   Assessment    Level of Function and  Prognosis  Assessment: Pt was able to complete all activities without the complaint of right shoulder pain. Approximately up to 150 degrees AROM right shoulder flexion observed. Pt demonstrates a capsular end feel PROM right shoulder flexion at approximately 160 degrees with slight discomfort reported at end range. Pt reports understanding to HEP. Pt is progressing well in therapy and will continue to benefit from PT in helping to further improve functional strength, activity tolerance, ROM, and right shoulder stability in order to return to PLOF and improve overall quality of life.        Therapy Goals    Short Term Goals  Start goal date: 3/7/24 End goal date: 4/5/24   ST Goal 1: Pt will be independent and compliant with HOME EXERCISE PROGRAM   in order to maintain gains towards longer term goals.   Goal 1 Status: progressing  Start goal date: 3/7/24 End goal date: 4/5/24   ST Goal 2: Pt will demonstrate full PROM of the right shoulder in order to   safely progress towards AROM of the right shoulder and no more than 2/10   pain in the right shoulder in order to demonstrate progress towards longer   term goals.   Goal 2 Status: progressing  Long Term Goals  Long term goal(s) met by: 5/7/24  LT Goal 1: Pt will demonstrate improved functional performance indicated   by an improved self reported FOTO score of 59 as compared to initial   measure of 4  Goal Status: progressing  LT Goal 2: Pt will demonstrate AROM of the right shoulder to be within   functional limits, grossly 5/5 of the right upper extremity, and no pain   in the right shoulder with all activities in helping to facilitate safe   return to work with decreased risk of reinjury of the right shoulder.   Goal Status: progressing       Plan    Plan for next treatment comment: Continue to progress AROM, stability of the right shoulder, and activity tolerance per protocol  Treatment duration will be through 5/7/2024

## 2024-04-09 ENCOUNTER — HOSPITAL ENCOUNTER (OUTPATIENT)
Dept: PHYSICAL THERAPY | Facility: HOSPITAL | Age: 64
Setting detail: THERAPIES SERIES
Discharge: STILL ADMITTED OR ADMINISTRATIVE | End: 2024-04-09
Payer: COMMERCIAL

## 2024-04-09 DIAGNOSIS — M75.101 ROTATOR CUFF SYNDROME OF RIGHT SHOULDER: ICD-10-CM

## 2024-04-09 DIAGNOSIS — G89.29 CHRONIC RIGHT SHOULDER PAIN: ICD-10-CM

## 2024-04-09 DIAGNOSIS — M75.41 IMPINGEMENT SYNDROME OF RIGHT SHOULDER: ICD-10-CM

## 2024-04-09 DIAGNOSIS — Z47.89 ORTHOPEDIC AFTERCARE: Primary | ICD-10-CM

## 2024-04-09 DIAGNOSIS — M25.511 CHRONIC RIGHT SHOULDER PAIN: ICD-10-CM

## 2024-04-09 DIAGNOSIS — M19.011 PRIMARY OSTEOARTHRITIS OF RIGHT SHOULDER: ICD-10-CM

## 2024-04-09 PROCEDURE — 97110 THERAPEUTIC EXERCISES: CPT | Mod: GP

## 2024-04-09 PROCEDURE — 97140 MANUAL THERAPY 1/> REGIONS: CPT | Mod: GP

## 2024-04-09 NOTE — INTERDISCIPLINARY/THERAPY
[address] [facility]  [facility] [id number] [phone]      Outpatient Physical Therapy Progress Note     Date of Service: 24  Patient Name: Hayden Franklin  : 1960  Referring Provider: Mina Kendrick DO  Today's Visit Number: 10  Medicare or Medicaid note, cosigner has been added.: N/A  Onset Date: 2024  SOC Date: 3/7/2024  Certification Period: 2024  HICN: [id number]  Medical Diagnosis listed first. Additional are Treatment Diagnoses:  1. Orthopedic aftercare    2. Chronic right shoulder pain    3. Rotator cuff syndrome of right shoulder    4. Impingement syndrome of right shoulder    5. Primary osteoarthritis of right shoulder        Subjective   Subjective Comments: Pt has been seen for a total of 10 visits in therapy s/p right RC repair 24. Pt reports that he is doing well overall. Pt notes that the pain in the right shoulder can still get up to a 4/10 with increased activity but this is due to a general soreness. Pt notes that this pain can still impede sleep at times. But all things considered pt reports that the pain is less, ROM is better, and can feel as though the right shoulder is progressing well overall. Pt would like to continue therapy in efforts to return to PLOF.  Activities Limited by Patient Complaint  Activities limited by patient complaint: Work duties, Sleep, Lifting  Social/Occupational/Recreational  Lived With: Spouse  Receives Help From: Family  STEADI Fall Risk  Have you fallen in the past year? : No  Do you feel unsteady when standing or walking?: No  Do you worry about falling? : No   Pain Assessment Scale  Pain Scale: 0-10  Pain Score: 4-Distracts me, can do usual activities  How Has Pain Adversely Affected You?: Decreased mobility, Sleep disturbances  Pain Type: Chronic pain  Pain Location: Shoulder  Pain Orientation: Right  Pain Descriptors: Aching  Pain Frequency: Intermittent  Clinical Progression: Gradually improving  Pain Interventions:  Cold pack  Past Medical History:   Diagnosis Date    Aortic valve stenosis     s/p AV replacement 11/2013    Arthritis     Bilat hands, ankle    Ascending aorta dilatation (CMS/[facility])     s/p AAA repair 11/2013    Asthma     CAD (coronary artery disease)     1 vessel bypass    Cancer (CMS/[facility])     CHF (congestive heart failure) (CMS/[facility])     COPD (chronic obstructive pulmonary disease) (CMS/[facility])     Dysrhythmia     nonsustained v-tach    History of transfusion     2013. 2022    Hyperlipidemia     Ischemic cardiomyopathy     Lung nodule 03/07/2022    Myocardial infarction (CMS/[facility])     Presence of heart assist device (CMS/[facility])     Shortness of breath     Skin cancer        Current Outpatient Medications:     atorvastatin (LIPITOR) 80 mg tablet, Take 1 tablet (80 mg total) by mouth nightly, Disp: 90 tablet, Rfl: 0    gabapentin (NEURONTIN) 300 mg capsule, Take 1 capsule (300 mg total) by mouth 3 (three) times a day for 7 days, Disp: 21 capsule, Rfl: 0    gabapentin (NEURONTIN) 300 mg capsule, Take 1 capsule (300 mg total) by mouth 3 (three) times a day for 7 days, Disp: 21 capsule, Rfl: 0    acetaminophen (Tylenol Extra Strength) 500 mg tablet, Take 2 tablets (1,000 mg total) by mouth every 8 (eight) hours for 14 days, Disp: 84 tablet, Rfl: 0    metoprolol succinate XL (TOPROL-XL) 50 mg 24 hr tablet, Take 1 tablet (50 mg total) by mouth daily, Disp: 90 tablet, Rfl: 3    budesonide-formoteroL (SYMBICORT) 160-4.5 mcg/actuation inhaler, Inhale 2 puffs 2 (two) times a day Rinse mouth with water after use. Do not swallow., Disp: 30.6 g, Rfl: 3    sacubitriL-valsartan (ENTRESTO) 24-26 mg tablet, Take 1 tablet by mouth 2 (two) times a day Pt to take 0.5tab BID for 2 weeks then increase to 1 tab BID. Pt will discontinue lisinopril for 3 day washout period prior to starting entresto., Disp: 60 tablet, Rfl: 11    evolocumab (Repatha SureClick) 140 mg/mL pen injector, Inject 140 mg under the skin every 14 (fourteen) days, Disp: 6  mL, Rfl: 3    spironolactone (ALDACTONE) 25 mg tablet, Take 1 tablet (25 mg total) by mouth daily, Disp: 90 tablet, Rfl: 3    nitroglycerin (NITROSTAT) 0.4 mg SL tablet, Place 1 tablet (0.4 mg total) under the tongue every 5 (five) minutes as needed for chest pain Limit 3 tabs per episode., Disp: 25 tablet, Rfl: 1    albuterol HFA (Ventolin HFA) 90 mcg/actuation inhaler, Inhale 2 puffs every 4 (four) hours, Disp: 1 Inhaler, Rfl: 11    amoxicillin (AMOXIL) 500 mg tablet, Take 1 tablet (500 mg total) by mouth See administration instructions Indications: For dental procedures only, Disp: , Rfl:     aspirin 81 mg EC tablet, Take 1 tablet (81 mg total) by mouth daily, Disp: 90 tablet, Rfl: 3  Allergies   Allergen Reactions    Ezetimibe Rash          Objective Findings:    PROM Right shoulder   Flexion: 150 degrees (capsular/painful end feel)  Abd 110 (painful end range; capsular   ER:  80 (pain at end range)   IR: 30 (capsular)     AROM Right shoulder   Flexion 140 degrees (pain reported superior shoulder)   IR: L3  ER  T1   ABD: 112 degrees     Right shoulder MMT  Grossly +3/5         Objective    Treatment:   Therapeutic Activity 10 min (not billed)   Subjective and objective measures taken for progress note purposes      Therapeutic Exercise 10 min   Stability ball circles x 20 clockwise and x20 counterclockwise     Rows with blue theraband x 20     Standing resisted external rotation with yellow band 2x10     Manual therapy 10 min   PROM   Flexion   Abduction  Internal rotation   External rotation                        FOTO: Progress  Patient's FOTO functional outcome score is Score: 48/100 where a higher number = greater function.     Time Calculation  Start Time: 0915  Stop Time: 0945  Time Calculation (min): 30 min  Therapeutic Interventions (Time Spent in Minutes)  Manual Therapy: 10  Therapeutic Activity: 10  Therapeutic Exercise: 10  Other Charges       Charge ID Procedure Code Description Qty. Modifiers  Charge Entry User Diagnosis    [id number]  THERAPEUTIC PX 1/> AREAS EACH 15 MIN EXERCISES 1 GP Bernardo Yee, PT     [id number]  MANUAL THERAPY TQS 1/> REGIONS EACH 15 MINUTES 1 GP Bernardo Yee, PT                Assessment/Plan   Assessment    Level of Function and Prognosis  Assessment: Pt has demosntrated good progress towards ROM, activity tolerance, strength, and outcome measure since the start of therapy. Pt has been slowly progressing towards strength and stability exercises as there is still residual weakness and lack of stability that can invite the risk of reinjury. Pt will continue to benefit from PT in helping to further improve functional strength, activity tolerance, ROM, and right shoulder stability with return to PLOF.        Therapy Goals    Short Term Goals  Start goal date: 3/7/24 End goal date: 4/30/24 ST Goal 1: Pt will be independent and compliant with HOME EXERCISE PROGRAM   in order to maintain gains towards longer term goals.   Goal 1 Status: progressing  Start goal date: 3/7/24 End goal date: 4/30/24   ST Goal 2: Pt will demonstrate full PROM of the right shoulder in order to   safely progress towards AROM of the right shoulder and no more than 2/10   pain in the right shoulder in order to demonstrate progress towards longer   term goals.   Goal 2 Status: progressing  Long Term Goals  Long term goal(s) met by: 6/7/24  LT Goal 1: Pt will demonstrate improved functional performance indicated   by an improved self reported FOTO score of 59 as compared to initial   measure of 4  Goal Status: progressing  LT Goal 2: Pt will demonstrate AROM of the right shoulder to be within   functional limits, grossly 5/5 of the right upper extremity, and no pain   in the right shoulder with all activities in helping to facilitate safe   return to work with decreased risk of reinjury of the right shoulder.   Goal Status: progressing       Plan  Plan  Plan for next treatment  comment: Continue to progress per protocol  Treatment duration will be through 6/7/2024       Thank you for allowing us to share in the care of this patient. If you have any questions, recommendations, or further concerns regarding this patient, please feel free to contact us at  [facility] [facility] [address] [phone]      * I have reviewed the plan of care and certify a continuing need for medically necessary services    Co-signed by:_________________________ Date and Time:________________

## 2024-04-10 ENCOUNTER — OFFICE VISIT (OUTPATIENT)
Dept: ORTHOPEDIC SURGERY | Facility: CLINIC | Age: 64
End: 2024-04-10
Payer: COMMERCIAL

## 2024-04-10 VITALS
OXYGEN SATURATION: 95 % | HEART RATE: 83 BPM | DIASTOLIC BLOOD PRESSURE: 64 MMHG | SYSTOLIC BLOOD PRESSURE: 104 MMHG | RESPIRATION RATE: 16 BRPM

## 2024-04-10 DIAGNOSIS — M75.41 IMPINGEMENT SYNDROME OF RIGHT SHOULDER: ICD-10-CM

## 2024-04-10 DIAGNOSIS — M75.101 TEAR OF RIGHT ROTATOR CUFF, UNSPECIFIED TEAR EXTENT, UNSPECIFIED WHETHER TRAUMATIC: Primary | ICD-10-CM

## 2024-04-10 PROCEDURE — 99024 POSTOP FOLLOW-UP VISIT: CPT | Performed by: ORTHOPAEDIC SURGERY

## 2024-04-10 ASSESSMENT — PAIN - FUNCTIONAL ASSESSMENT: PAIN_FUNCTIONAL_ASSESSMENT: 0-10

## 2024-04-10 ASSESSMENT — PAIN DESCRIPTION - DESCRIPTORS: DESCRIPTORS: ACHING;DISCOMFORT;DULL

## 2024-04-10 NOTE — PROGRESS NOTES
Orthopaedic Surgery  Post Operative Note    Subjective:  States he is feeling excellent.  Still some dull throbby achy pain particular after PT.    Objective:  Vitals:    04/10/24 1033   BP: 104/64   Pulse: 83   Resp: 16   SpO2: 95%        Patient was awake and alert, oriented X3. Mucuos membranes pink and moist, respirations even and unlabored. Skin was dry with cap refill < 2s.  Incision was clean,dry and intact with no oozing or erythema.  Active forward flexion to 145 abduction to 130 external rotation to 45    Imaging:  No images are attached to the encounter.       Assessment/Plan:   Hayden Franklin is a 63 y.o. male 10 weeks from massive right shoulder arthroscopic rotator repair, SCD.  He is doing excellent.  He may do 5 pound weightbearing close to body wean per protocol may start doing light weightbearing away from body 1 pound and slowly progress.  Discussed being careful with weight away and above as his tissue quality was quite poor and his tear was very large.  He expressed understanding.  I will see him back in 6 weeks        A voice recognition program was used to aid in documentation of this record. Sometimes words are not printed exactly as they were spoken.  While efforts were made to carefully edit and correct any inaccuracies, some errors may be present; please take these into context.  Please contact the provider's office if you have any questions or concerns.

## 2024-04-11 ENCOUNTER — HOSPITAL ENCOUNTER (OUTPATIENT)
Dept: PHYSICAL THERAPY | Facility: HOSPITAL | Age: 64
Setting detail: THERAPIES SERIES
Discharge: STILL ADMITTED OR ADMINISTRATIVE | End: 2024-04-11
Payer: COMMERCIAL

## 2024-04-11 DIAGNOSIS — Z47.89 ORTHOPEDIC AFTERCARE: Primary | ICD-10-CM

## 2024-04-11 DIAGNOSIS — M25.511 CHRONIC RIGHT SHOULDER PAIN: ICD-10-CM

## 2024-04-11 DIAGNOSIS — M19.011 PRIMARY OSTEOARTHRITIS OF RIGHT SHOULDER: ICD-10-CM

## 2024-04-11 DIAGNOSIS — G89.29 CHRONIC RIGHT SHOULDER PAIN: ICD-10-CM

## 2024-04-11 DIAGNOSIS — M75.101 ROTATOR CUFF SYNDROME OF RIGHT SHOULDER: ICD-10-CM

## 2024-04-11 DIAGNOSIS — M75.41 IMPINGEMENT SYNDROME OF RIGHT SHOULDER: ICD-10-CM

## 2024-04-11 PROCEDURE — 97110 THERAPEUTIC EXERCISES: CPT | Mod: GP

## 2024-04-11 PROCEDURE — 97140 MANUAL THERAPY 1/> REGIONS: CPT | Mod: GP

## 2024-04-11 NOTE — INTERDISCIPLINARY/THERAPY
[address] [facility] [address]  [phone]      Outpatient Physical Therapy Daily Treatment Note    Date of Service: 24  Patient Name: Hayden Franklin  : 1960  Referring Provider: Mina Kendrick DO  Today's Visit Number: 11  Onset Date: 2024  SOC Date: 3/7/2024  Certification Period: 2024  HICN: [id number]  Medical Diagnosis listed first. Additional are Treatment Diagnoses:  1. Orthopedic aftercare    2. Chronic right shoulder pain    3. Rotator cuff syndrome of right shoulder    4. Impingement syndrome of right shoulder    5. Primary osteoarthritis of right shoulder        Subjective   Subjective Comments: Pt reports that he is doing well with only residual soreness noted of the right shoulder. Pt had a follow up with ortho with note of good progress.  Has patient fallen since last visit? No     Have there been any changes in medications? No  Pain Assessment Scale  Pain Scale: 0-10  Pain Score: 1-Hardly notice pain  Pain Location: Shoulder  Pain Orientation: Right  Pain Descriptors: Sore  Pain Frequency: Intermittent       Objective    Treatment:       Therapeutic Exercise 20 min   Stability ball circles x 20 clockwise and x20 counterclockwise   Stability ball serratus with abduction plane   Forward flexion #1 3x10   Left side lying external rotation of the right shoulder #1 2x10   R biceps flexion with red band 2x10     Manual therapy 8 min   PROM   Flexion   Abduction  Internal rotation   External rotation                             Time Calculation  Start Time: 930  Stop Time: 958  Time Calculation (min): 28 min  Therapeutic Interventions (Time Spent in Minutes)  Manual Therapy: 8  Therapeutic Exercise: 20  Other Charges       Charge ID Procedure Code Description Qty. Modifiers Charge Entry User Diagnosis    [id number] HC THERAPEUTIC PX 1/> AREAS EACH 15 MIN EXERCISES 1 GP Bernardo Yee, PT     [id number]  MANUAL THERAPY TQS 1/>  REGIONS EACH 15 MINUTES 1 GP Bernardo Yee, PT                Assessment/Plan   Assessment    Level of Function and Prognosis  Assessment: Pt was able to progress in all activities without the complaint of pain in the right shoulder. Pt reports residual fatigue in the right shoulder after session today. Good PROM of the right shoulder noted in all planes. Pt reports understanding to HEP. Pt is progressing well in therapy.        Therapy Goals    Short Term Goals  Start goal date: 3/7/24 End goal date: 4/30/24   ST Goal 1: Pt will be independent and compliant with HOME EXERCISE PROGRAM   in order to maintain gains towards longer term goals.   Goal 1 Status: progressing  Start goal date: 3/7/24 End goal date: 4/30/24   ST Goal 2: Pt will demonstrate full PROM of the right shoulder in order to   safely progress towards AROM of the right shoulder and no more than 2/10   pain in the right shoulder in order to demonstrate progress towards longer   term goals.   Goal 2 Status: progressing  Long Term Goals  Long term goal(s) met by: 6/7/24  LT Goal 1: Pt will demonstrate improved functional performance indicated   by an improved self reported FOTO score of 59 as compared to initial   measure of 4  Goal Status: progressing  LT Goal 2: Pt will demonstrate AROM of the right shoulder to be within   functional limits, grossly 5/5 of the right upper extremity, and no pain   in the right shoulder with all activities in helping to facilitate safe   return to work with decreased risk of reinjury of the right shoulder.   Goal Status: progressing       Plan    Plan for next treatment comment: Continue to progress per protocol  Treatment duration will be through 6/7/2024

## 2024-04-16 ENCOUNTER — HOSPITAL ENCOUNTER (OUTPATIENT)
Dept: PHYSICAL THERAPY | Facility: HOSPITAL | Age: 64
Setting detail: THERAPIES SERIES
Discharge: STILL ADMITTED OR ADMINISTRATIVE | End: 2024-04-16
Payer: COMMERCIAL

## 2024-04-16 DIAGNOSIS — M19.011 PRIMARY OSTEOARTHRITIS OF RIGHT SHOULDER: ICD-10-CM

## 2024-04-16 DIAGNOSIS — G89.29 CHRONIC RIGHT SHOULDER PAIN: Primary | ICD-10-CM

## 2024-04-16 DIAGNOSIS — M75.41 IMPINGEMENT SYNDROME OF RIGHT SHOULDER: ICD-10-CM

## 2024-04-16 DIAGNOSIS — M25.511 CHRONIC RIGHT SHOULDER PAIN: Primary | ICD-10-CM

## 2024-04-16 DIAGNOSIS — M75.101 ROTATOR CUFF SYNDROME OF RIGHT SHOULDER: ICD-10-CM

## 2024-04-16 DIAGNOSIS — Z47.89 ORTHOPEDIC AFTERCARE: ICD-10-CM

## 2024-04-16 PROCEDURE — 97110 THERAPEUTIC EXERCISES: CPT | Mod: GP

## 2024-04-16 NOTE — INTERDISCIPLINARY/THERAPY
"  [address] [facility] [id number] [phone]      Outpatient Physical Therapy Daily Treatment Note    Date of Service: 24  Patient Name: Hayden Franklin  : 1960  Referring Provider: Mina Kendrick DO  Today's Visit Number: 12  Onset Date: 2024  SOC Date: 3/7/2024  Certification Period: 2024  HICN: [id number]  Medical Diagnosis listed first. Additional are Treatment Diagnoses:  1. Chronic right shoulder pain    2. Rotator cuff syndrome of right shoulder    3. Impingement syndrome of right shoulder    4. Primary osteoarthritis of right shoulder    5. Orthopedic aftercare        Subjective   Subjective Comments: Pt reports that the right shoulder feels a little more sore today, possibly from \"overdoing it\".  Has patient fallen since last visit? No     Have there been any changes in medications? No  Pain Assessment Scale  Pain Scale: 0-10  Pain Score: 3-Sometimes distracts me  Pain Type: Chronic pain  Pain Location: Shoulder  Pain Orientation: Right  Pain Descriptors: Sore  Clinical Progression: Gradually improving       Objective    Treatment:       Therapeutic Exercise 30 min   UBE 2 minutes forward and 2 minutes backward for physiologic warm up   Resisted right shoulder external rotation with red band 3x10   Resisted internal rotation green band 3x10   Forward flexion #2 2x10   Scaption #2 2x10   Resisted diagonals with red band 1x10   Bilateral external rotation with red band 2x10                                     Time Calculation  Start Time: 0915  Stop Time: 0945  Time Calculation (min): 30 min  Therapeutic Interventions (Time Spent in Minutes)  Therapeutic Exercise: 30  Other Charges       Charge ID Procedure Code Description Qty. Modifiers Charge Entry User Diagnosis    [id number]  THERAPEUTIC PX 1/> AREAS EACH 15 MIN EXERCISES 2 GP Bernardo Yee, PT                Assessment/Plan   Assessment    Level of Function and Prognosis  Assessment: Pt was " able to progress in all activities well with no complaint of right shoulder pain. Improved stability/strength of the right shoulder noted with above shoulder activities. Pt reports that the right shoulder felt better after session as before walking in today. Pt reports understanding to HEP. Pt will continue to benefit from PT in helping to further improve ROM, functional strength, activity tolerance, and right shoulder stability in helping to decrease the risk of reinjury with return to PLOF        Therapy Goals    Short Term Goals  Start goal date: 3/7/24 End goal date: 4/30/24   ST Goal 1: Pt will be independent and compliant with HOME EXERCISE PROGRAM   in order to maintain gains towards longer term goals.   Goal 1 Status: progressing  Start goal date: 3/7/24 End goal date: 4/30/24   ST Goal 2: Pt will demonstrate full PROM of the right shoulder in order to   safely progress towards AROM of the right shoulder and no more than 2/10   pain in the right shoulder in order to demonstrate progress towards longer   term goals.   Goal 2 Status: progressing  Long Term Goals  Long term goal(s) met by: 6/7/24  LT Goal 1: Pt will demonstrate improved functional performance indicated   by an improved self reported FOTO score of 59 as compared to initial   measure of 4  Goal Status: progressing  LT Goal 2: Pt will demonstrate AROM of the right shoulder to be within   functional limits, grossly 5/5 of the right upper extremity, and no pain   in the right shoulder with all activities in helping to facilitate safe   return to work with decreased risk of reinjury of the right shoulder.   Goal Status: progressing       Plan    Plan for next treatment comment: Continue to progress functional strength, ROM, activity tolerance, and right shoulder stability per protocol  Treatment duration will be through 6/7/2024

## 2024-04-18 ENCOUNTER — HOSPITAL ENCOUNTER (OUTPATIENT)
Dept: PHYSICAL THERAPY | Facility: HOSPITAL | Age: 64
Setting detail: THERAPIES SERIES
Discharge: STILL ADMITTED OR ADMINISTRATIVE | End: 2024-04-18
Payer: COMMERCIAL

## 2024-04-18 DIAGNOSIS — Z47.89 ORTHOPEDIC AFTERCARE: ICD-10-CM

## 2024-04-18 DIAGNOSIS — M75.41 IMPINGEMENT SYNDROME OF RIGHT SHOULDER: ICD-10-CM

## 2024-04-18 DIAGNOSIS — M25.511 CHRONIC RIGHT SHOULDER PAIN: ICD-10-CM

## 2024-04-18 DIAGNOSIS — M75.101 ROTATOR CUFF SYNDROME OF RIGHT SHOULDER: ICD-10-CM

## 2024-04-18 DIAGNOSIS — M19.011 PRIMARY OSTEOARTHRITIS OF RIGHT SHOULDER: Primary | ICD-10-CM

## 2024-04-18 DIAGNOSIS — G89.29 CHRONIC RIGHT SHOULDER PAIN: ICD-10-CM

## 2024-04-18 PROCEDURE — 97110 THERAPEUTIC EXERCISES: CPT | Mod: GP

## 2024-04-18 NOTE — INTERDISCIPLINARY/THERAPY
[address] [facility] [address]  [phone]      Outpatient Physical Therapy Daily Treatment Note    Date of Service: 24  Patient Name: Hayden Franklin  : 1960  Referring Provider: Mina Kendrick DO  Today's Visit Number: 13  Onset Date: 2024  SOC Date: 3/7/2024  Certification Period: 2024  HICN: [id number]  Medical Diagnosis listed first. Additional are Treatment Diagnoses:  1. Primary osteoarthritis of right shoulder    2. Impingement syndrome of right shoulder    3. Rotator cuff syndrome of right shoulder    4. Chronic right shoulder pain    5. Orthopedic aftercare          Subjective   Subjective Comments: Pt reports that he is doing well today with no new concerns of right shoulder pain.  Has patient fallen since last visit? No     Have there been any changes in medications? No  Pain Assessment Scale  Pain Scale: 0-10  Pain Score: 1-Hardly notice pain  Pain Location: Shoulder  Pain Orientation: Right  Pain Frequency: Intermittent       Objective    Treatment:     Therapeutic Exercise 25 min   UBE 2 minutes forward and 2 minutes backward for physiologic warm up   Resisted right shoulder external rotation with red band 3x10   Resisted internal rotation green band 3x10   Forward flexion #2 2x10   Scaption #2 2x10   Bicep flexion 1x10 #3; 1x10 #4   Bilateral shoulder external rotation with red band 2x10   Horizontal abduction with red band 2x10     Neuromuscular Reeducation 5 min (not billed)   Stability wand 90 degrees elbow flexion 30 seconds 0 degrees abduction with half supination/pronation   Stability wand 90 degrees elbow flexion 0 degrees abduction with wrist pronation 30 seconds                                       Time Calculation  Start Time: 09  Stop Time: 930  Time Calculation (min): 30 min  Therapeutic Interventions (Time Spent in Minutes)  Neuromuscular Re-Education: 5  Therapeutic Exercise: 25  Other Charges       Charge ID Procedure Code  Description Qty. Modifiers Charge Entry User Diagnosis    [id number] HC THERAPEUTIC PX 1/> AREAS EACH 15 MIN EXERCISES 2 GP Bernardo Yee, PT                Assessment/Plan   Assessment    Level of Function and Prognosis  Assessment: Pt was able to complete all activities without the complaint of right shoulder pain. Good stability observed with stability wand exercise and good strength observed with bicep curls. Pt does note increased fatigue with forward raises, scaption, and resisted external rotation indicating residual weakness. Pt will continue to benefit from PT in helping to further improve functional strength, stability, and activity tolerance in helping to return to PRIOR LEVEL OF FUNCTIONING.         Therapy Goals    Short Term Goals  Start goal date: 3/7/24 End goal date: 4/30/24   ST Goal 1: Pt will be independent and compliant with HOME EXERCISE PROGRAM   in order to maintain gains towards longer term goals.   Goal 1 Status: progressing  Start goal date: 3/7/24 End goal date: 4/30/24   ST Goal 2: Pt will demonstrate full PROM of the right shoulder in order to   safely progress towards AROM of the right shoulder and no more than 2/10   pain in the right shoulder in order to demonstrate progress towards longer   term goals.   Goal 2 Status: progressing  Long Term Goals  Long term goal(s) met by: 6/7/24  LT Goal 1: Pt will demonstrate improved functional performance indicated   by an improved self reported FOTO score of 59 as compared to initial   measure of 4  Goal Status: progressing  LT Goal 2: Pt will demonstrate AROM of the right shoulder to be within   functional limits, grossly 5/5 of the right upper extremity, and no pain   in the right shoulder with all activities in helping to facilitate safe   return to work with decreased risk of reinjury of the right shoulder.   Goal Status: progressing       Plan    Plan for next treatment comment: Continue to progress functional strength,  stability, ROM, and activity tolerance as appropriate.  Treatment duration will be through 6/7/2024

## 2024-04-19 ENCOUNTER — OFFICE VISIT (OUTPATIENT)
Dept: FAMILY MEDICINE | Facility: CLINIC | Age: 64
End: 2024-04-19
Payer: COMMERCIAL

## 2024-04-19 ENCOUNTER — MEDICATION ACCESS (OUTPATIENT)
Dept: FAMILY MEDICINE | Facility: CLINIC | Age: 64
End: 2024-04-19
Payer: COMMERCIAL

## 2024-04-19 VITALS
HEART RATE: 76 BPM | SYSTOLIC BLOOD PRESSURE: 116 MMHG | HEIGHT: 72 IN | BODY MASS INDEX: 28.58 KG/M2 | OXYGEN SATURATION: 94 % | WEIGHT: 211 LBS | DIASTOLIC BLOOD PRESSURE: 64 MMHG | TEMPERATURE: 98 F

## 2024-04-19 DIAGNOSIS — I50.42 CHRONIC COMBINED SYSTOLIC AND DIASTOLIC CONGESTIVE HEART FAILURE (CMS/HCC): ICD-10-CM

## 2024-04-19 DIAGNOSIS — E78.5 HYPERLIPIDEMIA, UNSPECIFIED HYPERLIPIDEMIA TYPE: ICD-10-CM

## 2024-04-19 DIAGNOSIS — I65.23 OCCLUSION AND STENOSIS OF BILATERAL CAROTID ARTERIES: ICD-10-CM

## 2024-04-19 DIAGNOSIS — Z12.5 PROSTATE CANCER SCREENING: ICD-10-CM

## 2024-04-19 DIAGNOSIS — I25.10 CORONARY ARTERY DISEASE INVOLVING NATIVE CORONARY ARTERY OF NATIVE HEART WITHOUT ANGINA PECTORIS: ICD-10-CM

## 2024-04-19 DIAGNOSIS — Z00.00 WELLNESS EXAMINATION: Primary | ICD-10-CM

## 2024-04-19 PROCEDURE — 99396 PREV VISIT EST AGE 40-64: CPT | Performed by: FAMILY MEDICINE

## 2024-04-19 RX ORDER — SPIRONOLACTONE 25 MG/1
25 TABLET ORAL DAILY
Qty: 90 TABLET | Refills: 3 | Status: SHIPPED | OUTPATIENT
Start: 2024-04-19

## 2024-04-19 RX ORDER — EVOLOCUMAB 140 MG/ML
140 INJECTION, SOLUTION SUBCUTANEOUS
Qty: 6 ML | Refills: 3 | Status: SHIPPED | OUTPATIENT
Start: 2024-04-19

## 2024-04-19 RX ORDER — WARFARIN SODIUM 5 MG/1
2.5-5 TABLET ORAL
COMMUNITY
Start: 2024-04-16

## 2024-04-19 ASSESSMENT — PAIN SCALES - GENERAL: PAINLEVEL: 0-NO PAIN

## 2024-04-19 NOTE — PROGRESS NOTES
04/19/24  3:44 PM    Inquiry initiated from:  [x]Rx Only       No PA needed/Repatha/Lyndon    [id number] - [place], [place] - [address]  [address] [facility] [address]  Phone: [phone]  Fax: [phone]    This Rx is approved and an authorization is on file.    No changes to the following:  Insurance   Prescriber  Medication Name   Dosage Strength  Dosage Form  Route of Administration   Frequency of Administration    Patient has not been notified, as there is no expected delay or interruption in therapy.        [id number]

## 2024-04-19 NOTE — PATIENT INSTRUCTIONS
Health Maintenance, Male    Adopting a healthy lifestyle and getting preventive care can go a long way to promote health and wellness. Talk with your health care provider about what schedule of regular examinations is right for you. This is a good chance for you to check in with your provider about disease prevention and staying healthy.    In between checkups, there are plenty of things you can do on your own. Experts have done a lot of research about which lifestyle changes and preventive measures are most likely to keep you healthy. Ask your health care provider for more information.    Weight and diet  Eat a healthy diet  Be sure to include plenty of vegetables, fruits, low-fat dairy products, and lean protein.  Do not eat a lot of foods high in solid fats, added sugars, or salt.  Get regular exercise. This is one of the most important things you can do for your health.  Most adults should exercise for at least 150 minutes each week. The exercise should increase your heart rate and make you sweat (moderate-intensity exercise).  Most adults should also do strengthening exercises at least twice a week. This is in addition to the moderate-intensity exercise.    Maintain a healthy weight  Body mass index (BMI) is a measurement that can be used to identify possible weight problems. It estimates body fat based on height and weight. Your health care provider can help determine your BMI and help you achieve or maintain a healthy weight.  For females 20 years of age and older:  A BMI below 18.5 is considered underweight.  A BMI of 18.5 to 24.9 is normal.  A BMI of 25 to 29.9 is considered overweight.  A BMI of 30 and above is considered obese.    Your BMI today was:  Body mass index is 28.62 kg/m².     Watch Your Levels of Cholesterol and Blood Lipids  Have your blood tested for lipids and cholesterol every 5 years starting at 35 years of age. If you are at high risk for heart disease, you should start having your  blood tested when you are 20 years old. You may need to have your cholesterol levels checked more often if:  Your lipid or cholesterol levels are high.  You are older than 50 years of age.  You are at high risk for heart disease.    What should I know about cancer screening?  Many types of cancers can be detected early and may often be prevented.  Lung Cancer  You should be screened every year for lung cancer if:  You are a current smoker who has smoked for at least 30 years.  You are a former smoker who has quit within the past 15 years.  Talk to your health care provider about your screening options, when you should start screening, and how often you should be screened.    Colorectal Cancer  Routine colorectal cancer screening usually begins at 45 years of age and should be repeated every 5-10 years until you are 75 years old. You may need to be screened more often if early forms of precancerous polyps or small growths are found. Your health care provider may recommend screening at an earlier age if you have risk factors for colon cancer.  Your health care provider may recommend using home test kits to check for hidden blood in the stool if you are normal or low risk for colon cancer. (FIT or Cologuard testing)  A small camera at the end of a tube can be used to examine your colon (sigmoidoscopy or colonoscopy). This checks for the earliest forms of colorectal cancer.    Prostate Cancer  Depending on your age and overall health, your health care provider may do certain tests to screen for prostate cancer  Talk to your health care provider about any symptoms or concerns you have about  prostate cancer.    Skin Cancer  Check your skin from head to toe regularly.  Tell your health care provider about any new moles or changes in moles, especially if:  There is a change in a mole’s size, shape, or color.  You have a mole that is larger than a pencil eraser.  Always use sunscreen. Apply sunscreen liberally and repeat  throughout the day.  Protect yourself by wearing long sleeves, pants, a wide-brimmed hat, and sunglasses when outside.    What should I know about heart disease, diabetes, and high blood pressure?  If you are 18-39 years of age, have your blood pressure checked every 3-5 years. If you are 40 years of age or older, have your blood pressure checked every year. You should have your blood pressure measured twice--once when you are at a hospital or clinic, and once when you are not at a hospital or clinic. Record the average of the two measurements. To check your blood pressure when you are not at a hospital or clinic, you can use:  An automated blood pressure machine at a pharmacy.  A home blood pressure monitor.  Talk to your health care provider about your target blood pressure.  If you are between 45-79 years old, ask your health care provider if you should take aspirin to prevent heart disease.  Have regular diabetes screenings by checking your fasting blood sugar level.  If you are at a normal weight and have a low risk for diabetes, have this test once every three years after the age of 45.  If you are overweight and have a high risk for diabetes, consider being tested at a younger age or more often.  A one-time screening for abdominal aortic aneurysm (AAA) by ultrasound is recommended for men aged 65-75 years who are current or former smokers.    What should I know about preventing infection?  Hepatitis B  If you have a higher risk for hepatitis B, you should be screened for this virus. Talk with your health care provider to find out if you are at risk for hepatitis B infection.  Hepatitis C  Blood testing is recommended for:  Everyone born from 1945 through 1965.  Anyone with known risk factors for hepatitis C.    Sexually Transmitted Diseases (STDs)  You should be screened each year for STDs including gonorrhea and chlamydia if:  You are sexually active and are younger than 24 years of age.  You are older than  24 years of age and your health care provider tells you that you are at risk for this type of infection.  Your sexual activity has changed since you were last screened and you are at an increased risk for chlamydia or gonorrhea. Ask your health care provider if you are at risk.  Talk with your health care provider about whether you are at high risk of being infected with HIV. Your health care provider may recommend a prescription medicine to help prevent HIV infection.    What else can I do?  Schedule regular health, dental, and eye exams.  Stay current with your vaccines (immunizations).  Do not use any tobacco products, such as cigarettes, chewing tobacco, and e-cigarettes. Do not use street drugs.  If you need help quitting, ask your health care provider.  Limit alcohol intake to no more than 2 drinks per day. One drink equals 12 ounces of beer, 5 ounces of wine, or 1½ ounces of hard liquor.  Tell your health care provider if you often feel depressed.  Tell your health care provider if you have ever been abused or do not feel safe at home.      [employer] Interactive Patient Education © 2018 [employer]

## 2024-04-19 NOTE — PROGRESS NOTES
SUBJECTIVE:    Chief Complaint   Patient presents with    Adult Well Visit         Hayden Franklin is a 63 y.o. male presenting for an annual exam.    He has coronary artery disease and sees cardiology for that.  He also has asthma COPD overlap, managed by pulmonology.  His hyperlipidemia is treated with Repatha and atorvastatin.  He is needing a refill on the Repatha.  He is also out of refills on the spironolactone, so I sent that in.  His anticoagulation is managed by cardiology.    Patient Active Problem List   Diagnosis    Coronary atherosclerosis of native coronary artery    Mixed hyperlipidemia    S/P AVR    History of coronary artery bypass graft x 1    Ischemic cardiomyopathy    Chronic combined systolic and diastolic heart failure (CMS/HCC)    Asthma-COPD overlap syndrome (CMS/HCC)    Nonrheumatic aortic valve insufficiency    Anticoagulated    Biventricular implantable cardioverter-defibrillator (ICD) in situ    Presence of prosthetic heart valve    Orthopedic aftercare    Right shoulder pain    Rotator cuff syndrome of right shoulder    Impingement syndrome of right shoulder    Primary osteoarthritis of right shoulder          Outpatient Medications Prior to Visit   Medication Sig Dispense Refill    warfarin (COUMADIN) 5 mg tablet Take 2.5-5 mg by mouth      atorvastatin (LIPITOR) 80 mg tablet Take 1 tablet (80 mg total) by mouth nightly 90 tablet 0    metoprolol succinate XL (TOPROL-XL) 50 mg 24 hr tablet Take 1 tablet (50 mg total) by mouth daily 90 tablet 3    budesonide-formoteroL (SYMBICORT) 160-4.5 mcg/actuation inhaler Inhale 2 puffs 2 (two) times a day Rinse mouth with water after use. Do not swallow. 30.6 g 3    sacubitriL-valsartan (ENTRESTO) 24-26 mg tablet Take 1 tablet by mouth 2 (two) times a day Pt to take 0.5tab BID for 2 weeks then increase to 1 tab BID. Pt will discontinue lisinopril for 3 day washout period prior to starting entresto. 60 tablet 11    nitroglycerin (NITROSTAT) 0.4  mg SL tablet Place 1 tablet (0.4 mg total) under the tongue every 5 (five) minutes as needed for chest pain Limit 3 tabs per episode. 25 tablet 1    aspirin 81 mg EC tablet Take 1 tablet (81 mg total) by mouth daily 90 tablet 3    evolocumab (Repatha SureClick) 140 mg/mL pen injector Inject 140 mg under the skin every 14 (fourteen) days 6 mL 3    spironolactone (ALDACTONE) 25 mg tablet Take 1 tablet (25 mg total) by mouth daily 90 tablet 3    gabapentin (NEURONTIN) 300 mg capsule Take 1 capsule (300 mg total) by mouth 3 (three) times a day for 7 days 21 capsule 0    gabapentin (NEURONTIN) 300 mg capsule Take 1 capsule (300 mg total) by mouth 3 (three) times a day for 7 days 21 capsule 0    acetaminophen (Tylenol Extra Strength) 500 mg tablet Take 2 tablets (1,000 mg total) by mouth every 8 (eight) hours for 14 days 84 tablet 0    albuterol HFA (Ventolin HFA) 90 mcg/actuation inhaler Inhale 2 puffs every 4 (four) hours 1 Inhaler 11    amoxicillin (AMOXIL) 500 mg tablet Take 1 tablet (500 mg total) by mouth See administration instructions Indications: For dental procedures only       No facility-administered medications prior to visit.       Family Status   Relation Name Status    Mother Ting Franklin Alive    Father Hayden Franklin     Sister Preethi Alive    Sister Analy Alive    Sister Catalina Alive    Daughter Alejandra Alive    Daughter Sana Alive       Family History   Problem Relation Age of Onset    Cancer Mother     Heart disease Father     Aneurysm Father     Other Sister         multisystem atrophy- on hospice    Breast cancer Sister     No Known Problems Sister     Gallbladder disease Daughter     No Known Problems Daughter            The patient's past medical history, medications, allergies, family history and social history were updated in his electronic medical record at today's visit.      REVIEW OF SYSTEMS:  Constitutional: weight is up 9 pounds.  No significant fatigue.  HEENT:  No  change in vision or hearing. Due for vision exam.    Pulmonary: No dyspnea on exertion, no wheezing, no shortness of breath  Cardiovascular: No exercise intolerance, no chest pain, no diaphoresis, no edema; walks regularly- 1/2-3/4 mile per day  Gastrointestinal: No abdominal pain, no change in bowel habits, no significant GERD.  :  The patient denies urinary hesitancy, nocturia, frequency or incomplete voiding.      Skin/Integumentary: No abnormal skin lesions noted, no evidence of rash  Neurologic: No chronic headaches.  Psychiatric: Denies depression or anxiety.  Some mood issues related to not working (off for surgery).    Musculoskeletal: negative for joint pain/swelling.  Shoulder surgery went well.      OBJECTIVE:   /64 (BP Location: Left arm, Patient Position: Sitting, Cuff Size: Regular Adult)   Pulse 76   Temp 36.7 °C (98 °F) (Temporal)   Ht 1.829 m (6')   Wt 95.7 kg (211 lb)   SpO2 94%   BMI 28.62 kg/m²    The patient appears well, in NAD. Mood and affect appropriate.  HEENT: Eyes grossly normal.  Ears normal; TM's are clear. Throat and pharynx normal. Teeth in good repair.  Neck supple. No adenopathy or thyromegaly.  Lungs: Clear, good air entry, no wheezes, rhonchi or rales.   Heart: Regular rate and rhythm with valve click noted.  Carotids are without bruits bilaterally.  Breasts without mass or axillary adenopathy.  Abdomen: Soft without tenderness, guarding, mass or hepatosplenomegaly. Extremities: No edema, no varicosities. Distal pulses intact.  Skin: I note only benign skin findings. No unusual rashes or suspicious skin lesions noted. Nails appear normal.      LAB:  Recent Results (from the past 48 hour(s))   Comprehensive metabolic panel Blood, Venous    Collection Time: 04/20/24  7:12 AM   Result Value Ref Range    Sodium 137 135 - 145 mmol/L    Potassium 4.1 3.5 - 5.1 MMOL/L    Chloride 103 98 - 107 mmol/L    CO2 23 21 - 32 mmol/L    Anion Gap 11 3 - 11 mmol/L    BUN 19 7 - 25  mg/dL    Creatinine 0.90 0.70 - 1.30 mg/dL    Glucose 102 70 - 105 mg/dL    Calcium 9.6 8.6 - 10.3 mg/dL    AST 16 <40 U/L    ALT (SGPT) 18 7 - 52 U/L    Alkaline Phosphatase 89 45 - 115 U/L    Total Protein 7.1 6.0 - 8.3 g/dL    Albumin 4.4 3.5 - 5.3 g/dL    Total Bilirubin 0.76 0.20 - 1.40 mg/dL    Corrected Calcium 9.3 8.6 - 10.3 mg/dL    eGFR 97 >60 mL/min/1.73m*2   CBC w/auto differential Blood, Venous    Collection Time: 04/20/24  7:12 AM   Result Value Ref Range    WBC 7.2 3.7 - 9.6 10*3/uL    RBC 5.59 4.10 - 5.80 10*6/µL    Hemoglobin 17.7 (H) 13.2 - 17.2 g/dL    Hematocrit 52.1 (H) 38.0 - 50.0 %    MCV 93.3 82.0 - 97.0 fL    MCH 31.6 29.0 - 34.0 pg    MCHC 33.9 32.0 - 36.0 g/dL    RDW 13.7 11.5 - 15.0 %    Platelets 188 130 - 350 10*3/uL    MPV 9.8 6.9 - 10.8 fL    Neutrophils% 62.3 46.0 - 70.0 %    Lymphocytes% 26.0 15.0 - 47.0 %    Monocytes% 8.6 5.0 - 13.0 %    Eosinophils% 2.5 0.0 - 3.0 %    Basophils% 0.6 0.0 - 2.0 %    ANC (auto diff) 4.50 10*3/UL    Lymphocytes Absolute 1.90 10*3/uL    Monocytes Absolute 0.60 10*3/uL    Eosinophils Absolute 0.20 10*3/uL    Basophils Absolute 0.00 10*3/uL   Lipid panel Blood, Venous    Collection Time: 04/20/24  7:12 AM   Result Value Ref Range    Triglycerides 138 <=149 mg/dL    Cholesterol 110 0 - 199 mg/dL    HDL 59 >=40 mg/dL    LDL Calculated 23 20 - 99 mg/dL    Fasting? Yes    PSA screen Blood, Venous    Collection Time: 04/20/24  7:12 AM   Result Value Ref Range    PSA 0.19 0.00 - 4.00 ng/mL        ASSESSMENT:    Diagnosis Plan   1. Wellness examination  Comprehensive metabolic panel Blood, Venous    Lipid panel Blood, Venous    CBC w/auto differential Blood, Venous    Comprehensive metabolic panel Blood, Venous    CBC w/auto differential Blood, Venous    Lipid panel Blood, Venous      2. Prostate cancer screening  PSA screen Blood, Venous    PSA screen Blood, Venous      3. Coronary artery disease involving native coronary artery of native heart without angina  pectoris  evolocumab (Repatha SureClick) 140 mg/mL      4. Occlusion and stenosis of bilateral carotid arteries  evolocumab (Repatha SureClick) 140 mg/mL      5. Hyperlipidemia, unspecified hyperlipidemia type  evolocumab (Repatha SureClick) 140 mg/mL      6. Chronic combined systolic and diastolic congestive heart failure (CMS/HCC)  spironolactone (ALDACTONE) 25 mg tablet             PLAN:     The patient was provided written information regarding healthcare maintenance in today's AVS.  This includes diet, exercise, vaccinations and age appropriate cancer screenings.      Health Maintenance reviewed - PSA normal; colonoscopy due 2026.  RSV vaccine- will check insurance first.      His hemoglobin and hematocrit are mildly elevated which is a new finding.  I would like to repeat this in a month before making any additional recommendations.  He was a prior smoker, but quit in 2022 and he does not have any known sleep apnea.  Sleep study has been recommended on at least 3 occasions (7/2021, 3/2022, 6/2022).  If his hemoglobin and hematocrit remain elevated next visit, sleep study will be the next step.    Return in about 1 year (around 4/19/2025) for 30 MIN- PHYSICAL, LAB appointment prior to next visit- orders in.    Rosa Haney MD

## 2024-04-20 ENCOUNTER — LAB (OUTPATIENT)
Dept: LAB | Facility: URGENT CARE | Age: 64
End: 2024-04-20
Payer: COMMERCIAL

## 2024-04-20 LAB
ALBUMIN SERPL-MCNC: 4.4 G/DL (ref 3.5–5.3)
ALP SERPL-CCNC: 89 U/L (ref 45–115)
ALT SERPL-CCNC: 18 U/L (ref 7–52)
ANION GAP SERPL CALC-SCNC: 11 MMOL/L (ref 3–11)
AST SERPL-CCNC: 16 U/L
BASOPHILS # BLD AUTO: 0 10*3/UL
BASOPHILS NFR BLD AUTO: 0.6 % (ref 0–2)
BILIRUB SERPL-MCNC: 0.76 MG/DL (ref 0.2–1.4)
BUN SERPL-MCNC: 19 MG/DL (ref 7–25)
CALCIUM ALBUM COR SERPL-MCNC: 9.3 MG/DL (ref 8.6–10.3)
CALCIUM SERPL-MCNC: 9.6 MG/DL (ref 8.6–10.3)
CHLORIDE SERPL-SCNC: 103 MMOL/L (ref 98–107)
CHOLEST SERPL-MCNC: 110 MG/DL (ref 0–199)
CO2 SERPL-SCNC: 23 MMOL/L (ref 21–32)
CREAT SERPL-MCNC: 0.9 MG/DL (ref 0.7–1.3)
EGFRCR SERPLBLD CKD-EPI 2021: 97 ML/MIN/1.73M*2
EOSINOPHIL # BLD AUTO: 0.2 10*3/UL
EOSINOPHIL NFR BLD AUTO: 2.5 % (ref 0–3)
ERYTHROCYTE [DISTWIDTH] IN BLOOD BY AUTOMATED COUNT: 13.7 % (ref 11.5–15)
FASTING STATUS PATIENT QL REPORTED: YES
GLUCOSE SERPL-MCNC: 102 MG/DL (ref 70–105)
HCT VFR BLD AUTO: 52.1 % (ref 38–50)
HDLC SERPL-MCNC: 59 MG/DL
HGB BLD-MCNC: 17.7 G/DL (ref 13.2–17.2)
LDLC SERPL CALC-MCNC: 23 MG/DL (ref 20–99)
LYMPHOCYTES # BLD AUTO: 1.9 10*3/UL
LYMPHOCYTES NFR BLD AUTO: 26 % (ref 15–47)
MCH RBC QN AUTO: 31.6 PG (ref 29–34)
MCHC RBC AUTO-ENTMCNC: 33.9 G/DL (ref 32–36)
MCV RBC AUTO: 93.3 FL (ref 82–97)
MONOCYTES # BLD AUTO: 0.6 10*3/UL
MONOCYTES NFR BLD AUTO: 8.6 % (ref 5–13)
NEUTROPHILS # BLD AUTO: 4.5 10*3/UL
NEUTROPHILS NFR BLD AUTO: 62.3 % (ref 46–70)
PLATELET # BLD AUTO: 188 10*3/UL (ref 130–350)
PMV BLD AUTO: 9.8 FL (ref 6.9–10.8)
POTASSIUM SERPL-SCNC: 4.1 MMOL/L (ref 3.5–5.1)
PROT SERPL-MCNC: 7.1 G/DL (ref 6–8.3)
PSA SERPL-MCNC: 0.19 NG/ML (ref 0–4)
RBC # BLD AUTO: 5.59 10*6/ΜL (ref 4.1–5.8)
SODIUM SERPL-SCNC: 137 MMOL/L (ref 135–145)
TRIGL SERPL-MCNC: 138 MG/DL
WBC # BLD AUTO: 7.2 10*3/UL (ref 3.7–9.6)

## 2024-04-20 PROCEDURE — 36415 COLL VENOUS BLD VENIPUNCTURE: CPT | Performed by: FAMILY MEDICINE

## 2024-04-20 PROCEDURE — 84153 ASSAY OF PSA TOTAL: CPT | Performed by: FAMILY MEDICINE

## 2024-04-20 PROCEDURE — 80053 COMPREHEN METABOLIC PANEL: CPT | Performed by: FAMILY MEDICINE

## 2024-04-20 PROCEDURE — 85025 COMPLETE CBC W/AUTO DIFF WBC: CPT | Performed by: FAMILY MEDICINE

## 2024-04-20 PROCEDURE — 80061 LIPID PANEL: CPT | Performed by: FAMILY MEDICINE

## 2024-04-22 ENCOUNTER — HOSPITAL ENCOUNTER (OUTPATIENT)
Dept: PHYSICAL THERAPY | Facility: HOSPITAL | Age: 64
Setting detail: THERAPIES SERIES
Discharge: STILL ADMITTED OR ADMINISTRATIVE | End: 2024-04-22
Payer: COMMERCIAL

## 2024-04-22 DIAGNOSIS — M75.41 IMPINGEMENT SYNDROME OF RIGHT SHOULDER: ICD-10-CM

## 2024-04-22 DIAGNOSIS — D58.2 ELEVATED HEMOGLOBIN (CMS/HCC): Primary | ICD-10-CM

## 2024-04-22 DIAGNOSIS — M19.011 PRIMARY OSTEOARTHRITIS OF RIGHT SHOULDER: ICD-10-CM

## 2024-04-22 DIAGNOSIS — M25.511 CHRONIC RIGHT SHOULDER PAIN: ICD-10-CM

## 2024-04-22 DIAGNOSIS — G89.29 CHRONIC RIGHT SHOULDER PAIN: ICD-10-CM

## 2024-04-22 DIAGNOSIS — M75.101 ROTATOR CUFF SYNDROME OF RIGHT SHOULDER: Primary | ICD-10-CM

## 2024-04-22 DIAGNOSIS — Z47.89 ORTHOPEDIC AFTERCARE: ICD-10-CM

## 2024-04-22 PROCEDURE — 97110 THERAPEUTIC EXERCISES: CPT | Mod: GP

## 2024-04-22 NOTE — INTERDISCIPLINARY/THERAPY
[address] [facility] [id number] [phone]      Outpatient Physical Therapy Daily Treatment Note    Date of Service: 24  Patient Name: Hayden Franklin  : 1960  Referring Provider: Mina Kendrick DO  Today's Visit Number: 14  Onset Date: 2024  SOC Date: 3/7/2024  Certification Period: 2024  HICN: [id number]  Medical Diagnosis listed first. Additional are Treatment Diagnoses:  1. Rotator cuff syndrome of right shoulder    2. Impingement syndrome of right shoulder    3. Primary osteoarthritis of right shoulder    4. Orthopedic aftercare    5. Chronic right shoulder pain        Subjective   Subjective Comments: Pt reports that he is doing well overall but notes that he does experience some soreness of the right shoulder after therapy sessions and feels as thought the colder weather can also cause an increase in pain in the right shoulder but overall pt reports that he is doing well.  Has patient fallen since last visit? No     Have there been any changes in medications? No  Pain Assessment Scale  Pain Scale: 0-10  Pain Score: 1-Hardly notice pain  Patient's Stated Pain Goal: 0-No pain  Has Pain Adversely Affected Your Usual Activity, Sleep, Mood or Stress in the Last 24 Hours?: No  Pain Location: Shoulder  Pain Orientation: Right       Objective    Treatment:   AROM right shoulder flexion 139 degrees   AROM left shoulder flexion 140 degrees    Therapeutic Exercise 23 min   UBE 2 minutes forward and 2 minutes backward for physiologic warm up   Resisted right shoulder external rotation with green band 3x10   Resisted internal rotation blue band 3x10   Forward flexion #2 2x10   Scaption #2 2x10   Bicep flexion 2x10 #4   Horizontal abduction with red band 2x10     Neuromuscular Reeducation 5 min (not billed)   Stability wand 90 degrees elbow flexion 30 seconds 0 degrees abduction with half supination/pronation   Stability wand 90 degrees elbow flexion 0 degrees abduction  with wrist pronation 30 seconds                                         Time Calculation  Start Time: 1000  Stop Time: 1028  Time Calculation (min): 28 min  Therapeutic Interventions (Time Spent in Minutes)  Neuromuscular Re-Education: 5  Therapeutic Exercise: 23  Other Charges       Charge ID Procedure Code Description Qty. Modifiers Charge Entry User Diagnosis    [id number] HC THERAPEUTIC PX 1/> AREAS EACH 15 MIN EXERCISES 2 GP Bernardo Yee, PT                Assessment/Plan   Assessment    Level of Function and Prognosis  Assessment: Pt notes increased tension/pain in the right anterior shoulder with forward flexion stretching on tamera stretch in which this was discontinued upon report. Pt was able to perform all other exercises without the complaint of pain in the right shoulder but does note fatigue at the end of the session. Pt able to perform all activities with minimal to no verbal cueing needed for the correction of mechanics. Pt is progressing very well in therapy and will continue to benefit from skilled therapy in helping to further improve functional strength, activity tolerance, stability, and decrease pain in helping to safely return to work.        Therapy Goals    Short Term Goals  Start goal date: 3/7/24 End goal date: 4/30/24   ST Goal 1: Pt will be independent and compliant with HOME EXERCISE PROGRAM   in order to maintain gains towards longer term goals.   Goal 1 Status: progressing  Start goal date: 3/7/24 End goal date: 4/30/24   ST Goal 2: Pt will demonstrate full PROM of the right shoulder in order to   safely progress towards AROM of the right shoulder and no more than 2/10   pain in the right shoulder in order to demonstrate progress towards longer   term goals.   Goal 2 Status: progressing  Long Term Goals  Long term goal(s) met by: 6/7/24  LT Goal 1: Pt will demonstrate improved functional performance indicated   by an improved self reported FOTO score of 59 as compared to  initial   measure of 4  Goal Status: progressing  LT Goal 2: Pt will demonstrate AROM of the right shoulder to be within   functional limits, grossly 5/5 of the right upper extremity, and no pain   in the right shoulder with all activities in helping to facilitate safe   return to work with decreased risk of reinjury of the right shoulder.   Goal Status: progressing       Plan    Plan for next treatment comment: Continue to progress functional strength, activity tolerance, stability, and ROM as appropriate.  Treatment duration will be through 6/7/2024

## 2024-04-25 ENCOUNTER — HOSPITAL ENCOUNTER (OUTPATIENT)
Dept: PHYSICAL THERAPY | Facility: HOSPITAL | Age: 64
Setting detail: THERAPIES SERIES
Discharge: STILL ADMITTED OR ADMINISTRATIVE | End: 2024-04-25
Payer: COMMERCIAL

## 2024-04-25 DIAGNOSIS — G89.29 CHRONIC RIGHT SHOULDER PAIN: ICD-10-CM

## 2024-04-25 DIAGNOSIS — M25.511 CHRONIC RIGHT SHOULDER PAIN: ICD-10-CM

## 2024-04-25 DIAGNOSIS — M75.41 IMPINGEMENT SYNDROME OF RIGHT SHOULDER: ICD-10-CM

## 2024-04-25 DIAGNOSIS — Z47.89 ORTHOPEDIC AFTERCARE: Primary | ICD-10-CM

## 2024-04-25 DIAGNOSIS — M75.101 ROTATOR CUFF SYNDROME OF RIGHT SHOULDER: ICD-10-CM

## 2024-04-25 DIAGNOSIS — M19.011 PRIMARY OSTEOARTHRITIS OF RIGHT SHOULDER: ICD-10-CM

## 2024-04-25 PROCEDURE — 97110 THERAPEUTIC EXERCISES: CPT | Mod: GP

## 2024-04-25 NOTE — INTERDISCIPLINARY/THERAPY
[address] [facility] [address]  [phone]      Outpatient Physical Therapy Daily Treatment Note    Date of Service: 24  Patient Name: Hayden Franklin  : 1960  Referring Provider: Mina Kendrick DO  Today's Visit Number: 15  Onset Date: 2024  SOC Date: 3/7/2024  Certification Period: 2024  HICN: [id number]  Medical Diagnosis listed first. Additional are Treatment Diagnoses:  1. Orthopedic aftercare    2. Chronic right shoulder pain    3. Rotator cuff syndrome of right shoulder    4. Impingement syndrome of right shoulder    5. Primary osteoarthritis of right shoulder        Subjective   Subjective Comments: Pt reports that he is doing great other than slight residual soreness in the right shoulder that he believes is related to weather change.  Has patient fallen since last visit? No     Have there been any changes in medications? No  Pain Assessment Scale  Pain Scale: 0-10  Pain Score: 0-No pain  Pain Type: Chronic pain  Pain Location: Shoulder  Pain Orientation: Right       Objective    Treatment:       Therapeutic Exercise 23 min   UBE 2 minutes forward and 2 minutes backward for physiologic warm up   Resisted right shoulder external rotation with green band 3x10   Resisted internal rotation blue band 3x10   Forward flexion #2 2x10   Stability ball rolls with lift off 2x10   Push up plus 2x10         Neuromuscular Reeducation 7 min (not billed)   Stability wand 90 degrees elbow flexion 30 seconds 60 degrees abduction with half supination/pronation   Stability wand 90 degrees elbow flexion 45 degrees abduction                                       Time Calculation  Start Time: 0845  Stop Time: 0915  Time Calculation (min): 30 min  Therapeutic Interventions (Time Spent in Minutes)  Neuromuscular Re-Education: 7  Therapeutic Exercise: 23  Other Charges       Charge ID Procedure Code Description Qty. Modifiers Charge Entry User Diagnosis    [id number]   THERAPEUTIC PX 1/> AREAS EACH 15 MIN EXERCISES 2 GP Bernardo Yee, PT                Assessment/Plan   Assessment    Level of Function and Prognosis  Assessment: Pt demosntrates increased upper trapezius activity with forward elevation of the right shoulder with #2 in which serratus was emphasized in the next two exercises in which fatigue was reported with stability ball rolls and was able to complete push up plus with moderate tactile cueing needed with good carry over observed. Pt denies any pain in the right shoulder after all exercises and reports understanding to home exercises. Pt will continue to benefit from PT in helping to further improve functional strength, activity tolerance, stability, and ROM in helping to return to work safely.        Therapy Goals    Short Term Goals  Start goal date: 3/7/24 End goal date: 4/30/24   ST Goal 1: Pt will be independent and compliant with HOME EXERCISE PROGRAM   in order to maintain gains towards longer term goals.   Goal 1 Status: progressing  Start goal date: 3/7/24 End goal date: 4/30/24   ST Goal 2: Pt will demonstrate full PROM of the right shoulder in order to   safely progress towards AROM of the right shoulder and no more than 2/10   pain in the right shoulder in order to demonstrate progress towards longer   term goals.   Goal 2 Status: progressing  Long Term Goals  Long term goal(s) met by: 6/7/24  LT Goal 1: Pt will demonstrate improved functional performance indicated   by an improved self reported FOTO score of 59 as compared to initial   measure of 4  Goal Status: progressing  LT Goal 2: Pt will demonstrate AROM of the right shoulder to be within   functional limits, grossly 5/5 of the right upper extremity, and no pain   in the right shoulder with all activities in helping to facilitate safe   return to work with decreased risk of reinjury of the right shoulder.   Goal Status: progressing       Plan    Plan for next treatment comment: Continue to  progress functional strength, activity tolerance, ROM, and stability per protocol  Treatment duration will be through 6/7/2024

## 2024-04-26 PROCEDURE — 93295 DEV INTERROG REMOTE 1/2/MLT: CPT | Performed by: INTERNAL MEDICINE

## 2024-04-29 ENCOUNTER — LAB (OUTPATIENT)
Dept: LAB | Facility: URGENT CARE | Age: 64
End: 2024-04-29
Payer: COMMERCIAL

## 2024-04-29 ENCOUNTER — HOSPITAL ENCOUNTER (OUTPATIENT)
Dept: PHYSICAL THERAPY | Facility: HOSPITAL | Age: 64
Setting detail: THERAPIES SERIES
Discharge: STILL ADMITTED OR ADMINISTRATIVE | End: 2024-04-29
Payer: COMMERCIAL

## 2024-04-29 ENCOUNTER — ANTICOAGULATION VISIT (OUTPATIENT)
Dept: CARDIOLOGY | Facility: CLINIC | Age: 64
End: 2024-04-29
Payer: COMMERCIAL

## 2024-04-29 DIAGNOSIS — Z47.89 ORTHOPEDIC AFTERCARE: ICD-10-CM

## 2024-04-29 DIAGNOSIS — Z95.2 S/P AVR: Primary | Chronic | ICD-10-CM

## 2024-04-29 DIAGNOSIS — Z95.2 PRESENCE OF PROSTHETIC HEART VALVE: ICD-10-CM

## 2024-04-29 DIAGNOSIS — M19.011 PRIMARY OSTEOARTHRITIS OF RIGHT SHOULDER: Primary | ICD-10-CM

## 2024-04-29 DIAGNOSIS — Z79.01 ANTICOAGULATION MANAGEMENT ENCOUNTER: ICD-10-CM

## 2024-04-29 DIAGNOSIS — Z51.81 ANTICOAGULATION MANAGEMENT ENCOUNTER: ICD-10-CM

## 2024-04-29 DIAGNOSIS — M75.101 ROTATOR CUFF SYNDROME OF RIGHT SHOULDER: ICD-10-CM

## 2024-04-29 DIAGNOSIS — M25.511 CHRONIC RIGHT SHOULDER PAIN: ICD-10-CM

## 2024-04-29 DIAGNOSIS — G89.29 CHRONIC RIGHT SHOULDER PAIN: ICD-10-CM

## 2024-04-29 DIAGNOSIS — M75.41 IMPINGEMENT SYNDROME OF RIGHT SHOULDER: ICD-10-CM

## 2024-04-29 LAB
INR BLD: 2.3
INR PPP: 2.3
PROTHROMBIN TIME: 26.9 SECONDS (ref 9.4–12.5)

## 2024-04-29 PROCEDURE — 97110 THERAPEUTIC EXERCISES: CPT | Mod: GP

## 2024-04-29 PROCEDURE — 97140 MANUAL THERAPY 1/> REGIONS: CPT | Mod: GP

## 2024-04-29 PROCEDURE — 85610 PROTHROMBIN TIME: CPT | Performed by: FAMILY MEDICINE

## 2024-04-29 PROCEDURE — 36415 COLL VENOUS BLD VENIPUNCTURE: CPT | Performed by: FAMILY MEDICINE

## 2024-04-29 NOTE — INTERDISCIPLINARY/THERAPY
[address] [facility] [id number] [phone]      Outpatient Physical Therapy Daily Treatment Note    Date of Service: 24  Patient Name: Hayden Franklin  : 1960  Referring Provider: Mina Kendrick DO  Today's Visit Number: 16  Onset Date: 2024  SOC Date: 3/7/2024  Certification Period: 2024  HICN: [id number]  Medical Diagnosis listed first. Additional are Treatment Diagnoses:  1. Primary osteoarthritis of right shoulder    2. Impingement syndrome of right shoulder    3. Rotator cuff syndrome of right shoulder    4. Chronic right shoulder pain    5. Orthopedic aftercare          Subjective   Subjective Comments: Pt reports that there was a bit of a set back over the weekend with report significant onset of right shoulder pain/tightness. Pt reports that the shoulder feels better today. Pt unsure if it was the exercises or if he slept wrong on the right shoulder.  Has patient fallen since last visit? No     Have there been any changes in medications? No  Pain Assessment Scale  Pain Scale: 0-10  Pain Score: 3-Sometimes distracts me  Patient's Stated Pain Goal: 0-No pain  How Has Pain Adversely Affected You?: Decreased mobility  Pain Type: Chronic pain  Pain Location: Shoulder  Pain Orientation: Right  Pain Frequency: Constant/continuous  Clinical Progression: Gradually improving       Objective    Treatment:       Therapeutic Exercise 22 min   UBE 2 minutes forward and 2 minutes backward for physiologic warm up   Shoulder flexion AROM 2x10   Shoulder scaption AROM 2x10   Shoulder abduction AROM 2x10   Resisted external rotation red band 2x10   Wall slides in flexion x 10   Wall slides in abduction x 10       Manual therapy 8 min   PROM right shoulder flexion, external rotation, abduction   STM anterior/posterior right shoulder                                           Time Calculation  Start Time: 900  Stop Time: 930  Time Calculation (min): 30 min  Therapeutic  Interventions (Time Spent in Minutes)  Manual Therapy: 8  Therapeutic Exercise: 22  Other Charges       Charge ID Procedure Code Description Qty. Modifiers Charge Entry User Diagnosis    [id number]  THERAPEUTIC PX 1/> AREAS EACH 15 MIN EXERCISES 1 GP Bernardo Yee, PT     [id number]  MANUAL THERAPY TQS 1/> REGIONS EACH 15 MINUTES 1 GP Bernardo Yee, PT                Assessment/Plan   Assessment    Level of Function and Prognosis  Assessment: Exercises were kept fairly light today as compared to previous sessions due to the exacerbation of pain over the weekend. Pt educated to continue to lighten up activities of the right shoulder until next visit just to ensure safety. Pt able to show close to WFL AROM right shoulder flexion, abduction, and scaption without complaint of right shoulder pain. No concerns noted of presentation. Pt reports that the right shoulder had felt better overall after session today. Pt reports understanding to the modifications of taking exercises lighter this week in order to avoid overdoing it. Pt is progressing well in therapy and will continue to benefit from skilled therapy in helping to improve ROM, functional strength, and activity tolerance in helping to return to PRIOR LEVEL OF FUNCTIONING.         Therapy Goals    Short Term Goals  Start goal date: 3/7/24 End goal date: 4/30/24   ST Goal 1: Pt will be independent and compliant with HOME EXERCISE PROGRAM   in order to maintain gains towards longer term goals.   Goal 1 Status: progressing  Start goal date: 3/7/24 End goal date: 4/30/24   ST Goal 2: Pt will demonstrate full PROM of the right shoulder in order to   safely progress towards AROM of the right shoulder and no more than 2/10   pain in the right shoulder in order to demonstrate progress towards longer   term goals.   Goal 2 Status: progressing  Long Term Goals  Long term goal(s) met by: 6/7/24  LT Goal 1: Pt will demonstrate improved functional  performance indicated   by an improved self reported FOTO score of 59 as compared to initial   measure of 4  Goal Status: progressing  LT Goal 2: Pt will demonstrate AROM of the right shoulder to be within   functional limits, grossly 5/5 of the right upper extremity, and no pain   in the right shoulder with all activities in helping to facilitate safe   return to work with decreased risk of reinjury of the right shoulder.   Goal Status: progressing       Plan    Plan for next treatment comment: Continue to progress ROM, activity tolerance, and stability per protocol  Treatment duration will be through 6/7/2024

## 2024-04-29 NOTE — PROGRESS NOTES
2:35 msg for pt to call [facility] re INR. DMD  2:37 pt called in  verified ID no med or diet changes no bleeding or bruising problems enc to call if changes verified warfarin dose and tab size 32.5mg/week mailed recheck reminder. DMD      continue warfarin dose as above and recheck INR in 5 weeks verb under. DMD

## 2024-05-02 ENCOUNTER — HOSPITAL ENCOUNTER (OUTPATIENT)
Dept: PHYSICAL THERAPY | Facility: HOSPITAL | Age: 64
Setting detail: THERAPIES SERIES
Discharge: STILL ADMITTED OR ADMINISTRATIVE | End: 2024-05-02
Payer: COMMERCIAL

## 2024-05-02 DIAGNOSIS — Z47.89 ORTHOPEDIC AFTERCARE: Primary | ICD-10-CM

## 2024-05-02 DIAGNOSIS — M75.41 IMPINGEMENT SYNDROME OF RIGHT SHOULDER: ICD-10-CM

## 2024-05-02 DIAGNOSIS — M75.101 ROTATOR CUFF SYNDROME OF RIGHT SHOULDER: ICD-10-CM

## 2024-05-02 DIAGNOSIS — G89.29 CHRONIC RIGHT SHOULDER PAIN: ICD-10-CM

## 2024-05-02 DIAGNOSIS — M19.011 PRIMARY OSTEOARTHRITIS OF RIGHT SHOULDER: ICD-10-CM

## 2024-05-02 DIAGNOSIS — M25.511 CHRONIC RIGHT SHOULDER PAIN: ICD-10-CM

## 2024-05-02 PROCEDURE — 97110 THERAPEUTIC EXERCISES: CPT | Mod: GP

## 2024-05-02 PROCEDURE — 97112 NEUROMUSCULAR REEDUCATION: CPT | Mod: GP

## 2024-05-02 NOTE — INTERDISCIPLINARY/THERAPY
[address] [facility] [id number] [phone]      Outpatient Physical Therapy Daily Treatment Note    Date of Service: 24  Patient Name: Hayden Franklin  : 1960  Referring Provider: Mina Kendrick DO  Today's Visit Number: 17  Onset Date: 2024  SOC Date: 3/7/2024  Certification Period: 2024  HICN: [id number]  Medical Diagnosis listed first. Additional are Treatment Diagnoses:  1. Orthopedic aftercare    2. Chronic right shoulder pain    3. Rotator cuff syndrome of right shoulder    4. Impingement syndrome of right shoulder    5. Primary osteoarthritis of right shoulder        Subjective   Subjective Comments: Pt reports that he is doing much better since last visit but continues to wonder what caused so much pain at that point. Pt reports that he is thinking that he had slept on the right shoulder wrong that previous evening/night. Pt reports that he is still cautious with home activities with only performing light activities with the right shoulder and notes that when driving he still cant make a full turn with the RUE and has to move the RUE in increments and not make a full turn.  Has patient fallen since last visit? No     Have there been any changes in medications? No  Pain Assessment Scale  Pain Scale: 0-10  Pain Score: 0-No pain  Pain Location: Shoulder  Pain Orientation: Right  Clinical Progression: Gradually improving       Objective    Treatment:       Therapeutic Exercise 31 min   UBE 2 minutes forward and 2 minutes backward for physiologic warm up   Resisted shoulder external rotation with 1st set yellow band x 10; 2x10 red band   Resisted internal rotation with blue band 3x10   Serratus ball rolls 2x10   Serratus ball rolls with lift off 2x10   Right shoulder flexion 3x10 #2   Shoulder scaption #2 2x10   Farmer's carry #10 KB 1 lap 43 meters   Bicep curls #4 x 15     Neuromuscular Reeducation 8 min   Blue Flex bar perturbations to mimic hammer swing 2x30  seconds                                                       Time Calculation  Start Time: 0915  Stop Time: 0954  Time Calculation (min): 39 min  Therapeutic Interventions (Time Spent in Minutes)  Neuromuscular Re-Education: 8  Therapeutic Exercise: 31  Other Charges       Charge ID Procedure Code Description Qty. Modifiers Charge Entry User Diagnosis    [id number] HC THERAPEUTIC PX 1/> AREAS EACH 15 MIN EXERCISES 2 GP Bernardo Yee, PT     [id number] HC THER PX 1/> AREAS EACH 15 MIN NEUROMUSC REEDUCA 1 GP Bernardo Yee, PT                Assessment/Plan   Assessment    Level of Function and Prognosis  Assessment: Pt was able to complete all progressions without the complaint of pain in the right shoulder. Pt does demonstrate increased right upper trapezius hiking with flexion and scaption regardless of weight. Serratus exercises were engaged with this demonstration with good technique observed but lack of carry over with mechanics observed with return back to resisted scaption exercise. Pt was able to carry #10 weight as a farmer's carry with no complaint of right shoulder pain. Pt is progressing well and will continue to benefit from skilled therapy in helping to further improve mechanics, stability, activity tolerance, and functional strength with return to PLOF.        Therapy Goals    Short Term Goals  Start goal date: 3/7/24 End goal date: 4/30/24   ST Goal 1: Pt will be independent and compliant with HOME EXERCISE PROGRAM   in order to maintain gains towards longer term goals.   Goal 1 Status: progressing  Start goal date: 3/7/24 End goal date: 4/30/24   ST Goal 2: Pt will demonstrate full PROM of the right shoulder in order to   safely progress towards AROM of the right shoulder and no more than 2/10   pain in the right shoulder in order to demonstrate progress towards longer   term goals.   Goal 2 Status: progressing  Long Term Goals  Long term goal(s) met by: 6/7/24  LT Goal 1: Pt  will demonstrate improved functional performance indicated   by an improved self reported FOTO score of 59 as compared to initial   measure of 4  Goal Status: progressing  LT Goal 2: Pt will demonstrate AROM of the right shoulder to be within   functional limits, grossly 5/5 of the right upper extremity, and no pain   in the right shoulder with all activities in helping to facilitate safe   return to work with decreased risk of reinjury of the right shoulder.   Goal Status: progressing       Plan    Plan for next treatment comment: Continue to focus on scapular stability/rhythm, functional strength, and activity tolerance per protocol  Treatment duration will be through 6/7/2024

## 2024-05-07 ENCOUNTER — HOSPITAL ENCOUNTER (OUTPATIENT)
Dept: PHYSICAL THERAPY | Facility: HOSPITAL | Age: 64
Setting detail: THERAPIES SERIES
Discharge: STILL ADMITTED OR ADMINISTRATIVE | End: 2024-05-07
Payer: COMMERCIAL

## 2024-05-07 DIAGNOSIS — M75.41 IMPINGEMENT SYNDROME OF RIGHT SHOULDER: ICD-10-CM

## 2024-05-07 DIAGNOSIS — M25.511 CHRONIC RIGHT SHOULDER PAIN: ICD-10-CM

## 2024-05-07 DIAGNOSIS — M75.101 ROTATOR CUFF SYNDROME OF RIGHT SHOULDER: ICD-10-CM

## 2024-05-07 DIAGNOSIS — G89.29 CHRONIC RIGHT SHOULDER PAIN: ICD-10-CM

## 2024-05-07 DIAGNOSIS — M19.011 PRIMARY OSTEOARTHRITIS OF RIGHT SHOULDER: ICD-10-CM

## 2024-05-07 DIAGNOSIS — Z47.89 ORTHOPEDIC AFTERCARE: Primary | ICD-10-CM

## 2024-05-07 PROCEDURE — 97110 THERAPEUTIC EXERCISES: CPT | Mod: GP

## 2024-05-07 PROCEDURE — 97112 NEUROMUSCULAR REEDUCATION: CPT | Mod: GP

## 2024-05-07 NOTE — INTERDISCIPLINARY/THERAPY
[address] [facility] [id number] [phone]      Outpatient Physical Therapy Daily Treatment Note    Date of Service: 24  Patient Name: Hayden Franklin  : 1960  Referring Provider: Mina Kendrick DO  Today's Visit Number: 18  Onset Date: 2024  SOC Date: 3/7/2024  Certification Period: 2024  HICN: [id number]  Medical Diagnosis listed first. Additional are Treatment Diagnoses:  1. Orthopedic aftercare    2. Chronic right shoulder pain    3. Rotator cuff syndrome of right shoulder    4. Impingement syndrome of right shoulder    5. Primary osteoarthritis of right shoulder        Subjective   Subjective Comments: Pt reports that the right shoulder is doing well without any complaint of pain. Pt does note that he does have issues or challenge with scratching the left posterior shoulder with the right upper extremity due to tightness.  Has patient fallen since last visit? No     Have there been any changes in medications? No  Pain Assessment Scale  Pain Scale: 0-10  Pain Score: 0-No pain  Pain Location: Shoulder  Pain Orientation: Right       Objective    Treatment:       Therapeutic Exercise 35 min   UBE 2 minutes forward and 2 minutes backward for physiologic warm up   Right posterior shoulder stretching   Right resisted external rotation with red band 2x10   Horizontal abduction 3x10 with red band   Resisted diagonals with green band 3x10   Resisted tricep extensions with blue band 3x10 RUE   Bent over rows with #10 KB 3x10 RUE       Neuromuscular Reeducation 10 min   Stability ball lifts for lower trapezius activation x 20   Stability ball rolls with depression for serratus 2x10   Ball taps on wall with tactile cueing provided for scapular depression                                                               Time Calculation  Start Time: 1330  Stop Time: 1415  Time Calculation (min): 45 min  Therapeutic Interventions (Time Spent in Minutes)  Neuromuscular  Re-Education: 10  Therapeutic Exercise: 35  Other Charges       Charge ID Procedure Code Description Qty. Modifiers Charge Entry User Diagnosis    [id number] HC THERAPEUTIC PX 1/> AREAS EACH 15 MIN EXERCISES 2 GP Bernardo Yee, PT     [id number] HC THER PX 1/> AREAS EACH 15 MIN NEUROMUSC REEDUCA 1 GP Bernardo Yee, PT                Assessment/Plan   Assessment    Level of Function and Prognosis  Assessment: Pt was able to perform all exercises without the complaint of right shoulder pain. Pt continues to have difficulty with inhibiting the right upper trapezius during overhead activities in which pt requires verbal and tactile cueing in order to decrease its onset with moderate to good carry over observed. Pt was educated on updated comprehensive HEP and reports understanding to the education provided. Pt is progressing well in therapy.        Therapy Goals    Short Term Goals  Start goal date: 3/7/24 End goal date: 4/30/24 ST Goal 1: Pt will be independent and compliant with HOME EXERCISE PROGRAM   in order to maintain gains towards longer term goals.   Goal 1 Status: progressing  Start goal date: 3/7/24 End goal date: 4/30/24   ST Goal 2: Pt will demonstrate full PROM of the right shoulder in order to   safely progress towards AROM of the right shoulder and no more than 2/10   pain in the right shoulder in order to demonstrate progress towards longer   term goals.   Goal 2 Status: progressing  Long Term Goals  Long term goal(s) met by: 6/7/24  LT Goal 1: Pt will demonstrate improved functional performance indicated   by an improved self reported FOTO score of 59 as compared to initial   measure of 4  Goal Status: progressing  LT Goal 2: Pt will demonstrate AROM of the right shoulder to be within   functional limits, grossly 5/5 of the right upper extremity, and no pain   in the right shoulder with all activities in helping to facilitate safe   return to work with decreased risk of  reinjury of the right shoulder.   Goal Status: progressing       Plan    Plan for next treatment comment: Continue to progress right shoulder stability, functional strenght, ROM, and scapular mechanics per protocol  Treatment duration will be through 6/7/2024

## 2024-05-09 ENCOUNTER — HOSPITAL ENCOUNTER (OUTPATIENT)
Dept: PHYSICAL THERAPY | Facility: HOSPITAL | Age: 64
Setting detail: THERAPIES SERIES
Discharge: STILL ADMITTED OR ADMINISTRATIVE | End: 2024-05-09
Payer: COMMERCIAL

## 2024-05-09 DIAGNOSIS — G89.29 CHRONIC RIGHT SHOULDER PAIN: ICD-10-CM

## 2024-05-09 DIAGNOSIS — M19.011 PRIMARY OSTEOARTHRITIS OF RIGHT SHOULDER: ICD-10-CM

## 2024-05-09 DIAGNOSIS — M25.511 CHRONIC RIGHT SHOULDER PAIN: ICD-10-CM

## 2024-05-09 DIAGNOSIS — Z47.89 ORTHOPEDIC AFTERCARE: ICD-10-CM

## 2024-05-09 DIAGNOSIS — M75.41 IMPINGEMENT SYNDROME OF RIGHT SHOULDER: ICD-10-CM

## 2024-05-09 DIAGNOSIS — M75.101 ROTATOR CUFF SYNDROME OF RIGHT SHOULDER: Primary | ICD-10-CM

## 2024-05-09 PROCEDURE — 97530 THERAPEUTIC ACTIVITIES: CPT | Mod: GP

## 2024-05-09 PROCEDURE — 97110 THERAPEUTIC EXERCISES: CPT | Mod: GP

## 2024-05-09 NOTE — INTERDISCIPLINARY/THERAPY
[address] [facility]  [place] [facility] [id number] [phone]      Outpatient Physical Therapy Progress Note     Date of Service: 24  Patient Name: Hayden Franklin  : 1960  Referring Provider: Mina Kendrick DO  Today's Visit Number: 19  Medicare or Medicaid note, cosigner has been added.: N/A  Onset Date: 2024  SOC Date: 3/7/2024  Certification Period: 2024  HICN: [id number]  Medical Diagnosis listed first. Additional are Treatment Diagnoses:  1. Rotator cuff syndrome of right shoulder    2. Impingement syndrome of right shoulder    3. Primary osteoarthritis of right shoulder    4. Chronic right shoulder pain    5. Orthopedic aftercare          Subjective   Subjective Comments: Pt has been seen for a total of 18 visits since the start of therapy s/p right RC repair 24. Pt reports that he is doing very well with no concerns of the right shoulder and feels as though it is progressing well. However, pt notes that he is still cautious with lifting activities and does experience some soreness in the right shoulder when sleeping on the right side at times. Pt reports that he feels as though he is 80-90% better since the start of therapy. Pt would like to continue therapy in efforts to gain further strength, activity tolerance, stability, in helping to return to work safely with decreased risk of re injury.          Pain Assessment Scale  Pain Scale: 0-10  Pain Score: 0-No pain  Pain Location: Shoulder  Pain Orientation: Right  Past Medical History:   Diagnosis Date    Aortic valve stenosis     s/p AV replacement 2013    Arthritis     Bilat hands, ankle    Ascending aorta dilatation (CMS/[facility])     s/p AAA repair 2013    Asthma     CAD (coronary artery disease)     1 vessel bypass    Cancer (CMS/[facility])     CHF (congestive heart failure) (CMS/[facility])     COPD (chronic obstructive pulmonary disease) (CMS/[facility])     Dysrhythmia     nonsustained v-tach    History of transfusion     2022     Hyperlipidemia     Ischemic cardiomyopathy     Lung nodule 03/07/2022    Myocardial infarction (CMS/HCC)     Presence of heart assist device (CMS/HCC)     Shortness of breath     Skin cancer        Current Outpatient Medications:     warfarin (COUMADIN) 5 mg tablet, Take 2.5-5 mg by mouth, Disp: , Rfl:     evolocumab (Repatha SureClick) 140 mg/mL, Inject 1 mL (140 mg total) under the skin every 14 (fourteen) days, Disp: 6 mL, Rfl: 3    spironolactone (ALDACTONE) 25 mg tablet, Take 1 tablet (25 mg total) by mouth daily, Disp: 90 tablet, Rfl: 3    atorvastatin (LIPITOR) 80 mg tablet, Take 1 tablet (80 mg total) by mouth nightly, Disp: 90 tablet, Rfl: 0    metoprolol succinate XL (TOPROL-XL) 50 mg 24 hr tablet, Take 1 tablet (50 mg total) by mouth daily, Disp: 90 tablet, Rfl: 3    budesonide-formoteroL (SYMBICORT) 160-4.5 mcg/actuation inhaler, Inhale 2 puffs 2 (two) times a day Rinse mouth with water after use. Do not swallow., Disp: 30.6 g, Rfl: 3    sacubitriL-valsartan (ENTRESTO) 24-26 mg tablet, Take 1 tablet by mouth 2 (two) times a day Pt to take 0.5tab BID for 2 weeks then increase to 1 tab BID. Pt will discontinue lisinopril for 3 day washout period prior to starting entresto., Disp: 60 tablet, Rfl: 11    nitroglycerin (NITROSTAT) 0.4 mg SL tablet, Place 1 tablet (0.4 mg total) under the tongue every 5 (five) minutes as needed for chest pain Limit 3 tabs per episode., Disp: 25 tablet, Rfl: 1    aspirin 81 mg EC tablet, Take 1 tablet (81 mg total) by mouth daily, Disp: 90 tablet, Rfl: 3  Allergies   Allergen Reactions    Ezetimibe Rash          Objective Findings:  PROM Right shoulder   Flexion: 150 degrees (capsular/painful end feel)  Abd 110 (painful end range; capsular   ER:  80 (pain at end range)   IR: 30 (capsular)      AROM Right shoulder   Flexion 142 degrees   IR: L1 level  ER  T1 level  ABD: 121 degrees   Scaption: 125 degrees          Right shoulder MMT  Flexion 4/5   Abduction: 4/5   Internal  rotation: 4/5   External rotation: 4/5   Objective    Treatment:     Therapeutic Exercise 30 min   UBE 2 minutes forward and 2 minutes backward for physiologic warm up   Flexion #2 x 10   Abduction #2 x 10   Scaption #2 x 10   Flex bar red extension right wrist x 10   Flex bar red flexion right wrist x 10   Flex bar green supination right wrist x 10   Flex bar green pronation right wrist x 10   Horizontal abduction with green band x 20   Resisted diagonals with yellow band x 20   Rows with silver band x 20     Therapeutic Activity 15 min   Subjective and objective measures taken for progress note purposes                                                                      FOTO: Progress  Patient's FOTO functional outcome score is Score: 61/100 where a higher number = greater function.     Time Calculation  Start Time: 0845  Stop Time: 0930  Time Calculation (min): 45 min  Therapeutic Interventions (Time Spent in Minutes)  Therapeutic Activity: 15  Therapeutic Exercise: 30  Other Charges       Charge ID Procedure Code Description Qty. Modifiers Charge Entry User Diagnosis    [id number]  THERAPEUTIC PX 1/> AREAS EACH 15 MIN EXERCISES 2 GP Bernardo Yee, PT     [id number]  THERAPEUT ACTVITY DIRECT PT CONTACT EACH 15 MIN 1 GP Bernardo Yee, PT                Assessment/Plan   Assessment    Level of Function and Prognosis  Assessment: Pt demonstrates improved ROM, functional strength, activity tolerance, decreased pain, and improved outcome measure since the start of therapy. Due to the nature of progression post op, pt has not been able to lift heavier items due to risk of compromise of surgery and requires more physical therapy in helping to improve further strength, ROM, and stability in making return to work as safe as possible with decreased risk of reinjury.        Therapy Goals    Short Term Goals  Start goal date: 3/7/24 End goal date: 4/30/24   ST Goal 1: Pt will be independent and  compliant with HOME EXERCISE PROGRAM   in order to maintain gains towards longer term goals.   Goal 1 Status: progressing  Start goal date: 3/7/24 End goal date: 4/30/24   ST Goal 2: Pt will demonstrate full PROM of the right shoulder in order to   safely progress towards AROM of the right shoulder and no more than 2/10   pain in the right shoulder in order to demonstrate progress towards longer   term goals.   Goal 2 Status: met  Long Term Goals  Long term goal(s) met by: 6/7/24  LT Goal 1: Pt will demonstrate improved functional performance indicated   by an improved self reported FOTO score of 59 as compared to initial   measure of 4  Goal Status: met  LT Goal 2: Pt will demonstrate AROM of the right shoulder to be within   functional limits, grossly 5/5 of the right upper extremity, and no pain   in the right shoulder with all activities in helping to facilitate safe   return to work with decreased risk of reinjury of the right shoulder.   Goal Status: progressing       Plan  Plan  Treatment Interventions: Manual therapy, Neuromuscular re-education, Therapeutic activities, Therapeutic exercises, Work Hardening/Conditioning  PT Frequency: up to 2x/wk  Plan for next treatment comment: Continue to focus on right shoulder stability, strength, postural strength, and activity tolerance per protocol  Treatment duration will be through 6/7/2024       Thank you for allowing us to share in the care of this patient. If you have any questions, recommendations, or further concerns regarding this patient, please feel free to contact us at  [address] [facility] [address] [phone]      * I have reviewed the plan of care and certify a continuing need for medically necessary services    Co-signed by:_________________________ Date and Time:________________

## 2024-05-14 ENCOUNTER — HOSPITAL ENCOUNTER (OUTPATIENT)
Dept: PHYSICAL THERAPY | Facility: HOSPITAL | Age: 64
Setting detail: THERAPIES SERIES
Discharge: STILL ADMITTED OR ADMINISTRATIVE | End: 2024-05-14
Payer: COMMERCIAL

## 2024-05-14 DIAGNOSIS — M19.011 PRIMARY OSTEOARTHRITIS OF RIGHT SHOULDER: ICD-10-CM

## 2024-05-14 DIAGNOSIS — G89.29 CHRONIC RIGHT SHOULDER PAIN: ICD-10-CM

## 2024-05-14 DIAGNOSIS — M75.101 ROTATOR CUFF SYNDROME OF RIGHT SHOULDER: ICD-10-CM

## 2024-05-14 DIAGNOSIS — M75.41 IMPINGEMENT SYNDROME OF RIGHT SHOULDER: ICD-10-CM

## 2024-05-14 DIAGNOSIS — M25.511 CHRONIC RIGHT SHOULDER PAIN: ICD-10-CM

## 2024-05-14 DIAGNOSIS — Z47.89 ORTHOPEDIC AFTERCARE: Primary | ICD-10-CM

## 2024-05-14 PROCEDURE — 97110 THERAPEUTIC EXERCISES: CPT | Mod: GP

## 2024-05-14 NOTE — INTERDISCIPLINARY/THERAPY
"  [address] [facility] [id number] [phone]      Outpatient Physical Therapy Daily Treatment Note    Date of Service: 24  Patient Name: Hayden Franklin  : 1960  Referring Provider: Mina Kendrick DO  Today's Visit Number: 20  Onset Date: 2024  SOC Date: 3/7/2024  Certification Period: 2024  HICN: [id number]  Medical Diagnosis listed first. Additional are Treatment Diagnoses:  1. Orthopedic aftercare    2. Chronic right shoulder pain    3. Rotator cuff syndrome of right shoulder    4. Impingement syndrome of right shoulder    5. Primary osteoarthritis of right shoulder        Subjective   Subjective Comments: Pt reports that he might have \"overdone it\" the last couple of days with the right shoulder reporting some residual soreness as an outcome. No significant reports of pain noted.  Has patient fallen since last visit? No     Have there been any changes in medications? No  Pain Assessment Scale  Pain Score: 2-Notice pain, does not interfere with activities  Pain Type: Chronic pain  Pain Location: Shoulder  Pain Orientation: Right  Pain Frequency: Intermittent  Clinical Progression: Gradually improving       Objective    Treatment:     Therapeutic Exercise 26 min   UBE 2 minutes forward and 2 minutes backward for physiologic warm up @ 3.0 resistance   Rows with yellow band with external rotation 2x10   Flex bar green extension right wrist x 10   Flex bar green flexion right wrist x 10   Flex bar green supination right wrist x 10  Flex bar green pronation right wrist x 10  Flexion #2 x 10   Abduction #2 x 10   Scaption #2 x 10   Bicep curls #5 2x10   's carry #4 4 laps of 20 meters each     Neuromuscular Reeducation 8 min (not billed)   Perturbation with green flex bar 1x30 seconds   Stability wand 90 degrees right elbow flexion and 0 degrees abduction  Stability wand 90/90 30 seconds   Stability wand 45 degrees 30 seconds               "                                                                             Time Calculation  Start Time: 0847  Stop Time: 0921  Time Calculation (min): 34 min  Therapeutic Interventions (Time Spent in Minutes)  Neuromuscular Re-Education: 8  Therapeutic Exercise: 26  Other Charges       Charge ID Procedure Code Description Qty. Modifiers Charge Entry User Diagnosis    [id number] HC THERAPEUTIC PX 1/> AREAS EACH 15 MIN EXERCISES 2 GP Bernardo Yee, PT                Assessment/Plan   Assessment    Level of Function and Prognosis  Assessment: Pt was able to perform all exercises without the complaint of right shoulder pain. Pt reports fatigue in the right shoulder after sessions. More of an endurance focus today rather than strengthening due to the report of soreness. Pt educated on taking 2 rest days per week in order to avoid over exertion and will start with progressed loading next week as appropriate. Pt reports understanding to education provided and feels as though the right shoulder is feeling better after session than the start of therapy today. Pt is progressing well in therapy.        Therapy Goals    Short Term Goals  Start goal date: 3/7/24 End goal date: 4/30/24   ST Goal 1: Pt will be independent and compliant with HOME EXERCISE PROGRAM   in order to maintain gains towards longer term goals.   Goal 1 Status: progressing  Start goal date: 3/7/24 End goal date: 4/30/24   ST Goal 2: Pt will demonstrate full PROM of the right shoulder in order to   safely progress towards AROM of the right shoulder and no more than 2/10   pain in the right shoulder in order to demonstrate progress towards longer   term goals.   Goal 2 Status: met  Long Term Goals  Long term goal(s) met by: 6/7/24  LT Goal 1: Pt will demonstrate improved functional performance indicated   by an improved self reported FOTO score of 59 as compared to initial   measure of 4  Goal Status: met  LT Goal 2: Pt will demonstrate AROM of  the right shoulder to be within   functional limits, grossly 5/5 of the right upper extremity, and no pain   in the right shoulder with all activities in helping to facilitate safe   return to work with decreased risk of reinjury of the right shoulder.   Goal Status: progressing       Plan    Plan for next treatment comment: Continue to load right shoulder in the frontal/saggital plane per protocol, continue to focus on stability and endurance with overhead activities  Treatment duration will be through 6/7/2024

## 2024-05-16 ENCOUNTER — HOSPITAL ENCOUNTER (OUTPATIENT)
Dept: PHYSICAL THERAPY | Facility: HOSPITAL | Age: 64
Setting detail: THERAPIES SERIES
Discharge: STILL ADMITTED OR ADMINISTRATIVE | End: 2024-05-16
Payer: COMMERCIAL

## 2024-05-16 DIAGNOSIS — Z47.89 ORTHOPEDIC AFTERCARE: ICD-10-CM

## 2024-05-16 DIAGNOSIS — M19.011 PRIMARY OSTEOARTHRITIS OF RIGHT SHOULDER: ICD-10-CM

## 2024-05-16 DIAGNOSIS — M75.41 IMPINGEMENT SYNDROME OF RIGHT SHOULDER: ICD-10-CM

## 2024-05-16 DIAGNOSIS — M25.511 CHRONIC RIGHT SHOULDER PAIN: ICD-10-CM

## 2024-05-16 DIAGNOSIS — M75.101 ROTATOR CUFF SYNDROME OF RIGHT SHOULDER: Primary | ICD-10-CM

## 2024-05-16 DIAGNOSIS — G89.29 CHRONIC RIGHT SHOULDER PAIN: ICD-10-CM

## 2024-05-16 PROCEDURE — 97110 THERAPEUTIC EXERCISES: CPT | Mod: GP

## 2024-05-16 PROCEDURE — 97112 NEUROMUSCULAR REEDUCATION: CPT | Mod: GP

## 2024-05-16 NOTE — INTERDISCIPLINARY/THERAPY
[address] [facility] [address]  [phone]      Outpatient Physical Therapy Daily Treatment Note    Date of Service: 24  Patient Name: Hayden Franklin  : 1960  Referring Provider: Mina Kendrick DO  Today's Visit Number: 21  Onset Date: 2024  SOC Date: 3/7/2024  Certification Period: 2024  HICN: [id number]  Medical Diagnosis listed first. Additional are Treatment Diagnoses:  1. Rotator cuff syndrome of right shoulder    2. Impingement syndrome of right shoulder    3. Chronic right shoulder pain    4. Primary osteoarthritis of right shoulder    5. Orthopedic aftercare          Subjective   Subjective Comments: Pt reports that the right shoulder is doing well with no new concerns. Pt reports that he has taken a slight break with exercises in the right shoulder but does feel better after this.  Has patient fallen since last visit? No     Have there been any changes in medications? No  Pain Assessment Scale  Pain Scale: 0-10  Pain Score: 0-No pain       Objective    Treatment:     Therapeutic Exercise 25 min   UBE 2 minutes forward and 2 minutes backward for physiologic warm up @ 3.0 resistance   Tricep extensions blue band 2x10   Bilateral external rotation with elevation with green band 2x10   's carry #4 4 laps of 20 meters each for promotion of activity tolerance/stability overhead   Alternating shoulder taps on wall 2x10     Neuromuscular Reeducation 15 min   Stability wand   0 degrees abduction x 30 seconds   45 degrees abduction x 30 seconds   90 degrees abduction x 30 seconds    Ball taps on wall with 2 Kg ball RUE                                                                                                        Time Calculation  Start Time: 0900  Stop Time: 0940  Time Calculation (min): 40 min  Therapeutic Interventions (Time Spent in Minutes)  Neuromuscular Re-Education: 15  Therapeutic Exercise: 25  Other Charges       Charge ID Procedure Code  Description Qty. Modifiers Charge Entry User Diagnosis    [id number]  THERAPEUTIC PX 1/> AREAS EACH 15 MIN EXERCISES 2 GP Bernardo Yee, PT     [id number]  THER PX 1/> AREAS EACH 15 MIN NEUROMUSC REEDUCA 1 GP Bernardo Yee, PT                Assessment/Plan   Assessment    Level of Function and Prognosis  Assessment: Pt was able to demonstrate good stability/activity tolerance with overhead activities with no pain reported in the right shoulder. Pt does note some fatigue at the end of the session in the right shoulder. Pt does require some tactile and verbal cueing on optimal mechanics but good carry over is observed. Pt will continue to benefit from PT in helping to reinforce functional strength/stability in helping to safely return back to work setting.         Therapy Goals    Short Term Goals  Start goal date: 3/7/24 End goal date: 4/30/24 ST Goal 1: Pt will be independent and compliant with HOME EXERCISE PROGRAM   in order to maintain gains towards longer term goals.   Goal 1 Status: progressing  Start goal date: 3/7/24 End goal date: 4/30/24   ST Goal 2: Pt will demonstrate full PROM of the right shoulder in order to   safely progress towards AROM of the right shoulder and no more than 2/10   pain in the right shoulder in order to demonstrate progress towards longer   term goals.   Goal 2 Status: met  Long Term Goals  Long term goal(s) met by: 6/7/24  LT Goal 1: Pt will demonstrate improved functional performance indicated   by an improved self reported FOTO score of 59 as compared to initial   measure of 4  Goal Status: met  LT Goal 2: Pt will demonstrate AROM of the right shoulder to be within   functional limits, grossly 5/5 of the right upper extremity, and no pain   in the right shoulder with all activities in helping to facilitate safe   return to work with decreased risk of reinjury of the right shoulder.   Goal Status: progressing       Plan    Plan for next treatment  comment: Continue to focus on functional strength, activity tolerance, and overhead stability per protocol  Treatment duration will be through 6/7/2024

## 2024-05-21 ENCOUNTER — PATIENT MESSAGE (OUTPATIENT)
Dept: FAMILY MEDICINE | Facility: CLINIC | Age: 64
End: 2024-05-21

## 2024-05-21 ENCOUNTER — LAB (OUTPATIENT)
Dept: LAB | Facility: URGENT CARE | Age: 64
End: 2024-05-21
Payer: COMMERCIAL

## 2024-05-21 DIAGNOSIS — G47.8 NON-RESTORATIVE SLEEP: Primary | ICD-10-CM

## 2024-05-21 DIAGNOSIS — D58.2 ELEVATED HEMOGLOBIN (CMS/HCC): ICD-10-CM

## 2024-05-21 DIAGNOSIS — D75.1 POLYCYTHEMIA: ICD-10-CM

## 2024-05-21 DIAGNOSIS — I50.42 CHRONIC COMBINED SYSTOLIC AND DIASTOLIC HEART FAILURE (CMS/HCC): Chronic | ICD-10-CM

## 2024-05-21 LAB
BASOPHILS # BLD AUTO: 0.02 10*3/UL
BASOPHILS NFR BLD AUTO: 0.3 % (ref 0–2)
EOSINOPHIL # BLD AUTO: 0.14 10*3/UL
EOSINOPHIL NFR BLD AUTO: 1.4 % (ref 0–3)
ERYTHROCYTE [DISTWIDTH] IN BLOOD BY AUTOMATED COUNT: 12.9 % (ref 11.5–15)
HCT VFR BLD AUTO: 52.9 % (ref 38–50)
HGB BLD-MCNC: 17.3 G/DL (ref 13.2–17.2)
LYMPHOCYTES # BLD AUTO: 1.34 10*3/UL
LYMPHOCYTES NFR BLD AUTO: 14.3 % (ref 15–47)
MCH RBC QN AUTO: 30.7 PG (ref 29–34)
MCHC RBC AUTO-ENTMCNC: 32.6 G/DL (ref 32–36)
MCV RBC AUTO: 94.1 FL (ref 82–97)
MONOCYTES # BLD AUTO: 0.73 10*3/UL
MONOCYTES NFR BLD AUTO: 7.7 % (ref 5–13)
NEUTROPHILS # BLD AUTO: 7.19 10*3/UL
NEUTROPHILS NFR BLD AUTO: 76.4 % (ref 46–70)
PLATELET # BLD AUTO: 178 10*3/UL (ref 130–350)
PMV BLD AUTO: 8.4 FL (ref 6.9–10.8)
RBC # BLD AUTO: 5.62 10*6/ΜL (ref 4.1–5.8)
WBC # BLD AUTO: 9.4 10*3/UL (ref 3.7–9.6)

## 2024-05-21 PROCEDURE — 36415 COLL VENOUS BLD VENIPUNCTURE: CPT | Performed by: FAMILY MEDICINE

## 2024-05-21 PROCEDURE — 85025 COMPLETE CBC W/AUTO DIFF WBC: CPT | Performed by: FAMILY MEDICINE

## 2024-05-22 ENCOUNTER — OFFICE VISIT (OUTPATIENT)
Dept: ORTHOPEDIC SURGERY | Facility: CLINIC | Age: 64
End: 2024-05-22
Payer: COMMERCIAL

## 2024-05-22 VITALS
TEMPERATURE: 98.4 F | BODY MASS INDEX: 28.99 KG/M2 | OXYGEN SATURATION: 95 % | WEIGHT: 214 LBS | DIASTOLIC BLOOD PRESSURE: 72 MMHG | HEIGHT: 72 IN | SYSTOLIC BLOOD PRESSURE: 122 MMHG | HEART RATE: 79 BPM | RESPIRATION RATE: 20 BRPM

## 2024-05-22 DIAGNOSIS — M75.101 TEAR OF RIGHT ROTATOR CUFF, UNSPECIFIED TEAR EXTENT, UNSPECIFIED WHETHER TRAUMATIC: ICD-10-CM

## 2024-05-22 DIAGNOSIS — M19.019 OSTEOARTHRITIS OF AC (ACROMIOCLAVICULAR) JOINT: ICD-10-CM

## 2024-05-22 DIAGNOSIS — M75.41 IMPINGEMENT SYNDROME OF RIGHT SHOULDER: Primary | ICD-10-CM

## 2024-05-22 DIAGNOSIS — S46.111S RUPTURE LONG HEAD BICEPS TENDON, RIGHT, SEQUELA: ICD-10-CM

## 2024-05-22 PROCEDURE — 99213 OFFICE O/P EST LOW 20 MIN: CPT | Performed by: ORTHOPAEDIC SURGERY

## 2024-05-22 ASSESSMENT — ENCOUNTER SYMPTOMS
EYE PAIN: 0
VOMITING: 0
SHORTNESS OF BREATH: 0
CHILLS: 0
PALPITATIONS: 0
WEAKNESS: 1
SORE THROAT: 0
COUGH: 0
ARTHRALGIAS: 0
COLOR CHANGE: 0
HEMATURIA: 0
BACK PAIN: 0
ABDOMINAL PAIN: 0
FEVER: 0
SEIZURES: 0
DYSURIA: 0

## 2024-05-22 ASSESSMENT — PAIN DESCRIPTION - DESCRIPTORS: DESCRIPTORS: SORE

## 2024-05-22 ASSESSMENT — PAIN - FUNCTIONAL ASSESSMENT: PAIN_FUNCTIONAL_ASSESSMENT: 0-10

## 2024-05-22 ASSESSMENT — VISUAL ACUITY: OU: 1

## 2024-05-22 NOTE — PROGRESS NOTES
Patient Follow-up Office Note  Chief Complaint:  Chief Complaint   Patient presents with    Right Shoulder - Follow-up     6 wk PO - right shoulder arthroscopic rotator cuff repair, subacromial decompression, distal clavicle excision, possible superior capsular reconstruction (DOS 2/1/24)        HPI/ Subjective:  Patient states he was doing excellent until this last weekend.  He is now having some pretty sharp shooting pain in the shoulder.  On Sunday he was unable to fully move his arm out to the side but the last 2 days it has gotten better.  He did do some mowing over the weekend with a self-propelled machine as well as a low lifting weedeater.  He does not think this caused the issue.      Review of Systems   Constitutional:  Negative for chills and fever.   HENT:  Negative for ear pain and sore throat.    Eyes:  Negative for pain and visual disturbance.   Respiratory:  Negative for cough and shortness of breath.    Cardiovascular:  Negative for chest pain and palpitations.   Gastrointestinal:  Negative for abdominal pain and vomiting.   Genitourinary:  Negative for dysuria and hematuria.   Musculoskeletal:  Negative for arthralgias and back pain.   Skin:  Negative for color change and rash.   Neurological:  Positive for weakness. Negative for seizures and syncope.   All other systems reviewed and are negative.      Allergies   Allergen Reactions    Ezetimibe Rash     Past Medical History:   Diagnosis Date    Aortic valve stenosis     s/p AV replacement 11/2013    Arthritis     Bilat hands, ankle    Ascending aorta dilatation (CMS/[facility])     s/p AAA repair 11/2013    Asthma     CAD (coronary artery disease)     1 vessel bypass    Cancer (CMS/[facility])     CHF (congestive heart failure) (CMS/[facility])     COPD (chronic obstructive pulmonary disease) (CMS/[facility])     Dysrhythmia     nonsustained v-tach    History of transfusion     2013. 2022    Hyperlipidemia     Ischemic cardiomyopathy     Lung nodule 03/07/2022    Myocardial  infarction (CMS/HCC)     Presence of heart assist device (CMS/HCC)     Shortness of breath     Skin cancer      Social History     Occupational History    Occupation:  at [employer]   Tobacco Use    Smoking status: Former     Current packs/day: 0.00     Average packs/day: 0.8 packs/day for 40.0 years (30.0 ttl pk-yrs)     Types: Cigarettes     Start date: 1982     Quit date: 2022     Years since quittin.2    Smokeless tobacco: Never    Tobacco comments:     Last smoked 3/1/22   Vaping Use    Vaping status: Never Used   Substance and Sexual Activity    Alcohol use: Yes     Alcohol/week: 4.0 - 5.0 standard drinks of alcohol     Types: 4 - 5 Cans of beer per week    Drug use: No    Sexual activity: Defer       Family History   Problem Relation Age of Onset    Cancer Mother     Heart disease Father     Aneurysm Father     Other Sister         multisystem atrophy- on hospice    Breast cancer Sister     No Known Problems Sister     Gallbladder disease Daughter     No Known Problems Daughter      Past Surgical History:   Procedure Laterality Date    AAA REPAIR - ENDOGRAFT  2013    ANKLE SURGERY Right     No hardware    AORTIC ROOT ANGIOGRAM N/A 2022    Procedure: Aortic Arch  Angiogram;  Surgeon: Derick Gallegos MD;  Location: [facility] Cath Lab;  Service: Cardiovascular;  Laterality: N/A;    AORTIC VALVE REPLACEMENT  2013    BIVENTRICULAR ICD UPGRADE N/A 2023    Procedure: Bi-V ICD upgrade;  Surgeon: Mitchel Saldaña DO;  Location: [facility] EP Lab;  Service: Cardiovascular;  Laterality: N/A;    CARDIAC VALVE REPLACEMENT      COLONOSCOPY N/A 10/05/2023    Procedure: COLONOSCOPY with polypectomies, endo clip placement x2;  Surgeon: Patricia Hutton MD;  Location: [facility] Endoscopy;  Service: Endoscopy;  Laterality: N/A;    CORONARY ANGIOPLASTY  2013    CORONARY ARTERY BYPASS GRAFT  2013    1V CABG SVG- RCA    FINGER SURGERY Left     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     LEFT HEART CATH N/A 05/20/2022    Procedure: Left heart cath;  Surgeon: Derick Gallegos MD;  Location: [facility] Cath Lab;  Service: Cardiovascular;  Laterality: N/A;    SHOULDER ARTHROSCOPY Right 2/1/2024    Procedure: RIGHT SHOULDER ARTHROSCOPIC ROTATOR CUFF REPAIR, SUBACROMIAL DECOMPRESSION, POSSIBLE SUPERIOR CAPSULAR RECONSTRUCTION, EXTENSIVE DEBRIDEMENT;  Surgeon: Mina Kendrick DO;  Location: [facility] OR;  Service: Orthopedics;  Laterality: Right;  CPT 54683, 20885, 53417, 69787; OUTPATIENT    TONSILLECTOMY Bilateral 1969     Current Outpatient Medications   Medication Sig Dispense Refill    warfarin (COUMADIN) 5 mg tablet Take 2.5-5 mg by mouth      evolocumab (Repatha SureClick) 140 mg/mL Inject 1 mL (140 mg total) under the skin every 14 (fourteen) days 6 mL 3    spironolactone (ALDACTONE) 25 mg tablet Take 1 tablet (25 mg total) by mouth daily 90 tablet 3    atorvastatin (LIPITOR) 80 mg tablet Take 1 tablet (80 mg total) by mouth nightly 90 tablet 0    metoprolol succinate XL (TOPROL-XL) 50 mg 24 hr tablet Take 1 tablet (50 mg total) by mouth daily 90 tablet 3    budesonide-formoteroL (SYMBICORT) 160-4.5 mcg/actuation inhaler Inhale 2 puffs 2 (two) times a day Rinse mouth with water after use. Do not swallow. 30.6 g 3    sacubitriL-valsartan (ENTRESTO) 24-26 mg tablet Take 1 tablet by mouth 2 (two) times a day Pt to take 0.5tab BID for 2 weeks then increase to 1 tab BID. Pt will discontinue lisinopril for 3 day washout period prior to starting entresto. 60 tablet 11    nitroglycerin (NITROSTAT) 0.4 mg SL tablet Place 1 tablet (0.4 mg total) under the tongue every 5 (five) minutes as needed for chest pain Limit 3 tabs per episode. 25 tablet 1    aspirin 81 mg EC tablet Take 1 tablet (81 mg total) by mouth daily 90 tablet 3     No current facility-administered medications for this visit.       Vitals:    05/22/24 0948   BP: 122/72   Pulse: 79   Resp: 20   Temp: 36.9 °C (98.4 °F)   SpO2: 95%     Physical  Exam  Vitals reviewed.   Constitutional:       General: He is not in acute distress.     Appearance: Normal appearance. He is well-developed and well-groomed.   HENT:      Head: Normocephalic and atraumatic.      Right Ear: External ear normal.      Left Ear: External ear normal.      Nose: Nose normal. No congestion.      Mouth/Throat:      Mouth: Mucous membranes are moist.      Pharynx: Oropharynx is clear.   Eyes:      General: Lids are normal. Vision grossly intact.      Conjunctiva/sclera: Conjunctivae normal.   Cardiovascular:      Pulses: Normal pulses.   Pulmonary:      Effort: Pulmonary effort is normal. No respiratory distress.      Breath sounds: No wheezing.   Abdominal:      General: Abdomen is flat. There is no distension.      Palpations: Abdomen is soft.      Tenderness: There is no abdominal tenderness.   Musculoskeletal:         General: Tenderness present.      Cervical back: Normal range of motion and neck supple.   Skin:     General: Skin is warm and dry.      Capillary Refill: Capillary refill takes less than 2 seconds.      Findings: No rash.   Neurological:      General: No focal deficit present.      Mental Status: He is alert and oriented to person, place, and time.   Psychiatric:         Mood and Affect: Mood normal.         Behavior: Behavior normal.         Thought Content: Thought content normal.       Ortho Exam  Active forward flexion to 150 abduction to 130 with mild pain through the arc of motion.  Gross motor strength to the supraspinatus, infraspinatus 4-5 with minimal pain.  Belly press was 4-5 bearhug was 5 out of 5.    No results found.         Imaging:  No image results found.       Diagnosis:  1. Impingement syndrome of right shoulder    2. Tear of right rotator cuff, unspecified tear extent, unspecified whether traumatic    3. Osteoarthritis of AC (acromioclavicular) joint    4. Rupture long head biceps tendon, right, sequela           Assessment/ Plan:  Hayden Franklin  is a 63 y.o. male 4 months from right shoulder arthroscopic rotator repair massive SAD, he is having some increasing pain over the last 2 days it has gotten better.  His motion and firing of the rotator cuff complex appears to be appropriate and maintain strength.  I think he is just experience some discomfort from the weather change as well as doing a lot more activities this week in the normal.  I have asked him to slow down some of the activities including reducing a little bit of the weight and frequency of his exercises and focus little bit more on soft tissue and well with stretching as he is a little bit still restricted.  Will give this 2 to 3 weeks and hopefully will get back to baseline we will have him  where we left.  I did offer anti-inflammatory but he did not wish any.              A voice recognition program was used to aid in documentation of this record. Sometimes words are not printed exactly as they were spoken.  While efforts were made to carefully edit and correct any inaccuracies, some errors may be present; please take these into context.  Please contact the provider's office if you have any questions or concerns.

## 2024-05-23 ENCOUNTER — HOSPITAL ENCOUNTER (OUTPATIENT)
Dept: PHYSICAL THERAPY | Facility: HOSPITAL | Age: 64
Setting detail: THERAPIES SERIES
Discharge: STILL ADMITTED OR ADMINISTRATIVE | End: 2024-05-23
Payer: COMMERCIAL

## 2024-05-23 DIAGNOSIS — M25.511 CHRONIC RIGHT SHOULDER PAIN: ICD-10-CM

## 2024-05-23 DIAGNOSIS — G89.29 CHRONIC RIGHT SHOULDER PAIN: ICD-10-CM

## 2024-05-23 DIAGNOSIS — M19.011 PRIMARY OSTEOARTHRITIS OF RIGHT SHOULDER: ICD-10-CM

## 2024-05-23 DIAGNOSIS — Z47.89 ORTHOPEDIC AFTERCARE: Primary | ICD-10-CM

## 2024-05-23 DIAGNOSIS — M75.101 ROTATOR CUFF SYNDROME OF RIGHT SHOULDER: ICD-10-CM

## 2024-05-23 DIAGNOSIS — M75.41 IMPINGEMENT SYNDROME OF RIGHT SHOULDER: ICD-10-CM

## 2024-05-23 PROCEDURE — 97110 THERAPEUTIC EXERCISES: CPT | Mod: GP

## 2024-05-23 NOTE — INTERDISCIPLINARY/THERAPY
[address] [facility] [address]  [phone]      Outpatient Physical Therapy Daily Treatment Note    Date of Service: 24  Patient Name: Hayden Franklin  : 1960  Referring Provider: Mina Kendrick DO  Today's Visit Number: 22  Onset Date: 2024  SOC Date: 3/7/2024  Certification Period: 2024  HICN: [id number]  Medical Diagnosis listed first. Additional are Treatment Diagnoses:  1. Orthopedic aftercare    2. Chronic right shoulder pain    3. Rotator cuff syndrome of right shoulder    4. Impingement syndrome of right shoulder    5. Primary osteoarthritis of right shoulder        Subjective   Subjective Comments: Pt reports that he had an exacerbation of pain in the right shoulder over the weekend in which he reports that it could be due to changes in the weather or overdoing it with exercises. Pt goes on to report that the right shoulder is feeling much better today.  Has patient fallen since last visit? No     Have there been any changes in medications? No  Pain Assessment Scale  Pain Score: 0-No pain  Pain Location: Shoulder  Pain Orientation: Right       Objective    Treatment:     Therapeutic Exercise 29 min   UBE 2 minutes forward and 2 minutes backward for physiologic warm up @ 3.0 resistance   Bilateral shoulder external rotation with red band 3x10   Horizontal abduction 3x10 with green band   Standing shoulder flexion, abduction, scaption #2 2x10 each   Tricep extensions blue band 3x10   Bilateral external rotation with elevation with green band 2x10   Bicep curls #5 3x10   Ball  taps on wall RUE x 30                                                                                                                Time Calculation  Start Time: 0830  Stop Time: 0859  Time Calculation (min): 29 min  Therapeutic Interventions (Time Spent in Minutes)  Therapeutic Exercise: 29  Other Charges       Charge ID Procedure Code Description Qty. Modifiers Charge Entry User  "Diagnosis    [id number]  THERAPEUTIC PX 1/> AREAS EACH 15 MIN EXERCISES 2 GP Bernardo Yee, PT                Assessment/Plan   Assessment    Level of Function and Prognosis  Assessment: Pt did report slight \"clicking/popping\" that was inconsistent in the right anterior shoulder with tricep extensions resisted but with good technique observed and no pain reported as an outcome. Pt reports significant fatigue at the end of session finishing off with overhead ball taps on wall without report of pain. Pt is progressing very well in therapy with little to no verbal cueing needed indicating good adhearance to home exercise program to maintain the progress. Pt is progressing very well in therapy.        Therapy Goals    Short Term Goals  Start goal date: 3/7/24 End goal date: 4/30/24 ST Goal 1: Pt will be independent and compliant with HOME EXERCISE PROGRAM   in order to maintain gains towards longer term goals.   Goal 1 Status: progressing  Start goal date: 3/7/24 End goal date: 4/30/24   ST Goal 2: Pt will demonstrate full PROM of the right shoulder in order to   safely progress towards AROM of the right shoulder and no more than 2/10   pain in the right shoulder in order to demonstrate progress towards longer   term goals.   Goal 2 Status: met  Long Term Goals  Long term goal(s) met by: 6/7/24  LT Goal 1: Pt will demonstrate improved functional performance indicated   by an improved self reported FOTO score of 59 as compared to initial   measure of 4  Goal Status: met  LT Goal 2: Pt will demonstrate AROM of the right shoulder to be within   functional limits, grossly 5/5 of the right upper extremity, and no pain   in the right shoulder with all activities in helping to facilitate safe   return to work with decreased risk of reinjury of the right shoulder.   Goal Status: progressing       Plan    Plan for next treatment comment: Continue to progress overhead strength, right shoulder stability, and " activity tolerance per protocol  Treatment duration will be through 6/7/2024

## 2024-05-28 ENCOUNTER — HOSPITAL ENCOUNTER (OUTPATIENT)
Dept: PHYSICAL THERAPY | Facility: HOSPITAL | Age: 64
Setting detail: THERAPIES SERIES
Discharge: STILL ADMITTED OR ADMINISTRATIVE | End: 2024-05-28
Payer: COMMERCIAL

## 2024-05-28 PROCEDURE — 97110 THERAPEUTIC EXERCISES: CPT | Mod: GP | Performed by: PHYSICAL THERAPIST

## 2024-05-28 NOTE — INTERDISCIPLINARY/THERAPY
[address] [facility] [address]  [phone]      Outpatient Physical Therapy Daily Treatment Note    Date of Service: 24  Patient Name: Hayden Franklin  : 1960  Referring Provider: Mina Kendrick DO  Today's Visit Number: 23  Onset Date: No data found  SOC Date: No data found  Certification Period: No data was found  HICN: [id number]  Medical Diagnosis listed first. Additional are Treatment Diagnoses:  There are no diagnoses linked to this encounter.    Subjective   Subjective Comments: Gopal is feeling good today and says that he feels better when the weather is warmer.  Has patient fallen since last visit? No  Fall Interventions: Not at risk of fall  Have there been any changes in medications? No  Pain Assessment Scale  Pain Scale: 0-10  Pain Score: 0-No pain  Pain Location: Shoulder  Pain Orientation: Right       Objective    AROM: shoulder   Flexion: right 133, left 155  Abduction: right 143, left 166  Extension: right 55, left 60  Ext/IR: right L5, left T12  ER(90): right 75, left 85  IR(90): right 55, left 60       Treatment:  TherEx:  -UBE for warm-up and strengthening/conditioning x 5'  >level 5 resistance: 2.5' forward/backward  -horizontal adduction stretch: right hand to left shoulder  -back to wall active abduction/HorizAbd to 90 degrees (touch wall): 20 reps  -ROM measurement  -single arm wall slides for height  -wall alternate shoulder taps  -wall mini push ups  -supine PROM stretching        Time Calculation  Start Time: 0745  Stop Time: 0830  Time Calculation (min): 45 min  Therapeutic Interventions (Time Spent in Minutes)  Therapeutic Exercise: 45  Other Charges       Charge ID Procedure Code Description Qty. Modifiers Charge Entry User Diagnosis    [id number] HC THERAPEUTIC PX 1/> AREAS EACH 15 MIN EXERCISES 3 GP Chucho Melendez, PT                Assessment/Plan   Assessment  Level of Function and Prognosis  Assessment: Limited with his horizontal  abd/add ROM and end range elevation.  Has good functional ROM and good movement mechanics/strength.  He continues to feel limited with strength and is going to work on more weighted wall taps as this was most challenging during his last appointment.        Therapy Goals    Short Term Goals  Start goal date: 3/7/24 End goal date: 4/30/24   ST Goal 1: Pt will be independent and compliant with HOME EXERCISE PROGRAM   in order to maintain gains towards longer term goals.   Goal 1 Status: progressing  Start goal date: 3/7/24 End goal date: 4/30/24   ST Goal 2: Pt will demonstrate full PROM of the right shoulder in order to   safely progress towards AROM of the right shoulder and no more than 2/10   pain in the right shoulder in order to demonstrate progress towards longer   term goals.   Goal 2 Status: met  Long Term Goals  Long term goal(s) met by: 6/7/24  LT Goal 1: Pt will demonstrate improved functional performance indicated   by an improved self reported FOTO score of 59 as compared to initial   measure of 4  Goal Status: met  LT Goal 2: Pt will demonstrate AROM of the right shoulder to be within   functional limits, grossly 5/5 of the right upper extremity, and no pain   in the right shoulder with all activities in helping to facilitate safe   return to work with decreased risk of reinjury of the right shoulder.   Goal Status: progressing       Plan  Plan for next treatment comment: progress ROM, strength, functional capability  Treatment duration will be through No data was found

## 2024-05-29 ENCOUNTER — ANTICOAGULATION VISIT (OUTPATIENT)
Dept: CARDIOLOGY | Facility: CLINIC | Age: 64
End: 2024-05-29
Payer: COMMERCIAL

## 2024-05-29 ENCOUNTER — LAB (OUTPATIENT)
Dept: LAB | Facility: URGENT CARE | Age: 64
End: 2024-05-29
Payer: COMMERCIAL

## 2024-05-29 DIAGNOSIS — Z95.2 S/P AVR: Primary | Chronic | ICD-10-CM

## 2024-05-29 DIAGNOSIS — Z51.81 ANTICOAGULATION MANAGEMENT ENCOUNTER: ICD-10-CM

## 2024-05-29 DIAGNOSIS — Z79.01 ANTICOAGULATION MANAGEMENT ENCOUNTER: ICD-10-CM

## 2024-05-29 DIAGNOSIS — Z95.2 PRESENCE OF PROSTHETIC HEART VALVE: ICD-10-CM

## 2024-05-29 LAB
INR BLD: 2.4
INR PPP: 2.4
PROTHROMBIN TIME: 28.1 SECONDS (ref 9.4–12.5)

## 2024-05-29 PROCEDURE — 85610 PROTHROMBIN TIME: CPT | Performed by: FAMILY MEDICINE

## 2024-05-29 PROCEDURE — 36415 COLL VENOUS BLD VENIPUNCTURE: CPT | Performed by: FAMILY MEDICINE

## 2024-05-29 NOTE — PROGRESS NOTES
12:02 msg for pt to call [facility] re INR. DMD  2:32 Spoke to pt verified ID no med or diet changes no bleeding or bruising problems enc to call if changes verified warfarin dose and tab size 32.5mg/week mailed recheck reminder. DMD      Continue warfarin dose as above and recheck INR in 6 weeks verb under. DMD

## 2024-05-30 ENCOUNTER — HOSPITAL ENCOUNTER (OUTPATIENT)
Dept: PHYSICAL THERAPY | Facility: HOSPITAL | Age: 64
Setting detail: THERAPIES SERIES
Discharge: STILL ADMITTED OR ADMINISTRATIVE | End: 2024-05-30
Payer: COMMERCIAL

## 2024-05-30 DIAGNOSIS — M75.41 IMPINGEMENT SYNDROME OF RIGHT SHOULDER: ICD-10-CM

## 2024-05-30 DIAGNOSIS — M75.101 ROTATOR CUFF SYNDROME OF RIGHT SHOULDER: ICD-10-CM

## 2024-05-30 DIAGNOSIS — M19.011 PRIMARY OSTEOARTHRITIS OF RIGHT SHOULDER: ICD-10-CM

## 2024-05-30 DIAGNOSIS — M25.511 ACUTE PAIN OF RIGHT SHOULDER: ICD-10-CM

## 2024-05-30 DIAGNOSIS — Z47.89 ORTHOPEDIC AFTERCARE: Primary | ICD-10-CM

## 2024-05-30 PROCEDURE — 97110 THERAPEUTIC EXERCISES: CPT | Mod: GP | Performed by: PHYSICAL THERAPIST

## 2024-05-30 PROCEDURE — 97140 MANUAL THERAPY 1/> REGIONS: CPT | Mod: GP | Performed by: PHYSICAL THERAPIST

## 2024-05-30 NOTE — INTERDISCIPLINARY/THERAPY
[address] [facility] [address]  [phone]      Outpatient Physical Therapy Daily Treatment Note    Date of Service: 24  Patient Name: Hayden Franklin  : 1960  Referring Provider: Mina Kendrick DO  Today's Visit Number: 24  Onset Date: 2024  SOC Date: 3/7/2024  Certification Period: 2024  HICN: [id number]  Medical Diagnosis listed first. Additional are Treatment Diagnoses:  1. Orthopedic aftercare    2. Acute pain of right shoulder    3. Rotator cuff syndrome of right shoulder    4. Impingement syndrome of right shoulder    5. Primary osteoarthritis of right shoulder        Subjective   Subjective Comments: He is feeling good today.      Has patient fallen since last visit? No  Fall Interventions: Not at risk of fall  Have there been any changes in medications? No          Objective    Surgery Date: 24 (Thursday)  Week Post-Op: 18    AROM: shoulder   24:  Flexion: right 133, left 155  Abduction: right 143, left 166  Extension: right 55, left 60  Ext/IR: right L5, left T12  ER(90): right 75, left 85  IR(90): right 55, left 60       Treatment:  TherEx:  -UBE for warm-up and strengthening/conditioning x 5'  >level 5 resistance: 2.5' forward/backward  -wall abduction x 20 reps  -back to wall active 90/90 as able x 20 reps  -prone lying active ER in 90 abduction x 20 reps  -prone ER end range static stretch  -prone horizontal abduction arm raises    Manual Therapy:  -supine lying end range passive stretching with GH joint mobs working progressively into end range          Time Calculation  Start Time: 0700  Stop Time: 0745  Time Calculation (min): 45 min  Therapeutic Interventions (Time Spent in Minutes)  Manual Therapy: 20  Therapeutic Exercise: 25  Other Charges       Charge ID Procedure Code Description Qty. Modifiers Charge Entry User Diagnosis    [id number] HC THERAPEUTIC PX 1/> AREAS EACH 15 MIN EXERCISES 2 GP Chucho Melendez, PT     [id number]  [id number]  MANUAL THERAPY TQS 1/> REGIONS EACH 15 MINUTES 1 GP Chucho Melendez PT                Assessment/Plan   Assessment  Level of Function and Prognosis  Assessment: Limited with end range elevation and responded well to prolonged stretching with joint mobs to attain more end range.  Excellent strength with all  exercises done today.        Therapy Goals    Short Term Goals  Start goal date: 3/7/24 End goal date: 4/30/24   ST Goal 1: Pt will be independent and compliant with HOME EXERCISE PROGRAM   in order to maintain gains towards longer term goals.   Goal 1 Status: progressing  Start goal date: 3/7/24 End goal date: 4/30/24   ST Goal 2: Pt will demonstrate full PROM of the right shoulder in order to   safely progress towards AROM of the right shoulder and no more than 2/10   pain in the right shoulder in order to demonstrate progress towards longer   term goals.   Goal 2 Status: met  Long Term Goals  Long term goal(s) met by: 6/7/24  LT Goal 1: Pt will demonstrate improved functional performance indicated   by an improved self reported FOTO score of 59 as compared to initial   measure of 4  Goal Status: met  LT Goal 2: Pt will demonstrate AROM of the right shoulder to be within   functional limits, grossly 5/5 of the right upper extremity, and no pain   in the right shoulder with all activities in helping to facilitate safe   return to work with decreased risk of reinjury of the right shoulder.   Goal Status: progressing       Plan     Treatment duration will be through 6/7/2024

## 2024-06-04 ENCOUNTER — HOSPITAL ENCOUNTER (OUTPATIENT)
Dept: PHYSICAL THERAPY | Facility: HOSPITAL | Age: 64
Setting detail: THERAPIES SERIES
Discharge: STILL ADMITTED OR ADMINISTRATIVE | End: 2024-06-04
Payer: COMMERCIAL

## 2024-06-04 DIAGNOSIS — M25.511 ACUTE PAIN OF RIGHT SHOULDER: ICD-10-CM

## 2024-06-04 DIAGNOSIS — M75.101 ROTATOR CUFF SYNDROME OF RIGHT SHOULDER: ICD-10-CM

## 2024-06-04 DIAGNOSIS — M19.011 PRIMARY OSTEOARTHRITIS OF RIGHT SHOULDER: ICD-10-CM

## 2024-06-04 DIAGNOSIS — M75.41 IMPINGEMENT SYNDROME OF RIGHT SHOULDER: ICD-10-CM

## 2024-06-04 DIAGNOSIS — Z47.89 ORTHOPEDIC AFTERCARE: Primary | ICD-10-CM

## 2024-06-04 PROCEDURE — 97110 THERAPEUTIC EXERCISES: CPT | Mod: GP | Performed by: PHYSICAL THERAPIST

## 2024-06-04 PROCEDURE — 97140 MANUAL THERAPY 1/> REGIONS: CPT | Mod: GP | Performed by: PHYSICAL THERAPIST

## 2024-06-04 NOTE — INTERDISCIPLINARY/THERAPY
[address] [facility] [address]  [phone]      Outpatient Physical Therapy Daily Treatment Note    Date of Service: 24  Patient Name: Hayden Franklin  : 1960  Referring Provider: Mina Kendrick DO  Today's Visit Number: 25  Onset Date: 2024  SOC Date: 3/7/2024  Certification Period: 2024  HICN: [id number]  Medical Diagnosis listed first. Additional are Treatment Diagnoses:  1. Orthopedic aftercare    2. Acute pain of right shoulder    3. Rotator cuff syndrome of right shoulder    4. Impingement syndrome of right shoulder    5. Primary osteoarthritis of right shoulder        Subjective   Subjective Comments: He did not have any pain after his last visit.    Has patient fallen since last visit? No  Fall Interventions: Not at risk of fall  Have there been any changes in medications? No  Pain Assessment Scale  Pain Scale: 0-10  Pain Score: 0-No pain  Pain Location: Shoulder  Pain Orientation: Right       Objective    Surgery Date: 24 (Thursday)  Week Post-Op: 18    AROM: shoulder   24:  Flexion: right 133, left 155  Abduction: right 143, left 166  Extension: right 55, left 60  Ext/IR: right L5, left T12  ER(90): right 75, left 85  IR(90): right 55, left 60       Treatment:  TherEx:  -UBE for warm-up and strengthening/conditioning x 5'  >level 5 resistance: 2.5' forward/backward  -wall supported elevation stretch focused on end range scapular depression as he elevates the scapula when getting into this position  -supine PNFs against light resistance  >full range progressed to end range  -supine end range elevation education on scapular depression      Manual Therapy:  -supine lying end range passive stretching with GH joint mobs working progressively into end range with end range humeral ER  -sidelying scapular mobilizations              Time Calculation  Start Time: 701  Stop Time: 746  Time Calculation (min): 45 min  Therapeutic Interventions (Time Spent  in Minutes)  Manual Therapy: 27  Therapeutic Exercise: 18  Other Charges       Charge ID Procedure Code Description Qty. Modifiers Charge Entry User Diagnosis    [id number]  THERAPEUTIC PX 1/> AREAS EACH 15 MIN EXERCISES 1 GP Chucho Melendez, PT     [id number]  MANUAL THERAPY TQS 1/> REGIONS EACH 15 MINUTES 2 GP Chucho Melendez PT                Assessment/Plan   Assessment  Level of Function and Prognosis  Assessment: Due to end range capsular tightness he compensates with scapular elevation both passively and actively.  Thoroughly educated on stretching with scapular depression to improve his range and mechanics.        Therapy Goals    Short Term Goals  Start goal date: 3/7/24 End goal date: 4/30/24   ST Goal 1: Pt will be independent and compliant with HOME EXERCISE PROGRAM   in order to maintain gains towards longer term goals.   Goal 1 Status: progressing  Start goal date: 3/7/24 End goal date: 4/30/24   ST Goal 2: Pt will demonstrate full PROM of the right shoulder in order to   safely progress towards AROM of the right shoulder and no more than 2/10   pain in the right shoulder in order to demonstrate progress towards longer   term goals.   Goal 2 Status: met  Long Term Goals  Long term goal(s) met by: 6/7/24  LT Goal 1: Pt will demonstrate improved functional performance indicated   by an improved self reported FOTO score of 59 as compared to initial   measure of 4  Goal Status: met  LT Goal 2: Pt will demonstrate AROM of the right shoulder to be within   functional limits, grossly 5/5 of the right upper extremity, and no pain   in the right shoulder with all activities in helping to facilitate safe   return to work with decreased risk of reinjury of the right shoulder.   Goal Status: progressing       Plan  Plan for next treatment comment: progress ROM, strength, functional capability  Treatment duration will be through 6/7/2024

## 2024-06-06 ENCOUNTER — HOSPITAL ENCOUNTER (OUTPATIENT)
Dept: PHYSICAL THERAPY | Facility: HOSPITAL | Age: 64
Setting detail: THERAPIES SERIES
Discharge: STILL ADMITTED OR ADMINISTRATIVE | End: 2024-06-06
Payer: COMMERCIAL

## 2024-06-06 DIAGNOSIS — M75.101 ROTATOR CUFF SYNDROME OF RIGHT SHOULDER: ICD-10-CM

## 2024-06-06 DIAGNOSIS — M19.011 PRIMARY OSTEOARTHRITIS OF RIGHT SHOULDER: ICD-10-CM

## 2024-06-06 DIAGNOSIS — Z47.89 ORTHOPEDIC AFTERCARE: Primary | ICD-10-CM

## 2024-06-06 DIAGNOSIS — M75.41 IMPINGEMENT SYNDROME OF RIGHT SHOULDER: ICD-10-CM

## 2024-06-06 DIAGNOSIS — M25.511 ACUTE PAIN OF RIGHT SHOULDER: ICD-10-CM

## 2024-06-06 PROCEDURE — 97110 THERAPEUTIC EXERCISES: CPT | Mod: GP | Performed by: PHYSICAL THERAPIST

## 2024-06-06 NOTE — INTERDISCIPLINARY/THERAPY
[address] [facility]  [facility] [id number] [phone]      Outpatient Physical Therapy Daily Treatment Note    Date of Service: 24  Patient Name: Hayden Franklin  : 1960  Referring Provider: Mina Kendrick DO  Today's Visit Number: 26  Onset Date: 2024  SOC Date: 3/7/2024  Certification Period: 2024  HICN: [id number]  Medical Diagnosis listed first. Additional are Treatment Diagnoses:  1. Orthopedic aftercare  2. Acute pain of right shoulder  3. Rotator cuff syndrome of right shoulder  4. Impingement syndrome of right shoulder  5. Primary osteoarthritis of right shoulder      Subjective   Subjective Comments: He asks if he can pull start his mower. Realizes that his shoulder is weaker with ER compared dtot he left      Has patient fallen since last visit? No  Fall Interventions: Not at risk of fall  Have there been any changes in medications? No  Pain Assessment Scale  Pain Scale: 0-10  Pain Score: 0-No pain  Pain Location: Shoulder  Pain Orientation: Right       Objective    Surgery Date: 24 (Thursday)  Week Post-Op: 19 (24)    AROM: shoulder   24:  Flexion: right 133, left 155  Abduction: right 143, left 166  Extension: right 55, left 60  Ext/IR: right L5, left T12  ER(90): right 75, left 85  IR(90): right 55, left 60       Treatment:  TherEx:  -UBE for warm-up and strengthening/conditioning x 5'  >level 5 resistance: 2.5' forward/backward  -black theraband:  Posterior anchor abduction shoulder circles each direction  Anterior anchor horizontal abduction circles each directions  -20# cable single arm ER: 1x10, flakito  -black/grey theraband ER for home exercise assessment  -prone ER in 90 abduction with scapular retraction arm raises (ER)        -wall supported elevation stretch focused on end range scapular depression as he elevates the scapula when getting into this position  -supine PNFs against light resistance  >full range progressed to end range  -supine end  range elevation education on scapular depression      Manual Therapy:  -supine lying end range passive stretching with GH joint mobs working progressively into end range with end range humeral ER  -sidelying scapular mobilizations                Time Calculation  Start Time: 0700  Stop Time: 0730  Time Calculation (min): 30 min  Therapeutic Interventions (Time Spent in Minutes)  Therapeutic Exercise: 30  Other Charges       Charge ID Procedure Code Description Qty. Modifiers Charge Entry User Diagnosis    [id number] HC THERAPEUTIC PX 1/> AREAS EACH 15 MIN EXERCISES 2 GP Chucho Melendez, PT                Assessment/Plan   Assessment  Level of Function and Prognosis  Assessment: Focused on challenging ER strength in two ways.  First, with the arm at side and heavier resistance.  Second, with the arm away from the body using the arm weight to coordinate ER to scapular retraction.  Would benefit from practicing each more at home.        Therapy Goals    Short Term Goals  Start goal date: 3/7/24 End goal date: 4/30/24   ST Goal 1: Pt will be independent and compliant with HOME EXERCISE PROGRAM   in order to maintain gains towards longer term goals.   Goal 1 Status: progressing  Start goal date: 3/7/24 End goal date: 4/30/24   ST Goal 2: Pt will demonstrate full PROM of the right shoulder in order to   safely progress towards AROM of the right shoulder and no more than 2/10   pain in the right shoulder in order to demonstrate progress towards longer   term goals.   Goal 2 Status: met  Long Term Goals  Long term goal(s) met by: 6/7/24  LT Goal 1: Pt will demonstrate improved functional performance indicated   by an improved self reported FOTO score of 59 as compared to initial   measure of 4  Goal Status: met  LT Goal 2: Pt will demonstrate AROM of the right shoulder to be within   functional limits, grossly 5/5 of the right upper extremity, and no pain   in the right shoulder with all activities in helping to  facilitate safe   return to work with decreased risk of reinjury of the right shoulder.   Goal Status: progressing       Plan  Plan for next treatment comment: progress ROM, strength, functional capability  Treatment duration will be through 6/7/2024

## 2024-06-11 ENCOUNTER — HOSPITAL ENCOUNTER (OUTPATIENT)
Dept: PHYSICAL THERAPY | Facility: HOSPITAL | Age: 64
Setting detail: THERAPIES SERIES
Discharge: STILL ADMITTED OR ADMINISTRATIVE | End: 2024-06-11
Payer: COMMERCIAL

## 2024-06-11 DIAGNOSIS — M75.101 ROTATOR CUFF SYNDROME OF RIGHT SHOULDER: ICD-10-CM

## 2024-06-11 DIAGNOSIS — M75.41 IMPINGEMENT SYNDROME OF RIGHT SHOULDER: ICD-10-CM

## 2024-06-11 DIAGNOSIS — M19.011 PRIMARY OSTEOARTHRITIS OF RIGHT SHOULDER: ICD-10-CM

## 2024-06-11 DIAGNOSIS — Z47.89 ORTHOPEDIC AFTERCARE: Primary | ICD-10-CM

## 2024-06-11 DIAGNOSIS — M25.511 ACUTE PAIN OF RIGHT SHOULDER: ICD-10-CM

## 2024-06-11 PROCEDURE — 97110 THERAPEUTIC EXERCISES: CPT | Mod: GP | Performed by: PHYSICAL THERAPIST

## 2024-06-13 ENCOUNTER — HOSPITAL ENCOUNTER (OUTPATIENT)
Dept: PHYSICAL THERAPY | Facility: HOSPITAL | Age: 64
Setting detail: THERAPIES SERIES
Discharge: STILL ADMITTED OR ADMINISTRATIVE | End: 2024-06-13
Payer: COMMERCIAL

## 2024-06-13 DIAGNOSIS — M19.011 PRIMARY OSTEOARTHRITIS OF RIGHT SHOULDER: ICD-10-CM

## 2024-06-13 DIAGNOSIS — M75.41 IMPINGEMENT SYNDROME OF RIGHT SHOULDER: ICD-10-CM

## 2024-06-13 DIAGNOSIS — M75.101 ROTATOR CUFF SYNDROME OF RIGHT SHOULDER: ICD-10-CM

## 2024-06-13 DIAGNOSIS — M25.511 ACUTE PAIN OF RIGHT SHOULDER: ICD-10-CM

## 2024-06-13 DIAGNOSIS — Z47.89 ORTHOPEDIC AFTERCARE: Primary | ICD-10-CM

## 2024-06-13 PROCEDURE — 97110 THERAPEUTIC EXERCISES: CPT | Mod: GP | Performed by: PHYSICAL THERAPIST

## 2024-06-13 NOTE — INTERDISCIPLINARY/THERAPY
[address] [facility] [address]  [phone]      Outpatient Physical Therapy Daily Treatment Note    Date of Service: 24  Patient Name: Hayden Franklin  : 1960  Referring Provider: Mina Kendrick DO  Today's Visit Number: 28  Onset Date: 2024  SOC Date: 3/7/2024  Certification Period: 2024  HICN: [id number]  Medical Diagnosis listed first. Additional are Treatment Diagnoses:  1. Orthopedic aftercare  2. Acute pain of right shoulder  3. Rotator cuff syndrome of right shoulder  4. Impingement syndrome of right shoulder  5. Primary osteoarthritis of right shoulder      Subjective   Subjective Comments: A little sore the past few days.    Has patient fallen since last visit? No  Fall Interventions: Not at risk of fall  Have there been any changes in medications? No          Objective    Surgery Date: 24 (Thursday)  Week Post-Op: 19 (24)    AROM: shoulder   24:  Flexion: right 133, left 155  Abduction: right 143, left 166  Extension: right 55, left 60  Ext/IR: right L5, left T12  ER(90): right 75, left 85  IR(90): right 55, left 60       Treatment:  TherEx:  -UBE for warm-up and strengthening/conditioning x 5'  >level 5 resistance: 2.5' forward/backward  -peach theraloop:  >pull apart pull overs  >overhead alternate single arm pull downs  >high row with ER  >chest fly: single arm  >shoulder IR  >staggered stand single arm punch, flakito  -back to wall scare crow  -Precor lat pull downs: 45#  -Precor chest press: machine weight with 10# added weight (too heavy) back down to no weight  -2.5# weight alternate lateral/forward raises: 2 arms        Time Calculation  Start Time: 0700  Stop Time: 0745  Time Calculation (min): 45 min  Therapeutic Interventions (Time Spent in Minutes)  Therapeutic Exercise: 45  Other Charges       Charge ID Procedure Code Description Qty. Modifiers Charge Entry User Diagnosis    [id number] HC THERAPEUTIC PX 1/> AREAS EACH 15 MIN  EXERCISES 3 GP Chucho Melendez, PT                Assessment/Plan   Assessment  Level of Function and Prognosis  Assessment: Sore from the last treatment and worked to continue strengthening progression.        Therapy Goals    Short Term Goals  Start goal date: 3/7/24 End goal date: 4/30/24   ST Goal 1: Pt will be independent and compliant with HOME EXERCISE PROGRAM   in order to maintain gains towards longer term goals.   Goal 1 Status: progressing  Start goal date: 3/7/24 End goal date: 4/30/24   ST Goal 2: Pt will demonstrate full PROM of the right shoulder in order to   safely progress towards AROM of the right shoulder and no more than 2/10   pain in the right shoulder in order to demonstrate progress towards longer   term goals.   Goal 2 Status: met  Long Term Goals  Long term goal(s) met by: 6/7/24  LT Goal 1: Pt will demonstrate improved functional performance indicated   by an improved self reported FOTO score of 59 as compared to initial   measure of 4  Goal Status: met  LT Goal 2: Pt will demonstrate AROM of the right shoulder to be within   functional limits, grossly 5/5 of the right upper extremity, and no pain   in the right shoulder with all activities in helping to facilitate safe   return to work with decreased risk of reinjury of the right shoulder.   Goal Status: progressing       Plan  Plan for next treatment comment: strengthen  Treatment duration will be through 6/7/2024

## 2024-06-18 ENCOUNTER — HOSPITAL ENCOUNTER (OUTPATIENT)
Dept: PHYSICAL THERAPY | Facility: HOSPITAL | Age: 64
Setting detail: THERAPIES SERIES
Discharge: STILL ADMITTED OR ADMINISTRATIVE | End: 2024-06-18
Payer: COMMERCIAL

## 2024-06-18 DIAGNOSIS — M75.101 ROTATOR CUFF SYNDROME OF RIGHT SHOULDER: ICD-10-CM

## 2024-06-18 DIAGNOSIS — M19.011 PRIMARY OSTEOARTHRITIS OF RIGHT SHOULDER: ICD-10-CM

## 2024-06-18 DIAGNOSIS — Z47.89 ORTHOPEDIC AFTERCARE: Primary | ICD-10-CM

## 2024-06-18 DIAGNOSIS — M25.511 ACUTE PAIN OF RIGHT SHOULDER: ICD-10-CM

## 2024-06-18 DIAGNOSIS — M75.41 IMPINGEMENT SYNDROME OF RIGHT SHOULDER: ICD-10-CM

## 2024-06-18 PROCEDURE — 97140 MANUAL THERAPY 1/> REGIONS: CPT | Mod: GP | Performed by: PHYSICAL THERAPIST

## 2024-06-18 NOTE — INTERDISCIPLINARY/THERAPY
[address] [facility] [address]  [phone]      Outpatient Physical Therapy Daily Treatment Note    Date of Service: 24  Patient Name: Hayden Franklin  : 1960  Referring Provider: Mina Kendrick DO  Today's Visit Number: 29  Onset Date: 2024  SOC Date: 3/7/2024  Certification Period: 2024  HICN: [id number]  Medical Diagnosis listed first. Additional are Treatment Diagnoses:  1. Orthopedic aftercare  2. Acute pain of right shoulder  3. Rotator cuff syndrome of right shoulder  4. Impingement syndrome of right shoulder  5. Primary osteoarthritis of right shoulder      Subjective   Subjective Comments: He continues to get soreness from end range flexion stretching but this does not sustain for long periods of time afterward.      Has patient fallen since last visit? No  Fall Interventions: Not at risk of fall  Have there been any changes in medications? No          Objective    Surgery Date: 24 (Thursday)  Week Post-Op: 20 (24)    AROM: shoulder   24:  Flexion: right 133, left 155  Abduction: right 143, left 166  Extension: right 55, left 60  Ext/IR: right L5, left T12  ER(90): right 75, left 85  IR(90): right 55, left 60     24:  Flexion:  -right 137 (pre-stretch), 146 (post-stretch)      Treatment:  TherEx:  -UBE for warm-up and strengthening/conditioning x 5'  >level 5 resistance: 2.5' forward/backward  -review of overhead flexion wall stretch:  >control ribcage, elbow angle and humerus toward head/neck    Manual Therapy:  -supine lying passive end range elevation stretching with joint mobilizations and varied techniques to facilitate progression in ROM          Time Calculation  Start Time: 740  Stop Time: 828  Time Calculation (min): 48 min  Therapeutic Interventions (Time Spent in Minutes)  Manual Therapy: 40  Therapeutic Exercise: 8  Other Charges       Charge ID Procedure Code Description Qty. Modifiers Charge Entry User Diagnosis     [id number]  MANUAL THERAPY TQS 1/> REGIONS EACH 15 MINUTES 3 GP Chucho Melendez PT                Assessment/Plan   Assessment  Level of Function and Prognosis  Assessment: Able to improve ROM by nearly 10 degrees post-stretch and encouraged to work on this overhead flexion stretch daily to achieve a more consistent 145-155 degree active ROM ability.        Therapy Goals    Short Term Goals  Start goal date: 3/7/24 End goal date: 4/30/24   ST Goal 1: Pt will be independent and compliant with HOME EXERCISE PROGRAM   in order to maintain gains towards longer term goals.   Goal 1 Status: progressing  Start goal date: 3/7/24 End goal date: 4/30/24   ST Goal 2: Pt will demonstrate full PROM of the right shoulder in order to   safely progress towards AROM of the right shoulder and no more than 2/10   pain in the right shoulder in order to demonstrate progress towards longer   term goals.   Goal 2 Status: met  Long Term Goals  Long term goal(s) met by: 6/7/24  LT Goal 1: Pt will demonstrate improved functional performance indicated   by an improved self reported FOTO score of 59 as compared to initial   measure of 4  Goal Status: met  LT Goal 2: Pt will demonstrate AROM of the right shoulder to be within   functional limits, grossly 5/5 of the right upper extremity, and no pain   in the right shoulder with all activities in helping to facilitate safe   return to work with decreased risk of reinjury of the right shoulder.   Goal Status: progressing       Plan  Plan for next treatment comment: strengthen  Treatment duration will be through 6/7/2024

## 2024-06-20 ENCOUNTER — HOSPITAL ENCOUNTER (OUTPATIENT)
Dept: PHYSICAL THERAPY | Facility: HOSPITAL | Age: 64
Setting detail: THERAPIES SERIES
Discharge: STILL ADMITTED OR ADMINISTRATIVE | End: 2024-06-20
Payer: COMMERCIAL

## 2024-06-20 DIAGNOSIS — M75.101 ROTATOR CUFF SYNDROME OF RIGHT SHOULDER: ICD-10-CM

## 2024-06-20 DIAGNOSIS — I25.10 CORONARY ARTERY DISEASE INVOLVING NATIVE CORONARY ARTERY OF NATIVE HEART WITHOUT ANGINA PECTORIS: ICD-10-CM

## 2024-06-20 DIAGNOSIS — M19.011 PRIMARY OSTEOARTHRITIS OF RIGHT SHOULDER: ICD-10-CM

## 2024-06-20 DIAGNOSIS — M25.511 ACUTE PAIN OF RIGHT SHOULDER: ICD-10-CM

## 2024-06-20 DIAGNOSIS — Z47.89 ORTHOPEDIC AFTERCARE: Primary | ICD-10-CM

## 2024-06-20 DIAGNOSIS — M75.41 IMPINGEMENT SYNDROME OF RIGHT SHOULDER: ICD-10-CM

## 2024-06-20 DIAGNOSIS — E78.2 MIXED HYPERLIPIDEMIA: ICD-10-CM

## 2024-06-20 PROCEDURE — 97110 THERAPEUTIC EXERCISES: CPT | Mod: GP | Performed by: PHYSICAL THERAPIST

## 2024-06-20 NOTE — INTERDISCIPLINARY/THERAPY
[address] [facility] [address]  [phone]      Outpatient Physical Therapy Daily Treatment Note    Date of Service: 24  Patient Name: Hayden Franklin  : 1960  Referring Provider: Mina Kendrick DO  Today's Visit Number: 30  Onset Date: 2024  SOC Date: 3/7/2024  Certification Period: 2024  HICN: [id number]  Medical Diagnosis listed first. Additional are Treatment Diagnoses:  1. Orthopedic aftercare  2. Acute pain of right shoulder  3. Rotator cuff syndrome of right shoulder  4. Impingement syndrome of right shoulder  5. Primary osteoarthritis of right shoulder      Subjective   Subjective Comments: He felt pretty good after his last appointment.  Thought that he might be more sore than he was.  Feels good with all activities today.    Has patient fallen since last visit? No  Fall Interventions: Not at risk of fall  Have there been any changes in medications? No  Pain Assessment Scale  Pain Scale: 0-10  Pain Score: 0-No pain  Pain Type: Chronic pain  Pain Location: Shoulder  Pain Orientation: Right       Objective    Surgery Date: 24 (Thursday)  Week Post-Op: 21 (24)    AROM: shoulder   24:  Flexion: right 133, left 155  Abduction: right 143, left 166  Extension: right 55, left 60  Ext/IR: right L5, left T12  ER(90): right 75, left 85  IR(90): right 55, left 60     24:  Flexion:  -right 137 (pre-stretch), 146 (post-stretch)      Treatment:  TherEx:  -UBE for warm-up and strengthening/conditioning x 5'  >level 5 resistance: 2.5' forward/backward  -back to wall: abduction/ER with ball in hand: right 1#, left 2#  -ball on wall push ups with hold at bottom  -kettlebell suitcase carry  -kettlebell bottoms up  carry  -sled push: 30m x 2  -peach theraloop:  >chest fly  >rear fly  >staggered stand single arm punch, flakito              Time Calculation  Start Time: 0700  Stop Time: 0743  Time Calculation (min): 43 min  Therapeutic Interventions (Time  Spent in Minutes)  Therapeutic Exercise: 43  Other Charges       Charge ID Procedure Code Description Qty. Modifiers Charge Entry User Diagnosis    [id number] HC THERAPEUTIC PX 1/> AREAS EACH 15 MIN EXERCISES 3 GP Chucho Melendez PT                Assessment/Plan   Assessment  Level of Function and Prognosis  Assessment: Progressive strengthening is going well and he shows good strength.  Working on more compound strengthening to prepare for job demands.        Therapy Goals    Short Term Goals  Start goal date: 3/7/24 End goal date: 4/30/24 ST Goal 1: Pt will be independent and compliant with HOME EXERCISE PROGRAM   in order to maintain gains towards longer term goals.   Goal 1 Status: progressing  Start goal date: 3/7/24 End goal date: 4/30/24   ST Goal 2: Pt will demonstrate full PROM of the right shoulder in order to   safely progress towards AROM of the right shoulder and no more than 2/10   pain in the right shoulder in order to demonstrate progress towards longer   term goals.   Goal 2 Status: met  Long Term Goals  Long term goal(s) met by: 6/7/24  LT Goal 1: Pt will demonstrate improved functional performance indicated   by an improved self reported FOTO score of 59 as compared to initial   measure of 4  Goal Status: met  LT Goal 2: Pt will demonstrate AROM of the right shoulder to be within   functional limits, grossly 5/5 of the right upper extremity, and no pain   in the right shoulder with all activities in helping to facilitate safe   return to work with decreased risk of reinjury of the right shoulder.   Goal Status: progressing       Plan  Plan for next treatment comment: strengthen and improve end range of motions  Treatment duration will be through 6/7/2024

## 2024-06-21 RX ORDER — ATORVASTATIN CALCIUM 80 MG/1
80 TABLET, FILM COATED ORAL NIGHTLY
Qty: 90 TABLET | Refills: 0 | Status: SHIPPED | OUTPATIENT
Start: 2024-06-21 | End: 2024-09-17

## 2024-07-01 ENCOUNTER — HOSPITAL ENCOUNTER (OUTPATIENT)
Dept: PHYSICAL THERAPY | Facility: HOSPITAL | Age: 64
Setting detail: THERAPIES SERIES
Discharge: STILL ADMITTED OR ADMINISTRATIVE | End: 2024-07-01
Payer: COMMERCIAL

## 2024-07-01 DIAGNOSIS — Z47.89 ORTHOPEDIC AFTERCARE: Primary | ICD-10-CM

## 2024-07-01 DIAGNOSIS — M75.101 ROTATOR CUFF SYNDROME OF RIGHT SHOULDER: ICD-10-CM

## 2024-07-01 DIAGNOSIS — M19.011 PRIMARY OSTEOARTHRITIS OF RIGHT SHOULDER: ICD-10-CM

## 2024-07-01 DIAGNOSIS — M75.41 IMPINGEMENT SYNDROME OF RIGHT SHOULDER: ICD-10-CM

## 2024-07-01 DIAGNOSIS — M25.511 ACUTE PAIN OF RIGHT SHOULDER: ICD-10-CM

## 2024-07-01 PROCEDURE — 97110 THERAPEUTIC EXERCISES: CPT | Mod: GP | Performed by: PHYSICAL THERAPIST

## 2024-07-01 NOTE — INTERDISCIPLINARY/THERAPY
[address] [facility] [id number] [phone]      Outpatient Physical Therapy Daily Treatment Note    Date of Service: 24  Patient Name: Hayden Franklin  : 1960  Referring Provider: Mina Kendrick DO  Today's Visit Number: 31  Onset Date: 2024  SOC Date: 3/7/2024  Certification Period: 2024  HICN: [id number]  Medical Diagnosis listed first. Additional are Treatment Diagnoses:  1. Orthopedic aftercare  2. Acute pain of right shoulder  3. Rotator cuff syndrome of right shoulder  4. Impingement syndrome of right shoulder  5. Primary osteoarthritis of right shoulder      Subjective   Subjective Comments: Gopal has been having bilateral wrist pain.  He awakens with this pain and it improves as the day goes on.      Has patient fallen since last visit? No  Fall Interventions: Not at risk of fall  Have there been any changes in medications? No  Pain Assessment Scale  Pain Scale: 0-10  Pain Score: 1-Hardly notice pain  Pain Location: Shoulder  Pain Orientation: Right       Objective    Surgery Date: 24 (Thursday)  Week Post-Op: 22 (24)    AROM: shoulder   24:  Flexion: right 133, left 155  Abduction: right 143, left 166  Extension: right 55, left 60  Ext/IR: right L5, left T12  ER(90): right 75, left 85  IR(90): right 55, left 60     24:  Flexion:  -right 137 (pre-stretch), 146 (post-stretch)    24:  Flexion:  -right 147 degrees (post stretch)          Treatment:  TherEx:  -UBE for warm-up and strengthening/conditioning x 5'  >level 5 resistance: 2.5' forward/backward  -wall slide stretch into overhead elevation  -wall slide with end range hand lifts offs: 2x5  -prone lying shoulder flexion: full range x 10r  -supine lying dowel overhead flexion for mobility x 10r  -back to the wall dowel pullovers:  >narrow   >wide   -black tband pectoral stretch to flys  -black tband horizontal adduction: rear flys        Time Calculation  Start Time:  0830  Stop Time: 0910  Time Calculation (min): 40 min  Therapeutic Interventions (Time Spent in Minutes)  Therapeutic Exercise: 40  Other Charges       Charge ID Procedure Code Description Qty. Modifiers Charge Entry User Diagnosis    [id number] HC THERAPEUTIC PX 1/> AREAS EACH 15 MIN EXERCISES 3 GP Chucho Melendez, PT                Assessment/Plan   Assessment  Level of Function and Prognosis  Assessment: Continues to have comparable ROM values post stretch of ~146 degrees.   Is progressing well with this strengthening and has good functional strength in preparation for returning to work demands in the future.        Therapy Goals    Short Term Goals  Start goal date: 3/7/24 End goal date: 4/30/24   ST Goal 1: Pt will be independent and compliant with HOME EXERCISE PROGRAM   in order to maintain gains towards longer term goals.   Goal 1 Status: progressing  Start goal date: 3/7/24 End goal date: 4/30/24   ST Goal 2: Pt will demonstrate full PROM of the right shoulder in order to   safely progress towards AROM of the right shoulder and no more than 2/10   pain in the right shoulder in order to demonstrate progress towards longer   term goals.   Goal 2 Status: met  Long Term Goals  Long term goal(s) met by: 6/7/24  LT Goal 1: Pt will demonstrate improved functional performance indicated   by an improved self reported FOTO score of 59 as compared to initial   measure of 4  Goal Status: met  LT Goal 2: Pt will demonstrate AROM of the right shoulder to be within   functional limits, grossly 5/5 of the right upper extremity, and no pain   in the right shoulder with all activities in helping to facilitate safe   return to work with decreased risk of reinjury of the right shoulder.   Goal Status: progressing       Plan  Plan for next treatment comment: strength; end range stretching  Treatment duration will be through 6/7/2024

## 2024-07-03 ENCOUNTER — OFFICE VISIT (OUTPATIENT)
Dept: ORTHOPEDIC SURGERY | Facility: CLINIC | Age: 64
End: 2024-07-03
Payer: COMMERCIAL

## 2024-07-03 ENCOUNTER — HOSPITAL ENCOUNTER (OUTPATIENT)
Dept: PHYSICAL THERAPY | Facility: HOSPITAL | Age: 64
Setting detail: THERAPIES SERIES
Discharge: STILL ADMITTED OR ADMINISTRATIVE | End: 2024-07-03
Payer: COMMERCIAL

## 2024-07-03 VITALS — OXYGEN SATURATION: 90 % | SYSTOLIC BLOOD PRESSURE: 102 MMHG | DIASTOLIC BLOOD PRESSURE: 60 MMHG | HEART RATE: 75 BPM

## 2024-07-03 DIAGNOSIS — M75.41 IMPINGEMENT SYNDROME OF RIGHT SHOULDER: ICD-10-CM

## 2024-07-03 DIAGNOSIS — Z47.89 ORTHOPEDIC AFTERCARE: Primary | ICD-10-CM

## 2024-07-03 DIAGNOSIS — M75.101 ROTATOR CUFF SYNDROME OF RIGHT SHOULDER: ICD-10-CM

## 2024-07-03 DIAGNOSIS — M19.019 OSTEOARTHRITIS OF AC (ACROMIOCLAVICULAR) JOINT: ICD-10-CM

## 2024-07-03 DIAGNOSIS — M75.101 TEAR OF RIGHT ROTATOR CUFF, UNSPECIFIED TEAR EXTENT, UNSPECIFIED WHETHER TRAUMATIC: ICD-10-CM

## 2024-07-03 DIAGNOSIS — M19.011 PRIMARY OSTEOARTHRITIS OF RIGHT SHOULDER: ICD-10-CM

## 2024-07-03 DIAGNOSIS — M25.511 ACUTE PAIN OF RIGHT SHOULDER: ICD-10-CM

## 2024-07-03 DIAGNOSIS — M75.41 IMPINGEMENT SYNDROME OF RIGHT SHOULDER: Primary | ICD-10-CM

## 2024-07-03 PROCEDURE — 99214 OFFICE O/P EST MOD 30 MIN: CPT | Performed by: ORTHOPAEDIC SURGERY

## 2024-07-03 PROCEDURE — 97110 THERAPEUTIC EXERCISES: CPT | Mod: GP | Performed by: PHYSICAL THERAPIST

## 2024-07-03 ASSESSMENT — ENCOUNTER SYMPTOMS
SHORTNESS OF BREATH: 0
SORE THROAT: 0
PALPITATIONS: 0
SEIZURES: 0
CHILLS: 0
COLOR CHANGE: 0
VOMITING: 0
ARTHRALGIAS: 0
COUGH: 0
ABDOMINAL PAIN: 0
BACK PAIN: 0
DYSURIA: 0
HEMATURIA: 0
WEAKNESS: 1
EYE PAIN: 0
FEVER: 0

## 2024-07-03 ASSESSMENT — PAIN - FUNCTIONAL ASSESSMENT: PAIN_FUNCTIONAL_ASSESSMENT: NO/DENIES PAIN

## 2024-07-03 ASSESSMENT — VISUAL ACUITY: OU: 1

## 2024-07-03 NOTE — INTERDISCIPLINARY/THERAPY
[address] [facility]  [place] [facility] [id number] [phone]      Outpatient Physical Therapy Daily Treatment Note    Date of Service: 24  Patient Name: Hayden Franklin  : 1960  Referring Provider: Mina Kendrick DO  Today's Visit Number: 32  Onset Date: 2024  SOC Date: 3/7/2024  Certification Period: 2024  HICN: [id number]  Medical Diagnosis listed first. Additional are Treatment Diagnoses:  1. Orthopedic aftercare  2. Acute pain of right shoulder  3. Rotator cuff syndrome of right shoulder  4. Impingement syndrome of right shoulder  5. Primary osteoarthritis of right shoulder      Subjective   Subjective Comments: Gopal is doing well overall and reports no shoulder pain.  He will see Dr. Kendrick later this morning.      Has patient fallen since last visit? No  Fall Interventions: Not at risk of fall  Have there been any changes in medications? No  Pain Assessment Scale  Pain Score: 0-No pain       Objective    Surgery Date: 24 (Thursday)  Week Post-Op: 22 (24)    AROM: shoulder   24:  Flexion: right 133, left 155  Abduction: right 143, left 166  Extension: right 55, left 60  Ext/IR: right L5, left T12  ER(90): right 75, left 85  IR(90): right 55, left 60     24:  Flexion:  -right 137 (pre-stretch), 146 (post-stretch)    24:  Flexion:  -right 147 degrees (post stretch)    7/3/24:  Flexion:  -right 140 degrees    Treatment:  TherEx:  -UBE for warm-up and strengthening/conditioning x 5'  >level 5 resistance: 2.5' forward/backward  -wall slide stretch into overhead elevation  -wall slide with end range hand lifts offs: 1x10  -back to wall active abduction/ER  -biceps curl to overhead press: 5#  -triceps press downs with cable row  -single arm overhead triceps press with cable row  -cable single arm ER: 2 sets  -blue theraband trunk rotation single arm reach outs  -blue theraband trunk rotation with high row          Time Calculation  Start Time: 902  Stop Time:  0945  Time Calculation (min): 43 min  Therapeutic Interventions (Time Spent in Minutes)  Therapeutic Exercise: 43  Other Charges       Charge ID Procedure Code Description Qty. Modifiers Charge Entry User Diagnosis    [id number] HC THERAPEUTIC PX 1/> AREAS EACH 15 MIN EXERCISES 3 GP Chucho Melendez, PT                Assessment/Plan   Assessment  Level of Function and Prognosis  Assessment: Progressing well with strength development.        Therapy Goals    Short Term Goals  Start goal date: 3/7/24 End goal date: 4/30/24 ST Goal 1: Pt will be independent and compliant with HOME EXERCISE PROGRAM   in order to maintain gains towards longer term goals.   Goal 1 Status: progressing  Start goal date: 3/7/24 End goal date: 4/30/24   ST Goal 2: Pt will demonstrate full PROM of the right shoulder in order to   safely progress towards AROM of the right shoulder and no more than 2/10   pain in the right shoulder in order to demonstrate progress towards longer   term goals.   Goal 2 Status: met  Long Term Goals  Long term goal(s) met by: 6/7/24  LT Goal 1: Pt will demonstrate improved functional performance indicated   by an improved self reported FOTO score of 59 as compared to initial   measure of 4  Goal Status: met  LT Goal 2: Pt will demonstrate AROM of the right shoulder to be within   functional limits, grossly 5/5 of the right upper extremity, and no pain   in the right shoulder with all activities in helping to facilitate safe   return to work with decreased risk of reinjury of the right shoulder.   Goal Status: progressing       Plan  Plan for next treatment comment: strengthen for work conditioning  Treatment duration will be through 6/7/2024

## 2024-07-03 NOTE — PROGRESS NOTES
Patient Follow-up Office Note  Chief Complaint:  Chief Complaint   Patient presents with    Right Shoulder - Follow-up        HPI/ Subjective:  Pain that he had before went away.  He actually thinks it was related to change in weather.  Denies numbness and tingling.  Still feels a little bit weak.  He states his work requires him to lift 50 to 60 pounds consistently away from his body.  Does have a little bit of night pain when he lays on it.      Review of Systems   Constitutional:  Negative for chills and fever.   HENT:  Negative for ear pain and sore throat.    Eyes:  Negative for pain and visual disturbance.   Respiratory:  Negative for cough and shortness of breath.    Cardiovascular:  Negative for chest pain and palpitations.   Gastrointestinal:  Negative for abdominal pain and vomiting.   Genitourinary:  Negative for dysuria and hematuria.   Musculoskeletal:  Negative for arthralgias and back pain.   Skin:  Negative for color change and rash.   Neurological:  Positive for weakness. Negative for seizures and syncope.   All other systems reviewed and are negative.      Allergies   Allergen Reactions    Ezetimibe Rash     Past Medical History:   Diagnosis Date    Aortic valve stenosis     s/p AV replacement 11/2013    Arthritis     Bilat hands, ankle    Ascending aorta dilatation (CMS/HCC)     s/p AAA repair 11/2013    Asthma     CAD (coronary artery disease)     1 vessel bypass    Cancer (CMS/HCC)     CHF (congestive heart failure) (CMS/HCC)     COPD (chronic obstructive pulmonary disease) (CMS/HCC)     Dysrhythmia     nonsustained v-tach    History of transfusion     2013. 2022    Hyperlipidemia     Ischemic cardiomyopathy     Lung nodule 03/07/2022    Myocardial infarction (CMS/HCC)     Presence of heart assist device (CMS/[facility])     Shortness of breath     Skin cancer      Social History     Occupational History    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   Tobacco Use    Smoking status: Former     Current  packs/day: 0.00     Average packs/day: 0.8 packs/day for 40.0 years (30.0 ttl pk-yrs)     Types: Cigarettes     Start date: 1982     Quit date: 2022     Years since quittin.3    Smokeless tobacco: Never    Tobacco comments:     Last smoked 3/1/22   Vaping Use    Vaping status: Never Used   Substance and Sexual Activity    Alcohol use: Yes     Alcohol/week: 4.0 - 5.0 standard drinks of alcohol     Types: 4 - 5 Cans of beer per week    Drug use: No    Sexual activity: Defer       Family History   Problem Relation Age of Onset    Cancer Mother     Heart disease Father     Aneurysm Father     Other Sister         multisystem atrophy- on hospice    Breast cancer Sister     No Known Problems Sister     Gallbladder disease Daughter     No Known Problems Daughter      Past Surgical History:   Procedure Laterality Date    AAA REPAIR - ENDOGRAFT  2013    ANKLE SURGERY Right     No hardware    AORTIC ROOT ANGIOGRAM N/A 2022    Procedure: Aortic Arch  Angiogram;  Surgeon: Derick Gallegos MD;  Location: [facility];  Service: Cardiovascular;  Laterality: N/A;    AORTIC VALVE REPLACEMENT  2013    BIVENTRICULAR ICD UPGRADE N/A 2023    Procedure: Bi-V ICD upgrade;  Surgeon: Mitchel Saldaña DO;  Location: [facility] EP Lab;  Service: Cardiovascular;  Laterality: N/A;    CARDIAC VALVE REPLACEMENT      COLONOSCOPY N/A 10/05/2023    Procedure: COLONOSCOPY with polypectomies, endo clip placement x2;  Surgeon: Patricia Hutton MD;  Location: [facility] End[facility];  Service: Endoscopy;  Laterality: N/A;    CORONARY ANGIOPLASTY  2013    CORONARY ARTERY BYPASS GRAFT  2013    1V CABG SVG- RCA    FINGER SURGERY Left     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 OR;  Service: Orthopedics;  Laterality: Right;  CPT 98636, 30097, 39585, 03083; OUTPATIENT    TONSILLECTOMY Bilateral 1969     Current Outpatient Medications   Medication Sig Dispense Refill    atorvastatin (LIPITOR) 80 mg tablet Take 1 tablet (80 mg total) by mouth nightly 90 tablet 0    warfarin (COUMADIN) 5 mg tablet Take 2.5-5 mg by mouth      evolocumab (Repatha SureClick) 140 mg/mL Inject 1 mL (140 mg total) under the skin every 14 (fourteen) days 6 mL 3    spironolactone (ALDACTONE) 25 mg tablet Take 1 tablet (25 mg total) by mouth daily 90 tablet 3    metoprolol succinate XL (TOPROL-XL) 50 mg 24 hr tablet Take 1 tablet (50 mg total) by mouth daily 90 tablet 3    sacubitriL-valsartan (ENTRESTO) 24-26 mg tablet Take 1 tablet by mouth 2 (two) times a day Pt to take 0.5tab BID for 2 weeks then increase to 1 tab BID. Pt will discontinue lisinopril for 3 day washout period prior to starting entresto. 60 tablet 11    nitroglycerin (NITROSTAT) 0.4 mg SL tablet Place 1 tablet (0.4 mg total) under the tongue every 5 (five) minutes as needed for chest pain Limit 3 tabs per episode. 25 tablet 1    aspirin 81 mg EC tablet Take 1 tablet (81 mg total) by mouth daily 90 tablet 3    budesonide-formoteroL (SYMBICORT) 160-4.5 mcg/actuation inhaler Inhale 2 puffs 2 (two) times a day Rinse mouth with water after use. Do not swallow. 30.6 g 3     No current facility-administered medications for this visit.       Vitals:    07/03/24 1002   BP: 102/60   Pulse: 75   SpO2: 90%     Physical Exam  Vitals reviewed.   Constitutional:       General: He is not in acute distress.     Appearance: Normal appearance. He is well-developed and well-groomed.   HENT:      Head: Normocephalic and atraumatic.      Right Ear: External ear normal.      Left Ear: External ear normal.       Nose: Nose normal. No congestion.      Mouth/Throat:      Mouth: Mucous membranes are moist.      Pharynx: Oropharynx is clear.   Eyes:      General: Lids are normal. Vision grossly intact.      Conjunctiva/sclera: Conjunctivae normal.   Cardiovascular:      Pulses: Normal pulses.   Pulmonary:      Effort: Pulmonary effort is normal. No respiratory distress.      Breath sounds: No wheezing.   Abdominal:      General: Abdomen is flat. There is no distension.      Palpations: Abdomen is soft.      Tenderness: There is no abdominal tenderness.   Musculoskeletal:         General: No tenderness.      Cervical back: Normal range of motion and neck supple.   Skin:     General: Skin is warm and dry.      Capillary Refill: Capillary refill takes less than 2 seconds.      Findings: No rash.   Neurological:      General: No focal deficit present.      Mental Status: He is alert and oriented to person, place, and time.   Psychiatric:         Mood and Affect: Mood normal.         Behavior: Behavior normal.         Thought Content: Thought content normal.       Ortho Exam  Active forward flexion to 165 abduction to 150.  Gross motor strength to the rotator cuff was near comparable to contralateral limb    No results found.         Imaging:  No image results found.       Diagnosis:  1. Impingement syndrome of right shoulder    2. Tear of right rotator cuff, unspecified tear extent, unspecified whether traumatic    3. Osteoarthritis of AC (acromioclavicular) joint           Assessment/ Plan:  Hayden Franklin is a 63 y.o. male 5 months from massive rotator cuff repair, SAD, he is actually doing very well.  He does have a little pain at night when he lays on it which we discussed about this.  He feels pretty good from a strength standpoint but due to his massive tear and timeframe I am not comfortable yet with him returning to 5060 pound weightbearing away from his body.  I like him to continue to do his strengthening over the  next 2 months.  I will evaluate him at that time and likely release him to all activities.              A voice recognition program was used to aid in documentation of this record. Sometimes words are not printed exactly as they were spoken.  While efforts were made to carefully edit and correct any inaccuracies, some errors may be present; please take these into context.  Please contact the provider's office if you have any questions or concerns.

## 2024-07-03 NOTE — LETTER
[facility] ORTHOPEDIC SURGERY  [address] [facility] [address]  [phone]  Dept: [phone]  July 3, 2024     Patient: Hayden Franklin   YOB: 1960   Date of Visit: 7/3/2024       To Whom It May Concern:    It is my medical opinion that Hayden Franklin may return to light duty immediately with the following restrictions: no lifting until after reassessed by orthopedics in 8 weeks .    If you have any questions or concerns, please have the patient contact [facility] ORTHOPEDIC SURGERY or work directly with employer’s internal employee health to address specific needs.            Sincerely,        Mina Kendrick DO    Electronically signed by Mina Kendrick DO at 10:40 AM    CC: No Recipients

## 2024-07-10 ENCOUNTER — LAB (OUTPATIENT)
Dept: LAB | Facility: URGENT CARE | Age: 64
End: 2024-07-10
Payer: COMMERCIAL

## 2024-07-10 ENCOUNTER — ANTICOAGULATION VISIT (OUTPATIENT)
Dept: CARDIOLOGY | Facility: CLINIC | Age: 64
End: 2024-07-10
Payer: COMMERCIAL

## 2024-07-10 DIAGNOSIS — Z51.81 ANTICOAGULATION MANAGEMENT ENCOUNTER: ICD-10-CM

## 2024-07-10 DIAGNOSIS — Z95.2 PRESENCE OF PROSTHETIC HEART VALVE: ICD-10-CM

## 2024-07-10 DIAGNOSIS — Z79.01 ANTICOAGULATION MANAGEMENT ENCOUNTER: ICD-10-CM

## 2024-07-10 DIAGNOSIS — Z95.2 S/P AVR: Primary | Chronic | ICD-10-CM

## 2024-07-10 LAB
INR BLD: 3.3
INR PPP: 3.3
PROTHROMBIN TIME: 38.5 SECONDS (ref 9.4–12.5)

## 2024-07-10 PROCEDURE — 85610 PROTHROMBIN TIME: CPT | Performed by: FAMILY MEDICINE

## 2024-07-10 PROCEDURE — 36415 COLL VENOUS BLD VENIPUNCTURE: CPT | Performed by: FAMILY MEDICINE

## 2024-07-10 NOTE — PROGRESS NOTES
7/10/24  1340  ID verified. Patient states he had 2 beers yesterday, due to sister's .  No medication or diet changes. No bleeding or bruising. Verified Warfarin dose and tablet size. Encouraged to call with any changes. Mailed reminder. QUINTEN    7/10/24  1340  Continue Warfarin dose 2.5 mg every Tue; 5 mg all other days. Check INR in 5 weeks, . Verbalized understanding. QUINTEN

## 2024-07-11 ENCOUNTER — HOSPITAL ENCOUNTER (OUTPATIENT)
Dept: PHYSICAL THERAPY | Facility: HOSPITAL | Age: 64
Setting detail: THERAPIES SERIES
Discharge: STILL ADMITTED OR ADMINISTRATIVE | End: 2024-07-11
Payer: COMMERCIAL

## 2024-07-11 DIAGNOSIS — M75.101 ROTATOR CUFF SYNDROME OF RIGHT SHOULDER: ICD-10-CM

## 2024-07-11 DIAGNOSIS — M75.41 IMPINGEMENT SYNDROME OF RIGHT SHOULDER: ICD-10-CM

## 2024-07-11 DIAGNOSIS — Z47.89 ORTHOPEDIC AFTERCARE: Primary | ICD-10-CM

## 2024-07-11 DIAGNOSIS — M19.011 PRIMARY OSTEOARTHRITIS OF RIGHT SHOULDER: ICD-10-CM

## 2024-07-11 DIAGNOSIS — M25.511 CHRONIC RIGHT SHOULDER PAIN: ICD-10-CM

## 2024-07-11 DIAGNOSIS — G89.29 CHRONIC RIGHT SHOULDER PAIN: ICD-10-CM

## 2024-07-11 PROCEDURE — 97530 THERAPEUTIC ACTIVITIES: CPT | Mod: GP

## 2024-07-11 PROCEDURE — 97110 THERAPEUTIC EXERCISES: CPT | Mod: GP

## 2024-07-11 NOTE — INTERDISCIPLINARY/THERAPY
[address] [facility]  [place] [facility] [id number] [phone]      Outpatient Physical Therapy Progress Note     Date of Service: 24  Patient Name: Hayden Franklin  : 1960  Referring Provider: Mina Kendrick DO  Today's Visit Number: 33  Medicare or Medicaid note, cosigner has been added.: N/A  Onset Date: 2024  SOC Date: 3/7/2024  Certification Period: 2024  HICN: [id number]  Medical Diagnosis listed first. Additional are Treatment Diagnoses:  1. Orthopedic aftercare  2. Chronic right shoulder pain  3. Rotator cuff syndrome of right shoulder  4. Impingement syndrome of right shoulder  5. Primary osteoarthritis of right shoulder      Subjective   Subjective Comments: Pt has been seen for a total of 33 visits since the start of therapy s/p right RC repair 24. Pt reports that he is doing very well. However, per Dr. Dillon's suggestion of lifting restriction, and pt's report of confidence of return to work we just aren't at that point of safe return. Pt reports that he is able to lift all light items without the complaint of pain but is concerned with heavy lifting of work. Pt reports that he would like to continue therapy in efforts to further decrease the risk of re injury with safe return to full duty work.             Past Medical History:   Diagnosis Date    Aortic valve stenosis     s/p AV replacement 2013    Arthritis     Bilat hands, ankle    Ascending aorta dilatation (CMS/HCC)     s/p AAA repair 2013    Asthma     CAD (coronary artery disease)     1 vessel bypass    Cancer (CMS/[facility])     CHF (congestive heart failure) (CMS/[facility])     COPD (chronic obstructive pulmonary disease) (CMS/[facility])     Dysrhythmia     nonsustained v-tach    History of transfusion     2022    Hyperlipidemia     Ischemic cardiomyopathy     Lung nodule 2022    Myocardial infarction (CMS/[facility])     Presence of heart assist device (CMS/[facility])     Shortness of breath     Skin cancer         Current Outpatient Medications:     atorvastatin (LIPITOR) 80 mg tablet, Take 1 tablet (80 mg total) by mouth nightly, Disp: 90 tablet, Rfl: 0    warfarin (COUMADIN) 5 mg tablet, Take 2.5-5 mg by mouth, Disp: , Rfl:     evolocumab (Repatha SureClick) 140 mg/mL, Inject 1 mL (140 mg total) under the skin every 14 (fourteen) days, Disp: 6 mL, Rfl: 3    spironolactone (ALDACTONE) 25 mg tablet, Take 1 tablet (25 mg total) by mouth daily, Disp: 90 tablet, Rfl: 3    metoprolol succinate XL (TOPROL-XL) 50 mg 24 hr tablet, Take 1 tablet (50 mg total) by mouth daily, Disp: 90 tablet, Rfl: 3    budesonide-formoteroL (SYMBICORT) 160-4.5 mcg/actuation inhaler, Inhale 2 puffs 2 (two) times a day Rinse mouth with water after use. Do not swallow., Disp: 30.6 g, Rfl: 3    sacubitriL-valsartan (ENTRESTO) 24-26 mg tablet, Take 1 tablet by mouth 2 (two) times a day Pt to take 0.5tab BID for 2 weeks then increase to 1 tab BID. Pt will discontinue lisinopril for 3 day washout period prior to starting entresto., Disp: 60 tablet, Rfl: 11    nitroglycerin (NITROSTAT) 0.4 mg SL tablet, Place 1 tablet (0.4 mg total) under the tongue every 5 (five) minutes as needed for chest pain Limit 3 tabs per episode., Disp: 25 tablet, Rfl: 1    aspirin 81 mg EC tablet, Take 1 tablet (81 mg total) by mouth daily, Disp: 90 tablet, Rfl: 3  Allergies   Allergen Reactions    Ezetimibe Rash          Objective Findings:  AROM Right shoulder   Flexion: 150 degrees   IR: L1 level  ER  T1 level  ABD: 140 degrees   Scaption: 145 degrees            Right shoulder MMT  Flexion 4/5   Abduction: 4/5 (pain)  Internal rotation: 4/5   External rotation: 4/5       Objective    Treatment:     Therapeutic Exercise 30 min   Serratus wall slides with yellow band for resistance with foam roll 2x10     Serratus wall slides with pillow case with yellow band in order to replicate home use     Standing resisted external rotation with red band with overhead press to mimic  lifting stability in work setting x 15     Overhead press in seated position #5 2x10 RUE     Hammer curls #5 3x10           Therapeutic Activity 15 min  Subjective and objective measures taken for progress note purposes   Education on plan of care and home exercises                                                                                                                    Patient's FOTO functional outcome score is  NT/100 where a higher number = greater function.     Time Calculation  Start Time: 0800  Stop Time: 0845  Time Calculation (min): 45 min  Therapeutic Interventions (Time Spent in Minutes)  Therapeutic Activity: 15  Therapeutic Exercise: 30  Other Charges       Charge ID Procedure Code Description Qty. Modifiers Charge Entry User Diagnosis    [id number]  THERAPEUTIC PX 1/> AREAS EACH 15 MIN EXERCISES 2 GP Bernardo Yee, PT     [id number]  THERAPEUT ACTVITY DIRECT PT CONTACT EACH 15 MIN 1 GP Bernardo Yee, PT                Assessment/Plan   Assessment    Level of Function and Prognosis  Assessment: Pt demonstrates improved functional strength, activity tolerance, ROM, and right shoulder stability since the start of therapy. However there is still some residual weakness of the right shoulder with overhead stability and lifting away from body that could invite the risk of reinjury of the right shoulder with return to work. Pt will continue to benefit from PT in helping to further improve quality of strength and stability in having a safe transition back in to full participation of work setting.        Therapy Goals    Short Term Goals  Start goal date: 3/7/24 End goal date: 4/30/24 ST Goal 1: Pt will be independent and compliant with HOME EXERCISE PROGRAM   in order to maintain gains towards longer term goals.   Goal 1 Status: progressing  Start goal date: 3/7/24 End goal date: 4/30/24 ST Goal 2: Pt will demonstrate full PROM of the right shoulder in order to   safely  progress towards AROM of the right shoulder and no more than 2/10   pain in the right shoulder in order to demonstrate progress towards longer   term goals.   Goal 2 Status: met  Long Term Goals  Long term goal(s) met by: 9/11/24  LT Goal 1: Pt will demonstrate improved functional performance indicated   by an improved self reported FOTO score of 59 as compared to initial   measure of 4  Goal Status: met  LT Goal 2: Pt will demonstrate AROM of the right shoulder to be within   functional limits, grossly 5/5 of the right upper extremity, and no pain   in the right shoulder with all activities in helping to facilitate safe   return to work with decreased risk of reinjury of the right shoulder.   Goal Status: progressing       Plan  Plan  Treatment Interventions: Therapeutic Exercise, Therapeutic Activity, Neuromuscular Re-education, Manual Therapy  PT Frequency: 1-2x/wk  Plan for next treatment comment: Continue to progress functional strength, and right shoulder stability as appropriate for safe return to work related tasks  Treatment duration will be through 9/11/2024       Thank you for allowing us to share in the care of this patient. If you have any questions, recommendations, or further concerns regarding this patient, please feel free to contact us at  [address] [facility] [address] [phone]      * I have reviewed the plan of care and certify a continuing need for medically necessary services    Co-signed by:_________________________ Date and Time:________________

## 2024-07-16 ENCOUNTER — HOSPITAL ENCOUNTER (OUTPATIENT)
Dept: PHYSICAL THERAPY | Facility: HOSPITAL | Age: 64
Setting detail: THERAPIES SERIES
Discharge: STILL ADMITTED OR ADMINISTRATIVE | End: 2024-07-16
Payer: COMMERCIAL

## 2024-07-16 DIAGNOSIS — M25.511 CHRONIC RIGHT SHOULDER PAIN: ICD-10-CM

## 2024-07-16 DIAGNOSIS — M19.011 PRIMARY OSTEOARTHRITIS OF RIGHT SHOULDER: ICD-10-CM

## 2024-07-16 DIAGNOSIS — Z47.89 ORTHOPEDIC AFTERCARE: Primary | ICD-10-CM

## 2024-07-16 DIAGNOSIS — M75.101 ROTATOR CUFF SYNDROME OF RIGHT SHOULDER: ICD-10-CM

## 2024-07-16 DIAGNOSIS — M75.41 IMPINGEMENT SYNDROME OF RIGHT SHOULDER: ICD-10-CM

## 2024-07-16 DIAGNOSIS — G89.29 CHRONIC RIGHT SHOULDER PAIN: ICD-10-CM

## 2024-07-16 PROCEDURE — 97110 THERAPEUTIC EXERCISES: CPT | Mod: GP

## 2024-07-16 NOTE — INTERDISCIPLINARY/THERAPY
[address] [facility] [address]  [phone]      Outpatient Physical Therapy Daily Treatment Note    Date of Service: 24  Patient Name: Hayden Franklin  : 1960  Referring Provider: Mina Kendrick DO  Today's Visit Number: 34  Onset Date: 2024  SOC Date: 3/7/2024  Certification Period: 2024  HICN: [id number]  Medical Diagnosis listed first. Additional are Treatment Diagnoses:  1. Orthopedic aftercare  2. Rotator cuff syndrome of right shoulder  3. Impingement syndrome of right shoulder  4. Primary osteoarthritis of right shoulder  5. Chronic right shoulder pain      Subjective   Subjective Comments: Pt reports that the right shoulder is doing well but complains of bilateral hand pain that he has noticed getting worse over the course of a couple of weeks.  Has patient fallen since last visit? No     Have there been any changes in medications? No  Pain Assessment Scale  Pain Scale: 0-10  Pain Score: 0-No pain  Pain Type: Chronic pain  Pain Location: Shoulder  Pain Orientation: Right       Objective    Treatment:     Therapeutic Exercise 35 min   Serratus wall slides with yellow band for resistance with foam roll 2x10     Rows with cybex 3x10 #80 3x10     Lat pull down on cybex #50 2x10     Seated overhead press with #5 2x10     Bent over rows with #20 KB 3x10       Neuromuscular Reeducation 5 min (not billed)   Stability wand   0 degrees abduction 30 seconds   45 degrees abduction 30 seconds   90 degrees abduction 30 seconds   Forward flexion 30 seconds   Abduction 30 seconds                                                                                                                                    Time Calculation  Start Time: 0800  Stop Time: 0840  Time Calculation (min): 40 min  Therapeutic Interventions (Time Spent in Minutes)  Neuromuscular Re-Education: 5  Therapeutic Exercise: 35  Other Charges       Charge ID Procedure Code Description Qty. Modifiers  Charge Entry User Diagnosis    [id number]  THERAPEUTIC PX 1/> AREAS EACH 15 MIN EXERCISES 3 GP Bernardo Yee, PT                Assessment/Plan   Assessment    Level of Function and Prognosis  Assessment: Pt was able to complete all activities without the complaint right shoulder pain. Pt does report some popping in the anterior right shoulder with bent over rows with some increased right upper trapezius actvity in which tactile cueing was needed to correct with moderate carry over observed. Pt continued to report bilateral hand pain that was not exacerbated during exercises but was consistent. Fatigue in the right shoulder is reported with stability wand especially outside DAVID and overhead. Pt is progressing well in therapy and will continue to benefit from skilled therapy in helping to further improve functional strength, stability and activity tolerance in helping to return to work safely.        Therapy Goals    Short Term Goals  Start goal date: 3/7/24 End goal date: 4/30/24 ST Goal 1: Pt will be independent and compliant with HOME EXERCISE PROGRAM   in order to maintain gains towards longer term goals.   Goal 1 Status: progressing  Start goal date: 3/7/24 End goal date: 4/30/24   ST Goal 2: Pt will demonstrate full PROM of the right shoulder in order to   safely progress towards AROM of the right shoulder and no more than 2/10   pain in the right shoulder in order to demonstrate progress towards longer   term goals.   Goal 2 Status: met  Long Term Goals  Long term goal(s) met by: 9/11/24  LT Goal 1: Pt will demonstrate improved functional performance indicated   by an improved self reported FOTO score of 59 as compared to initial   measure of 4  Goal Status: met  LT Goal 2: Pt will demonstrate AROM of the right shoulder to be within   functional limits, grossly 5/5 of the right upper extremity, and no pain   in the right shoulder with all activities in helping to facilitate safe   return to  work with decreased risk of reinjury of the right shoulder.   Goal Status: progressing       Plan    Plan for next treatment comment: Continue to focus on work related tasks and work conditioning as appropriate.  Treatment duration will be through 9/11/2024

## 2024-07-17 ENCOUNTER — OFFICE VISIT (OUTPATIENT)
Dept: PULMONOLOGY | Facility: CLINIC | Age: 64
End: 2024-07-17
Payer: COMMERCIAL

## 2024-07-17 VITALS
BODY MASS INDEX: 30.2 KG/M2 | SYSTOLIC BLOOD PRESSURE: 104 MMHG | HEART RATE: 83 BPM | HEIGHT: 72 IN | DIASTOLIC BLOOD PRESSURE: 60 MMHG | OXYGEN SATURATION: 92 % | WEIGHT: 223 LBS

## 2024-07-17 DIAGNOSIS — J44.89 ASTHMA-COPD OVERLAP SYNDROME (CMS/HCC): Primary | ICD-10-CM

## 2024-07-17 DIAGNOSIS — F17.210 CIGARETTE SMOKER: ICD-10-CM

## 2024-07-17 DIAGNOSIS — R91.1 PULMONARY NODULE: ICD-10-CM

## 2024-07-17 PROCEDURE — 99214 OFFICE O/P EST MOD 30 MIN: CPT | Performed by: INTERNAL MEDICINE

## 2024-07-17 RX ORDER — ALBUTEROL SULFATE 90 UG/1
2 INHALANT RESPIRATORY (INHALATION) EVERY 6 HOURS PRN
Qty: 18 G | Refills: 11 | Status: SHIPPED | OUTPATIENT
Start: 2024-07-17

## 2024-07-17 ASSESSMENT — ENCOUNTER SYMPTOMS
BRUISES/BLEEDS EASILY: 1
ARTHRALGIAS: 1
FATIGUE: 1
SLEEP DISTURBANCE: 1

## 2024-07-17 NOTE — PROGRESS NOTES
Assessment      1. Asthma-COPD overlap syndrome (CMS/HCC)    2. Pulmonary nodule    3. Cigarette smoker            Plan   Doing well with his breathing, continue ICS/laba.  New prescription sent to pharmacy for albuterol to have available.  No changes today.  Counseled at length on smoking cessation.  He is motivated to quit and we set a quit date for August 8.  Previous pulmonary nodule had decreased in size from 12/2023 --> 1/2024, not PET avid.  Plan repeat scan in 6 months to reassess.  This can also service his annual screening scan.     Return to clinic in 6 months        On this date of service 30 minutes of total time was spent on this encounter, from 5642-0311.  This included preappointment review of chart, counseling, discussion of diagnosis and treatment options, documentation, and review of previous testing with patient      HPI  Hayden Franklin is a 63 y.o. year old male who returns for routine follow-up of asthma-COPD overlap, last seen in clinic by nurse practitioner January 2024.  Since that time continues to do well without exacerbation of his COPD.  He continues on ICS/laba faithfully with good results.  He uses albuterol sporadically, mostly related to air quality issues.  He also does occasionally prior to exertion.  Denies any recent cough, sputum, dyspnea or wheezing.  No limitations around his [place].  Had shoulder surgery earlier this year and has taken a little bit of time to recover from this but is nearing baseline.  No cardiac symptoms or GI complaints.    Previously had quit smoking, but started back up and is smoking roughly 1 pack weekly.        Objective     /60   Pulse 83   Ht 1.829 m (6')   Wt 101.2 kg (223 lb)   SpO2 92%   BMI 30.24 kg/m²       Physical Exam  Vitals reviewed.   Constitutional:       General: He is not in acute distress.     Appearance: He is normal weight. He is not ill-appearing.   HENT:      Mouth/Throat:      Mouth: Mucous membranes  "are moist.      Pharynx: Oropharynx is clear. No posterior oropharyngeal erythema.   Cardiovascular:      Rate and Rhythm: Normal rate and regular rhythm.      Heart sounds: No murmur heard.  Pulmonary:      Breath sounds: Normal breath sounds. No wheezing, rhonchi or rales.   Musculoskeletal:      Cervical back: Neck supple.      Right lower leg: No edema.      Left lower leg: No edema.   Lymphadenopathy:      Cervical: No cervical adenopathy.   Skin:     General: Skin is warm and dry.   Neurological:      Mental Status: He is alert and oriented to person, place, and time.   Psychiatric:         Behavior: Behavior normal.                Imaging  PET January 2024 and CT chest December 2023 report and images independently reviewed in detail        PFT  6/2023  Mild COPD with an asthmatic component  FEV1 2.3 L / 62% --> 2.9 L/78 (25% increase after albuterol.  FVC showed similar postbronchodilator improvement.  Spirometry was significantly improved compared to April 2022      Lab            No lab exists for component: \"LABALBU\"                              The following have been reviewed and updated as appropriate in this visit:      Review of Systems   Constitutional:  Positive for fatigue.   HENT:  Positive for congestion.    Cardiovascular:  Positive for chest pain (Chest wall tenderness) and leg swelling.   Musculoskeletal:  Positive for arthralgias.   Hematological:  Bruises/bleeds easily.   Psychiatric/Behavioral:  Positive for sleep disturbance.    All other systems reviewed and are negative.          Past Medical History:   Diagnosis Date    Aortic valve stenosis     s/p AV replacement 11/2013    Arthritis     Bilat hands, ankle    Ascending aorta dilatation (CMS/[facility])     s/p AAA repair 11/2013    Asthma     CAD (coronary artery disease)     1 vessel bypass    Cancer (CMS/[facility])     CHF (congestive heart failure) (CMS/[facility])     COPD (chronic obstructive pulmonary disease) (CMS/[facility])     Dysrhythmia     " nonsustained v-tach    History of transfusion     2013. 2022    Hyperlipidemia     Ischemic cardiomyopathy     Lung nodule 03/07/2022    Myocardial infarction (CMS/HCC)     Presence of heart assist device (CMS/HCC)     Shortness of breath     Skin cancer          Past Surgical History:   Procedure Laterality Date    AAA REPAIR - ENDOGRAFT  11/2013    ANKLE SURGERY Right 1989    No hardware    AORTIC ROOT ANGIOGRAM N/A 05/20/2022    Procedure: Aortic Arch  Angiogram;  Surgeon: Derick Gallegos MD;  Location: [facility] Cath Lab;  Service: Cardiovascular;  Laterality: N/A;    AORTIC VALVE REPLACEMENT  11/2013    BIVENTRICULAR ICD UPGRADE N/A 05/24/2023    Procedure: Bi-V ICD upgrade;  Surgeon: Mitchel Saldaña DO;  Location: [facility] EP Lab;  Service: Cardiovascular;  Laterality: N/A;    CARDIAC VALVE REPLACEMENT      COLONOSCOPY N/A 10/05/2023    Procedure: COLONOSCOPY with polypectomies, endo clip placement x2;  Surgeon: Patricia Hutton MD;  Location: [facility] Endoscopy;  Service: Endoscopy;  Laterality: N/A;    CORONARY ANGIOPLASTY  11/2013    CORONARY ARTERY BYPASS GRAFT  11/2013    1V CABG SVG- RCA    FINGER SURGERY Left 1979    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    LEFT HEART CATH N/A 05/20/2022    Procedure: Left heart cath;  Surgeon: Derick Gallegos MD;  Location: [facility] Cath Lab;  Service: Cardiovascular;  Laterality: N/A;    SHOULDER ARTHROSCOPY Right 2/1/2024    Procedure: RIGHT SHOULDER ARTHROSCOPIC ROTATOR CUFF REPAIR, SUBACROMIAL DECOMPRESSION, POSSIBLE SUPERIOR CAPSULAR RECONSTRUCTION, EXTENSIVE DEBRIDEMENT;  Surgeon: Mina Kendrick DO;  Location: [facility] OR;  Service: Orthopedics;  Laterality: Right;  CPT 03892, 40204, 38167, 91506; OUTPATIENT    TONSILLECTOMY Bilateral 1969          Social History     Tobacco Use    Smoking status: Former     Current packs/day: 0.00     Average packs/day: 0.8 packs/day for 40.0 years (30.0 ttl pk-yrs)     Types: Cigarettes     Start date: 2/24/1982     Quit date:  2022     Years since quittin.3    Smokeless tobacco: Never    Tobacco comments:     Last smoked 3/1/22   Vaping Use    Vaping status: Never Used   Substance Use Topics    Alcohol use: Yes     Alcohol/week: 4.0 - 5.0 standard drinks of alcohol     Types: 4 - 5 Cans of beer per week    Drug use: No          Family History   Problem Relation Age of Onset    Cancer Mother     Heart disease Father     Aneurysm Father     Other Sister         multisystem atrophy- on hospice    Breast cancer Sister     No Known Problems Sister     Gallbladder disease Daughter     No Known Problems Daughter             Current Outpatient Medications:     budesonide/formoterol fumarate (BREYNA INHL), Inhale, Disp: , Rfl:     atorvastatin (LIPITOR) 80 mg tablet, Take 1 tablet (80 mg total) by mouth nightly, Disp: 90 tablet, Rfl: 0    warfarin (COUMADIN) 5 mg tablet, Take 2.5-5 mg by mouth, Disp: , Rfl:     evolocumab (Repatha SureClick) 140 mg/mL, Inject 1 mL (140 mg total) under the skin every 14 (fourteen) days, Disp: 6 mL, Rfl: 3    spironolactone (ALDACTONE) 25 mg tablet, Take 1 tablet (25 mg total) by mouth daily, Disp: 90 tablet, Rfl: 3    metoprolol succinate XL (TOPROL-XL) 50 mg 24 hr tablet, Take 1 tablet (50 mg total) by mouth daily, Disp: 90 tablet, Rfl: 3    sacubitriL-valsartan (ENTRESTO) 24-26 mg tablet, Take 1 tablet by mouth 2 (two) times a day Pt to take 0.5tab BID for 2 weeks then increase to 1 tab BID. Pt will discontinue lisinopril for 3 day washout period prior to starting entresto., Disp: 60 tablet, Rfl: 11    nitroglycerin (NITROSTAT) 0.4 mg SL tablet, Place 1 tablet (0.4 mg total) under the tongue every 5 (five) minutes as needed for chest pain Limit 3 tabs per episode., Disp: 25 tablet, Rfl: 1    aspirin 81 mg EC tablet, Take 1 tablet (81 mg total) by mouth daily, Disp: 90 tablet, Rfl: 3    albuterol HFA 90 mcg/actuation inhaler, Inhale 2 puffs every 6 (six) hours as needed for wheezing, Disp: 18 g, Rfl: 11     budesonide-formoteroL (SYMBICORT) 160-4.5 mcg/actuation inhaler, Inhale 2 puffs 2 (two) times a day Rinse mouth with water after use. Do not swallow. (Patient not taking: Reported on 7/17/2024), Disp: 30.6 g, Rfl: 3              A voice recognition program was used to aid in documentation of this record.  Sometimes words are not printed exactly as they were spoken.  While efforts were made to carefully edit and correct any inaccuracies, some errors may be present; please take these into context.  Please contact the provider if areas are identified.

## 2024-07-18 ENCOUNTER — HOSPITAL ENCOUNTER (OUTPATIENT)
Dept: PHYSICAL THERAPY | Facility: HOSPITAL | Age: 64
Setting detail: THERAPIES SERIES
Discharge: STILL ADMITTED OR ADMINISTRATIVE | End: 2024-07-18
Payer: COMMERCIAL

## 2024-07-18 DIAGNOSIS — Z47.89 ORTHOPEDIC AFTERCARE: ICD-10-CM

## 2024-07-18 DIAGNOSIS — M75.101 ROTATOR CUFF SYNDROME OF RIGHT SHOULDER: ICD-10-CM

## 2024-07-18 DIAGNOSIS — M75.41 IMPINGEMENT SYNDROME OF RIGHT SHOULDER: ICD-10-CM

## 2024-07-18 DIAGNOSIS — M19.011 PRIMARY OSTEOARTHRITIS OF RIGHT SHOULDER: ICD-10-CM

## 2024-07-18 DIAGNOSIS — M25.511 CHRONIC RIGHT SHOULDER PAIN: Primary | ICD-10-CM

## 2024-07-18 DIAGNOSIS — G89.29 CHRONIC RIGHT SHOULDER PAIN: Primary | ICD-10-CM

## 2024-07-18 PROCEDURE — 97110 THERAPEUTIC EXERCISES: CPT | Mod: GP

## 2024-07-18 NOTE — INTERDISCIPLINARY/THERAPY
[address] [facility] [id number] [phone]      Outpatient Physical Therapy Daily Treatment Note    Date of Service: 24  Patient Name: Hayden Franklin  : 1960  Referring Provider: Mina Kendrick DO  Today's Visit Number: 35  Onset Date: 2024  SOC Date: 3/7/2024  Certification Period: 2024  HICN: [id number]  Medical Diagnosis listed first. Additional are Treatment Diagnoses:  1. Chronic right shoulder pain  2. Rotator cuff syndrome of right shoulder  3. Impingement syndrome of right shoulder  4. Primary osteoarthritis of right shoulder  5. Orthopedic aftercare      Subjective   Subjective Comments: Pt reports that the right shoulder is doing well overall with no new complaints. However pt complains of bilateral hand pain that has gotten stiffer and more sore over the last couple of weeks, in particular the dorsum of bilatearal thumbs.  Has patient fallen since last visit? No     Have there been any changes in medications? No  Pain Assessment Scale  Pain Scale: 0-10  Pain Score: 0-No pain  Pain Location: Shoulder  Pain Orientation: Right       Objective    Treatment:     Therapeutic Exercise 38 min     UBE 2 minutes forward and 2 minutes backward at level 3 for physiological warm up    Bent over rows with #20 KB 2x15 RUE     Forward flexion #4 2x10     Scaption #4 2x10     Abduction #4 1x10; 1x10 with #2 (fatigue)    Hammer curls #8 3x10     Resisted external rotation with green band 2x15 @45 degrees abduction    Pronation supination with kettle bell flip #10 45 seconds right hand     Rows with cybex 3x10 #90    wall walks with yellow resistance around wrists 4 laps of 6 meters each     Neuromuscular Reeducation 3 min (not billed)   Stability wand   0 degrees abduction 30 seconds   45 degrees abduction 30 seconds   90 degrees abduction 30 seconds                                                                                                                                           Time Calculation  Start Time: 0800  Stop Time: 0841  Time Calculation (min): 41 min  Therapeutic Interventions (Time Spent in Minutes)  Neuromuscular Re-Education: 3  Therapeutic Exercise: 38  Other Charges       Charge ID Procedure Code Description Qty. Modifiers Charge Entry User Diagnosis    [id number] HC THERAPEUTIC PX 1/> AREAS EACH 15 MIN EXERCISES 3 GP Bernardo Yee, PT                Assessment/Plan   Assessment    Level of Function and Prognosis  Assessment: Pt was able to complete all activities without the complaint of pain in the right shoulder. Pt does note bilateral wrist pain with resisted flex bar to mimic resisted screwdriving for work in which pt was educated on wrist extension stretching. Pt does tend to have a slight overactive right upper trapezius with overhead activitites. Pt reports fatigue in the right shoulder after session and feels as though he is making good progress overall.        Therapy Goals    Short Term Goals  Start goal date: 3/7/24 End goal date: 4/30/24   ST Goal 1: Pt will be independent and compliant with HOME EXERCISE PROGRAM   in order to maintain gains towards longer term goals.   Goal 1 Status: progressing  Start goal date: 3/7/24 End goal date: 4/30/24   ST Goal 2: Pt will demonstrate full PROM of the right shoulder in order to   safely progress towards AROM of the right shoulder and no more than 2/10   pain in the right shoulder in order to demonstrate progress towards longer   term goals.   Goal 2 Status: met  Long Term Goals  Long term goal(s) met by: 9/11/24  LT Goal 1: Pt will demonstrate improved functional performance indicated   by an improved self reported FOTO score of 59 as compared to initial   measure of 4  Goal Status: met  LT Goal 2: Pt will demonstrate AROM of the right shoulder to be within   functional limits, grossly 5/5 of the right upper extremity, and  no pain   in the right shoulder with all activities in helping to facilitate safe   return to work with decreased risk of reinjury of the right shoulder.   Goal Status: progressing       Plan    Plan for next treatment comment: Continue to focus on overhead stability and functional strength  Treatment duration will be through 9/11/2024

## 2024-07-26 PROCEDURE — 93295 DEV INTERROG REMOTE 1/2/MLT: CPT | Performed by: INTERNAL MEDICINE

## 2024-07-30 ENCOUNTER — HOSPITAL ENCOUNTER (OUTPATIENT)
Dept: PHYSICAL THERAPY | Facility: HOSPITAL | Age: 64
Setting detail: THERAPIES SERIES
Discharge: STILL ADMITTED OR ADMINISTRATIVE | End: 2024-07-30
Payer: COMMERCIAL

## 2024-07-30 DIAGNOSIS — M75.101 ROTATOR CUFF SYNDROME OF RIGHT SHOULDER: ICD-10-CM

## 2024-07-30 DIAGNOSIS — M19.011 PRIMARY OSTEOARTHRITIS OF RIGHT SHOULDER: ICD-10-CM

## 2024-07-30 DIAGNOSIS — Z47.89 ORTHOPEDIC AFTERCARE: Primary | ICD-10-CM

## 2024-07-30 DIAGNOSIS — M25.511 CHRONIC RIGHT SHOULDER PAIN: ICD-10-CM

## 2024-07-30 DIAGNOSIS — G89.29 CHRONIC RIGHT SHOULDER PAIN: ICD-10-CM

## 2024-07-30 DIAGNOSIS — M75.41 IMPINGEMENT SYNDROME OF RIGHT SHOULDER: ICD-10-CM

## 2024-07-30 PROCEDURE — 97110 THERAPEUTIC EXERCISES: CPT | Mod: GP

## 2024-07-30 NOTE — INTERDISCIPLINARY/THERAPY
"  [address] [facility] [id number] [phone]      Outpatient Physical Therapy Daily Treatment Note    Date of Service: 24  Patient Name: Hayden Franklin  : 1960  Referring Provider: Mina Kendrick DO  Today's Visit Number: 36  Onset Date: 2024  SOC Date: 3/7/2024  Certification Period: 2024  HICN: [id number]  Medical Diagnosis listed first. Additional are Treatment Diagnoses:  1. Orthopedic aftercare  2. Chronic right shoulder pain  3. Rotator cuff syndrome of right shoulder  4. Impingement syndrome of right shoulder  5. Primary osteoarthritis of right shoulder      Subjective   Subjective Comments: Pt reports that the right shoulder has been \"feeling good\" overall with no new complaints. Pt does however note that the bilateral hand pain is still present. The pain is worse in the morning and gets better as the day continues.  Has patient fallen since last visit? No     Have there been any changes in medications? No          Objective    Treatment:     Therapeutic Exercise 40 min     UBE 2 minutes forward and 2 minutes backward at level 3 for physiological warm up    Forward flexion #5 2x10     Scaption #5 2x10     Abduction #5 2x10 (slight right upper trapezius hiking with last 3-4 reps due to fatigue)    Hammer curls #10 3x10     Serratus punch with yellow band #2 3x10     Seated overhead press #5 3x10 (weakness observed     Seated  press 0# x 10 on smith machine  #5 x 10     Standing bent over rows with #28 KB RUE  x 20     Standing bent over rows with #35 KB x 10                                                                                                                                                   Time Calculation  Start Time: 0800  Stop Time: 0840  Time Calculation (min): 40 min  Therapeutic Interventions (Time Spent in Minutes)  Therapeutic Exercise: 40  Other Charges       Charge ID Procedure Code Description Qty. Modifiers Charge Entry User " Diagnosis    [id number]  THERAPEUTIC PX 1/> AREAS EACH 15 MIN EXERCISES 3 GP Bernardo Yee, PT                Assessment/Plan   Assessment    Level of Function and Prognosis  Assessment: Pt was able to perform all progressions without the complaint of right shoulder pain. Fatigue was visable with resisted overhead presses with increased reps. Pt demonstrates good strength with bent over rows with #35 KB which is a great progression leading into returning to work. Weakness and fatigue observed with overhead activities that can pose challenge with full return to work related tasks. Pt will benefit from PT in helping to further improve functional strength, activity tolerance, and right shoulder stability in helping to safely return to work.        Therapy Goals    Short Term Goals  Start goal date: 3/7/24 End goal date: 4/30/24 ST Goal 1: Pt will be independent and compliant with HOME EXERCISE PROGRAM   in order to maintain gains towards longer term goals.   Goal 1 Status: progressing  Start goal date: 3/7/24 End goal date: 4/30/24   ST Goal 2: Pt will demonstrate full PROM of the right shoulder in order to   safely progress towards AROM of the right shoulder and no more than 2/10   pain in the right shoulder in order to demonstrate progress towards longer   term goals.   Goal 2 Status: met  Long Term Goals  Long term goal(s) met by: 9/11/24  LT Goal 1: Pt will demonstrate improved functional performance indicated   by an improved self reported FOTO score of 59 as compared to initial   measure of 4  Goal Status: met  LT Goal 2: Pt will demonstrate AROM of the right shoulder to be within   functional limits, grossly 5/5 of the right upper extremity, and no pain   in the right shoulder with all activities in helping to facilitate safe   return to work with decreased risk of reinjury of the right shoulder.   Goal Status: progressing       Plan    Plan for next treatment comment: Continue to progress  functional strength, activity tolerance, and overhead stability as appropriate.  Treatment duration will be through 9/11/2024

## 2024-08-06 ENCOUNTER — HOSPITAL ENCOUNTER (OUTPATIENT)
Dept: PHYSICAL THERAPY | Facility: HOSPITAL | Age: 64
Setting detail: THERAPIES SERIES
Discharge: STILL ADMITTED OR ADMINISTRATIVE | End: 2024-08-06
Payer: COMMERCIAL

## 2024-08-06 DIAGNOSIS — Z47.89 ORTHOPEDIC AFTERCARE: Primary | ICD-10-CM

## 2024-08-06 DIAGNOSIS — M75.41 IMPINGEMENT SYNDROME OF RIGHT SHOULDER: ICD-10-CM

## 2024-08-06 DIAGNOSIS — M19.011 PRIMARY OSTEOARTHRITIS OF RIGHT SHOULDER: ICD-10-CM

## 2024-08-06 DIAGNOSIS — G89.29 CHRONIC RIGHT SHOULDER PAIN: ICD-10-CM

## 2024-08-06 DIAGNOSIS — M75.101 ROTATOR CUFF SYNDROME OF RIGHT SHOULDER: ICD-10-CM

## 2024-08-06 DIAGNOSIS — M25.511 CHRONIC RIGHT SHOULDER PAIN: ICD-10-CM

## 2024-08-06 PROCEDURE — 97110 THERAPEUTIC EXERCISES: CPT | Mod: GP

## 2024-08-06 NOTE — INTERDISCIPLINARY/THERAPY
[address] [facility] [id number] [phone]      Outpatient Physical Therapy Daily Treatment Note    Date of Service: 24  Patient Name: Hayden Franklin  : 1960  Referring Provider: Mina Kendrick DO  Today's Visit Number: 37  Onset Date: 2024  SOC Date: 3/7/2024  Certification Period: 2024  HICN: [id number]  Medical Diagnosis listed first. Additional are Treatment Diagnoses:  1. Orthopedic aftercare  2. Chronic right shoulder pain  3. Rotator cuff syndrome of right shoulder  4. Impingement syndrome of right shoulder  5. Primary osteoarthritis of right shoulder      Subjective   Subjective Comments: Pt reports that the right shoulder continues to do great but notes that he continues to have significant bilateral hand pain R>L which appears to be a significant deficit that impedes quality of performance during activities of daily living.  Has patient fallen since last visit? No     Have there been any changes in medications? No  Pain Assessment Scale  Pain Scale: 0-10  Pain Score: 0-No pain       Objective    Treatment:     Therapeutic Exercise 39 min     UBE 2 minutes forward and 2 minutes backward at level 3 for physiological warm up    Landmine press (just bar) 3x10 RUE     Overhead carry #9 KB bell up 2 laps of 45 meters each     Suitcase carry #35 2 laps of 45 meters each RUE     Resisted external rotation on Anish 3 PSI 3x10 RUE     Shoulder press on Anish #15 3x 10 RUE     Forward flexion #5 1x10     Scaption #5 1x10     Abduction #5 1x10 (slight right upper trapezius hiking with last 3-4 reps due to fatigue)    Hammer curls #10 3x10      Neuromuscular Reeducation 5 min (not billed)  Stability wand   0 degrees abduction 30 seconds  45 degrees abduction 30 seconds   90 degrees abduction 90 degrees elbow flexion 30 seconds                                                                                                                                                     Time Calculation  Start Time: 0800  Stop Time: 0844  Time Calculation (min): 44 min  Therapeutic Interventions (Time Spent in Minutes)  Neuromuscular Re-Education: 5  Therapeutic Exercise: 39  Other Charges       Charge ID Procedure Code Description Qty. Modifiers Charge Entry User Diagnosis    [id number] HC THERAPEUTIC PX 1/> AREAS EACH 15 MIN EXERCISES 3 GP Bernardo Yee, PT                Assessment/Plan   Assessment    Level of Function and Prognosis  Assessment: Pt was able to progress in all activities without the complaint of right shoulder pain. Continued right shoulder upper trapezius hiking noted with resisted abduction of up to #5 indicating some residual weakness and motor control deficits. Pt was unable to perform reaction ball catches due to the onset of right thumb pain which tenderness to palpation of the right abductor pollicis brevis tendon is noted. Pt was educated on the possiblity of obtaining a referral for hand therapy in which pt reports understanding to education provided. Pt is progressing well in therapy.        Therapy Goals    Short Term Goals  Start goal date: 3/7/24 End goal date: 4/30/24   ST Goal 1: Pt will be independent and compliant with HOME EXERCISE PROGRAM   in order to maintain gains towards longer term goals.   Goal 1 Status: progressing  Start goal date: 3/7/24 End goal date: 4/30/24   ST Goal 2: Pt will demonstrate full PROM of the right shoulder in order to   safely progress towards AROM of the right shoulder and no more than 2/10   pain in the right shoulder in order to demonstrate progress towards longer   term goals.   Goal 2 Status: met  Long Term Goals  Long term goal(s) met by: 9/11/24  LT Goal 1: Pt will demonstrate improved functional performance indicated   by an improved self reported FOTO score of 59 as compared to initial   measure of 4  Goal Status: met  LT  Goal 2: Pt will demonstrate AROM of the right shoulder to be within   functional limits, grossly 5/5 of the right upper extremity, and no pain   in the right shoulder with all activities in helping to facilitate safe   return to work with decreased risk of reinjury of the right shoulder.   Goal Status: progressing       Plan    Plan for next treatment comment: Continue to progress functional strength, activity tolerance, and right shoulder stability as appropriate.  Treatment duration will be through 9/11/2024

## 2024-08-13 ENCOUNTER — OFFICE VISIT (OUTPATIENT)
Dept: ORTHOPEDIC SURGERY | Facility: CLINIC | Age: 64
End: 2024-08-13
Payer: COMMERCIAL

## 2024-08-13 ENCOUNTER — HOSPITAL ENCOUNTER (OUTPATIENT)
Dept: PHYSICAL THERAPY | Facility: HOSPITAL | Age: 64
Setting detail: THERAPIES SERIES
Discharge: STILL ADMITTED OR ADMINISTRATIVE | End: 2024-08-13
Payer: COMMERCIAL

## 2024-08-13 VITALS
SYSTOLIC BLOOD PRESSURE: 102 MMHG | DIASTOLIC BLOOD PRESSURE: 65 MMHG | BODY MASS INDEX: 29.93 KG/M2 | HEIGHT: 72 IN | OXYGEN SATURATION: 93 % | HEART RATE: 73 BPM | WEIGHT: 221 LBS

## 2024-08-13 DIAGNOSIS — M19.011 PRIMARY OSTEOARTHRITIS OF RIGHT SHOULDER: ICD-10-CM

## 2024-08-13 DIAGNOSIS — M75.41 IMPINGEMENT SYNDROME OF RIGHT SHOULDER: ICD-10-CM

## 2024-08-13 DIAGNOSIS — G89.29 CHRONIC RIGHT SHOULDER PAIN: ICD-10-CM

## 2024-08-13 DIAGNOSIS — M65.4 DE QUERVAIN'S DISEASE (RADIAL STYLOID TENOSYNOVITIS): Primary | ICD-10-CM

## 2024-08-13 DIAGNOSIS — Z47.89 ORTHOPEDIC AFTERCARE: Primary | ICD-10-CM

## 2024-08-13 DIAGNOSIS — M18.0 ARTHRITIS OF CARPOMETACARPAL (CMC) JOINT OF BOTH THUMBS: ICD-10-CM

## 2024-08-13 DIAGNOSIS — M75.101 ROTATOR CUFF SYNDROME OF RIGHT SHOULDER: ICD-10-CM

## 2024-08-13 DIAGNOSIS — M25.511 CHRONIC RIGHT SHOULDER PAIN: ICD-10-CM

## 2024-08-13 PROCEDURE — 99213 OFFICE O/P EST LOW 20 MIN: CPT | Mod: 25 | Performed by: PHYSICIAN ASSISTANT

## 2024-08-13 PROCEDURE — 97110 THERAPEUTIC EXERCISES: CPT | Mod: GP

## 2024-08-13 PROCEDURE — 97112 NEUROMUSCULAR REEDUCATION: CPT | Mod: GP

## 2024-08-13 PROCEDURE — 20550 NJX 1 TENDON SHEATH/LIGAMENT: CPT | Mod: 50 | Performed by: PHYSICIAN ASSISTANT

## 2024-08-13 RX ORDER — LIDOCAINE HYDROCHLORIDE 10 MG/ML
1 INJECTION, SOLUTION INFILTRATION; PERINEURAL
Status: COMPLETED | OUTPATIENT
Start: 2024-08-13 | End: 2024-08-13

## 2024-08-13 RX ORDER — TRIAMCINOLONE ACETONIDE 40 MG/ML
40 INJECTION, SUSPENSION INTRA-ARTICULAR; INTRAMUSCULAR
Status: COMPLETED | OUTPATIENT
Start: 2024-08-13 | End: 2024-08-13

## 2024-08-13 RX ADMIN — TRIAMCINOLONE ACETONIDE 40 MG: 40 INJECTION, SUSPENSION INTRA-ARTICULAR; INTRAMUSCULAR at 10:00

## 2024-08-13 RX ADMIN — LIDOCAINE HYDROCHLORIDE 1 ML: 10 INJECTION, SOLUTION INFILTRATION; PERINEURAL at 10:00

## 2024-08-13 ASSESSMENT — PAIN - FUNCTIONAL ASSESSMENT: PAIN_FUNCTIONAL_ASSESSMENT: 0-10

## 2024-08-13 ASSESSMENT — PAIN DESCRIPTION - DESCRIPTORS: DESCRIPTORS: SHARP

## 2024-08-13 NOTE — PROGRESS NOTES
Established Patient Note    Chief Complaint:  Bilateral hand pain    HPI/ Subjective:  Patient is a 63-year-old right-hand-dominant male who comes into the clinic today for evaluation of both his hands.  He states that he has predominately most of his pain and discomfort over the radial aspect of both his wrists and hands.  Sometimes he can have difficulty over the ulnar aspects as well.  He denies any specific numbness or tingling.  He has difficulty with gripping and twisting of both wrists and hands.  They can be fairly painful at times.  He denies any specific injuries.  He denies any neck pain currently.  He denies any radicular symptoms.  He is currently being treated for some shoulder problems though.      Review of Systems: See HPI      Allergies   Allergen Reactions    Ezetimibe Rash     Vitals:    08/13/24 0930   BP: 102/65   Pulse: 73   SpO2: 93%     Past Medical History:   Diagnosis Date    Aortic valve stenosis     s/p AV replacement 11/2013    Arthritis     Bilat hands, ankle    Ascending aorta dilatation (CMS/HCC)     s/p AAA repair 11/2013    Asthma     CAD (coronary artery disease)     1 vessel bypass    Cancer (CMS/[facility])     CHF (congestive heart failure) (CMS/HCC)     COPD (chronic obstructive pulmonary disease) (CMS/[facility])     Dysrhythmia     nonsustained v-tach    History of transfusion     2013. 2022    Hyperlipidemia     Ischemic cardiomyopathy     Lung nodule 03/07/2022    Myocardial infarction (CMS/[facility])     Presence of heart assist device (CMS/[facility])     Shortness of breath     Skin cancer      Past Surgical History:   Procedure Laterality Date    AAA REPAIR - ENDOGRAFT  11/2013    ANKLE SURGERY Right 1989    No hardware    AORTIC ROOT ANGIOGRAM N/A 05/20/2022    Procedure: Aortic Arch  Angiogram;  Surgeon: Derick Gallegos MD;  Location: [facility] Cath Lab;  Service: Cardiovascular;  Laterality: N/A;    AORTIC VALVE REPLACEMENT  11/2013    BIVENTRICULAR ICD UPGRADE N/A 05/24/2023    Procedure: Bi-V  ICD upgrade;  Surgeon: Mitchel Saldaña DO;  Location: [facility] EP Lab;  Service: Cardiovascular;  Laterality: N/A;    CARDIAC VALVE REPLACEMENT      COLONOSCOPY N/A 10/05/2023    Procedure: COLONOSCOPY with polypectomies, endo clip placement x2;  Surgeon: Patricia Hutton MD;  Location: [facility] Endoscopy;  Service: Endoscopy;  Laterality: N/A;    CORONARY ANGIOPLASTY  11/2013    CORONARY ARTERY BYPASS GRAFT  11/2013    1V CABG SVG- RCA    FINGER SURGERY Left 1979    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    LEFT HEART CATH N/A 05/20/2022    Procedure: Left heart cath;  Surgeon: Derick Gallegos MD;  Location: [facility] Cath Lab;  Service: Cardiovascular;  Laterality: N/A;    SHOULDER ARTHROSCOPY Right 2/1/2024    Procedure: RIGHT SHOULDER ARTHROSCOPIC ROTATOR CUFF REPAIR, SUBACROMIAL DECOMPRESSION, POSSIBLE SUPERIOR CAPSULAR RECONSTRUCTION, EXTENSIVE DEBRIDEMENT;  Surgeon: Mina Kendrick DO;  Location: [facility] OR;  Service: Orthopedics;  Laterality: Right;  CPT 76383, 39077, 84893, 41866; OUTPATIENT    TONSILLECTOMY Bilateral 1969     Current Outpatient Medications   Medication Sig Dispense Refill    budesonide/formoterol fumarate (BREYNA INHL) Inhale      albuterol HFA 90 mcg/actuation inhaler Inhale 2 puffs every 6 (six) hours as needed for wheezing 18 g 11    atorvastatin (LIPITOR) 80 mg tablet Take 1 tablet (80 mg total) by mouth nightly 90 tablet 0    warfarin (COUMADIN) 5 mg tablet Take 2.5-5 mg by mouth      evolocumab (Repatha SureClick) 140 mg/mL Inject 1 mL (140 mg total) under the skin every 14 (fourteen) days 6 mL 3    spironolactone (ALDACTONE) 25 mg tablet Take 1 tablet (25 mg total) by mouth daily 90 tablet 3    metoprolol succinate XL (TOPROL-XL) 50 mg 24 hr tablet Take 1 tablet (50 mg total) by mouth daily 90 tablet 3    sacubitriL-valsartan (ENTRESTO) 24-26 mg tablet Take 1 tablet by mouth 2 (two) times a day Pt to take 0.5tab BID for 2 weeks then increase to 1 tab BID. Pt will discontinue lisinopril  for 3 day washout period prior to starting entresto. 60 tablet 11    nitroglycerin (NITROSTAT) 0.4 mg SL tablet Place 1 tablet (0.4 mg total) under the tongue every 5 (five) minutes as needed for chest pain Limit 3 tabs per episode. 25 tablet 1    aspirin 81 mg EC tablet Take 1 tablet (81 mg total) by mouth daily 90 tablet 3    budesonide-formoteroL (SYMBICORT) 160-4.5 mcg/actuation inhaler Inhale 2 puffs 2 (two) times a day Rinse mouth with water after use. Do not swallow. (Patient not taking: Reported on 7/17/2024) 30.6 g 3     No current facility-administered medications for this visit.            Physical Exam  Ortho Exam  Patient is alert and appears comfortable.  Nonlabored breathing.  Neck supple.  Examination of both hands shows evidence of some primary osteoarthrosis of the digits of both hands.  He has predominant tenderness though over the first dorsal compartment and CMC joints bilaterally.  There is also some tenderness over the FCU some bilaterally.  Positive basal grind test bilaterally.  Positive Finkelstein test bilaterally.  Distal digits of both hands are grossly neurovascularly intact though.  No signs of any thenar or hypothenar atrophy about both hands.  Compartments are otherwise supple.  Negative Tinel's at the elbow and wrist bilaterally     No results found.    Orders Placed This Encounter   Procedures    Hand/Upper Ext Arthrocentesis/Injection: bilateral extensor compartment 1     This order was created via procedure documentation       CBC:    BMP:  Lab Results   Component Value Date    GLUCOSE 102 04/20/2024    CALCIUM 9.6 04/20/2024     04/20/2024    K 4.1 04/20/2024    CO2 23 04/20/2024     04/20/2024    BUN 19 04/20/2024    CREATININE 0.90 04/20/2024    ANIONGAP 11 04/20/2024       Assessment/ Plan:    Diagnoses and all orders for this visit:    De Quervain's disease (radial styloid tenosynovitis)    Arthritis of carpometacarpal (CMC) joint of both thumbs    Other  orders  -     Hand/Upper Ext Arthrocentesis/Injection: bilateral extensor compartment 1             Plan:  Discussed anatomy and possibilities.  Reviewed clinical exam and imaging studies.  Explained to the patient that he describes both thumb osteoarthrosis symptoms and de Quervain's symptoms bilaterally.  We discussed conservative treatment options with the patient today.  He is not interested in any kind of surgical management.  He elected to proceed with bilateral first dorsal compartment injections today to help with his discomfort.  Should he not get the relief he is looking forward to then I would recommend a follow-up with imaging of both hands.  Otherwise we will plan on seeing the patient back on a as needed basis.  Discussed with him some home comfort measures as well.  He understands current treatment plan.  All questions were answered       Return if symptoms worsen or fail to improve.     Discussed signs and symptoms of when to return to clinic including redness, fever, drainage, increase in pain, decrease in sensation, or with any questions or concerns.  If we are not open they are to go to urgent care or ER.  Office info provided.  They were agreeable with today's plan of care.    A voice recognition program was used to aid in documentation of this record. Sometimes words are not printed exactly as they were spoken.  While efforts were made to carefully edit and correct any inaccuracies, some errors may be present; please take these into context.  Please contact the provider's office if you have any questions or concerns.     MIRELLA Monahan   [facility]  [facility]

## 2024-08-13 NOTE — INTERDISCIPLINARY/THERAPY
[address] [facility] [address]  [phone]      Outpatient Physical Therapy Daily Treatment Note    Date of Service: 24  Patient Name: Hayden Franklin  : 1960  Referring Provider: Mina Kendrick DO  Today's Visit Number: 38  Onset Date: 2024  SOC Date: 3/7/2024  Certification Period: 2024  HICN: [id number]  Medical Diagnosis listed first. Additional are Treatment Diagnoses:  1. Orthopedic aftercare  2. Chronic right shoulder pain  3. Rotator cuff syndrome of right shoulder  4. Impingement syndrome of right shoulder  5. Primary osteoarthritis of right shoulder      Subjective   Subjective Comments: Pt reports that the right shoulder is doing very well with no complaint of increased pain. Pt does note however that the pain in the hands have continued and maybe in fact gotten worse. Pt reports that he is seeing PA today for an evaluation/consult on hands.  Has patient fallen since last visit? No     Have there been any changes in medications? No  Pain Assessment Scale  Pain Location: Shoulder  Pain Orientation: Right       Objective    Treatment:     Therapeutic Exercise 31 min     UBE 2 minutes forward and 2 minutes backward at level 3 for physiological warm up    Rows on cybex #50 x 15; #70 x 10     Lat pull on cybex #40 2x10     Landmine press (just bar) 3x10 RUE     Overhead carry #9 KB bell up 2 laps of 45 meters each     Shoulder D1 on Anish 5 PSI 2x10 RUE     Bicep curls #12 3x10      Neuromuscular Reeducation 9 min   Stability wand   0 degrees abduction 30 seconds  45 degrees abduction 30 seconds   90 degrees abduction 90 degrees elbow flexion 30 seconds     Ball taps on wall with 5 kg ball RUE                                                                                                                                                 Time Calculation  Start Time: 0800  Stop Time: 0840  Time Calculation (min): 40 min  Therapeutic Interventions (Time Spent  in Minutes)  Neuromuscular Re-Education: 9  Therapeutic Exercise: 31  Other Charges       Charge ID Procedure Code Description Qty. Modifiers Charge Entry User Diagnosis    [id number] HC THERAPEUTIC PX 1/> AREAS EACH 15 MIN EXERCISES 2 GP Bernardo Yee, PT     [id number] HC THER PX 1/> AREAS EACH 15 MIN NEUROMUSC REEDUCA 1 GP Bernardo Yee, PT                Assessment/Plan   Assessment    Level of Function and Prognosis  Assessment: Pt denies any pain in the right shoulder with all progressions but does note that he does have increased bilateral wrist pain R>L with loading/lifting especially overhead carry. Pt demonstrates good progressions of functional strength throughout all stability exercises. Pt has one more scheduled PT appointment and feels that he would be close to return to work if it wasn't for bilateral wrist pain.        Therapy Goals    Short Term Goals  Start goal date: 3/7/24 End goal date: 4/30/24   ST Goal 1: Pt will be independent and compliant with HOME EXERCISE PROGRAM   in order to maintain gains towards longer term goals.   Goal 1 Status: progressing  Start goal date: 3/7/24 End goal date: 4/30/24   ST Goal 2: Pt will demonstrate full PROM of the right shoulder in order to   safely progress towards AROM of the right shoulder and no more than 2/10   pain in the right shoulder in order to demonstrate progress towards longer   term goals.   Goal 2 Status: met  Long Term Goals  Long term goal(s) met by: 9/11/24  LT Goal 1: Pt will demonstrate improved functional performance indicated   by an improved self reported FOTO score of 59 as compared to initial   measure of 4  Goal Status: met  LT Goal 2: Pt will demonstrate AROM of the right shoulder to be within   functional limits, grossly 5/5 of the right upper extremity, and no pain   in the right shoulder with all activities in helping to facilitate safe   return to work with decreased risk of reinjury of the right shoulder.    Goal Status: progressing       Plan    Plan for next treatment comment: Discharge planning from PT  Treatment duration will be through 9/11/2024

## 2024-08-13 NOTE — PROGRESS NOTES
Hand/Upper Ext Arthrocentesis/Injection: bilateral extensor compartment 1 for de Quervain's tenosynovitis on 8/13/2024 10:00 AM  Indications: pain  Details: 25 G needle, radial approach  Medications (Right): 40 mg triamcinolone acetonide 40 mg/mL; 1 mL lidocaine 10 mg/mL (1 %)  Medications (Left): 40 mg triamcinolone acetonide 40 mg/mL; 1 mL lidocaine 10 mg/mL (1 %)  Outcome: tolerated well, no immediate complications  Procedure, treatment alternatives, risks and benefits explained, specific risks discussed. Consent was given by the patient. Immediately prior to procedure a time out was called to verify the correct patient, procedure, equipment, support staff and site/side marked as required. Patient was prepped and draped in the usual sterile fashion.

## 2024-08-14 ENCOUNTER — ANTICOAGULATION VISIT (OUTPATIENT)
Dept: CARDIOLOGY | Facility: CLINIC | Age: 64
End: 2024-08-14
Payer: COMMERCIAL

## 2024-08-14 ENCOUNTER — LAB (OUTPATIENT)
Dept: LAB | Facility: URGENT CARE | Age: 64
End: 2024-08-14
Payer: COMMERCIAL

## 2024-08-14 DIAGNOSIS — Z95.2 S/P AVR: Primary | Chronic | ICD-10-CM

## 2024-08-14 DIAGNOSIS — Z95.2 PRESENCE OF PROSTHETIC HEART VALVE: ICD-10-CM

## 2024-08-14 DIAGNOSIS — Z79.01 ANTICOAGULATION MANAGEMENT ENCOUNTER: ICD-10-CM

## 2024-08-14 DIAGNOSIS — Z51.81 ANTICOAGULATION MANAGEMENT ENCOUNTER: ICD-10-CM

## 2024-08-14 LAB
INR BLD: 4
INR PPP: 4
PROTHROMBIN TIME: 44.5 SECONDS (ref 9.4–12.5)

## 2024-08-14 PROCEDURE — 36415 COLL VENOUS BLD VENIPUNCTURE: CPT | Performed by: FAMILY MEDICINE

## 2024-08-14 PROCEDURE — 85610 PROTHROMBIN TIME: CPT | Performed by: FAMILY MEDICINE

## 2024-08-14 NOTE — PROGRESS NOTES
8/14/24  1430  left message to call [facility].    8/14/24  1435  ID verified. No medication or diet changes. States he had a few beers a couple of days ago.  No bleeding or bruising. Verified Warfarin dose and tablet size. Encouraged to call with any changes. Mailed reminder. QUINTEN    8/14/24  1435  Per [facility] and patient history. Warfarin 2.5 mg every Tue, Thu, Sat; 5 mg all other days.  Check INR in 3 weeks, 9/4. Verbalized understanding. QUINTEN

## 2024-09-04 ENCOUNTER — HOSPITAL ENCOUNTER (OUTPATIENT)
Dept: PHYSICAL THERAPY | Facility: HOSPITAL | Age: 64
Setting detail: THERAPIES SERIES
Discharge: STILL ADMITTED OR ADMINISTRATIVE | End: 2024-09-04
Payer: COMMERCIAL

## 2024-09-04 ENCOUNTER — ANTICOAGULATION VISIT (OUTPATIENT)
Dept: CARDIOLOGY | Facility: CLINIC | Age: 64
End: 2024-09-04
Payer: COMMERCIAL

## 2024-09-04 ENCOUNTER — OFFICE VISIT (OUTPATIENT)
Dept: ORTHOPEDIC SURGERY | Facility: CLINIC | Age: 64
End: 2024-09-04
Payer: COMMERCIAL

## 2024-09-04 ENCOUNTER — LAB (OUTPATIENT)
Dept: LAB | Facility: URGENT CARE | Age: 64
End: 2024-09-04
Payer: COMMERCIAL

## 2024-09-04 VITALS — OXYGEN SATURATION: 94 % | SYSTOLIC BLOOD PRESSURE: 115 MMHG | DIASTOLIC BLOOD PRESSURE: 70 MMHG | HEART RATE: 82 BPM

## 2024-09-04 DIAGNOSIS — M75.101 ROTATOR CUFF SYNDROME OF RIGHT SHOULDER: ICD-10-CM

## 2024-09-04 DIAGNOSIS — M75.41 IMPINGEMENT SYNDROME OF RIGHT SHOULDER: ICD-10-CM

## 2024-09-04 DIAGNOSIS — M75.41 IMPINGEMENT SYNDROME OF RIGHT SHOULDER: Primary | ICD-10-CM

## 2024-09-04 DIAGNOSIS — Z51.81 ANTICOAGULATION MANAGEMENT ENCOUNTER: ICD-10-CM

## 2024-09-04 DIAGNOSIS — Z95.2 PRESENCE OF PROSTHETIC HEART VALVE: ICD-10-CM

## 2024-09-04 DIAGNOSIS — M19.019 OSTEOARTHRITIS OF AC (ACROMIOCLAVICULAR) JOINT: ICD-10-CM

## 2024-09-04 DIAGNOSIS — Z95.2 S/P AVR: Primary | Chronic | ICD-10-CM

## 2024-09-04 DIAGNOSIS — M25.511 CHRONIC RIGHT SHOULDER PAIN: ICD-10-CM

## 2024-09-04 DIAGNOSIS — M19.011 PRIMARY OSTEOARTHRITIS OF RIGHT SHOULDER: ICD-10-CM

## 2024-09-04 DIAGNOSIS — M75.101 TEAR OF RIGHT ROTATOR CUFF, UNSPECIFIED TEAR EXTENT, UNSPECIFIED WHETHER TRAUMATIC: ICD-10-CM

## 2024-09-04 DIAGNOSIS — Z79.01 ANTICOAGULATION MANAGEMENT ENCOUNTER: ICD-10-CM

## 2024-09-04 DIAGNOSIS — Z47.89 ORTHOPEDIC AFTERCARE: Primary | ICD-10-CM

## 2024-09-04 DIAGNOSIS — G89.29 CHRONIC RIGHT SHOULDER PAIN: ICD-10-CM

## 2024-09-04 LAB
INR BLD: 2.5
INR PPP: 2.5
PROTHROMBIN TIME: 28.4 SECONDS (ref 9.4–12.5)

## 2024-09-04 PROCEDURE — 36415 COLL VENOUS BLD VENIPUNCTURE: CPT | Performed by: FAMILY MEDICINE

## 2024-09-04 PROCEDURE — 85610 PROTHROMBIN TIME: CPT | Performed by: FAMILY MEDICINE

## 2024-09-04 PROCEDURE — 97530 THERAPEUTIC ACTIVITIES: CPT | Mod: GP

## 2024-09-04 PROCEDURE — 97110 THERAPEUTIC EXERCISES: CPT | Mod: GP

## 2024-09-04 PROCEDURE — 99213 OFFICE O/P EST LOW 20 MIN: CPT | Performed by: ORTHOPAEDIC SURGERY

## 2024-09-04 ASSESSMENT — ENCOUNTER SYMPTOMS
BACK PAIN: 0
HEMATURIA: 0
EYE PAIN: 0
ABDOMINAL PAIN: 0
COLOR CHANGE: 0
DYSURIA: 0
COUGH: 0
CHILLS: 0
VOMITING: 0
FEVER: 0
SHORTNESS OF BREATH: 0
SEIZURES: 0
ARTHRALGIAS: 0
PALPITATIONS: 0
SORE THROAT: 0

## 2024-09-04 ASSESSMENT — PAIN - FUNCTIONAL ASSESSMENT: PAIN_FUNCTIONAL_ASSESSMENT: NO/DENIES PAIN

## 2024-09-04 ASSESSMENT — VISUAL ACUITY: OU: 1

## 2024-09-04 NOTE — LETTER
[facility]  [address] [facility] [address]  [phone]  Dept: [phone]  September 4, 2024     Patient: Hayden Franklin   YOB: 1960   Date of Visit: 9/4/2024       To Whom It May Concern:    It is my medical opinion that Hayden Franklin may return to full duty immediately with no restrictions.    If you have any questions or concerns, please have the patient contact [facility] or work directly with employer’s internal employee health to address specific needs.            Sincerely,        Mina Kendrick DO    Electronically signed by Mina Kendrick DO at 10:01 AM    CC: No Recipients

## 2024-09-04 NOTE — INTERDISCIPLINARY/THERAPY
[address] [facility] [id number] [phone]      Outpatient Physical Therapy Discharge Note    Date of Service: 24  Patient Name: Hayden Franklin  : 1960  Today's Visit Number: 39  Onset Date: 2024  SOC Date: 3/7/2024  Certification Period: 2024  HICN: [id number]  1. Orthopedic aftercare  2. Chronic right shoulder pain  3. Rotator cuff syndrome of right shoulder  4. Impingement syndrome of right shoulder  5. Primary osteoarthritis of right shoulder      Subjective   Subjective Comments: Pt reports that he was released to work per Dr. Kendrick and feels as though he is prepared to return as well. Pt notes that he feels that the right shoulder is doing very well. Pt feels comfortable with discharge from therapy.             Past Medical History:   Diagnosis Date    Aortic valve stenosis     s/p AV replacement 2013    Arthritis     Bilat hands, ankle    Ascending aorta dilatation (CMS/[facility])     s/p AAA repair 2013    Asthma     CAD (coronary artery disease)     1 vessel bypass    Cancer (CMS/[facility])     CHF (congestive heart failure) (CMS/[facility])     COPD (chronic obstructive pulmonary disease) (CMS/[facility])     Dysrhythmia     nonsustained v-tach    History of transfusion     2022    Hyperlipidemia     Ischemic cardiomyopathy     Lung nodule 2022    Myocardial infarction (CMS/[facility])     Presence of heart assist device (CMS/[facility])     Shortness of breath     Skin cancer        Current Outpatient Medications:     budesonide/formoterol fumarate (BREYNA INHL), Inhale, Disp: , Rfl:     albuterol HFA 90 mcg/actuation inhaler, Inhale 2 puffs every 6 (six) hours as needed for wheezing, Disp: 18 g, Rfl: 11    atorvastatin (LIPITOR) 80 mg tablet, Take 1 tablet (80 mg total) by mouth nightly, Disp: 90 tablet, Rfl: 0    warfarin (COUMADIN) 5 mg tablet, Take 2.5-5 mg by mouth, Disp: , Rfl:     evolocumab (Repatha SureClick) 140 mg/mL, Inject 1 mL (140 mg total) under the skin every  14 (fourteen) days, Disp: 6 mL, Rfl: 3    spironolactone (ALDACTONE) 25 mg tablet, Take 1 tablet (25 mg total) by mouth daily, Disp: 90 tablet, Rfl: 3    metoprolol succinate XL (TOPROL-XL) 50 mg 24 hr tablet, Take 1 tablet (50 mg total) by mouth daily, Disp: 90 tablet, Rfl: 3    budesonide-formoteroL (SYMBICORT) 160-4.5 mcg/actuation inhaler, Inhale 2 puffs 2 (two) times a day Rinse mouth with water after use. Do not swallow. (Patient not taking: Reported on 7/17/2024), Disp: 30.6 g, Rfl: 3    sacubitriL-valsartan (ENTRESTO) 24-26 mg tablet, Take 1 tablet by mouth 2 (two) times a day Pt to take 0.5tab BID for 2 weeks then increase to 1 tab BID. Pt will discontinue lisinopril for 3 day washout period prior to starting entresto., Disp: 60 tablet, Rfl: 11    nitroglycerin (NITROSTAT) 0.4 mg SL tablet, Place 1 tablet (0.4 mg total) under the tongue every 5 (five) minutes as needed for chest pain Limit 3 tabs per episode., Disp: 25 tablet, Rfl: 1    aspirin 81 mg EC tablet, Take 1 tablet (81 mg total) by mouth daily, Disp: 90 tablet, Rfl: 3  Allergies   Allergen Reactions    Ezetimibe Rash          Objective Findings:    AROM right shoulder   Grossly WITHIN FUNCTIONAL LIMITS     Strength   Grossly 5/5                    Objective    Treatment:     Therapeutic Exercise 27 min     UBE 2 minutes forward and 2 minutes backward at level 3 for physiological warm up    Home exercise program    Access Code: [email or web address]  URL: [email or web address]  Date: 09/04/2024  Prepared by: Bernardo Yee    Exercises  - Standing Shoulder Flexion with Resistance  - 1 x daily - 7 x weekly - 3 sets - 10 reps  - Standing Shoulder Scaption with Resistance  - 1 x daily - 7 x weekly - 3 sets - 10 reps  - Standing Single Arm Shoulder Abduction with Resistance  - 1 x daily - 7 x weekly - 3 sets - 10 reps  - Shoulder Flexion Serratus Activation with Resistance  - 1 x daily - 7 x weekly - 3 sets - 10 reps        Therapeutic Activity 10  min   Education on discharge planning and comprehensive HOME EXERCISE PROGRAM                                                                                                                                                Patient's FOTO functional outcome score is Score: 78/100 where a higher number = greater function.      Time Calculation  Start Time: 1030  Stop Time: 1058  Time Calculation (min): 28 min  Therapeutic Interventions (Time Spent in Minutes)  Therapeutic Activity: 10  Therapeutic Exercise: 18  Other Charges       Charge ID Procedure Code Description Qty. Modifiers Charge Entry User Diagnosis    [id number] HC THERAPEUTIC PX 1/> AREAS EACH 15 MIN EXERCISES 1 GP Bernardo Yee, PT     [id number] HC THERAPEUT ACTVITY DIRECT PT CONTACT EACH 15 MIN 1 GP Bernardo Yee, PT                Assessment/Plan   Assessment     Therapy Goals    Short Term Goals  Start goal date: 3/7/24 End goal date: 4/30/24   ST Goal 1: Pt will be independent and compliant with HOME EXERCISE PROGRAM   in order to maintain gains towards longer term goals.   Goal 1 Status: met  Start goal date: 3/7/24 End goal date: 4/30/24   ST Goal 2: Pt will demonstrate full PROM of the right shoulder in order to   safely progress towards AROM of the right shoulder and no more than 2/10   pain in the right shoulder in order to demonstrate progress towards longer   term goals.   Goal 2 Status: met  Long Term Goals  Long term goal(s) met by: 9/11/24  LT Goal 1: Pt will demonstrate improved functional performance indicated   by an improved self reported FOTO score of 59 as compared to initial   measure of 4  Goal Status: met  LT Goal 2: Pt will demonstrate AROM of the right shoulder to be within   functional limits, grossly 5/5 of the right upper extremity, and no pain   in the right shoulder with all activities in helping to facilitate safe   return to work with decreased risk of reinjury of the right shoulder.   Goal Status:  met     This patient has been discharged from therapy because: goals have been met  Date of last visit 9/4/2024  Total number of therapy visits provided: 39        Plan : Pt will be discharged from therapy at this time

## 2024-09-04 NOTE — PROGRESS NOTES
9/4/24  1350  ID verified. No medication or diet changes. No bleeding or bruising. Verified Warfarin dose and tablet size. Encouraged to call with any changes. Mailed reminder. QUINTEN    9/4/24  1350  Continue Warfarin dose 2.5 mg every Tue, Thu, Sat; 5 mg all other days. Check INR in 5 weeks, 10/9. Verbalized understanding. QUINTEN

## 2024-09-04 NOTE — PROGRESS NOTES
Patient Follow-up Office Note  Chief Complaint:  Chief Complaint   Patient presents with    Right Shoulder - Follow-up        HPI/ Subjective:  States that shoulder feels excellent.  He is very happy.  It does not bother him.  Symptoms of the tightness at the top and full motion.  He feels like he is able to get back to his job without restrictions.  Denies night pain.      Review of Systems   Constitutional:  Negative for chills and fever.   HENT:  Negative for ear pain and sore throat.    Eyes:  Negative for pain and visual disturbance.   Respiratory:  Negative for cough and shortness of breath.    Cardiovascular:  Negative for chest pain and palpitations.   Gastrointestinal:  Negative for abdominal pain and vomiting.   Genitourinary:  Negative for dysuria and hematuria.   Musculoskeletal:  Negative for arthralgias and back pain.   Skin:  Negative for color change and rash.   Neurological:  Negative for seizures and syncope.   All other systems reviewed and are negative.      Allergies   Allergen Reactions    Ezetimibe Rash     Past Medical History:   Diagnosis Date    Aortic valve stenosis     s/p AV replacement 11/2013    Arthritis     Bilat hands, ankle    Ascending aorta dilatation (CMS/[facility])     s/p AAA repair 11/2013    Asthma     CAD (coronary artery disease)     1 vessel bypass    Cancer (CMS/[facility])     CHF (congestive heart failure) (CMS/[facility])     COPD (chronic obstructive pulmonary disease) (CMS/[facility])     Dysrhythmia     nonsustained v-tach    History of transfusion     2013. 2022    Hyperlipidemia     Ischemic cardiomyopathy     Lung nodule 03/07/2022    Myocardial infarction (CMS/[facility])     Presence of heart assist device (CMS/[facility])     Shortness of breath     Skin cancer      Social History     Occupational History    Occupation:  at [employer]   Tobacco Use    Smoking status: Every Day     Current packs/day: 1.00     Average packs/day: 0.8 packs/day for 40.5 years (30.5 ttl pk-yrs)     Types:  Cigarettes     Start date: 2/24/1982     Last attempt to quit: 2/24/2022    Smokeless tobacco: Never    Tobacco comments:     Last smoked 3/1/22   Vaping Use    Vaping status: Never Used   Substance and Sexual Activity    Alcohol use: Yes     Alcohol/week: 4.0 - 5.0 standard drinks of alcohol     Types: 4 - 5 Cans of beer per week    Drug use: No    Sexual activity: Defer       Family History   Problem Relation Age of Onset    Cancer Mother     Heart disease Father     Aneurysm Father     Other Sister         multisystem atrophy- on hospice    Breast cancer Sister     No Known Problems Sister     Gallbladder disease Daughter     No Known Problems Daughter      Past Surgical History:   Procedure Laterality Date    AAA REPAIR - ENDOGRAFT  11/2013    ANKLE SURGERY Right 1989    No hardware    AORTIC ROOT ANGIOGRAM N/A 05/20/2022    Procedure: Aortic Arch  Angiogram;  Surgeon: Derick Gallegos MD;  Location: [facility] Cath Lab;  Service: Cardiovascular;  Laterality: N/A;    AORTIC VALVE REPLACEMENT  11/2013    BIVENTRICULAR ICD UPGRADE N/A 05/24/2023    Procedure: Bi-V ICD upgrade;  Surgeon: Mitchel Saldaña DO;  Location: [facility] EP Lab;  Service: Cardiovascular;  Laterality: N/A;    CARDIAC VALVE REPLACEMENT      COLONOSCOPY N/A 10/05/2023    Procedure: COLONOSCOPY with polypectomies, endo clip placement x2;  Surgeon: Patricia Hutton MD;  Location: [facility] End[facility];  Service: Endoscopy;  Laterality: N/A;    CORONARY ANGIOPLASTY  11/2013    CORONARY ARTERY BYPASS GRAFT  11/2013    1V CABG SVG- RCA    FINGER SURGERY Left 1979    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    LEFT HEART CATH N/A 05/20/2022    Procedure: Left heart cath;  Surgeon: Derick Gallegos MD;  Location: [facility] Cath Lab;  Service: Cardiovascular;  Laterality: N/A;    SHOULDER ARTHROSCOPY Right 2/1/2024    Procedure: RIGHT SHOULDER ARTHROSCOPIC ROTATOR CUFF REPAIR, SUBACROMIAL DECOMPRESSION, POSSIBLE SUPERIOR CAPSULAR RECONSTRUCTION, EXTENSIVE DEBRIDEMENT;   Surgeon: Mina Kendrick DO;  Location: [facility];  Service: Orthopedics;  Laterality: Right;  CPT 80195, 26008, 04839, 38481; OUTPATIENT    TONSILLECTOMY Bilateral 1969     Current Outpatient Medications   Medication Sig Dispense Refill    budesonide/formoterol fumarate (BREYNA INHL) Inhale      albuterol HFA 90 mcg/actuation inhaler Inhale 2 puffs every 6 (six) hours as needed for wheezing 18 g 11    atorvastatin (LIPITOR) 80 mg tablet Take 1 tablet (80 mg total) by mouth nightly 90 tablet 0    warfarin (COUMADIN) 5 mg tablet Take 2.5-5 mg by mouth      evolocumab (Repatha SureClick) 140 mg/mL Inject 1 mL (140 mg total) under the skin every 14 (fourteen) days 6 mL 3    spironolactone (ALDACTONE) 25 mg tablet Take 1 tablet (25 mg total) by mouth daily 90 tablet 3    metoprolol succinate XL (TOPROL-XL) 50 mg 24 hr tablet Take 1 tablet (50 mg total) by mouth daily 90 tablet 3    nitroglycerin (NITROSTAT) 0.4 mg SL tablet Place 1 tablet (0.4 mg total) under the tongue every 5 (five) minutes as needed for chest pain Limit 3 tabs per episode. 25 tablet 1    aspirin 81 mg EC tablet Take 1 tablet (81 mg total) by mouth daily 90 tablet 3    budesonide-formoteroL (SYMBICORT) 160-4.5 mcg/actuation inhaler Inhale 2 puffs 2 (two) times a day Rinse mouth with water after use. Do not swallow. (Patient not taking: Reported on 7/17/2024) 30.6 g 3    sacubitriL-valsartan (ENTRESTO) 24-26 mg tablet Take 1 tablet by mouth 2 (two) times a day Pt to take 0.5tab BID for 2 weeks then increase to 1 tab BID. Pt will discontinue lisinopril for 3 day washout period prior to starting entresto. 60 tablet 11     No current facility-administered medications for this visit.       Vitals:    09/04/24 0939   BP: 115/70   Pulse: 82   SpO2: 94%     Physical Exam  Vitals reviewed.   Constitutional:       General: He is not in acute distress.     Appearance: Normal appearance. He is well-developed and well-groomed.   HENT:      Head: Normocephalic  and atraumatic.      Right Ear: External ear normal.      Left Ear: External ear normal.      Nose: Nose normal. No congestion.      Mouth/Throat:      Mouth: Mucous membranes are moist.      Pharynx: Oropharynx is clear.   Eyes:      General: Lids are normal. Vision grossly intact.      Conjunctiva/sclera: Conjunctivae normal.   Cardiovascular:      Pulses: Normal pulses.   Pulmonary:      Effort: Pulmonary effort is normal. No respiratory distress.      Breath sounds: No wheezing.   Abdominal:      General: Abdomen is flat. There is no distension.      Palpations: Abdomen is soft.      Tenderness: There is no abdominal tenderness.   Musculoskeletal:      Cervical back: Normal range of motion and neck supple.   Skin:     General: Skin is warm and dry.      Capillary Refill: Capillary refill takes less than 2 seconds.      Findings: No rash.   Neurological:      General: No focal deficit present.      Mental Status: He is alert and oriented to person, place, and time.   Psychiatric:         Mood and Affect: Mood normal.         Behavior: Behavior normal.         Thought Content: Thought content normal.       Ortho Exam  Active forward flexion to 170 abduction to 165.  Gross motor strength to the rotator cuff is 5 out of 5    No results found.         Imaging:  No image results found.       Diagnosis:  1. Impingement syndrome of right shoulder    2. Tear of right rotator cuff, unspecified tear extent, unspecified whether traumatic           Assessment/ Plan:  Hayden Franklin is a 63 y.o. male 7 months right shoulder arthroscopic rotator cuff repair massive, SAD.  He is doing actually very well.  Feels strong.  Good motion.  We may start returning to all activities.  Encouraged continued home exercise program.  Follow-up as needed.              A voice recognition program was used to aid in documentation of this record. Sometimes words are not printed exactly as they were spoken.  While efforts were made to  carefully edit and correct any inaccuracies, some errors may be present; please take these into context.  Please contact the provider's office if you have any questions or concerns.

## 2024-09-06 ENCOUNTER — ANCILLARY PROCEDURE (OUTPATIENT)
Dept: SLEEP MEDICINE | Facility: HOSPITAL | Age: 64
End: 2024-09-06
Payer: COMMERCIAL

## 2024-09-06 VITALS — WEIGHT: 221 LBS | HEIGHT: 72 IN | BODY MASS INDEX: 29.93 KG/M2

## 2024-09-06 PROCEDURE — 95806 SLEEP STUDY UNATT&RESP EFFT: CPT

## 2024-09-06 PROCEDURE — 95806 SLEEP STUDY UNATT&RESP EFFT: CPT | Mod: 26 | Performed by: PSYCHIATRY & NEUROLOGY

## 2024-09-06 NOTE — PATIENT INSTRUCTIONS
Patient is here to  HSAT equipment. Pt. watched Sleep Disorders video. With assistance by JEREMIE Ferrer, patient was instructed on how to apply HSAT equipment by self-application. Application instructions, patient morning questionnaire, and activity log sent with pt. Pt. instructed to call the Sleep Center with any concerns during the study. Patient will do the study on 9/6/24. Will return equipment 9/9/24.  JEREMIE Ferrer 09/06/2024 1106

## 2024-09-13 ENCOUNTER — PATIENT MESSAGE (OUTPATIENT)
Dept: FAMILY MEDICINE | Facility: CLINIC | Age: 64
End: 2024-09-13
Payer: COMMERCIAL

## 2024-09-13 PROBLEM — G47.33 OBSTRUCTIVE SLEEP APNEA: Status: ACTIVE | Noted: 2024-09-13

## 2024-09-13 PROBLEM — G47.33 OBSTRUCTIVE SLEEP APNEA: Chronic | Status: ACTIVE | Noted: 2024-09-13

## 2024-09-16 NOTE — TELEPHONE ENCOUNTER
Spoke with patient he is in to speak with wife and see how they would like to proceed.  Will call back to let us know

## 2024-09-17 DIAGNOSIS — E78.2 MIXED HYPERLIPIDEMIA: ICD-10-CM

## 2024-09-17 DIAGNOSIS — I25.10 CORONARY ARTERY DISEASE INVOLVING NATIVE CORONARY ARTERY OF NATIVE HEART WITHOUT ANGINA PECTORIS: ICD-10-CM

## 2024-09-17 RX ORDER — ATORVASTATIN CALCIUM 80 MG/1
80 TABLET, FILM COATED ORAL NIGHTLY
Qty: 90 TABLET | Refills: 1 | Status: SHIPPED | OUTPATIENT
Start: 2024-09-17 | End: 2025-09-17

## 2024-09-25 DIAGNOSIS — G47.34 NOCTURNAL HYPOXEMIA: ICD-10-CM

## 2024-09-25 DIAGNOSIS — G47.8 NON-RESTORATIVE SLEEP: Primary | ICD-10-CM

## 2024-10-07 DIAGNOSIS — I50.42 CHRONIC COMBINED SYSTOLIC AND DIASTOLIC HEART FAILURE (CMS/HCC): Chronic | ICD-10-CM

## 2024-10-07 RX ORDER — SACUBITRIL AND VALSARTAN 24; 26 MG/1; MG/1
1 TABLET, FILM COATED ORAL 2 TIMES DAILY
Qty: 180 TABLET | Refills: 3 | Status: SHIPPED | OUTPATIENT
Start: 2024-10-07 | End: 2025-10-07

## 2024-10-09 ENCOUNTER — LAB (OUTPATIENT)
Dept: LAB | Facility: URGENT CARE | Age: 64
End: 2024-10-09
Payer: COMMERCIAL

## 2024-10-09 DIAGNOSIS — Z95.2 PRESENCE OF PROSTHETIC HEART VALVE: ICD-10-CM

## 2024-10-09 LAB
INR BLD: 1.7
PROTHROMBIN TIME: 19.1 SECONDS (ref 9.4–12.5)

## 2024-10-09 PROCEDURE — 36415 COLL VENOUS BLD VENIPUNCTURE: CPT | Performed by: FAMILY MEDICINE

## 2024-10-09 PROCEDURE — 85610 PROTHROMBIN TIME: CPT | Performed by: FAMILY MEDICINE

## 2024-10-10 ENCOUNTER — ANTICOAGULATION VISIT (OUTPATIENT)
Dept: CARDIOLOGY | Facility: CLINIC | Age: 64
End: 2024-10-10
Payer: COMMERCIAL

## 2024-10-10 DIAGNOSIS — Z51.81 ANTICOAGULATION MANAGEMENT ENCOUNTER: ICD-10-CM

## 2024-10-10 DIAGNOSIS — Z95.2 S/P AVR: Primary | Chronic | ICD-10-CM

## 2024-10-10 DIAGNOSIS — Z95.2 PRESENCE OF PROSTHETIC HEART VALVE: ICD-10-CM

## 2024-10-10 DIAGNOSIS — Z79.01 ANTICOAGULATION MANAGEMENT ENCOUNTER: ICD-10-CM

## 2024-10-10 LAB — INR PPP: 1.7

## 2024-10-10 NOTE — PATIENT INSTRUCTIONS
Per [facility] calculations and pt history, increase warfarin to 2.5 mg on Tue and Thu, all other days take 5 mg, recheck INR in 3 weeks-valarie

## 2024-10-10 NOTE — PROGRESS NOTES
[id number]@[facility]-spoke to Gopal, no changes in meds or diet, subtherapeutic-lado Per [facility] calculations and pt history, increase warfarin to 2.5 mg on Tue and Thu, all other days take 5 mg, recheck INR in 3 weeks-valarie

## 2024-10-15 ENCOUNTER — TELEPHONE (OUTPATIENT)
Dept: ORTHOPEDIC SURGERY | Facility: CLINIC | Age: 64
End: 2024-10-15
Payer: COMMERCIAL

## 2024-10-15 NOTE — TELEPHONE ENCOUNTER
She's requesting that we do a letter to his insurance stating why his extended physical therapy was medial neccessary? Are you ok with me proceeding with this letter?

## 2024-10-17 ENCOUNTER — DOCUMENTATION (OUTPATIENT)
Dept: PHYSICAL THERAPY | Facility: HOSPITAL | Age: 64
End: 2024-10-17
Payer: COMMERCIAL

## 2024-10-17 DIAGNOSIS — M75.101 ROTATOR CUFF SYNDROME OF RIGHT SHOULDER: ICD-10-CM

## 2024-10-17 DIAGNOSIS — Z47.89 ORTHOPEDIC AFTERCARE: Primary | ICD-10-CM

## 2024-10-17 DIAGNOSIS — M19.011 PRIMARY OSTEOARTHRITIS OF RIGHT SHOULDER: ICD-10-CM

## 2024-10-17 DIAGNOSIS — M25.511 CHRONIC RIGHT SHOULDER PAIN: ICD-10-CM

## 2024-10-17 DIAGNOSIS — M75.41 IMPINGEMENT SYNDROME OF RIGHT SHOULDER: ICD-10-CM

## 2024-10-17 DIAGNOSIS — G89.29 CHRONIC RIGHT SHOULDER PAIN: ICD-10-CM

## 2024-10-17 NOTE — INTERDISCIPLINARY/THERAPY
[address] [facility]  [place] [facility] [id number] [phone]      Outpatient Physical Therapy Discharge Note    Date of Service: 10/17/24  Patient Name: Hayden Franklin  : 1960  Today's Visit Number: NA  Onset Date: 2024  SOC Date: 3/7/2024  Certification Period: 2024  HICN: N/A  1. Orthopedic aftercare  2. Chronic right shoulder pain  3. Rotator cuff syndrome of right shoulder  4. Impingement syndrome of right shoulder  5. Primary osteoarthritis of right shoulder      Subjective   Patient did not attend a formal discharge therapy session or has not been seen in the last 30 days.  No objective information able to be obtained and goals not reassessed secondary to not attending a formal discharge therapy session.  Please reference previous daily notes and evaluation for information related to this patients care.                 Past Medical History:   Diagnosis Date    Aortic valve stenosis     s/p AV replacement 2013    Arthritis     Bilat hands, ankle    Ascending aorta dilatation (CMS/[facility])     s/p AAA repair 2013    Asthma     CAD (coronary artery disease)     1 vessel bypass    Cancer (CMS/[facility])     CHF (congestive heart failure) (CMS/[facility])     COPD (chronic obstructive pulmonary disease) (CMS/[facility])     Dysrhythmia     nonsustained v-tach    History of transfusion     2022    Hyperlipidemia     Ischemic cardiomyopathy     Lung nodule 2022    Myocardial infarction (CMS/[facility])     Obstructive sleep apnea 2024    With some central events; oxygen sat 80%  2024      Presence of heart assist device (CMS/[facility])     Shortness of breath     Skin cancer        Current Outpatient Medications:     sacubitriL-valsartan (ENTRESTO) 24-26 mg tablet, Take 1 tablet by mouth 2 (two) times a day Pt to take 0.5tab BID for 2 weeks then increase to 1 tab BID. Pt will discontinue lisinopril for 3 day washout period prior to starting entresto., Disp: 180 tablet, Rfl: 3    atorvastatin (LIPITOR) 80  mg tablet, Take 1 tablet (80 mg total) by mouth nightly, Disp: 90 tablet, Rfl: 1    budesonide/formoterol fumarate (BREYNA INHL), Inhale, Disp: , Rfl:     albuterol HFA 90 mcg/actuation inhaler, Inhale 2 puffs every 6 (six) hours as needed for wheezing, Disp: 18 g, Rfl: 11    warfarin (COUMADIN) 5 mg tablet, Take 2.5-5 mg by mouth, Disp: , Rfl:     evolocumab (Repatha SureClick) 140 mg/mL, Inject 1 mL (140 mg total) under the skin every 14 (fourteen) days, Disp: 6 mL, Rfl: 3    spironolactone (ALDACTONE) 25 mg tablet, Take 1 tablet (25 mg total) by mouth daily, Disp: 90 tablet, Rfl: 3    metoprolol succinate XL (TOPROL-XL) 50 mg 24 hr tablet, Take 1 tablet (50 mg total) by mouth daily, Disp: 90 tablet, Rfl: 3    budesonide-formoteroL (SYMBICORT) 160-4.5 mcg/actuation inhaler, Inhale 2 puffs 2 (two) times a day Rinse mouth with water after use. Do not swallow. (Patient not taking: Reported on 7/17/2024), Disp: 30.6 g, Rfl: 3    nitroglycerin (NITROSTAT) 0.4 mg SL tablet, Place 1 tablet (0.4 mg total) under the tongue every 5 (five) minutes as needed for chest pain Limit 3 tabs per episode., Disp: 25 tablet, Rfl: 1    aspirin 81 mg EC tablet, Take 1 tablet (81 mg total) by mouth daily, Disp: 90 tablet, Rfl: 3  Allergies   Allergen Reactions    Ezetimibe Rash          Objective Findings:  AROM WITHIN FUNCTIONAL LIMITS RUE    Grossly 5/5 strength RUE       Objective    Treatment:       Home exercise program    Access Code: [email or web address]  URL: [email or web address]  Date: 09/04/2024  Prepared by: Bernardo Yee    Exercises  - Standing Shoulder Flexion with Resistance  - 1 x daily - 7 x weekly - 3 sets - 10 reps  - Standing Shoulder Scaption with Resistance  - 1 x daily - 7 x weekly - 3 sets - 10 reps  - Standing Single Arm Shoulder Abduction with Resistance  - 1 x daily - 7 x weekly - 3 sets - 10 reps  - Shoulder Flexion Serratus Activation with Resistance  - 1 x daily - 7 x weekly - 3 sets - 10  reps                                                                                                                                                       Patient's FOTO functional outcome score is  NT/100 where a higher number = greater function.                Assessment/Plan   Assessment  Level of Function and Prognosis  Assessment: Pt has been seen for a total of 39 visist s/p RUE RC repair. Pt had demonstrated great progress overall and noted that he felt that he had returned to PLOF per last visit in PT and will be discharged at this time and had been provided an HEP last visit.  Therapy Goals    Short Term Goals  Start goal date: 3/7/24 End goal date: 4/30/24   ST Goal 1: Pt will be independent and compliant with HOME EXERCISE PROGRAM   in order to maintain gains towards longer term goals.   Goal 1 Status: met  Start goal date: 3/7/24 End goal date: 4/30/24   ST Goal 2: Pt will demonstrate full PROM of the right shoulder in order to   safely progress towards AROM of the right shoulder and no more than 2/10   pain in the right shoulder in order to demonstrate progress towards longer   term goals.   Goal 2 Status: met  Long Term Goals  Long term goal(s) met by: 9/11/24  LT Goal 1: Pt will demonstrate improved functional performance indicated   by an improved self reported FOTO score of 59 as compared to initial   measure of 4  Goal Status: met  LT Goal 2: Pt will demonstrate AROM of the right shoulder to be within   functional limits, grossly 5/5 of the right upper extremity, and no pain   in the right shoulder with all activities in helping to facilitate safe   return to work with decreased risk of reinjury of the right shoulder.   Goal Status: met     This patient has been discharged from therapy because: goals have been met  Date of last visit 9/4/2024  Total number of therapy visits provided: 39        Plan : Pt will be discharged from therapy at this time

## 2024-10-20 DIAGNOSIS — I42.8 NONISCHEMIC CARDIOMYOPATHY (CMS/HCC): ICD-10-CM

## 2024-10-20 DIAGNOSIS — I50.42 CHRONIC COMBINED SYSTOLIC AND DIASTOLIC HEART FAILURE (CMS/HCC): ICD-10-CM

## 2024-10-21 NOTE — TELEPHONE ENCOUNTER
Care Due:                  Date            Visit Type   Department     Provider  --------------------------------------------------------------------------------                                           [facility]  Last Visit: 04-      PHYSICAL     MEDICINE       KRYSTA MALDONADO  Next Visit: None Scheduled  None         None Found                                                            Last  Test          Frequency    Reason                     Performed    Due Date  --------------------------------------------------------------------------------  Office Visit  6 months...  spironolactone...........  04-   10-    BMP.........  6 months...  spironolactone...........  Not Found    Overdue    [employer] Embedded Care Due Messages. Reference number: [id number]. 10/20/2024 9:18:59 PM RHONA

## 2024-10-22 RX ORDER — METOPROLOL SUCCINATE 50 MG/1
50 TABLET, EXTENDED RELEASE ORAL DAILY
Qty: 90 TABLET | Refills: 2 | Status: SHIPPED | OUTPATIENT
Start: 2024-10-22

## 2024-10-31 ENCOUNTER — LAB (OUTPATIENT)
Dept: LAB | Facility: URGENT CARE | Age: 64
End: 2024-10-31
Payer: COMMERCIAL

## 2024-10-31 DIAGNOSIS — Z95.2 PRESENCE OF PROSTHETIC HEART VALVE: ICD-10-CM

## 2024-10-31 LAB
INR BLD: 1.9
PROTHROMBIN TIME: 21 SECONDS (ref 9.4–12.5)

## 2024-10-31 PROCEDURE — 85610 PROTHROMBIN TIME: CPT | Performed by: FAMILY MEDICINE

## 2024-10-31 PROCEDURE — 36415 COLL VENOUS BLD VENIPUNCTURE: CPT | Performed by: FAMILY MEDICINE

## 2024-11-01 ENCOUNTER — ANTICOAGULATION VISIT (OUTPATIENT)
Dept: CARDIOLOGY | Facility: CLINIC | Age: 64
End: 2024-11-01
Payer: COMMERCIAL

## 2024-11-01 DIAGNOSIS — Z79.01 ANTICOAGULATION MANAGEMENT ENCOUNTER: ICD-10-CM

## 2024-11-01 DIAGNOSIS — Z51.81 ANTICOAGULATION MANAGEMENT ENCOUNTER: ICD-10-CM

## 2024-11-01 DIAGNOSIS — Z95.2 PRESENCE OF PROSTHETIC HEART VALVE: ICD-10-CM

## 2024-11-01 DIAGNOSIS — Z95.2 S/P AVR: Primary | Chronic | ICD-10-CM

## 2024-11-01 LAB — INR PPP: 1.9

## 2024-11-01 PROCEDURE — 93295 DEV INTERROG REMOTE 1/2/MLT: CPT | Performed by: INTERNAL MEDICINE

## 2024-11-01 NOTE — PROGRESS NOTES
11/1/24  1030  ID verified. No medication or diet changes. No bleeding or bruising. Verified Warfarin dose and tablet size. Encouraged to call with any changes. Mailed reminder. QUINTEN    11/1/24  1030 Per [facility] and patient history. Warfarin 2.5 mg every Thu; 5 mg all other days. Check INR in 3 weeks, 11/21. Verbalized understanding. QUINTEN

## 2024-11-21 ENCOUNTER — LAB (OUTPATIENT)
Dept: LAB | Facility: URGENT CARE | Age: 64
End: 2024-11-21
Payer: COMMERCIAL

## 2024-11-22 ENCOUNTER — ANCILLARY PROCEDURE (OUTPATIENT)
Dept: SLEEP MEDICINE | Facility: HOSPITAL | Age: 64
End: 2024-11-22
Payer: COMMERCIAL

## 2024-11-22 DIAGNOSIS — G47.8 NON-RESTORATIVE SLEEP: ICD-10-CM

## 2024-11-22 DIAGNOSIS — G47.34 NOCTURNAL HYPOXEMIA: ICD-10-CM

## 2024-11-22 PROCEDURE — 95811 POLYSOM 6/>YRS CPAP 4/> PARM: CPT | Mod: 26 | Performed by: PSYCHIATRY & NEUROLOGY

## 2024-11-22 PROCEDURE — 95811 POLYSOM 6/>YRS CPAP 4/> PARM: CPT

## 2024-11-23 VITALS
WEIGHT: 220.9 LBS | RESPIRATION RATE: 12 BRPM | SYSTOLIC BLOOD PRESSURE: 108 MMHG | HEIGHT: 72 IN | HEART RATE: 72 BPM | DIASTOLIC BLOOD PRESSURE: 63 MMHG | BODY MASS INDEX: 29.92 KG/M2 | OXYGEN SATURATION: 94 %

## 2024-11-25 ENCOUNTER — DOCUMENTATION (OUTPATIENT)
Dept: NEUROLOGY | Facility: CLINIC | Age: 64
End: 2024-11-25
Payer: COMMERCIAL

## 2024-11-25 DIAGNOSIS — G47.33 OBSTRUCTIVE SLEEP APNEA: Primary | Chronic | ICD-10-CM

## 2024-11-25 NOTE — PROGRESS NOTES
Patient had recent titration study.  He was titrated on CPAP at 7 cm H2O, please inform him of these results.  We will mail him a prescription for CPAP, provide him with a list of DME vendors.  He will need a follow-up with sleep provider 6-8 weeks after he starts therapy.

## 2024-11-25 NOTE — PROGRESS NOTES
Nurse contacted patient with results and informed him that with his recent titration study. He was titrated on CPAP at 7 cm H2O. We will mail him a prescription for CPAP, provide him with a list of DME vendors. Nurse explained that he will need a follow-up with sleep provider 6-8 weeks after he starts therapy.      Patient verbalized understanding and was agreeable. Nurse verified address.

## 2024-12-15 ENCOUNTER — HEALTH MAINTENANCE LETTER (OUTPATIENT)
Age: 64
End: 2024-12-15

## 2024-12-23 ENCOUNTER — LAB (OUTPATIENT)
Dept: LAB | Facility: URGENT CARE | Age: 64
End: 2024-12-23
Payer: COMMERCIAL

## 2024-12-23 ENCOUNTER — ANTICOAGULATION VISIT (OUTPATIENT)
Dept: CARDIOLOGY | Facility: CLINIC | Age: 64
End: 2024-12-23
Payer: COMMERCIAL

## 2024-12-23 DIAGNOSIS — Z00.00 WELLNESS EXAMINATION: ICD-10-CM

## 2024-12-23 DIAGNOSIS — Z79.01 ANTICOAGULATION MANAGEMENT ENCOUNTER: ICD-10-CM

## 2024-12-23 DIAGNOSIS — Z51.81 ANTICOAGULATION MANAGEMENT ENCOUNTER: ICD-10-CM

## 2024-12-23 DIAGNOSIS — Z95.2 PRESENCE OF PROSTHETIC HEART VALVE: ICD-10-CM

## 2024-12-23 DIAGNOSIS — Z12.5 PROSTATE CANCER SCREENING: ICD-10-CM

## 2024-12-23 DIAGNOSIS — Z95.2 S/P AVR: Primary | Chronic | ICD-10-CM

## 2024-12-23 LAB
ALBUMIN SERPL-MCNC: 4 G/DL (ref 3.3–5.5)
ALP SERPL-CCNC: 105 U/L (ref 45–115)
ALT SERPL-CCNC: 21 U/L (ref 10–47)
ANION GAP SERPL CALC-SCNC: <0 MMOL/L (ref 3–11)
AST SERPL-CCNC: 26 U/L
BASOPHILS # BLD AUTO: 0.04 10*3/UL
BASOPHILS NFR BLD AUTO: 0.4 % (ref 0–2)
BILIRUB SERPL-MCNC: 0.9 MG/DL (ref 0.2–1.4)
BUN SERPL-MCNC: 15 MG/DL (ref 7–25)
CALCIUM ALBUM COR SERPL-MCNC: 9.7 MG/DL (ref 8.6–10.3)
CALCIUM SERPL-MCNC: 9.7 MG/DL (ref 8.6–10.3)
CHLORIDE SERPL-SCNC: 108 MMOL/L (ref 98–107)
CHOLEST SERPL-MCNC: 97 MG/DL (ref 0–199)
CO2 SERPL-SCNC: 29 MMOL/L (ref 21–32)
CREAT SERPL-MCNC: 0.7 MG/DL (ref 0.7–1.3)
EGFRCR SERPLBLD CKD-EPI 2021: 104 ML/MIN/1.73M*2
EOSINOPHIL # BLD AUTO: 0.23 10*3/UL
EOSINOPHIL NFR BLD AUTO: 2.6 % (ref 0–3)
ERYTHROCYTE [DISTWIDTH] IN BLOOD BY AUTOMATED COUNT: 12.5 % (ref 11.5–15)
FASTING STATUS PATIENT QL REPORTED: NO
GLUCOSE SERPL-MCNC: 144 MG/DL (ref 70–105)
HCT VFR BLD AUTO: 49.1 % (ref 38–50)
HDLC SERPL-MCNC: 62 MG/DL
HGB BLD-MCNC: 17 G/DL (ref 13.2–17.2)
INR BLD: 3.3
INR PPP: 3.3
LDLC SERPL CALC-MCNC: <20 MG/DL (ref 20–99)
LYMPHOCYTES # BLD AUTO: 2.02 10*3/UL
LYMPHOCYTES NFR BLD AUTO: 22.4 % (ref 15–47)
MCH RBC QN AUTO: 33.2 PG (ref 29–34)
MCHC RBC AUTO-ENTMCNC: 34.7 G/DL (ref 32–36)
MCV RBC AUTO: 95.8 FL (ref 82–97)
MONOCYTES # BLD AUTO: 0.69 10*3/UL
MONOCYTES NFR BLD AUTO: 7.6 % (ref 5–13)
NEUTROPHILS # BLD AUTO: 6.03 10*3/UL
NEUTROPHILS NFR BLD AUTO: 67 % (ref 46–70)
PLATELET # BLD AUTO: 164 10*3/UL (ref 130–350)
PMV BLD AUTO: 8 FL (ref 6.9–10.8)
POTASSIUM SERPL-SCNC: 4.6 MMOL/L (ref 3.5–5.1)
PROT SERPL-MCNC: 7.1 G/DL (ref 6.4–8.1)
PROTHROMBIN TIME: 36.5 SECONDS (ref 9.4–12.5)
PSA SERPL-MCNC: 0.17 NG/ML (ref 0–4)
RBC # BLD AUTO: 5.13 10*6/UL (ref 4.1–5.8)
SODIUM SERPL-SCNC: 131 MMOL/L (ref 128–145)
TRIGL SERPL-MCNC: 157 MG/DL
WBC # BLD AUTO: 9 10*3/UL (ref 3.7–9.6)

## 2024-12-23 PROCEDURE — 85610 PROTHROMBIN TIME: CPT | Performed by: FAMILY MEDICINE

## 2024-12-23 PROCEDURE — 80061 LIPID PANEL: CPT | Performed by: FAMILY MEDICINE

## 2024-12-23 PROCEDURE — 85025 COMPLETE CBC W/AUTO DIFF WBC: CPT | Performed by: FAMILY MEDICINE

## 2024-12-23 PROCEDURE — 84153 ASSAY OF PSA TOTAL: CPT | Performed by: FAMILY MEDICINE

## 2024-12-23 PROCEDURE — 80053 COMPREHEN METABOLIC PANEL: CPT | Performed by: FAMILY MEDICINE

## 2024-12-23 PROCEDURE — 36415 COLL VENOUS BLD VENIPUNCTURE: CPT | Performed by: FAMILY MEDICINE

## 2024-12-23 NOTE — PATIENT INSTRUCTIONS
12-23-24 1633 As per ACH calculation and patient history today (12-23-24) only take 2.5mg Then resume 2.5mg on Thursday and 5mg all other days.  Recheck in 2 weeks. augustus

## 2024-12-23 NOTE — PROGRESS NOTES
12-23-24 1633 ID Verified spoke with Gopal.  Supratherapeutic.  No unusual bruising or bleeding.  No changes in diet or medications.  Mew    12-23-24 1633 As per ACH calculation and patient history today (12-23-24) only take 2.5mg Then resume 2.5mg on Thursday and 5mg all other days.  Recheck in 2 weeks. mew

## 2025-01-06 ENCOUNTER — LAB (OUTPATIENT)
Dept: LAB | Facility: URGENT CARE | Age: 65
End: 2025-01-06
Payer: COMMERCIAL

## 2025-01-06 DIAGNOSIS — Z95.2 PRESENCE OF PROSTHETIC HEART VALVE: ICD-10-CM

## 2025-01-06 LAB
INR BLD: 3.3
PROTHROMBIN TIME: 37.2 SECONDS (ref 9.4–12.5)

## 2025-01-06 PROCEDURE — 85610 PROTHROMBIN TIME: CPT | Performed by: FAMILY MEDICINE

## 2025-01-06 PROCEDURE — 36415 COLL VENOUS BLD VENIPUNCTURE: CPT | Performed by: FAMILY MEDICINE

## 2025-01-07 ENCOUNTER — ANTICOAGULATION VISIT (OUTPATIENT)
Dept: CARDIOLOGY | Facility: CLINIC | Age: 65
End: 2025-01-07
Payer: COMMERCIAL

## 2025-01-07 DIAGNOSIS — Z79.01 ANTICOAGULATION MANAGEMENT ENCOUNTER: ICD-10-CM

## 2025-01-07 DIAGNOSIS — Z95.2 S/P AVR: Primary | Chronic | ICD-10-CM

## 2025-01-07 DIAGNOSIS — Z95.2 PRESENCE OF PROSTHETIC HEART VALVE: ICD-10-CM

## 2025-01-07 DIAGNOSIS — Z51.81 ANTICOAGULATION MANAGEMENT ENCOUNTER: ICD-10-CM

## 2025-01-07 LAB — INR PPP: 3.3

## 2025-01-07 NOTE — PROGRESS NOTES
1/7/25  1245  ID verified. States he does not now why it is still high, will change dose. No medication or diet changes. No bleeding or bruising. Verified Warfarin dose and tablet size. Encouraged to call with any changes. Mailed reminder. QUINTEN    1/7/25  1245  Per ACH and patient history. Warfarin 2.5 mg every Sun, Thu; 5 mg all other days.  Check INR in 3 weeks, 1/28. Verbalized understanding. QUINTEN

## 2025-01-07 NOTE — PATIENT INSTRUCTIONS
1/7/25  1245  Per ACH and patient history. Warfarin 2.5 mg every Sun, Thu; 5 mg all other days.  Check INR in 3 weeks, 1/28. Verbalized understanding. QUINTEN

## 2025-01-07 NOTE — PROGRESS NOTES
A user error has taken place: encounter opened in error, closed for administrative reasonsA user error has taken place: encounter opened in error, closed for administrative reasons..

## 2025-01-17 ENCOUNTER — HOSPITAL ENCOUNTER (OUTPATIENT)
Dept: CT IMAGING | Facility: HOSPITAL | Age: 65
Discharge: HOME | End: 2025-01-17
Payer: COMMERCIAL

## 2025-01-17 DIAGNOSIS — R91.1 PULMONARY NODULE: ICD-10-CM

## 2025-01-17 PROCEDURE — 71250 CT THORAX DX C-: CPT

## 2025-01-20 ENCOUNTER — HOSPITAL ENCOUNTER (OUTPATIENT)
Dept: RADIOLOGY | Facility: HOSPITAL | Age: 65
Discharge: HOME | End: 2025-01-20
Payer: COMMERCIAL

## 2025-01-20 ENCOUNTER — OFFICE VISIT (OUTPATIENT)
Dept: SPORTS MEDICINE | Facility: CLINIC | Age: 65
End: 2025-01-20
Payer: COMMERCIAL

## 2025-01-20 VITALS
WEIGHT: 206 LBS | HEART RATE: 80 BPM | OXYGEN SATURATION: 94 % | BODY MASS INDEX: 27.9 KG/M2 | HEIGHT: 72 IN | SYSTOLIC BLOOD PRESSURE: 107 MMHG | DIASTOLIC BLOOD PRESSURE: 63 MMHG | TEMPERATURE: 97.5 F

## 2025-01-20 DIAGNOSIS — M25.562 ACUTE PAIN OF LEFT KNEE: Primary | ICD-10-CM

## 2025-01-20 PROCEDURE — 73564 X-RAY EXAM KNEE 4 OR MORE: CPT | Mod: TC,LT | Performed by: ORTHOPAEDIC SURGERY

## 2025-01-20 PROCEDURE — 99203 OFFICE O/P NEW LOW 30 MIN: CPT | Performed by: STUDENT IN AN ORGANIZED HEALTH CARE EDUCATION/TRAINING PROGRAM

## 2025-01-20 ASSESSMENT — PAIN DESCRIPTION - DESCRIPTORS: DESCRIPTORS: THROBBING;SHARP

## 2025-01-20 ASSESSMENT — PAIN - FUNCTIONAL ASSESSMENT: PAIN_FUNCTIONAL_ASSESSMENT: 0-10

## 2025-01-20 NOTE — PATIENT INSTRUCTIONS
-Go  some Diclofenac gel and apply 3 times a day to the area that is tender  -Call in 1 week with update.

## 2025-01-20 NOTE — PROGRESS NOTES
On license of [facility]  Sports Medicine Clinic         SUBJECTIVE     History of Present Illness    The patient, an  by profession, presented with acute left knee pain that started while climbing bleachers the day prior. The patient reported a 'pop' sensation in the knee, followed by significant pain that made further climbing difficult. The knee pain had been present for a few weeks prior to this incident, with the patient noting difficulty in standing up from a kneeling position without support. The pain was localized to the inner aspect of the knee and was severe enough to disrupt sleep. The patient denied any history of knee issues in the past.    The patient attempted to manage the pain at home with an icing machine, using it for about two hours initially, followed by another half-hour session after a break. A hot bath was also attempted but was challenging due to the pain. The patient reported a slight improvement in pain the following day but still walked with a limp. The knee did not feel unstable while walking, but straightening the knee was painful.    The patient has a history of shoulder surgery about a year ago and also has a pacemaker and defibrillator implanted. The patient is on warfarin and manages pain with acetaminophen. The patient expressed a desire to return to work as soon as possible, given a history of significant time off work due to previous health issues.         PMH, Medications and Allergies were reviewed and updated as needed.      ROS:  As noted above otherwise negative.    Patient Active Problem List   Diagnosis    Coronary atherosclerosis of native coronary artery    Mixed hyperlipidemia    S/P AVR    History of coronary artery bypass graft x 1    Ischemic cardiomyopathy    Chronic combined systolic and diastolic heart failure (CMS/HCC)    Asthma-COPD overlap syndrome (CMS/HCC)    Nonrheumatic aortic valve insufficiency    Anticoagulated     Biventricular implantable cardioverter-defibrillator (ICD) in situ    Presence of prosthetic heart valve    Obstructive sleep apnea       Current Outpatient Medications   Medication Sig Dispense Refill    metoprolol succinate XL (TOPROL-XL) 50 mg 24 hr tablet Take 1 tablet (50 mg total) by mouth daily 90 tablet 2    sacubitriL-valsartan (ENTRESTO) 24-26 mg tablet Take 1 tablet by mouth 2 (two) times a day Pt to take 0.5tab BID for 2 weeks then increase to 1 tab BID. Pt will discontinue lisinopril for 3 day washout period prior to starting entresto. 180 tablet 3    atorvastatin (LIPITOR) 80 mg tablet Take 1 tablet (80 mg total) by mouth nightly 90 tablet 1    budesonide/formoterol fumarate (BREYNA INHL) Inhale      albuterol HFA 90 mcg/actuation inhaler Inhale 2 puffs every 6 (six) hours as needed for wheezing 18 g 11    warfarin (COUMADIN) 5 mg tablet Take 2.5-5 mg by mouth      evolocumab (Repatha SureClick) 140 mg/mL Inject 1 mL (140 mg total) under the skin every 14 (fourteen) days 6 mL 3    spironolactone (ALDACTONE) 25 mg tablet Take 1 tablet (25 mg total) by mouth daily 90 tablet 3    nitroglycerin (NITROSTAT) 0.4 mg SL tablet Place 1 tablet (0.4 mg total) under the tongue every 5 (five) minutes as needed for chest pain Limit 3 tabs per episode. 25 tablet 1    aspirin 81 mg EC tablet Take 1 tablet (81 mg total) by mouth daily 90 tablet 3    budesonide-formoteroL (SYMBICORT) 160-4.5 mcg/actuation inhaler Inhale 2 puffs 2 (two) times a day Rinse mouth with water after use. Do not swallow. (Patient not taking: Reported on 7/17/2024) 30.6 g 3     No current facility-administered medications for this visit.            OBJECTIVE       Vitals:   Vitals:    01/20/25 0810   BP: 107/63   BP Location: Right arm   Patient Position: Sitting   Cuff Size: Regular Adult   Pulse: 80   Temp: 36.4 °C (97.5 °F)   TempSrc: Temporal   SpO2: 94%   Weight: 93.4 kg (206 lb)   Height: 1.829 m (6')     BMI: Body mass index is 27.94  kg/m².    Gen: Well nourished and in no acute distress  HEENT: Extraocular movement intact  Neck: Supple  Pulm: Breathing Comfortably. No increased respiratory effort.  Psych: Euthymic. Appropriately answers questions    MSK:  Right knee:  Small effusion present  TTP to the medial joint line, mcl, and pes anserine bursa  Flexion to 90 and extension to -10 due to pain  MS intact, pain with resisted adduction of the hip  +varus, Cassandra, and thessaly   -lachman's, anterior and posterior drawer    Imaging   Results    RADIOLOGY  Knee X-ray: Medial joint space narrowing, intra-articular loose bodies, patellar osteophyte, degenerative changes, no fractures (01/19/2025)               ASSESSMENT & PLAN     Assessment/Plan     Left Knee Pain  Acute onset of pain following a pop while climbing bleachers, with a history of difficulty standing from a kneeling position for several weeks prior. Pain localized to the medial aspect of the knee. X-ray showed degenerative changes, possible loose bodies, and joint space narrowing. Possible MCL strain or sprain and arthritic flare-up suggested.  -Rest and avoid work for a couple of days to reduce inflammation.  -Apply diclofenac gel three times a day to the tender area. Cannot take NSAIDs  -Consider purchasing a knee brace for additional support.  -Start range of motion and strengthening exercises as tolerated.  -Plan for a follow-up call in one week to assess progress.  -Consider a steroid injection in a couple of weeks if needed.          Diagnosis Plan   1. Acute pain of left knee  X-ray knee 4 or more views left           Return to clinic in 1 week for follow up. Return sooner if develops new or worsening symptoms.    Options for treatment and/or follow-up care were reviewed with the patient was actively involved in the decision making process. Patient verbalized understanding and was in agreement with the plan and have no further questions at this time.      Dr. Darryl Ramesh,  DO    Primary Care Sports Medicine  [facility]  [phone]

## 2025-01-20 NOTE — LETTER
[facility] PHYSICAL MEDICINE & REHAB/SPORTS MEDICINE  [facility] [address]  [address]  [facility] [address]   Dept: [phone]  January 20, 2025     Patient: Hayden Franklin   YOB: 1960   Date of Visit: 1/20/2025       To Whom It May Concern:    It is my medical opinion that Hayden Franklin may return to work on 1/23/25 .    If you have any questions or concerns, please have the patient contact [facility] PHYSICAL MEDICINE & REHAB/SPORTS MEDICINE or work directly with employer’s internal employee health to address specific needs.            Sincerely,        Darryl Ramesh DO    Electronically signed by Darryl Ramesh DO at 9:07 AM    CC: No Recipients

## 2025-01-30 ENCOUNTER — LAB (OUTPATIENT)
Dept: LAB | Facility: URGENT CARE | Age: 65
End: 2025-01-30
Payer: COMMERCIAL

## 2025-01-30 DIAGNOSIS — Z95.2 PRESENCE OF PROSTHETIC HEART VALVE: ICD-10-CM

## 2025-01-30 LAB
INR BLD: 2.6
INR PPP: 2.6
PROTHROMBIN TIME: 29.5 SECONDS (ref 9.4–12.5)

## 2025-01-30 PROCEDURE — 36415 COLL VENOUS BLD VENIPUNCTURE: CPT | Performed by: FAMILY MEDICINE

## 2025-01-30 PROCEDURE — 85610 PROTHROMBIN TIME: CPT | Performed by: FAMILY MEDICINE

## 2025-01-31 ENCOUNTER — ANTICOAGULATION VISIT (OUTPATIENT)
Dept: CARDIOLOGY | Facility: CLINIC | Age: 65
End: 2025-01-31
Payer: COMMERCIAL

## 2025-01-31 DIAGNOSIS — Z51.81 ANTICOAGULATION MANAGEMENT ENCOUNTER: Primary | ICD-10-CM

## 2025-01-31 DIAGNOSIS — Z79.01 ANTICOAGULATION MANAGEMENT ENCOUNTER: Primary | ICD-10-CM

## 2025-01-31 NOTE — PROGRESS NOTES
[id number]-spoke to Gopal, no changes in meds or diet, verified dosing, therapeutic-lado Continue current warfarin dose, recheck INR in 4 weeks at [facility]

## 2025-01-31 NOTE — PATIENT INSTRUCTIONS
Continue current warfarin dose, recheck INR in 4 weeks at [facility]

## 2025-02-07 ENCOUNTER — OFFICE VISIT (OUTPATIENT)
Dept: NEUROLOGY | Facility: CLINIC | Age: 65
End: 2025-02-07
Payer: COMMERCIAL

## 2025-02-07 VITALS
WEIGHT: 213 LBS | HEART RATE: 75 BPM | HEIGHT: 72 IN | DIASTOLIC BLOOD PRESSURE: 64 MMHG | SYSTOLIC BLOOD PRESSURE: 108 MMHG | OXYGEN SATURATION: 95 % | BODY MASS INDEX: 28.85 KG/M2

## 2025-02-07 DIAGNOSIS — G47.33 MILD OBSTRUCTIVE SLEEP APNEA: Primary | ICD-10-CM

## 2025-02-07 PROCEDURE — 99215 OFFICE O/P EST HI 40 MIN: CPT

## 2025-02-07 ASSESSMENT — SLEEP AND FATIGUE QUESTIONNAIRES
HOW LIKELY ARE YOU TO NOD OFF OR FALL ASLEEP WHILE SITTING AND TALKING TO SOMEONE: WOULD NEVER DOZE
HOW LIKELY ARE YOU TO NOD OFF OR FALL ASLEEP WHILE SITTING AND TALKING TO SOMEONE: WOULD NEVER DOZE
HOW LIKELY ARE YOU TO NOD OFF OR FALL ASLEEP WHILE SITTING INACTIVE IN A PUBLIC PLACE: WOULD NEVER DOZE
HOW LIKELY ARE YOU TO NOD OFF OR FALL ASLEEP WHILE WATCHING TV: MODERATE CHANCE OF DOZING
HOW LIKELY ARE YOU TO NOD OFF OR FALL ASLEEP WHILE SITTING AND READING: WOULD NEVER DOZE
HOW LIKELY ARE YOU TO NOD OFF OR FALL ASLEEP WHEN YOU ARE A PASSENGER IN A CAR FOR AN HOUR WITHOUT A BREAK: SLIGHT CHANCE OF DOZING
HOW LIKELY ARE YOU TO NOD OFF OR FALL ASLEEP WHILE SITTING INACTIVE IN A PUBLIC PLACE: WOULD NEVER DOZE
HOW LIKELY ARE YOU TO NOD OFF OR FALL ASLEEP IN A CAR, WHILE STOPPED FOR A FEW MINUTES IN TRAFFIC: WOULD NEVER DOZE
HOW LIKELY ARE YOU TO NOD OFF OR FALL ASLEEP WHEN YOU ARE A PASSENGER IN A CAR FOR AN HOUR WITHOUT A BREAK: SLIGHT CHANCE OF DOZING
HOW LIKELY ARE YOU TO NOD OFF OR FALL ASLEEP WHILE WATCHING TV: SLIGHT CHANCE OF DOZING
HOW LIKELY ARE YOU TO NOD OFF OR FALL ASLEEP WHILE SITTING AND READING: HIGH CHANCE OF DOZING
HOW LIKELY ARE YOU TO NOD OFF OR FALL ASLEEP IN A CAR, WHILE STOPPED FOR A FEW MINUTES IN TRAFFIC: WOULD NEVER DOZE
HOW LIKELY ARE YOU TO NOD OFF OR FALL ASLEEP WHILE LYING DOWN TO REST IN THE AFTERNOON WHEN CIRCUMSTANCES PERMIT: WOULD NEVER DOZE
HOW LIKELY ARE YOU TO NOD OFF OR FALL ASLEEP WHILE LYING DOWN TO REST IN THE AFTERNOON WHEN CIRCUMSTANCES PERMIT: HIGH CHANCE OF DOZING
HOW LIKELY ARE YOU TO NOD OFF OR FALL ASLEEP WHILE SITTING QUIETLY AFTER LUNCH WITHOUT ALCOHOL: WOULD NEVER DOZE
HOW LIKELY ARE YOU TO NOD OFF OR FALL ASLEEP WHILE SITTING QUIETLY AFTER LUNCH WITHOUT ALCOHOL: WOULD NEVER DOZE

## 2025-02-07 ASSESSMENT — ENCOUNTER SYMPTOMS
SORE THROAT: 0
APNEA: 1
SHORTNESS OF BREATH: 0
PALPITATIONS: 0
CHILLS: 0
JOINT SWELLING: 1
ABDOMINAL PAIN: 0
RHINORRHEA: 1
BRUISES/BLEEDS EASILY: 1
FEVER: 0
ACTIVITY CHANGE: 0
COUGH: 0
SEIZURES: 0

## 2025-02-07 NOTE — PROGRESS NOTES
"Sleep Medicine Consultation/New Patient Note      HPI: Mr. Hayden Franklin is a 64 y.o. male seen at the request of Dr. Rosa Haney. Patient was referred to sleep medicine due to complaints of snoring, excessive daytime somnolence and erythrocytosis.  Due to these complaints patient completed home sleep study on 9/6/2024.  This study demonstrated mild MO with 5.5 apneas and hypopneas per hour and a minimum oxygen saturation of 80%.  Patient returned for titration study on 11/22/2024.  Patient was treated with CPAP pressures ranging from 5-7 cm H2O.  It was recommended he be treated with CPAP at a set pressure of 7 cm H2O.    Sleep study date: 9/6/2024 (HST)    11/22/2024 (Titration)  Severity: Mild  AHI: 5.5  Minimum O2 saturation: 80%  Study Weight: 221 lbs  Machine type: CPAP  Mask: Nasal cushion without chinstrap    Gets CPAP supplies from [facility].    Chief complaint: snoring, excessive daytime somnolence  Duration: \"Unsure\" \"a long time\"    SRBD Risk Assessment:  Snoring: Yes, moderate and does disturb bed partner  Observed apneas: Yes  Dry Mouth in morning: Yes and still continues to have dry mouth with CPAP. Patient does know how to adjust humidity settings on his machine as needed. Encouraged him to trying increasing it from level 4 to level 5.   Nocturnal gasping: Denies  Morning Headaches: Denies  Nasal obstruction: Nasal congestion    Sleep Pattern:  Sleep aids: Denies  Location: (bedroom/Living room) bedroom  Bed/Recliner/Wedge:  bed  # of pillows under head: 1  Position: back and sides  Bedtime: Weekdays: 8-9 PM, Off Days: 10-11 PM  Latency: used to take 1 hour, now takes less than 15 minutes  Awakenings: 2, past few nights has not woken up at all  Reason: Wheezing/shortness of breath. With CPAP occasionally will get up to use the bathroom.  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  Patients estimate of total sleep time: 4 " "hours prior to PAP use, with CPAP 6-8 hours    Daytime Symptoms:  Upon Awakening: Feels refreshed  Daytime fatigue/sleepiness: Yes, afternoons. Has improved since starting CPAP.  Naps: Yes on weekdays around 1 PM.  These last around 60 minutes and he does feel refreshed upon awakening. Since starting CPAP not napping.   Involuntary Dozing: occasionally on the weekends watching TV  Cognitive Symptoms: Impaired memory  Driving: Difficulty with sleepiness and driving: Denies  Close calls related to sleepiness: Denies  Accidents related to sleepiness: Denies    Sleep Review of Symptoms:  Parasomnias:  Sleep Walking: Denies  Dream Enactment: Denies  Bruxism: Denies    Motor:  RLS: occasional - urge to move legs. He relates this to an old ankle injury.  PLMS: PSG \"There were 0 periodic limb movements of sleep (0.0/hr), of which 0 (0.0/hr) were associated with an arousal.\"     Narcolepsy:  Hallucinations: Denies  Paralysis: Denies  Cataplexy: Denies    Social History:  Social History     Tobacco Use    Smoking status: Every Day     Current packs/day: 1.00     Average packs/day: 0.8 packs/day for 40.9 years (30.9 ttl pk-yrs)     Types: Cigarettes     Start date: 2/24/1982     Last attempt to quit: 2/24/2022    Smokeless tobacco: Never    Tobacco comments:     Last smoked 3/1/22   Vaping Use    Vaping status: Never Used   Substance Use Topics    Alcohol use: Yes     Alcohol/week: 4.0 - 5.0 standard drinks of alcohol     Types: 4 - 5 Cans of beer per week    Drug use: No      Employment:   Alcohol: 6 alcoholic drinks per week  Smoking: Currently smoking 6 cigarettes per day  Other drugs: Denies  Caffeine: 4 cups of coffee per day  Family: wife, 3 dogs - 1 dog does sleep with him  Regular exercise: Yes, walking throughout the day 4-6 hours    For further details please see information in the sleep questionnaire filled out for this evaluation.     MSE:  Alert and appropriate Yes  Oriented to person, place and time: " "Yes  Mood: \"good\"  Affect: broad    Past/Childhood Sleep History: Denies    Family History: Family history of sleep disorders: Denies    Patient Active Problem List   Diagnosis    Coronary atherosclerosis of native coronary artery    Mixed hyperlipidemia    S/P AVR    History of coronary artery bypass graft x 1    Ischemic cardiomyopathy    Chronic combined systolic and diastolic heart failure (CMS/HCC)    Asthma-COPD overlap syndrome (CMS/[facility])    Nonrheumatic aortic valve insufficiency    Anticoagulated    Biventricular implantable cardioverter-defibrillator (ICD) in situ    Presence of prosthetic heart valve    Obstructive sleep apnea       Past Medical History:   Diagnosis Date    Aortic valve stenosis     s/p AV replacement 11/2013    Arthritis     Bilat hands, ankle    Ascending aorta dilatation (CMS/[facility])     s/p AAA repair 11/2013    Asthma     CAD (coronary artery disease)     1 vessel bypass    Cancer (CMS/[facility])     CHF (congestive heart failure) (CMS/[facility])     COPD (chronic obstructive pulmonary disease) (CMS/[facility])     Dysrhythmia     nonsustained v-tach    History of transfusion     2013. 2022    Hyperlipidemia     Ischemic cardiomyopathy     Lung nodule 03/07/2022    Myocardial infarction (CMS/[facility])     Obstructive sleep apnea 09/13/2024    With some central events; oxygen sat 80%  9/2024      Presence of heart assist device (CMS/[facility])     Shortness of breath     Skin cancer        Past Surgical History:   Procedure Laterality Date    AAA REPAIR - ENDOGRAFT  11/2013    ANKLE SURGERY Right 1989    No hardware    AORTIC ROOT ANGIOGRAM N/A 05/20/2022    Procedure: Aortic Arch  Angiogram;  Surgeon: Derick Gallegos MD;  Location: [facility];  Service: Cardiovascular;  Laterality: N/A;    AORTIC VALVE REPLACEMENT  11/2013    BIVENTRICULAR ICD UPGRADE N/A 05/24/2023    Procedure: Bi-V ICD upgrade;  Surgeon: Mitchel Saldaña DO;  Location: [facility] EP Lab;  Service: Cardiovascular;  Laterality: N/A;    CARDIAC VALVE " REPLACEMENT      COLONOSCOPY N/A 10/05/2023    Procedure: COLONOSCOPY with polypectomies, endo clip placement x2;  Surgeon: Patricia Hutton MD;  Location: [facility];  Service: Endoscopy;  Laterality: N/A;    CORONARY ANGIOPLASTY  11/2013    CORONARY ARTERY BYPASS GRAFT  11/2013    1V CABG SVG- RCA    FINGER SURGERY Left 1979    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    LEFT HEART CATH N/A 05/20/2022    Procedure: Left heart cath;  Surgeon: Derick Gallegos MD;  Location: [facility];  Service: Cardiovascular;  Laterality: N/A;    SHOULDER ARTHROSCOPY Right 2/1/2024    Procedure: RIGHT SHOULDER ARTHROSCOPIC ROTATOR CUFF REPAIR, SUBACROMIAL DECOMPRESSION, POSSIBLE SUPERIOR CAPSULAR RECONSTRUCTION, EXTENSIVE DEBRIDEMENT;  Surgeon: Mina Kendrick DO;  Location: [facility] OR;  Service: Orthopedics;  Laterality: Right;  CPT 84400, 25843, 83010, 41232; OUTPATIENT    TONSILLECTOMY Bilateral 1969         Current Outpatient Medications:     metoprolol succinate XL (TOPROL-XL) 50 mg 24 hr tablet, Take 1 tablet (50 mg total) by mouth daily, Disp: 90 tablet, Rfl: 2    sacubitriL-valsartan (ENTRESTO) 24-26 mg tablet, Take 1 tablet by mouth 2 (two) times a day Pt to take 0.5tab BID for 2 weeks then increase to 1 tab BID. Pt will discontinue lisinopril for 3 day washout period prior to starting entresto., Disp: 180 tablet, Rfl: 3    atorvastatin (LIPITOR) 80 mg tablet, Take 1 tablet (80 mg total) by mouth nightly, Disp: 90 tablet, Rfl: 1    budesonide/formoterol fumarate (BREYNA INHL), Inhale, Disp: , Rfl:     albuterol HFA 90 mcg/actuation inhaler, Inhale 2 puffs every 6 (six) hours as needed for wheezing, Disp: 18 g, Rfl: 11    warfarin (COUMADIN) 5 mg tablet, Take 2.5-5 mg by mouth, Disp: , Rfl:     evolocumab (Repatha SureClick) 140 mg/mL, Inject 1 mL (140 mg total) under the skin every 14 (fourteen) days, Disp: 6 mL, Rfl: 3    spironolactone (ALDACTONE) 25 mg tablet, Take 1 tablet (25 mg total) by mouth daily,  Disp: 90 tablet, Rfl: 3    nitroglycerin (NITROSTAT) 0.4 mg SL tablet, Place 1 tablet (0.4 mg total) under the tongue every 5 (five) minutes as needed for chest pain Limit 3 tabs per episode., Disp: 25 tablet, Rfl: 1    aspirin 81 mg EC tablet, Take 1 tablet (81 mg total) by mouth daily, Disp: 90 tablet, Rfl: 3    budesonide-formoteroL (SYMBICORT) 160-4.5 mcg/actuation inhaler, Inhale 2 puffs 2 (two) times a day Rinse mouth with water after use. Do not swallow. (Patient not taking: Reported on 7/17/2024), Disp: 30.6 g, Rfl: 3     Questionnaires:    Patient-reported scores:    [employer] V1.1  In general, would you say your health is:: Fair  In general, would you say your quality of life is:: Very good  In general, how would you rate your physical health?: Good  In general, how would you rate your mental health, including your mood and your ability to think?: Excellent  In general, how would you rate your satisfaction with your social activities and relationships?: Excellent  In general, please rate how well you carry out your usual social activities and roles. (This includes activities at home, at work and in your community, and responsibilities as a parent, child, spouse, employee, friend, etc.): Excellent  To what extent are you able to carry out your everyday physical activities such as walking, climbing stairs, carrying groceries, or moving a chair?: Completely  In the past 7 days, how often have you been bothered by emotional problems such as feeling anxious, depressed or irritable?: Never  In the past 7 days, how would you rate your fatigue on average?: Moderate  In the past 7 days, how would you rate your pain on average?: 1     Austin Sleepiness Scale  Sitting and reading: Would never doze  Watching TV: Slight chance of dozing  Sitting, inactive in a public place (e.g. a theatre or a meeting): Would never doze  As a passenger in a car for an hour without a break: Slight chance of dozing  Lying  down to rest in the afternoon when circumstances permit: Would never doze  Sitting and talking to someone: Would never doze  Sitting quietly after a lunch without alcohol: Would never doze  In a car, while stopped for a few minutes in traffic: Would never doze  Total score: 2       ROS:  Review of Systems   Constitutional:  Negative for activity change, chills and fever.   HENT:  Positive for congestion, rhinorrhea and tinnitus. Negative for sore throat.    Respiratory:  Positive for apnea (controlled with PAP therapy). Negative for cough and shortness of breath.    Cardiovascular:  Negative for chest pain and palpitations.   Gastrointestinal:  Negative for abdominal pain.   Musculoskeletal:  Positive for joint swelling.   Neurological:  Negative for seizures and syncope.   Hematological:  Bruises/bleeds easily.          PE:  Physical Exam  Vitals reviewed.   Constitutional:       Appearance: Normal appearance.   HENT:      Head: Normocephalic.      Mouth/Throat:      Mouth: Mucous membranes are moist.   Cardiovascular:      Rate and Rhythm: Normal rate and regular rhythm.      Heart sounds: Normal heart sounds.   Pulmonary:      Effort: Pulmonary effort is normal.      Breath sounds: Normal breath sounds.   Neurological:      Mental Status: He is alert and oriented to person, place, and time.   Psychiatric:         Behavior: Behavior normal.        Mallampati: 4  Hard palate: highly arched  Soft palate: low hanging  Tongue: scalloping  Neck Circumference: 16.5 inches    Body mass index is 28.89 kg/m².   Vitals:    02/07/25 1050 02/07/25 1052   BP: 108/64    BP Location: Left arm    Patient Position: Sitting    Cuff Size: Regular Adult    Pulse: 75    SpO2:  95%   Weight: 96.6 kg (213 lb)    Height: 1.829 m (6')        Assessment:   Patient underwent sleep study on 9/6/2024 and was found to have mild obstructive sleep apnea with an AHI of 5.5 and a minimum oxygen saturation of 80%.  He is currently on CPAP with a  set pressure of 7 cm H2O.      Mr. Hayden Franklin is a 64 y.o. male who is seen to evaluate sleep complaints and recent diagnosis of obstructive sleep apnea.  Results of his home sleep test were reviewed with patient in clinic today.  Given he is highly symptomatic with snoring and excessive daytime somnolence, it was strongly recommended he continue treatment.  The pathophysiology of, the reasons to treat and treatment options for obstructive sleep apnea (MO) were all reviewed with the patient today. Untreated obstructive sleep apnea presents a moderate risk of morbidity (ie hypertension, atrial fibrillation, etc). I discussed CPAP would be the first line treatment. The patient reports he would like to continue PAP therapy.  Overall patient notes a significant improvement in feeling rested in the mornings, increased energy throughout the day and is no longer snoring at night.    The patient indicates understanding of these issues and agrees with the plan.    Compliance Card Data:  Date Range: 1/7/2025 - 2/5/2025  Setting: CPAP 7 cm H2O  Residual AHI: 2.1 (OAI 1.4, TAMIKO 0.3)  Leak (L/min): median-2.0, 95th%-15.0  Average usage days used: 6 hours 3 minutes  % Days used: 100%  % Days with Usage > 4 hrs: 97%     Recommendations:  Patient is currently using and benefiting from CPAP therapy.  He is tolerating CPAP without complaint.  There is no need to make any changes in regards to his settings.  He is noted to be compliant with 97% usage over 4 hours.  AHI is well-controlled at 2.1.   Patient was counseled on what to look for if pressure is too high or too low.  Patient encouraged to call if he is having any pressure intolerance issues.  He will be provided with a prescription to obtain new PAP supplies today.  Driving safety was reviewed with patient. If the patient feels too sleepy to drive he/she knows not to drive. If he/she becomes sleepy while driving he/she will pull over and nap.  Follow-up in 1 year, or  sooner as needed.  The diagnostic assessment has been reviewed.  Patient has expressed a good understanding of the assessment and recommendations from today's visit.  There are no apparent barriers to understanding this information.  He has been advised to contact this office or the answering service with questions or concerns that may arise.  Patient has been advised to follow-up with their primary care provider for general medical needs.  On this date of service 48 minutes of total time was spent in evaluation and management on this encounter.    Doreen Charles, CNP

## 2025-02-08 DIAGNOSIS — J44.89 ASTHMA-COPD OVERLAP SYNDROME (CMS/HCC): ICD-10-CM

## 2025-02-10 RX ORDER — BUDESONIDE AND FORMOTEROL FUMARATE DIHYDRATE 160; 4.5 UG/1; UG/1
AEROSOL RESPIRATORY (INHALATION)
Qty: 33 G | Refills: 0 | Status: SHIPPED | OUTPATIENT
Start: 2025-02-10

## 2025-02-28 ENCOUNTER — LAB (OUTPATIENT)
Dept: LAB | Facility: URGENT CARE | Age: 65
End: 2025-02-28
Payer: COMMERCIAL

## 2025-02-28 DIAGNOSIS — Z95.2 PRESENCE OF PROSTHETIC HEART VALVE: ICD-10-CM

## 2025-02-28 LAB
INR BLD: 2.5
INR PPP: 2.5
PROTHROMBIN TIME: 27.5 SECONDS (ref 9.4–12.5)

## 2025-02-28 PROCEDURE — 85610 PROTHROMBIN TIME: CPT | Performed by: FAMILY MEDICINE

## 2025-02-28 PROCEDURE — 36415 COLL VENOUS BLD VENIPUNCTURE: CPT | Performed by: FAMILY MEDICINE

## 2025-03-03 ENCOUNTER — ANTICOAGULATION VISIT (OUTPATIENT)
Dept: CARDIOLOGY | Facility: CLINIC | Age: 65
End: 2025-03-03
Payer: COMMERCIAL

## 2025-03-03 DIAGNOSIS — Z79.01 ANTICOAGULATION MANAGEMENT ENCOUNTER: Primary | ICD-10-CM

## 2025-03-03 DIAGNOSIS — Z51.81 ANTICOAGULATION MANAGEMENT ENCOUNTER: Primary | ICD-10-CM

## 2025-03-03 NOTE — PATIENT INSTRUCTIONS
Continue current warfarin dose, recheck INR in 5 weeks at [facility]gent [facility]

## 2025-03-03 NOTE — PROGRESS NOTES
[id number]@[facility]-spoke with Gopal, no changes in meds or diet, therapeutic-lado Continue current warfarin dose, recheck INR in 5 weeks at [facility]

## 2025-03-11 DIAGNOSIS — E78.2 MIXED HYPERLIPIDEMIA: ICD-10-CM

## 2025-03-11 DIAGNOSIS — I25.10 CORONARY ARTERY DISEASE INVOLVING NATIVE CORONARY ARTERY OF NATIVE HEART WITHOUT ANGINA PECTORIS: ICD-10-CM

## 2025-03-11 RX ORDER — ATORVASTATIN CALCIUM 80 MG/1
80 TABLET, FILM COATED ORAL DAILY
Qty: 90 TABLET | Refills: 1 | Status: SHIPPED | OUTPATIENT
Start: 2025-03-11 | End: 2026-03-11

## 2025-03-11 NOTE — TELEPHONE ENCOUNTER
Refill per protocol.  Patient is due for cardiology office visit with Dr. Joshi.  Scheduling notified

## 2025-03-14 ENCOUNTER — TELEPHONE (OUTPATIENT)
Dept: CARDIOLOGY | Facility: CLINIC | Age: 65
End: 2025-03-14
Payer: COMMERCIAL

## 2025-03-26 NOTE — INTERDISCIPLINARY/THERAPY
[address] [facility] [address]  [phone]      Outpatient Physical Therapy Daily Treatment Note    Date of Service: 24  Patient Name: Hayden Franklin  : 1960  Referring Provider: Mina Kendrick DO  Today's Visit Number: 27  Onset Date: 2024  SOC Date: 3/7/2024  Certification Period: 2024  HICN: [id number]  Medical Diagnosis listed first. Additional are Treatment Diagnoses:  1. Orthopedic aftercare  2. Acute pain of right shoulder  3. Rotator cuff syndrome of right shoulder  4. Impingement syndrome of right shoulder  5. Primary osteoarthritis of right shoulder      Subjective   Subjective Comments: He would like to continue to work on strengthening until his return to work in early August.    Has patient fallen since last visit? No  Fall Interventions: Not at risk of fall  Have there been any changes in medications? No  Pain Assessment Scale  Pain Scale: 0-10  Pain Score: 0-No pain       Objective    Surgery Date: 24 (Thursday)  Week Post-Op: 19 (24)    AROM: shoulder   24:  Flexion: right 133, left 155  Abduction: right 143, left 166  Extension: right 55, left 60  Ext/IR: right L5, left T12  ER(90): right 75, left 85  IR(90): right 55, left 60       Treatment:  TherEx:  -UBE for warm-up and strengthening/conditioning x 5'  >level 5 resistance: 2.5' forward/backward  -peach theraloop:  >pull apart pull overs  >high row with ER  >chest fly: single arm  >shoulder IR  >staggered stand single arm punch  -Precor lat pull downs: 45#  -Precor chest press: machine weight without added weight          Time Calculation  Start Time: 0745  Stop Time: 0830  Time Calculation (min): 45 min  Therapeutic Interventions (Time Spent in Minutes)  Therapeutic Exercise: 45  Other Charges       Charge ID Procedure Code Description Qty. Modifiers Charge Entry User Diagnosis    [id number] HC THERAPEUTIC PX 1/> AREAS EACH 15 MIN EXERCISES 3 GP Chucho Melendez, PT        C/o right flank pain that radiates to the front and then goes across her left thigh. Denies dysuria, thinks urine looks lighter than normal. Pt had surgery on her left foot 3/10 and was given pain meds, she has been taking those to help with the pain. Has a hx of kidney stones, has had to have them removed, states this feels similar.             Assessment/Plan   Assessment  Level of Function and Prognosis  Assessment: Does well with lighter high rep compound exercises and would benefit from this for work hardening.  His ER strength in isolation is not symmetrical to the left and will continue to improve this isolated strength.        Therapy Goals    Short Term Goals  Start goal date: 3/7/24 End goal date: 4/30/24   ST Goal 1: Pt will be independent and compliant with HOME EXERCISE PROGRAM   in order to maintain gains towards longer term goals.   Goal 1 Status: progressing  Start goal date: 3/7/24 End goal date: 4/30/24   ST Goal 2: Pt will demonstrate full PROM of the right shoulder in order to   safely progress towards AROM of the right shoulder and no more than 2/10   pain in the right shoulder in order to demonstrate progress towards longer   term goals.   Goal 2 Status: met  Long Term Goals  Long term goal(s) met by: 6/7/24  LT Goal 1: Pt will demonstrate improved functional performance indicated   by an improved self reported FOTO score of 59 as compared to initial   measure of 4  Goal Status: met  LT Goal 2: Pt will demonstrate AROM of the right shoulder to be within   functional limits, grossly 5/5 of the right upper extremity, and no pain   in the right shoulder with all activities in helping to facilitate safe   return to work with decreased risk of reinjury of the right shoulder.   Goal Status: progressing       Plan  Plan for next treatment comment: strengthen  Treatment duration will be through 6/7/2024

## 2025-04-03 NOTE — PROGRESS NOTES
"                                         Cardiology Outpatient Follow-up Note    Subjective    Patient ID: Hayden Franklin is a 64 y.o. male.      HPI  \"Gopal\" is a 64-year-old male followed by Dr. Joshi. Past medical history significant for inferior MI with CABG x 1 with concomitant AVR in 2013.  He ended up with severe aortic stenosis and underwent redo sternotomy with aortic valve replacement using the 23 mm On-X aortic valve on 9/6/2022 at the [facility].  His hospitalization was complicated by EMMA on CKD, cardiogenic shock, development of complete AV block status post AVR with subsequent placement of dual-chamber PPM on 9/10/2022 with dislodgement of atrial lead which was fixed prior to discharge.  5/24/2023 he underwent upgrade to BiV ICD related to nonischemic cardiomyopathy despite optimal GDMT.  Other medical history includes hyperlipidemia, obstructive sleep apnea and COPD.     He was last seen by Dr. Joshi in 1/2024 for preoperative cardiovascular risk assessment prior to shoulder surgery.  He was doing well at that time.    Patient presents for routine follow-up today. He is doing well from a cardiovascular standpoint.  He continues to work as an  and walks frequently.  Denies chest pain/pressure/tightness or shortness of breath.  Further denies dizziness/lightheadedness, orthopnea, PND or lower extremity swelling. Reports easy bleeding on warfarin and aspirin.     Cardiology Problem List:      Cardiology Problem List    Last Edited By Inocencia Keller RN on 1/12/2024    Primary Cardiologist: Dr. Joshi  Primary EP: Dr Saldaña    Coronary   - Inferior MI 11/1/2013  - 1V CABG 11/5/2013: SVG-RPDA     CHF/CM  - ICM  - Chronic combined CHF    Peripheral Vascular  - Ascending Aortic aneurysm repair 11/5/2013: 34-mm Dacron graft    Valvular  - Nonrheumatic AV stenosis  - AVR 11/5/2013: Justin Mitangélica bioprosthetic  - AVR (re-do) 9/6/2022: #23-mm On-X  ([facility] [place])    Electrophysiology  - Complete AVB w/ junction escape  - PPM implantation 9/9/2022: [employer] MRI EL [id number] SN: [id number] ([facility])  - RA lead dislodgement 9/15/2022  - Bi-V ICD upgrade 5/24/2023    Pulmonary Vascular: N/A  Other: N/A    Risk Factors   - Dyslipidemia [Type: Hyperlipidemia]  - h/o Tobacco use [cigarettes, 1 ppd x 30 years, quit 2/24/2022]    Cardiovascular Procedure History  - 11/05/2013 CABG x 1: SVG-RPDA  - 11/05/2013 Ascending Aortic aneurysm repair: 34-mm Dacron graft  - 11/05/2013 AVR: Justin Mitroflow bioprosthetic  - 09/06/2022 AVR (re-do): #23-mm On-X  ([facility])  - 09/09/2022 PPM implantation: [employer] MRI EL L331 SN: [id number] ([facility])  - 09/15/2022 RA lead dislodgement  - 05/24/2023 Bi-V ICD upgrade: [employer] Resonate G447 SN: [id number]; RV: [employer] [place] 4 0673-64cm SN: [id number]; Coronary sinus: [employer] Acuity Lv X4 4678-95cm SN: [id number]    Prior Imaging/Testing History   - 08/18/2021 Echo: EF 38%, moderate LV systolic dysfunction, segmental wall motion abnormalities, RV hypokinetic, LA moderately dilated, AV prosthetic graft present [34-mm Dacron Graft], RVSP is estimated at 28 mmHg  - 02/25/2022 Echo: EF 52%, RA dilated, 27-mm Justin Mitroflow bioprosthetic AV present, valve leaflets slightly thickened and calcified although all 3 leaflets are noted to have a decent range of motion, mild-moderate AR, mild pHTN [RVSP  is estimated at 40 mmHg]  - 11/09/2022 Echo: EF 29%, mild LV dilation [LVIDD 5.8 cm], severe LV systolic dysfunction with segmental wall motion abnormalities, akinesis of basal inferior wall, presence of diastolic dysfunction, unable to assess grade due to summation of E and A waves,  moderate RV hypokinesis, severe LAE [LA VI 52 ml/m2], dilated RA, normally functioning 23-mm On-X mechanical AV prosthesis [peak aortic velocity 1.5 m/s, mean gradient 5 mmHg, effective orifice area 2.1 cm², no significant stenosis or regurgitation], mild MR/TR,  RVSP estimated at 34 mmHg  - 02/23/2023 Echo: EF 31%, mild LV [LVIDD 6.0 cm], moderate LV systolic dysfunction with segmental wall motion abnormalities, severe hypokinesis of basal to mid inferior and basal inferoseptal walls, moderate hypokinesis of rest of the segments, diastolic dysfunction, unable to assess grade (summation E and a waves), severe LAE [LA VI 49 ml/m2], dilated RA, normally functioning 23-mm On-X bileaflet mechanical AV [peak aortic velocity 1.9 m/s, mean gradient 8 mmHg, EOA 2.1 cm²], trivial AR, trace MR/TR, RVSP estimated 34 mmHg  - 08/23/2023 Echo (ltd): EF 41%, mild LV systolic dysfunction with segmental wall motion abnormalities, akinesis of basal inferior wall. , severe hypokinesis of basal to mid inferior wall, hypokinesis of basal to mid inferolateral wall, Grade 1 diastolic dysfunction, mild SILVIO [LA VI 39 ml/m2], normally functioning 23-mm On-X mechanical AV prosthesis [peak aortic velocity 1.6 m/s, mean gradient 6 mmHg, no significant stenosis or regurgitation], trace MR/TR, RVSP estimated 27 mmHg    12/07/2022 EKG (Sinus Rhythm, 80 bpm, 1st degree AVB [], nonspecific IVCD with LAD, borderline T abnormalities [lateral leads])  - 08/23/2023 EKG (A-sensed V-paced, 70 bpm, Bi-V paced rhythm)    - 02/26/2022 Lexiscan MPI: abnormal; EF 48%    Past Medical History:   Diagnosis Date    Aortic valve stenosis     s/p AV replacement 11/2013    Arthritis     Bilat hands, ankle    Ascending aorta dilatation (CMS/[facility])     s/p AAA repair 11/2013    Asthma     CAD (coronary artery disease)     1 vessel bypass    Cancer (CMS/[facility])     CHF (congestive heart failure) (CMS/[facility])     COPD (chronic obstructive pulmonary disease) (CMS/[facility])     Dysrhythmia     nonsustained v-tach    History of transfusion     2013. 2022    Hyperlipidemia     Ischemic cardiomyopathy     Lung nodule 03/07/2022    Myocardial infarction (CMS/[facility])     Obstructive sleep apnea 09/13/2024    With some central events; oxygen sat  80%  9/2024      Presence of heart assist device (CMS/[facility])     Shortness of breath     Skin cancer        Past Surgical History:   Procedure Laterality Date    AAA REPAIR - ENDOGRAFT  11/2013    ANKLE SURGERY Right 1989    No hardware    AORTIC ROOT ANGIOGRAM N/A 05/20/2022    Procedure: Aortic Arch  Angiogram;  Surgeon: Derick Gallegos MD;  Location: [facility] Cath Lab;  Service: Cardiovascular;  Laterality: N/A;    AORTIC VALVE REPLACEMENT  11/2013    BIVENTRICULAR ICD UPGRADE N/A 05/24/2023    Procedure: Bi-V ICD upgrade;  Surgeon: Mitchel Saldaña DO;  Location: [facility] EP Lab;  Service: Cardiovascular;  Laterality: N/A;    CARDIAC VALVE REPLACEMENT      COLONOSCOPY N/A 10/05/2023    Procedure: COLONOSCOPY with polypectomies, endo clip placement x2;  Surgeon: Patricia Hutton MD;  Location: [facility] Endoscopy;  Service: Endoscopy;  Laterality: N/A;    CORONARY ANGIOPLASTY  11/2013    CORONARY ARTERY BYPASS GRAFT  11/2013    1V CABG SVG- RCA    FINGER SURGERY Left 1979    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    LEFT HEART CATH N/A 05/20/2022    Procedure: Left heart cath;  Surgeon: Derick Gallegos MD;  Location: [facility] Cath Lab;  Service: Cardiovascular;  Laterality: N/A;    SHOULDER ARTHROSCOPY Right 2/1/2024    Procedure: RIGHT SHOULDER ARTHROSCOPIC ROTATOR CUFF REPAIR, SUBACROMIAL DECOMPRESSION, POSSIBLE SUPERIOR CAPSULAR RECONSTRUCTION, EXTENSIVE DEBRIDEMENT;  Surgeon: Mina Kendrick DO;  Location: [facility] OR;  Service: Orthopedics;  Laterality: Right;  CPT 42982, 01037, 29586, 39326; OUTPATIENT    TONSILLECTOMY Bilateral 1969       Allergies as of 04/04/2025 - Reviewed 04/04/2025   Allergen Reaction Noted    Ezetimibe Rash 03/16/2020       Current Outpatient Medications   Medication Sig Dispense Refill    atorvastatin (LIPITOR) 80 mg tablet Take 1 tablet (80 mg total) by mouth daily 90 tablet 1    metoprolol succinate XL (TOPROL-XL) 50 mg 24 hr tablet Take 1 tablet (50 mg total) by mouth daily 90 tablet 2     sacubitriL-valsartan (ENTRESTO) 24-26 mg tablet Take 1 tablet by mouth 2 (two) times a day Pt to take 0.5tab BID for 2 weeks then increase to 1 tab BID. Pt will discontinue lisinopril for 3 day washout period prior to starting entresto. 180 tablet 3    budesonide/formoterol fumarate (BREYNA INHL) Inhale      albuterol HFA 90 mcg/actuation inhaler Inhale 2 puffs every 6 (six) hours as needed for wheezing 18 g 11    evolocumab (Repatha SureClick) 140 mg/mL Inject 1 mL (140 mg total) under the skin every 14 (fourteen) days 6 mL 3    spironolactone (ALDACTONE) 25 mg tablet Take 1 tablet (25 mg total) by mouth daily 90 tablet 3    nitroglycerin (NITROSTAT) 0.4 mg SL tablet Place 1 tablet (0.4 mg total) under the tongue every 5 (five) minutes as needed for chest pain Limit 3 tabs per episode. 25 tablet 1    aspirin 81 mg EC tablet Take 1 tablet (81 mg total) by mouth daily 90 tablet 3    warfarin (COUMADIN) 5 mg tablet Warfarin 5 mg Tablets:  take 1/2 to 1 tablet by mouth daily as directed by INR 80 tablet 3     No current facility-administered medications for this visit.       Family History   Problem Relation Age of Onset    Cancer Mother     Heart disease Father     Aneurysm Father     Other Sister         multisystem atrophy- on hospice    Breast cancer Sister     No Known Problems Sister     Gallbladder disease Daughter     No Known Problems Daughter        Social History     Tobacco Use    Smoking status: Every Day     Current packs/day: 0.50     Average packs/day: 0.8 packs/day for 41.1 years (31.0 ttl pk-yrs)     Types: Cigarettes     Start date: 2/24/1982     Last attempt to quit: 2/24/2022    Smokeless tobacco: Never    Tobacco comments:     Last smoked 3/1/22   Vaping Use    Vaping status: Never Used   Substance Use Topics    Alcohol use: Yes     Alcohol/week: 4.0 - 5.0 standard drinks of alcohol     Types: 4 - 5 Cans of beer per week    Drug use: No       Review of Systems   Respiratory:          CPAP at night    Genitourinary:  Positive for nocturia.   All other systems reviewed and are negative.      Objective     Vitals:    04/04/25 1114   BP: 122/62   Pulse: 72   Resp: 18   SpO2: 91%     Weight:   Weights (Current Encounter Only) (last 180 days)       Date/Time Weight    04/04/25 1114 95.7 kg (211 lb)             Physical Exam    General: No acute distress  HEENT: Normocephalic atraumatic  Neck: Normal JVP, no carotid bruit  Chest: Clear to auscultation, no adventitious breath sounds  CVS: S1-S2, regular rhythm, no murmurs rubs or gallops  Abdomen: Soft, nontender, nondistended  Extremities: No edema, cyanosis or clubbing  Vascular: Radial pulses 2+ bilaterally  Skin: warm, dry, intact.    Data Review:     Sodium   Date Value Ref Range Status   12/23/2024 131 128 - 145 mmol/L Final     Potassium   Date Value Ref Range Status   12/23/2024 4.6 3.5 - 5.1 MMOL/L Final     Chloride   Date Value Ref Range Status   12/23/2024 108 (H) 98 - 107 mmol/L Final     CO2   Date Value Ref Range Status   12/23/2024 29 21 - 32 mmol/L Final     BUN   Date Value Ref Range Status   12/23/2024 15 7 - 25 mg/dL Final     Creatinine   Date Value Ref Range Status   12/23/2024 0.70 0.70 - 1.30 mg/dL Final     Glucose   Date Value Ref Range Status   12/23/2024 144 (H) 70 - 105 mg/dL Final     Calcium   Date Value Ref Range Status   12/23/2024 9.7 8.6 - 10.3 mg/dL Final     WBC   Date Value Ref Range Status   12/23/2024 9.0 3.7 - 9.6 10*3/uL Final     Hemoglobin   Date Value Ref Range Status   12/23/2024 17.0 13.2 - 17.2 g/dL Final     Hematocrit   Date Value Ref Range Status   12/23/2024 49.1 38.0 - 50.0 % Final     MCV   Date Value Ref Range Status   12/23/2024 95.8 82.0 - 97.0 fL Final     Platelets   Date Value Ref Range Status   12/23/2024 164 130 - 350 10*3/uL Final     Cholesterol   Date Value Ref Range Status   12/23/2024 97 0 - 199 mg/dL Final     Triglycerides   Date Value Ref Range Status   12/23/2024 157 (H) <=149 mg/dL Final     HDL    Date Value Ref Range Status   2024 62 >=40 mg/dL Final     LDL Calculated   Date Value Ref Range Status   2024 <20 (L) 20 - 99 mg/dL Final         Assessment/Plan   Diagnoses and all orders for this visit:    S/P AVR  -     ECG 12 lead -Normal, Today  -     US Echo complete; Future    History of coronary artery bypass graft x 1    Chronic systolic congestive heart failure (CMS/HCC)  -     ECG 12 lead -Normal, Today  -     US Echo complete; Future    Mixed hyperlipidemia    Status post aortic valve redo replacement with 23 mm On-X mechanical valve for treatment of severe aortic regurgitation of bioprosthetic Justin valve  Echocardiogram 2023:  Normally functioning 23 mm On-X mechanical aortic valve prosthesis.  Peak aortic velocity 1.6 m/s, mean gradient 6 mmHg.  No significant stenosis or regurgitation.  Due for repeat echocardiogram  Continue aspirin and warfarin  Lifelong antibiotic prophylaxis for dental work involving gingival manipulation with 2 g amoxicillin 1 hour prior to dental procedure    Coronary artery disease  S/p CABG x 1 (SVG-RCA) in   No anginal symptoms  GDMT: Aspirin, metoprolol succinate, Repatha    Chronic systolic heart failure  NYHA Class I  Clinically euvolemic  Echocardiogram on 2023 showed EF 41%  Due for repeat echocardiogram   Device: BiV ICD  Device check today:  A pacing: <1%  RV pacin%  LV pacin%  AT/AF burden: <1% (atrial tachycardia)  Battery life: 9.5 years   No therapies or shocks delivered  Continue to follow with device clinic per routine and EP as needed  GDMT:   ACEI/ARB/ARNI: Entresto 24-26 mg twice daily  Beta-Blocker: Metoprolol succinate 50 mg daily  Aldosterone antagonist: Aldactone 25 mg daily  SGLT2 inhibitor: None    Hyperlipidemia  Last lipid panel on 2024:  Cholesterol: 97  Triglycerides: 157  HDL: 62  LDL: <20  On Repatha      Patient Instructions   No medication changes today  Echocardiogram today  Follow-up with EP (rhythm  team) next available   Follow-up with Dr. Joshi in 1 year or sooner if needed    Return in about 1 year (around 4/4/2026), or if symptoms worsen or fail to improve, for Dr. Joshi .    The patient verbalized correct understanding and agreement to today's instructions and plan.  The patient verbalized that all questions have been addressed.  Cait Walker CNP      A voice recognition program was used to aid in documentation of this record. Sometimes words are not printed exactly as they were spoken. While efforts were made to carefully edit and correct any inaccuracies, some errors may be present; please take these into context. Please contact the provider if errors are identified.

## 2025-04-04 ENCOUNTER — ANCILLARY PROCEDURE (OUTPATIENT)
Dept: CARDIOLOGY | Facility: CLINIC | Age: 65
End: 2025-04-04
Payer: COMMERCIAL

## 2025-04-04 ENCOUNTER — OFFICE VISIT (OUTPATIENT)
Dept: CARDIOLOGY | Facility: CLINIC | Age: 65
End: 2025-04-04
Payer: COMMERCIAL

## 2025-04-04 VITALS
SYSTOLIC BLOOD PRESSURE: 122 MMHG | HEART RATE: 72 BPM | HEIGHT: 72 IN | RESPIRATION RATE: 18 BRPM | DIASTOLIC BLOOD PRESSURE: 62 MMHG | WEIGHT: 211 LBS | BODY MASS INDEX: 28.58 KG/M2 | OXYGEN SATURATION: 91 %

## 2025-04-04 DIAGNOSIS — Z45.02 ENCOUNTER FOR INTERROGATION OF CARDIAC DEFIBRILLATOR: ICD-10-CM

## 2025-04-04 DIAGNOSIS — E78.2 MIXED HYPERLIPIDEMIA: ICD-10-CM

## 2025-04-04 DIAGNOSIS — Z95.1 HISTORY OF CORONARY ARTERY BYPASS GRAFT X 1: ICD-10-CM

## 2025-04-04 DIAGNOSIS — I50.22 CHRONIC SYSTOLIC CONGESTIVE HEART FAILURE (CMS/HCC): ICD-10-CM

## 2025-04-04 DIAGNOSIS — Z95.2 S/P AVR: Primary | ICD-10-CM

## 2025-04-04 DIAGNOSIS — Z95.2 S/P AVR: ICD-10-CM

## 2025-04-04 DIAGNOSIS — Z45.02 ENCOUNTER FOR INTERROGATION OF CARDIAC DEFIBRILLATOR: Primary | ICD-10-CM

## 2025-04-04 LAB
AOV AT: 0.11 S
AV LVOT PEAK GRADIENT: 7.5 MMHG
AV MEAN GRADIENT: 7.63 MMHG
AV PEAK GRADIENT: 13.54 MMHG
BSA FOR ECHO PROCEDURE: 2.21 M2
BSA FOR ECHO PROCEDURE: 2.21 M2
DOP CALC AO PEAK VEL: 1.84 M/S
DOP CALC AO VTI: 36.9 CM
DOP CALC LVOT DIAMETER: 2.26 CM
DOP CALC LVOT STROKE VOLUME: 113 CM3
DOP CALC MV VTI: 29.77 CM
DOP CALC RVOT PEAK VEL: 0.4 M/S
DOP CALCLVOT PEAK VEL VTI: 28.2 CM
E/A RATIO: 0.8
E/E' RATIO (AVERAGE): 6.7
E/E' RATIO: 7.2
ECHO EF ESTIMATED: 41 %
EJECTION FRACTION: 41 %
ERAP: 5 MMHG
INTERVENTRICULAR SEPTUM: 1.1 CM (ref 0.6–1.1)
IVC PROX: 1.61 CM
LA AREA A4C SYSTOLE: 71.3 CM3
LEFT ATRIUM VOLUME INDEX: 40 ML/M2
LEFT ATRIUM VOLUME: 86.4 CM3
LEFT INTERNAL DIMENSION IN SYSTOLE: 3 CM (ref 2.1–4)
LEFT VENTRICLE DIASTOLIC VOLUME INDEX: 74.3 ML/M2
LEFT VENTRICLE DIASTOLIC VOLUME: 162 CM3
LEFT VENTRICLE SYSTOLIC VOLUME INDEX: 43.6 ML/M2
LEFT VENTRICLE SYSTOLIC VOLUME: 95 CM3
LEFT VENTRICULAR INTERNAL DIMENSION IN DIASTOLE: 4.3 CM (ref 3.5–6)
LVAD-AP2: 41.1 CM2
ML OF DILUTED DEFINITY INJECTED: 3 ML
MV DEC SLOPE: 1990 MM/S2
MV DT: 319 MS
MV MEAN GRADIENT: 1.4 MMHG
MV PEAK A VEL: 73 CM/S
MV PEAK E VEL: 57.8 CM/S
MV PEAK GRADIENT: 3 MMHG
MV STENOSIS PRESSURE HALF TIME: 99 MS
MV VALVE AREA P 1/2 METHOD: 2.22 CM2
MV VMAX: 83 CM/S
MVA (VTI): 3.8 CM2
POSTERIOR WALL: 1.2 CM (ref 0.6–1.1)
PULM VEIN S/D RATIO: 1.4
PV PEAK D VEL: 36 CM/S
PV PEAK GRADIENT: 2.43 MMHG
PV PEAK S VEL: 51 CM/S
PV VMAX: 0.78 M/S
RA AREA: 29.2 CM2
RH ABDOMINAL AORTA: 2.59 CM
RH CV ECHO AV VALVE AREA VEL: 3 CM2
RH CV ECHO AV VALVE AREA VTI: 3.1 CM2
RH LVOT PEAK VELOCITY REST: 1.4 M/S
RV AP4 BASE: 4.1 CM
TDI: 8.1 CM/S
TDILATERAL: 9.3 CM/S
TR MAX PG: 24 MMHG
TRICUSPID ANNULAR PLANE SYSTOLIC EXCURSION: 0.9 CM
TRICUSPID VALVE PEAK REGURGITATION VELOCITY: 2.4 M/S
TV REST PULMONARY ARTERY PRESSURE: 29 MMHG

## 2025-04-04 PROCEDURE — 99214 OFFICE O/P EST MOD 30 MIN: CPT

## 2025-04-04 PROCEDURE — 93306 TTE W/DOPPLER COMPLETE: CPT | Performed by: INTERNAL MEDICINE

## 2025-04-04 ASSESSMENT — PAIN SCALES - GENERAL: PAINLEVEL_OUTOF10: 0-NO PAIN

## 2025-04-04 NOTE — PATIENT INSTRUCTIONS
No medication changes today  Echocardiogram today  Follow-up with EP (rhythm team) next available   Follow-up with Dr. Joshi in 1 year or sooner if needed

## 2025-04-07 ENCOUNTER — LAB (OUTPATIENT)
Dept: LAB | Facility: URGENT CARE | Age: 65
End: 2025-04-07
Payer: COMMERCIAL

## 2025-04-07 DIAGNOSIS — Z51.81 ANTICOAGULATION MANAGEMENT ENCOUNTER: Primary | ICD-10-CM

## 2025-04-07 DIAGNOSIS — Z95.2 PRESENCE OF PROSTHETIC HEART VALVE: ICD-10-CM

## 2025-04-07 DIAGNOSIS — Z79.01 ANTICOAGULATION MANAGEMENT ENCOUNTER: Primary | ICD-10-CM

## 2025-04-07 LAB
INR BLD: 2.3
PROTHROMBIN TIME: 25.9 SECONDS (ref 9.4–12.5)

## 2025-04-07 PROCEDURE — 85610 PROTHROMBIN TIME: CPT | Performed by: FAMILY MEDICINE

## 2025-04-07 PROCEDURE — 36415 COLL VENOUS BLD VENIPUNCTURE: CPT | Performed by: FAMILY MEDICINE

## 2025-04-08 LAB — INR PPP: 2.3

## 2025-04-08 RX ORDER — WARFARIN SODIUM 5 MG/1
TABLET ORAL
Qty: 80 TABLET | Refills: 3 | Status: SHIPPED | OUTPATIENT
Start: 2025-04-08

## 2025-04-09 ENCOUNTER — ANTICOAGULATION VISIT (OUTPATIENT)
Dept: CARDIOLOGY | Facility: CLINIC | Age: 65
End: 2025-04-09
Payer: COMMERCIAL

## 2025-04-09 DIAGNOSIS — Z79.01 ANTICOAGULATION MANAGEMENT ENCOUNTER: ICD-10-CM

## 2025-04-09 DIAGNOSIS — Z95.2 S/P AVR: Primary | Chronic | ICD-10-CM

## 2025-04-09 DIAGNOSIS — Z95.2 PRESENCE OF PROSTHETIC HEART VALVE: ICD-10-CM

## 2025-04-09 DIAGNOSIS — Z51.81 ANTICOAGULATION MANAGEMENT ENCOUNTER: ICD-10-CM

## 2025-04-09 NOTE — PROGRESS NOTES
4-8-25 1016  Left  to call back to [facility].  augustus; [id number] to call [facility]  4.9.25  1607  collected on 4/7, resulted on 4/8  ID verified. Spoke with  .  No medication or diet changes. No bleeding or bruising. Verified Warfarin dose and tablet size. Encouraged to call with any changes. Mailed reminder. [facility] [employer] [facility]    4.9.25  1606  Continue Warfarin dose 2.5 mg every Sun, Thu; 5 mg all other days. Check INR in 5 weeks, 4/14. Verbalized understanding. ARTURO

## 2025-04-14 ENCOUNTER — RESULTS FOLLOW-UP (OUTPATIENT)
Dept: CARDIOLOGY | Facility: CLINIC | Age: 65
End: 2025-04-14
Payer: COMMERCIAL

## 2025-04-14 RX ORDER — WARFARIN SODIUM 5 MG/1
TABLET ORAL
Qty: 90 TABLET | Refills: 0 | OUTPATIENT
Start: 2025-04-14

## 2025-04-30 ENCOUNTER — TELEPHONE (OUTPATIENT)
Dept: CARDIOLOGY | Facility: CLINIC | Age: 65
End: 2025-04-30
Payer: COMMERCIAL

## 2025-04-30 NOTE — TELEPHONE ENCOUNTER
"Called and spoke to Gopal relaying Cait Walker CNP message of he echocardiogram results. \"EF remains down slightly, no significant change compared to prior echocardiogram in 2023.  Aortic valve appears to be functioning normally.  Discussed with Dr. Joshi and recommend starting Jardiance 10 mg daily or Farxiga 10 mg daily, whichever his insurance will cover and have BMP drawn 1 week after starting medication.\"     Pt asked if he should get off the baby aspirin due to the bruising and also asked if we could order wither the Farxiga or Jardiance. He was not sure which one his insurance would cover. Pt also asked for a call back if he should stop taking the aspirin or not.     Please place order.    "

## 2025-05-02 ENCOUNTER — ANTICOAGULATION VISIT (OUTPATIENT)
Dept: CARDIOLOGY | Facility: CLINIC | Age: 65
End: 2025-05-02
Payer: COMMERCIAL

## 2025-05-02 ENCOUNTER — LAB (OUTPATIENT)
Dept: LAB | Facility: CLINIC | Age: 65
End: 2025-05-02
Payer: COMMERCIAL

## 2025-05-02 ENCOUNTER — OFFICE VISIT (OUTPATIENT)
Dept: FAMILY MEDICINE | Facility: CLINIC | Age: 65
End: 2025-05-02
Payer: COMMERCIAL

## 2025-05-02 ENCOUNTER — MEDICATION ACCESS (OUTPATIENT)
Dept: FAMILY MEDICINE | Facility: CLINIC | Age: 65
End: 2025-05-02
Payer: COMMERCIAL

## 2025-05-02 VITALS
TEMPERATURE: 97.5 F | WEIGHT: 209 LBS | HEIGHT: 72 IN | DIASTOLIC BLOOD PRESSURE: 62 MMHG | HEART RATE: 77 BPM | BODY MASS INDEX: 28.31 KG/M2 | SYSTOLIC BLOOD PRESSURE: 116 MMHG | OXYGEN SATURATION: 93 %

## 2025-05-02 DIAGNOSIS — Z00.00 WELLNESS EXAMINATION: Primary | ICD-10-CM

## 2025-05-02 DIAGNOSIS — Z95.2 PRESENCE OF PROSTHETIC HEART VALVE: ICD-10-CM

## 2025-05-02 DIAGNOSIS — Z95.2 S/P AVR: Primary | Chronic | ICD-10-CM

## 2025-05-02 DIAGNOSIS — I65.23 OCCLUSION AND STENOSIS OF BILATERAL CAROTID ARTERIES: ICD-10-CM

## 2025-05-02 DIAGNOSIS — E78.5 HYPERLIPIDEMIA, UNSPECIFIED HYPERLIPIDEMIA TYPE: ICD-10-CM

## 2025-05-02 DIAGNOSIS — I50.42 CHRONIC COMBINED SYSTOLIC AND DIASTOLIC CONGESTIVE HEART FAILURE (CMS/HCC): ICD-10-CM

## 2025-05-02 DIAGNOSIS — Z79.01 ANTICOAGULATION MANAGEMENT ENCOUNTER: ICD-10-CM

## 2025-05-02 DIAGNOSIS — N52.9 ERECTILE DYSFUNCTION, UNSPECIFIED ERECTILE DYSFUNCTION TYPE: ICD-10-CM

## 2025-05-02 DIAGNOSIS — Z12.5 PROSTATE CANCER SCREENING: ICD-10-CM

## 2025-05-02 DIAGNOSIS — Z51.81 ANTICOAGULATION MANAGEMENT ENCOUNTER: ICD-10-CM

## 2025-05-02 DIAGNOSIS — I25.10 CORONARY ARTERY DISEASE INVOLVING NATIVE CORONARY ARTERY OF NATIVE HEART WITHOUT ANGINA PECTORIS: ICD-10-CM

## 2025-05-02 LAB
INR BLD: 1.9
INR PPP: 1.9
INR PPP: 1.9
PROTHROMBIN TIME: 20.7 SECONDS (ref 9.4–12.5)

## 2025-05-02 PROCEDURE — 36415 COLL VENOUS BLD VENIPUNCTURE: CPT | Performed by: INTERNAL MEDICINE

## 2025-05-02 PROCEDURE — 93295 DEV INTERROG REMOTE 1/2/MLT: CPT | Performed by: INTERNAL MEDICINE

## 2025-05-02 PROCEDURE — 85610 PROTHROMBIN TIME: CPT | Performed by: INTERNAL MEDICINE

## 2025-05-02 RX ORDER — SPIRONOLACTONE 25 MG/1
25 TABLET ORAL DAILY
Qty: 90 TABLET | Refills: 3 | Status: SHIPPED | OUTPATIENT
Start: 2025-05-02

## 2025-05-02 RX ORDER — EVOLOCUMAB 140 MG/ML
140 INJECTION, SOLUTION SUBCUTANEOUS
Qty: 6 ML | Refills: 3 | Status: SHIPPED | OUTPATIENT
Start: 2025-05-02

## 2025-05-02 RX ORDER — SILDENAFIL 100 MG/1
50-100 TABLET, FILM COATED ORAL DAILY PRN
Qty: 10 TABLET | Refills: 2 | Status: SHIPPED | OUTPATIENT
Start: 2025-05-02

## 2025-05-02 NOTE — PROGRESS NOTES
SUBJECTIVE:    Chief Complaint   Patient presents with    Annual Exam         Hayden Franklin is a 64 y.o. male presenting for an annual exam.    Patient Active Problem List   Diagnosis    Coronary atherosclerosis of native coronary artery    Mixed hyperlipidemia    S/P AVR    History of coronary artery bypass graft x 1    Ischemic cardiomyopathy    Chronic combined systolic and diastolic heart failure (CMS/HCC)    Asthma-COPD overlap syndrome (CMS/HCC)    Nonrheumatic aortic valve insufficiency    Anticoagulated    Biventricular implantable cardioverter-defibrillator (ICD) in situ    Presence of prosthetic heart valve    Obstructive sleep apnea          Outpatient Medications Prior to Visit   Medication Sig Dispense Refill    warfarin (COUMADIN) 5 mg tablet Warfarin 5 mg Tablets:  take 1/2 to 1 tablet by mouth daily as directed by INR 80 tablet 3    atorvastatin (LIPITOR) 80 mg tablet Take 1 tablet (80 mg total) by mouth daily 90 tablet 1    metoprolol succinate XL (TOPROL-XL) 50 mg 24 hr tablet Take 1 tablet (50 mg total) by mouth daily 90 tablet 2    sacubitriL-valsartan (ENTRESTO) 24-26 mg tablet Take 1 tablet by mouth 2 (two) times a day Pt to take 0.5tab BID for 2 weeks then increase to 1 tab BID. Pt will discontinue lisinopril for 3 day washout period prior to starting entresto. 180 tablet 3    budesonide/formoterol fumarate (BREYNA INHL) Inhale      albuterol HFA 90 mcg/actuation inhaler Inhale 2 puffs every 6 (six) hours as needed for wheezing 18 g 11    nitroglycerin (NITROSTAT) 0.4 mg SL tablet Place 1 tablet (0.4 mg total) under the tongue every 5 (five) minutes as needed for chest pain Limit 3 tabs per episode. 25 tablet 1    aspirin 81 mg EC tablet Take 1 tablet (81 mg total) by mouth daily 90 tablet 3    evolocumab (Repatha SureClick) 140 mg/mL Inject 1 mL (140 mg total) under the skin every 14 (fourteen) days 6 mL 3    spironolactone (ALDACTONE) 25 mg tablet Take 1 tablet (25 mg total) by mouth  "daily 90 tablet 3     No facility-administered medications prior to visit.       Family Status   Relation Name Status    Mother Ting Franklin Alive    Father Hayden Franklin     Sister Preethi     Sister Analy Alive    Sister Catalina Alive    Daughter Alejandra Alive    Daughter Sana Alive   No partnership data on file       Family History   Problem Relation Age of Onset    Lung cancer Mother     Osteoporosis Mother     Heart disease Father     Aneurysm Father     Other Sister 58        multisystem atrophy- on hospice    Breast cancer Sister     No Known Problems Sister     Gallbladder disease Daughter     No Known Problems Daughter            The patient's past medical history, medications, allergies, family history and social history were updated in his electronic medical record at today's visit.      REVIEW OF SYSTEMS:  Constitutional: weight is down 4 pounds from a year ago.  No significant fatigue.  HEENT:  mild hearing loss; due for vision exam.     Pulmonary: No hemoptysis; stable FLORES; chronic cough.  Sees pulmonology  Cardiovascular: No chest pain or syncope  Gastrointestinal: No abdominal pain, no change in bowel habits, no significant GERD.  :  Nocturia on occasion; weak stream in AM but then improves.  Positive for ED- would like to try viagra.  Discussed potential SE and written info provided.    Skin/Integumentary: No abnormal skin lesions noted, no evidence of rash; excessive bruising with blood thinner.    Neurologic: No chronic headaches.  Psychiatric: Denies depression or anxiety.  Musculoskeletal: negative for joint pain/swelling.      OBJECTIVE:   /62 (BP Location: Left arm, Patient Position: Sitting, Cuff Size: Long Adult)   Pulse 77   Temp 36.4 °C (97.5 °F) (Temporal)   Ht 1.829 m (6' 0.01\")   Wt 94.8 kg (209 lb)   SpO2 93%   BMI 28.34 kg/m²    The patient appears well, in NAD. Mood and affect appropriate.  HEENT: Eyes grossly normal.  Ears normal; TM's are " clear. Throat and pharynx normal. Teeth in good repair.  Neck supple. No adenopathy or thyromegaly.  Lungs: Clear, good air entry, no wheezes, rhonchi or rales.   Heart: S1 and S2 normal, no murmurs, regular rate and rhythm. Carotids are without bruits bilaterally.  Breasts without mass or axillary adenopathy.  Abdomen: Soft without tenderness, guarding, mass or hepatosplenomegaly. Extremities: No edema, no varicosities. Distal pulses intact.  Skin: several bruises on arms.        LAB:  Recent Results (from the past 48 hours)   Protime-INR Blood, Venous    Collection Time: 05/02/25 12:00 AM   Result Value Ref Range    INR 1.90    Protime-INR Blood, Venous    Collection Time: 05/02/25 12:00 AM   Result Value Ref Range    INR 1.90    Protime-INR Blood, Venous    Collection Time: 05/02/25  9:01 AM   Result Value Ref Range    Protime 20.7 (H) 9.4 - 12.5 SECONDS    INR 1.9 (H) <=1.1        ASSESSMENT:    Diagnosis Plan   1. Wellness examination  CBC w/auto differential Blood, Venous    Lipid panel Blood, Venous    Comprehensive metabolic panel Blood, Venous      2. Coronary artery disease involving native coronary artery of native heart without angina pectoris  evolocumab (Repatha SureClick) 140 mg/mL      3. Occlusion and stenosis of bilateral carotid arteries  evolocumab (Repatha SureClick) 140 mg/mL      4. Hyperlipidemia, unspecified hyperlipidemia type  evolocumab (Repatha SureClick) 140 mg/mL      5. Chronic combined systolic and diastolic congestive heart failure (CMS/HCC)  spironolactone (ALDACTONE) 25 mg tablet      6. Prostate cancer screening  PSA screen Blood, Venous      7. Erectile dysfunction, unspecified erectile dysfunction type  sildenafiL (VIAGRA) 100 mg tablet             PLAN:     The patient was provided written information regarding healthcare maintenance in today's AVS.  This includes diet, exercise, vaccinations and age appropriate cancer screenings.      Health Maintenance reviewed -  vaccinations are up to date.  Colon screening due 10/2026, lung screen due 1/2026..    Return in about 1 year (around 5/2/2026) for 30 MIN- PHYSICAL, LAB appointment prior to next visit- orders in.

## 2025-05-02 NOTE — PATIENT INSTRUCTIONS
Health Maintenance, Male    Adopting a healthy lifestyle and getting preventive care can go a long way to promote health and wellness. Talk with your health care provider about what schedule of regular examinations is right for you. This is a good chance for you to check in with your provider about disease prevention and staying healthy.    In between checkups, there are plenty of things you can do on your own. Experts have done a lot of research about which lifestyle changes and preventive measures are most likely to keep you healthy. Ask your health care provider for more information.    Weight and diet  Eat a healthy diet  Be sure to include plenty of vegetables, fruits, low-fat dairy products, and lean protein.  Do not eat a lot of foods high in solid fats, added sugars, or salt.  Get regular exercise. This is one of the most important things you can do for your health.  Most adults should exercise for at least 150 minutes each week. The exercise should increase your heart rate and make you sweat (moderate-intensity exercise).  Most adults should also do strengthening exercises at least twice a week. This is in addition to the moderate-intensity exercise.    Maintain a healthy weight  Body mass index (BMI) is a measurement that can be used to identify possible weight problems. It estimates body fat based on height and weight. Your health care provider can help determine your BMI and help you achieve or maintain a healthy weight.  For females 20 years of age and older:  A BMI below 18.5 is considered underweight.  A BMI of 18.5 to 24.9 is normal.  A BMI of 25 to 29.9 is considered overweight.  A BMI of 30 and above is considered obese.    Your BMI today was:  Body mass index is 28.34 kg/m².     Watch Your Levels of Cholesterol and Blood Lipids  Have your blood tested for lipids and cholesterol every 5 years starting at 35 years of age. If you are at high risk for heart disease, you should start having your  blood tested when you are 20 years old. You may need to have your cholesterol levels checked more often if:  Your lipid or cholesterol levels are high.  You are older than 50 years of age.  You are at high risk for heart disease.    What should I know about cancer screening?  Many types of cancers can be detected early and may often be prevented.  Lung Cancer  You should be screened every year for lung cancer if:  You are a current smoker who has smoked for at least 30 years.  You are a former smoker who has quit within the past 15 years.  Talk to your health care provider about your screening options, when you should start screening, and how often you should be screened.    Colorectal Cancer  Routine colorectal cancer screening usually begins at 45 years of age and should be repeated every 5-10 years until you are 75 years old. You may need to be screened more often if early forms of precancerous polyps or small growths are found. Your health care provider may recommend screening at an earlier age if you have risk factors for colon cancer.  Your health care provider may recommend using home test kits to check for hidden blood in the stool if you are normal or low risk for colon cancer. (FIT or Cologuard testing)  A small camera at the end of a tube can be used to examine your colon (sigmoidoscopy or colonoscopy). This checks for the earliest forms of colorectal cancer.    Prostate Cancer  Depending on your age and overall health, your health care provider may do certain tests to screen for prostate cancer  Talk to your health care provider about any symptoms or concerns you have about  prostate cancer.    Skin Cancer  Check your skin from head to toe regularly.  Tell your health care provider about any new moles or changes in moles, especially if:  There is a change in a mole’s size, shape, or color.  You have a mole that is larger than a pencil eraser.  Always use sunscreen. Apply sunscreen liberally and repeat  throughout the day.  Protect yourself by wearing long sleeves, pants, a wide-brimmed hat, and sunglasses when outside.    What should I know about heart disease, diabetes, and high blood pressure?  If you are 18-39 years of age, have your blood pressure checked every 3-5 years. If you are 40 years of age or older, have your blood pressure checked every year. You should have your blood pressure measured twice--once when you are at a hospital or clinic, and once when you are not at a hospital or clinic. Record the average of the two measurements. To check your blood pressure when you are not at a hospital or clinic, you can use:  An automated blood pressure machine at a pharmacy.  A home blood pressure monitor.  Talk to your health care provider about your target blood pressure.  If you are between 45-79 years old, ask your health care provider if you should take aspirin to prevent heart disease.  Have regular diabetes screenings by checking your fasting blood sugar level.  If you are at a normal weight and have a low risk for diabetes, have this test once every three years after the age of 45.  If you are overweight and have a high risk for diabetes, consider being tested at a younger age or more often.  A one-time screening for abdominal aortic aneurysm (AAA) by ultrasound is recommended for men aged 65-75 years who are current or former smokers.    What should I know about preventing infection?  Hepatitis B  If you have a higher risk for hepatitis B, you should be screened for this virus. Talk with your health care provider to find out if you are at risk for hepatitis B infection.  Hepatitis C  Blood testing is recommended for:  Everyone born from 1945 through 1965.  Anyone with known risk factors for hepatitis C.    Sexually Transmitted Diseases (STDs)  You should be screened each year for STDs including gonorrhea and chlamydia if:  You are sexually active and are younger than 24 years of age.  You are older than  24 years of age and your health care provider tells you that you are at risk for this type of infection.  Your sexual activity has changed since you were last screened and you are at an increased risk for chlamydia or gonorrhea. Ask your health care provider if you are at risk.  Talk with your health care provider about whether you are at high risk of being infected with HIV. Your health care provider may recommend a prescription medicine to help prevent HIV infection.    What else can I do?  Schedule regular health, dental, and eye exams.  Stay current with your vaccines (immunizations).  Do not use any tobacco products, such as cigarettes, chewing tobacco, and e-cigarettes. Do not use street drugs.  If you need help quitting, ask your health care provider.  Limit alcohol intake to no more than 2 drinks per day. One drink equals 12 ounces of beer, 5 ounces of wine, or 1½ ounces of hard liquor.  Tell your health care provider if you often feel depressed.  Tell your health care provider if you have ever been abused or do not feel safe at home.      [employer] Interactive Patient Education © 2018 [employer]

## 2025-05-02 NOTE — PATIENT INSTRUCTIONS
5-2-25 1150 Per patient history no changes in warfarin dose.  Continue 2.5mg Tuesday and Thursday.  5mg all other days.  Recheck INR in 5 weeks.  augustus

## 2025-05-02 NOTE — PROGRESS NOTES
05/02/25  12:26 PM    Inquiry initiated from:   [x]Rx Only    There are changes to the following:  [x]There have been no changes.     No PA needed/Repatha/Lyndon    This Rx is approved and an authorization is on file.    [facility] [id number] [address]  Phone: [phone]  Fax: [phone]

## 2025-05-02 NOTE — PROGRESS NOTES
5-2-25 1150 ID Verified and spoke with Gopal.  Slightly subtherapeutic.  He stated he knows why as he ate a bunch of broccoli last night.  He will decrease his greens intake.  No unusual bruising or bleeding.  No changes in medication.  Requests INR check in 5 weeks.  Augustus    5-2-25 1150 Per patient history no changes in warfarin dose.  Continue 2.5mg Tuesday and Thursday.  5mg all other days.  Recheck INR in 5 weeks.  augustus

## 2025-05-06 ENCOUNTER — OFFICE VISIT (OUTPATIENT)
Dept: CARDIOLOGY | Facility: CLINIC | Age: 65
End: 2025-05-06
Payer: COMMERCIAL

## 2025-05-06 VITALS
HEART RATE: 74 BPM | HEIGHT: 72 IN | SYSTOLIC BLOOD PRESSURE: 98 MMHG | DIASTOLIC BLOOD PRESSURE: 60 MMHG | BODY MASS INDEX: 28.61 KG/M2 | WEIGHT: 211.2 LBS | RESPIRATION RATE: 18 BRPM

## 2025-05-06 DIAGNOSIS — I25.5 ISCHEMIC CARDIOMYOPATHY: Primary | Chronic | ICD-10-CM

## 2025-05-06 DIAGNOSIS — G47.33 OBSTRUCTIVE SLEEP APNEA: Chronic | ICD-10-CM

## 2025-05-06 DIAGNOSIS — Z79.01 ANTICOAGULATED: Chronic | ICD-10-CM

## 2025-05-06 DIAGNOSIS — Z95.810 BIVENTRICULAR IMPLANTABLE CARDIOVERTER-DEFIBRILLATOR (ICD) IN SITU: Chronic | ICD-10-CM

## 2025-05-06 DIAGNOSIS — Z98.890 S/P ASCENDING AORTIC ANEURYSM REPAIR: ICD-10-CM

## 2025-05-06 DIAGNOSIS — I25.10 CORONARY ARTERY DISEASE INVOLVING NATIVE CORONARY ARTERY OF NATIVE HEART WITHOUT ANGINA PECTORIS: ICD-10-CM

## 2025-05-06 DIAGNOSIS — I50.42 CHRONIC COMBINED SYSTOLIC AND DIASTOLIC HEART FAILURE (CMS/HCC): Chronic | ICD-10-CM

## 2025-05-06 DIAGNOSIS — Z95.1 HISTORY OF CORONARY ARTERY BYPASS GRAFT X 1: Chronic | ICD-10-CM

## 2025-05-06 DIAGNOSIS — I65.23 OCCLUSION AND STENOSIS OF BILATERAL CAROTID ARTERIES: ICD-10-CM

## 2025-05-06 DIAGNOSIS — Z86.79 S/P ASCENDING AORTIC ANEURYSM REPAIR: ICD-10-CM

## 2025-05-06 DIAGNOSIS — Z95.2 PRESENCE OF PROSTHETIC HEART VALVE: ICD-10-CM

## 2025-05-06 DIAGNOSIS — E78.5 HYPERLIPIDEMIA, UNSPECIFIED HYPERLIPIDEMIA TYPE: ICD-10-CM

## 2025-05-06 DIAGNOSIS — Z95.2 S/P AVR: Chronic | ICD-10-CM

## 2025-05-06 DIAGNOSIS — E78.2 MIXED HYPERLIPIDEMIA: Chronic | ICD-10-CM

## 2025-05-06 DIAGNOSIS — J44.89 ASTHMA-COPD OVERLAP SYNDROME (CMS/HCC): Chronic | ICD-10-CM

## 2025-05-06 PROBLEM — I44.2 COMPLETE HEART BLOCK (CMS/HCC): Status: ACTIVE | Noted: 2025-05-06

## 2025-05-06 PROCEDURE — 99214 OFFICE O/P EST MOD 30 MIN: CPT | Performed by: NURSE PRACTITIONER

## 2025-05-06 PROCEDURE — 93000 ELECTROCARDIOGRAM COMPLETE: CPT | Performed by: INTERNAL MEDICINE

## 2025-05-06 RX ORDER — EPLERENONE 25 MG/1
25 TABLET ORAL DAILY
Qty: 90 TABLET | Refills: 3 | Status: SHIPPED | OUTPATIENT
Start: 2025-05-06 | End: 2026-05-06

## 2025-05-06 RX ORDER — EVOLOCUMAB 140 MG/ML
140 INJECTION, SOLUTION SUBCUTANEOUS EVERY 2 WEEKS
OUTPATIENT
Start: 2025-05-06

## 2025-05-06 ASSESSMENT — ENCOUNTER SYMPTOMS
PALPITATIONS: 0
NEUROLOGICAL NEGATIVE: 1
SYNCOPE: 0
SHORTNESS OF BREATH: 0
GASTROINTESTINAL NEGATIVE: 1
CONSTITUTIONAL NEGATIVE: 1
NEAR-SYNCOPE: 0
IRREGULAR HEARTBEAT: 0
PSYCHIATRIC NEGATIVE: 1
PND: 0
ENDOCRINE NEGATIVE: 1
ORTHOPNEA: 1

## 2025-05-06 ASSESSMENT — PAIN SCALES - GENERAL: PAINLEVEL_OUTOF10: 0-NO PAIN

## 2025-05-06 NOTE — PROGRESS NOTES
Subjective    Patient ID: Hayden Franklin is a 64 y.o. male.    Chief Complaint:   Chief Complaint   Patient presents with    Cardiomyopathy    Congestive Heart Failure    Follow-up     Chief complaint: Biventricular ICD.  Ischemic cardiomyopathy.  Combined systolic diastolic heart failure.    Hayden is a pleasant 64-year-old male presenting for follow-up of biventricular ICD implant.  I last saw him 8/23/2023.  He had been seen by Dr. Saldaña 4/26/2023 as he was found to have nonsustained VT on remote pacemaker transmission.  He had received a pacemaker at the [facility] where he underwent redo aortic valve replacement.  It was noted that his ejection fraction had been down and they had hoped it would recover with medical therapy and time postsurgery.  When seen in April he continued to note decreased exertional capacity.  He was on GDMT with spironolactone, lisinopril and metoprolol.  Biventricular ICD implant was recommended for NYHA class II-III CHF, EF less than 30% in spite of GDMT.  He was pacemaker dependent due to high degree AV block.  His pacemaker was upgraded to a biventricular ICD 5/24/2023.  I saw him in follow-up on 8/23/2023.  His ejection fraction had improved to 39% on limited echo that day.  He was 99% BiV paced.  Less than 1% atrial paced.    He has been followed by general cardiology for valvular heart disease and CAD.  He had a history of inferior MI with CABG x 1 with concomitant AVR in 2013.  He ended up with severe aortic stenosis and underwent redo sternotomy with aortic valve replacement using the 23 mm On-X aortic valve on 9/6/2022 at the [facility].  His hospitalization was complicated by EMMA on CKD, cardiogenic shock, development of complete AV block status post AVR with subsequent placement of dual-chamber PPM on 9/10/2022 with dislodgement of atrial lead which was fixed prior to discharge.past medical history also includes hypertension, MO on CPAP, COPD  and hyperlipidemia.  He has been anticoagulated with warfarin for his aortic valve replacement.    Hayden denies chest discomfort.  He denies shortness of breath.  His ICD was interrogated today.  He is less than 1% atrial paced and 100% BiV paced.  There is been no atrial fibrillation.  He does have PVCs though only 1 episode of 3 or more.  He denies PND or lower extremity swelling.  He prefers to sleep with the head of the bed up a bit.  He denies lightheaded or dizzy feelings syncopal or near syncopal he bends.  He has not had any sensation of palpitations racing or fluttering.  His EKG today shows atrial sensed ventricular paced rhythm at 74 bpm.  Blood pressure 98/60.  He does complain of breast tenderness and gynecomastia.  Apparently CT scan was unremarkable.    Echo 4/4/2025: EF 41%.  Grade 1 diastolic dysfunction.  Hypokinetic RV function.  Left atrium mildly dilated with LA VI 40 mL/m².  RA dilated.  No effusion.  23 mm On-X mechanical aortic valve prosthesis functioning normally.  V-max 1.8 m/s with a mean gradient 7 mmHg and overall no significant changes from prior echo 2023.    Remote transmission 4/28/2025: Programmed DDD BiV .  0% atrial paced.  100% BiV paced.  0% AT/AF burden.  9.5 years remaining on the battery.  Thresholds are excellent.  Lead impedances normal range.  VT zone set for 170 bpm with monitor only.  VF zone 200 bpm with ATP and shock therapy x 8.  He has had 4 untreated episodes since implant.  Over the last 8 months he has had 656,000 PVCs.  Only 1 episode with 3 or more PVCs.    Ejection fraction 2/20/2023 was 31% with LA volume 103 and LA size 4.79 cm.  It was recommended that he undergo upgrade of his pacemaker to a biventricular ICD or at minimum a biventricular pacemaker.     Echo 11/9/2022: EF 29% with akinesis basal inferior wall.  Severe left atrial enlargement with LA VI 52 mL per metered squared, dilated RA, normally functioning 23 mm On-X mechanical aortic valve  prosthesis with peak velocity 1.5 m/s, mean gradient 5 mmHg and effective orifice area 2.1 cm².  No significant stenosis or regurgitation.  Mild MR.  Mild TR.  RVSP 34 mmHg.     Cardiac cath 5/20/2022: Left main patent large vessel.  ANTONIO-3 flow in the LAD and left circumflex.  LAD 4 to 4.5 mm proximal.  Diffuse luminal irregularities up to 20%.  Patent diagonals and septals.  LCx proximal 3.5 to 4 mm vessel with minimal luminal irregularities up to 20%.  Patent OM branches.  RCA proximal 100%  extending to the distal RCA.  SVG to the RPDA patent and fills the RPL as well.  Moderate aortic regurgitation.    Carotid duplex 6/13/2022: 16 to 49% bulbar and ICA stenosis bilaterally.  Less than 50% external's bilaterally.  Antegrade vertebrals.          Specialty Problems          Cardiology Problems    Coronary atherosclerosis of native coronary artery        Mixed hyperlipidemia        History of coronary artery bypass graft x 1        S/P AVR        Ischemic cardiomyopathy        Chronic combined systolic and diastolic heart failure (CMS/HCC)        Asthma-COPD overlap syndrome (CMS/[facility])        Nonrheumatic aortic valve insufficiency        Anticoagulated        Biventricular implantable cardioverter-defibrillator (ICD) in situ        Presence of prosthetic heart valve         Past Medical History:   Diagnosis Date    Aortic valve stenosis     s/p AV replacement 11/2013    Arthritis     Bilat hands, ankle    Ascending aorta dilatation (CMS/[facility])     s/p AAA repair 11/2013    Asthma     CAD (coronary artery disease)     1 vessel bypass    Cancer (CMS/[facility])     CHF (congestive heart failure) (CMS/[facility])     COPD (chronic obstructive pulmonary disease) (CMS/[facility])     Dysrhythmia     nonsustained v-tach    History of transfusion     2013. 2022    Hyperlipidemia     Ischemic cardiomyopathy     Lung nodule 03/07/2022    Myocardial infarction (CMS/[facility])     Obstructive sleep apnea 09/13/2024    With some central events; oxygen  sat 80%  9/2024      Presence of heart assist device (CMS/[facility])     Shortness of breath     Skin cancer      Past Surgical History:   Procedure Laterality Date    AAA REPAIR - ENDOGRAFT  11/2013    ANKLE SURGERY Right 1989    No hardware    AORTIC ROOT ANGIOGRAM N/A 05/20/2022    Procedure: Aortic Arch  Angiogram;  Surgeon: Derick Gallegos MD;  Location: [facility] Cath Lab;  Service: Cardiovascular;  Laterality: N/A;    AORTIC VALVE REPLACEMENT  11/2013    BIVENTRICULAR ICD UPGRADE N/A 05/24/2023    Procedure: Bi-V ICD upgrade;  Surgeon: Mitchel Saldaña DO;  Location: [facility] EP Lab;  Service: Cardiovascular;  Laterality: N/A;    CARDIAC VALVE REPLACEMENT      COLONOSCOPY N/A 10/05/2023    Procedure: COLONOSCOPY with polypectomies, endo clip placement x2;  Surgeon: Patricia Hutton MD;  Location: [facility] Endoscopy;  Service: Endoscopy;  Laterality: N/A;    CORONARY ANGIOPLASTY  11/2013    CORONARY ARTERY BYPASS GRAFT  11/2013    1V CABG SVG- RCA    FINGER SURGERY Left 1979    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    LEFT HEART CATH N/A 05/20/2022    Procedure: Left heart cath;  Surgeon: Derick Gallegos MD;  Location: [facility] Cath Lab;  Service: Cardiovascular;  Laterality: N/A;    SHOULDER ARTHROSCOPY Right 2/1/2024    Procedure: RIGHT SHOULDER ARTHROSCOPIC ROTATOR CUFF REPAIR, SUBACROMIAL DECOMPRESSION, POSSIBLE SUPERIOR CAPSULAR RECONSTRUCTION, EXTENSIVE DEBRIDEMENT;  Surgeon: Mina Kendrick DO;  Location: [facility] OR;  Service: Orthopedics;  Laterality: Right;  CPT 87514, 51335, 00929, 42531; OUTPATIENT    TONSILLECTOMY Bilateral 1969     Allergies as of 05/06/2025 - Reviewed 05/06/2025   Allergen Reaction Noted    Ezetimibe Rash 03/16/2020     Current Outpatient Medications   Medication Sig Dispense Refill    evolocumab (Repatha SureClick) 140 mg/mL Inject 1 mL (140 mg total) under the skin every 14 (fourteen) days 6 mL 3    sildenafiL (VIAGRA) 100 mg tablet Take 0.5-1 tablets ( mg total) by mouth daily as  needed for erectile dysfunction 10 tablet 2    warfarin (COUMADIN) 5 mg tablet Warfarin 5 mg Tablets:  take 1/2 to 1 tablet by mouth daily as directed by INR 80 tablet 3    atorvastatin (LIPITOR) 80 mg tablet Take 1 tablet (80 mg total) by mouth daily 90 tablet 1    metoprolol succinate XL (TOPROL-XL) 50 mg 24 hr tablet Take 1 tablet (50 mg total) by mouth daily 90 tablet 2    sacubitriL-valsartan (ENTRESTO) 24-26 mg tablet Take 1 tablet by mouth 2 (two) times a day Pt to take 0.5tab BID for 2 weeks then increase to 1 tab BID. Pt will discontinue lisinopril for 3 day washout period prior to starting entresto. 180 tablet 3    budesonide/formoterol fumarate (BREYNA INHL) Inhale      albuterol HFA 90 mcg/actuation inhaler Inhale 2 puffs every 6 (six) hours as needed for wheezing 18 g 11    nitroglycerin (NITROSTAT) 0.4 mg SL tablet Place 1 tablet (0.4 mg total) under the tongue every 5 (five) minutes as needed for chest pain Limit 3 tabs per episode. 25 tablet 1    aspirin 81 mg EC tablet Take 1 tablet (81 mg total) by mouth daily 90 tablet 3    eplerenone (Inspra) 25 mg tablet Take 1 tablet (25 mg total) by mouth daily 90 tablet 3     No current facility-administered medications for this visit.       Review of Systems  Review of Systems   Constitutional: Negative.        Gynecomastia, breast tenderness   Cardiovascular:  Positive for orthopnea. Negative for chest pain, dyspnea on exertion, irregular heartbeat, leg swelling/pain, near-syncope, palpitations, PND and syncope/fainting.   Respiratory:  Positive for sleep apnea. Negative for shortness of breath.    Endocrine: Negative.    Musculoskeletal:  Positive for joint pain.        Arthritis   Gastrointestinal: Negative.    Genitourinary: Negative.    Neurological: Negative.    Psychiatric/Behavioral: Negative.         Objective     Physical Exam  Vitals reviewed.   Constitutional:       Appearance: Normal appearance. He is well-developed.   HENT:      Head:  Normocephalic and atraumatic.   Eyes:      General: No scleral icterus.  Neck:      Thyroid: No thyromegaly.      Vascular: No carotid bruit or JVD.   Cardiovascular:      Rate and Rhythm: Normal rate and regular rhythm.      Heart sounds: Normal heart sounds. No murmur heard.     No friction rub. No gallop.      Comments: Crisp prosthetic valve sound.  ICD in the left upper chest without any evidence of erosion or infection.  Pulmonary:      Effort: Pulmonary effort is normal.      Breath sounds: No wheezing, rhonchi or rales.      Comments: Breath sounds are diminished bilaterally.  Abdominal:      General: Bowel sounds are normal.      Palpations: Abdomen is soft.      Tenderness: There is no abdominal tenderness.   Musculoskeletal:      Cervical back: Normal range of motion and neck supple.      Right lower leg: No edema.      Left lower leg: No edema.   Lymphadenopathy:      Cervical: No cervical adenopathy.   Skin:     General: Skin is warm and dry.      Findings: No rash.   Neurological:      General: No focal deficit present.      Mental Status: He is alert and oriented to person, place, and time.   Psychiatric:         Mood and Affect: Mood normal.         Behavior: Behavior normal.         Data Review:   Vitals:    05/06/25 1441   BP: 98/60   Pulse: 74   Resp: 18   Height: 1.829 m (6')   Weight: 95.8 kg (211 lb 3.2 oz)   PainSc: 0-No pain   Patient Position: Sitting     Sodium   Date Value Ref Range Status   12/23/2024 131 128 - 145 mmol/L Final   04/20/2024 137 135 - 145 mmol/L Final   01/12/2024 137 135 - 145 mmol/L Final     Potassium   Date Value Ref Range Status   12/23/2024 4.6 3.5 - 5.1 MMOL/L Final   04/20/2024 4.1 3.5 - 5.1 MMOL/L Final   01/12/2024 4.4 3.5 - 5.1 MMOL/L Final     CO2   Date Value Ref Range Status   12/23/2024 29 21 - 32 mmol/L Final   04/20/2024 23 21 - 32 mmol/L Final   01/12/2024 27 21 - 32 mmol/L Final     BUN   Date Value Ref Range Status   12/23/2024 15 7 - 25 mg/dL Final    04/20/2024 19 7 - 25 mg/dL Final   01/12/2024 16 7 - 25 mg/dL Final     Creatinine   Date Value Ref Range Status   12/23/2024 0.70 0.70 - 1.30 mg/dL Final   04/20/2024 0.90 0.70 - 1.30 mg/dL Final   01/12/2024 0.98 0.70 - 1.30 mg/dL Final     Glucose   Date Value Ref Range Status   12/23/2024 144 (H) 70 - 105 mg/dL Final   04/20/2024 102 70 - 105 mg/dL Final   01/12/2024 105 70 - 105 mg/dL Final     Calcium   Date Value Ref Range Status   12/23/2024 9.7 8.6 - 10.3 mg/dL Final   04/20/2024 9.6 8.6 - 10.3 mg/dL Final   01/12/2024 9.5 8.6 - 10.3 mg/dL Final     hsTnI 0 Hour   Date Value Ref Range Status   02/26/2022 14.7 <=19.8 pg/mL Final   02/25/2022 17.6 <=19.8 pg/mL Final   02/25/2022 20.8 (H) <=19.8 pg/mL Final     BNP   Date Value Ref Range Status   03/08/2022 375 (H) 0 - 100 pg/mL Final   02/27/2022 494 (H) 0 - 100 pg/mL Final   02/26/2022 634 (H) 0 - 100 pg/mL Final     WBC   Date Value Ref Range Status   12/23/2024 9.0 3.7 - 9.6 10*3/uL Final   05/21/2024 9.4 3.7 - 9.6 10*3/uL Final   04/20/2024 7.2 3.7 - 9.6 10*3/uL Final     Hemoglobin   Date Value Ref Range Status   12/23/2024 17.0 13.2 - 17.2 g/dL Final   05/21/2024 17.3 (H) 13.2 - 17.2 g/dL Final   04/20/2024 17.7 (H) 13.2 - 17.2 g/dL Final     Hematocrit   Date Value Ref Range Status   12/23/2024 49.1 38.0 - 50.0 % Final   05/21/2024 52.9 (H) 38.0 - 50.0 % Final   04/20/2024 52.1 (H) 38.0 - 50.0 % Final     MCV   Date Value Ref Range Status   12/23/2024 95.8 82.0 - 97.0 fL Final   05/21/2024 94.1 82.0 - 97.0 fL Final   04/20/2024 93.3 82.0 - 97.0 fL Final     Platelets   Date Value Ref Range Status   12/23/2024 164 130 - 350 10*3/uL Final   05/21/2024 178 130 - 350 10*3/uL Final   04/20/2024 188 130 - 350 10*3/uL Final     Cholesterol   Date Value Ref Range Status   12/23/2024 97 0 - 199 mg/dL Final   04/20/2024 110 0 - 199 mg/dL Final   04/14/2023 114 0 - 199 mg/dL Final     Triglycerides   Date Value Ref Range Status   12/23/2024 157 (H) <=149 mg/dL  Final   04/20/2024 138 <=149 mg/dL Final   04/14/2023 92 <=149 mg/dL Final     HDL   Date Value Ref Range Status   12/23/2024 62 >=40 mg/dL Final   04/20/2024 59 >=40 mg/dL Final   04/14/2023 71 >=40 mg/dL Final     LDL Calculated   Date Value Ref Range Status   12/23/2024 <20 (L) 20 - 99 mg/dL Final   04/20/2024 23 20 - 99 mg/dL Final   04/14/2023 25 20 - 99 mg/dL Final     Lab Results   Component Value Date    NTPROBNPM 171 (H) 02/20/2023     (H) 03/08/2022          Assessment/Plan   Diagnoses and all orders for this visit:    Ischemic cardiomyopathy  -     ECG 12 lead -Normal, Today  -     eplerenone (Inspra) 25 mg tablet; Take 1 tablet (25 mg total) by mouth daily    Biventricular implantable cardioverter-defibrillator (ICD) in situ  -     ECG 12 lead -Normal, Today    Chronic combined systolic and diastolic heart failure (CMS/HCC)    History of coronary artery bypass graft x 1    Mixed hyperlipidemia    Presence of prosthetic heart valve    S/P AVR    Anticoagulated    Obstructive sleep apnea    Asthma-COPD overlap syndrome (CMS/HCC)    S/P ascending aortic aneurysm repair    Plan/recommendation:  Ischemic cardiomyopathy: Bridget has a history of CAD with CABG x 1 as well as prior PCI.  He has a chronic total occluded RCA with a bypass graft patent on last cath in 2022.  He denies exertional angina.  He remains very active working.  He is followed by general cardiology.  He is on GDMT with aspirin and statin as well as metoprolol.  His ejection fraction has improved to 41% from 29 to 30% previously.    Biventricular ICD/chronic systolic heart failure: Has a history of NYHA class II-III congestive heart failure, ischemic cardiomyopathy with ejection fraction previously in the 30% range, complete heart block with prior dual-chamber pacemaker.  His pacemaker was upgraded to a biventricular ICD in 2023 by Dr. Saldaña.  Device was checked today and functioning normally.  He is less than 1% atrial paced and  100% BiV paced.  He has not had any atrial arrhythmias.  No ventricular arrhythmias.  He does have PVCs greater than 600,000 in the last 8 months with only 1 episode of 3 or more PVCs.  His site is well-healed without evidence of erosion or infection.  He is doing remote transmissions.  He is programmed DDD BiV 60-1 30.  He has 9-1/2 years remaining on his battery.  His VT zone set for 170 bpm monitor only.  VF zone 200 bpm with ATP and shock therapy x 8.  Will continue with routine device surveillance with remote and in clinic monitoring.    Valvular heart disease: Severe aortic stenosis.  Initial aortic valve replaced 11/13 with ascending aortic aneurysm repair with a 34 mm Dacron graft and Justin Mitroflow bioprosthetic valve.  He underwent redo AVR [facility] 9/6/2022.  Recent echo showing On-X mechanical aortic valve functioning normally with a peak velocity 1.5 m/s, mean gradient 5 mmHg and effective orifice area 2.1 cm².  No significant stenosis or regurgitation.  Follows with Dr. Sarmiento.    CAD/CABG: History of inferior MI 11/13 with CABG x 1 with SVG to the RCA, at the time of AVR and ascending aortic aneurysm repair.  Follows with general cardiology.    Anticoagulation: Currently anticoagulated with warfarin for his prosthetic valve.  Has not had bleeding issues.    Breast tenderness/gynecomastia: Most likely due to spironolactone.  Will replace with eplerenone 25 mg daily.  New Rx sent and written instructions provided.    He will continue to follow with Dr. Sarmiento.  Will follow up in 1 year with EP.  Will see him sooner if electrical issues arise.

## 2025-05-06 NOTE — TELEPHONE ENCOUNTER
No care due was identified.  [facility] Embedded Care Due Messages. Reference number: [id number]. 5/06/2025 3:24:37 PM MDT

## 2025-05-16 DIAGNOSIS — J44.89 ASTHMA-COPD OVERLAP SYNDROME (CMS/HCC): Primary | ICD-10-CM

## 2025-05-16 RX ORDER — BUDESONIDE AND FORMOTEROL FUMARATE DIHYDRATE 160; 4.5 UG/1; UG/1
2 AEROSOL RESPIRATORY (INHALATION) 2 TIMES DAILY
Qty: 10.2 G | Refills: 5 | Status: SHIPPED | OUTPATIENT
Start: 2025-05-16 | End: 2025-11-12

## 2025-05-22 DIAGNOSIS — I25.5 ISCHEMIC CARDIOMYOPATHY: Primary | ICD-10-CM

## 2025-05-22 DIAGNOSIS — I50.42 CHRONIC COMBINED SYSTOLIC AND DIASTOLIC HEART FAILURE (CMS/HCC): ICD-10-CM

## 2025-05-22 RX ORDER — DAPAGLIFLOZIN 5 MG/1
10 TABLET, FILM COATED ORAL DAILY
Qty: 180 TABLET | Refills: 0 | Status: SHIPPED | OUTPATIENT
Start: 2025-05-22

## 2025-06-05 ENCOUNTER — LAB (OUTPATIENT)
Dept: LAB | Facility: URGENT CARE | Age: 65
End: 2025-06-05
Payer: COMMERCIAL

## 2025-06-05 DIAGNOSIS — Z95.2 PRESENCE OF PROSTHETIC HEART VALVE: ICD-10-CM

## 2025-06-05 LAB
INR BLD: 2.3
PROTHROMBIN TIME: 25.6 SECONDS (ref 9.4–12.5)

## 2025-06-06 ENCOUNTER — ANTICOAGULATION VISIT (OUTPATIENT)
Dept: CARDIOLOGY | Facility: CLINIC | Age: 65
End: 2025-06-06
Payer: COMMERCIAL

## 2025-06-06 DIAGNOSIS — Z79.01 ANTICOAGULATED: Primary | ICD-10-CM

## 2025-06-06 LAB — INR PPP: 2.3

## 2025-06-06 NOTE — PROGRESS NOTES
"[id number]@[facility]0-spoke with Gopal, no changes in meds or diet, \"I'm really trying to watch my greens\", therapeutic-lado Continue current warfarin dose, recheck INR in 6 weeks-paolao   "

## 2025-06-12 DIAGNOSIS — I50.42 CHRONIC COMBINED SYSTOLIC AND DIASTOLIC HEART FAILURE (CMS/HCC): ICD-10-CM

## 2025-06-12 DIAGNOSIS — I25.5 ISCHEMIC CARDIOMYOPATHY: ICD-10-CM

## 2025-07-16 ENCOUNTER — LAB (OUTPATIENT)
Dept: LAB | Facility: URGENT CARE | Age: 65
End: 2025-07-16
Payer: COMMERCIAL

## 2025-07-16 DIAGNOSIS — Z95.2 PRESENCE OF PROSTHETIC HEART VALVE: ICD-10-CM

## 2025-07-16 LAB
INR BLD: 2.4
PROTHROMBIN TIME: 26.7 SECONDS (ref 9.4–12.5)

## 2025-07-16 PROCEDURE — 36415 COLL VENOUS BLD VENIPUNCTURE: CPT | Performed by: FAMILY MEDICINE

## 2025-07-16 PROCEDURE — 85610 PROTHROMBIN TIME: CPT | Performed by: FAMILY MEDICINE

## 2025-07-17 ENCOUNTER — ANTICOAGULATION VISIT (OUTPATIENT)
Dept: CARDIOLOGY | Facility: CLINIC | Age: 65
End: 2025-07-17
Payer: COMMERCIAL

## 2025-07-17 DIAGNOSIS — Z95.2 PRESENCE OF PROSTHETIC HEART VALVE: ICD-10-CM

## 2025-07-17 DIAGNOSIS — Z79.01 ANTICOAGULATION MANAGEMENT ENCOUNTER: ICD-10-CM

## 2025-07-17 DIAGNOSIS — Z95.2 S/P AVR: Primary | Chronic | ICD-10-CM

## 2025-07-17 DIAGNOSIS — Z51.81 ANTICOAGULATION MANAGEMENT ENCOUNTER: ICD-10-CM

## 2025-07-17 LAB
INR PPP: 2.4
INR PPP: 2.4

## 2025-07-17 NOTE — PROGRESS NOTES
7/17/25  1405  ID verified. Spoke with  Gopal.  No medication or diet changes. No bleeding or bruising. Verified Warfarin dose and tablet size. Encouraged to call with any changes. Mailed reminder. QUINTEN    7/17/25  1405  Continue Warfarin dose 2.5 mg every Tue, Thu; 5 mg all other days . Check INR in 6 weeks, 8/28. Verbalized understanding. QUINTEN

## 2025-08-15 ENCOUNTER — PROCEDURE VISIT (OUTPATIENT)
Dept: ORTHOPEDIC SURGERY | Facility: CLINIC | Age: 65
End: 2025-08-15
Payer: COMMERCIAL

## 2025-08-15 VITALS
SYSTOLIC BLOOD PRESSURE: 114 MMHG | HEIGHT: 72 IN | WEIGHT: 211 LBS | HEART RATE: 72 BPM | OXYGEN SATURATION: 92 % | DIASTOLIC BLOOD PRESSURE: 65 MMHG | RESPIRATION RATE: 17 BRPM | BODY MASS INDEX: 28.58 KG/M2

## 2025-08-15 DIAGNOSIS — M65.4 DE QUERVAIN'S DISEASE (RADIAL STYLOID TENOSYNOVITIS): Primary | ICD-10-CM

## 2025-08-15 DIAGNOSIS — M18.12 ARTHRITIS OF CARPOMETACARPAL (CMC) JOINT OF LEFT THUMB: ICD-10-CM

## 2025-08-15 RX ORDER — TRIAMCINOLONE ACETONIDE 40 MG/ML
40 INJECTION, SUSPENSION INTRA-ARTICULAR; INTRAMUSCULAR
Status: COMPLETED | OUTPATIENT
Start: 2025-08-15 | End: 2025-08-15

## 2025-08-15 RX ORDER — BUPIVACAINE HYDROCHLORIDE 5 MG/ML
1 INJECTION, SOLUTION EPIDURAL; INTRACAUDAL; PERINEURAL
Status: COMPLETED | OUTPATIENT
Start: 2025-08-15 | End: 2025-08-15

## 2025-08-15 RX ADMIN — TRIAMCINOLONE ACETONIDE 40 MG: 40 INJECTION, SUSPENSION INTRA-ARTICULAR; INTRAMUSCULAR at 15:20

## 2025-08-15 RX ADMIN — BUPIVACAINE HYDROCHLORIDE 1 ML: 5 INJECTION, SOLUTION EPIDURAL; INTRACAUDAL; PERINEURAL at 15:20

## 2025-08-15 ASSESSMENT — PAIN DESCRIPTION - DESCRIPTORS: DESCRIPTORS: ACHING;RADIATING

## 2025-08-15 ASSESSMENT — PAIN - FUNCTIONAL ASSESSMENT: PAIN_FUNCTIONAL_ASSESSMENT: 0-10

## 2025-08-27 ENCOUNTER — LAB (OUTPATIENT)
Dept: LAB | Facility: URGENT CARE | Age: 65
End: 2025-08-27
Payer: COMMERCIAL

## 2025-08-27 DIAGNOSIS — Z51.81 ANTICOAGULATION MANAGEMENT ENCOUNTER: ICD-10-CM

## 2025-08-27 DIAGNOSIS — Z79.01 ANTICOAGULATION MANAGEMENT ENCOUNTER: ICD-10-CM

## 2025-09-02 ENCOUNTER — TELEPHONE (OUTPATIENT)
Dept: URGENT CARE | Facility: URGENT CARE | Age: 65
End: 2025-09-02
Payer: COMMERCIAL

## 2025-09-03 ENCOUNTER — OFFICE VISIT (OUTPATIENT)
Age: 65
End: 2025-09-03
Payer: COMMERCIAL

## 2025-09-03 DIAGNOSIS — Z95.2 PRESENCE OF PROSTHETIC HEART VALVE: ICD-10-CM

## 2025-09-03 LAB
INR BLD: 2.1
PROTHROMBIN TIME: 23.6 SECONDS (ref 9.4–12.5)

## 2025-09-04 ENCOUNTER — ANTICOAGULATION VISIT (OUTPATIENT)
Dept: CARDIOLOGY | Facility: CLINIC | Age: 65
End: 2025-09-04
Payer: COMMERCIAL

## 2025-09-04 DIAGNOSIS — Z51.81 ANTICOAGULATION MANAGEMENT ENCOUNTER: ICD-10-CM

## 2025-09-04 DIAGNOSIS — Z79.01 ANTICOAGULATION MANAGEMENT ENCOUNTER: ICD-10-CM

## 2025-09-04 DIAGNOSIS — Z95.2 PRESENCE OF PROSTHETIC HEART VALVE: ICD-10-CM

## 2025-09-04 DIAGNOSIS — Z95.2 S/P AVR: Primary | Chronic | ICD-10-CM

## 2025-09-04 LAB — INR PPP: 2.1

## (undated) DEVICE — BLADE CLIPPER SGL USE 9680

## (undated) DEVICE — GUIDE WIRE WHOLEY .035INX260CM INTERMEDIATE MODIFIED J/SHAPEABLE 0005281

## (undated) DEVICE — 6F SAFE SHEATH II, 13CM ***DUPLICATE, PLEASE TRANSITION TO CAT # 667460-300

## (undated) DEVICE — PREP SKIN SCRUB TRAY 4461A

## (undated) DEVICE — SU PROLENE 6-0 C-1DA 30" 8706H

## (undated) DEVICE — PROTECTOR ARM ONE-STEP TRENDELENBURG 40418

## (undated) DEVICE — KIT LATERAL TRACTION ARM ORDER QTY OF 5 EA

## (undated) DEVICE — SU MONOCRYL 4-0 PS-2 27" UND Y426H

## (undated) DEVICE — SURGICEL HEMOSTAT 4X8" 1952

## (undated) DEVICE — DRAIN CHEST TUBE RIGHT ANGLED 32FR 8132

## (undated) DEVICE — DRSG DRAIN 4X4" 7086

## (undated) DEVICE — LINEN TOWEL PACK X6 WHITE 5487

## (undated) DEVICE — CANNULA TWIST-IN 7MM X 7CM W/ NO SQUIRT CAP

## (undated) DEVICE — TUBING SUCTION DRAINAGE PLEURAL DUAL 8884714200

## (undated) DEVICE — GLOVE BIOGEL PI IND 6.5 SURGICAL

## (undated) DEVICE — POWERASP 4.0MM X 13CM

## (undated) DEVICE — GLIDESHEATH SLENDER 6FR 10CM .021 GW  A KIT

## (undated) DEVICE — NEEDLE SCORPION MULTIFIRE MIN 5 EA

## (undated) DEVICE — SYR BULB IRRIG DOVER 60 ML LATEX FREE 67000

## (undated) DEVICE — Device

## (undated) DEVICE — BONE WAX 2.5GM W31G

## (undated) DEVICE — CANNULA NASAL NOMOLINE LH WITH CO2 ADULT NON-INTUBATED PATIENT

## (undated) DEVICE — RX SURGIFLO HEMOSTATIC MATRIX W/THROMBIN 8ML 2994

## (undated) DEVICE — TIES BANDING T50R

## (undated) DEVICE — DEVICE KODIAK POLAR CARE CIRCULATING COLD THERAPY

## (undated) DEVICE — BLADE PLASMA 4.0 STERILE DISPOSABLE

## (undated) DEVICE — GLOVE BIOGEL PI IND SURGICAL SZ 7.5

## (undated) DEVICE — GLOVE SENSICARE 7.5 SURG

## (undated) DEVICE — WIPES FOLEY CARE SURESTEP PROVON DFC100

## (undated) DEVICE — CLIP HORIZON SM RED WIDE SLOT 001201

## (undated) DEVICE — DECANTER BAG 2002S

## (undated) DEVICE — GLOVE SENSICARE MICRO PF 6.5

## (undated) DEVICE — ESU HOLSTER PLASTIC DISP E2400

## (undated) DEVICE — KIT ROOM TURNOVER STANDARD 01C INFECTION CONTROL SYSTEM

## (undated) DEVICE — LEAD PACER MYOCARDIAL BIPOLAR TEMPORARY 53CM 6495F

## (undated) DEVICE — SUCTION CATH AIRLIFE TRI-FLO W/CONTROL PORT 14FR  T60C

## (undated) DEVICE — DRAIN CHEST TUBE 32FR STR 8032

## (undated) DEVICE — TIGERTAPE 2MM SOLD QTYS OF 6 ONLY

## (undated) DEVICE — SPONGE RAY-TEC 4X8" 7318

## (undated) DEVICE — DRAPE ARTHROSCOPY SPLIT SHEET W POUCH DRAWSTRING

## (undated) DEVICE — SUCTION DRY CHEST DRAIN OASIS 3600-100

## (undated) DEVICE — SUTURE ETHILON 3-0 PS-2 18" 3/8CR BLK MONO N ABSORBABLE

## (undated) DEVICE — BAND TR STD W INFLATOR

## (undated) DEVICE — TAPE MICROPORE 2" PAPER

## (undated) DEVICE — DRAPE SHEET REV FOLD 3/4 9349

## (undated) DEVICE — SU PROLENE 4-0 RB-1DA 36" 8557H

## (undated) DEVICE — INTRO SHEATH MICRO PLATINUM TIP 4FRX40CM 7274

## (undated) DEVICE — GLOVE PROTEXIS W/NEU-THERA 7.5  2D73TE75

## (undated) DEVICE — SPONGE GAUZE 12PLY 4X4 STL

## (undated) DEVICE — SUTURE FIBERTAPE 2MM BRAIDED BLUE POLYBLEND

## (undated) DEVICE — SU SILK 0 TIE 6X30" A306H

## (undated) DEVICE — SNARE CAPTIVATOR II STIFF 15MM SHEATH 2.4MM

## (undated) DEVICE — SU VICRYL 0 CTX 36" J370H

## (undated) DEVICE — DRAPE STERI U-DRAPE

## (undated) DEVICE — SU PLEDGET SOFT TFE 13MMX7MMX1.5MM D7044

## (undated) DEVICE — SU PROLENE 4-0 SHDA 36" 8521H

## (undated) DEVICE — SPONGE LAP 18X18" X8435

## (undated) DEVICE — BANDAGE D-STAT FLOWABLE 4000 LIQUID HEMOSTAT

## (undated) DEVICE — SU SNTH BRD NABSB 20MM SH-2 GRN WHT STRL PXX82N

## (undated) DEVICE — BLADE SAW OSCILLATING STRK 53X32X0.38MM 5400-134-282

## (undated) DEVICE — MULTIPORT APOLLO RF 90DEG ARTHREX

## (undated) DEVICE — SU ETHIBOND 2-0 V-5 EXC 30" PXX82

## (undated) DEVICE — KNIFE SERRATE AGGRESSIVE SONOPET 12.4X8MM 5450-815-114

## (undated) DEVICE — SU PROLENE 5-0 RB-1DA 36"  8556H

## (undated) DEVICE — CATH DXTERITY 5F JL3.5 100CM

## (undated) DEVICE — SU PLEDGET SOFT TFE 3/8"X3/26"X1/16" PCP40

## (undated) DEVICE — SOL NACL 0.9% IRRIG 1000ML BOTTLE 2F7124

## (undated) DEVICE — SU STEEL 6 CCS 4X18" M654G

## (undated) DEVICE — CATH DIAG 5F AL1 @

## (undated) DEVICE — DRSG PRIMAPORE 03 1/8X6" 66000318

## (undated) DEVICE — TOURNIQUET VASCULAR KIT 7 1/2" 79012

## (undated) DEVICE — SOL NACL 0.9% 10ML VIAL 0409-4888-02

## (undated) DEVICE — SU PROLENE 5-0 RB-2DA 30" 8710H

## (undated) DEVICE — PREP CHLORAPREP 26ML TINTED HI-LITE ORANGE 930815

## (undated) DEVICE — SU PROLENE 3-0 SHDA 36" 8522H

## (undated) DEVICE — ELECTRODE DEFIB CADENCE 22550R

## (undated) DEVICE — ADH SKIN CLOSURE PREMIERPRO EXOFIN 1.0ML 3470

## (undated) DEVICE — ESU ELEC BLADE 2.75" COATED/INSULATED E1455

## (undated) DEVICE — CATH DXTERITY 5F JR40 100CM

## (undated) DEVICE — SUCTION MANIFOLD NEPTUNE 2 SYS 4 PORT 0702-020-000

## (undated) DEVICE — GLOVE BIOGEL PI INDICATOR SIZE 8.0 UNDERGLOVE 0

## (undated) DEVICE — SOL NACL 0.9% INJ 1000ML BAG 2B1324X

## (undated) DEVICE — DRSG TELFA 3X8" 1238

## (undated) DEVICE — SOL WATER IRRIG 1000ML BOTTLE 2F7114

## (undated) DEVICE — ESU ELEC BLADE 6" COATED/INSULATED E1455-6

## (undated) DEVICE — TRAP POLYP E TRAP

## (undated) DEVICE — CLIP HORIZON MED BLUE 002200

## (undated) DEVICE — DEFIB PRO-PADZ LVP LQD GEL ADULT 8900-2105-01

## (undated) DEVICE — SUCTION YANKAUER BULB TIP W 1 PIECE HANDLE

## (undated) DEVICE — ESU ELEC BLADE E-SEP INSULATED NEPTUNE 70MM 0703-070-002

## (undated) DEVICE — SU ETHIBOND 3-0 BBDA 36" X588H

## (undated) DEVICE — PREP SCRUB SOL EXIDINE 4% CHG 4OZ 29002-404

## (undated) DEVICE — DRSG ABDOMINAL 07 1/2X8" 7197D

## (undated) DEVICE — SUTURE VICRYL 3-0 SH 27" UNDYED

## (undated) DEVICE — IRRIGATION WOUND IRRISEPT WOUND DEBRIDEMENT SYSTEM

## (undated) DEVICE — SU ETHIBOND 2-0 SHDA 30" X563H

## (undated) DEVICE — TAPE MEDIPORE 4"X2YD 2864

## (undated) DEVICE — DRAPE IOBAN INCISE 23X17" 6650EZ

## (undated) DEVICE — DRESSING 5X9" ABD PAD CURITY STL

## (undated) DEVICE — TOWEL DISP BLUE STL 4 PK

## (undated) DEVICE — PAD BOVIE ADULT 9' CORD REM ELECTRODE PATIENT RETURN

## (undated) DEVICE — TUBING INSUFFLATION PNEUMOCLEAR 0620050100

## (undated) DEVICE — SU ETHIBOND 0 CT-1 CR 8X18" CX21D

## (undated) DEVICE — SU STEEL MYO/WIRE II STERNOTOMY 8 BE-1 3X14" 048-217

## (undated) DEVICE — SOL NACL 0.9% IRRIG 3000ML BAG 2B7477

## (undated) DEVICE — LINEN TOWEL PACK X30 5481

## (undated) DEVICE — BANDAGE CO-FLEX 4"X5YD STL SELF ADHESIVE

## (undated) DEVICE — PACK ADULT HEART UMMC PV15CG92D

## (undated) DEVICE — PAD COLD THREAPY SHLDR13.5X14" KODIAK POLAR CARE

## (undated) DEVICE — DRAPE HYH LRG 76 X 55 INCHES

## (undated) DEVICE — CABLE PACING L2.5 MR CONNECTOR ALLIGATOR CLIP 343586

## (undated) DEVICE — HEMOSTASIS BONE OSTENE 2.5G SYNTHETIC 1503832

## (undated) DEVICE — SU VICRYL 2-0 CT-1 27" UND J259H

## (undated) DEVICE — SYR 10ML LL W/O NDL 302995

## (undated) DEVICE — ESU EYE LOW TEMP AA17

## (undated) DEVICE — TUBING SUCTION 3/16"X10'

## (undated) DEVICE — DRAPE STERI SM

## (undated) DEVICE — DRAPE PLASTIC IMPERVIOUS SPLIT

## (undated) DEVICE — TUBING ARTHROSCOPY ARTHREX MIN 10 EACH

## (undated) DEVICE — GLOVE SENSICARE 8.0 SURG

## (undated) DEVICE — TRAY ARTHROSCOPY CUSTOM RCRH CUSTOM PACK

## (undated) DEVICE — BLADE EXCALIBUR 4.0MMX13CM

## (undated) DEVICE — TIP YANKAUER OPEN CLEAR

## (undated) DEVICE — DRESSING ADAPTIC 3X8

## (undated) DEVICE — SNARE EXACTO COLD 9MM

## (undated) DEVICE — ESU PENCIL SMOKE EVAC W/ROCKER SWITCH 0703-047-000

## (undated) DEVICE — CATH DXTERITY 5F PG145 110 CM

## (undated) DEVICE — DRAPE FLUID WARMING 52 X 60" ORS-321

## (undated) DEVICE — NDL COUNTER 40CT  31142311

## (undated) DEVICE — DRAIN CHEST TUBE EXTENDED STR LF 19907

## (undated) DEVICE — PREP SKIN CHLORAPREP ORNG 26ML STL

## (undated) DEVICE — KIT ENDO PROCEDURE CARRY ON

## (undated) RX ORDER — CEFAZOLIN SODIUM 2 G/100ML
INJECTION, SOLUTION INTRAVENOUS
Status: DISPENSED
Start: 2022-09-15

## (undated) RX ORDER — HEPARIN SODIUM 1000 [USP'U]/ML
INJECTION, SOLUTION INTRAVENOUS; SUBCUTANEOUS
Status: DISPENSED
Start: 2022-09-06

## (undated) RX ORDER — CEFAZOLIN SODIUM 2 G/100ML
INJECTION, SOLUTION INTRAVENOUS
Status: DISPENSED
Start: 2022-09-09

## (undated) RX ORDER — CEFAZOLIN SODIUM 1 G/3ML
INJECTION, POWDER, FOR SOLUTION INTRAMUSCULAR; INTRAVENOUS
Status: DISPENSED
Start: 2022-09-06

## (undated) RX ORDER — CHLORHEXIDINE GLUCONATE ORAL RINSE 1.2 MG/ML
SOLUTION DENTAL
Status: DISPENSED
Start: 2022-09-01

## (undated) RX ORDER — ASPIRIN 81 MG/1
TABLET, CHEWABLE ORAL
Status: DISPENSED
Start: 2022-09-01

## (undated) RX ORDER — FENTANYL CITRATE 50 UG/ML
INJECTION, SOLUTION INTRAMUSCULAR; INTRAVENOUS
Status: DISPENSED
Start: 2022-09-09

## (undated) RX ORDER — LIDOCAINE HYDROCHLORIDE 20 MG/ML
INJECTION, SOLUTION EPIDURAL; INFILTRATION; INTRACAUDAL; PERINEURAL
Status: DISPENSED
Start: 2022-09-06

## (undated) RX ORDER — FENTANYL CITRATE 50 UG/ML
INJECTION, SOLUTION INTRAMUSCULAR; INTRAVENOUS
Status: DISPENSED
Start: 2022-09-01

## (undated) RX ORDER — BUPIVACAINE HYDROCHLORIDE AND EPINEPHRINE 2.5; 5 MG/ML; UG/ML
INJECTION, SOLUTION EPIDURAL; INFILTRATION; INTRACAUDAL; PERINEURAL
Status: DISPENSED
Start: 2022-09-01

## (undated) RX ORDER — DIPHENHYDRAMINE HYDROCHLORIDE 50 MG/ML
INJECTION INTRAMUSCULAR; INTRAVENOUS
Status: DISPENSED
Start: 2022-09-15

## (undated) RX ORDER — CEFAZOLIN SODIUM/WATER 2 G/20 ML
SYRINGE (ML) INTRAVENOUS
Status: DISPENSED
Start: 2022-09-06

## (undated) RX ORDER — EPHEDRINE SULFATE 50 MG/ML
INJECTION, SOLUTION INTRAMUSCULAR; INTRAVENOUS; SUBCUTANEOUS
Status: DISPENSED
Start: 2022-09-06

## (undated) RX ORDER — GABAPENTIN 300 MG/1
CAPSULE ORAL
Status: DISPENSED
Start: 2022-09-06

## (undated) RX ORDER — FENTANYL CITRATE 50 UG/ML
INJECTION, SOLUTION INTRAMUSCULAR; INTRAVENOUS
Status: DISPENSED
Start: 2022-09-15

## (undated) RX ORDER — FENTANYL CITRATE 50 UG/ML
INJECTION, SOLUTION INTRAMUSCULAR; INTRAVENOUS
Status: DISPENSED
Start: 2022-09-06

## (undated) RX ORDER — FENTANYL CITRATE-0.9 % NACL/PF 10 MCG/ML
PLASTIC BAG, INJECTION (ML) INTRAVENOUS
Status: DISPENSED
Start: 2022-09-06

## (undated) RX ORDER — PROPOFOL 10 MG/ML
INJECTION, EMULSION INTRAVENOUS
Status: DISPENSED
Start: 2022-09-06

## (undated) RX ORDER — GABAPENTIN 300 MG/1
CAPSULE ORAL
Status: DISPENSED
Start: 2022-09-01

## (undated) RX ORDER — ACETAMINOPHEN 325 MG/1
TABLET ORAL
Status: DISPENSED
Start: 2022-09-06

## (undated) RX ORDER — ACETAMINOPHEN 325 MG/1
TABLET ORAL
Status: DISPENSED
Start: 2022-09-01

## (undated) RX ORDER — LIDOCAINE HYDROCHLORIDE 20 MG/ML
INJECTION, SOLUTION INFILTRATION; PERINEURAL
Status: DISPENSED
Start: 2022-09-09

## (undated) RX ORDER — CEFAZOLIN SODIUM 1 G/3ML
INJECTION, POWDER, FOR SOLUTION INTRAMUSCULAR; INTRAVENOUS
Status: DISPENSED
Start: 2022-09-09